# Patient Record
Sex: MALE | Race: WHITE | NOT HISPANIC OR LATINO | Employment: OTHER | URBAN - METROPOLITAN AREA
[De-identification: names, ages, dates, MRNs, and addresses within clinical notes are randomized per-mention and may not be internally consistent; named-entity substitution may affect disease eponyms.]

---

## 2017-04-18 ENCOUNTER — HOSPITAL ENCOUNTER (OUTPATIENT)
Dept: LAB | Facility: MEDICAL CENTER | Age: 77
End: 2017-04-18
Attending: UROLOGY
Payer: MEDICARE

## 2017-04-18 LAB
25(OH)D3 SERPL-MCNC: 26 NG/ML (ref 30–100)
ALBUMIN SERPL BCP-MCNC: 3.9 G/DL (ref 3.2–4.9)
ALBUMIN/GLOB SERPL: 1.3 G/DL
ALP SERPL-CCNC: 63 U/L (ref 30–99)
ALT SERPL-CCNC: 17 U/L (ref 2–50)
ANION GAP SERPL CALC-SCNC: 6 MMOL/L (ref 0–11.9)
AST SERPL-CCNC: 14 U/L (ref 12–45)
BASOPHILS # BLD AUTO: 0.9 % (ref 0–1.8)
BASOPHILS # BLD: 0.06 K/UL (ref 0–0.12)
BILIRUB SERPL-MCNC: 0.4 MG/DL (ref 0.1–1.5)
BUN SERPL-MCNC: 27 MG/DL (ref 8–22)
CALCIUM SERPL-MCNC: 9.8 MG/DL (ref 8.5–10.5)
CHLORIDE SERPL-SCNC: 105 MMOL/L (ref 96–112)
CO2 SERPL-SCNC: 27 MMOL/L (ref 20–33)
CREAT SERPL-MCNC: 1.78 MG/DL (ref 0.5–1.4)
EOSINOPHIL # BLD AUTO: 0.08 K/UL (ref 0–0.51)
EOSINOPHIL NFR BLD: 1.2 % (ref 0–6.9)
ERYTHROCYTE [DISTWIDTH] IN BLOOD BY AUTOMATED COUNT: 43 FL (ref 35.9–50)
GFR SERPL CREATININE-BSD FRML MDRD: 37 ML/MIN/1.73 M 2
GLOBULIN SER CALC-MCNC: 2.9 G/DL (ref 1.9–3.5)
GLUCOSE SERPL-MCNC: 142 MG/DL (ref 65–99)
HCT VFR BLD AUTO: 45.6 % (ref 42–52)
HGB BLD-MCNC: 15.3 G/DL (ref 14–18)
IMM GRANULOCYTES # BLD AUTO: 0.04 K/UL (ref 0–0.11)
IMM GRANULOCYTES NFR BLD AUTO: 0.6 % (ref 0–0.9)
LYMPHOCYTES # BLD AUTO: 1.88 K/UL (ref 1–4.8)
LYMPHOCYTES NFR BLD: 28 % (ref 22–41)
MCH RBC QN AUTO: 29.3 PG (ref 27–33)
MCHC RBC AUTO-ENTMCNC: 33.6 G/DL (ref 33.7–35.3)
MCV RBC AUTO: 87.4 FL (ref 81.4–97.8)
MONOCYTES # BLD AUTO: 0.75 K/UL (ref 0–0.85)
MONOCYTES NFR BLD AUTO: 11.2 % (ref 0–13.4)
NEUTROPHILS # BLD AUTO: 3.91 K/UL (ref 1.82–7.42)
NEUTROPHILS NFR BLD: 58.1 % (ref 44–72)
NRBC # BLD AUTO: 0 K/UL
NRBC BLD AUTO-RTO: 0 /100 WBC
PLATELET # BLD AUTO: 228 K/UL (ref 164–446)
PMV BLD AUTO: 11.6 FL (ref 9–12.9)
POTASSIUM SERPL-SCNC: 3.9 MMOL/L (ref 3.6–5.5)
PROT SERPL-MCNC: 6.8 G/DL (ref 6–8.2)
RBC # BLD AUTO: 5.22 M/UL (ref 4.7–6.1)
SODIUM SERPL-SCNC: 138 MMOL/L (ref 135–145)
TESTOST SERPL-MCNC: 389 NG/DL (ref 175–781)
WBC # BLD AUTO: 6.7 K/UL (ref 4.8–10.8)

## 2017-04-18 PROCEDURE — 84403 ASSAY OF TOTAL TESTOSTERONE: CPT

## 2017-04-18 PROCEDURE — 80053 COMPREHEN METABOLIC PANEL: CPT

## 2017-04-18 PROCEDURE — 36415 COLL VENOUS BLD VENIPUNCTURE: CPT

## 2017-04-18 PROCEDURE — 82306 VITAMIN D 25 HYDROXY: CPT

## 2017-04-18 PROCEDURE — 85025 COMPLETE CBC W/AUTO DIFF WBC: CPT

## 2017-05-25 ENCOUNTER — HOSPITAL ENCOUNTER (OUTPATIENT)
Dept: LAB | Facility: MEDICAL CENTER | Age: 77
End: 2017-05-25
Attending: FAMILY MEDICINE
Payer: MEDICARE

## 2017-05-25 LAB
APPEARANCE UR: CLEAR
BILIRUB UR QL STRIP.AUTO: NEGATIVE
COLOR UR: ABNORMAL
CREAT UR-MCNC: 90.1 MG/DL
CULTURE IF INDICATED INDCX: NO UA CULTURE
GFR SERPL CREATININE-BSD FRML MDRD: 40 ML/MIN/1.73 M 2
GLUCOSE UR STRIP.AUTO-MCNC: NEGATIVE MG/DL
KETONES UR STRIP.AUTO-MCNC: NEGATIVE MG/DL
LEUKOCYTE ESTERASE UR QL STRIP.AUTO: NEGATIVE
MICRO URNS: ABNORMAL
MICROALBUMIN UR-MCNC: 93.3 MG/DL
MICROALBUMIN/CREAT UR: 1036 MG/G (ref 0–30)
NITRITE UR QL STRIP.AUTO: NEGATIVE
PH UR STRIP.AUTO: 7 [PH]
PROT UR QL STRIP: 100 MG/DL
RBC # URNS HPF: ABNORMAL /HPF
RBC UR QL AUTO: NEGATIVE
SP GR UR STRIP.AUTO: 1.01
WBC #/AREA URNS HPF: ABNORMAL /HPF

## 2017-05-25 PROCEDURE — 36415 COLL VENOUS BLD VENIPUNCTURE: CPT

## 2017-05-25 PROCEDURE — 82570 ASSAY OF URINE CREATININE: CPT

## 2017-05-25 PROCEDURE — 81001 URINALYSIS AUTO W/SCOPE: CPT

## 2017-05-25 PROCEDURE — 83036 HEMOGLOBIN GLYCOSYLATED A1C: CPT

## 2017-05-25 PROCEDURE — 84153 ASSAY OF PSA TOTAL: CPT

## 2017-05-25 PROCEDURE — 80061 LIPID PANEL: CPT

## 2017-05-25 PROCEDURE — 82043 UR ALBUMIN QUANTITATIVE: CPT

## 2017-05-25 PROCEDURE — 80053 COMPREHEN METABOLIC PANEL: CPT

## 2017-05-26 LAB
ALBUMIN SERPL BCP-MCNC: 3.7 G/DL (ref 3.2–4.9)
ALBUMIN/GLOB SERPL: 1.3 G/DL
ALP SERPL-CCNC: 64 U/L (ref 30–99)
ALT SERPL-CCNC: 20 U/L (ref 2–50)
ANION GAP SERPL CALC-SCNC: 8 MMOL/L (ref 0–11.9)
AST SERPL-CCNC: 17 U/L (ref 12–45)
BILIRUB SERPL-MCNC: 0.6 MG/DL (ref 0.1–1.5)
BUN SERPL-MCNC: 27 MG/DL (ref 8–22)
CALCIUM SERPL-MCNC: 9.7 MG/DL (ref 8.5–10.5)
CHLORIDE SERPL-SCNC: 102 MMOL/L (ref 96–112)
CHOLEST SERPL-MCNC: 175 MG/DL (ref 100–199)
CO2 SERPL-SCNC: 28 MMOL/L (ref 20–33)
CREAT SERPL-MCNC: 1.69 MG/DL (ref 0.5–1.4)
EST. AVERAGE GLUCOSE BLD GHB EST-MCNC: 151 MG/DL
GLOBULIN SER CALC-MCNC: 2.9 G/DL (ref 1.9–3.5)
GLUCOSE SERPL-MCNC: 128 MG/DL (ref 65–99)
HBA1C MFR BLD: 6.9 % (ref 0–5.6)
HDLC SERPL-MCNC: 41 MG/DL
LDLC SERPL CALC-MCNC: 73 MG/DL
POTASSIUM SERPL-SCNC: 4 MMOL/L (ref 3.6–5.5)
PROT SERPL-MCNC: 6.6 G/DL (ref 6–8.2)
PSA SERPL-MCNC: 1.91 NG/ML (ref 0–4)
SODIUM SERPL-SCNC: 138 MMOL/L (ref 135–145)
TRIGL SERPL-MCNC: 303 MG/DL (ref 0–149)

## 2017-10-19 ENCOUNTER — APPOINTMENT (OUTPATIENT)
Dept: SOCIAL WORK | Facility: CLINIC | Age: 77
End: 2017-10-19
Payer: MEDICARE

## 2017-10-19 PROCEDURE — G0008 ADMIN INFLUENZA VIRUS VAC: HCPCS | Performed by: REGISTERED NURSE

## 2017-10-19 PROCEDURE — 90662 IIV NO PRSV INCREASED AG IM: CPT | Performed by: REGISTERED NURSE

## 2018-02-17 ENCOUNTER — HOSPITAL ENCOUNTER (OUTPATIENT)
Dept: LAB | Facility: MEDICAL CENTER | Age: 78
End: 2018-02-17
Attending: FAMILY MEDICINE
Payer: MEDICARE

## 2018-02-17 LAB
ALBUMIN SERPL BCP-MCNC: 4 G/DL (ref 3.2–4.9)
ALBUMIN/GLOB SERPL: 1.4 G/DL
ALP SERPL-CCNC: 65 U/L (ref 30–99)
ALT SERPL-CCNC: 15 U/L (ref 2–50)
ANION GAP SERPL CALC-SCNC: 11 MMOL/L (ref 0–11.9)
AST SERPL-CCNC: 15 U/L (ref 12–45)
BASOPHILS # BLD AUTO: 0.6 % (ref 0–1.8)
BASOPHILS # BLD: 0.07 K/UL (ref 0–0.12)
BILIRUB SERPL-MCNC: 0.4 MG/DL (ref 0.1–1.5)
BUN SERPL-MCNC: 24 MG/DL (ref 8–22)
CALCIUM SERPL-MCNC: 9.8 MG/DL (ref 8.5–10.5)
CHLORIDE SERPL-SCNC: 101 MMOL/L (ref 96–112)
CHOLEST SERPL-MCNC: 175 MG/DL (ref 100–199)
CO2 SERPL-SCNC: 26 MMOL/L (ref 20–33)
CREAT SERPL-MCNC: 1.61 MG/DL (ref 0.5–1.4)
CREAT UR-MCNC: 54.4 MG/DL
EOSINOPHIL # BLD AUTO: 0.14 K/UL (ref 0–0.51)
EOSINOPHIL NFR BLD: 1.1 % (ref 0–6.9)
ERYTHROCYTE [DISTWIDTH] IN BLOOD BY AUTOMATED COUNT: 44.3 FL (ref 35.9–50)
EST. AVERAGE GLUCOSE BLD GHB EST-MCNC: 157 MG/DL
GLOBULIN SER CALC-MCNC: 2.9 G/DL (ref 1.9–3.5)
GLUCOSE SERPL-MCNC: 122 MG/DL (ref 65–99)
HBA1C MFR BLD: 7.1 % (ref 0–5.6)
HCT VFR BLD AUTO: 46.7 % (ref 42–52)
HDLC SERPL-MCNC: 39 MG/DL
HGB BLD-MCNC: 15.3 G/DL (ref 14–18)
IMM GRANULOCYTES # BLD AUTO: 0.03 K/UL (ref 0–0.11)
IMM GRANULOCYTES NFR BLD AUTO: 0.2 % (ref 0–0.9)
LDLC SERPL CALC-MCNC: ABNORMAL MG/DL
LYMPHOCYTES # BLD AUTO: 2.18 K/UL (ref 1–4.8)
LYMPHOCYTES NFR BLD: 17.4 % (ref 22–41)
MCH RBC QN AUTO: 29.3 PG (ref 27–33)
MCHC RBC AUTO-ENTMCNC: 32.8 G/DL (ref 33.7–35.3)
MCV RBC AUTO: 89.3 FL (ref 81.4–97.8)
MICROALBUMIN UR-MCNC: 51.7 MG/DL
MICROALBUMIN/CREAT UR: 950 MG/G (ref 0–30)
MONOCYTES # BLD AUTO: 0.91 K/UL (ref 0–0.85)
MONOCYTES NFR BLD AUTO: 7.3 % (ref 0–13.4)
NEUTROPHILS # BLD AUTO: 9.22 K/UL (ref 1.82–7.42)
NEUTROPHILS NFR BLD: 73.4 % (ref 44–72)
NRBC # BLD AUTO: 0 K/UL
NRBC BLD-RTO: 0 /100 WBC
PLATELET # BLD AUTO: 258 K/UL (ref 164–446)
PMV BLD AUTO: 11.3 FL (ref 9–12.9)
POTASSIUM SERPL-SCNC: 3.8 MMOL/L (ref 3.6–5.5)
PROT SERPL-MCNC: 6.9 G/DL (ref 6–8.2)
RBC # BLD AUTO: 5.23 M/UL (ref 4.7–6.1)
SODIUM SERPL-SCNC: 138 MMOL/L (ref 135–145)
TRIGL SERPL-MCNC: 463 MG/DL (ref 0–149)
URATE SERPL-MCNC: 5.9 MG/DL (ref 2.5–8.3)
WBC # BLD AUTO: 12.6 K/UL (ref 4.8–10.8)

## 2018-02-17 PROCEDURE — 82043 UR ALBUMIN QUANTITATIVE: CPT

## 2018-02-17 PROCEDURE — 84550 ASSAY OF BLOOD/URIC ACID: CPT

## 2018-02-17 PROCEDURE — 36415 COLL VENOUS BLD VENIPUNCTURE: CPT

## 2018-02-17 PROCEDURE — 85025 COMPLETE CBC W/AUTO DIFF WBC: CPT

## 2018-02-17 PROCEDURE — 80053 COMPREHEN METABOLIC PANEL: CPT

## 2018-02-17 PROCEDURE — 80061 LIPID PANEL: CPT

## 2018-02-17 PROCEDURE — 82570 ASSAY OF URINE CREATININE: CPT

## 2018-02-17 PROCEDURE — 83036 HEMOGLOBIN GLYCOSYLATED A1C: CPT

## 2018-04-25 ENCOUNTER — HOSPITAL ENCOUNTER (OUTPATIENT)
Dept: LAB | Facility: MEDICAL CENTER | Age: 78
End: 2018-04-25
Attending: UROLOGY
Payer: MEDICARE

## 2018-04-25 LAB
ALBUMIN SERPL BCP-MCNC: 3.8 G/DL (ref 3.2–4.9)
ALBUMIN/GLOB SERPL: 1.3 G/DL
ALP SERPL-CCNC: 58 U/L (ref 30–99)
ALT SERPL-CCNC: 16 U/L (ref 2–50)
ANION GAP SERPL CALC-SCNC: 6 MMOL/L (ref 0–11.9)
AST SERPL-CCNC: 13 U/L (ref 12–45)
BILIRUB SERPL-MCNC: 0.4 MG/DL (ref 0.1–1.5)
BUN SERPL-MCNC: 33 MG/DL (ref 8–22)
CALCIUM SERPL-MCNC: 9.5 MG/DL (ref 8.5–10.5)
CHLORIDE SERPL-SCNC: 107 MMOL/L (ref 96–112)
CO2 SERPL-SCNC: 27 MMOL/L (ref 20–33)
CREAT SERPL-MCNC: 2.07 MG/DL (ref 0.5–1.4)
ERYTHROCYTE [DISTWIDTH] IN BLOOD BY AUTOMATED COUNT: 45.1 FL (ref 35.9–50)
GLOBULIN SER CALC-MCNC: 2.9 G/DL (ref 1.9–3.5)
GLUCOSE SERPL-MCNC: 131 MG/DL (ref 65–99)
HCT VFR BLD AUTO: 45.1 % (ref 42–52)
HGB BLD-MCNC: 14.4 G/DL (ref 14–18)
MCH RBC QN AUTO: 28.6 PG (ref 27–33)
MCHC RBC AUTO-ENTMCNC: 31.9 G/DL (ref 33.7–35.3)
MCV RBC AUTO: 89.5 FL (ref 81.4–97.8)
PLATELET # BLD AUTO: 253 K/UL (ref 164–446)
PMV BLD AUTO: 10.7 FL (ref 9–12.9)
POTASSIUM SERPL-SCNC: 3.7 MMOL/L (ref 3.6–5.5)
PROT SERPL-MCNC: 6.7 G/DL (ref 6–8.2)
RBC # BLD AUTO: 5.04 M/UL (ref 4.7–6.1)
SODIUM SERPL-SCNC: 140 MMOL/L (ref 135–145)
WBC # BLD AUTO: 6.9 K/UL (ref 4.8–10.8)

## 2018-04-25 PROCEDURE — 84270 ASSAY OF SEX HORMONE GLOBUL: CPT

## 2018-04-25 PROCEDURE — 85027 COMPLETE CBC AUTOMATED: CPT

## 2018-04-25 PROCEDURE — 84403 ASSAY OF TOTAL TESTOSTERONE: CPT

## 2018-04-25 PROCEDURE — 36415 COLL VENOUS BLD VENIPUNCTURE: CPT

## 2018-04-25 PROCEDURE — 80053 COMPREHEN METABOLIC PANEL: CPT

## 2018-04-27 LAB
SHBG SERPL-SCNC: 35 NMOL/L (ref 11–80)
TESTOST FREE MFR SERPL: 1.9 % (ref 1.6–2.9)
TESTOST FREE SERPL-MCNC: 121 PG/ML (ref 47–244)
TESTOST SERPL-MCNC: 637 NG/DL (ref 300–720)

## 2018-06-05 ENCOUNTER — HOSPITAL ENCOUNTER (OUTPATIENT)
Dept: LAB | Facility: MEDICAL CENTER | Age: 78
End: 2018-06-05
Attending: FAMILY MEDICINE
Payer: MEDICARE

## 2018-06-05 LAB
ALBUMIN SERPL BCP-MCNC: 4.1 G/DL (ref 3.2–4.9)
ALBUMIN/GLOB SERPL: 1.3 G/DL
ALP SERPL-CCNC: 61 U/L (ref 30–99)
ALT SERPL-CCNC: 19 U/L (ref 2–50)
ANION GAP SERPL CALC-SCNC: 10 MMOL/L (ref 0–11.9)
APPEARANCE UR: CLEAR
AST SERPL-CCNC: 12 U/L (ref 12–45)
BACTERIA #/AREA URNS HPF: NEGATIVE /HPF
BILIRUB SERPL-MCNC: 0.6 MG/DL (ref 0.1–1.5)
BILIRUB UR QL STRIP.AUTO: NEGATIVE
BUN SERPL-MCNC: 34 MG/DL (ref 8–22)
CALCIUM SERPL-MCNC: 10.3 MG/DL (ref 8.5–10.5)
CHLORIDE SERPL-SCNC: 103 MMOL/L (ref 96–112)
CO2 SERPL-SCNC: 26 MMOL/L (ref 20–33)
COLOR UR: YELLOW
CREAT SERPL-MCNC: 1.63 MG/DL (ref 0.5–1.4)
EPI CELLS #/AREA URNS HPF: NEGATIVE /HPF
EST. AVERAGE GLUCOSE BLD GHB EST-MCNC: 151 MG/DL
GLOBULIN SER CALC-MCNC: 3.1 G/DL (ref 1.9–3.5)
GLUCOSE SERPL-MCNC: 120 MG/DL (ref 65–99)
GLUCOSE UR STRIP.AUTO-MCNC: NEGATIVE MG/DL
HBA1C MFR BLD: 6.9 % (ref 0–5.6)
HYALINE CASTS #/AREA URNS LPF: ABNORMAL /LPF
KETONES UR STRIP.AUTO-MCNC: NEGATIVE MG/DL
LEUKOCYTE ESTERASE UR QL STRIP.AUTO: NEGATIVE
MICRO URNS: ABNORMAL
NITRITE UR QL STRIP.AUTO: NEGATIVE
PH UR STRIP.AUTO: 6.5 [PH]
POTASSIUM SERPL-SCNC: 3.9 MMOL/L (ref 3.6–5.5)
PROT SERPL-MCNC: 7.2 G/DL (ref 6–8.2)
PROT UR QL STRIP: 100 MG/DL
RBC # URNS HPF: ABNORMAL /HPF
RBC UR QL AUTO: NEGATIVE
SODIUM SERPL-SCNC: 139 MMOL/L (ref 135–145)
SP GR UR STRIP.AUTO: 1.01
UROBILINOGEN UR STRIP.AUTO-MCNC: 0.2 MG/DL
WBC #/AREA URNS HPF: ABNORMAL /HPF

## 2018-06-05 PROCEDURE — 83036 HEMOGLOBIN GLYCOSYLATED A1C: CPT

## 2018-06-05 PROCEDURE — 36415 COLL VENOUS BLD VENIPUNCTURE: CPT

## 2018-06-05 PROCEDURE — 81001 URINALYSIS AUTO W/SCOPE: CPT

## 2018-06-05 PROCEDURE — 80053 COMPREHEN METABOLIC PANEL: CPT

## 2018-08-14 ENCOUNTER — PATIENT OUTREACH (OUTPATIENT)
Dept: HEALTH INFORMATION MANAGEMENT | Facility: OTHER | Age: 78
End: 2018-08-14

## 2018-08-14 NOTE — PROGRESS NOTES
Outcome: Left Message    Please transfer to Patient Outreach Team at [phone] when patient returns call.    WebIZ Checked & Epic Updated:  yes    HealthConnect Verified: yes    Attempt # 1

## 2018-09-05 NOTE — PROGRESS NOTES
Outcome: Requested A Call Back     Please transfer to Patient Outreach Team at [phone] when patient returns call.      Attempt # 2

## 2018-09-17 NOTE — PROGRESS NOTES
1. Attempt #:3    2. WebIZ Checked & Epic Updated: Yes  3. HealthConnect Verified: yes  4. Verify PCP: yes    5. Communication Preference Obtained: yes    6. Diabetes Visit Scheduling  Scheduling Status:Not Scheduled. Patient states they are no longer with PCP      7. Care Gap Scheduling (Attempt to Schedule EACH Overdue Care Gap!)    Health Maintenance Due   Topic Date Due   • Annual Wellness Visit  1940   • DIABETES MONOFILAMENT / LE EXAM  1940   • RETINAL SCREENING  04/29/1959   • IMM HEP B VACCINE (1 of 3 - Risk 3-dose series) 04/29/1960   • IMM DTaP/Tdap/Td Vaccine (1 - Tdap) 04/29/1960   • COLONOSCOPY  04/29/1991   • IMM ZOSTER VACCINES (1 of 2) 04/29/1991   • IMM INFLUENZA (1) 09/01/2018        8. Patient was directed to Health and Wellness Website: no  Has a non [facility] pcp    9. Screened for Food [employer] Prescription? yes  10. Evirx Activation: already active  11. Evirx Latesha: no  12. Virtual Visits: no  13. Opt In to Text Messages: no

## 2018-09-18 ENCOUNTER — HOSPITAL ENCOUNTER (OUTPATIENT)
Dept: LAB | Facility: MEDICAL CENTER | Age: 78
End: 2018-09-18
Attending: INTERNAL MEDICINE
Payer: MEDICARE

## 2018-09-18 LAB
25(OH)D3 SERPL-MCNC: 40 NG/ML (ref 30–100)
APPEARANCE UR: CLEAR
BACTERIA #/AREA URNS HPF: NEGATIVE /HPF
BASOPHILS # BLD AUTO: 0.4 % (ref 0–1.8)
BASOPHILS # BLD: 0.03 K/UL (ref 0–0.12)
BILIRUB UR QL STRIP.AUTO: NEGATIVE
COLOR UR: YELLOW
CREAT UR-MCNC: 65.5 MG/DL
CREAT UR-MCNC: 66.3 MG/DL
EOSINOPHIL # BLD AUTO: 0.11 K/UL (ref 0–0.51)
EOSINOPHIL NFR BLD: 1.4 % (ref 0–6.9)
EPI CELLS #/AREA URNS HPF: NEGATIVE /HPF
ERYTHROCYTE [DISTWIDTH] IN BLOOD BY AUTOMATED COUNT: 46.1 FL (ref 35.9–50)
EST. AVERAGE GLUCOSE BLD GHB EST-MCNC: 169 MG/DL
GLUCOSE UR STRIP.AUTO-MCNC: NEGATIVE MG/DL
HBA1C MFR BLD: 7.5 % (ref 0–5.6)
HCT VFR BLD AUTO: 47.3 % (ref 42–52)
HGB BLD-MCNC: 15.4 G/DL (ref 14–18)
HYALINE CASTS #/AREA URNS LPF: ABNORMAL /LPF
IMM GRANULOCYTES # BLD AUTO: 0.03 K/UL (ref 0–0.11)
IMM GRANULOCYTES NFR BLD AUTO: 0.4 % (ref 0–0.9)
KETONES UR STRIP.AUTO-MCNC: NEGATIVE MG/DL
LEUKOCYTE ESTERASE UR QL STRIP.AUTO: NEGATIVE
LYMPHOCYTES # BLD AUTO: 1.74 K/UL (ref 1–4.8)
LYMPHOCYTES NFR BLD: 22.6 % (ref 22–41)
MCH RBC QN AUTO: 29.4 PG (ref 27–33)
MCHC RBC AUTO-ENTMCNC: 32.6 G/DL (ref 33.7–35.3)
MCV RBC AUTO: 90.3 FL (ref 81.4–97.8)
MICRO URNS: ABNORMAL
MICROALBUMIN UR-MCNC: 44.1 MG/DL
MICROALBUMIN/CREAT UR: 673 MG/G (ref 0–30)
MONOCYTES # BLD AUTO: 0.69 K/UL (ref 0–0.85)
MONOCYTES NFR BLD AUTO: 9 % (ref 0–13.4)
NEUTROPHILS # BLD AUTO: 5.09 K/UL (ref 1.82–7.42)
NEUTROPHILS NFR BLD: 66.2 % (ref 44–72)
NITRITE UR QL STRIP.AUTO: NEGATIVE
NRBC # BLD AUTO: 0 K/UL
NRBC BLD-RTO: 0 /100 WBC
PH UR STRIP.AUTO: 6.5 [PH]
PLATELET # BLD AUTO: 250 K/UL (ref 164–446)
PMV BLD AUTO: 11 FL (ref 9–12.9)
PROT UR QL STRIP: 100 MG/DL
PROT UR-MCNC: 61.9 MG/DL (ref 0–15)
PROT/CREAT UR: 934 MG/G (ref 15–68)
PTH-INTACT SERPL-MCNC: 65.2 PG/ML (ref 14–72)
RBC # BLD AUTO: 5.24 M/UL (ref 4.7–6.1)
RBC # URNS HPF: ABNORMAL /HPF
RBC UR QL AUTO: NEGATIVE
SP GR UR STRIP.AUTO: 1.01
UROBILINOGEN UR STRIP.AUTO-MCNC: 0.2 MG/DL
WBC # BLD AUTO: 7.7 K/UL (ref 4.8–10.8)
WBC #/AREA URNS HPF: ABNORMAL /HPF

## 2018-09-18 PROCEDURE — 82043 UR ALBUMIN QUANTITATIVE: CPT

## 2018-09-18 PROCEDURE — 83970 ASSAY OF PARATHORMONE: CPT

## 2018-09-18 PROCEDURE — 80069 RENAL FUNCTION PANEL: CPT

## 2018-09-18 PROCEDURE — 81001 URINALYSIS AUTO W/SCOPE: CPT

## 2018-09-18 PROCEDURE — 83735 ASSAY OF MAGNESIUM: CPT

## 2018-09-18 PROCEDURE — 36415 COLL VENOUS BLD VENIPUNCTURE: CPT

## 2018-09-18 PROCEDURE — 83036 HEMOGLOBIN GLYCOSYLATED A1C: CPT

## 2018-09-18 PROCEDURE — 82570 ASSAY OF URINE CREATININE: CPT | Mod: 91

## 2018-09-18 PROCEDURE — 84550 ASSAY OF BLOOD/URIC ACID: CPT

## 2018-09-18 PROCEDURE — 85025 COMPLETE CBC W/AUTO DIFF WBC: CPT

## 2018-09-18 PROCEDURE — 82306 VITAMIN D 25 HYDROXY: CPT

## 2018-09-18 PROCEDURE — 84156 ASSAY OF PROTEIN URINE: CPT

## 2018-09-19 LAB
ALBUMIN SERPL BCP-MCNC: 4 G/DL (ref 3.2–4.9)
BUN SERPL-MCNC: 28 MG/DL (ref 8–22)
CALCIUM SERPL-MCNC: 9.5 MG/DL (ref 8.5–10.5)
CHLORIDE SERPL-SCNC: 105 MMOL/L (ref 96–112)
CO2 SERPL-SCNC: 25 MMOL/L (ref 20–33)
CREAT SERPL-MCNC: 1.71 MG/DL (ref 0.5–1.4)
FASTING STATUS PATIENT QL REPORTED: NORMAL
GLUCOSE SERPL-MCNC: 115 MG/DL (ref 65–99)
MAGNESIUM SERPL-MCNC: 2 MG/DL (ref 1.5–2.5)
PHOSPHATE SERPL-MCNC: 3 MG/DL (ref 2.5–4.5)
POTASSIUM SERPL-SCNC: 4.1 MMOL/L (ref 3.6–5.5)
SODIUM SERPL-SCNC: 141 MMOL/L (ref 135–145)
URATE SERPL-MCNC: 6.5 MG/DL (ref 2.5–8.3)

## 2018-11-08 ENCOUNTER — IMMUNIZATION (OUTPATIENT)
Dept: SOCIAL WORK | Facility: CLINIC | Age: 78
End: 2018-11-08
Payer: MEDICARE

## 2018-11-08 DIAGNOSIS — Z23 NEED FOR VACCINATION: ICD-10-CM

## 2018-11-08 PROCEDURE — G0008 ADMIN INFLUENZA VIRUS VAC: HCPCS | Performed by: REGISTERED NURSE

## 2018-11-08 PROCEDURE — 90662 IIV NO PRSV INCREASED AG IM: CPT | Performed by: REGISTERED NURSE

## 2018-12-10 ENCOUNTER — HOSPITAL ENCOUNTER (OUTPATIENT)
Dept: LAB | Facility: MEDICAL CENTER | Age: 78
End: 2018-12-10
Attending: FAMILY MEDICINE
Payer: MEDICARE

## 2018-12-10 LAB
ALBUMIN SERPL BCP-MCNC: 4 G/DL (ref 3.2–4.9)
ALBUMIN/GLOB SERPL: 1.4 G/DL
ALP SERPL-CCNC: 72 U/L (ref 30–99)
ALT SERPL-CCNC: 17 U/L (ref 2–50)
ANION GAP SERPL CALC-SCNC: 10 MMOL/L (ref 0–11.9)
APPEARANCE UR: CLEAR
AST SERPL-CCNC: 14 U/L (ref 12–45)
BACTERIA #/AREA URNS HPF: NEGATIVE /HPF
BASOPHILS # BLD AUTO: 0.6 % (ref 0–1.8)
BASOPHILS # BLD: 0.05 K/UL (ref 0–0.12)
BILIRUB SERPL-MCNC: 0.6 MG/DL (ref 0.1–1.5)
BILIRUB UR QL STRIP.AUTO: NEGATIVE
BUN SERPL-MCNC: 34 MG/DL (ref 8–22)
CALCIUM SERPL-MCNC: 9.9 MG/DL (ref 8.5–10.5)
CHLORIDE SERPL-SCNC: 103 MMOL/L (ref 96–112)
CHOLEST SERPL-MCNC: 167 MG/DL (ref 100–199)
CO2 SERPL-SCNC: 26 MMOL/L (ref 20–33)
COLOR UR: YELLOW
CREAT SERPL-MCNC: 1.97 MG/DL (ref 0.5–1.4)
CREAT UR-MCNC: 84.4 MG/DL
EOSINOPHIL # BLD AUTO: 0.19 K/UL (ref 0–0.51)
EOSINOPHIL NFR BLD: 2.1 % (ref 0–6.9)
EPI CELLS #/AREA URNS HPF: NEGATIVE /HPF
ERYTHROCYTE [DISTWIDTH] IN BLOOD BY AUTOMATED COUNT: 45.5 FL (ref 35.9–50)
FASTING STATUS PATIENT QL REPORTED: NORMAL
FOLATE SERPL-MCNC: 22.9 NG/ML
GLOBULIN SER CALC-MCNC: 2.9 G/DL (ref 1.9–3.5)
GLUCOSE SERPL-MCNC: 145 MG/DL (ref 65–99)
GLUCOSE UR STRIP.AUTO-MCNC: NEGATIVE MG/DL
HCT VFR BLD AUTO: 45.7 % (ref 42–52)
HDLC SERPL-MCNC: 41 MG/DL
HGB BLD-MCNC: 15.3 G/DL (ref 14–18)
HYALINE CASTS #/AREA URNS LPF: ABNORMAL /LPF
IMM GRANULOCYTES # BLD AUTO: 0.03 K/UL (ref 0–0.11)
IMM GRANULOCYTES NFR BLD AUTO: 0.3 % (ref 0–0.9)
KETONES UR STRIP.AUTO-MCNC: NEGATIVE MG/DL
LDLC SERPL CALC-MCNC: 69 MG/DL
LEUKOCYTE ESTERASE UR QL STRIP.AUTO: NEGATIVE
LYMPHOCYTES # BLD AUTO: 2.29 K/UL (ref 1–4.8)
LYMPHOCYTES NFR BLD: 25.2 % (ref 22–41)
MCH RBC QN AUTO: 29.7 PG (ref 27–33)
MCHC RBC AUTO-ENTMCNC: 33.5 G/DL (ref 33.7–35.3)
MCV RBC AUTO: 88.7 FL (ref 81.4–97.8)
MICRO URNS: ABNORMAL
MICROALBUMIN UR-MCNC: 42.8 MG/DL
MICROALBUMIN/CREAT UR: 507 MG/G (ref 0–30)
MONOCYTES # BLD AUTO: 0.83 K/UL (ref 0–0.85)
MONOCYTES NFR BLD AUTO: 9.1 % (ref 0–13.4)
NEUTROPHILS # BLD AUTO: 5.69 K/UL (ref 1.82–7.42)
NEUTROPHILS NFR BLD: 62.7 % (ref 44–72)
NITRITE UR QL STRIP.AUTO: NEGATIVE
NRBC # BLD AUTO: 0 K/UL
NRBC BLD-RTO: 0 /100 WBC
PH UR STRIP.AUTO: 6.5 [PH]
PLATELET # BLD AUTO: 236 K/UL (ref 164–446)
PMV BLD AUTO: 11.3 FL (ref 9–12.9)
POTASSIUM SERPL-SCNC: 3.9 MMOL/L (ref 3.6–5.5)
PROT SERPL-MCNC: 6.9 G/DL (ref 6–8.2)
PROT UR QL STRIP: 100 MG/DL
RBC # BLD AUTO: 5.15 M/UL (ref 4.7–6.1)
RBC # URNS HPF: ABNORMAL /HPF
RBC UR QL AUTO: NEGATIVE
SODIUM SERPL-SCNC: 139 MMOL/L (ref 135–145)
SP GR UR STRIP.AUTO: 1.01
T4 FREE SERPL-MCNC: 0.85 NG/DL (ref 0.53–1.43)
TRIGL SERPL-MCNC: 286 MG/DL (ref 0–149)
TSH SERPL DL<=0.005 MIU/L-ACNC: 1.53 UIU/ML (ref 0.38–5.33)
UROBILINOGEN UR STRIP.AUTO-MCNC: 0.2 MG/DL
VIT B12 SERPL-MCNC: 316 PG/ML (ref 211–911)
WBC # BLD AUTO: 9.1 K/UL (ref 4.8–10.8)
WBC #/AREA URNS HPF: ABNORMAL /HPF

## 2018-12-10 PROCEDURE — 80061 LIPID PANEL: CPT

## 2018-12-10 PROCEDURE — 84439 ASSAY OF FREE THYROXINE: CPT

## 2018-12-10 PROCEDURE — 36415 COLL VENOUS BLD VENIPUNCTURE: CPT

## 2018-12-10 PROCEDURE — 82607 VITAMIN B-12: CPT

## 2018-12-10 PROCEDURE — 80053 COMPREHEN METABOLIC PANEL: CPT

## 2018-12-10 PROCEDURE — 82043 UR ALBUMIN QUANTITATIVE: CPT

## 2018-12-10 PROCEDURE — 81001 URINALYSIS AUTO W/SCOPE: CPT

## 2018-12-10 PROCEDURE — 82570 ASSAY OF URINE CREATININE: CPT

## 2018-12-10 PROCEDURE — 82746 ASSAY OF FOLIC ACID SERUM: CPT

## 2018-12-10 PROCEDURE — 85025 COMPLETE CBC W/AUTO DIFF WBC: CPT

## 2018-12-10 PROCEDURE — 83036 HEMOGLOBIN GLYCOSYLATED A1C: CPT

## 2018-12-10 PROCEDURE — 84443 ASSAY THYROID STIM HORMONE: CPT

## 2018-12-12 LAB
EST. AVERAGE GLUCOSE BLD GHB EST-MCNC: 160 MG/DL
HBA1C MFR BLD: 7.2 % (ref 0–5.6)

## 2019-04-08 ENCOUNTER — HOSPITAL ENCOUNTER (OUTPATIENT)
Dept: LAB | Facility: MEDICAL CENTER | Age: 79
End: 2019-04-08
Attending: NURSE PRACTITIONER
Payer: MEDICARE

## 2019-04-08 LAB
ALBUMIN SERPL BCP-MCNC: 4.1 G/DL (ref 3.2–4.9)
ALBUMIN/GLOB SERPL: 1.4 G/DL
ALP SERPL-CCNC: 62 U/L (ref 30–99)
ALT SERPL-CCNC: 16 U/L (ref 2–50)
ANION GAP SERPL CALC-SCNC: 7 MMOL/L (ref 0–11.9)
APPEARANCE UR: CLEAR
AST SERPL-CCNC: 16 U/L (ref 12–45)
BACTERIA #/AREA URNS HPF: NEGATIVE /HPF
BASOPHILS # BLD AUTO: 0.7 % (ref 0–1.8)
BASOPHILS # BLD: 0.06 K/UL (ref 0–0.12)
BILIRUB SERPL-MCNC: 0.4 MG/DL (ref 0.1–1.5)
BILIRUB UR QL STRIP.AUTO: NEGATIVE
BUN SERPL-MCNC: 33 MG/DL (ref 8–22)
CALCIUM SERPL-MCNC: 9.8 MG/DL (ref 8.5–10.5)
CHLORIDE SERPL-SCNC: 103 MMOL/L (ref 96–112)
CO2 SERPL-SCNC: 28 MMOL/L (ref 20–33)
COLOR UR: YELLOW
CREAT SERPL-MCNC: 1.98 MG/DL (ref 0.5–1.4)
EOSINOPHIL # BLD AUTO: 0.17 K/UL (ref 0–0.51)
EOSINOPHIL NFR BLD: 2 % (ref 0–6.9)
EPI CELLS #/AREA URNS HPF: NEGATIVE /HPF
ERYTHROCYTE [DISTWIDTH] IN BLOOD BY AUTOMATED COUNT: 45.3 FL (ref 35.9–50)
GLOBULIN SER CALC-MCNC: 2.9 G/DL (ref 1.9–3.5)
GLUCOSE SERPL-MCNC: 100 MG/DL (ref 65–99)
GLUCOSE UR STRIP.AUTO-MCNC: NEGATIVE MG/DL
HCT VFR BLD AUTO: 45.8 % (ref 42–52)
HGB BLD-MCNC: 15.3 G/DL (ref 14–18)
HYALINE CASTS #/AREA URNS LPF: ABNORMAL /LPF
IMM GRANULOCYTES # BLD AUTO: 0.02 K/UL (ref 0–0.11)
IMM GRANULOCYTES NFR BLD AUTO: 0.2 % (ref 0–0.9)
KETONES UR STRIP.AUTO-MCNC: NEGATIVE MG/DL
LEUKOCYTE ESTERASE UR QL STRIP.AUTO: NEGATIVE
LYMPHOCYTES # BLD AUTO: 2.23 K/UL (ref 1–4.8)
LYMPHOCYTES NFR BLD: 26.7 % (ref 22–41)
MCH RBC QN AUTO: 29.8 PG (ref 27–33)
MCHC RBC AUTO-ENTMCNC: 33.4 G/DL (ref 33.7–35.3)
MCV RBC AUTO: 89.1 FL (ref 81.4–97.8)
MICRO URNS: ABNORMAL
MONOCYTES # BLD AUTO: 0.83 K/UL (ref 0–0.85)
MONOCYTES NFR BLD AUTO: 9.9 % (ref 0–13.4)
NEUTROPHILS # BLD AUTO: 5.04 K/UL (ref 1.82–7.42)
NEUTROPHILS NFR BLD: 60.5 % (ref 44–72)
NITRITE UR QL STRIP.AUTO: NEGATIVE
NRBC # BLD AUTO: 0 K/UL
NRBC BLD-RTO: 0 /100 WBC
PH UR STRIP.AUTO: 6.5 [PH]
PLATELET # BLD AUTO: 248 K/UL (ref 164–446)
PMV BLD AUTO: 11.3 FL (ref 9–12.9)
POTASSIUM SERPL-SCNC: 4.2 MMOL/L (ref 3.6–5.5)
PROT SERPL-MCNC: 7 G/DL (ref 6–8.2)
PROT UR QL STRIP: 300 MG/DL
RBC # BLD AUTO: 5.14 M/UL (ref 4.7–6.1)
RBC # URNS HPF: ABNORMAL /HPF
RBC UR QL AUTO: NEGATIVE
SODIUM SERPL-SCNC: 138 MMOL/L (ref 135–145)
SP GR UR STRIP.AUTO: 1.02
UROBILINOGEN UR STRIP.AUTO-MCNC: 0.2 MG/DL
WBC # BLD AUTO: 8.4 K/UL (ref 4.8–10.8)
WBC #/AREA URNS HPF: ABNORMAL /HPF

## 2019-04-08 PROCEDURE — 36415 COLL VENOUS BLD VENIPUNCTURE: CPT

## 2019-04-08 PROCEDURE — 85025 COMPLETE CBC W/AUTO DIFF WBC: CPT

## 2019-04-08 PROCEDURE — 81001 URINALYSIS AUTO W/SCOPE: CPT

## 2019-04-08 PROCEDURE — 80053 COMPREHEN METABOLIC PANEL: CPT

## 2019-05-11 ENCOUNTER — HOSPITAL ENCOUNTER (OUTPATIENT)
Dept: LAB | Facility: MEDICAL CENTER | Age: 79
End: 2019-05-11
Attending: FAMILY MEDICINE
Payer: MEDICARE

## 2019-05-11 LAB
ALBUMIN SERPL BCP-MCNC: 3.8 G/DL (ref 3.2–4.9)
ALBUMIN/GLOB SERPL: 1.3 G/DL
ALP SERPL-CCNC: 69 U/L (ref 30–99)
ALT SERPL-CCNC: 16 U/L (ref 2–50)
ANION GAP SERPL CALC-SCNC: 8 MMOL/L (ref 0–11.9)
APPEARANCE UR: CLEAR
AST SERPL-CCNC: 15 U/L (ref 12–45)
BACTERIA #/AREA URNS HPF: NEGATIVE /HPF
BASOPHILS # BLD AUTO: 0.5 % (ref 0–1.8)
BASOPHILS # BLD: 0.04 K/UL (ref 0–0.12)
BILIRUB SERPL-MCNC: 0.6 MG/DL (ref 0.1–1.5)
BILIRUB UR QL STRIP.AUTO: NEGATIVE
BUN SERPL-MCNC: 31 MG/DL (ref 8–22)
CALCIUM SERPL-MCNC: 9.7 MG/DL (ref 8.5–10.5)
CHLORIDE SERPL-SCNC: 102 MMOL/L (ref 96–112)
CHOLEST SERPL-MCNC: 168 MG/DL (ref 100–199)
CO2 SERPL-SCNC: 27 MMOL/L (ref 20–33)
COLOR UR: YELLOW
CREAT SERPL-MCNC: 1.72 MG/DL (ref 0.5–1.4)
CREAT UR-MCNC: 64.7 MG/DL
EOSINOPHIL # BLD AUTO: 0.18 K/UL (ref 0–0.51)
EOSINOPHIL NFR BLD: 2.2 % (ref 0–6.9)
EPI CELLS #/AREA URNS HPF: NEGATIVE /HPF
ERYTHROCYTE [DISTWIDTH] IN BLOOD BY AUTOMATED COUNT: 45.9 FL (ref 35.9–50)
EST. AVERAGE GLUCOSE BLD GHB EST-MCNC: 163 MG/DL
FASTING STATUS PATIENT QL REPORTED: NORMAL
GLOBULIN SER CALC-MCNC: 3 G/DL (ref 1.9–3.5)
GLUCOSE SERPL-MCNC: 143 MG/DL (ref 65–99)
GLUCOSE UR STRIP.AUTO-MCNC: NEGATIVE MG/DL
HBA1C MFR BLD: 7.3 % (ref 0–5.6)
HCT VFR BLD AUTO: 46.9 % (ref 42–52)
HDLC SERPL-MCNC: 38 MG/DL
HGB BLD-MCNC: 15.4 G/DL (ref 14–18)
HYALINE CASTS #/AREA URNS LPF: ABNORMAL /LPF
IMM GRANULOCYTES # BLD AUTO: 0.03 K/UL (ref 0–0.11)
IMM GRANULOCYTES NFR BLD AUTO: 0.4 % (ref 0–0.9)
KETONES UR STRIP.AUTO-MCNC: NEGATIVE MG/DL
LDLC SERPL CALC-MCNC: 55 MG/DL
LEUKOCYTE ESTERASE UR QL STRIP.AUTO: NEGATIVE
LYMPHOCYTES # BLD AUTO: 2.11 K/UL (ref 1–4.8)
LYMPHOCYTES NFR BLD: 26 % (ref 22–41)
MCH RBC QN AUTO: 29.4 PG (ref 27–33)
MCHC RBC AUTO-ENTMCNC: 32.8 G/DL (ref 33.7–35.3)
MCV RBC AUTO: 89.7 FL (ref 81.4–97.8)
MICRO URNS: ABNORMAL
MICROALBUMIN UR-MCNC: 74.5 MG/DL
MICROALBUMIN/CREAT UR: 1151 MG/G (ref 0–30)
MONOCYTES # BLD AUTO: 0.79 K/UL (ref 0–0.85)
MONOCYTES NFR BLD AUTO: 9.7 % (ref 0–13.4)
NEUTROPHILS # BLD AUTO: 4.97 K/UL (ref 1.82–7.42)
NEUTROPHILS NFR BLD: 61.2 % (ref 44–72)
NITRITE UR QL STRIP.AUTO: NEGATIVE
NRBC # BLD AUTO: 0 K/UL
NRBC BLD-RTO: 0 /100 WBC
PH UR STRIP.AUTO: 6.5 [PH]
PLATELET # BLD AUTO: 224 K/UL (ref 164–446)
PMV BLD AUTO: 11.1 FL (ref 9–12.9)
POTASSIUM SERPL-SCNC: 3.8 MMOL/L (ref 3.6–5.5)
PROT SERPL-MCNC: 6.8 G/DL (ref 6–8.2)
PROT UR QL STRIP: 100 MG/DL
RBC # BLD AUTO: 5.23 M/UL (ref 4.7–6.1)
RBC # URNS HPF: ABNORMAL /HPF
RBC UR QL AUTO: NEGATIVE
SODIUM SERPL-SCNC: 137 MMOL/L (ref 135–145)
SP GR UR STRIP.AUTO: 1.01
T4 FREE SERPL-MCNC: 0.79 NG/DL (ref 0.53–1.43)
TRIGL SERPL-MCNC: 377 MG/DL (ref 0–149)
TSH SERPL DL<=0.005 MIU/L-ACNC: 1.77 UIU/ML (ref 0.38–5.33)
UROBILINOGEN UR STRIP.AUTO-MCNC: 0.2 MG/DL
WBC # BLD AUTO: 8.1 K/UL (ref 4.8–10.8)
WBC #/AREA URNS HPF: ABNORMAL /HPF

## 2019-05-11 PROCEDURE — 84443 ASSAY THYROID STIM HORMONE: CPT

## 2019-05-11 PROCEDURE — 82043 UR ALBUMIN QUANTITATIVE: CPT

## 2019-05-11 PROCEDURE — 36415 COLL VENOUS BLD VENIPUNCTURE: CPT

## 2019-05-11 PROCEDURE — 80053 COMPREHEN METABOLIC PANEL: CPT

## 2019-05-11 PROCEDURE — 85025 COMPLETE CBC W/AUTO DIFF WBC: CPT

## 2019-05-11 PROCEDURE — 81001 URINALYSIS AUTO W/SCOPE: CPT

## 2019-05-11 PROCEDURE — 83036 HEMOGLOBIN GLYCOSYLATED A1C: CPT

## 2019-05-11 PROCEDURE — 80061 LIPID PANEL: CPT

## 2019-05-11 PROCEDURE — 84439 ASSAY OF FREE THYROXINE: CPT

## 2019-05-11 PROCEDURE — 82570 ASSAY OF URINE CREATININE: CPT

## 2019-07-24 ENCOUNTER — HOSPITAL ENCOUNTER (OUTPATIENT)
Dept: LAB | Facility: MEDICAL CENTER | Age: 79
End: 2019-07-24
Attending: INTERNAL MEDICINE
Payer: MEDICARE

## 2019-07-24 LAB
25(OH)D3 SERPL-MCNC: 30 NG/ML (ref 30–100)
ALBUMIN SERPL BCP-MCNC: 4 G/DL (ref 3.2–4.9)
APPEARANCE UR: CLEAR
BACTERIA #/AREA URNS HPF: NEGATIVE /HPF
BASOPHILS # BLD AUTO: 0.6 % (ref 0–1.8)
BASOPHILS # BLD: 0.05 K/UL (ref 0–0.12)
BILIRUB UR QL STRIP.AUTO: NEGATIVE
BUN SERPL-MCNC: 29 MG/DL (ref 8–22)
CALCIUM SERPL-MCNC: 9.9 MG/DL (ref 8.5–10.5)
CHLORIDE SERPL-SCNC: 102 MMOL/L (ref 96–112)
CO2 SERPL-SCNC: 25 MMOL/L (ref 20–33)
COLOR UR: YELLOW
CREAT SERPL-MCNC: 2.08 MG/DL (ref 0.5–1.4)
CREAT UR-MCNC: 77.7 MG/DL
EOSINOPHIL # BLD AUTO: 0.16 K/UL (ref 0–0.51)
EOSINOPHIL NFR BLD: 2 % (ref 0–6.9)
EPI CELLS #/AREA URNS HPF: NEGATIVE /HPF
ERYTHROCYTE [DISTWIDTH] IN BLOOD BY AUTOMATED COUNT: 44.8 FL (ref 35.9–50)
FERRITIN SERPL-MCNC: 95.6 NG/ML (ref 22–322)
GLUCOSE SERPL-MCNC: 150 MG/DL (ref 65–99)
GLUCOSE UR STRIP.AUTO-MCNC: NEGATIVE MG/DL
HCT VFR BLD AUTO: 49.2 % (ref 42–52)
HGB BLD-MCNC: 15.7 G/DL (ref 14–18)
HYALINE CASTS #/AREA URNS LPF: ABNORMAL /LPF
IMM GRANULOCYTES # BLD AUTO: 0.03 K/UL (ref 0–0.11)
IMM GRANULOCYTES NFR BLD AUTO: 0.4 % (ref 0–0.9)
IRON SATN MFR SERPL: 28 % (ref 15–55)
IRON SERPL-MCNC: 92 UG/DL (ref 50–180)
KETONES UR STRIP.AUTO-MCNC: NEGATIVE MG/DL
LEUKOCYTE ESTERASE UR QL STRIP.AUTO: NEGATIVE
LYMPHOCYTES # BLD AUTO: 2.22 K/UL (ref 1–4.8)
LYMPHOCYTES NFR BLD: 27.3 % (ref 22–41)
MAGNESIUM SERPL-MCNC: 2.1 MG/DL (ref 1.5–2.5)
MCH RBC QN AUTO: 28.4 PG (ref 27–33)
MCHC RBC AUTO-ENTMCNC: 31.9 G/DL (ref 33.7–35.3)
MCV RBC AUTO: 89 FL (ref 81.4–97.8)
MICRO URNS: ABNORMAL
MONOCYTES # BLD AUTO: 0.7 K/UL (ref 0–0.85)
MONOCYTES NFR BLD AUTO: 8.6 % (ref 0–13.4)
NEUTROPHILS # BLD AUTO: 4.98 K/UL (ref 1.82–7.42)
NEUTROPHILS NFR BLD: 61.1 % (ref 44–72)
NITRITE UR QL STRIP.AUTO: NEGATIVE
NRBC # BLD AUTO: 0 K/UL
NRBC BLD-RTO: 0 /100 WBC
PH UR STRIP.AUTO: 6 [PH]
PHOSPHATE SERPL-MCNC: 3.2 MG/DL (ref 2.5–4.5)
PLATELET # BLD AUTO: 262 K/UL (ref 164–446)
PMV BLD AUTO: 11.2 FL (ref 9–12.9)
POTASSIUM SERPL-SCNC: 3.8 MMOL/L (ref 3.6–5.5)
PROT UR QL STRIP: 100 MG/DL
PROT UR-MCNC: 133.4 MG/DL (ref 0–15)
PROT/CREAT UR: 1717 MG/G (ref 15–68)
PTH-INTACT SERPL-MCNC: 73 PG/ML (ref 14–72)
RBC # BLD AUTO: 5.53 M/UL (ref 4.7–6.1)
RBC # URNS HPF: ABNORMAL /HPF
RBC UR QL AUTO: NEGATIVE
SODIUM SERPL-SCNC: 136 MMOL/L (ref 135–145)
SP GR UR STRIP.AUTO: 1.01
TIBC SERPL-MCNC: 323 UG/DL (ref 250–450)
URATE SERPL-MCNC: 6.5 MG/DL (ref 2.5–8.3)
UROBILINOGEN UR STRIP.AUTO-MCNC: 0.2 MG/DL
WBC # BLD AUTO: 8.1 K/UL (ref 4.8–10.8)
WBC #/AREA URNS HPF: ABNORMAL /HPF

## 2019-07-24 PROCEDURE — 83550 IRON BINDING TEST: CPT

## 2019-07-24 PROCEDURE — 83540 ASSAY OF IRON: CPT

## 2019-07-24 PROCEDURE — 83970 ASSAY OF PARATHORMONE: CPT

## 2019-07-24 PROCEDURE — 82306 VITAMIN D 25 HYDROXY: CPT

## 2019-07-24 PROCEDURE — 81001 URINALYSIS AUTO W/SCOPE: CPT

## 2019-07-24 PROCEDURE — 85025 COMPLETE CBC W/AUTO DIFF WBC: CPT

## 2019-07-24 PROCEDURE — 84550 ASSAY OF BLOOD/URIC ACID: CPT

## 2019-07-24 PROCEDURE — 36415 COLL VENOUS BLD VENIPUNCTURE: CPT

## 2019-07-24 PROCEDURE — 83735 ASSAY OF MAGNESIUM: CPT

## 2019-07-24 PROCEDURE — 84156 ASSAY OF PROTEIN URINE: CPT

## 2019-07-24 PROCEDURE — 82570 ASSAY OF URINE CREATININE: CPT

## 2019-07-24 PROCEDURE — 80069 RENAL FUNCTION PANEL: CPT

## 2019-07-24 PROCEDURE — 82728 ASSAY OF FERRITIN: CPT

## 2019-08-26 ENCOUNTER — HOSPITAL ENCOUNTER (OUTPATIENT)
Dept: LAB | Facility: MEDICAL CENTER | Age: 79
End: 2019-08-26
Attending: INTERNAL MEDICINE
Payer: MEDICARE

## 2019-08-26 LAB
ALBUMIN SERPL BCP-MCNC: 3.9 G/DL (ref 3.2–4.9)
APPEARANCE UR: CLEAR
BACTERIA #/AREA URNS HPF: NEGATIVE /HPF
BASOPHILS # BLD AUTO: 0.4 % (ref 0–1.8)
BASOPHILS # BLD: 0.04 K/UL (ref 0–0.12)
BILIRUB UR QL STRIP.AUTO: NEGATIVE
BUN SERPL-MCNC: 35 MG/DL (ref 8–22)
CALCIUM SERPL-MCNC: 10.4 MG/DL (ref 8.5–10.5)
CHLORIDE SERPL-SCNC: 102 MMOL/L (ref 96–112)
CO2 SERPL-SCNC: 26 MMOL/L (ref 20–33)
COLOR UR: YELLOW
CREAT SERPL-MCNC: 2.08 MG/DL (ref 0.5–1.4)
CREAT UR-MCNC: 124.8 MG/DL
EOSINOPHIL # BLD AUTO: 0.15 K/UL (ref 0–0.51)
EOSINOPHIL NFR BLD: 1.6 % (ref 0–6.9)
EPI CELLS #/AREA URNS HPF: NEGATIVE /HPF
ERYTHROCYTE [DISTWIDTH] IN BLOOD BY AUTOMATED COUNT: 48.1 FL (ref 35.9–50)
GLUCOSE SERPL-MCNC: 152 MG/DL (ref 65–99)
GLUCOSE UR STRIP.AUTO-MCNC: NEGATIVE MG/DL
HCT VFR BLD AUTO: 47.1 % (ref 42–52)
HGB BLD-MCNC: 15.1 G/DL (ref 14–18)
HYALINE CASTS #/AREA URNS LPF: ABNORMAL /LPF
IMM GRANULOCYTES # BLD AUTO: 0.05 K/UL (ref 0–0.11)
IMM GRANULOCYTES NFR BLD AUTO: 0.5 % (ref 0–0.9)
KETONES UR STRIP.AUTO-MCNC: NEGATIVE MG/DL
LEUKOCYTE ESTERASE UR QL STRIP.AUTO: NEGATIVE
LYMPHOCYTES # BLD AUTO: 2.23 K/UL (ref 1–4.8)
LYMPHOCYTES NFR BLD: 23.3 % (ref 22–41)
MCH RBC QN AUTO: 29.3 PG (ref 27–33)
MCHC RBC AUTO-ENTMCNC: 32.1 G/DL (ref 33.7–35.3)
MCV RBC AUTO: 91.5 FL (ref 81.4–97.8)
MICRO URNS: ABNORMAL
MONOCYTES # BLD AUTO: 0.71 K/UL (ref 0–0.85)
MONOCYTES NFR BLD AUTO: 7.4 % (ref 0–13.4)
NEUTROPHILS # BLD AUTO: 6.41 K/UL (ref 1.82–7.42)
NEUTROPHILS NFR BLD: 66.8 % (ref 44–72)
NITRITE UR QL STRIP.AUTO: NEGATIVE
NRBC # BLD AUTO: 0 K/UL
NRBC BLD-RTO: 0 /100 WBC
PH UR STRIP.AUTO: 6.5 [PH] (ref 5–8)
PHOSPHATE SERPL-MCNC: 3 MG/DL (ref 2.5–4.5)
PLATELET # BLD AUTO: 253 K/UL (ref 164–446)
PMV BLD AUTO: 11.4 FL (ref 9–12.9)
POTASSIUM SERPL-SCNC: 3.7 MMOL/L (ref 3.6–5.5)
PROT UR QL STRIP: 300 MG/DL
PROT UR-MCNC: 209.5 MG/DL (ref 0–15)
PROT/CREAT UR: 1679 MG/G (ref 15–68)
RBC # BLD AUTO: 5.15 M/UL (ref 4.7–6.1)
RBC # URNS HPF: ABNORMAL /HPF
RBC UR QL AUTO: NEGATIVE
SODIUM SERPL-SCNC: 138 MMOL/L (ref 135–145)
SP GR UR STRIP.AUTO: 1.02
UROBILINOGEN UR STRIP.AUTO-MCNC: 0.2 MG/DL
WBC # BLD AUTO: 9.6 K/UL (ref 4.8–10.8)
WBC #/AREA URNS HPF: ABNORMAL /HPF

## 2019-08-26 PROCEDURE — 85025 COMPLETE CBC W/AUTO DIFF WBC: CPT

## 2019-08-26 PROCEDURE — 84156 ASSAY OF PROTEIN URINE: CPT

## 2019-08-26 PROCEDURE — 36415 COLL VENOUS BLD VENIPUNCTURE: CPT

## 2019-08-26 PROCEDURE — 80069 RENAL FUNCTION PANEL: CPT

## 2019-08-26 PROCEDURE — 82570 ASSAY OF URINE CREATININE: CPT

## 2019-08-26 PROCEDURE — 81001 URINALYSIS AUTO W/SCOPE: CPT

## 2019-09-20 ENCOUNTER — OFFICE VISIT (OUTPATIENT)
Dept: CARDIOLOGY | Facility: MEDICAL CENTER | Age: 79
End: 2019-09-20
Payer: MEDICARE

## 2019-09-20 VITALS
HEIGHT: 71 IN | BODY MASS INDEX: 28.14 KG/M2 | HEART RATE: 78 BPM | OXYGEN SATURATION: 93 % | DIASTOLIC BLOOD PRESSURE: 68 MMHG | WEIGHT: 201 LBS | SYSTOLIC BLOOD PRESSURE: 110 MMHG

## 2019-09-20 DIAGNOSIS — N18.30 CHRONIC KIDNEY DISEASE, STAGE III (MODERATE) (HCC): Chronic | ICD-10-CM

## 2019-09-20 DIAGNOSIS — E78.2 MIXED HYPERLIPIDEMIA: Chronic | ICD-10-CM

## 2019-09-20 DIAGNOSIS — I10 ESSENTIAL HYPERTENSION: ICD-10-CM

## 2019-09-20 DIAGNOSIS — I45.10 RBBB: ICD-10-CM

## 2019-09-20 LAB — EKG IMPRESSION: NORMAL

## 2019-09-20 PROCEDURE — 99203 OFFICE O/P NEW LOW 30 MIN: CPT | Performed by: INTERNAL MEDICINE

## 2019-09-20 PROCEDURE — 93000 ELECTROCARDIOGRAM COMPLETE: CPT | Performed by: INTERNAL MEDICINE

## 2019-09-20 RX ORDER — CARVEDILOL 3.12 MG/1
3.12 TABLET ORAL 2 TIMES DAILY WITH MEALS
COMMUNITY
End: 2020-07-22 | Stop reason: DRUGHIGH

## 2019-09-20 RX ORDER — FINASTERIDE 5 MG/1
5 TABLET, FILM COATED ORAL DAILY
COMMUNITY
End: 2021-02-27

## 2019-09-20 RX ORDER — MIRTAZAPINE 15 MG/1
15 TABLET, FILM COATED ORAL NIGHTLY
COMMUNITY
End: 2021-02-27

## 2019-09-20 RX ORDER — OLMESARTAN MEDOXOMIL AND HYDROCHLOROTHIAZIDE 40/25 40; 25 MG/1; MG/1
1 TABLET ORAL DAILY
Qty: 90 TAB | Refills: 3 | Status: SHIPPED | DISCHARGE
Start: 2019-09-20 | End: 2021-05-24

## 2019-09-20 ASSESSMENT — ENCOUNTER SYMPTOMS
SHORTNESS OF BREATH: 1
DIZZINESS: 1

## 2019-09-20 NOTE — PROGRESS NOTES
Cardiology Initial Consultation Note    Date of note:    9/20/2019    Primary Care Provider: Dino Bee M.D.  Referring Provider: Dino Bee M.D.    Patient Name: Tyrone Cline   YOB: 1940  MRN:              [id number]    Chief Complaint: hypertension    History of Present Illness: Tyrone Cline is a 79 y.o. male whose current medical problems include diabetes, hypertension, dyslipidemia, chronic kidney disease, gout, who is here for cardiac consultation for hypertension.    Has had difficulty with high blood pressure. Had swelling on high dose norvasc. Now at 5mg PO Daily along with coreg and BP well controlled without symptoms. He is here to confirm these medications for hypertension he is on are appropriate.     Followed by Dr. Sharma for his CKD.     Walks for 30 minutes daily.     Review of Systems   HENT: Positive for hearing loss and tinnitus.    Cardiovascular: Positive for leg swelling.   Respiratory: Positive for shortness of breath.    Genitourinary: Positive for frequency and urgency.   Neurological: Positive for dizziness.     All other systems reviewed and discussed using a comprehensive questionnaire and are negative.       Past Medical History:   Diagnosis Date   • Abnormal electrocardiogram    • ACE inhibitor intolerance    • BPH (benign prostatic hyperplasia)    • Cancer (HCC)     basal and squamous cell to face   • Cataract     robbi IOL   • Cholesterol blood decreased    • Chronic kidney disease, stage III (moderate) (HCC)    • Cold    • Diabetes     oral meds   • GOUT    • History of skull fracture    • History of urinary tract infection    • Hyperlipidemia    • Hypertension    • Indigestion    • Mixed hyperlipidemia    • Pneumonia 9/2015   • Proteinuria    • Type 2 diabetes mellitus (HCC)          Past Surgical History:   Procedure Laterality Date   • SEPTAL RECONSTRUCTION  12/7/2015    Procedure: SEPTAL RECONSTRUCTION OPEN WITH   GRAFTS & CONCHAL CART GRAFT;  Surgeon: SYDNIE Gaitan M.D.;  Location: SURGERY SAME DAY [facility];  Service:    • BLEPHAROPLASTY  7/23/2014    Performed by Gera Plasencia M.D. at SURGERY [facility] ORS   • BROW LIFT  7/23/2014    Performed by Gera Plasencia M.D. at [facility] ORS   • CATARACT PHACO WITH IOL  12/4/2012    Performed by Suraj Gonzalez M.D. at [facility] ORS   • CATARACT PHACO WITH IOL  11/20/2012    Performed by Suraj Gonzalez M.D. at [facility] ORS   • APPENDECTOMY     • OTHER      KNEE SURGERY - LEFT.   • OTHER      NOSE SURGERY.   • TONSILLECTOMY           Current Outpatient Medications   Medication Sig Dispense Refill   • finasteride (PROSCAR) 5 MG Tab Take 5 mg by mouth every day.     • mirtazapine (REMERON) 15 MG Tab Take 15 mg by mouth every evening.     • carvedilol (COREG) 3.125 MG Tab Take 3.125 mg by mouth 2 times a day, with meals.     • amlodipine (NORVASC) 5 MG TABS Take 5 mg by mouth every day.     • simvastatin (ZOCOR) 10 MG TABS Take 10 mg by mouth every evening.     • Testosterone (ANDROGEL) 20.25 MG/1.25GM (1.62%) GEL Apply  to skin as directed. daily     • zolpidem (AMBIEN) 10 MG TABS Take 10 mg by mouth at bedtime as needed.       • metformin (GLUCOPHAGE) 500 MG TABS Take 1,000 mg by mouth 2 times a day.     • allopurinol (ZYLOPRIM) 300 MG TABS Take 300 mg by mouth every day.     • Multiple Vitamins-Minerals (CENTRUM SILVER PO) Take  by mouth every day.     • FISH OIL by Does not apply route every day.     • Cholecalciferol (VITAMIN D) 1000 UNIT CAPS Take  by mouth every day.     • sitagliptin (JANUVIA) 100 MG TABS Take  by mouth every day. With dinner       No current facility-administered medications for this visit.          Allergies   Allergen Reactions   • Ace Inhibitors      COUGH.   • Ether      Violently sick   • Lipitor [Atorvastatin Calcium]      LEG PAINS           Family History   Problem Relation Age of Onset   •  "Diabetes Father    • Heart Disease Father          Social History     Socioeconomic History   • Marital status:      Spouse name: Not on file   • Number of children: Not on file   • Years of education: Not on file   • Highest education level: Not on file   Occupational History   • Not on file   Social Needs   • Financial resource strain: Not on file   • Food insecurity:     Worry: Not on file     Inability: Not on file   • Transportation needs:     Medical: Not on file     Non-medical: Not on file   Tobacco Use   • Smoking status: Never Smoker   • Smokeless tobacco: Never Used   • Tobacco comment: Stopped over 25 years ago.   Substance and Sexual Activity   • Alcohol use: No   • Drug use: No   • Sexual activity: Not on file   Lifestyle   • Physical activity:     Days per week: Not on file     Minutes per session: Not on file   • Stress: Not on file   Relationships   • Social connections:     Talks on phone: Not on file     Gets together: Not on file     Attends Taoist service: Not on file     Active member of club or organization: Not on file     Attends meetings of clubs or organizations: Not on file     Relationship status: Not on file   • Intimate partner violence:     Fear of current or ex partner: Not on file     Emotionally abused: Not on file     Physically abused: Not on file     Forced sexual activity: Not on file   Other Topics Concern   • Not on file   Social History Narrative   • Not on file         Physical Exam:  Ambulatory Vitals  /68 (BP Location: Left arm, Patient Position: Sitting, BP Cuff Size: Adult)   Pulse 78   Ht 1.803 m (5' 11\")   Wt 91.2 kg (201 lb)   SpO2 93%    Oxygen Therapy:  Pulse Oximetry: 93 %  BP Readings from Last 4 Encounters:   09/20/19 110/68   07/11/16 136/88   12/08/15 147/85   04/15/15 138/73       Weight/BMI: Body mass index is 28.03 kg/m².  Wt Readings from Last 4 Encounters:   09/20/19 91.2 kg (201 lb)   07/11/16 96.6 kg (213 lb)   12/02/15 96.6 kg (212 " lb 15.4 oz)   04/13/15 97.5 kg (214 lb 15.9 oz)       General: No apparent distress  Eyes: nl conjunctiva  ENT: OP clear, normal external appearance of nose and ears  Neck: JVP 4-5 cm H2O, no carotid bruits  Lungs: normal respiratory effort, CTAB  Heart: RRR, no murmurs, no rubs or gallops, 1+ edema bilateral lower extremities. No LV/RV heave on cardiac palpatation. 2+ bilateral radial pulses.  2+ bilateral dp pulses.   Abdomen: soft, non tender, non distended, no masses, normal bowel sounds.  No HSM.  Extremities/MSK: no clubbing, no cyanosis  Neurological: No focal sensory deficits  Psychiatric: Appropriate affect, A/O x 3, intact judgement and insight  Skin: Warm extremities      Lab Data Review:  Lab Results   Component Value Date/Time    CHOLSTRLTOT 168 05/11/2019 09:27 AM    LDL 55 05/11/2019 09:27 AM    HDL 38 (A) 05/11/2019 09:27 AM    TRIGLYCERIDE 377 (H) 05/11/2019 09:27 AM       Lab Results   Component Value Date/Time    SODIUM 138 08/26/2019 11:26 AM    POTASSIUM 3.7 08/26/2019 11:26 AM    CHLORIDE 102 08/26/2019 11:26 AM    CO2 26 08/26/2019 11:26 AM    GLUCOSE 152 (H) 08/26/2019 11:26 AM    BUN 35 (H) 08/26/2019 11:26 AM    CREATININE 2.08 (H) 08/26/2019 11:26 AM    CREATININE 1.4 02/06/2009 03:00 AM     Lab Results   Component Value Date/Time    ALKPHOSPHAT 69 05/11/2019 09:27 AM    ASTSGOT 15 05/11/2019 09:27 AM    ALTSGPT 16 05/11/2019 09:27 AM    TBILIRUBIN 0.6 05/11/2019 09:27 AM      Lab Results   Component Value Date/Time    WBC 9.6 08/26/2019 11:26 AM     No components found for: HBGA1C  No components found for: TROPONIN  No components found for: BNP      Cardiac Imaging and Procedures Review:    EKG dated 9/20/2019 : My personal interpretation is NSR, 1st degree AV block, RBBB    Echo dated 2015:   CONCLUSIONS  Normal left ventricular size, thickness and, systolic function.   Contrast was used to enhance visualization of the endocardial border.   Left ventricular ejection fraction is 60% to  65%. Grade I diastolic   dysfunction.   No significant valve disease or flow abnormalities.   Mild tricuspid regurgitation. Right ventricular systolic pressure is   estimated to be 27 mmHg.  Normal aortic root diameter 2.7 cm.  No prior study is available for comparison.       [facility] (2015):   FINDINGS:  1.  HEMODYNAMICS:  Left heart pressures.  LVEDP of 12, left ventricular   systolic pressure of 147, central aortic pressure systolic 134, diastolic 61,   mean of 86.     2.  LEFT VENTRICULOGRAPHY:  Left ventricular chamber size, wall motion, and   systolic function are normal.  Calculated ejection fraction 67%.     3.  CORONARY ANGIOGRAPHY:  1.  Left main artery:  Left main vessel is large, angiographically widely   patent normal and trifurcates to left anterior descending artery, circumflex   artery and a small caliber ramus intermedius vessel.  2.  Left anterior descending artery:  The LAD has mild superficial   calcifications.  The mid LAD has some mild focal atheroma, but the vessel is   otherwise widely patent with no stenotic lesions.  3.  Circumflex artery:  The circumflex is a large caliber vessel, gives rise   to 3 large marginal branches and a small first marginal branch, the left   atrial branch and SA edith artery.  Angiographically, the circumflex artery   has some mild focal intimal atheroma, but otherwise is widely patent.  4.  Right coronary artery:  The RCA gives rise to a conus branch and a small   caliber bifurcating posterior descending artery and a small caliber posterior   lateral branch.  Angiographically, the right coronary artery has some minimal   focal intimal plaquing.  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  5.  Ramus intermedius:  The ramus is a tiny caliber vessel that is   angiographically patent.     CONCLUSION:  1.  EF 67%, normal wall motion.  2.  Minimal atheromatous disease, but no stenotic lesions.       Radiology test Review:  CXR: 2015  Heart size is within normal limits  allowing for patient body habitus and poor inspiration along with portable technique. There is bibasal atelectasis.  No focal infiltrates or consolidations are identified in the lungs.  No pleural fluid collections are identified.  No pneumothorax is appreciated.       V/Q scan 2015:  Negative ventilation/perfusion scan.    Medical Decision Makin. Essential hypertension  Now well controlled  -continue coreg, can uptitrate as needed. Could try to increase coreg and stop norvasc in the future if you'd like as he does have some leg swelling even on this dose of norvasc.  -continue olmesartan/hctz.       2. Chronic kidney disease, stage III (moderate) (HCC)  On ARB. Followed by nephrology  -CTM    3. Mixed hyperlipidemia  Continue simvastastin, given his lack of clinical CVD, aggressive statin therapy is typically not indicated, but I would consider increasing to at least 20mg PO daily given his significant hypertriglyceridemia. Would check lipid panel at least once a year and ensure tg's are <500.     4. RBBB  Along with 1st degree AV block. Discussed heart block symptoms and ED precautions as he is at somewhat high lifetime risk for needing a pacemaker.       Return if symptoms worsen or fail to improve.      Vaughn Quiros MD, [facility]  [facility], [address]  [address] [address]  Phone: [phone]  Fax: [phone]

## 2019-10-16 ENCOUNTER — IMMUNIZATION (OUTPATIENT)
Dept: SOCIAL WORK | Facility: CLINIC | Age: 79
End: 2019-10-16
Payer: MEDICARE

## 2019-10-16 DIAGNOSIS — Z23 NEED FOR VACCINATION: ICD-10-CM

## 2019-10-16 PROCEDURE — G0008 ADMIN INFLUENZA VIRUS VAC: HCPCS | Performed by: REGISTERED NURSE

## 2019-10-16 PROCEDURE — 90662 IIV NO PRSV INCREASED AG IM: CPT | Performed by: REGISTERED NURSE

## 2020-02-26 ENCOUNTER — OFFICE VISIT (OUTPATIENT)
Dept: URGENT CARE | Facility: CLINIC | Age: 80
End: 2020-02-26
Payer: MEDICARE

## 2020-02-26 VITALS
HEART RATE: 85 BPM | SYSTOLIC BLOOD PRESSURE: 128 MMHG | HEIGHT: 71 IN | OXYGEN SATURATION: 95 % | BODY MASS INDEX: 27.3 KG/M2 | DIASTOLIC BLOOD PRESSURE: 100 MMHG | RESPIRATION RATE: 14 BRPM | WEIGHT: 195 LBS | TEMPERATURE: 98.7 F

## 2020-02-26 DIAGNOSIS — S09.90XA CLOSED HEAD INJURY, INITIAL ENCOUNTER: ICD-10-CM

## 2020-02-26 DIAGNOSIS — S01.81XA FACIAL LACERATION, INITIAL ENCOUNTER: ICD-10-CM

## 2020-02-26 PROCEDURE — 99213 OFFICE O/P EST LOW 20 MIN: CPT | Performed by: PHYSICIAN ASSISTANT

## 2020-02-26 ASSESSMENT — ENCOUNTER SYMPTOMS
CHILLS: 0
HEADACHES: 0
FEVER: 0
ROS SKIN COMMENTS: LACERATION TO RIGHT EYEBROW
SHORTNESS OF BREATH: 0
DIZZINESS: 0

## 2020-02-26 NOTE — PROGRESS NOTES
"Subjective:      Tyrone Cline is a 79 y.o. male who presents with Laceration (RT eyebrow after fall)        Laceration      Patient is a 79-year-old male who complains of a right eyebrow laceration onset 3 hours ago.  Patient states he was walking his dogs and 1 of them may have pulled him and he tripped, fell forward, caught himself with his right hand, however, his right forehead/eyebrow area struck the pavement ground.  Mild bleeding that is controlled.  He sustained a laceration and swelling.  He denies any loss of consciousness, dizziness, headache, nausea, vomiting or any other concerns.  He has not on any anticoagulants or antiplatelets.  Up-to-date on tetanus.    Review of Systems   Constitutional: Negative for chills and fever.   HENT: Negative for ear pain and nosebleeds.    Respiratory: Negative for shortness of breath.    Cardiovascular: Negative for chest pain.   Skin:        Laceration to right eyebrow   Neurological: Negative for dizziness and headaches.          Objective:     /100 (BP Location: Left arm, Patient Position: Sitting, BP Cuff Size: Adult)   Pulse 85   Temp 37.1 °C (98.7 °F)   Resp 14   Ht 1.803 m (5' 11\")   Wt 88.5 kg (195 lb)   SpO2 95%   BMI 27.20 kg/m²      Physical Exam  Vitals signs reviewed.   Constitutional:       Appearance: Normal appearance.   HENT:      Head:        Right Ear: Tympanic membrane normal.      Left Ear: Tympanic membrane normal.      Nose: Nose normal.      Mouth/Throat:      Mouth: Mucous membranes are dry.      Pharynx: No oropharyngeal exudate or posterior oropharyngeal erythema.   Eyes:      Extraocular Movements: Extraocular movements intact.      Conjunctiva/sclera: Conjunctivae normal.      Pupils: Pupils are equal, round, and reactive to light.   Cardiovascular:      Rate and Rhythm: Normal rate and regular rhythm.      Heart sounds: Normal heart sounds.   Pulmonary:      Effort: Pulmonary effort is normal. No respiratory " distress.      Breath sounds: Normal breath sounds. No wheezing, rhonchi or rales.   Skin:     General: Skin is warm and dry.   Neurological:      General: No focal deficit present.      Mental Status: He is alert and oriented to person, place, and time.      Sensory: No sensory deficit.      Motor: No weakness.   Psychiatric:         Mood and Affect: Mood normal.         Behavior: Behavior normal.       Past Medical History:   Diagnosis Date   • Abnormal electrocardiogram    • ACE inhibitor intolerance    • BPH (benign prostatic hyperplasia)    • Cancer (HCC)     basal and squamous cell to face   • Cataract     robbi IOL   • Cholesterol blood decreased    • Chronic kidney disease, stage III (moderate) (HCC)    • Cold    • Diabetes     oral meds   • GOUT    • History of skull fracture    • History of urinary tract infection    • Hyperlipidemia    • Hypertension    • Indigestion    • Mixed hyperlipidemia    • Pneumonia 9/2015   • Proteinuria    • Type 2 diabetes mellitus (HCC)       Past Surgical History:   Procedure Laterality Date   • SEPTAL RECONSTRUCTION  12/7/2015    Procedure: SEPTAL RECONSTRUCTION OPEN WITH  GRAFTS & CONCHAL CART GRAFT;  Surgeon: SYDNIE Gaitan M.D.;  Location: SURGERY SAME DAY [facility];  Service:    • BLEPHAROPLASTY  7/23/2014    Performed by Gera Plasencia M.D. at [facility] ORS   • BROW LIFT  7/23/2014    Performed by Gera Plasencia M.D. at [facility] ORS   • CATARACT PHACO WITH IOL  12/4/2012    Performed by Suraj Gonzalez M.D. at [facility] ORS   • CATARACT PHACO WITH IOL  11/20/2012    Performed by Suraj Gonzalez M.D. at [facility] ORS   • APPENDECTOMY     • CARDIAC CATH, LEFT HEART      C out of concern for STEMI, normal coronaries with minimal atherosclerosis, diagnosed with PNA as cause of symptoms and ST changes.    • OTHER      KNEE SURGERY - LEFT.   • OTHER      NOSE SURGERY.   • TONSILLECTOMY        Social  History     Socioeconomic History   • Marital status:      Spouse name: Not on file   • Number of children: Not on file   • Years of education: Not on file   • Highest education level: Not on file   Occupational History   • Not on file   Social Needs   • Financial resource strain: Not on file   • Food insecurity     Worry: Not on file     Inability: Not on file   • Transportation needs     Medical: Not on file     Non-medical: Not on file   Tobacco Use   • Smoking status: Former Smoker     Packs/day: 0.20     Years: 6.00     Pack years: 1.20     Last attempt to quit: 1965     Years since quittin.1   • Smokeless tobacco: Never Used   • Tobacco comment: Stopped over 25 years ago.   Substance and Sexual Activity   • Alcohol use: No   • Drug use: No   • Sexual activity: Not on file   Lifestyle   • Physical activity     Days per week: Not on file     Minutes per session: Not on file   • Stress: Not on file   Relationships   • Social connections     Talks on phone: Not on file     Gets together: Not on file     Attends Muslim service: Not on file     Active member of club or organization: Not on file     Attends meetings of clubs or organizations: Not on file     Relationship status: Not on file   • Intimate partner violence     Fear of current or ex partner: Not on file     Emotionally abused: Not on file     Physically abused: Not on file     Forced sexual activity: Not on file   Other Topics Concern   • Not on file   Social History Narrative    Retired CPA.    Ace inhibitors; Ether; and Lipitor [atorvastatin calcium]       Assessment/Plan:     1. Facial laceration, initial encounter    2. Closed head injury, initial encounter    Patient sustained mild laceration to right eyebrow.  Uncomplicated.    Laceration was thoroughly cleansed with chlorhexidine and normal saline.  Laceration edges were lined up and approximated nicely and was able to close laceration with Dermabond and Steri-Strips without  complication.  No sutures needed.    Instructed to keep area dry for at least a week.  Avoid contact with area.  Watch for signs of infection such as surrounding redness, swelling, purulent drainage, fevers, chills, or any other concerns.  If any of those signs or any other concerns please return to the clinic for reevaluation.  If any headache, dizziness, confusion, nausea, vomiting present to the emergency department.  His son periodically checks on him.  Recommended he son check on him tonight and also tomorrow for any of the symptoms.  Concerns for concussion or intracranial pathology are low.    Supportive care, differential diagnoses, and indications for immediate follow-up discussed with patient.    Pathogenesis of diagnosis discussed including typical length and natural progression. Patient expresses understanding and agrees to plan.    Please note that this dictation was created using voice recognition software. I have made every reasonable attempt to correct obvious errors, but I expect that there are errors of grammar and possibly content that I did not discover before finalizing the note.

## 2020-06-14 ENCOUNTER — HOSPITAL ENCOUNTER (OUTPATIENT)
Facility: MEDICAL CENTER | Age: 80
End: 2020-06-14
Attending: EMERGENCY MEDICINE
Payer: MEDICARE

## 2020-06-14 ENCOUNTER — OFFICE VISIT (OUTPATIENT)
Dept: URGENT CARE | Facility: CLINIC | Age: 80
End: 2020-06-14
Payer: MEDICARE

## 2020-06-14 VITALS
OXYGEN SATURATION: 95 % | TEMPERATURE: 97.7 F | HEART RATE: 95 BPM | BODY MASS INDEX: 27.86 KG/M2 | SYSTOLIC BLOOD PRESSURE: 130 MMHG | HEIGHT: 71 IN | WEIGHT: 199 LBS | DIASTOLIC BLOOD PRESSURE: 88 MMHG

## 2020-06-14 DIAGNOSIS — Z87.448 HISTORY OF RENAL INSUFFICIENCY: ICD-10-CM

## 2020-06-14 DIAGNOSIS — R39.9 SYMPTOMS INVOLVING URINARY SYSTEM: ICD-10-CM

## 2020-06-14 LAB
APPEARANCE UR: CLEAR
BILIRUB UR STRIP-MCNC: NEGATIVE MG/DL
COLOR UR AUTO: YELLOW
GLUCOSE UR STRIP.AUTO-MCNC: NEGATIVE MG/DL
KETONES UR STRIP.AUTO-MCNC: NEGATIVE MG/DL
LEUKOCYTE ESTERASE UR QL STRIP.AUTO: NEGATIVE
NITRITE UR QL STRIP.AUTO: NEGATIVE
PH UR STRIP.AUTO: 6 [PH] (ref 5–8)
PROT UR QL STRIP: 300 MG/DL
RBC UR QL AUTO: NEGATIVE
SP GR UR STRIP.AUTO: 1.02
UROBILINOGEN UR STRIP-MCNC: 0.2 MG/DL

## 2020-06-14 PROCEDURE — 99203 OFFICE O/P NEW LOW 30 MIN: CPT | Performed by: EMERGENCY MEDICINE

## 2020-06-14 PROCEDURE — 81002 URINALYSIS NONAUTO W/O SCOPE: CPT | Performed by: EMERGENCY MEDICINE

## 2020-06-14 PROCEDURE — 87086 URINE CULTURE/COLONY COUNT: CPT

## 2020-06-14 RX ORDER — SULFAMETHOXAZOLE AND TRIMETHOPRIM 400; 80 MG/1; MG/1
1 TABLET ORAL 2 TIMES DAILY
Qty: 28 TAB | Refills: 0 | Status: SHIPPED | OUTPATIENT
Start: 2020-06-14 | End: 2020-06-28

## 2020-06-14 ASSESSMENT — ENCOUNTER SYMPTOMS
CONSTIPATION: 1
DIARRHEA: 1
FEVER: 0
CHANGE IN BOWEL HABIT: 1
BLOOD IN STOOL: 0
NAUSEA: 0
ROS GI COMMENTS: ALTERNATING
FLANK PAIN: 1
VOMITING: 0
ABDOMINAL PAIN: 0

## 2020-06-14 ASSESSMENT — FIBROSIS 4 INDEX: FIB4 SCORE: 1.17

## 2020-06-14 NOTE — PROGRESS NOTES
Subjective:      Tyrone Cline is a 80 y.o. male who presents with UTI (hx of UTI, lower back pain, painful urination)            UTI   This is a new problem. The current episode started more than 1 month ago. The problem has been rapidly worsening. Associated symptoms include a change in bowel habit and urinary symptoms. Pertinent negatives include no abdominal pain, fever, nausea, rash or vomiting. Nothing aggravates the symptoms. He has tried nothing for the symptoms.   Notes symptoms including malodorous urine started several weeks ago, worsened since yesterday.  Also notes diffuse lower backache without trauma.  Notes over-the-counter urine testing kit positive.  PMH significant for BPH, chronic kidney disease.  Last renal function testing estimated 1 year ago.    Review of Systems   Constitutional: Negative for fever.   Gastrointestinal: Positive for change in bowel habit, constipation and diarrhea. Negative for abdominal pain, blood in stool, nausea and vomiting.        Alternating   Genitourinary: Positive for dysuria, flank pain, frequency and urgency. Negative for hematuria.        No scrotal pain, swelling.  No penile discharge.   Skin: Negative for rash.       Past Medical History:   Diagnosis Date   • Abnormal electrocardiogram    • ACE inhibitor intolerance    • BPH (benign prostatic hyperplasia)    • Cancer (HCC)     basal and squamous cell to face   • Cataract     robbi IOL   • Cholesterol blood decreased    • Chronic kidney disease, stage III (moderate) (HCC)    • Cold    • Diabetes     oral meds   • GOUT    • History of skull fracture    • History of urinary tract infection    • Hyperlipidemia    • Hypertension    • Indigestion    • Mixed hyperlipidemia    • Pneumonia 9/2015   • Proteinuria    • Type 2 diabetes mellitus (HCC)      Past Surgical History:   Procedure Laterality Date   • SEPTAL RECONSTRUCTION  12/7/2015    Procedure: SEPTAL RECONSTRUCTION OPEN WITH  GRAFTS & CONCHAL  CART GRAFT;  Surgeon: SYDNIE Gaitan M.D.;  Location: SURGERY SAME DAY [facility];  Service:    • BLEPHAROPLASTY  7/23/2014    Performed by Gera Plasencia M.D. at SURGERY [facility]   • BROW LIFT  7/23/2014    Performed by Gera Plasencia M.D. at [facility]   • CATARACT PHACO WITH IOL  12/4/2012    Performed by Suraj Gonzalez M.D. at [facility]   • CATARACT PHACO WITH IOL  11/20/2012    Performed by Suraj Gonzalez M.D. at [facility]   • APPENDECTOMY     • CARDIAC CATH, LEFT HEART      [facility] out of concern for STEMI, normal coronaries with minimal atherosclerosis, diagnosed with PNA as cause of symptoms and ST changes.    • OTHER      KNEE SURGERY - LEFT.   • OTHER      NOSE SURGERY.   • TONSILLECTOMY        Allergy:  Ace inhibitors; Ether; and Lipitor [atorvastatin calcium]     Current Outpatient Medications:   •  sulfamethoxazole-trimethoprim, 1 Tab, Oral, BID  •  finasteride, 5 mg, Oral, DAILY, Taking  •  mirtazapine, 15 mg, Oral, Nightly, Taking  •  carvedilol, 3.125 mg, Oral, BID WITH MEALS, Taking  •  olmesartan-hydrochlorothiazide, 1 Tab, Oral, DAILY, Taking  •  simvastatin, 10 mg, Oral, Nightly, Taking  •  Testosterone, Apply  to skin as directed. daily, Taking  •  zolpidem, 10 mg, Oral, HS PRN, Taking  •  metFORMIN, 1,000 mg, Oral, BID, Taking  •  allopurinol, 300 mg, Oral, DAILY, Taking  •  Multiple Vitamins-Minerals (CENTRUM SILVER PO), Take  by mouth every day., Taking  •  FISH OIL, by Does not apply route every day., Taking  •  Vitamin D, Take  by mouth every day., Taking  •  SITagliptin, Take  by mouth every day. With dinner, Taking  •  amLODIPine, 5 mg, Oral, DAILY, Not Taking   family history includes Diabetes in his father; Heart Attack (age of onset: 79) in his father.   Social History     Tobacco Use   • Smoking status: Former Smoker     Packs/day: 0.20     Years: 6.00     Pack years: 1.20     Last attempt to quit: 1965     Years  "since quittin.4   • Smokeless tobacco: Never Used   • Tobacco comment: Stopped over 25 years ago.   Substance Use Topics   • Alcohol use: No   • Drug use: No       Objective:     /88 (BP Location: Left arm, Patient Position: Sitting, BP Cuff Size: Large adult)   Pulse 95   Temp 36.5 °C (97.7 °F) (Temporal)   Ht 1.803 m (5' 11\")   Wt 90.3 kg (199 lb)   SpO2 95%   BMI 27.75 kg/m²      Physical Exam  Constitutional:       General: He is not in acute distress.     Appearance: He is well-developed. He is not ill-appearing.   Cardiovascular:      Rate and Rhythm: Normal rate and regular rhythm.      Heart sounds: Normal heart sounds.   Pulmonary:      Effort: Pulmonary effort is normal.      Breath sounds: Normal breath sounds.   Abdominal:      General: There is no distension or abdominal bruit.      Palpations: Abdomen is soft. There is no mass or pulsatile mass.      Tenderness: There is no abdominal tenderness. There is no right CVA tenderness or left CVA tenderness.   Genitourinary:     Penis: Uncircumcised. No erythema, discharge or lesions.       Scrotum/Testes:         Right: Tenderness or swelling not present.         Left: Tenderness or swelling not present.   Musculoskeletal:      Lumbar back: He exhibits no bony tenderness and no deformity.   Skin:     General: Skin is warm and dry.      Findings: No rash.   Neurological:      Mental Status: He is alert and oriented to person, place, and time.      Gait: Gait is intact.   Psychiatric:         Behavior: Behavior is cooperative.                 Assessment/Plan:       1. Symptoms involving urinary system  Positive protein- POCT Urinalysis  - URINE CULTURE(NEW); Future  Due to known GFR 30's:  - sulfamethoxazole-trimethoprim (BACTRIM) 400-80 MG Tab; Take 1 Tab by mouth 2 times a day for 14 days.  Dispense: 28 Tab; Refill: 0    2. History of renal insufficiency  REF NEPHROLOGY  - Renal Function Panel; Future    "

## 2020-06-15 ENCOUNTER — HOSPITAL ENCOUNTER (OUTPATIENT)
Dept: LAB | Facility: MEDICAL CENTER | Age: 80
End: 2020-06-15
Attending: EMERGENCY MEDICINE
Payer: MEDICARE

## 2020-06-15 DIAGNOSIS — R39.9 SYMPTOMS INVOLVING URINARY SYSTEM: ICD-10-CM

## 2020-06-15 DIAGNOSIS — Z87.448 HISTORY OF RENAL INSUFFICIENCY: ICD-10-CM

## 2020-06-15 LAB
ALBUMIN SERPL BCP-MCNC: 3.9 G/DL (ref 3.2–4.9)
BUN SERPL-MCNC: 34 MG/DL (ref 8–22)
CALCIUM SERPL-MCNC: 9.8 MG/DL (ref 8.5–10.5)
CHLORIDE SERPL-SCNC: 99 MMOL/L (ref 96–112)
CO2 SERPL-SCNC: 27 MMOL/L (ref 20–33)
CREAT SERPL-MCNC: 2.46 MG/DL (ref 0.5–1.4)
GLUCOSE SERPL-MCNC: 90 MG/DL (ref 65–99)
PHOSPHATE SERPL-MCNC: 3.3 MG/DL (ref 2.5–4.5)
POTASSIUM SERPL-SCNC: 4 MMOL/L (ref 3.6–5.5)
SODIUM SERPL-SCNC: 139 MMOL/L (ref 135–145)

## 2020-06-15 PROCEDURE — 36415 COLL VENOUS BLD VENIPUNCTURE: CPT

## 2020-06-15 PROCEDURE — 80069 RENAL FUNCTION PANEL: CPT

## 2020-06-17 ENCOUNTER — TELEPHONE (OUTPATIENT)
Dept: URGENT CARE | Facility: CLINIC | Age: 80
End: 2020-06-17

## 2020-06-17 LAB
BACTERIA UR CULT: NORMAL
SIGNIFICANT IND 70042: NORMAL
SITE SITE: NORMAL
SOURCE SOURCE: NORMAL

## 2020-06-17 NOTE — TELEPHONE ENCOUNTER
Please notify patient of negative urine culture test; no evidence of UTI.  Discontinue antibiotic, follow up with urologist and nephrologist as advised.

## 2020-07-22 ENCOUNTER — OFFICE VISIT (OUTPATIENT)
Dept: NEPHROLOGY | Facility: MEDICAL CENTER | Age: 80
End: 2020-07-22
Payer: MEDICARE

## 2020-07-22 VITALS
HEIGHT: 71 IN | HEART RATE: 86 BPM | OXYGEN SATURATION: 98 % | WEIGHT: 196 LBS | DIASTOLIC BLOOD PRESSURE: 74 MMHG | BODY MASS INDEX: 27.44 KG/M2 | SYSTOLIC BLOOD PRESSURE: 146 MMHG | RESPIRATION RATE: 16 BRPM | TEMPERATURE: 98 F

## 2020-07-22 DIAGNOSIS — R80.9 MICROALBUMINURIA DUE TO TYPE 2 DIABETES MELLITUS (HCC): ICD-10-CM

## 2020-07-22 DIAGNOSIS — I10 ESSENTIAL HYPERTENSION: ICD-10-CM

## 2020-07-22 DIAGNOSIS — N18.4 ANEMIA DUE TO STAGE 4 CHRONIC KIDNEY DISEASE (HCC): ICD-10-CM

## 2020-07-22 DIAGNOSIS — E11.29 MICROALBUMINURIA DUE TO TYPE 2 DIABETES MELLITUS (HCC): ICD-10-CM

## 2020-07-22 DIAGNOSIS — D63.1 ANEMIA DUE TO STAGE 4 CHRONIC KIDNEY DISEASE (HCC): ICD-10-CM

## 2020-07-22 DIAGNOSIS — M10.9 CONTROLLED GOUT: ICD-10-CM

## 2020-07-22 DIAGNOSIS — E55.9 VITAMIN D DEFICIENCY: ICD-10-CM

## 2020-07-22 DIAGNOSIS — N18.4 CKD (CHRONIC KIDNEY DISEASE), STAGE IV (HCC): ICD-10-CM

## 2020-07-22 PROCEDURE — 99204 OFFICE O/P NEW MOD 45 MIN: CPT | Performed by: INTERNAL MEDICINE

## 2020-07-22 RX ORDER — CARVEDILOL 12.5 MG/1
12.5 TABLET ORAL 2 TIMES DAILY WITH MEALS
Qty: 100 TAB | Refills: 2 | Status: ON HOLD
Start: 2020-07-22 | End: 2021-02-28

## 2020-07-22 ASSESSMENT — ENCOUNTER SYMPTOMS
MYALGIAS: 0
NECK PAIN: 0
SHORTNESS OF BREATH: 0
SINUS PAIN: 0
FLANK PAIN: 0
WHEEZING: 0
BACK PAIN: 1
NAUSEA: 0
WEIGHT LOSS: 0
FEVER: 0
CHILLS: 0
ORTHOPNEA: 0
EYES NEGATIVE: 1
VOMITING: 0
COUGH: 0
HEMOPTYSIS: 0
PALPITATIONS: 0

## 2020-07-22 ASSESSMENT — FIBROSIS 4 INDEX: FIB4 SCORE: 1.17

## 2020-07-22 NOTE — PROGRESS NOTES
Subjective:      Tyrone Cline is a 80 y.o. male who presents with New Patient and Chronic Kidney Disease            HPI  Tyrone is coming today for initial evaluation of CKD III with progression to CKD IV  Seen by Nephrologist Dr. Baig in the past.  Doing well, no complaints except LUTS -scheduled for prostate surgery on Aug 9th with Dr Thompson  CKD III -with progression to stage IV -creat worse from 2.8 to 2.46!  HTN: BP monitored at home -compliant to low salt diet and medications  With elevated SBP -to adjust carvedilol dose  Anemia: Hb at 15.1 a from Aug 2019   Hx/of gout involving left big toe -on Allopurinol    Review of Systems   Constitutional: Negative for chills, fever, malaise/fatigue and weight loss.   HENT: Negative for congestion, hearing loss and sinus pain.    Eyes: Negative.    Respiratory: Negative for cough, hemoptysis, shortness of breath and wheezing.    Cardiovascular: Negative for chest pain, palpitations, orthopnea and leg swelling.   Gastrointestinal: Negative for nausea and vomiting.   Genitourinary: Positive for frequency and urgency. Negative for dysuria, flank pain and hematuria.   Musculoskeletal: Positive for back pain and joint pain. Negative for myalgias and neck pain.   Skin: Negative.    All other systems reviewed and are negative.    Past Medical History:   Diagnosis Date   • Abnormal electrocardiogram    • ACE inhibitor intolerance    • BPH (benign prostatic hyperplasia)    • Cancer (HCC)     basal and squamous cell to face   • Cataract     robbi IOL   • Cholesterol blood decreased    • Chronic kidney disease, stage III (moderate) (HCC)    • Cold    • Diabetes     oral meds   • GOUT    • History of skull fracture    • History of urinary tract infection    • Hyperlipidemia    • Hypertension    • Indigestion    • Mixed hyperlipidemia    • Pneumonia 9/2015   • Proteinuria    • Type 2 diabetes mellitus (HCC)        Family History   Problem Relation Age of Onset   • Diabetes  "Father    • Heart Attack Father 79       Social History     Socioeconomic History   • Marital status:      Spouse name: Not on file   • Number of children: Not on file   • Years of education: Not on file   • Highest education level: Not on file   Occupational History   • Not on file   Social Needs   • Financial resource strain: Not on file   • Food insecurity     Worry: Not on file     Inability: Not on file   • Transportation needs     Medical: Not on file     Non-medical: Not on file   Tobacco Use   • Smoking status: Former Smoker     Packs/day: 0.20     Years: 6.00     Pack years: 1.20     Last attempt to quit: 1965     Years since quittin.5   • Smokeless tobacco: Never Used   • Tobacco comment: Stopped over 25 years ago.   Substance and Sexual Activity   • Alcohol use: No   • Drug use: No   • Sexual activity: Not on file   Lifestyle   • Physical activity     Days per week: Not on file     Minutes per session: Not on file   • Stress: Not on file   Relationships   • Social connections     Talks on phone: Not on file     Gets together: Not on file     Attends Jainism service: Not on file     Active member of club or organization: Not on file     Attends meetings of clubs or organizations: Not on file     Relationship status: Not on file   • Intimate partner violence     Fear of current or ex partner: Not on file     Emotionally abused: Not on file     Physically abused: Not on file     Forced sexual activity: Not on file   Other Topics Concern   • Not on file   Social History Narrative    Retired CPA.          Objective:     /74 (BP Location: Right arm, Patient Position: Sitting)   Pulse 86   Temp 36.7 °C (98 °F)   Resp 16   Ht 1.803 m (5' 11\")   Wt 88.9 kg (196 lb)   SpO2 98%   BMI 27.34 kg/m²      Physical Exam  Vitals signs reviewed.   Constitutional:       General: He is not in acute distress.     Appearance: Normal appearance. He is well-developed. He is not diaphoretic.   HENT:      " Head: Normocephalic and atraumatic.      Nose: Nose normal.      Mouth/Throat:      Mouth: Mucous membranes are moist.   Eyes:      General: No scleral icterus.     Extraocular Movements: Extraocular movements intact.      Conjunctiva/sclera: Conjunctivae normal.      Pupils: Pupils are equal, round, and reactive to light.   Neck:      Musculoskeletal: Normal range of motion and neck supple.   Cardiovascular:      Rate and Rhythm: Normal rate and regular rhythm.      Pulses: Normal pulses.      Heart sounds: Normal heart sounds. No friction rub. No gallop.    Pulmonary:      Effort: Pulmonary effort is normal.      Breath sounds: Normal breath sounds.   Abdominal:      General: Bowel sounds are normal. There is no distension.      Palpations: Abdomen is soft. There is no mass.      Tenderness: There is no abdominal tenderness. There is no right CVA tenderness, left CVA tenderness or guarding.   Musculoskeletal:         General: No swelling.      Right lower leg: No edema.      Left lower leg: No edema.   Skin:     General: Skin is warm and dry.      Coloration: Skin is not jaundiced or pale.      Findings: No erythema.   Neurological:      General: No focal deficit present.      Mental Status: He is alert and oriented to person, place, and time.      Cranial Nerves: No cranial nerve deficit.      Coordination: Coordination normal.   Psychiatric:         Mood and Affect: Mood normal.         Behavior: Behavior normal.         Thought Content: Thought content normal.         Judgment: Judgment normal.     Laboratory results reviewed: d/w Pt  Lab Results   Component Value Date/Time    CREATININE 2.46 (H) 06/15/2020 03:36 PM    CREATININE 1.4 02/06/2009 03:00 AM    POTASSIUM 4.0 06/15/2020 03:36 PM                 Assessment/Plan:       1. CKD (chronic kidney disease), stage IV (HCC)      With worsening creat level -to monitor closely  - CBC WITHOUT DIFFERENTIAL; Future  - PHOSPHORUS; Future  - URINALYSIS; Future  -  Basic Metabolic Panel; Future    2. Essential hypertension      Elevated BP -to monitor at home      Carvedilol 12.5 mg BID  - URINALYSIS; Future  - Basic Metabolic Panel; Future    3. 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 Controlled gout      To reduce Allopurinol dose  - URIC ACID, SERUM    5. Vitamin D deficiency      To monitor vit D and PTH levels    6. Anemia due to stage 4 chronic kidney disease (HCC)      To monitor  - CBC WITHOUT DIFFERENTIAL; Future      Recs;  Carvedilol 12.5 mg twice a day  Allopurinol 150 mg daily (1/2 tablet)  Monitor BP  Low salt diet  Keep well hydrated  Avoid NSAID's  Need to talk to Primary doctor to replace metformin with other diabetic agent  F/u in 4 weeks    Thank you for the consult

## 2020-07-22 NOTE — PATIENT INSTRUCTIONS
Carvedilol 12.5 mg twice a day  Allopurinol 150 mg daily (1/2 tablet)  Monitor BP  Low salt diet  Keep well hydrated  Avoid NSAID's  Need to talk to Primary doctor to replace metformin with other diabetic agent

## 2020-07-27 ENCOUNTER — HOSPITAL ENCOUNTER (OUTPATIENT)
Dept: LAB | Facility: MEDICAL CENTER | Age: 80
End: 2020-07-27
Attending: UROLOGY
Payer: MEDICARE

## 2020-07-27 LAB
ALBUMIN SERPL BCP-MCNC: 3.9 G/DL (ref 3.2–4.9)
ALBUMIN/GLOB SERPL: 1.4 G/DL
ALP SERPL-CCNC: 73 U/L (ref 30–99)
ALT SERPL-CCNC: 15 U/L (ref 2–50)
ANION GAP SERPL CALC-SCNC: 14 MMOL/L (ref 7–16)
AST SERPL-CCNC: 12 U/L (ref 12–45)
BASOPHILS # BLD AUTO: 0.4 % (ref 0–1.8)
BASOPHILS # BLD: 0.03 K/UL (ref 0–0.12)
BILIRUB SERPL-MCNC: 0.4 MG/DL (ref 0.1–1.5)
BUN SERPL-MCNC: 35 MG/DL (ref 8–22)
CALCIUM SERPL-MCNC: 9.9 MG/DL (ref 8.5–10.5)
CHLORIDE SERPL-SCNC: 100 MMOL/L (ref 96–112)
CO2 SERPL-SCNC: 22 MMOL/L (ref 20–33)
CREAT SERPL-MCNC: 2.36 MG/DL (ref 0.5–1.4)
EOSINOPHIL # BLD AUTO: 0.16 K/UL (ref 0–0.51)
EOSINOPHIL NFR BLD: 2.2 % (ref 0–6.9)
ERYTHROCYTE [DISTWIDTH] IN BLOOD BY AUTOMATED COUNT: 47 FL (ref 35.9–50)
GLOBULIN SER CALC-MCNC: 2.8 G/DL (ref 1.9–3.5)
GLUCOSE SERPL-MCNC: 234 MG/DL (ref 65–99)
HCT VFR BLD AUTO: 46 % (ref 42–52)
HGB BLD-MCNC: 14.8 G/DL (ref 14–18)
IMM GRANULOCYTES # BLD AUTO: 0.02 K/UL (ref 0–0.11)
IMM GRANULOCYTES NFR BLD AUTO: 0.3 % (ref 0–0.9)
LYMPHOCYTES # BLD AUTO: 1.62 K/UL (ref 1–4.8)
LYMPHOCYTES NFR BLD: 22 % (ref 22–41)
MCH RBC QN AUTO: 29.1 PG (ref 27–33)
MCHC RBC AUTO-ENTMCNC: 32.2 G/DL (ref 33.7–35.3)
MCV RBC AUTO: 90.6 FL (ref 81.4–97.8)
MONOCYTES # BLD AUTO: 0.53 K/UL (ref 0–0.85)
MONOCYTES NFR BLD AUTO: 7.2 % (ref 0–13.4)
NEUTROPHILS # BLD AUTO: 5.01 K/UL (ref 1.82–7.42)
NEUTROPHILS NFR BLD: 67.9 % (ref 44–72)
NRBC # BLD AUTO: 0 K/UL
NRBC BLD-RTO: 0 /100 WBC
PLATELET # BLD AUTO: 230 K/UL (ref 164–446)
PMV BLD AUTO: 11.3 FL (ref 9–12.9)
POTASSIUM SERPL-SCNC: 3.9 MMOL/L (ref 3.6–5.5)
PROT SERPL-MCNC: 6.7 G/DL (ref 6–8.2)
PSA SERPL-MCNC: 2.57 NG/ML (ref 0–4)
RBC # BLD AUTO: 5.08 M/UL (ref 4.7–6.1)
SODIUM SERPL-SCNC: 136 MMOL/L (ref 135–145)
TESTOST SERPL-MCNC: 303 NG/DL (ref 175–781)
WBC # BLD AUTO: 7.4 K/UL (ref 4.8–10.8)

## 2020-07-27 PROCEDURE — 84153 ASSAY OF PSA TOTAL: CPT

## 2020-07-27 PROCEDURE — 85025 COMPLETE CBC W/AUTO DIFF WBC: CPT

## 2020-07-27 PROCEDURE — 36415 COLL VENOUS BLD VENIPUNCTURE: CPT

## 2020-07-27 PROCEDURE — 84403 ASSAY OF TOTAL TESTOSTERONE: CPT

## 2020-07-27 PROCEDURE — 80053 COMPREHEN METABOLIC PANEL: CPT

## 2020-08-25 ENCOUNTER — TELEPHONE (OUTPATIENT)
Dept: NEPHROLOGY | Facility: MEDICAL CENTER | Age: 80
End: 2020-08-25

## 2020-08-25 NOTE — TELEPHONE ENCOUNTER
The patient called to let you know that he forgot to get his labs done for tomorrow. He had some other labs done for a different provider on 7/27/2020. Do you want to see him or do you need him to get the labs that you ordered done first? Please advise.    Thank you

## 2020-08-26 ENCOUNTER — APPOINTMENT (OUTPATIENT)
Dept: NEPHROLOGY | Facility: MEDICAL CENTER | Age: 80
End: 2020-08-26
Payer: MEDICARE

## 2020-08-26 ENCOUNTER — HOSPITAL ENCOUNTER (OUTPATIENT)
Dept: LAB | Facility: MEDICAL CENTER | Age: 80
End: 2020-08-26
Attending: INTERNAL MEDICINE
Payer: MEDICARE

## 2020-08-26 DIAGNOSIS — I10 ESSENTIAL HYPERTENSION: ICD-10-CM

## 2020-08-26 DIAGNOSIS — D63.1 ANEMIA DUE TO STAGE 4 CHRONIC KIDNEY DISEASE (HCC): ICD-10-CM

## 2020-08-26 DIAGNOSIS — N18.4 CKD (CHRONIC KIDNEY DISEASE), STAGE IV (HCC): ICD-10-CM

## 2020-08-26 DIAGNOSIS — N18.4 ANEMIA DUE TO STAGE 4 CHRONIC KIDNEY DISEASE (HCC): ICD-10-CM

## 2020-08-26 LAB
ANION GAP SERPL CALC-SCNC: 16 MMOL/L (ref 7–16)
APPEARANCE UR: CLEAR
BACTERIA #/AREA URNS HPF: NEGATIVE /HPF
BILIRUB UR QL STRIP.AUTO: NEGATIVE
BUN SERPL-MCNC: 46 MG/DL (ref 8–22)
CALCIUM SERPL-MCNC: 10.1 MG/DL (ref 8.5–10.5)
CHLORIDE SERPL-SCNC: 97 MMOL/L (ref 96–112)
CO2 SERPL-SCNC: 23 MMOL/L (ref 20–33)
COLOR UR: YELLOW
CREAT SERPL-MCNC: 2.68 MG/DL (ref 0.5–1.4)
EPI CELLS #/AREA URNS HPF: NEGATIVE /HPF
ERYTHROCYTE [DISTWIDTH] IN BLOOD BY AUTOMATED COUNT: 46.9 FL (ref 35.9–50)
GLUCOSE SERPL-MCNC: 264 MG/DL (ref 65–99)
GLUCOSE UR STRIP.AUTO-MCNC: >=1000 MG/DL
HCT VFR BLD AUTO: 47.4 % (ref 42–52)
HGB BLD-MCNC: 15.7 G/DL (ref 14–18)
HYALINE CASTS #/AREA URNS LPF: ABNORMAL /LPF
KETONES UR STRIP.AUTO-MCNC: NEGATIVE MG/DL
LEUKOCYTE ESTERASE UR QL STRIP.AUTO: NEGATIVE
MCH RBC QN AUTO: 29.8 PG (ref 27–33)
MCHC RBC AUTO-ENTMCNC: 33.1 G/DL (ref 33.7–35.3)
MCV RBC AUTO: 90.1 FL (ref 81.4–97.8)
MICRO URNS: ABNORMAL
NITRITE UR QL STRIP.AUTO: NEGATIVE
PH UR STRIP.AUTO: 6.5 [PH] (ref 5–8)
PHOSPHATE SERPL-MCNC: 4.3 MG/DL (ref 2.5–4.5)
PLATELET # BLD AUTO: 238 K/UL (ref 164–446)
PMV BLD AUTO: 11.5 FL (ref 9–12.9)
POTASSIUM SERPL-SCNC: 4.1 MMOL/L (ref 3.6–5.5)
PROT UR QL STRIP: 300 MG/DL
RBC # BLD AUTO: 5.26 M/UL (ref 4.7–6.1)
RBC # URNS HPF: ABNORMAL /HPF
RBC UR QL AUTO: NEGATIVE
SODIUM SERPL-SCNC: 136 MMOL/L (ref 135–145)
SP GR UR STRIP.AUTO: 1.02
UROBILINOGEN UR STRIP.AUTO-MCNC: 0.2 MG/DL
WBC # BLD AUTO: 7.4 K/UL (ref 4.8–10.8)
WBC #/AREA URNS HPF: ABNORMAL /HPF

## 2020-08-26 PROCEDURE — 85027 COMPLETE CBC AUTOMATED: CPT

## 2020-08-26 PROCEDURE — 36415 COLL VENOUS BLD VENIPUNCTURE: CPT

## 2020-08-26 PROCEDURE — 80048 BASIC METABOLIC PNL TOTAL CA: CPT

## 2020-08-26 PROCEDURE — 84100 ASSAY OF PHOSPHORUS: CPT

## 2020-08-26 PROCEDURE — 81001 URINALYSIS AUTO W/SCOPE: CPT

## 2020-09-10 ENCOUNTER — OFFICE VISIT (OUTPATIENT)
Dept: NEPHROLOGY | Facility: MEDICAL CENTER | Age: 80
End: 2020-09-10
Payer: MEDICARE

## 2020-09-10 VITALS
SYSTOLIC BLOOD PRESSURE: 128 MMHG | DIASTOLIC BLOOD PRESSURE: 76 MMHG | OXYGEN SATURATION: 98 % | BODY MASS INDEX: 28 KG/M2 | HEIGHT: 71 IN | TEMPERATURE: 98 F | RESPIRATION RATE: 16 BRPM | WEIGHT: 200 LBS | HEART RATE: 67 BPM

## 2020-09-10 DIAGNOSIS — D63.1 ANEMIA DUE TO STAGE 4 CHRONIC KIDNEY DISEASE (HCC): ICD-10-CM

## 2020-09-10 DIAGNOSIS — I10 ESSENTIAL HYPERTENSION: ICD-10-CM

## 2020-09-10 DIAGNOSIS — E11.29 MICROALBUMINURIA DUE TO TYPE 2 DIABETES MELLITUS (HCC): ICD-10-CM

## 2020-09-10 DIAGNOSIS — N18.4 ANEMIA DUE TO STAGE 4 CHRONIC KIDNEY DISEASE (HCC): ICD-10-CM

## 2020-09-10 DIAGNOSIS — R80.9 MICROALBUMINURIA DUE TO TYPE 2 DIABETES MELLITUS (HCC): ICD-10-CM

## 2020-09-10 DIAGNOSIS — E55.9 VITAMIN D DEFICIENCY: ICD-10-CM

## 2020-09-10 DIAGNOSIS — M10.9 CONTROLLED GOUT: ICD-10-CM

## 2020-09-10 DIAGNOSIS — N18.4 CKD (CHRONIC KIDNEY DISEASE), STAGE IV (HCC): ICD-10-CM

## 2020-09-10 PROCEDURE — 99214 OFFICE O/P EST MOD 30 MIN: CPT | Performed by: INTERNAL MEDICINE

## 2020-09-10 ASSESSMENT — ENCOUNTER SYMPTOMS
NECK PAIN: 0
PALPITATIONS: 0
ORTHOPNEA: 0
CHILLS: 0
ABDOMINAL PAIN: 0
EYES NEGATIVE: 1
HEMOPTYSIS: 0
WEIGHT LOSS: 0
FLANK PAIN: 0
FEVER: 0
VOMITING: 0
BACK PAIN: 1
NAUSEA: 0
SINUS PAIN: 0
SHORTNESS OF BREATH: 0
COUGH: 0
MYALGIAS: 0
WHEEZING: 0

## 2020-09-10 ASSESSMENT — FIBROSIS 4 INDEX: FIB4 SCORE: 1.03

## 2020-09-10 NOTE — PROGRESS NOTES
Subjective:      Tyrone Cline is a 80 y.o. male who presents with Follow-Up and Chronic Kidney Disease            Chronic Kidney Disease  Pertinent negatives include no abdominal pain, chest pain, chills, congestion, coughing, fever, myalgias, nausea, neck pain or vomiting.     Tyrone is coming today for f/u of CKD IV  Seen by Nephrologist Dr. Baig in the past.  Doing well, no complaints except LUTS -scheduled for prostate surgery on Aug 9th with Dr Thompson - postponed  CKD III -with progression to stage IV -creat worse from 2.08 to 2.46! -now at 2.68!  HTN: BP monitored at home - well controlled now -compliant to low salt diet and medications  Anemia: Hb level stable WNL  Hx/of gout involving left big toe - no recent attacks -on Allopurinol    Review of Systems   Constitutional: Negative for chills, fever, malaise/fatigue and weight loss.   HENT: Negative for congestion, hearing loss and sinus pain.    Eyes: Negative.    Respiratory: Negative for cough, hemoptysis, shortness of breath and wheezing.    Cardiovascular: Negative for chest pain, palpitations, orthopnea and leg swelling.   Gastrointestinal: Negative for abdominal pain, nausea and vomiting.   Genitourinary: Positive for frequency. Negative for dysuria, flank pain, hematuria and urgency.   Musculoskeletal: Positive for back pain and joint pain. Negative for myalgias and neck pain.   Skin: Negative.    All other systems reviewed and are negative.    Past Medical History:   Diagnosis Date   • Abnormal electrocardiogram    • ACE inhibitor intolerance    • BPH (benign prostatic hyperplasia)    • Cancer (HCC)     basal and squamous cell to face   • Cataract     robbi IOL   • Cholesterol blood decreased    • Chronic kidney disease, stage III (moderate) (HCC)    • Cold    • Diabetes     oral meds   • GOUT    • History of skull fracture    • History of urinary tract infection    • Hyperlipidemia    • Hypertension    • Indigestion    • Mixed  "hyperlipidemia    • Pneumonia 2015   • Proteinuria    • Type 2 diabetes mellitus (HCC)        Family History   Problem Relation Age of Onset   • Diabetes Father    • Heart Attack Father 79       Social History     Socioeconomic History   • Marital status:      Spouse name: Not on file   • Number of children: Not on file   • Years of education: Not on file   • Highest education level: Not on file   Occupational History   • Not on file   Social Needs   • Financial resource strain: Not on file   • Food insecurity     Worry: Not on file     Inability: Not on file   • Transportation needs     Medical: Not on file     Non-medical: Not on file   Tobacco Use   • Smoking status: Former Smoker     Packs/day: 0.20     Years: 6.00     Pack years: 1.20     Quit date: 1965     Years since quittin.7   • Smokeless tobacco: Never Used   • Tobacco comment: Stopped over 25 years ago.   Substance and Sexual Activity   • Alcohol use: No   • Drug use: No   • Sexual activity: Not on file   Lifestyle   • Physical activity     Days per week: Not on file     Minutes per session: Not on file   • Stress: Not on file   Relationships   • Social connections     Talks on phone: Not on file     Gets together: Not on file     Attends Evangelical service: Not on file     Active member of club or organization: Not on file     Attends meetings of clubs or organizations: Not on file     Relationship status: Not on file   • Intimate partner violence     Fear of current or ex partner: Not on file     Emotionally abused: Not on file     Physically abused: Not on file     Forced sexual activity: Not on file   Other Topics Concern   • Not on file   Social History Narrative    Retired CPA.          Objective:     /76 (BP Location: Right arm, Patient Position: Sitting)   Pulse 67   Temp 36.7 °C (98 °F)   Resp 16   Ht 1.803 m (5' 11\")   Wt 90.7 kg (200 lb)   SpO2 98%   BMI 27.89 kg/m²      Physical Exam  Vitals signs reviewed. "   Constitutional:       General: He is not in acute distress.     Appearance: He is well-developed. He is not diaphoretic.   HENT:      Head: Normocephalic and atraumatic.      Nose: Nose normal.      Mouth/Throat:      Mouth: Mucous membranes are moist.      Pharynx: Oropharynx is clear.   Eyes:      Extraocular Movements: Extraocular movements intact.      Conjunctiva/sclera: Conjunctivae normal.      Pupils: Pupils are equal, round, and reactive to light.   Neck:      Musculoskeletal: Neck supple.   Cardiovascular:      Rate and Rhythm: Normal rate and regular rhythm.      Pulses: Normal pulses.      Heart sounds: Normal heart sounds. No friction rub. No gallop.    Pulmonary:      Effort: Pulmonary effort is normal. No respiratory distress.      Breath sounds: Normal breath sounds. No wheezing, rhonchi or rales.   Abdominal:      General: There is no distension.      Palpations: There is no mass.      Tenderness: There is no abdominal tenderness. There is no left CVA tenderness or guarding.   Musculoskeletal:      Right lower leg: No edema.      Left lower leg: No edema.   Skin:     General: Skin is warm.      Coloration: Skin is not jaundiced.      Findings: No bruising, erythema, lesion or rash.   Neurological:      General: No focal deficit present.      Mental Status: He is alert and oriented to person, place, and time.      Cranial Nerves: No cranial nerve deficit.      Coordination: Coordination normal.   Psychiatric:         Mood and Affect: Mood normal.         Behavior: Behavior normal.         Thought Content: Thought content normal.         Judgment: Judgment normal.     Laboratory results reviewed: d/w Pt  Lab Results   Component Value Date/Time    CREATININE 2.68 (H) 08/26/2020 02:52 PM    CREATININE 1.4 02/06/2009 03:00 AM    POTASSIUM 4.1 08/26/2020 02:52 PM                 Assessment/Plan:       1. CKD (chronic kidney disease), stage IV (HCC)      With worsening creat level -to monitor closely       F/u urology with concerns of urinary retention    2. Essential hypertension      Elevated BP -improved with carvedilol -to monitor at home       3. 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 Controlled gout      No recen tattacks -on allopurinol      To monitor uric acid    5. Vitamin D deficiency      To monitor vit D and PTH levels    6. Anemia due to stage 4 chronic kidney disease (HCC)      Hb stable -WNL      Recs;  Continue current treatment  Monitor BP  Low salt diet  Keep well hydrated  Avoid NSAID's  F/u in 2 months

## 2020-09-23 ENCOUNTER — IMMUNIZATION (OUTPATIENT)
Dept: SOCIAL WORK | Facility: CLINIC | Age: 80
End: 2020-09-23
Payer: MEDICARE

## 2020-09-23 DIAGNOSIS — Z23 NEED FOR VACCINATION: ICD-10-CM

## 2020-09-23 PROCEDURE — G0008 ADMIN INFLUENZA VIRUS VAC: HCPCS | Performed by: REGISTERED NURSE

## 2020-09-23 PROCEDURE — 90662 IIV NO PRSV INCREASED AG IM: CPT | Performed by: REGISTERED NURSE

## 2020-10-21 ENCOUNTER — IMMUNIZATION (OUTPATIENT)
Dept: SOCIAL WORK | Facility: CLINIC | Age: 80
End: 2020-10-21
Payer: MEDICARE

## 2020-10-21 DIAGNOSIS — Z23 NEED FOR VACCINATION: ICD-10-CM

## 2020-11-19 ENCOUNTER — HOSPITAL ENCOUNTER (OUTPATIENT)
Dept: LAB | Facility: MEDICAL CENTER | Age: 80
End: 2020-11-19
Attending: INTERNAL MEDICINE
Payer: MEDICARE

## 2020-11-19 DIAGNOSIS — N18.4 CKD (CHRONIC KIDNEY DISEASE), STAGE IV (HCC): ICD-10-CM

## 2020-11-19 DIAGNOSIS — E55.9 VITAMIN D DEFICIENCY: ICD-10-CM

## 2020-11-19 DIAGNOSIS — D63.1 ANEMIA DUE TO STAGE 4 CHRONIC KIDNEY DISEASE (HCC): ICD-10-CM

## 2020-11-19 DIAGNOSIS — N18.4 ANEMIA DUE TO STAGE 4 CHRONIC KIDNEY DISEASE (HCC): ICD-10-CM

## 2020-11-19 DIAGNOSIS — I10 ESSENTIAL HYPERTENSION: ICD-10-CM

## 2020-11-19 LAB
25(OH)D3 SERPL-MCNC: 41 NG/ML (ref 30–100)
ANION GAP SERPL CALC-SCNC: 12 MMOL/L (ref 7–16)
APPEARANCE UR: CLEAR
BACTERIA #/AREA URNS HPF: NEGATIVE /HPF
BILIRUB UR QL STRIP.AUTO: NEGATIVE
BUN SERPL-MCNC: 35 MG/DL (ref 8–22)
CALCIUM SERPL-MCNC: 10.2 MG/DL (ref 8.5–10.5)
CHLORIDE SERPL-SCNC: 99 MMOL/L (ref 96–112)
CO2 SERPL-SCNC: 28 MMOL/L (ref 20–33)
COLOR UR: YELLOW
CREAT SERPL-MCNC: 2.42 MG/DL (ref 0.5–1.4)
CREAT UR-MCNC: 68.47 MG/DL
EPI CELLS #/AREA URNS HPF: NEGATIVE /HPF
ERYTHROCYTE [DISTWIDTH] IN BLOOD BY AUTOMATED COUNT: 47.1 FL (ref 35.9–50)
GLUCOSE SERPL-MCNC: 279 MG/DL (ref 65–99)
GLUCOSE UR STRIP.AUTO-MCNC: 100 MG/DL
HCT VFR BLD AUTO: 51.2 % (ref 42–52)
HGB BLD-MCNC: 16.4 G/DL (ref 14–18)
HYALINE CASTS #/AREA URNS LPF: ABNORMAL /LPF
KETONES UR STRIP.AUTO-MCNC: NEGATIVE MG/DL
LEUKOCYTE ESTERASE UR QL STRIP.AUTO: NEGATIVE
MCH RBC QN AUTO: 29 PG (ref 27–33)
MCHC RBC AUTO-ENTMCNC: 32 G/DL (ref 33.7–35.3)
MCV RBC AUTO: 90.5 FL (ref 81.4–97.8)
MICRO URNS: ABNORMAL
NITRITE UR QL STRIP.AUTO: NEGATIVE
PH UR STRIP.AUTO: 6.5 [PH] (ref 5–8)
PLATELET # BLD AUTO: 232 K/UL (ref 164–446)
PMV BLD AUTO: 11.4 FL (ref 9–12.9)
POTASSIUM SERPL-SCNC: 3.8 MMOL/L (ref 3.6–5.5)
PROT UR QL STRIP: 300 MG/DL
PROT UR-MCNC: 184 MG/DL (ref 0–15)
PTH-INTACT SERPL-MCNC: 78.3 PG/ML (ref 14–72)
RBC # BLD AUTO: 5.66 M/UL (ref 4.7–6.1)
RBC # URNS HPF: ABNORMAL /HPF
RBC UR QL AUTO: NEGATIVE
SODIUM SERPL-SCNC: 139 MMOL/L (ref 135–145)
SP GR UR STRIP.AUTO: 1.01
URATE SERPL-MCNC: 8.3 MG/DL (ref 2.5–8.3)
UROBILINOGEN UR STRIP.AUTO-MCNC: 0.2 MG/DL
WBC # BLD AUTO: 8.3 K/UL (ref 4.8–10.8)
WBC #/AREA URNS HPF: ABNORMAL /HPF

## 2020-11-19 PROCEDURE — 80048 BASIC METABOLIC PNL TOTAL CA: CPT

## 2020-11-19 PROCEDURE — 36415 COLL VENOUS BLD VENIPUNCTURE: CPT

## 2020-11-19 PROCEDURE — 85027 COMPLETE CBC AUTOMATED: CPT

## 2020-11-19 PROCEDURE — 82306 VITAMIN D 25 HYDROXY: CPT

## 2020-11-19 PROCEDURE — 84156 ASSAY OF PROTEIN URINE: CPT

## 2020-11-19 PROCEDURE — 81001 URINALYSIS AUTO W/SCOPE: CPT

## 2020-11-19 PROCEDURE — 84550 ASSAY OF BLOOD/URIC ACID: CPT

## 2020-11-19 PROCEDURE — 83970 ASSAY OF PARATHORMONE: CPT

## 2020-11-19 PROCEDURE — 82570 ASSAY OF URINE CREATININE: CPT

## 2020-12-03 ENCOUNTER — OFFICE VISIT (OUTPATIENT)
Dept: NEPHROLOGY | Facility: MEDICAL CENTER | Age: 80
End: 2020-12-03
Payer: MEDICARE

## 2020-12-03 VITALS
HEART RATE: 76 BPM | WEIGHT: 212 LBS | SYSTOLIC BLOOD PRESSURE: 130 MMHG | DIASTOLIC BLOOD PRESSURE: 80 MMHG | OXYGEN SATURATION: 94 % | BODY MASS INDEX: 29.57 KG/M2 | TEMPERATURE: 96.8 F | RESPIRATION RATE: 14 BRPM

## 2020-12-03 DIAGNOSIS — E11.29 MICROALBUMINURIA DUE TO TYPE 2 DIABETES MELLITUS (HCC): ICD-10-CM

## 2020-12-03 DIAGNOSIS — N18.4 ANEMIA DUE TO STAGE 4 CHRONIC KIDNEY DISEASE (HCC): ICD-10-CM

## 2020-12-03 DIAGNOSIS — R80.9 MICROALBUMINURIA DUE TO TYPE 2 DIABETES MELLITUS (HCC): ICD-10-CM

## 2020-12-03 DIAGNOSIS — D63.1 ANEMIA DUE TO STAGE 4 CHRONIC KIDNEY DISEASE (HCC): ICD-10-CM

## 2020-12-03 DIAGNOSIS — M10.9 CONTROLLED GOUT: ICD-10-CM

## 2020-12-03 DIAGNOSIS — E55.9 VITAMIN D DEFICIENCY: ICD-10-CM

## 2020-12-03 DIAGNOSIS — N18.4 CKD (CHRONIC KIDNEY DISEASE), STAGE IV (HCC): ICD-10-CM

## 2020-12-03 DIAGNOSIS — I10 ESSENTIAL HYPERTENSION: ICD-10-CM

## 2020-12-03 PROCEDURE — 99214 OFFICE O/P EST MOD 30 MIN: CPT | Performed by: INTERNAL MEDICINE

## 2020-12-03 RX ORDER — GLIMEPIRIDE 2 MG/1
2 TABLET ORAL EVERY MORNING
COMMUNITY
End: 2021-03-29

## 2020-12-03 RX ORDER — MIRABEGRON 50 MG/1
1 TABLET, FILM COATED, EXTENDED RELEASE ORAL DAILY
COMMUNITY
End: 2021-12-10

## 2020-12-03 ASSESSMENT — ENCOUNTER SYMPTOMS
FLANK PAIN: 0
BACK PAIN: 1
EYES NEGATIVE: 1
ORTHOPNEA: 0
CHILLS: 0
WHEEZING: 0
ABDOMINAL PAIN: 0
WEIGHT LOSS: 0
NAUSEA: 0
DIARRHEA: 0
SINUS PAIN: 0
VOMITING: 0
MYALGIAS: 0
HEMOPTYSIS: 0
SHORTNESS OF BREATH: 0
FEVER: 0
PALPITATIONS: 0
COUGH: 0

## 2020-12-03 ASSESSMENT — FIBROSIS 4 INDEX: FIB4 SCORE: 1.06

## 2020-12-03 NOTE — PROGRESS NOTES
Subjective:      Tyrone Cline is a 80 y.o. male who presents with Follow-Up and Chronic Kidney Disease            Chronic Kidney Disease  Pertinent negatives include no abdominal pain, chest pain, chills, congestion, coughing, fever, myalgias, nausea or vomiting.     Tyrone is coming today for f/u of CKD IV  Doing well, no complaints except BPH LUTS f/u with Urology  CKD IV  -creat level slightly better 2.68-to 2.42  HTN: BP monitored at home - well controlled now -compliant to low salt diet and medications  Anemia: Hb level stable WNL  Hx/of gout involving left big toe - no recent attacks -on Allopurinol    Review of Systems   Constitutional: Negative for chills, fever, malaise/fatigue and weight loss.   HENT: Negative for congestion, hearing loss and sinus pain.    Eyes: Negative.    Respiratory: Negative for cough, hemoptysis, shortness of breath and wheezing.    Cardiovascular: Negative for chest pain, palpitations, orthopnea and leg swelling.   Gastrointestinal: Negative for abdominal pain, diarrhea, nausea and vomiting.   Genitourinary: Positive for frequency and urgency. Negative for dysuria, flank pain and hematuria.   Musculoskeletal: Positive for back pain and joint pain. Negative for myalgias.   Skin: Negative.    All other systems reviewed and are negative.    Past Medical History:   Diagnosis Date   • Abnormal electrocardiogram    • ACE inhibitor intolerance    • BPH (benign prostatic hyperplasia)    • Cancer (HCC)     basal and squamous cell to face   • Cataract     robbi IOL   • Cholesterol blood decreased    • Chronic kidney disease, stage III (moderate) (HCC)    • Cold    • Diabetes     oral meds   • GOUT    • History of skull fracture    • History of urinary tract infection    • Hyperlipidemia    • Hypertension    • Indigestion    • Mixed hyperlipidemia    • Pneumonia 9/2015   • Proteinuria    • Type 2 diabetes mellitus (HCC)        Family History   Problem Relation Age of Onset   •  Diabetes Father    • Heart Attack Father 79       Social History     Socioeconomic History   • Marital status:      Spouse name: Not on file   • Number of children: Not on file   • Years of education: Not on file   • Highest education level: Not on file   Occupational History   • Not on file   Social Needs   • Financial resource strain: Not on file   • Food insecurity     Worry: Not on file     Inability: Not on file   • Transportation needs     Medical: Not on file     Non-medical: Not on file   Tobacco Use   • Smoking status: Former Smoker     Packs/day: 0.20     Years: 6.00     Pack years: 1.20     Quit date: 1965     Years since quittin.9   • Smokeless tobacco: Never Used   • Tobacco comment: Stopped over 25 years ago.   Substance and Sexual Activity   • Alcohol use: No   • Drug use: No   • Sexual activity: Not on file   Lifestyle   • Physical activity     Days per week: Not on file     Minutes per session: Not on file   • Stress: Not on file   Relationships   • Social connections     Talks on phone: Not on file     Gets together: Not on file     Attends Mosque service: Not on file     Active member of club or organization: Not on file     Attends meetings of clubs or organizations: Not on file     Relationship status: Not on file   • Intimate partner violence     Fear of current or ex partner: Not on file     Emotionally abused: Not on file     Physically abused: Not on file     Forced sexual activity: Not on file   Other Topics Concern   • Not on file   Social History Narrative    Retired CPA.          Objective:     /80   Pulse 76   Temp 36 °C (96.8 °F) (Temporal)   Resp 14   Wt 96.2 kg (212 lb)   SpO2 94%   BMI 29.57 kg/m²      Physical Exam  Vitals signs and nursing note reviewed.   Constitutional:       General: He is not in acute distress.     Appearance: Normal appearance. He is well-developed. He is not diaphoretic.   HENT:      Head: Normocephalic and atraumatic.      Nose:  Nose normal.      Mouth/Throat:      Mouth: Mucous membranes are moist.      Pharynx: Oropharynx is clear.   Eyes:      General: No scleral icterus.     Extraocular Movements: Extraocular movements intact.      Conjunctiva/sclera: Conjunctivae normal.      Pupils: Pupils are equal, round, and reactive to light.   Neck:      Musculoskeletal: Normal range of motion and neck supple.   Cardiovascular:      Rate and Rhythm: Normal rate and regular rhythm.      Pulses: Normal pulses.      Heart sounds: Normal heart sounds.   Pulmonary:      Effort: Pulmonary effort is normal. No respiratory distress.      Breath sounds: Normal breath sounds. No wheezing, rhonchi or rales.   Abdominal:      General: Bowel sounds are normal. There is no distension.      Palpations: Abdomen is soft. There is no mass.      Tenderness: There is no abdominal tenderness. There is no right CVA tenderness, left CVA tenderness or guarding.   Musculoskeletal:      Right lower leg: No edema.      Left lower leg: No edema.   Skin:     General: Skin is warm.      Coloration: Skin is not jaundiced or pale.      Findings: No bruising, erythema, lesion or rash.   Neurological:      General: No focal deficit present.      Mental Status: He is alert and oriented to person, place, and time.      Cranial Nerves: No cranial nerve deficit.      Coordination: Coordination normal.   Psychiatric:         Mood and Affect: Mood normal.         Behavior: Behavior normal.         Thought Content: Thought content normal.         Judgment: Judgment normal.     Laboratory results reviewed: d/w Pt  Lab Results   Component Value Date/Time    CREATININE 2.42 (H) 11/19/2020 10:55 AM    CREATININE 1.4 02/06/2009 03:00 AM    POTASSIUM 3.8 11/19/2020 10:55 AM                 Assessment/Plan:       1. CKD (chronic kidney disease), stage IV ([facility])      Creat level better -to monitor closely      F/u urology with concerns of urinary retention    2. Essential hypertension        Elevated BP - well controlled now  -to monitor at home       3. 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 Controlled gout      No recent gout attacks -on allopurinol      To monitor uric acid    5. Vitamin D deficiency      vit D and PTH levels well controlled    6. Anemia due to stage 4 chronic kidney disease (HCC)      Hb stable -WNL      Recs;  Continue current treatment  Monitor BP  Low salt diet  Keep well hydrated  Avoid NSAID's  F/u in 3-4 months

## 2021-01-08 DIAGNOSIS — Z23 NEED FOR VACCINATION: ICD-10-CM

## 2021-01-14 ENCOUNTER — IMMUNIZATION (OUTPATIENT)
Dept: FAMILY PLANNING/WOMEN'S HEALTH CLINIC | Facility: IMMUNIZATION CENTER | Age: 81
End: 2021-01-14
Attending: INTERNAL MEDICINE
Payer: MEDICARE

## 2021-01-14 DIAGNOSIS — Z23 NEED FOR VACCINATION: ICD-10-CM

## 2021-01-14 DIAGNOSIS — Z23 ENCOUNTER FOR VACCINATION: Primary | ICD-10-CM

## 2021-01-14 PROCEDURE — 0001A PFIZER SARS-COV-2 VACCINE: CPT

## 2021-01-14 PROCEDURE — 91300 PFIZER SARS-COV-2 VACCINE: CPT

## 2021-01-22 ENCOUNTER — HOSPITAL ENCOUNTER (OUTPATIENT)
Dept: LAB | Facility: MEDICAL CENTER | Age: 81
End: 2021-01-22
Attending: UROLOGY
Payer: MEDICARE

## 2021-01-22 LAB
ALBUMIN SERPL BCP-MCNC: 3.9 G/DL (ref 3.2–4.9)
ALBUMIN/GLOB SERPL: 1.3 G/DL
ALP SERPL-CCNC: 75 U/L (ref 30–99)
ALT SERPL-CCNC: 17 U/L (ref 2–50)
ANION GAP SERPL CALC-SCNC: 12 MMOL/L (ref 7–16)
AST SERPL-CCNC: 11 U/L (ref 12–45)
BASOPHILS # BLD AUTO: 0.4 % (ref 0–1.8)
BASOPHILS # BLD: 0.03 K/UL (ref 0–0.12)
BILIRUB SERPL-MCNC: 0.3 MG/DL (ref 0.1–1.5)
BUN SERPL-MCNC: 38 MG/DL (ref 8–22)
CALCIUM SERPL-MCNC: 9.6 MG/DL (ref 8.5–10.5)
CHLORIDE SERPL-SCNC: 99 MMOL/L (ref 96–112)
CO2 SERPL-SCNC: 24 MMOL/L (ref 20–33)
CREAT SERPL-MCNC: 2.61 MG/DL (ref 0.5–1.4)
EOSINOPHIL # BLD AUTO: 0.11 K/UL (ref 0–0.51)
EOSINOPHIL NFR BLD: 1.6 % (ref 0–6.9)
ERYTHROCYTE [DISTWIDTH] IN BLOOD BY AUTOMATED COUNT: 47.1 FL (ref 35.9–50)
GLOBULIN SER CALC-MCNC: 3 G/DL (ref 1.9–3.5)
GLUCOSE SERPL-MCNC: 280 MG/DL (ref 65–99)
HCT VFR BLD AUTO: 47.4 % (ref 42–52)
HGB BLD-MCNC: 15.7 G/DL (ref 14–18)
IMM GRANULOCYTES # BLD AUTO: 0.02 K/UL (ref 0–0.11)
IMM GRANULOCYTES NFR BLD AUTO: 0.3 % (ref 0–0.9)
LYMPHOCYTES # BLD AUTO: 1.68 K/UL (ref 1–4.8)
LYMPHOCYTES NFR BLD: 24.8 % (ref 22–41)
MCH RBC QN AUTO: 29.5 PG (ref 27–33)
MCHC RBC AUTO-ENTMCNC: 33.1 G/DL (ref 33.7–35.3)
MCV RBC AUTO: 88.9 FL (ref 81.4–97.8)
MONOCYTES # BLD AUTO: 0.48 K/UL (ref 0–0.85)
MONOCYTES NFR BLD AUTO: 7.1 % (ref 0–13.4)
NEUTROPHILS # BLD AUTO: 4.45 K/UL (ref 1.82–7.42)
NEUTROPHILS NFR BLD: 65.8 % (ref 44–72)
NRBC # BLD AUTO: 0 K/UL
NRBC BLD-RTO: 0 /100 WBC
PLATELET # BLD AUTO: 221 K/UL (ref 164–446)
PMV BLD AUTO: 11.6 FL (ref 9–12.9)
POTASSIUM SERPL-SCNC: 3.8 MMOL/L (ref 3.6–5.5)
PROT SERPL-MCNC: 6.9 G/DL (ref 6–8.2)
PSA SERPL-MCNC: 1.87 NG/ML (ref 0–4)
RBC # BLD AUTO: 5.33 M/UL (ref 4.7–6.1)
SODIUM SERPL-SCNC: 135 MMOL/L (ref 135–145)
TESTOST SERPL-MCNC: 167 NG/DL (ref 175–781)
WBC # BLD AUTO: 6.8 K/UL (ref 4.8–10.8)

## 2021-01-22 PROCEDURE — 85025 COMPLETE CBC W/AUTO DIFF WBC: CPT

## 2021-01-22 PROCEDURE — 80053 COMPREHEN METABOLIC PANEL: CPT

## 2021-01-22 PROCEDURE — 84153 ASSAY OF PSA TOTAL: CPT

## 2021-01-22 PROCEDURE — 36415 COLL VENOUS BLD VENIPUNCTURE: CPT

## 2021-01-22 PROCEDURE — 84403 ASSAY OF TOTAL TESTOSTERONE: CPT

## 2021-02-05 ENCOUNTER — IMMUNIZATION (OUTPATIENT)
Dept: FAMILY PLANNING/WOMEN'S HEALTH CLINIC | Facility: IMMUNIZATION CENTER | Age: 81
End: 2021-02-05
Attending: INTERNAL MEDICINE
Payer: MEDICARE

## 2021-02-05 DIAGNOSIS — Z23 ENCOUNTER FOR VACCINATION: Primary | ICD-10-CM

## 2021-02-05 PROCEDURE — 91300 PFIZER SARS-COV-2 VACCINE: CPT

## 2021-02-05 PROCEDURE — 0002A PFIZER SARS-COV-2 VACCINE: CPT

## 2021-02-11 ENCOUNTER — HOSPITAL ENCOUNTER (OUTPATIENT)
Dept: RADIOLOGY | Facility: MEDICAL CENTER | Age: 81
End: 2021-02-11
Attending: FAMILY MEDICINE
Payer: MEDICARE

## 2021-02-11 DIAGNOSIS — J18.9 PNEUMONIA OF RIGHT MIDDLE LOBE DUE TO INFECTIOUS ORGANISM: ICD-10-CM

## 2021-02-11 PROCEDURE — 71046 X-RAY EXAM CHEST 2 VIEWS: CPT

## 2021-02-27 ENCOUNTER — APPOINTMENT (OUTPATIENT)
Dept: RADIOLOGY | Facility: MEDICAL CENTER | Age: 81
DRG: 305 | End: 2021-02-27
Attending: EMERGENCY MEDICINE
Payer: MEDICARE

## 2021-02-27 ENCOUNTER — APPOINTMENT (OUTPATIENT)
Dept: CARDIOLOGY | Facility: MEDICAL CENTER | Age: 81
DRG: 305 | End: 2021-02-27
Attending: STUDENT IN AN ORGANIZED HEALTH CARE EDUCATION/TRAINING PROGRAM
Payer: MEDICARE

## 2021-02-27 ENCOUNTER — APPOINTMENT (OUTPATIENT)
Dept: RADIOLOGY | Facility: MEDICAL CENTER | Age: 81
DRG: 305 | End: 2021-02-27
Attending: STUDENT IN AN ORGANIZED HEALTH CARE EDUCATION/TRAINING PROGRAM
Payer: MEDICARE

## 2021-02-27 ENCOUNTER — HOSPITAL ENCOUNTER (INPATIENT)
Facility: MEDICAL CENTER | Age: 81
LOS: 1 days | DRG: 305 | End: 2021-02-28
Attending: EMERGENCY MEDICINE | Admitting: STUDENT IN AN ORGANIZED HEALTH CARE EDUCATION/TRAINING PROGRAM
Payer: MEDICARE

## 2021-02-27 DIAGNOSIS — I16.1 HYPERTENSIVE EMERGENCY: ICD-10-CM

## 2021-02-27 DIAGNOSIS — R79.89 ELEVATED TROPONIN: ICD-10-CM

## 2021-02-27 PROBLEM — H93.8X3 CONGESTION OF BOTH EARS: Status: ACTIVE | Noted: 2021-02-27

## 2021-02-27 PROBLEM — N18.4 CKD (CHRONIC KIDNEY DISEASE) STAGE 4, GFR 15-29 ML/MIN (HCC): Status: ACTIVE | Noted: 2021-02-27

## 2021-02-27 LAB
ALBUMIN SERPL BCP-MCNC: 3.7 G/DL (ref 3.2–4.9)
ALBUMIN/GLOB SERPL: 1.2 G/DL
ALP SERPL-CCNC: 74 U/L (ref 30–99)
ALT SERPL-CCNC: 18 U/L (ref 2–50)
ANION GAP SERPL CALC-SCNC: 10 MMOL/L (ref 7–16)
AST SERPL-CCNC: 13 U/L (ref 12–45)
BASOPHILS # BLD AUTO: 0.5 % (ref 0–1.8)
BASOPHILS # BLD: 0.04 K/UL (ref 0–0.12)
BILIRUB SERPL-MCNC: 0.3 MG/DL (ref 0.1–1.5)
BUN SERPL-MCNC: 36 MG/DL (ref 8–22)
CALCIUM SERPL-MCNC: 9.6 MG/DL (ref 8.5–10.5)
CHLORIDE SERPL-SCNC: 99 MMOL/L (ref 96–112)
CO2 SERPL-SCNC: 26 MMOL/L (ref 20–33)
CREAT SERPL-MCNC: 2.44 MG/DL (ref 0.5–1.4)
CREAT UR-MCNC: 36.6 MG/DL
CREAT UR-MCNC: 36.87 MG/DL
D DIMER PPP IA.FEU-MCNC: 0.41 UG/ML (FEU) (ref 0–0.5)
EKG IMPRESSION: NORMAL
EKG IMPRESSION: NORMAL
EOSINOPHIL # BLD AUTO: 0.19 K/UL (ref 0–0.51)
EOSINOPHIL NFR BLD: 2.2 % (ref 0–6.9)
ERYTHROCYTE [DISTWIDTH] IN BLOOD BY AUTOMATED COUNT: 46.2 FL (ref 35.9–50)
EST. AVERAGE GLUCOSE BLD GHB EST-MCNC: 206 MG/DL
FLUAV RNA SPEC QL NAA+PROBE: NEGATIVE
FLUBV RNA SPEC QL NAA+PROBE: NEGATIVE
GLOBULIN SER CALC-MCNC: 3.1 G/DL (ref 1.9–3.5)
GLUCOSE SERPL-MCNC: 206 MG/DL (ref 65–99)
HBA1C MFR BLD: 8.8 % (ref 4–5.6)
HCT VFR BLD AUTO: 46 % (ref 42–52)
HGB BLD-MCNC: 15.3 G/DL (ref 14–18)
IMM GRANULOCYTES # BLD AUTO: 0.03 K/UL (ref 0–0.11)
IMM GRANULOCYTES NFR BLD AUTO: 0.3 % (ref 0–0.9)
LIPASE SERPL-CCNC: 35 U/L (ref 11–82)
LV EJECT FRACT  99904: 65
LV EJECT FRACT MOD 2C 99903: 71.52
LV EJECT FRACT MOD 4C 99902: 42.84
LV EJECT FRACT MOD BP 99901: 57.61
LYMPHOCYTES # BLD AUTO: 2.15 K/UL (ref 1–4.8)
LYMPHOCYTES NFR BLD: 24.8 % (ref 22–41)
MAGNESIUM SERPL-MCNC: 2.1 MG/DL (ref 1.5–2.5)
MCH RBC QN AUTO: 29.7 PG (ref 27–33)
MCHC RBC AUTO-ENTMCNC: 33.3 G/DL (ref 33.7–35.3)
MCV RBC AUTO: 89.3 FL (ref 81.4–97.8)
MONOCYTES # BLD AUTO: 0.74 K/UL (ref 0–0.85)
MONOCYTES NFR BLD AUTO: 8.5 % (ref 0–13.4)
NEUTROPHILS # BLD AUTO: 5.52 K/UL (ref 1.82–7.42)
NEUTROPHILS NFR BLD: 63.7 % (ref 44–72)
NRBC # BLD AUTO: 0 K/UL
NRBC BLD-RTO: 0 /100 WBC
NT-PROBNP SERPL IA-MCNC: 54 PG/ML (ref 0–125)
PLATELET # BLD AUTO: 210 K/UL (ref 164–446)
PMV BLD AUTO: 11.2 FL (ref 9–12.9)
POTASSIUM SERPL-SCNC: 3.6 MMOL/L (ref 3.6–5.5)
PROT SERPL-MCNC: 6.8 G/DL (ref 6–8.2)
PROT UR-MCNC: 167 MG/DL (ref 0–15)
PROT/CREAT UR: 4563 MG/G (ref 15–68)
RBC # BLD AUTO: 5.15 M/UL (ref 4.7–6.1)
RSV RNA SPEC QL NAA+PROBE: NEGATIVE
SARS-COV-2 RNA RESP QL NAA+PROBE: NOTDETECTED
SODIUM SERPL-SCNC: 135 MMOL/L (ref 135–145)
SPECIMEN SOURCE: NORMAL
TROPONIN T SERPL-MCNC: 30 NG/L (ref 6–19)
TROPONIN T SERPL-MCNC: 32 NG/L (ref 6–19)
TROPONIN T SERPL-MCNC: 32 NG/L (ref 6–19)
TSH SERPL DL<=0.005 MIU/L-ACNC: 2.13 UIU/ML (ref 0.38–5.33)
WBC # BLD AUTO: 8.7 K/UL (ref 4.8–10.8)

## 2021-02-27 PROCEDURE — 94760 N-INVAS EAR/PLS OXIMETRY 1: CPT

## 2021-02-27 PROCEDURE — 93306 TTE W/DOPPLER COMPLETE: CPT

## 2021-02-27 PROCEDURE — 700111 HCHG RX REV CODE 636 W/ 250 OVERRIDE (IP): Performed by: STUDENT IN AN ORGANIZED HEALTH CARE EDUCATION/TRAINING PROGRAM

## 2021-02-27 PROCEDURE — 80053 COMPREHEN METABOLIC PANEL: CPT

## 2021-02-27 PROCEDURE — 0241U HCHG SARS-COV-2 COVID-19 NFCT DS RESP RNA 4 TRGT MIC: CPT

## 2021-02-27 PROCEDURE — A9270 NON-COVERED ITEM OR SERVICE: HCPCS | Performed by: STUDENT IN AN ORGANIZED HEALTH CARE EDUCATION/TRAINING PROGRAM

## 2021-02-27 PROCEDURE — 99223 1ST HOSP IP/OBS HIGH 75: CPT | Mod: AI | Performed by: STUDENT IN AN ORGANIZED HEALTH CARE EDUCATION/TRAINING PROGRAM

## 2021-02-27 PROCEDURE — 93010 ELECTROCARDIOGRAM REPORT: CPT | Performed by: INTERNAL MEDICINE

## 2021-02-27 PROCEDURE — 99285 EMERGENCY DEPT VISIT HI MDM: CPT

## 2021-02-27 PROCEDURE — 93306 TTE W/DOPPLER COMPLETE: CPT | Mod: 26 | Performed by: INTERNAL MEDICINE

## 2021-02-27 PROCEDURE — 96375 TX/PRO/DX INJ NEW DRUG ADDON: CPT

## 2021-02-27 PROCEDURE — 700102 HCHG RX REV CODE 250 W/ 637 OVERRIDE(OP): Performed by: STUDENT IN AN ORGANIZED HEALTH CARE EDUCATION/TRAINING PROGRAM

## 2021-02-27 PROCEDURE — 770020 HCHG ROOM/CARE - TELE (206)

## 2021-02-27 PROCEDURE — 84484 ASSAY OF TROPONIN QUANT: CPT

## 2021-02-27 PROCEDURE — 83735 ASSAY OF MAGNESIUM: CPT

## 2021-02-27 PROCEDURE — 93005 ELECTROCARDIOGRAM TRACING: CPT

## 2021-02-27 PROCEDURE — 700101 HCHG RX REV CODE 250: Performed by: EMERGENCY MEDICINE

## 2021-02-27 PROCEDURE — 83880 ASSAY OF NATRIURETIC PEPTIDE: CPT

## 2021-02-27 PROCEDURE — 70450 CT HEAD/BRAIN W/O DYE: CPT | Mod: ME

## 2021-02-27 PROCEDURE — 700105 HCHG RX REV CODE 258: Performed by: STUDENT IN AN ORGANIZED HEALTH CARE EDUCATION/TRAINING PROGRAM

## 2021-02-27 PROCEDURE — 84156 ASSAY OF PROTEIN URINE: CPT

## 2021-02-27 PROCEDURE — 84443 ASSAY THYROID STIM HORMONE: CPT

## 2021-02-27 PROCEDURE — 82570 ASSAY OF URINE CREATININE: CPT | Mod: 91

## 2021-02-27 PROCEDURE — 85025 COMPLETE CBC W/AUTO DIFF WBC: CPT

## 2021-02-27 PROCEDURE — 83036 HEMOGLOBIN GLYCOSYLATED A1C: CPT

## 2021-02-27 PROCEDURE — 93005 ELECTROCARDIOGRAM TRACING: CPT | Performed by: EMERGENCY MEDICINE

## 2021-02-27 PROCEDURE — 71045 X-RAY EXAM CHEST 1 VIEW: CPT

## 2021-02-27 PROCEDURE — 700117 HCHG RX CONTRAST REV CODE 255: Performed by: STUDENT IN AN ORGANIZED HEALTH CARE EDUCATION/TRAINING PROGRAM

## 2021-02-27 PROCEDURE — 83690 ASSAY OF LIPASE: CPT

## 2021-02-27 PROCEDURE — 96374 THER/PROPH/DIAG INJ IV PUSH: CPT

## 2021-02-27 PROCEDURE — 85379 FIBRIN DEGRADATION QUANT: CPT

## 2021-02-27 PROCEDURE — 93005 ELECTROCARDIOGRAM TRACING: CPT | Performed by: STUDENT IN AN ORGANIZED HEALTH CARE EDUCATION/TRAINING PROGRAM

## 2021-02-27 PROCEDURE — 36415 COLL VENOUS BLD VENIPUNCTURE: CPT

## 2021-02-27 RX ORDER — DILTIAZEM HYDROCHLORIDE 5 MG/ML
10 INJECTION INTRAVENOUS ONCE
Status: COMPLETED | OUTPATIENT
Start: 2021-02-27 | End: 2021-02-27

## 2021-02-27 RX ORDER — POLYETHYLENE GLYCOL 3350 17 G/17G
1 POWDER, FOR SOLUTION ORAL
Status: DISCONTINUED | OUTPATIENT
Start: 2021-02-27 | End: 2021-02-28 | Stop reason: HOSPADM

## 2021-02-27 RX ORDER — ONDANSETRON 4 MG/1
4 TABLET, ORALLY DISINTEGRATING ORAL EVERY 4 HOURS PRN
Status: DISCONTINUED | OUTPATIENT
Start: 2021-02-27 | End: 2021-02-28 | Stop reason: HOSPADM

## 2021-02-27 RX ORDER — REGADENOSON 0.08 MG/ML
0.4 INJECTION, SOLUTION INTRAVENOUS
Status: COMPLETED | OUTPATIENT
Start: 2021-02-27 | End: 2021-02-28

## 2021-02-27 RX ORDER — AMINOPHYLLINE 25 MG/ML
100 INJECTION, SOLUTION INTRAVENOUS
Status: DISCONTINUED | OUTPATIENT
Start: 2021-02-27 | End: 2021-02-28 | Stop reason: HOSPADM

## 2021-02-27 RX ORDER — BISACODYL 10 MG
10 SUPPOSITORY, RECTAL RECTAL
Status: DISCONTINUED | OUTPATIENT
Start: 2021-02-27 | End: 2021-02-28 | Stop reason: HOSPADM

## 2021-02-27 RX ORDER — DIPHENHYDRAMINE HCL 25 MG
25 TABLET ORAL
COMMUNITY
End: 2021-03-29

## 2021-02-27 RX ORDER — SIMVASTATIN 40 MG
40 TABLET ORAL NIGHTLY
Status: DISCONTINUED | OUTPATIENT
Start: 2021-02-27 | End: 2021-02-28 | Stop reason: HOSPADM

## 2021-02-27 RX ORDER — AMLODIPINE BESYLATE 10 MG/1
10 TABLET ORAL
Status: DISCONTINUED | OUTPATIENT
Start: 2021-02-28 | End: 2021-02-28 | Stop reason: HOSPADM

## 2021-02-27 RX ORDER — ENALAPRILAT 1.25 MG/ML
1.25 INJECTION INTRAVENOUS EVERY 6 HOURS PRN
Status: DISCONTINUED | OUTPATIENT
Start: 2021-02-27 | End: 2021-02-27

## 2021-02-27 RX ORDER — OLMESARTAN MEDOXOMIL AND HYDROCHLOROTHIAZIDE 40/25 40; 25 MG/1; MG/1
1 TABLET ORAL DAILY
Status: DISCONTINUED | OUTPATIENT
Start: 2021-02-27 | End: 2021-02-27

## 2021-02-27 RX ORDER — ACETAMINOPHEN 325 MG/1
650 TABLET ORAL EVERY 6 HOURS PRN
Status: DISCONTINUED | OUTPATIENT
Start: 2021-02-27 | End: 2021-02-28 | Stop reason: HOSPADM

## 2021-02-27 RX ORDER — ONDANSETRON 2 MG/ML
4 INJECTION INTRAMUSCULAR; INTRAVENOUS EVERY 4 HOURS PRN
Status: DISCONTINUED | OUTPATIENT
Start: 2021-02-27 | End: 2021-02-28 | Stop reason: HOSPADM

## 2021-02-27 RX ORDER — AMLODIPINE BESYLATE 5 MG/1
5 TABLET ORAL
Status: DISCONTINUED | OUTPATIENT
Start: 2021-02-27 | End: 2021-02-27

## 2021-02-27 RX ORDER — CHOLECALCIFEROL (VITAMIN D3) 125 MCG
5 CAPSULE ORAL NIGHTLY
Status: DISCONTINUED | OUTPATIENT
Start: 2021-02-27 | End: 2021-02-28 | Stop reason: HOSPADM

## 2021-02-27 RX ORDER — ASPIRIN 325 MG
325 TABLET ORAL DAILY
Status: DISCONTINUED | OUTPATIENT
Start: 2021-02-27 | End: 2021-02-28 | Stop reason: HOSPADM

## 2021-02-27 RX ORDER — LABETALOL HYDROCHLORIDE 5 MG/ML
10 INJECTION, SOLUTION INTRAVENOUS ONCE
Status: COMPLETED | OUTPATIENT
Start: 2021-02-27 | End: 2021-02-27

## 2021-02-27 RX ORDER — SODIUM CHLORIDE, SODIUM LACTATE, POTASSIUM CHLORIDE, CALCIUM CHLORIDE 600; 310; 30; 20 MG/100ML; MG/100ML; MG/100ML; MG/100ML
1000 INJECTION, SOLUTION INTRAVENOUS CONTINUOUS
Status: DISCONTINUED | OUTPATIENT
Start: 2021-02-27 | End: 2021-02-28

## 2021-02-27 RX ORDER — OLMESARTAN MEDOXOMIL 20 MG/1
40 TABLET ORAL
Status: DISCONTINUED | OUTPATIENT
Start: 2021-02-27 | End: 2021-02-28 | Stop reason: HOSPADM

## 2021-02-27 RX ORDER — ASPIRIN 81 MG/1
324 TABLET, CHEWABLE ORAL DAILY
Status: DISCONTINUED | OUTPATIENT
Start: 2021-02-27 | End: 2021-02-28 | Stop reason: HOSPADM

## 2021-02-27 RX ORDER — OXYBUTYNIN CHLORIDE 5 MG/1
2.5 TABLET ORAL 2 TIMES DAILY
Status: DISCONTINUED | OUTPATIENT
Start: 2021-02-27 | End: 2021-02-28 | Stop reason: HOSPADM

## 2021-02-27 RX ORDER — AMOXICILLIN 250 MG
2 CAPSULE ORAL 2 TIMES DAILY
Status: DISCONTINUED | OUTPATIENT
Start: 2021-02-27 | End: 2021-02-28 | Stop reason: HOSPADM

## 2021-02-27 RX ORDER — ALUMINA, MAGNESIA, AND SIMETHICONE 2400; 2400; 240 MG/30ML; MG/30ML; MG/30ML
30 SUSPENSION ORAL EVERY 4 HOURS PRN
Status: DISCONTINUED | OUTPATIENT
Start: 2021-02-27 | End: 2021-02-28 | Stop reason: HOSPADM

## 2021-02-27 RX ORDER — HYDRALAZINE HYDROCHLORIDE 20 MG/ML
20 INJECTION INTRAMUSCULAR; INTRAVENOUS EVERY 4 HOURS PRN
Status: DISCONTINUED | OUTPATIENT
Start: 2021-02-27 | End: 2021-02-28 | Stop reason: HOSPADM

## 2021-02-27 RX ORDER — ASPIRIN 300 MG/1
300 SUPPOSITORY RECTAL DAILY
Status: DISCONTINUED | OUTPATIENT
Start: 2021-02-27 | End: 2021-02-28 | Stop reason: HOSPADM

## 2021-02-27 RX ORDER — CARVEDILOL 25 MG/1
25 TABLET ORAL 2 TIMES DAILY WITH MEALS
Status: DISCONTINUED | OUTPATIENT
Start: 2021-02-27 | End: 2021-02-28 | Stop reason: HOSPADM

## 2021-02-27 RX ORDER — ZOLPIDEM TARTRATE 5 MG/1
5 TABLET ORAL NIGHTLY PRN
Status: DISCONTINUED | OUTPATIENT
Start: 2021-02-27 | End: 2021-02-28 | Stop reason: HOSPADM

## 2021-02-27 RX ORDER — DEXTROSE MONOHYDRATE 25 G/50ML
50 INJECTION, SOLUTION INTRAVENOUS
Status: DISCONTINUED | OUTPATIENT
Start: 2021-02-27 | End: 2021-02-27

## 2021-02-27 RX ORDER — HYDROCHLOROTHIAZIDE 25 MG/1
25 TABLET ORAL
Status: DISCONTINUED | OUTPATIENT
Start: 2021-02-27 | End: 2021-02-28 | Stop reason: HOSPADM

## 2021-02-27 RX ORDER — CARVEDILOL 12.5 MG/1
12.5 TABLET ORAL 2 TIMES DAILY WITH MEALS
Status: DISCONTINUED | OUTPATIENT
Start: 2021-02-27 | End: 2021-02-27

## 2021-02-27 RX ADMIN — AMLODIPINE BESYLATE 5 MG: 5 TABLET ORAL at 11:40

## 2021-02-27 RX ADMIN — Medication 5 MG: at 21:39

## 2021-02-27 RX ADMIN — SODIUM CHLORIDE, POTASSIUM CHLORIDE, SODIUM LACTATE AND CALCIUM CHLORIDE 1000 ML: 600; 310; 30; 20 INJECTION, SOLUTION INTRAVENOUS at 09:59

## 2021-02-27 RX ADMIN — HYDRALAZINE HYDROCHLORIDE 20 MG: 20 INJECTION INTRAMUSCULAR; INTRAVENOUS at 15:01

## 2021-02-27 RX ADMIN — ENALAPRILAT 1.25 MG: 1.25 INJECTION INTRAVENOUS at 13:08

## 2021-02-27 RX ADMIN — CARVEDILOL 12.5 MG: 12.5 TABLET, FILM COATED ORAL at 08:21

## 2021-02-27 RX ADMIN — SIMVASTATIN 40 MG: 40 TABLET, FILM COATED ORAL at 21:39

## 2021-02-27 RX ADMIN — OLMESARTAN MEDOXOMIL 40 MG: 20 TABLET, FILM COATED ORAL at 08:21

## 2021-02-27 RX ADMIN — OXYBUTYNIN CHLORIDE 2.5 MG: 5 TABLET ORAL at 18:11

## 2021-02-27 RX ADMIN — HYDROCHLOROTHIAZIDE 25 MG: 25 TABLET ORAL at 08:21

## 2021-02-27 RX ADMIN — LABETALOL HYDROCHLORIDE 10 MG: 5 INJECTION, SOLUTION INTRAVENOUS at 04:45

## 2021-02-27 RX ADMIN — SITAGLIPTIN 100 MG: 100 TABLET, FILM COATED ORAL at 17:27

## 2021-02-27 RX ADMIN — HUMAN ALBUMIN MICROSPHERES AND PERFLUTREN 3 ML: 10; .22 INJECTION, SOLUTION INTRAVENOUS at 10:51

## 2021-02-27 RX ADMIN — CARVEDILOL 25 MG: 25 TABLET, FILM COATED ORAL at 17:27

## 2021-02-27 RX ADMIN — DILTIAZEM HYDROCHLORIDE 10 MG: 5 INJECTION INTRAVENOUS at 05:28

## 2021-02-27 ASSESSMENT — COGNITIVE AND FUNCTIONAL STATUS - GENERAL
MOBILITY SCORE: 24
DAILY ACTIVITIY SCORE: 24
SUGGESTED CMS G CODE MODIFIER MOBILITY: CH
SUGGESTED CMS G CODE MODIFIER DAILY ACTIVITY: CH

## 2021-02-27 ASSESSMENT — PATIENT HEALTH QUESTIONNAIRE - PHQ9
SUM OF ALL RESPONSES TO PHQ9 QUESTIONS 1 AND 2: 0
SUM OF ALL RESPONSES TO PHQ9 QUESTIONS 1 AND 2: 0
2. FEELING DOWN, DEPRESSED, IRRITABLE, OR HOPELESS: NOT AT ALL
1. LITTLE INTEREST OR PLEASURE IN DOING THINGS: NOT AT ALL
1. LITTLE INTEREST OR PLEASURE IN DOING THINGS: NOT AT ALL

## 2021-02-27 ASSESSMENT — ENCOUNTER SYMPTOMS
HEADACHES: 0
SHORTNESS OF BREATH: 1
DOUBLE VISION: 0
PALPITATIONS: 0
FEVER: 0
DIZZINESS: 0
ORTHOPNEA: 1
SORE THROAT: 0
CLAUDICATION: 0
MYALGIAS: 0
PND: 0
DEPRESSION: 0
COUGH: 0
CHILLS: 0
NAUSEA: 0
BLURRED VISION: 0
ABDOMINAL PAIN: 0
VOMITING: 0
WHEEZING: 0

## 2021-02-27 ASSESSMENT — PAIN DESCRIPTION - PAIN TYPE: TYPE: ACUTE PAIN

## 2021-02-27 ASSESSMENT — LIFESTYLE VARIABLES
ALCOHOL_USE: NO
EVER FELT BAD OR GUILTY ABOUT YOUR DRINKING: NO
TOTAL SCORE: 0
HAVE PEOPLE ANNOYED YOU BY CRITICIZING YOUR DRINKING: NO
HAVE YOU EVER FELT YOU SHOULD CUT DOWN ON YOUR DRINKING: NO
TOTAL SCORE: 0
CONSUMPTION TOTAL: NEGATIVE
EVER HAD A DRINK FIRST THING IN THE MORNING TO STEADY YOUR NERVES TO GET RID OF A HANGOVER: NO
HOW MANY TIMES IN THE PAST YEAR HAVE YOU HAD 5 OR MORE DRINKS IN A DAY: 0
TOTAL SCORE: 0
ON A TYPICAL DAY WHEN YOU DRINK ALCOHOL HOW MANY DRINKS DO YOU HAVE: 0
AVERAGE NUMBER OF DAYS PER WEEK YOU HAVE A DRINK CONTAINING ALCOHOL: 0

## 2021-02-27 ASSESSMENT — FIBROSIS 4 INDEX
FIB4 SCORE: 0.95
FIB4 SCORE: 1.15
FIB4 SCORE: 1.15

## 2021-02-27 NOTE — ASSESSMENT & PLAN NOTE
Labetalol and Diltiazem given in the ED  Admitted with telemetry  Chest xray showing: No acute cardiopulmonary abnormality  EKG showing NSR, with RBBB, 1st degree AV block, no acute changes from previous EKG  BROWN: No evidence of heart failure. 4.4 cm thoracic AA     Plan:   Increase home medications: Coreg 25mg BID, Norvasc 10mg starting 2/28 (5mg today)   Hydralazine PRN

## 2021-02-27 NOTE — PROGRESS NOTES
Received report from, RN (ED). Patient is A&Ox4. Patient transported via [facility] with ACLS RN and [employer] monitor. Patient arrived to unit, placed on tele box. . [place] Fall precautions in place. Call light and belongings within reach. Bed locked and in lowest position. Bed alarm on. Assessment completed, will continue to monitor.

## 2021-02-27 NOTE — PROGRESS NOTES
Daily Progress Note:     Date of Service: 2/27/2021  Primary Team: [employer] [facility] Yellow Team   Attending: JARED Paulson M.D.   Senior Resident: Dr. Sun  Contact:  [phone]    Chief Complaint: Shortness of breath    Subjective:   Overnight: Admitted, hypertensive urgency   Given Diltiazem, Labetalol, started on home meds    Continued to remain anxious, hypertensive throughout day - increased Norvasc/Coreg  Denies CP   TTE: no evidence of CHF, Thoracic Aortic Dilation 4.4cm     Consultants/Specialty:  None     Review of Systems:   Review of Systems   Constitutional: Negative for chills and fever.   HENT: Positive for congestion. Negative for sore throat.    Eyes: Negative for blurred vision and double vision.   Respiratory: Negative for cough and wheezing.    Cardiovascular: Positive for orthopnea. Negative for palpitations, claudication, leg swelling and PND.   Gastrointestinal: Negative for abdominal pain, nausea and vomiting.   Genitourinary: Negative for dysuria.   Musculoskeletal: Negative for myalgias.   Skin: Negative for itching and rash.   Neurological: Negative for dizziness and headaches.   Psychiatric/Behavioral: Negative for depression and suicidal ideas.       Objective Data:   Physical Exam:   Vitals:   Temp:  [36.1 °C (97 °F)-36.9 °C (98.4 °F)] 36.5 °C (97.7 °F)  Pulse:  [50-72] 61  Resp:  [13-29] 16  BP: (154-227)/() 159/92  SpO2:  [94 %-100 %] 99 %  Physical Exam  Constitutional:       Appearance: He is obese. He is not ill-appearing.   HENT:      Head: Normocephalic and atraumatic.      Mouth/Throat:      Mouth: Mucous membranes are moist.      Pharynx: Oropharynx is clear.   Eyes:      Extraocular Movements: Extraocular movements intact.      Conjunctiva/sclera: Conjunctivae normal.      Pupils: Pupils are equal, round, and reactive to light.   Cardiovascular:      Rate and Rhythm: Normal rate and regular rhythm.      Pulses: Normal pulses.      Heart sounds: Normal heart sounds.    Pulmonary:      Effort: Pulmonary effort is normal. No respiratory distress.      Breath sounds: Normal breath sounds. No stridor. No wheezing, rhonchi or rales.   Abdominal:      General: Abdomen is flat. Bowel sounds are normal. There is no distension.      Palpations: Abdomen is soft.   Musculoskeletal:         General: No swelling. Normal range of motion.      Cervical back: Normal range of motion.   Skin:     General: Skin is warm and dry.   Neurological:      General: No focal deficit present.      Mental Status: He is alert and oriented to person, place, and time.   Psychiatric:         Thought Content: Thought content normal.         Judgment: Judgment normal.      Comments: Anxious         Labs:   Recent Labs     02/27/21  0320   WBC 8.7   RBC 5.15   HEMOGLOBIN 15.3   HEMATOCRIT 46.0   MCV 89.3   MCH 29.7   RDW 46.2   PLATELETCT 210   MPV 11.2   NEUTSPOLYS 63.70   LYMPHOCYTES 24.80   MONOCYTES 8.50   EOSINOPHILS 2.20   BASOPHILS 0.50     Recent Labs     02/27/21  0320   SODIUM 135   POTASSIUM 3.6   CHLORIDE 99   CO2 26   GLUCOSE 206*   BUN 36*       Imaging:   EC-ECHOCARDIOGRAM COMPLETE W/ CONT   Final Result      CT-HEAD W/O   Final Result      No acute intracranial abnormality is identified.      Atrophy      There are periventricular and subcortical white matter changes present.  This finding is nonspecific and could be from previous small vessel ischemia, demyelination, or gliosis.         DX-CHEST-PORTABLE (1 VIEW)   Final Result      No acute cardiopulmonary abnormality.      NM-CARDIAC STRESS TEST    (Results Pending)       Hypertensive emergency- (present on admission)  Assessment & Plan  Labetalol and Diltiazem given in the ED  Admitted with telemetry  Chest xray showing: No acute cardiopulmonary abnormality  EKG showing NSR, with RBBB, 1st degree AV block, no acute changes from previous EKG  BROWN: No evidence of heart failure. 4.4 cm thoracic AA     Plan:   Increase home medications: Coreg 25mg  BID, Norvasc 10mg starting 2/28 (5mg today)   Hydralazine PRN       CKD (chronic kidney disease) stage 4, GFR 15-29 ml/min ([facility])- (present on admission)  Assessment & Plan  GFR 26   BUN/Creatinine 36/2.44  Continue to monitor  Gentle IVF    Type 2 diabetes mellitus (HCC)- (present on admission)  Assessment & Plan  A1c 8.8   Restarted Januvia      Orthopnea  Assessment & Plan  C/o dyspnea on exertion and orthopnea  Concerning for CHF     BROWN: no evidence of Heart Failure     Plan:   CTM   Pending Stress Test in AM    Congestion of both ears  Assessment & Plan  Cerumen blocking full ear exam  Sinus congestion X 1 month  CT head/sinuses to assess    Mixed hyperlipidemia- (present on admission)  Assessment & Plan  Lipid panel ordered to assess  Continue statin daily

## 2021-02-27 NOTE — H&P
Hospital Medicine History & Physical Note    Date of Service  2/27/2021    Primary Care Physician  Dino Bee M.D.    Consultants       Code Status  Full Code    Chief Complaint  Chief Complaint   Patient presents with   • Hypertension     systolic 200's    • Shortness of Breath     started around 2300       History of Presenting Illness  80 y.o. male with a past medical hx of DM2, HTN, CKD, HLD, GERD who presented 2/27/2021 via EMS with shortness of breath onset a few weeks ago. Per the patient, his shortness of breath suddenly worsened last night and when he checked his blood pressure, he was systolic in the 200s; his normal is around 125-130.  He states that both of his monitors at home said his blood pressure was higher than could be read, which led him to call EMS to be taken to the ED. He has gone to see his PCP for his shortness of breath, where he had a chest x-ray performed that was unremarkable. The patient reports additional symptoms of orthopnea. His shortness of breath is worsened with exertion; no other alleviating factors were noted. He adds that he sleeps with an extra pillow while laying on his left side, slightly elevated, and that he has been taken to the ED in the past for concerns of a heart attack.  He is on a diuretic and has not missed any doses, and takes supplementary testosterone as his levels are low. He has not had recent changes to his medication dosages. He was given 4 ASA prior to arrival by EMS. Patient also complains of recent congestion in his sinuses for the past month.      Chest xray performed in the ED shows, no acute cardiopulmonary abnormality.  Notable labs include: /120 with HR 72 on arrival to ED  GFR 26  Troponin 30  Glucose 206  Bun/creatinine 36/2.44  Patient is admitted to the hospitalist service for HTN emergency, shortness of breath for ACS rule out.  Echo and stress test are ordered.      Review of Systems  Review of Systems   HENT: Positive for  congestion.    Respiratory: Positive for shortness of breath.    Cardiovascular: Positive for chest pain and orthopnea.   All other systems reviewed and are negative.      Past Medical History   has a past medical history of Abnormal electrocardiogram, ACE inhibitor intolerance, BPH (benign prostatic hyperplasia), Cancer (HCC), Cataract, Cholesterol blood decreased, Chronic kidney disease, stage III (moderate), Cold, Diabetes, GOUT, History of skull fracture, History of urinary tract infection, Hyperlipidemia, Hypertension, Indigestion, Mixed hyperlipidemia, Pneumonia (9/2015), Proteinuria, and Type 2 diabetes mellitus (HCC).    Surgical History   has a past surgical history that includes cataract phaco with iol (11/20/2012); cataract phaco with iol (12/4/2012); appendectomy; other; other; tonsillectomy; blepharoplasty (7/23/2014); brow lift (7/23/2014); septal reconstruction (12/7/2015); and cardiac cath, left heart.     Family History  family history includes Diabetes in his father; Heart Attack (age of onset: 79) in his father.     Social History   reports that he quit smoking about 56 years ago. He has a 1.20 pack-year smoking history. He has never used smokeless tobacco. He reports that he does not drink alcohol and does not use drugs.    Allergies  Allergies   Allergen Reactions   • Ace Inhibitors      COUGH.   • Ether      Violently sick   • Lipitor [Atorvastatin Calcium]      LEG PAINS         Medications  Prior to Admission Medications   Prescriptions Last Dose Informant Patient Reported? Taking?   Cholecalciferol (VITAMIN D) 1000 UNIT CAPS  Patient Yes No   Sig: Take  by mouth every day.   FISH OIL  Patient Yes No   Sig: by Does not apply route every day.   Mirabegron ER (MYRBETRIQ) 50 MG TABLET SR 24 HR   Yes No   Sig: Take  by mouth.   Multiple Vitamins-Minerals (CENTRUM SILVER PO)  Patient Yes No   Sig: Take  by mouth every day.   Testosterone (ANDROGEL) 20.25 MG/1.25GM (1.62%) GEL  Patient Yes No    Sig: Apply  to skin as directed. daily   allopurinol (ZYLOPRIM) 300 MG TABS  Patient Yes No   Sig: Take 300 mg by mouth every day.   amlodipine (NORVASC) 5 MG TABS  Patient Yes No   Sig: Take 5 mg by mouth every day.   carvedilol (COREG) 12.5 MG Tab   No No   Sig: Take 1 Tab by mouth 2 times a day, with meals.   finasteride (PROSCAR) 5 MG Tab   Yes No   Sig: Take 5 mg by mouth every day.   glimepiride (AMARYL) 2 MG Tab   Yes No   Sig: Take 2 mg by mouth every morning.   metformin (GLUCOPHAGE) 500 MG TABS  Patient Yes No   Sig: Take 1,000 mg by mouth 2 times a day.   mirtazapine (REMERON) 15 MG Tab   Yes No   Sig: Take 15 mg by mouth every evening.   olmesartan-hydrochlorothiazide (BENICAR HCT) 40-25 MG per tablet   No No   Sig: Take 1 Tab by mouth every day.   simvastatin (ZOCOR) 10 MG TABS  Patient Yes No   Sig: Take 10 mg by mouth every evening.   sitagliptin (JANUVIA) 100 MG TABS  Patient Yes No   Sig: Take  by mouth every day. With dinner   zolpidem (AMBIEN) 10 MG TABS  Patient Yes No   Sig: Take 10 mg by mouth at bedtime as needed.        Facility-Administered Medications: None       Physical Exam  Temp:  [36.9 °C (98.4 °F)] 36.9 °C (98.4 °F)  Pulse:  [50-72] 64  Resp:  [13-29] 20  BP: (171-227)/() 195/102  SpO2:  [94 %-100 %] 98 %    Physical Exam       Vitals and nursing note reviewed.  Constitutional:     General: Alert in no acute distress.    Appearance: Pt is not ill-appearing.      HENT: No signs of trauma, right ear tympanic membrane covered with cerumen, left ear with erythema, Nose normal. Patient sounds congested when speaking.      Head: Normocephalic.     Mouth/Throat: Unremarkable. Moist Mucosa     Eyes:      Pupils: Pupils are equal round and reactive to light, Conjunctiva normal, Non-icteric.   Neck: Normal range of motion, No tenderness, Supple, No stridor.   Cardiovascular:      Rate and Rhythm: Regular Rate and Rhythm     Heart sounds: No murmur, rubs, or gallops  appreciated.  Pulmonary/Thorax & Lungs:      Effort: No respiratory distress.     Breath sounds: No stridor. No wheezing, No Rhonchi, no palpable chest tenderness      Abdomen:     General: There is no distension.      Palpation/Auscultation: Soft, No tenderness, No masses, No pulsatile masses. Bowel sounds normal.   Skin: Warm, Dry, No erythema, No rash.       Coloration: Skin is not jaundiced or pale.     Musculoskeletal:         General: No  tenderness. Normal ROM  Extremities:       General: Intact distal pulses, b/l trace pitting edema, No tenderness, No cyanosis    Neurologic/Psych: Alert, No focal deficits noted.         Laboratory:  Recent Labs     02/27/21 0320   WBC 8.7   RBC 5.15   HEMOGLOBIN 15.3   HEMATOCRIT 46.0   MCV 89.3   MCH 29.7   MCHC 33.3*   RDW 46.2   PLATELETCT 210   MPV 11.2     Recent Labs     02/27/21 0320   SODIUM 135   POTASSIUM 3.6   CHLORIDE 99   CO2 26   GLUCOSE 206*   BUN 36*   CREATININE 2.44*   CALCIUM 9.6     Recent Labs     02/27/21 0320   ALTSGPT 18   ASTSGOT 13   ALKPHOSPHAT 74   TBILIRUBIN 0.3   LIPASE 35   GLUCOSE 206*         Recent Labs     02/27/21  0320   NTPROBNP 54         Recent Labs     02/27/21  0320   TROPONINT 30*       Imaging:  DX-CHEST-PORTABLE (1 VIEW)   Final Result      No acute cardiopulmonary abnormality.      NM-CARDIAC STRESS TEST    (Results Pending)   EC-ECHOCARDIOGRAM LTD W/ CONT    (Results Pending)         Assessment/Plan:  I anticipate this patient is appropriate for observation status at this time.    Congestion of both ears- (present on admission)  Assessment & Plan  Cerumen blocking full ear exam  Sinus congestion X 1 month  CT head/sinuses to assess    CKD (chronic kidney disease) stage 4, GFR 15-29 ml/min ([facility])- (present on admission)  Assessment & Plan  GFR 26   BUN/Creatinine 36/2.44  Continue to monitor  Gentle IVF    Hypertensive emergency- (present on admission)  Assessment & Plan  Labetalol and Diltiazem given in the ED  Admitted with  telemetry  Restart home meds  Denies chest pain  /120 with HR 72 on arrival to ED  Chest xray showing: No acute cardiopulmonary abnormality    EKG showing NSR, with RBBB, 1st degree AV block, no acute changes from previous EKG    Type 2 diabetes mellitus (HCC)- (present on admission)  Assessment & Plan  ISS  A1C ordered  accuchecks    Mixed hyperlipidemia- (present on admission)  Assessment & Plan  Lipid panel ordered to assess  Continue statin daily

## 2021-02-27 NOTE — PROGRESS NOTES
2 RN Skin Check    2 RN skin check complete with Annika RN  Devices in place: PIV, tele box.  Skin assessed under devices: no.  Confirmed pressure ulcers found on: n/a.  New potential pressure ulcers noted on n/a. Wound consult placed N/A.  The following interventions in place Pillows, encouraged patient to turn frequently.    All bony prominences intact.   Generalized scars  Ricardo heels dry/calloused.

## 2021-02-27 NOTE — ED NOTES
Med rec complete via interview with pt at bedside (medication list at bedside). Allergies reviewed. Pt denies antibiotic use in past 14 days.

## 2021-02-27 NOTE — ED NOTES
Patient ambulated to restroom w/ shuffle gait and no assistance. No other needs at this time.    [FreeTextEntry1] : CT Chest on 12/06/2019:\par - 5.3 x 4.0 x 4.3 cm focal consolidation within MYLES, inseparable from the posterior pleural surface and oblique fissure, triangular in shape, with air bronchograms\par - 6 mm LLL nodule (3: 95)\par - calcified right hilar LNs\par - pericardial calcifications, compatible with previous pericarditis

## 2021-02-27 NOTE — ED TRIAGE NOTES
"Chief Complaint   Patient presents with   • Hypertension     systolic 200's    • Shortness of Breath     started around 2300     Pt BIB EMS for above complaint. Patient has hx of HTN and is compliant w/ his medications. Pt states he developed the SOB and went and checked his BP and at home it read 270/160. Patient has minimal SOB, speaking full sentences and airway intact. BP Lt side 206/101 and Rt side 179/100. EKG obtained. Pt denies any chest pain, abdominal pain, or back pain.     BP (!) 206/101   Pulse 68   Temp 36.9 °C (98.4 °F) (Oral)   Resp 16   Ht 1.803 m (5' 11\")   Wt 90.7 kg (200 lb)   SpO2 98%   BMI 27.89 kg/m²     "

## 2021-02-27 NOTE — ED PROVIDER NOTES
ED Provider Note    Scribed for Aditya Fonseca M.D. by Nicholas Aviles. 2/27/2021  3:18 AM    Primary care provider: Dino Bee M.D.  Means of arrival: EMS  History obtained from: Patient  History limited by: None    CHIEF COMPLAINT  Chief Complaint   Patient presents with   • Hypertension     systolic 200's    • Shortness of Breath     started around 2300       HPI  Tyrone Cline is a 80 y.o. male who presents to the Emergency Department via EMS with shortness of breath onset couple of weeks ago. Per the patient, his shortness of breath suddenly worsened and when he checked his blood pressure, he was systolic in the 200s.  He states that both of his monitors at home said his blood pressure was higher than could be read, which led him to call EMS to be taken to the ED. He has gone to see his PCP for his shortness of breath and had an unremarkable chest x ray. The patient denies chest pain, tightness, leg swelling, fever, melena, hematochezia, diarrhea, or vomiting. His shortness of breath is worsened when laying down, but not upon exertion; no other alleviating factors were noted. He adds that he sleeps with an extra pillow while laying on his left side, slightly elevated, and that he has been taken to the ED in the past for concerns of a heart attack.  He is on a diuretic and has not missed any doses, and takes supplementary testosterone as his levels are low. He has not had recent changes to his medication dosages. He was given 4 ASA prior to arrival by EMS.     REVIEW OF SYSTEMS  Pertinent positives include: hypertension, shortness of breath. Pertinent negatives include: chest pain, tightness, leg swelling, fever, melena, hematochezia, diarrhea, or vomiting. See history of present illness. All other systems are negative.     PAST MEDICAL HISTORY   has a past medical history of Abnormal electrocardiogram, ACE inhibitor intolerance, BPH (benign prostatic hyperplasia), Cancer (HCC), Cataract,  Cholesterol blood decreased, Chronic kidney disease, stage III (moderate), Cold, Diabetes, GOUT, History of skull fracture, History of urinary tract infection, Hyperlipidemia, Hypertension, Indigestion, Mixed hyperlipidemia, Pneumonia (2015), Proteinuria, and Type 2 diabetes mellitus (HCC).    SURGICAL HISTORY   has a past surgical history that includes cataract phaco with iol (2012); cataract phaco with iol (2012); appendectomy; other; other; tonsillectomy; blepharoplasty (2014); brow lift (2014); septal reconstruction (2015); and cardiac cath, left heart.    SOCIAL HISTORY  Social History     Tobacco Use   • Smoking status: Former Smoker     Packs/day: 0.20     Years: 6.00     Pack years: 1.20     Quit date:      Years since quittin.1   • Smokeless tobacco: Never Used   • Tobacco comment: Stopped over 25 years ago.   Substance Use Topics   • Alcohol use: No   • Drug use: No      Social History     Substance and Sexual Activity   Drug Use No       FAMILY HISTORY  Family History   Problem Relation Age of Onset   • Diabetes Father    • Heart Attack Father 79       CURRENT MEDICATIONS  Current Outpatient Medications   Medication Instructions   • allopurinol (ZYLOPRIM) 300 mg, DAILY   • amLODIPine (NORVASC) 5 mg, DAILY   • carvedilol (COREG) 12.5 mg, Oral, 2 TIMES DAILY WITH MEALS   • Cholecalciferol (VITAMIN D) 1000 UNIT CAPS Oral, DAILY   • finasteride (PROSCAR) 5 mg, Oral, DAILY   • FISH OIL Does not apply, DAILY   • glimepiride (AMARYL) 2 mg, Oral, EVERY MORNING   • metFORMIN (GLUCOPHAGE) 1,000 mg, 2 TIMES DAILY   • Mirabegron ER (MYRBETRIQ) 50 MG TABLET SR 24 HR Oral   • mirtazapine (REMERON) 15 mg, Oral, NIGHTLY   • Multiple Vitamins-Minerals (CENTRUM SILVER PO) Oral, DAILY   • olmesartan-hydrochlorothiazide (BENICAR HCT) 40-25 MG per tablet 1 tablet, Oral, DAILY   • simvastatin (ZOCOR) 10 mg, NIGHTLY   • sitagliptin (JANUVIA) 100 MG TABS Oral, DAILY, With dinner   •  "Testosterone (ANDROGEL) 20.25 MG/1.25GM (1.62%) GEL Transdermal, daily   • zolpidem (AMBIEN) 10 mg, NIGHTLY PRN     ALLERGIES  Allergies   Allergen Reactions   • Ace Inhibitors      COUGH.   • Ether      Violently sick   • Lipitor [Atorvastatin Calcium]      LEG PAINS         PHYSICAL EXAM  VITAL SIGNS: BP (!) 206/101   Pulse 68   Temp 36.9 °C (98.4 °F) (Oral)   Resp 16   Ht 1.803 m (5' 11\")   Wt 90.7 kg (200 lb)   SpO2 98%   BMI 27.89 kg/m²     Constitutional: Alert in no apparent distress.  HENT: No signs of trauma, Bilateral external ears normal, Nose normal. Uvula midline.   Eyes: Pupils are equal and reactive, Conjunctiva normal, Non-icteric.   Neck: Normal range of motion, No tenderness, Supple, No stridor.   Lymphatic: No lymphadenopathy noted.   Cardiovascular: Regular rate and rhythm, no murmurs.   Thorax & Lungs: Normal breath sounds, No respiratory distress, No wheezing, No chest tenderness.   Abdomen:  Soft, No tenderness, No peritoneal signs, No masses, No pulsatile masses.   Skin: Warm, Dry, No erythema, No rash.   Back: No bony tenderness, No CVA tenderness.   Extremities: Intact distal pulses, No edema, No tenderness, No cyanosis.  Musculoskeletal: Good range of motion in all major joints. No tenderness to palpation or major deformities noted.   Neurologic: Alert , Normal motor function, Normal sensory function, No focal deficits noted.   Psychiatric: Affect normal, Judgment normal, Mood normal.     DIAGNOSTIC STUDIES / PROCEDURES    LABS  Labs Reviewed   CBC WITH DIFFERENTIAL - Abnormal; Notable for the following components:       Result Value    MCHC 33.3 (*)     All other components within normal limits   COMP METABOLIC PANEL - Abnormal; Notable for the following components:    Glucose 206 (*)     Bun 36 (*)     Creatinine 2.44 (*)     All other components within normal limits   TROPONIN - Abnormal; Notable for the following components:    Troponin T 30 (*)     All other components within " normal limits   ESTIMATED GFR - Abnormal; Notable for the following components:    GFR If  31 (*)     GFR If Non  26 (*)     All other components within normal limits   HEMOGLOBIN A1C - Abnormal; Notable for the following components:    Glycohemoglobin 8.8 (*)     All other components within normal limits   LIPASE   D-DIMER   COV-2, FLU A/B, AND RSV BY PCR    Narrative:     Have you been in close contact with a person who is suspected  or known to be positive for COVID-19 within the last 30 days  (e.g. last seen that person < 30 days ago)->No   MAGNESIUM   PROBRAIN NATRIURETIC PEPTIDE, NT   TSH WITH REFLEX TO FT4      All labs reviewed by me.    EKG  12 Lead EKG interpreted by me to show:  Indication: Shortness of breath  Normal sinus rhythm  Rate 69  Axis: Normal  Intervals: RBBB  Normal T waves  Normal ST segments  My impression of this EKG: No STEMI. No change from 9/20/2019.     RADIOLOGY  DX-CHEST-PORTABLE (1 VIEW)   Final Result      No acute cardiopulmonary abnormality.      NM-CARDIAC STRESS TEST    (Results Pending)   EC-ECHOCARDIOGRAM LTD W/ CONT    (Results Pending)   CT-HEAD W/O    (Results Pending)     The radiologist's interpretation of all radiological studies have been reviewed by me.    COURSE & MEDICAL DECISION MAKING  Nursing notes, VS, PMSFHx reviewed in chart.    80 y.o. male p/w chief complaint of shortness of breath and hypertension.    3:18 AM Patient seen and examined at bedside.      I verified that the patient was wearing a mask and I was wearing appropriate PPE every time I entered the room. The patient's mask was on the patient at all times during my encounter except for a brief view of the oropharynx.     The differential diagnoses include but are not limited to:     #acute chest pain  CBC negative for significant anemia/leukocytosis.  BMP negative for significant electrolyte abnormality.  Troponin/EKG (interpreted by me) without STEMI    HEART SCORE:  5  Hx and physical exam not c/w pericarditis, no e/o pericardial effusion on US  Elevated trop.     Given new reported elevated blood pressure and positive troponin I am concerned for hypertensive emergency however upon arrival patient with systolic blood pressure greater than 220 with spontaneous improvement to the 170s prior to medications.  Given the fluctuation in patient's blood pressure I elected to treat patient with labetalol, and attempt to slowly correct hypertension as patient does not have any confusion or slurred speech and his chest pain/ chest tightness free at this time.     4:15 AM Paged the hospitalist for consult.     4:18 AM Patient treated with labetalol 10 mg.      4:22 AM RN reports patient's bp is 187     4:23 AM Recheck: Patient re-evaluated at bedside. Discussed patient's condition and treatment plan, including plans for hospitalization to evaluate the extent of his heart strain further. Patient's lab and radiology results discussed. The patient understood and is in agreement.      4:27 AM I discussed the patient's case and the above findings with Dr. Correa (Hospitalist) who agreed to hospitalize the patient       DISPOSITION:  Patient will be hospitalized by Dr. Correa in guarded condition.    FINAL IMPRESSION  1. Hypertensive emergency    2. Elevated troponin          I, Nicholas Aviles (Scribe), am scribing for, and in the presence of, Aditya Fonseca M.D..    Electronically signed by: Nicholas Aviles (Scribe), 2/27/2021    IAditya M.D. personally performed the services described in this documentation, as scribed by Nicholas Aviles in my presence, and it is both accurate and complete.    C    The note accurately reflects work and decisions made by me.  Aditya Fonseca M.D.  2/27/2021  6:44 AM

## 2021-02-28 ENCOUNTER — APPOINTMENT (OUTPATIENT)
Dept: RADIOLOGY | Facility: MEDICAL CENTER | Age: 81
DRG: 305 | End: 2021-02-28
Attending: STUDENT IN AN ORGANIZED HEALTH CARE EDUCATION/TRAINING PROGRAM
Payer: MEDICARE

## 2021-02-28 VITALS
OXYGEN SATURATION: 92 % | HEIGHT: 71 IN | WEIGHT: 214.73 LBS | SYSTOLIC BLOOD PRESSURE: 136 MMHG | TEMPERATURE: 97.2 F | RESPIRATION RATE: 18 BRPM | DIASTOLIC BLOOD PRESSURE: 80 MMHG | HEART RATE: 73 BPM | BODY MASS INDEX: 30.06 KG/M2

## 2021-02-28 LAB
ALBUMIN SERPL BCP-MCNC: 3.4 G/DL (ref 3.2–4.9)
ALBUMIN/GLOB SERPL: 1.1 G/DL
ALP SERPL-CCNC: 69 U/L (ref 30–99)
ALT SERPL-CCNC: 13 U/L (ref 2–50)
ANION GAP SERPL CALC-SCNC: 12 MMOL/L (ref 7–16)
AST SERPL-CCNC: 14 U/L (ref 12–45)
BASOPHILS # BLD AUTO: 0.5 % (ref 0–1.8)
BASOPHILS # BLD: 0.05 K/UL (ref 0–0.12)
BILIRUB SERPL-MCNC: 0.5 MG/DL (ref 0.1–1.5)
BUN SERPL-MCNC: 37 MG/DL (ref 8–22)
CALCIUM SERPL-MCNC: 9.5 MG/DL (ref 8.5–10.5)
CHLORIDE SERPL-SCNC: 97 MMOL/L (ref 96–112)
CHOLEST SERPL-MCNC: 180 MG/DL (ref 100–199)
CO2 SERPL-SCNC: 20 MMOL/L (ref 20–33)
CREAT SERPL-MCNC: 2.45 MG/DL (ref 0.5–1.4)
EOSINOPHIL # BLD AUTO: 0.13 K/UL (ref 0–0.51)
EOSINOPHIL NFR BLD: 1.2 % (ref 0–6.9)
ERYTHROCYTE [DISTWIDTH] IN BLOOD BY AUTOMATED COUNT: 46 FL (ref 35.9–50)
GLOBULIN SER CALC-MCNC: 3.1 G/DL (ref 1.9–3.5)
GLUCOSE SERPL-MCNC: 200 MG/DL (ref 65–99)
HCT VFR BLD AUTO: 45.6 % (ref 42–52)
HDLC SERPL-MCNC: 34 MG/DL
HGB BLD-MCNC: 15.3 G/DL (ref 14–18)
IMM GRANULOCYTES # BLD AUTO: 0.05 K/UL (ref 0–0.11)
IMM GRANULOCYTES NFR BLD AUTO: 0.5 % (ref 0–0.9)
LDLC SERPL CALC-MCNC: 83 MG/DL
LYMPHOCYTES # BLD AUTO: 2.39 K/UL (ref 1–4.8)
LYMPHOCYTES NFR BLD: 22.7 % (ref 22–41)
MCH RBC QN AUTO: 29.5 PG (ref 27–33)
MCHC RBC AUTO-ENTMCNC: 33.6 G/DL (ref 33.7–35.3)
MCV RBC AUTO: 88 FL (ref 81.4–97.8)
MONOCYTES # BLD AUTO: 1 K/UL (ref 0–0.85)
MONOCYTES NFR BLD AUTO: 9.5 % (ref 0–13.4)
NEUTROPHILS # BLD AUTO: 6.92 K/UL (ref 1.82–7.42)
NEUTROPHILS NFR BLD: 65.6 % (ref 44–72)
NRBC # BLD AUTO: 0 K/UL
NRBC BLD-RTO: 0 /100 WBC
PLATELET # BLD AUTO: 212 K/UL (ref 164–446)
PMV BLD AUTO: 11 FL (ref 9–12.9)
POTASSIUM SERPL-SCNC: 3.4 MMOL/L (ref 3.6–5.5)
PROT SERPL-MCNC: 6.5 G/DL (ref 6–8.2)
RBC # BLD AUTO: 5.18 M/UL (ref 4.7–6.1)
SODIUM SERPL-SCNC: 129 MMOL/L (ref 135–145)
TRIGL SERPL-MCNC: 313 MG/DL (ref 0–149)
TROPONIN T SERPL-MCNC: 51 NG/L (ref 6–19)
WBC # BLD AUTO: 10.5 K/UL (ref 4.8–10.8)

## 2021-02-28 PROCEDURE — A9502 TC99M TETROFOSMIN: HCPCS

## 2021-02-28 PROCEDURE — 80053 COMPREHEN METABOLIC PANEL: CPT

## 2021-02-28 PROCEDURE — 700111 HCHG RX REV CODE 636 W/ 250 OVERRIDE (IP)

## 2021-02-28 PROCEDURE — A9270 NON-COVERED ITEM OR SERVICE: HCPCS | Performed by: STUDENT IN AN ORGANIZED HEALTH CARE EDUCATION/TRAINING PROGRAM

## 2021-02-28 PROCEDURE — 700102 HCHG RX REV CODE 250 W/ 637 OVERRIDE(OP): Performed by: STUDENT IN AN ORGANIZED HEALTH CARE EDUCATION/TRAINING PROGRAM

## 2021-02-28 PROCEDURE — 85025 COMPLETE CBC W/AUTO DIFF WBC: CPT

## 2021-02-28 PROCEDURE — 700111 HCHG RX REV CODE 636 W/ 250 OVERRIDE (IP): Performed by: STUDENT IN AN ORGANIZED HEALTH CARE EDUCATION/TRAINING PROGRAM

## 2021-02-28 PROCEDURE — 99239 HOSP IP/OBS DSCHRG MGMT >30: CPT | Mod: GC | Performed by: HOSPITALIST

## 2021-02-28 PROCEDURE — 84484 ASSAY OF TROPONIN QUANT: CPT

## 2021-02-28 PROCEDURE — 80061 LIPID PANEL: CPT

## 2021-02-28 RX ORDER — AMLODIPINE BESYLATE 10 MG/1
10 TABLET ORAL DAILY
Qty: 30 TABLET | Refills: 0 | Status: SHIPPED
Start: 2021-03-01 | End: 2021-02-28

## 2021-02-28 RX ORDER — CHOLECALCIFEROL (VITAMIN D3) 125 MCG
5 CAPSULE ORAL
Qty: 30 TABLET | Refills: 0 | Status: SHIPPED
Start: 2021-02-28 | End: 2021-02-28

## 2021-02-28 RX ORDER — OXYBUTYNIN CHLORIDE 5 MG/1
2.5 TABLET ORAL 2 TIMES DAILY
Qty: 30 TABLET | Refills: 0 | Status: SHIPPED
Start: 2021-02-28 | End: 2021-02-28

## 2021-02-28 RX ORDER — REGADENOSON 0.08 MG/ML
INJECTION, SOLUTION INTRAVENOUS
Status: COMPLETED
Start: 2021-02-28 | End: 2021-02-28

## 2021-02-28 RX ORDER — AMLODIPINE BESYLATE 10 MG/1
10 TABLET ORAL DAILY
Qty: 30 TABLET | Refills: 0 | Status: SHIPPED | OUTPATIENT
Start: 2021-03-01 | End: 2021-03-29

## 2021-02-28 RX ORDER — AMLODIPINE BESYLATE 10 MG/1
TABLET ORAL
Status: DISCONTINUED
Start: 2021-02-28 | End: 2021-02-28 | Stop reason: HOSPADM

## 2021-02-28 RX ORDER — POTASSIUM CHLORIDE 20 MEQ/1
20 TABLET, EXTENDED RELEASE ORAL ONCE
Status: COMPLETED | OUTPATIENT
Start: 2021-02-28 | End: 2021-02-28

## 2021-02-28 RX ORDER — CHOLECALCIFEROL (VITAMIN D3) 125 MCG
5 CAPSULE ORAL
Qty: 30 TABLET | Refills: 0 | Status: SHIPPED | OUTPATIENT
Start: 2021-02-28 | End: 2021-03-30

## 2021-02-28 RX ORDER — OXYBUTYNIN CHLORIDE 5 MG/1
2.5 TABLET ORAL 2 TIMES DAILY
Qty: 30 TABLET | Refills: 0 | Status: SHIPPED | OUTPATIENT
Start: 2021-02-28 | End: 2021-03-29

## 2021-02-28 RX ORDER — POTASSIUM CHLORIDE 20 MEQ/1
40 TABLET, EXTENDED RELEASE ORAL ONCE
Status: COMPLETED | OUTPATIENT
Start: 2021-02-28 | End: 2021-02-28

## 2021-02-28 RX ORDER — ASPIRIN 81 MG/1
81 TABLET, CHEWABLE ORAL DAILY
Qty: 30 TABLET | Refills: 0 | Status: SHIPPED | OUTPATIENT
Start: 2021-02-28 | End: 2021-03-30

## 2021-02-28 RX ORDER — CARVEDILOL 25 MG/1
25 TABLET ORAL 2 TIMES DAILY WITH MEALS
Qty: 60 TABLET | Refills: 1 | Status: SHIPPED | OUTPATIENT
Start: 2021-02-28 | End: 2021-03-30

## 2021-02-28 RX ORDER — SIMVASTATIN 40 MG
40 TABLET ORAL EVERY EVENING
Qty: 30 TABLET | Refills: 1 | Status: SHIPPED | OUTPATIENT
Start: 2021-02-28 | End: 2021-03-30

## 2021-02-28 RX ORDER — SIMVASTATIN 40 MG
40 TABLET ORAL EVERY EVENING
Qty: 30 TABLET | Refills: 1 | Status: SHIPPED
Start: 2021-02-28 | End: 2021-02-28

## 2021-02-28 RX ORDER — CARVEDILOL 25 MG/1
25 TABLET ORAL 2 TIMES DAILY WITH MEALS
Qty: 60 TABLET | Refills: 1 | Status: SHIPPED
Start: 2021-02-28 | End: 2021-02-28

## 2021-02-28 RX ORDER — ASPIRIN 81 MG/1
81 TABLET, CHEWABLE ORAL DAILY
Qty: 30 TABLET | Refills: 0 | Status: SHIPPED
Start: 2021-02-28 | End: 2021-03-29

## 2021-02-28 RX ADMIN — CARVEDILOL 25 MG: 25 TABLET, FILM COATED ORAL at 08:00

## 2021-02-28 RX ADMIN — REGADENOSON 0.4 MG: 0.08 INJECTION, SOLUTION INTRAVENOUS at 10:43

## 2021-02-28 RX ADMIN — ASPIRIN 325 MG ORAL TABLET 325 MG: 325 PILL ORAL at 04:34

## 2021-02-28 RX ADMIN — ZOLPIDEM TARTRATE 5 MG: 5 TABLET ORAL at 00:02

## 2021-02-28 RX ADMIN — HYDROCHLOROTHIAZIDE 25 MG: 25 TABLET ORAL at 04:34

## 2021-02-28 RX ADMIN — AMLODIPINE BESYLATE 10 MG: 10 TABLET ORAL at 04:34

## 2021-02-28 RX ADMIN — POTASSIUM CHLORIDE 40 MEQ: 1500 TABLET, EXTENDED RELEASE ORAL at 08:00

## 2021-02-28 RX ADMIN — OLMESARTAN MEDOXOMIL 40 MG: 20 TABLET, FILM COATED ORAL at 04:34

## 2021-02-28 RX ADMIN — OXYBUTYNIN CHLORIDE 2.5 MG: 5 TABLET ORAL at 04:35

## 2021-02-28 RX ADMIN — POTASSIUM CHLORIDE 20 MEQ: 1500 TABLET, EXTENDED RELEASE ORAL at 12:34

## 2021-02-28 RX ADMIN — SITAGLIPTIN 100 MG: 100 TABLET, FILM COATED ORAL at 04:34

## 2021-02-28 ASSESSMENT — PAIN DESCRIPTION - PAIN TYPE
TYPE: ACUTE PAIN
TYPE: ACUTE PAIN

## 2021-02-28 NOTE — PROGRESS NOTES
Patient discharged home with friend. All personal belongings collected. IV access removed. Discharge instructions discussed. Medications reviewed. Follow up appointments to be scheduled by patient at earliest convenience. Patient discharged from discharge lounge without incident.

## 2021-02-28 NOTE — PROGRESS NOTES
[facility]    I was asked by [facility] case management to review this case to see if patient meets inpatient status.    I have personally and independently completed this review.    Patient presented to the hospital on February 27, 2021 with complaints of hypotension and shortness of breath.  Patient diagnosed with acute hypertensive urgency/emergency with slight elevation of troponin levels.  Blood pressures on presentation were 216/120.  Since hospitalization patient has required administration of intravenous labetalol 10 mg initially at 0400 hrs., followed by administration of intravenous diltiazem 10 mg at 0500 hrs., followed by administration of intravenous enalapril at 1300 hrs and subsequently followed by administration of intravenous hydralazine 20 mg x 1 at 1500 hrs.  Patient has simultaneously been continued on intravenous fluids with underlying CKD.  Oral regimen is being titrated by the primary team.  Overall in the last 24 hours, patient has required administration of multiple intravenous antihypertensive agents for optimal control of hypertension.  Anticipation for hospitalization is more than 2 midnights.    Overall this patient has an anticipation of hospitalization of more than 2 midnights requiring ongoing administration of intravenous antihypertensive agents for optimal blood pressure control without which there is high risk of development of complications.  Hospitalization is appropriate at an inpatient level to protect endorgan dysfunction and further complications.    Recommendations: Patient appropriate for hospitalization at an inpatient level    Sunitha Lopez M.D.  02/27/21  6:08 PM

## 2021-02-28 NOTE — PROGRESS NOTES
Bedside report received from JUAN JOSÉ Harper. Call light and belongings within reach. Bed locked and in lowest position. Alarm and fall precautions in place.

## 2021-02-28 NOTE — THERAPY
Occupational Therapy   Initial Evaluation     Patient Name: Tyrone Cline  Age:  80 y.o., Sex:  male  Medical Record #: [id number]  Today's Date: 2/28/2021 02/28/21 0719   Interdisciplinary Plan of Care Collaboration   Collaboration Comments OT eval held due to uptrending labs. Will continue to follow.

## 2021-02-28 NOTE — ASSESSMENT & PLAN NOTE
C/o dyspnea on exertion and orthopnea  Concerning for CHF     BROWN: no evidence of Heart Failure     Plan:   CTM   Pending Stress Test in AM

## 2021-02-28 NOTE — THERAPY
Therapy Contact Note.     Patient Name: Tyrone Cline  Age:  80 y.o., Sex:  male  Medical Record #: [id number]  Today's Date: 2/28/2021    Discussed missed therapy with RN       02/28/21 1034   Initial Contact Note    Initial Contact Note Order Received and Verified. Physical Therapy Evaluation NOT Completed Because Patient Does Not Require Acute Physical Therapy at this Time.   Interdisciplinary Plan of Care Collaboration   Collaboration Comments Chart reviewed, case discussed with RN who states pt is mobilizing at indep level, no device.,pt has no PT needs. No DME or post acute PT needs. Evaluation not indicated.

## 2021-02-28 NOTE — DISCHARGE SUMMARY
"Discharge Summary    Date of Admission: 2/27/2021  Date of Discharge: 2/28/21  Discharging Attending: Ramiro Hebert M.D.   Discharging Senior Resident: Dr. Hebert    CHIEF COMPLAINT ON ADMISSION  Chief Complaint   Patient presents with   • Hypertension     systolic 200's    • Shortness of Breath     started around 2300       Reason for Admission  Hypertensive emergency     Admission Date  2/27/2021    CODE STATUS  Full Code    HPI & HOSPITAL COURSE  80 y.o. male who presented via EMS with shortness of breath of several weeks. His shortness of breath suddenly worsened night before admission and when he checked his blood pressure, he was systolic in the 200s; his normal is around 125-130.  He stated that both of his monitors at home said his blood pressure was higher than could be read, which led him to call EMS to be taken to the ED. He has gone to see his PCP for his shortness of breath, where he had a chest x-ray performed that was unremarkable. The patient reported orthopnea. His shortness of breath is worsened with exertion. He was sleeping with an extra pillow. Patient also complained of recent congestion in his sinuses for the past month.  CXR in the ED showed no acute cardiopulmonary abnormality. /120 with HR 72 on arrival to ED. GFR 26. Troponin 30. Glucose 206. Bun/creatinine 36/2.44. Patient was admitted for HTN emergency, shortness of breath, and ACS rule out. EKG showed SR 69, first degree AV block, RBBB, no significant change compared to EKG 9/20/19. Troponin 30, 32, 32, 51. ECHO showed, \"Normal left ventricular chamber size. Normal left ventricular wall thickness. Normal left ventricular systolic function. Left ventricular ejection fraction is visually estimated to be 65%. Normal regional wall motion. Normal diastolic function.  No evidence of heart failure. 4.4 cm thoracic AA.\" Nuclear stress test showed, \"No evidence of significant jeopardized viable myocardium or prior myocardial " "infarction.\" His symptoms resolved with correction of his blood pressure. His Coreg was increased to 25 mg BID and started Norvasc 10 mg daily. Continue olmesartan-HCTZ. Blood pressure stabilized with this regiment. Will need to follow up closely with PCP. If blood pressure continues to be an issue, would recommend adding PO hydralazine outpatient. Recommended he follows up with his nephrologist for his chronic kidney disease and PCP to closely monitor his blood pressure and perform appropriate screening for his thoracic aortic aneurysm.     Therefore, he is discharged in fair and stable condition to home with close outpatient follow-up.    The patient recovered much more quickly than anticipated on admission.    PHYSICAL EXAM ON DISCHARGE  Temp:  [36.2 °C (97.2 °F)-37 °C (98.6 °F)] 36.2 °C (97.2 °F)  Pulse:  [73-99] 73  Resp:  [16-18] 18  BP: (133-198)/() 136/80  SpO2:  [91 %-98 %] 92 %    Physical Exam   Constitutional:       Appearance: He is obese. He is not ill-appearing.   HENT:      Head: Normocephalic and atraumatic.      Mouth/Throat:      Mouth: Mucous membranes are moist.      Pharynx: Oropharynx is clear.   Eyes:      Extraocular Movements: Extraocular movements intact.      Conjunctiva/sclera: Conjunctivae normal.      Pupils: Pupils are equal, round, and reactive to light.   Cardiovascular:      Rate and Rhythm: Normal rate and regular rhythm.      Pulses: Normal pulses.      Heart sounds: Normal heart sounds.   Pulmonary:      Effort: Pulmonary effort is normal. No respiratory distress.      Breath sounds: Normal breath sounds. No stridor. No wheezing, rhonchi or rales.   Abdominal:      General: Abdomen is flat. Bowel sounds are normal. There is no distension.      Palpations: Abdomen is soft.   Musculoskeletal:         General: No swelling. Normal range of motion.      Cervical back: Normal range of motion.   Skin:     General: Skin is warm and dry.   Neurological:      General: No focal " deficit present.      Mental Status: He is alert and oriented to person, place, and time.   Psychiatric:         Thought Content: Thought content normal.         Judgment: Judgment normal.      Comments: Anxious     Discharge Date  2/28/21    FOLLOW UP ITEMS POST DISCHARGE  PCP  Cardiology     DISCHARGE DIAGNOSES  Active Problems:    Hypertensive emergency POA: Yes    Orthopnea POA: Unknown      Overview: 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    Type 2 diabetes mellitus (HCC) (Chronic) POA: Yes    CKD (chronic kidney disease) stage 4, GFR 15-29 ml/min (HCC) POA: Yes    Mixed hyperlipidemia (Chronic) POA: Yes  Resolved Problems:    * No resolved hospital problems. *      FOLLOW UP  Future Appointments   Date Time Provider Department Center   5/5/2021  2:00 PM Kristine Olsen M.D. [facility]     No follow-up provider specified.    MEDICATIONS ON DISCHARGE     Medication List      START taking these medications      Instructions   amLODIPine 10 MG Tabs  Start taking on: March 1, 2021  Commonly known as: NORVASC   Take 1 tablet by mouth every day for 30 days.  Dose: 10 mg     * aspirin 81 MG Chew chewable tablet  Commonly known as: ASA   Chew 1 tablet every day for 30 days.  Dose: 81 mg     * aspirin 81 MG Chew chewable tablet  Commonly known as: ASA   Chew 1 tablet every day for 30 days.  Dose: 81 mg     melatonin 5 mg Tabs   Take 1 tablet by mouth every bedtime for 30 days.  Dose: 5 mg     oxybutynin 5 MG Tabs  Commonly known as: DITROPAN   Take 0.5 Tablets by mouth 2 Times a Day for 30 days.  Dose: 2.5 mg         * This list has 2 medication(s) that are the same as other medications prescribed for you. Read the directions carefully, and ask your doctor or other care provider to review them with you.            CHANGE how you take these medications      Instructions   carvedilol 25 MG Tabs  What changed:   · medication strength  · how much to take  Commonly known as: COREG   Take 1 tablet by mouth 2 times a day, with meals for 30  days.  Dose: 25 mg     simvastatin 40 MG Tabs  What changed:   · medication strength  · how much to take  · when to take this  Commonly known as: ZOCOR   Take 1 tablet by mouth every evening for 30 days.  Dose: 40 mg        CONTINUE taking these medications      Instructions   allopurinol 300 MG Tabs  Commonly known as: ZYLOPRIM   Take 150 mg by mouth every day.  Dose: 150 mg     AndroGel 20.25 MG/1.25GM (1.62%) Gel  Generic drug: Testosterone   Place 60.75 mg on the skin every day. daily  Dose: 60.75 mg     B COMPLEX 1 PO   Take 1 tablet by mouth every day.  Dose: 1 tablet     CITRUCEL PO   Take 1 capsule by mouth every day.  Dose: 1 capsule     diphenhydrAMINE 25 MG Tabs  Commonly known as: BENADRYL   Take 25 mg by mouth one time as needed for Sleep.  Dose: 25 mg     FISH OIL   Take 1 capsule by mouth every day.  Dose: 1 capsule     glimepiride 2 MG Tabs  Commonly known as: AMARYL   Take 2 mg by mouth every morning.  Dose: 2 mg     Januvia 100 MG Tabs  Generic drug: SITagliptin   Take  by mouth every day. With dinner     Myrbetriq 50 MG Tb24  Generic drug: Mirabegron ER   Take 1 tablet by mouth every day.  Dose: 1 tablet     olmesartan-hydrochlorothiazide 40-25 MG per tablet  Commonly known as: BENICAR HCT   Take 1 Tab by mouth every day.  Dose: 1 tablet     vitamin D 2000 UNIT Tabs   Take 1 capsule by mouth 2 (two) times a day.  Dose: 1 capsule     zolpidem 10 MG Tabs  Commonly known as: AMBIEN   Take 5-10 mg by mouth at bedtime as needed.  Dose: 5-10 mg            Allergies  Allergies   Allergen Reactions   • Ace Inhibitors      COUGH.   • Ether      Violently sick   • Lipitor [Atorvastatin Calcium]      LEG PAINS         DIET  Orders Placed This Encounter   Procedures   • Diet Order Diet: Consistent CHO (Diabetic)     Standing Status:   Standing     Number of Occurrences:   1     Order Specific Question:   Diet:     Answer:   Consistent CHO (Diabetic) [4]       ACTIVITY  As tolerated.  Weight bearing as  tolerated    CONSULTATIONS  N/A    PROCEDURES  N/A    Time spent on discharge 30 minutes

## 2021-03-04 ENCOUNTER — TELEPHONE (OUTPATIENT)
Dept: VASCULAR LAB | Facility: MEDICAL CENTER | Age: 81
End: 2021-03-04

## 2021-03-04 NOTE — TELEPHONE ENCOUNTER
LVM for pt to call back to schedule initial vascular medicine appt w/ Dr. Cline.     Referral from [facility]

## 2021-03-05 ENCOUNTER — HOSPITAL ENCOUNTER (OUTPATIENT)
Dept: LAB | Facility: MEDICAL CENTER | Age: 81
End: 2021-03-05
Attending: FAMILY MEDICINE
Payer: MEDICARE

## 2021-03-05 LAB
ALBUMIN SERPL BCP-MCNC: 4.1 G/DL (ref 3.2–4.9)
ALBUMIN/GLOB SERPL: 1.2 G/DL
ALP SERPL-CCNC: 76 U/L (ref 30–99)
ALT SERPL-CCNC: 20 U/L (ref 2–50)
ANION GAP SERPL CALC-SCNC: 13 MMOL/L (ref 7–16)
AST SERPL-CCNC: 11 U/L (ref 12–45)
BILIRUB SERPL-MCNC: 0.6 MG/DL (ref 0.1–1.5)
BUN SERPL-MCNC: 47 MG/DL (ref 8–22)
CALCIUM SERPL-MCNC: 10.2 MG/DL (ref 8.5–10.5)
CHLORIDE SERPL-SCNC: 96 MMOL/L (ref 96–112)
CHOLEST SERPL-MCNC: 152 MG/DL (ref 100–199)
CO2 SERPL-SCNC: 23 MMOL/L (ref 20–33)
CREAT SERPL-MCNC: 2.66 MG/DL (ref 0.5–1.4)
CREAT UR-MCNC: 60.34 MG/DL
EST. AVERAGE GLUCOSE BLD GHB EST-MCNC: 214 MG/DL
FASTING STATUS PATIENT QL REPORTED: NORMAL
GLOBULIN SER CALC-MCNC: 3.4 G/DL (ref 1.9–3.5)
GLUCOSE SERPL-MCNC: 141 MG/DL (ref 65–99)
HBA1C MFR BLD: 9.1 % (ref 4–5.6)
HDLC SERPL-MCNC: 39 MG/DL
LDLC SERPL CALC-MCNC: 73 MG/DL
MICROALBUMIN UR-MCNC: 56.6 MG/DL
MICROALBUMIN/CREAT UR: 938 MG/G (ref 0–30)
POTASSIUM SERPL-SCNC: 3.5 MMOL/L (ref 3.6–5.5)
PROT SERPL-MCNC: 7.5 G/DL (ref 6–8.2)
SODIUM SERPL-SCNC: 132 MMOL/L (ref 135–145)
TRIGL SERPL-MCNC: 200 MG/DL (ref 0–149)

## 2021-03-05 PROCEDURE — 80061 LIPID PANEL: CPT

## 2021-03-05 PROCEDURE — 36415 COLL VENOUS BLD VENIPUNCTURE: CPT

## 2021-03-05 PROCEDURE — 82043 UR ALBUMIN QUANTITATIVE: CPT

## 2021-03-05 PROCEDURE — 80053 COMPREHEN METABOLIC PANEL: CPT

## 2021-03-05 PROCEDURE — 83036 HEMOGLOBIN GLYCOSYLATED A1C: CPT

## 2021-03-05 PROCEDURE — 82570 ASSAY OF URINE CREATININE: CPT

## 2021-03-22 ENCOUNTER — TELEPHONE (OUTPATIENT)
Dept: NEPHROLOGY | Facility: MEDICAL CENTER | Age: 81
End: 2021-03-22

## 2021-03-22 NOTE — TELEPHONE ENCOUNTER
Patient called... Had recent labs 3/5. GFR has decreased. Has an appt with you May 5. Feet and calf's are swelling.  No current order for GFR.    Please advise.

## 2021-03-29 ENCOUNTER — APPOINTMENT (OUTPATIENT)
Dept: VASCULAR LAB | Facility: MEDICAL CENTER | Age: 81
End: 2021-03-29
Payer: MEDICARE

## 2021-03-29 ENCOUNTER — OFFICE VISIT (OUTPATIENT)
Dept: VASCULAR LAB | Facility: MEDICAL CENTER | Age: 81
End: 2021-03-29
Attending: INTERNAL MEDICINE
Payer: MEDICARE

## 2021-03-29 VITALS
BODY MASS INDEX: 29.4 KG/M2 | DIASTOLIC BLOOD PRESSURE: 72 MMHG | HEIGHT: 71 IN | WEIGHT: 210 LBS | SYSTOLIC BLOOD PRESSURE: 124 MMHG | HEART RATE: 73 BPM

## 2021-03-29 DIAGNOSIS — I77.819 ACQUIRED DILATION OF ASCENDING AORTA AND AORTIC ROOT (HCC): ICD-10-CM

## 2021-03-29 DIAGNOSIS — I10 ESSENTIAL HYPERTENSION: ICD-10-CM

## 2021-03-29 DIAGNOSIS — E11.22 TYPE 2 DIABETES MELLITUS WITH STAGE 4 CHRONIC KIDNEY DISEASE, WITHOUT LONG-TERM CURRENT USE OF INSULIN (HCC): ICD-10-CM

## 2021-03-29 DIAGNOSIS — N18.4 TYPE 2 DIABETES MELLITUS WITH STAGE 4 CHRONIC KIDNEY DISEASE, WITHOUT LONG-TERM CURRENT USE OF INSULIN (HCC): ICD-10-CM

## 2021-03-29 DIAGNOSIS — E78.2 MIXED HYPERLIPIDEMIA: ICD-10-CM

## 2021-03-29 DIAGNOSIS — N18.4 CKD (CHRONIC KIDNEY DISEASE) STAGE 4, GFR 15-29 ML/MIN (HCC): ICD-10-CM

## 2021-03-29 PROCEDURE — 99204 OFFICE O/P NEW MOD 45 MIN: CPT | Performed by: INTERNAL MEDICINE

## 2021-03-29 PROCEDURE — 99212 OFFICE O/P EST SF 10 MIN: CPT

## 2021-03-29 RX ORDER — INSULIN DEGLUDEC INJECTION 100 U/ML
6 INJECTION, SOLUTION SUBCUTANEOUS DAILY
Qty: 100 ML | Refills: 11
Start: 2021-03-29 | End: 2021-12-10

## 2021-03-29 RX ORDER — AMLODIPINE BESYLATE 2.5 MG/1
2.5 TABLET ORAL DAILY
Qty: 30 TABLET | Refills: 11 | Status: SHIPPED
Start: 2021-03-29 | End: 2021-04-05

## 2021-03-29 ASSESSMENT — FIBROSIS 4 INDEX: FIB4 SCORE: 0.92

## 2021-03-29 NOTE — PROGRESS NOTES
VASCULAR MEDICINE CLINIC - INITIAL VISIT  03/29/21     Tyrone Cline is a 80 y.o.  male who presents today  for   Chief Complaint   Patient presents with   • Follow-Up        HPI:  Patient referred for evaluation and management of ascending aortic aneurysm, hypertension, dyslipidemia, diabetes  Recent admission with hypertensive urgency.  Had meds adjusted.   Denies any issues with adherence  No interfering substances.   Never happened before.   Had recently had a covid shot  Sees Dr. Olsen  Has had stage 4 for some time.   No recent kidney imaging   nsaids   BP at home around 100-120/60-70s  Has some lightheadedness   No h/o ASCVD  No tia or cva  Remains on simvastatin - no myalgias  Had AE on atorva in past.   Was on metformin for awhile - stopped due to renal function  Was on amaryl and januvia for awhile  Started on tresiba about 2 weeks ago    Past Medical History:   Diagnosis Date   • Abnormal electrocardiogram    • ACE inhibitor intolerance    • BPH (benign prostatic hyperplasia)    • Cancer (HCC)     basal and squamous cell to face   • Cataract     robbi IOL   • Cholesterol blood decreased    • Chronic kidney disease, stage III (moderate)    • Cold    • Diabetes     oral meds   • GOUT    • History of skull fracture    • History of urinary tract infection    • Hyperlipidemia    • Hypertension    • Indigestion    • Mixed hyperlipidemia    • Pneumonia 9/2015   • Proteinuria    • Type 2 diabetes mellitus (HCC)         Past Surgical History:   Procedure Laterality Date   • SEPTAL RECONSTRUCTION  12/7/2015    Procedure: SEPTAL RECONSTRUCTION OPEN WITH  GRAFTS & CONCHAL CART GRAFT;  Surgeon: SYDNIE Gaitan M.D.;  Location: SURGERY SAME DAY [facility];  Service:    • BLEPHAROPLASTY  7/23/2014    Performed by Gera Plasencia M.D. at SURGERY SURGICAL [facility] ORS   • BROW LIFT  7/23/2014    Performed by Gera Plasencia M.D. at SURGERY SURGICAL [facility] ORS   • CATARACT PHACO WITH IOL  12/4/2012     Performed by Suraj Gonzalez M.D. at [facility]GERY [facility]   • CATARACT PHACO WITH IOL  2012    Performed by Suraj Gonzalez M.D. at [facility]   • APPENDECTOMY     • CARDIAC CATH, LEFT HEART      LHC out of concern for STEMI, normal coronaries with minimal atherosclerosis, diagnosed with PNA as cause of symptoms and ST changes.    • OTHER      KNEE SURGERY - LEFT.   • OTHER      NOSE SURGERY.   • TONSILLECTOMY          Family History   Problem Relation Age of Onset   • Diabetes Father    • Heart Attack Father 79        Social History     Tobacco Use   • Smoking status: Former Smoker     Packs/day: 0.20     Years: 6.00     Pack years: 1.20     Quit date: 1965     Years since quittin.2   • Smokeless tobacco: Never Used   • Tobacco comment: Stopped over 25 years ago.   Substance Use Topics   • Alcohol use: No   • Drug use: No        Current Outpatient Medications on File Prior to Visit   Medication Sig Dispense Refill   • carvedilol (COREG) 25 MG Tab Take 1 tablet by mouth 2 times a day, with meals for 30 days. 60 tablet 1   • simvastatin (ZOCOR) 40 MG Tab Take 1 tablet by mouth every evening for 30 days. 30 tablet 1   • amLODIPine (NORVASC) 10 MG Tab Take 1 tablet by mouth every day for 30 days. 30 tablet 0   • melatonin 5 mg Tab Take 1 tablet by mouth every bedtime for 30 days. 30 tablet 0   • aspirin (ASA) 81 MG Chew Tab chewable tablet Chew 1 tablet every day for 30 days. 30 tablet 0   • B Complex Vitamins (B COMPLEX 1 PO) Take 1 tablet by mouth every day.     • Mirabegron ER (MYRBETRIQ) 50 MG TABLET SR 24 HR Take 1 tablet by mouth every day.     • olmesartan-hydrochlorothiazide (BENICAR HCT) 40-25 MG per tablet Take 1 Tab by mouth every day. 90 Tab 3   • Testosterone (ANDROGEL) 20.25 MG/1.25GM (1.62%) GEL Place 60.75 mg on the skin every day. daily     • zolpidem (AMBIEN) 10 MG TABS Take 5-10 mg by mouth at bedtime as needed.     • allopurinol (ZYLOPRIM) 300 MG TABS Take 150 mg by  "mouth every day.     • FISH OIL Take 1 capsule by mouth every day.     • Cholecalciferol (VITAMIN D) 2000 UNIT Tab Take 1 capsule by mouth 2 (two) times a day.     • sitagliptin (JANUVIA) 100 MG TABS Take  by mouth every day. With dinner     • glimepiride (AMARYL) 2 MG Tab Take 2 mg by mouth every morning.       No current facility-administered medications on file prior to visit.        ALLERGIES  Ace inhibitors, Ether, and Lipitor [atorvastatin calcium]     DIET AND EXERCISE:  Weight Change: up a bit over past year  Diet: decent dm diet but wants to see MNT  Exercise: walks daily      Objective:     /72 (BP Location: Left arm, Patient Position: Sitting)   Pulse 73   Ht 1.803 m (5' 11\")   Wt 95.3 kg (210 lb)      Physical Exam   Constitutional: He is oriented to person, place, and time and well-developed, well-nourished, and in no distress. No distress.   HENT:   Head: Normocephalic and atraumatic.   Eyes: Conjunctivae and EOM are normal. No scleral icterus.   Cardiovascular: Normal rate, regular rhythm, normal heart sounds and intact distal pulses.   No murmur heard.  Pulmonary/Chest: Effort normal and breath sounds normal. No respiratory distress. He has no wheezes. He has no rales.   Abdominal: Soft. There is no abdominal tenderness.   Musculoskeletal:         General: Edema present. No tenderness.      Cervical back: Neck supple.      Comments: Trace edema bilateral   Neurological: He is alert and oriented to person, place, and time. No cranial nerve deficit. Gait normal. Coordination normal.   Skin: No rash noted. He is not diaphoretic. No pallor.   Psychiatric: Affect and judgment normal.   Vitals reviewed.       DATA REVIEW    Lab Results   Component Value Date/Time    CHOLSTRLTOT 152 03/05/2021 12:37 PM    LDL 73 03/05/2021 12:37 PM    HDL 39 (A) 03/05/2021 12:37 PM    TRIGLYCERIDE 200 (H) 03/05/2021 12:37 PM       Lab Results   Component Value Date/Time    SODIUM 132 (L) 03/05/2021 12:37 PM    " POTASSIUM 3.5 (L) 03/05/2021 12:37 PM    CHLORIDE 96 03/05/2021 12:37 PM    CO2 23 03/05/2021 12:37 PM    GLUCOSE 141 (H) 03/05/2021 12:37 PM    BUN 47 (H) 03/05/2021 12:37 PM    CREATININE 2.66 (H) 03/05/2021 12:37 PM    CREATININE 1.4 02/06/2009 03:00 AM     Lab Results   Component Value Date/Time    ALKPHOSPHAT 76 03/05/2021 12:37 PM    ASTSGOT 11 (L) 03/05/2021 12:37 PM    ALTSGPT 20 03/05/2021 12:37 PM    TBILIRUBIN 0.6 03/05/2021 12:37 PM       Lab Results   Component Value Date/Time    HBA1C 9.1 (H) 03/05/2021 12:37 PM       Lab Results   Component Value Date/Time    MALBCRT 938 (H) 03/05/2021 12:37 PM    MICROALBUR 56.6 03/05/2021 12:37 PM       M PI February 2021   No evidence of significant jeopardized viable myocardium or prior myocardial    infarction.   Normal left ventricular size, ejection fraction, and wall motion.   ECG INTERPRETATION   Negative stress ECG for ischemia.    Echocardiogram February 2021  Compared to the images of the prior study done 4/15/2015, Ascending   aortic dilatation is now present.  Technically difficult study - adequate information is obtained.   Contrast was used to enhance visualization of the endocardial border.  Left ventricular ejection fraction is visually estimated to be 65%.  Normal regional wall motion.  Normal left ventricular wall thickness.  Normal right ventricular size and systolic function.  Normal left atrial size.  Structurally normal mitral valve without significant stenosis or   regurgitation.  Mild aortic sclerosis without stenosis.  No aortic insufficiency.  Estimated right ventricular systolic pressure  is 32 mmHg.  Trace tricuspid regurgitation.  No pericardial effusion seen.  Ascending aorta is dilated with a diameter of 4.4 cm.    Medical Decision Making:  Today's Assessment / Status / Plan:     1. Acquired dilation of ascending aorta and aortic root (HCC)     2. CKD (chronic kidney disease) stage 4, GFR 15-29 ml/min (HCC)     3. Essential  hypertension     4. Type 2 diabetes mellitus with stage 4 chronic kidney disease, without long-term current use of insulin (HCC)     5. Mixed hyperlipidemia        Patient Type: Primary prevention    Etiology of Established CVD if Present: Ascending aortic aneurysm without previous intervention    Lipid Management: Qualifies for Statin Therapy Based on 2018 ACC/AHA Guidelines: yes  Calculated 10-Year Risk of ASCVD: N/A  Currently on Statin: Yes  Goal LDL less than 100 and non-HDL less than 130  Appears below threshold on most recent blood work  Did not tolerate atorvastatin in the past  Although not necessarily the best agent for CKD, does tolerate simvastatin  Plan:  -Continue simvastatin for now  -Consider change to rosuvastatin in the future  -Recheck fasting lipid panel in 6 to 12 months    Blood Pressure Management:  [facility] (2017) Blood Pressure Goal <130/80  Had recent admission for hypertensive urgency -unclear etiology  Denies issues with adherence  Denies changes in lifestyle or interfering substances  Possibly related to Covid vaccination  Blood pressure now under excellent control with intensification of his medications, in fact now has some symptomatic relative hypotension  Leg swelling on higher dose of amlodipine  CKD obviously barrier to control  Occasionally renal artery stenosis can cause hypertensive urgency  Plan:  -Continue current dose of carvedilol  -Continue current dose of olmesartan HCT  -Decrease amlodipine to 2.5 mg daily  -Continue to follow blood pressure carefully at home  -Check renal artery duplex  -Avoid interfering substances    Glycemic Status: Diabetic  Goal A1c at least less than 7.5  Has had poor control for some time now  Pharmacologic options limited by CKD  Plan:  -Agree with Januvia, but renally dose 50 mg daily  -Defer titration of Tresiba to PCP and endocrinologist  -We will refer to medical nutrition therapy and diabetes education    Anti-Platelet/Anti-Coagulant Tx:  yes  Reasonable to continue low-dose aspirin given relatively high cardiovascular risk    Smoking: Continue complete avoidance    Physical Activity: Continue daily walking    Weight Management and Nutrition: Dietary plan was discussed with patient at this visit including referral to dietitian for further evaluation and management    Other:    1.  Ascending aortic aneurysm -newly diagnosed based on echocardiogram during his recent hospitalization.  Max diameter 4.4 cm.  Imaging options limited by CKD.  We will repeat echocardiogram in 1 year    2.  CKD, stage IV -most likely etiology is diabetes and hypertension although his early onset proteinuria does suggest that other etiologies may be in the differential.  Will obtain renal artery duplex as above.  Otherwise defer all further work-up management and surveillance to his nephrologist    3.  Testosterone therapy -as we discussed, the cardiovascular effects of testosterone repletion in the eighth decade of life are unknown.  Over vigorous replacement of testosterone can worsen blood pressure control.  All that being said, I am not opposed to continuing testosterone therapy as long as he is aware of these potential risks and is being monitored carefully for side effects.  We will defer all further work-up management and surveillance to urology    Instructed to follow-up with PCP for remainder of adult medical needs: yes  We will partner with other providers in the management of established vascular disease and cardiometabolic risk factors.    Studies to Be Obtained:   1) renal artery duplex prior to follow-up  2) echocardiogram February 2022    Labs to Be Obtained: Per nephrology    Follow up in: 2 months    Michael J Bloch, M.D.     Cc:  Dr. Pretty Villavicencio

## 2021-04-05 DIAGNOSIS — I10 ESSENTIAL HYPERTENSION: ICD-10-CM

## 2021-04-05 RX ORDER — HYDRALAZINE HYDROCHLORIDE 10 MG/1
10 TABLET, FILM COATED ORAL 3 TIMES DAILY
Qty: 90 TABLET | Refills: 2 | Status: SHIPPED | OUTPATIENT
Start: 2021-04-05 | End: 2021-07-08

## 2021-04-05 NOTE — PROGRESS NOTES
Pt called stating his BP was 182/97 last night at 2:30 am and right now just a few minutes ago was 143/85. He requested to go back on the Amlodipine 10 mg. He did not want to end up back in the hospital. He had leg swelling on the 10 mg dos so it was decreased to 2.5 mg just recently. I felt he could go back up to 5 mg and will be seeing Dr. Bloch again soon. He also needed a prn for elevated BP so I allowed his to have hydralazine utril he see's Dr. Bloch again. KEV Liao.

## 2021-04-06 ENCOUNTER — OFFICE VISIT (OUTPATIENT)
Dept: HEALTH INFORMATION MANAGEMENT | Facility: MEDICAL CENTER | Age: 81
End: 2021-04-06
Payer: MEDICARE

## 2021-04-06 DIAGNOSIS — N18.4 CKD (CHRONIC KIDNEY DISEASE) STAGE 4, GFR 15-29 ML/MIN (HCC): ICD-10-CM

## 2021-04-06 DIAGNOSIS — N18.4 TYPE 2 DIABETES MELLITUS WITH STAGE 4 CHRONIC KIDNEY DISEASE, WITHOUT LONG-TERM CURRENT USE OF INSULIN (HCC): ICD-10-CM

## 2021-04-06 DIAGNOSIS — E11.22 TYPE 2 DIABETES MELLITUS WITH STAGE 4 CHRONIC KIDNEY DISEASE, WITHOUT LONG-TERM CURRENT USE OF INSULIN (HCC): ICD-10-CM

## 2021-04-06 PROCEDURE — 97802 MEDICAL NUTRITION INDIV IN: CPT | Performed by: DIETITIAN, REGISTERED

## 2021-04-06 NOTE — PROGRESS NOTES
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   Time in/out: 12:53-1:55pm    Anthropometrics/Objective  There were no vitals filed for this visit.    There is no height or weight on file to calculate BMI.      Estimated Caloric needs: 5605-7499 Kcals/day for weight maintenance   See comprehensive patient history form for further information     Subjective:  · Would like a recommendation for a meal delivery service to take into account multiple conditions. Only prepares breakfasts/lunches/snacks, does not like to cook. Eats out with family for dinner 1-2x/week  · Recently started using insulin, BG have improved since  · Wants to maintain current weight, wants to track kcal intake   · Checking fasting BG only, readings vary widely      Nutrition Diagnosis (PES Statement)  · Altered nutrition related lab values related to endocrine dysfunction as evidenced by HgbA1c of 9.1  · Altered nutrition related lab values related to kidney dysfunction as evidenced by GFR of 23    Client history:  Condition(s) associated with a diagnosis or treatment (specify): T2DM, CKD4, HTN, HLD, overweight    Biochemical data, medical test and procedures  Lab Results   Component Value Date/Time    HBA1C 9.1 (H) 03/05/2021 12:37 PM   @  Lab Results   Component Value Date/Time    POCGLUCOSE 111 (H) 12/07/2015 09:26 AM     Lab Results   Component Value Date/Time    CHOLSTRLTOT 152 03/05/2021 12:37 PM    LDL 73 03/05/2021 12:37 PM    HDL 39 (A) 03/05/2021 12:37 PM    TRIGLYCERIDE 200 (H) 03/05/2021 12:37 PM         Nutrition Intervention  Nutrition Prescription  Recommended Daily Kcals: 8141-0347 kcals/day  Carb choices/grams: ~45g per meal, ~15g per snack  Protein choices/grams: 8-11 oz per day    Meal and Snack  Recommend a general/healthful diet    Comprehensive Nutrition education Instruction or training leading to in-depth nutrition related knowledge about:  Combine carb, protein and fat at each meal, Eating out, Metabolism of carb, protein, fat,  Portion control, Sweets and alcohol in moderation, Heart-healthy guidelines and Label Reading    Monitoring & Evaluation Plan    Behavioral-Environmental:  Behavior: adjust behavior when eating out: half portions, ask for modifications, skip freebies/appetizers; use label and/or Calorie Timothy to determine kcals, CHO, and protein content; suggested checking BG 2 hrs pp in addition to fasting.    Food / Nutrient Intake:  Food intake: follow Plate Planner for balance and portion control while meal planning, following nutrition prescription listed above; follow low Na diet    Physical Signs / Symptoms:  HbA1c profiles: trend down to WNL   Other: GFR remains stable    Assessment Notes: Ag is a very pleasant patient who is looking for further guidance and resources to maintain his kidney function and improve his BG. We reviewed nutrition basics, emphasizing appropriate portions of all foods, lean proteins, and unsaturated fats in moderation. I explained the the role of protein combined with CHO to stabilize BG and maintain lean muscle, but I also reviewed the need to restrict protein d/t his stage of CKD. I advised him to spread his protein intake out through the day, rather than eating too much at one sitting. We reviewed label reading, advising Ag to use the label when available for greater accuracy, but suggested he download the BLAZER & FLIP FLOPS keturah to determine specific nutrient content. We discussed strategies for making healthier choices when eating out, and I provided a few suggestions for meal delivery services (namely [employer]). I encouraged Ag to check his BG 2 hrs after eating in addition to his fasting. Pt verbalized understanding and all of his/her nutrition-related questions were answered.     Follow-up: PRN

## 2021-04-13 ENCOUNTER — APPOINTMENT (OUTPATIENT)
Dept: RADIOLOGY | Facility: MEDICAL CENTER | Age: 81
End: 2021-04-13
Attending: INTERNAL MEDICINE
Payer: MEDICARE

## 2021-04-21 ENCOUNTER — HOSPITAL ENCOUNTER (OUTPATIENT)
Dept: RADIOLOGY | Facility: MEDICAL CENTER | Age: 81
End: 2021-04-21
Attending: INTERNAL MEDICINE
Payer: MEDICARE

## 2021-04-21 DIAGNOSIS — I10 ESSENTIAL HYPERTENSION: ICD-10-CM

## 2021-04-21 DIAGNOSIS — N18.4 CKD (CHRONIC KIDNEY DISEASE) STAGE 4, GFR 15-29 ML/MIN (HCC): ICD-10-CM

## 2021-04-21 PROCEDURE — 93975 VASCULAR STUDY: CPT

## 2021-04-21 PROCEDURE — 93975 VASCULAR STUDY: CPT | Mod: 26 | Performed by: INTERNAL MEDICINE

## 2021-04-26 ENCOUNTER — HOSPITAL ENCOUNTER (OUTPATIENT)
Dept: LAB | Facility: MEDICAL CENTER | Age: 81
End: 2021-04-26
Attending: INTERNAL MEDICINE
Payer: MEDICARE

## 2021-04-26 DIAGNOSIS — E55.9 VITAMIN D DEFICIENCY: ICD-10-CM

## 2021-04-26 DIAGNOSIS — N18.4 CKD (CHRONIC KIDNEY DISEASE), STAGE IV (HCC): ICD-10-CM

## 2021-04-26 DIAGNOSIS — N18.4 ANEMIA DUE TO STAGE 4 CHRONIC KIDNEY DISEASE (HCC): ICD-10-CM

## 2021-04-26 DIAGNOSIS — I10 ESSENTIAL HYPERTENSION: ICD-10-CM

## 2021-04-26 DIAGNOSIS — D63.1 ANEMIA DUE TO STAGE 4 CHRONIC KIDNEY DISEASE (HCC): ICD-10-CM

## 2021-04-26 LAB
ANION GAP SERPL CALC-SCNC: 12 MMOL/L (ref 7–16)
BUN SERPL-MCNC: 53 MG/DL (ref 8–22)
CALCIUM SERPL-MCNC: 10.3 MG/DL (ref 8.5–10.5)
CHLORIDE SERPL-SCNC: 98 MMOL/L (ref 96–112)
CO2 SERPL-SCNC: 26 MMOL/L (ref 20–33)
CREAT SERPL-MCNC: 2.68 MG/DL (ref 0.5–1.4)
ERYTHROCYTE [DISTWIDTH] IN BLOOD BY AUTOMATED COUNT: 47.7 FL (ref 35.9–50)
GLUCOSE SERPL-MCNC: 88 MG/DL (ref 65–99)
HCT VFR BLD AUTO: 46.6 % (ref 42–52)
HGB BLD-MCNC: 15.1 G/DL (ref 14–18)
MCH RBC QN AUTO: 29.9 PG (ref 27–33)
MCHC RBC AUTO-ENTMCNC: 32.4 G/DL (ref 33.7–35.3)
MCV RBC AUTO: 92.3 FL (ref 81.4–97.8)
PLATELET # BLD AUTO: 234 K/UL (ref 164–446)
PMV BLD AUTO: 11.9 FL (ref 9–12.9)
POTASSIUM SERPL-SCNC: 4.1 MMOL/L (ref 3.6–5.5)
PTH-INTACT SERPL-MCNC: 103 PG/ML (ref 14–72)
RBC # BLD AUTO: 5.05 M/UL (ref 4.7–6.1)
SODIUM SERPL-SCNC: 136 MMOL/L (ref 135–145)
URATE SERPL-MCNC: 9.8 MG/DL (ref 2.5–8.3)
WBC # BLD AUTO: 7.9 K/UL (ref 4.8–10.8)

## 2021-04-26 PROCEDURE — 36415 COLL VENOUS BLD VENIPUNCTURE: CPT

## 2021-04-26 PROCEDURE — 82306 VITAMIN D 25 HYDROXY: CPT

## 2021-04-26 PROCEDURE — 85027 COMPLETE CBC AUTOMATED: CPT

## 2021-04-26 PROCEDURE — 83970 ASSAY OF PARATHORMONE: CPT

## 2021-04-26 PROCEDURE — 80048 BASIC METABOLIC PNL TOTAL CA: CPT

## 2021-04-26 PROCEDURE — 84550 ASSAY OF BLOOD/URIC ACID: CPT

## 2021-04-28 LAB — 25(OH)D3 SERPL-MCNC: 47 NG/ML (ref 30–80)

## 2021-05-05 ENCOUNTER — OFFICE VISIT (OUTPATIENT)
Dept: NEPHROLOGY | Facility: MEDICAL CENTER | Age: 81
End: 2021-05-05
Payer: MEDICARE

## 2021-05-05 VITALS
HEIGHT: 71 IN | BODY MASS INDEX: 27.72 KG/M2 | OXYGEN SATURATION: 95 % | WEIGHT: 198 LBS | RESPIRATION RATE: 18 BRPM | HEART RATE: 67 BPM | DIASTOLIC BLOOD PRESSURE: 70 MMHG | SYSTOLIC BLOOD PRESSURE: 114 MMHG | TEMPERATURE: 97.5 F

## 2021-05-05 DIAGNOSIS — I10 ESSENTIAL HYPERTENSION: ICD-10-CM

## 2021-05-05 DIAGNOSIS — M10.9 CONTROLLED GOUT: ICD-10-CM

## 2021-05-05 DIAGNOSIS — E55.9 VITAMIN D DEFICIENCY: ICD-10-CM

## 2021-05-05 DIAGNOSIS — R80.9 MICROALBUMINURIA DUE TO TYPE 2 DIABETES MELLITUS (HCC): ICD-10-CM

## 2021-05-05 DIAGNOSIS — R80.9 PROTEINURIA, UNSPECIFIED TYPE: ICD-10-CM

## 2021-05-05 DIAGNOSIS — N18.4 ANEMIA DUE TO STAGE 4 CHRONIC KIDNEY DISEASE (HCC): ICD-10-CM

## 2021-05-05 DIAGNOSIS — D63.1 ANEMIA DUE TO STAGE 4 CHRONIC KIDNEY DISEASE (HCC): ICD-10-CM

## 2021-05-05 DIAGNOSIS — N18.4 CKD (CHRONIC KIDNEY DISEASE), STAGE IV (HCC): ICD-10-CM

## 2021-05-05 DIAGNOSIS — E11.29 MICROALBUMINURIA DUE TO TYPE 2 DIABETES MELLITUS (HCC): ICD-10-CM

## 2021-05-05 PROCEDURE — 99214 OFFICE O/P EST MOD 30 MIN: CPT | Performed by: INTERNAL MEDICINE

## 2021-05-05 RX ORDER — SEMAGLUTIDE 1.34 MG/ML
0.5 INJECTION, SOLUTION SUBCUTANEOUS
Status: ON HOLD | COMMUNITY
Start: 2021-04-08 | End: 2023-05-31

## 2021-05-05 RX ORDER — TESTOSTERONE 20.25 MG/1.25G
GEL TOPICAL
Status: ON HOLD | COMMUNITY
Start: 2021-03-15 | End: 2021-12-09

## 2021-05-05 RX ORDER — FLURBIPROFEN SODIUM 0.3 MG/ML
SOLUTION/ DROPS OPHTHALMIC
COMMUNITY
Start: 2021-03-15 | End: 2022-02-23

## 2021-05-05 RX ORDER — INSULIN DEGLUDEC 200 U/ML
INJECTION, SOLUTION SUBCUTANEOUS
COMMUNITY
Start: 2021-03-16 | End: 2021-05-24

## 2021-05-05 RX ORDER — TRIAMCINOLONE ACETONIDE 1 MG/G
CREAM TOPICAL
COMMUNITY
Start: 2021-03-29 | End: 2021-05-24

## 2021-05-05 ASSESSMENT — ENCOUNTER SYMPTOMS
COUGH: 0
NAUSEA: 0
FEVER: 0
SORE THROAT: 0
MYALGIAS: 0
BACK PAIN: 0
PALPITATIONS: 0
WEIGHT LOSS: 0
EYES NEGATIVE: 1
ABDOMINAL PAIN: 0
NECK PAIN: 0
ORTHOPNEA: 0
SHORTNESS OF BREATH: 0
CHILLS: 0
FLANK PAIN: 0
WHEEZING: 0
VOMITING: 0
HEMOPTYSIS: 0
SINUS PAIN: 0

## 2021-05-05 ASSESSMENT — FIBROSIS 4 INDEX: FIB4 SCORE: 0.84

## 2021-05-05 NOTE — PROGRESS NOTES
Subjective:      Tyrone Cline is a 81 y.o. male who presents with Follow-Up and Chronic Kidney Disease            Chronic Kidney Disease  Pertinent negatives include no abdominal pain, chest pain, chills, congestion, coughing, fever, myalgias, nausea, neck pain, sore throat or vomiting.     Tyrone is coming today for f/u of CKD IV  Doing well, no complaints   BPH LUTS f/u with Urology  Hospitalized in Feb 2021 with uncotrolled HTN and DM -improved.  CKD IV  -creat level stable at 2.6  HTN: BP monitored at home - well controlled now -compliant to low salt diet and medications  Anemia: Hb level stable WNL  Hx/of gout involving left big toe - no recent attacks -on Allopurinol  Elevated uric acid level -to adjust allopurinol dose    Review of Systems   Constitutional: Negative for chills, fever, malaise/fatigue and weight loss.   HENT: Negative for congestion, hearing loss, sinus pain and sore throat.    Eyes: Negative.    Respiratory: Negative for cough, hemoptysis, shortness of breath and wheezing.    Cardiovascular: Negative for chest pain, palpitations, orthopnea and leg swelling.   Gastrointestinal: Negative for abdominal pain, nausea and vomiting.   Genitourinary: Positive for frequency. Negative for dysuria, flank pain, hematuria and urgency.   Musculoskeletal: Negative for back pain, myalgias and neck pain.   Skin: Negative.    All other systems reviewed and are negative.    Past Medical History:   Diagnosis Date   • Abnormal electrocardiogram    • ACE inhibitor intolerance    • BPH (benign prostatic hyperplasia)    • Cancer (HCC)     basal and squamous cell to face   • Cataract     robbi IOL   • Cholesterol blood decreased    • Chronic kidney disease, stage III (moderate)    • Cold    • Diabetes     oral meds   • GOUT    • History of skull fracture    • History of urinary tract infection    • Hyperlipidemia    • Hypertension    • Indigestion    • Mixed hyperlipidemia    • Pneumonia 9/2015   •  "Proteinuria    • Type 2 diabetes mellitus (HCC)        Family History   Problem Relation Age of Onset   • Diabetes Father    • Heart Attack Father 79       Social History     Socioeconomic History   • Marital status:      Spouse name: Not on file   • Number of children: Not on file   • Years of education: Not on file   • Highest education level: Not on file   Occupational History   • Not on file   Tobacco Use   • Smoking status: Former Smoker     Packs/day: 0.20     Years: 6.00     Pack years: 1.20     Quit date: 1965     Years since quittin.3   • Smokeless tobacco: Never Used   • Tobacco comment: Stopped over 25 years ago.   Substance and Sexual Activity   • Alcohol use: No   • Drug use: No   • Sexual activity: Not on file   Other Topics Concern   • Not on file   Social History Narrative    Retired CPA.     Social Determinants of Health     Financial Resource Strain:    • Difficulty of Paying Living Expenses:    Food Insecurity:    • Worried About Running Out of Food in the Last Year:    • Ran Out of Food in the Last Year:    Transportation Needs:    • Lack of Transportation (Medical):    • Lack of Transportation (Non-Medical):    Physical Activity:    • Days of Exercise per Week:    • Minutes of Exercise per Session:    Stress:    • Feeling of Stress :    Social Connections:    • Frequency of Communication with Friends and Family:    • Frequency of Social Gatherings with Friends and Family:    • Attends [employer] Services:    • Active Member of Clubs or Organizations:    • Attends Club or Organization Meetings:    • Marital Status:    Intimate Partner Violence:    • Fear of Current or Ex-Partner:    • Emotionally Abused:    • Physically Abused:    • Sexually Abused:           Objective:     /70 (BP Location: Right arm, Patient Position: Sitting)   Pulse 67   Temp 36.4 °C (97.5 °F) (Temporal)   Resp 18   Ht 1.803 m (5' 11\")   Wt 89.8 kg (198 lb)   SpO2 95%   BMI 27.62 kg/m²      Physical " Exam  Vitals and nursing note reviewed.   Constitutional:       General: He is not in acute distress.     Appearance: Normal appearance. He is well-developed. He is not diaphoretic.   HENT:      Head: Normocephalic and atraumatic.      Nose: Nose normal.      Mouth/Throat:      Mouth: Mucous membranes are moist.      Pharynx: Oropharynx is clear.   Eyes:      Pupils: Pupils are equal, round, and reactive to light.   Cardiovascular:      Rate and Rhythm: Normal rate and regular rhythm.      Pulses: Normal pulses.      Heart sounds: Normal heart sounds.   Pulmonary:      Effort: Pulmonary effort is normal. No respiratory distress.      Breath sounds: Normal breath sounds. No wheezing, rhonchi or rales.   Abdominal:      General: Bowel sounds are normal. There is no distension.      Palpations: Abdomen is soft. There is no mass.      Tenderness: There is no abdominal tenderness. There is no right CVA tenderness, left CVA tenderness or guarding.   Musculoskeletal:         General: Normal range of motion.      Cervical back: Normal range of motion and neck supple.      Right lower leg: No edema.      Left lower leg: No edema.   Skin:     General: Skin is warm.      Findings: No erythema or rash.   Neurological:      General: No focal deficit present.      Mental Status: He is alert and oriented to person, place, and time.      Cranial Nerves: No cranial nerve deficit.      Coordination: Coordination normal.   Psychiatric:         Mood and Affect: Mood normal.         Behavior: Behavior normal.         Thought Content: Thought content normal.         Judgment: Judgment normal.     Laboratory results reviewed: d/w Pt  Lab Results   Component Value Date/Time    CREATININE 2.68 (H) 04/26/2021 04:10 PM    CREATININE 1.4 02/06/2009 03:00 AM    POTASSIUM 4.1 04/26/2021 04:10 PM                 Assessment/Plan:       1. CKD (chronic kidney disease), stage IV ([facility])      Creat level stable-to monitor closely         2. Essential  hypertension       Elevated BP - very well controlled now  -to monitor at home       3. 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 Controlled gout      No recent gout attacks -on allopurinol      To monitor uric acid    5. Vitamin D deficiency      vit D and PTH levels well controlled    6. Anemia due to stage 4 chronic kidney disease (HCC)      Hb stable -WNL    7. Proteinuria: to complete serology r/o GN    Recs;  Continue current treatment  Monitor BP  Low salt diet  Keep well hydrated  Avoid NSAID's  F/u in 2 months

## 2021-05-24 ENCOUNTER — OFFICE VISIT (OUTPATIENT)
Dept: VASCULAR LAB | Facility: MEDICAL CENTER | Age: 81
End: 2021-05-24
Attending: INTERNAL MEDICINE
Payer: MEDICARE

## 2021-05-24 VITALS
HEIGHT: 71 IN | SYSTOLIC BLOOD PRESSURE: 93 MMHG | BODY MASS INDEX: 27.72 KG/M2 | DIASTOLIC BLOOD PRESSURE: 57 MMHG | HEART RATE: 80 BPM | WEIGHT: 198 LBS

## 2021-05-24 DIAGNOSIS — N18.4 CKD (CHRONIC KIDNEY DISEASE) STAGE 4, GFR 15-29 ML/MIN (HCC): ICD-10-CM

## 2021-05-24 DIAGNOSIS — I77.819 ACQUIRED DILATION OF ASCENDING AORTA AND AORTIC ROOT (HCC): ICD-10-CM

## 2021-05-24 DIAGNOSIS — E11.22 TYPE 2 DIABETES MELLITUS WITH STAGE 4 CHRONIC KIDNEY DISEASE, WITHOUT LONG-TERM CURRENT USE OF INSULIN (HCC): ICD-10-CM

## 2021-05-24 DIAGNOSIS — I10 ESSENTIAL HYPERTENSION: ICD-10-CM

## 2021-05-24 DIAGNOSIS — N18.4 TYPE 2 DIABETES MELLITUS WITH STAGE 4 CHRONIC KIDNEY DISEASE, WITHOUT LONG-TERM CURRENT USE OF INSULIN (HCC): ICD-10-CM

## 2021-05-24 DIAGNOSIS — E78.2 MIXED HYPERLIPIDEMIA: ICD-10-CM

## 2021-05-24 PROCEDURE — 99214 OFFICE O/P EST MOD 30 MIN: CPT | Performed by: INTERNAL MEDICINE

## 2021-05-24 PROCEDURE — 99212 OFFICE O/P EST SF 10 MIN: CPT

## 2021-05-24 RX ORDER — AMLODIPINE BESYLATE 10 MG/1
5 TABLET ORAL DAILY
Qty: 30 TABLET | Refills: 11
Start: 2021-05-24 | End: 2021-06-04 | Stop reason: SDUPTHER

## 2021-05-24 RX ORDER — SIMVASTATIN 40 MG
40 TABLET ORAL NIGHTLY
COMMUNITY
End: 2022-02-14

## 2021-05-24 RX ORDER — AMLODIPINE BESYLATE 5 MG/1
5 TABLET ORAL DAILY
COMMUNITY
End: 2021-05-24

## 2021-05-24 RX ORDER — OLMESARTAN MEDOXOMIL AND HYDROCHLOROTHIAZIDE 40/25 40; 25 MG/1; MG/1
0.5 TABLET ORAL DAILY
Qty: 90 TABLET | Refills: 3
Start: 2021-05-24 | End: 2021-07-08

## 2021-05-24 RX ORDER — ASPIRIN 81 MG/1
81 TABLET ORAL DAILY
Status: ON HOLD | COMMUNITY
End: 2021-12-09

## 2021-05-24 RX ORDER — OLMESARTAN MEDOXOMIL, AMLODIPINE AND HYDROCHLOROTHIAZIDE TABLET 20/5/12.5 MG 20; 5; 12.5 MG/1; MG/1; MG/1
20 TABLET ORAL
COMMUNITY
End: 2021-05-24

## 2021-05-24 RX ORDER — CARVEDILOL 12.5 MG/1
12.5 TABLET ORAL 2 TIMES DAILY WITH MEALS
COMMUNITY
End: 2021-07-19 | Stop reason: SDUPTHER

## 2021-05-24 ASSESSMENT — FIBROSIS 4 INDEX: FIB4 SCORE: 0.84

## 2021-05-24 NOTE — PROGRESS NOTES
"[facility] - Follow up VISIT  21    Tyrone Cline is a 80 y.o.  male who presents today  for   Chief Complaint   Patient presents with   • Follow-Up      HPI:  Patient here for f/u of ascending aortic aneurysm, hypertension, dyslipidemia, diabetes  Has lost 18 pounds since starting ozempic  Has been decreasing bp meds at direction of endo  BPs at home mostly 120s/70s  Still with a bit of lightheadedness at times, but does not seem to correlate with low bps  Sees Dr. Olsen regularly  Has had stage 4 ckd for some time.   Had renal artery duplex per below  Denies nsaids  Remains on simvastatin - no myalgias  Had AE on atorva in past.   On ozempic monotherapy  Fasting fs around 130       Family History   Problem Relation Age of Onset   • Diabetes Father    • Heart Attack Father 79        Social History     Tobacco Use   • Smoking status: Former Smoker     Packs/day: 0.20     Years: 6.00     Pack years: 1.20     Quit date: 1965     Years since quittin.4   • Smokeless tobacco: Never Used   • Tobacco comment: Stopped over 25 years ago.   Vaping Use   • Vaping Use: Never used   Substance Use Topics   • Alcohol use: No   • Drug use: No      DIET AND EXERCISE:  Weight Change: down 18 pounds since starting ozempic  Diet: decent dm diet but wants to see MNT  Exercise: walks daily      Objective:     Vitals:    21 1340   BP: (!) 93/57   BP Location: Left arm   Patient Position: Sitting   BP Cuff Size: Adult   Pulse: 80   Weight: 89.8 kg (198 lb)   Height: 1.803 m (5' 11\")       Physical Exam  Vitals reviewed.   Constitutional:       General: He is not in acute distress.     Appearance: He is not diaphoretic.   HENT:      Head: Normocephalic and atraumatic.   Eyes:      General: No scleral icterus.     Conjunctiva/sclera: Conjunctivae normal.   Cardiovascular:      Rate and Rhythm: Normal rate and regular rhythm.      Heart sounds: Normal heart sounds. No murmur heard.     Pulmonary:      " Effort: Pulmonary effort is normal. No respiratory distress.      Breath sounds: Normal breath sounds. No wheezing or rales.   Musculoskeletal:      Cervical back: Neck supple.      Right lower leg: No edema.      Left lower leg: No edema.   Skin:     Coloration: Skin is not pale.      Findings: No rash.   Neurological:      Mental Status: He is alert and oriented to person, place, and time.      Cranial Nerves: No cranial nerve deficit.      Coordination: Coordination normal.      Gait: Gait is intact.   Psychiatric:         Mood and Affect: Affect normal.         Judgment: Judgment normal.          DATA REVIEW    Lab Results   Component Value Date/Time    CHOLSTRLTOT 152 03/05/2021 12:37 PM    LDL 73 03/05/2021 12:37 PM    HDL 39 (A) 03/05/2021 12:37 PM    TRIGLYCERIDE 200 (H) 03/05/2021 12:37 PM       Lab Results   Component Value Date/Time    SODIUM 136 04/26/2021 04:10 PM    POTASSIUM 4.1 04/26/2021 04:10 PM    CHLORIDE 98 04/26/2021 04:10 PM    CO2 26 04/26/2021 04:10 PM    GLUCOSE 88 04/26/2021 04:10 PM    BUN 53 (H) 04/26/2021 04:10 PM    CREATININE 2.68 (H) 04/26/2021 04:10 PM    CREATININE 1.4 02/06/2009 03:00 AM     Lab Results   Component Value Date/Time    ALKPHOSPHAT 76 03/05/2021 12:37 PM    ASTSGOT 11 (L) 03/05/2021 12:37 PM    ALTSGPT 20 03/05/2021 12:37 PM    TBILIRUBIN 0.6 03/05/2021 12:37 PM       Lab Results   Component Value Date/Time    HBA1C 9.1 (H) 03/05/2021 12:37 PM       Lab Results   Component Value Date/Time    MALBCRT 938 (H) 03/05/2021 12:37 PM    MICROALBUR 56.6 03/05/2021 12:37 PM       M PI February 2021   No evidence of significant jeopardized viable myocardium or prior myocardial    infarction.   Normal left ventricular size, ejection fraction, and wall motion.   ECG INTERPRETATION   Negative stress ECG for ischemia.    Echocardiogram February 2021  Compared to the images of the prior study done 4/15/2015, Ascending   aortic dilatation is now present.  Technically difficult study  - adequate information is obtained.   Contrast was used to enhance visualization of the endocardial border.  Left ventricular ejection fraction is visually estimated to be 65%.  Normal regional wall motion.  Normal left ventricular wall thickness.  Normal right ventricular size and systolic function.  Normal left atrial size.  Structurally normal mitral valve without significant stenosis or   regurgitation.  Mild aortic sclerosis without stenosis.  No aortic insufficiency.  Estimated right ventricular systolic pressure  is 32 mmHg.  Trace tricuspid regurgitation.  No pericardial effusion seen.  Ascending aorta is dilated with a diameter of 4.4 cm.    Renal artery duplex april 2021   SMA and celiac artery not visualized.    Proximal/mid aorta not well seen.   No evidence of abdominal aortic aneurysm in areas visualized.    No obvious KEERTHI   Multiple left renal cysts seen measuring approximately 0.9cm.    Medical Decision Making:  Today's Assessment / Status / Plan:     1. Essential hypertension  amLODIPine (NORVASC) 10 MG Tab   2. Acquired dilation of ascending aorta and aortic root (HCC)     3. CKD (chronic kidney disease) stage 4, GFR 15-29 ml/min ([facility])     4. Type 2 diabetes mellitus with stage 4 chronic kidney disease, without long-term current use of insulin ([facility])     5. Mixed hyperlipidemia        Patient Type: Primary prevention    Etiology of Established CVD if Present: Ascending aortic aneurysm without previous intervention    Lipid Management: Qualifies for Statin Therapy Based on 2018 ACC/AHA Guidelines: yes  Calculated 10-Year Risk of ASCVD: N/A  Currently on Statin: Yes  Goal LDL less than 100 and non-HDL less than 130  Appears below threshold on most recent blood work  Did not tolerate atorvastatin in the past  Although not necessarily the best agent for CKD, does tolerate simvastatin  Plan:  -Continue simvastatin for now  -Consider change to rosuvastatin in the future  -Recheck fasting lipid panel in 6  to 12 months    Blood Pressure Management:  [facility] (2017) Blood Pressure Goal <130/80  Blood pressure now under excellent control with intensification of his medications, in fact now has some symptomatic relative hypotension especially after losing weight  Leg swelling on higher dose of amlodipine - would not increase past 5 mg  CKD obviously barrier to control  Occasionally renal artery stenosis can cause hypertensive urgency  H/o gout limits use of higher dose tiazides  No obvious evidence of KERETHI on duplex  Plan:  -Continue current dose of carvedilol  -Continue current dose of olmesartan HCT (1/2 tab), but consider change to olmesartan without hctz in future  -continue amlodipine 5 mg daily for now  -Continue to follow blood pressure carefully at home  -Avoid interfering substances    Glycemic Status: Diabetic  Goal A1c at least less than 7.5  Control improving per FS since started on ozempic  Pharmacologic options limited by CKD  Plan:  -continue ozempic monotherapy per endo  -Defer further management to endocrinologist  -continue lifestyle mod  - recheck a1c per endo    Anti-Platelet/Anti-Coagulant Tx: yes  Reasonable to continue low-dose aspirin given relatively high cardiovascular risk    Smoking: Continue complete avoidance    Physical Activity: Continue daily walking    Weight Management and Nutrition: Dietary plan was discussed with patient at this visit including referral to dietitian for further evaluation and management    Other:    1.  Ascending aortic aneurysm -newly diagnosed based on echocardiogram during his recent hospitalization.  Max diameter 4.4 cm.  Imaging options limited by CKD.  We will repeat echocardiogram in 1 year frm previous    2.  CKD, stage IV -most likely etiology is diabetes and hypertension although his early onset proteinuria does suggest that other etiologies may be in the differential.  No obvious evidence of KEERTHI on duplex. Avoid nsaids. BP control and ARB as above. Otherwise  defer all further work-up management and surveillance to his nephrologist    3.  Testosterone therapy -as we discussed previously, the cardiovascular effects of testosterone repletion in the eighth decade of life are unknown.  Over vigorous replacement of testosterone can worsen blood pressure control.  All that being said, I am not opposed to continuing testosterone therapy as long as he is aware of these potential risks and is being monitored carefully for side effects.  We will defer all further work-up management and surveillance to urology    4. Gout - no recent recurrence. Avoid higher doses of thiazides. Otherwise defer management to pcp    Instructed to follow-up with PCP for remainder of adult medical needs: yes  We will partner with other providers in the management of established vascular disease and cardiometabolic risk factors.    Studies to Be Obtained: echocardiogram February 2022    Labs to Be Obtained: Per nephrology and endo    Follow up in: feb after imaging; as needed if bp becomes a problem again between now and then    Michael J Bloch, M.D.     Cc:  Dr. Pretty Villavicencio

## 2021-06-04 DIAGNOSIS — I10 ESSENTIAL HYPERTENSION: ICD-10-CM

## 2021-06-04 RX ORDER — AMLODIPINE BESYLATE 10 MG/1
5 TABLET ORAL DAILY
Qty: 30 TABLET | Refills: 11 | Status: SHIPPED | OUTPATIENT
Start: 2021-06-04 | End: 2021-06-10 | Stop reason: SDUPTHER

## 2021-06-10 ENCOUNTER — OFFICE VISIT (OUTPATIENT)
Dept: SLEEP MEDICINE | Facility: MEDICAL CENTER | Age: 81
End: 2021-06-10
Payer: MEDICARE

## 2021-06-10 VITALS
HEART RATE: 74 BPM | HEIGHT: 71 IN | RESPIRATION RATE: 16 BRPM | DIASTOLIC BLOOD PRESSURE: 64 MMHG | SYSTOLIC BLOOD PRESSURE: 114 MMHG | OXYGEN SATURATION: 97 % | BODY MASS INDEX: 26.6 KG/M2 | TEMPERATURE: 98.1 F | WEIGHT: 190 LBS

## 2021-06-10 DIAGNOSIS — I10 HYPERTENSION, UNSPECIFIED TYPE: ICD-10-CM

## 2021-06-10 DIAGNOSIS — N18.4 CKD (CHRONIC KIDNEY DISEASE) STAGE 4, GFR 15-29 ML/MIN (HCC): ICD-10-CM

## 2021-06-10 DIAGNOSIS — R06.02 SOB (SHORTNESS OF BREATH): ICD-10-CM

## 2021-06-10 DIAGNOSIS — I10 ESSENTIAL HYPERTENSION: ICD-10-CM

## 2021-06-10 PROCEDURE — 99203 OFFICE O/P NEW LOW 30 MIN: CPT | Performed by: INTERNAL MEDICINE

## 2021-06-10 RX ORDER — AMLODIPINE BESYLATE 10 MG/1
5 TABLET ORAL DAILY
Qty: 45 TABLET | Refills: 3 | Status: SHIPPED | OUTPATIENT
Start: 2021-06-10 | End: 2021-07-22 | Stop reason: SDUPTHER

## 2021-06-10 ASSESSMENT — ENCOUNTER SYMPTOMS
FALLS: 0
DIZZINESS: 0
TREMORS: 0
VOMITING: 0
HEARTBURN: 0
FOCAL WEAKNESS: 0
HEMOPTYSIS: 0
STRIDOR: 0
ORTHOPNEA: 0
WHEEZING: 0
HEADACHES: 0
WEAKNESS: 0
MYALGIAS: 0
PALPITATIONS: 0
BLURRED VISION: 0
SHORTNESS OF BREATH: 0
CHILLS: 0
WEIGHT LOSS: 0
SPUTUM PRODUCTION: 0
DOUBLE VISION: 0
PHOTOPHOBIA: 0
DIAPHORESIS: 0
EYE REDNESS: 0
FEVER: 0
NAUSEA: 0
CLAUDICATION: 0
EYE DISCHARGE: 0
SPEECH CHANGE: 0
SINUS PAIN: 0
PND: 0
CONSTIPATION: 0
NECK PAIN: 0
COUGH: 0
SORE THROAT: 0
DEPRESSION: 0
EYE PAIN: 0
DIARRHEA: 0
ABDOMINAL PAIN: 0
BACK PAIN: 0

## 2021-06-10 ASSESSMENT — FIBROSIS 4 INDEX: FIB4 SCORE: 0.84

## 2021-06-10 ASSESSMENT — PAIN SCALES - GENERAL: PAINLEVEL: NO PAIN

## 2021-06-10 NOTE — PROGRESS NOTES
Chief Complaint   Patient presents with   • New Patient     Evaluation for Shortness of Breath       HPI: This patient is a 81 y.o. male presenting for evaluation of SOB.  Patient's past medical history significant for hypertension previously not well controlled, CKD followed by nephrology, type 2 diabetes.  He is a former tobacco smoker with between 25 and 35-pack-year history and quit in 1975.  He is retired from work as a CPA.  He has both the mother and brother suffer from significant asthma.  Otherwise no family history of autoimmune disease.  He presents today for evaluation of shortness of breath that began slowly several months prior to being admitted in February for hypertensive emergency.  He denies chronic shortness of breath.  No cough.  No wheezing.  No chest pain.  No edema.  Chest x-ray during his stay showed clear lung fields.  He has since been followed by nephrology, cardiology and vascular medicine with improved BP control.  His shortness of breath has essentially resolved with controlled blood pressure.  Echo during his stay showed normal LV ejection fraction, normal RV size and function with RVSP estimated at 30 mmHg.  Interestingly enough he reportedly had normal diastolic function.  Today he is asymptomatic and saturating 97% on room air.    Past Medical History:   Diagnosis Date   • Abdominal pain    • Abnormal electrocardiogram    • ACE inhibitor intolerance    • BPH (benign prostatic hyperplasia)    • Bruxism    • Cancer (HCC)     basal and squamous cell to face   • Cataract     robbi IOL   • Chickenpox    • Cholesterol blood decreased    • Chronic kidney disease, stage III (moderate) (HCC)    • Cold    • Cough    • Depression    • Diabetes     oral meds   • Difficulty breathing    • Fever    • GERD (gastroesophageal reflux disease)    • GOUT    • Gout    • History of skull fracture    • History of urinary tract infection    • Hyperlipidemia    • Hypertension    • Indigestion    • Influenza     • Insomnia    • Mixed hyperlipidemia    • Pneumonia 2015   • Proteinuria    • Ringing in ears    • Shortness of breath    • Sputum production    • Tonsillitis    • Type 2 diabetes mellitus (HCC)    • Wears glasses    • Weight loss        Social History     Socioeconomic History   • Marital status:      Spouse name: Not on file   • Number of children: Not on file   • Years of education: Not on file   • Highest education level: Not on file   Occupational History   • Not on file   Tobacco Use   • Smoking status: Former Smoker     Packs/day: 0.20     Years: 6.00     Pack years: 1.20     Quit date:      Years since quittin.4   • Smokeless tobacco: Never Used   • Tobacco comment: Stopped over 25 years ago.   Vaping Use   • Vaping Use: Never used   Substance and Sexual Activity   • Alcohol use: No   • Drug use: No   • Sexual activity: Not on file   Other Topics Concern   • Not on file   Social History Narrative    Retired CPA.     Social Determinants of Health     Financial Resource Strain:    • Difficulty of Paying Living Expenses:    Food Insecurity:    • Worried About Running Out of Food in the Last Year:    • Ran Out of Food in the Last Year:    Transportation Needs:    • Lack of Transportation (Medical):    • Lack of Transportation (Non-Medical):    Physical Activity:    • Days of Exercise per Week:    • Minutes of Exercise per Session:    Stress:    • Feeling of Stress :    Social Connections:    • Frequency of Communication with Friends and Family:    • Frequency of Social Gatherings with Friends and Family:    • Attends [employer] Services:    • Active Member of Clubs or Organizations:    • Attends Club or Organization Meetings:    • Marital Status:    Intimate Partner Violence:    • Fear of Current or Ex-Partner:    • Emotionally Abused:    • Physically Abused:    • Sexually Abused:        Family History   Problem Relation Age of Onset   • Diabetes Father    • Heart Attack Father 79   • Cancer  Brother        Current Outpatient Medications on File Prior to Visit   Medication Sig Dispense Refill   • carvedilol (COREG) 12.5 MG Tab Take 12.5 mg by mouth 2 times a day with meals.     • aspirin (ASPIRIN 81) 81 MG EC tablet Take 81 mg by mouth every day.     • simvastatin (ZOCOR) 40 MG Tab Take 40 mg by mouth every evening.     • olmesartan-hydrochlorothiazide (BENICAR HCT) 40-25 MG per tablet Take 0.5 Tablets by mouth every day. 90 tablet 3   • OZEMPIC, 0.25 OR 0.5 MG/DOSE, 2 MG/1.5ML Solution Pen-injector      • Insulin Degludec (TRESIBA) 100 UNIT/ML Solution Inject 6 Units under the skin every day. 100 mL 11   • B Complex Vitamins (B COMPLEX 1 PO) Take 1 tablet by mouth every day.     • Mirabegron ER (MYRBETRIQ) 50 MG TABLET SR 24 HR Take 1 tablet by mouth every day.     • Testosterone (ANDROGEL) 20.25 MG/1.25GM (1.62%) GEL Place 60.75 mg on the skin every day. daily     • zolpidem (AMBIEN) 10 MG TABS Take 5-10 mg by mouth at bedtime as needed.     • allopurinol (ZYLOPRIM) 300 MG TABS Take 200 mg by mouth every day.     • FISH OIL Take 1 capsule by mouth every day.     • Cholecalciferol (VITAMIN D) 2000 UNIT Tab Take 1 capsule by mouth 2 (two) times a day.     • B-D UF III MINI PEN NEEDLES 31G X 5 MM Misc      • CONTOUR NEXT TEST strip      • Testosterone 1.62 % Gel      • hydrALAZINE (APRESOLINE) 10 MG Tab Take 1 tablet by mouth 3 times a day. PRN BP > 165/90 90 tablet 2     No current facility-administered medications on file prior to visit.       Allergies: Ace inhibitors, Ether, and Lipitor [atorvastatin calcium]    ROS:   Review of Systems   Constitutional: Negative for chills, diaphoresis, fever, malaise/fatigue and weight loss.   HENT: Negative for congestion, ear discharge, ear pain, hearing loss, nosebleeds, sinus pain, sore throat and tinnitus.    Eyes: Negative for blurred vision, double vision, photophobia, pain, discharge and redness.   Respiratory: Negative for cough, hemoptysis, sputum  "production, shortness of breath, wheezing and stridor.    Cardiovascular: Negative for chest pain, palpitations, orthopnea, claudication, leg swelling and PND.   Gastrointestinal: Negative for abdominal pain, constipation, diarrhea, heartburn, nausea and vomiting.   Genitourinary: Negative for dysuria and urgency.   Musculoskeletal: Negative for back pain, falls, joint pain, myalgias and neck pain.   Skin: Negative for itching and rash.   Neurological: Negative for dizziness, tremors, speech change, focal weakness, weakness and headaches.   Endo/Heme/Allergies: Negative for environmental allergies.   Psychiatric/Behavioral: Negative for depression.       /64 (BP Location: Right arm, Patient Position: Sitting, BP Cuff Size: Adult long)   Pulse 74   Temp 36.7 °C (98.1 °F) (Temporal)   Resp 16   Ht 1.803 m (5' 11\")   Wt 86.2 kg (190 lb)   SpO2 97%     Physical Exam:  Physical Exam  Vitals reviewed.   Constitutional:       General: He is not in acute distress.     Appearance: Normal appearance. He is normal weight.   HENT:      Head: Normocephalic and atraumatic.      Right Ear: External ear normal.      Left Ear: External ear normal.      Nose: Nose normal. No congestion.      Mouth/Throat:      Mouth: Mucous membranes are moist.      Pharynx: Oropharynx is clear. No oropharyngeal exudate.   Eyes:      General: No scleral icterus.     Extraocular Movements: Extraocular movements intact.      Conjunctiva/sclera: Conjunctivae normal.      Pupils: Pupils are equal, round, and reactive to light.   Cardiovascular:      Rate and Rhythm: Normal rate and regular rhythm.      Heart sounds: Normal heart sounds. No murmur heard.   No gallop.    Pulmonary:      Effort: Pulmonary effort is normal. No respiratory distress.      Breath sounds: Normal breath sounds. No wheezing or rales.   Abdominal:      Palpations: Abdomen is soft.      Comments: Abdominal obesity   Musculoskeletal:         General: Normal range of " motion.      Cervical back: Normal range of motion and neck supple.      Right lower leg: No edema.      Left lower leg: No edema.   Skin:     General: Skin is warm and dry.      Findings: No rash.   Neurological:      Mental Status: He is alert and oriented to person, place, and time.      Cranial Nerves: No cranial nerve deficit.   Psychiatric:         Mood and Affect: Mood normal.         Behavior: Behavior normal.         PFTs as reviewed by me personally: None    Imaging as reviewed by me personally: As per HPI    Assessment:  1. SOB (shortness of breath)     2. Hypertension, unspecified type     3. CKD (chronic kidney disease) stage 4, GFR 15-29 ml/min ([facility])         Plan:  1.  This has resolved and although it did precede his hospitalization, I suspect was the development of elevated left ventricular end-diastolic filling pressures.  This is supported by the fact that his symptoms have improved with BP control.  He is followed by vascular and nephrology.  Chest imaging, exam and oxygenation are within normal limits.  He does have a tobacco history and I offered to obtain pulmonary function testing however this would not change our management at this point in time.  Ultimately we decided to follow-up on an as-needed basis if symptoms return despite good blood pressure control.  2.  See discussion above.  Patient was admitted with malignant hypertension with shortness of breath associated with this likely due to acute elevation of left ventricular end-diastolic filling pressures.  BP is now excellently controlled under the care of nephrology and vascular medicine.  Shortness of breath has subsequently resolved.  3.  Followed by nephrology.  BP well controlled.  No evidence of volume overload on exam today.  Return if symptoms worsen or fail to improve.

## 2021-07-01 ENCOUNTER — HOSPITAL ENCOUNTER (OUTPATIENT)
Dept: LAB | Facility: MEDICAL CENTER | Age: 81
End: 2021-07-01
Attending: INTERNAL MEDICINE
Payer: MEDICARE

## 2021-07-01 DIAGNOSIS — I10 ESSENTIAL HYPERTENSION: ICD-10-CM

## 2021-07-01 DIAGNOSIS — R80.9 PROTEINURIA, UNSPECIFIED TYPE: ICD-10-CM

## 2021-07-01 DIAGNOSIS — N18.4 CKD (CHRONIC KIDNEY DISEASE), STAGE IV (HCC): ICD-10-CM

## 2021-07-01 DIAGNOSIS — E55.9 VITAMIN D DEFICIENCY: ICD-10-CM

## 2021-07-01 DIAGNOSIS — R80.9 MICROALBUMINURIA DUE TO TYPE 2 DIABETES MELLITUS (HCC): ICD-10-CM

## 2021-07-01 DIAGNOSIS — D63.1 ANEMIA DUE TO STAGE 4 CHRONIC KIDNEY DISEASE (HCC): ICD-10-CM

## 2021-07-01 DIAGNOSIS — E11.29 MICROALBUMINURIA DUE TO TYPE 2 DIABETES MELLITUS (HCC): ICD-10-CM

## 2021-07-01 DIAGNOSIS — N18.4 ANEMIA DUE TO STAGE 4 CHRONIC KIDNEY DISEASE (HCC): ICD-10-CM

## 2021-07-01 LAB
25(OH)D3 SERPL-MCNC: 71 NG/ML (ref 30–100)
ANION GAP SERPL CALC-SCNC: 9 MMOL/L (ref 7–16)
APPEARANCE UR: CLEAR
BACTERIA #/AREA URNS HPF: NEGATIVE /HPF
BILIRUB UR QL STRIP.AUTO: NEGATIVE
BUN SERPL-MCNC: 45 MG/DL (ref 8–22)
C3 SERPL-MCNC: 153.2 MG/DL (ref 87–200)
C4 SERPL-MCNC: 37.5 MG/DL (ref 19–52)
CALCIUM SERPL-MCNC: 10.5 MG/DL (ref 8.5–10.5)
CHLORIDE SERPL-SCNC: 101 MMOL/L (ref 96–112)
CO2 SERPL-SCNC: 28 MMOL/L (ref 20–33)
COLOR UR: YELLOW
CREAT SERPL-MCNC: 2.72 MG/DL (ref 0.5–1.4)
CREAT UR-MCNC: 103.78 MG/DL
CREAT UR-MCNC: 108.19 MG/DL
EPI CELLS #/AREA URNS HPF: NEGATIVE /HPF
ERYTHROCYTE [DISTWIDTH] IN BLOOD BY AUTOMATED COUNT: 45.3 FL (ref 35.9–50)
EST. AVERAGE GLUCOSE BLD GHB EST-MCNC: 154 MG/DL
GLUCOSE SERPL-MCNC: 88 MG/DL (ref 65–99)
GLUCOSE UR STRIP.AUTO-MCNC: NEGATIVE MG/DL
HBA1C MFR BLD: 7 % (ref 4–5.6)
HCT VFR BLD AUTO: 43.4 % (ref 42–52)
HGB BLD-MCNC: 14.2 G/DL (ref 14–18)
HYALINE CASTS #/AREA URNS LPF: ABNORMAL /LPF
KETONES UR STRIP.AUTO-MCNC: NEGATIVE MG/DL
LEUKOCYTE ESTERASE UR QL STRIP.AUTO: NEGATIVE
MCH RBC QN AUTO: 29.5 PG (ref 27–33)
MCHC RBC AUTO-ENTMCNC: 32.7 G/DL (ref 33.7–35.3)
MCV RBC AUTO: 90 FL (ref 81.4–97.8)
MICRO URNS: ABNORMAL
MICROALBUMIN UR-MCNC: 38.7 MG/DL
MICROALBUMIN/CREAT UR: 373 MG/G (ref 0–30)
NITRITE UR QL STRIP.AUTO: NEGATIVE
PH UR STRIP.AUTO: 6.5 [PH] (ref 5–8)
PHOSPHATE SERPL-MCNC: 3.2 MG/DL (ref 2.5–4.5)
PLATELET # BLD AUTO: 262 K/UL (ref 164–446)
PMV BLD AUTO: 11.5 FL (ref 9–12.9)
POTASSIUM SERPL-SCNC: 3.8 MMOL/L (ref 3.6–5.5)
PROT UR QL STRIP: 100 MG/DL
PROT UR-MCNC: 52 MG/DL (ref 0–15)
PTH-INTACT SERPL-MCNC: 77.9 PG/ML (ref 14–72)
RBC # BLD AUTO: 4.82 M/UL (ref 4.7–6.1)
RBC # URNS HPF: ABNORMAL /HPF
RBC UR QL AUTO: NEGATIVE
SODIUM SERPL-SCNC: 138 MMOL/L (ref 135–145)
SP GR UR STRIP.AUTO: 1.01
URATE SERPL-MCNC: 8 MG/DL (ref 2.5–8.3)
UROBILINOGEN UR STRIP.AUTO-MCNC: 0.2 MG/DL
WBC # BLD AUTO: 8.2 K/UL (ref 4.8–10.8)
WBC #/AREA URNS HPF: ABNORMAL /HPF

## 2021-07-01 PROCEDURE — 36415 COLL VENOUS BLD VENIPUNCTURE: CPT

## 2021-07-01 PROCEDURE — 84402 ASSAY OF FREE TESTOSTERONE: CPT

## 2021-07-01 PROCEDURE — 86255 FLUORESCENT ANTIBODY SCREEN: CPT

## 2021-07-01 PROCEDURE — 84403 ASSAY OF TOTAL TESTOSTERONE: CPT

## 2021-07-01 PROCEDURE — 83970 ASSAY OF PARATHORMONE: CPT

## 2021-07-01 PROCEDURE — 81001 URINALYSIS AUTO W/SCOPE: CPT

## 2021-07-01 PROCEDURE — 85027 COMPLETE CBC AUTOMATED: CPT

## 2021-07-01 PROCEDURE — 84270 ASSAY OF SEX HORMONE GLOBUL: CPT

## 2021-07-01 PROCEDURE — 82306 VITAMIN D 25 HYDROXY: CPT

## 2021-07-01 PROCEDURE — 82570 ASSAY OF URINE CREATININE: CPT | Mod: 91

## 2021-07-01 PROCEDURE — 84100 ASSAY OF PHOSPHORUS: CPT

## 2021-07-01 PROCEDURE — 80048 BASIC METABOLIC PNL TOTAL CA: CPT

## 2021-07-01 PROCEDURE — 83036 HEMOGLOBIN GLYCOSYLATED A1C: CPT

## 2021-07-01 PROCEDURE — 86160 COMPLEMENT ANTIGEN: CPT | Mod: 91

## 2021-07-01 PROCEDURE — 82043 UR ALBUMIN QUANTITATIVE: CPT

## 2021-07-01 PROCEDURE — 84156 ASSAY OF PROTEIN URINE: CPT

## 2021-07-01 PROCEDURE — 84550 ASSAY OF BLOOD/URIC ACID: CPT

## 2021-07-03 LAB
ANCA IGG TITR SER IF: NORMAL {TITER}
SHBG SERPL-SCNC: 31 NMOL/L (ref 11–80)
TESTOST FREE MFR SERPL: 1.9 % (ref 1.6–2.9)
TESTOST FREE SERPL-MCNC: 79 PG/ML (ref 47–244)
TESTOST SERPL-MCNC: 411 NG/DL (ref 300–720)

## 2021-07-06 LAB
ALBUMIN 24H MFR UR ELPH: 73 %
ALPHA1 GLOB 24H MFR UR ELPH: 9.5 %
ALPHA2 GLOB 24H MFR UR ELPH: 4.9 %
B-GLOBULIN 24H MFR UR ELPH: 10.1 %
COLLECT DURATION TIME SPEC: NORMAL HRS
EER MONOCLONAL PROTEIN STUDY, 24 HOUR U Q5964: NORMAL
GAMMA GLOB 24H MFR UR ELPH: 2.5 %
INTERPRETATION UR IFE-IMP: NORMAL
M PROTEIN 24H MFR UR ELPH: 0 %
M PROTEIN 24H UR ELPH-MRATE: NORMAL MG/24 HRS
PROT 24H UR-MRATE: NORMAL MG/D (ref 40–150)
PROT UR-MCNC: 53 MG/DL
SPECIMEN VOL ?TM UR: NORMAL ML

## 2021-07-08 ENCOUNTER — OFFICE VISIT (OUTPATIENT)
Dept: NEPHROLOGY | Facility: MEDICAL CENTER | Age: 81
End: 2021-07-08
Payer: MEDICARE

## 2021-07-08 VITALS
HEART RATE: 69 BPM | BODY MASS INDEX: 26.88 KG/M2 | DIASTOLIC BLOOD PRESSURE: 78 MMHG | HEIGHT: 71 IN | TEMPERATURE: 97.7 F | WEIGHT: 192 LBS | SYSTOLIC BLOOD PRESSURE: 102 MMHG | OXYGEN SATURATION: 98 %

## 2021-07-08 DIAGNOSIS — I10 ESSENTIAL HYPERTENSION: ICD-10-CM

## 2021-07-08 DIAGNOSIS — M10.9 CONTROLLED GOUT: ICD-10-CM

## 2021-07-08 DIAGNOSIS — N18.4 CKD (CHRONIC KIDNEY DISEASE), STAGE IV (HCC): ICD-10-CM

## 2021-07-08 DIAGNOSIS — E55.9 VITAMIN D DEFICIENCY: ICD-10-CM

## 2021-07-08 DIAGNOSIS — R80.9 MICROALBUMINURIA DUE TO TYPE 2 DIABETES MELLITUS (HCC): ICD-10-CM

## 2021-07-08 DIAGNOSIS — E11.29 MICROALBUMINURIA DUE TO TYPE 2 DIABETES MELLITUS (HCC): ICD-10-CM

## 2021-07-08 PROCEDURE — 99214 OFFICE O/P EST MOD 30 MIN: CPT | Performed by: INTERNAL MEDICINE

## 2021-07-08 RX ORDER — HYOSCYAMINE SULFATE 0.125 MG
1 TABLET,DISINTEGRATING ORAL PRN
COMMUNITY
Start: 2021-06-08 | End: 2021-07-08

## 2021-07-08 RX ORDER — OLMESARTAN MEDOXOMIL 20 MG/1
20 TABLET ORAL DAILY
Qty: 30 TABLET | Refills: 3 | Status: SHIPPED | OUTPATIENT
Start: 2021-07-08 | End: 2022-07-14

## 2021-07-08 ASSESSMENT — ENCOUNTER SYMPTOMS
CHILLS: 0
BACK PAIN: 0
VOMITING: 0
EYES NEGATIVE: 1
COUGH: 0
MYALGIAS: 0
FEVER: 0
NECK PAIN: 0
NAUSEA: 0
FLANK PAIN: 0
WHEEZING: 0
PALPITATIONS: 0
SINUS PAIN: 0
HEMOPTYSIS: 0
SHORTNESS OF BREATH: 0
ORTHOPNEA: 0
WEIGHT LOSS: 0

## 2021-07-08 ASSESSMENT — FIBROSIS 4 INDEX: FIB4 SCORE: 0.75

## 2021-07-08 NOTE — PROGRESS NOTES
Subjective:      Tyrone Cline is a 81 y.o. male who presents with Follow-Up and Chronic Kidney Disease            Chronic Kidney Disease  Pertinent negatives include no chest pain, chills, congestion, coughing, fever, myalgias, nausea, neck pain or vomiting.     Tyrone is coming today for f/u of CKD IV  Doing well, no complaints   BPH LUTS f/u with Urology  (+) weight loss since started Ozempic -several lbs  CKD IV  -creat level slightly worse from  2.6 -to 2.7  HTN: BP monitored at home - well controlled now -compliant to low salt diet and medications  Anemia: Hb level stable WNL  Hx/of gout involving left big toe - no recent attacks -on Allopurinol  Elevated uric acid level under better control - after adjusted allopurinol dose    Review of Systems   Constitutional: Negative for chills, fever, malaise/fatigue and weight loss.   HENT: Negative for congestion, hearing loss and sinus pain.    Eyes: Negative.    Respiratory: Negative for cough, hemoptysis, shortness of breath and wheezing.    Cardiovascular: Negative for chest pain, palpitations, orthopnea and leg swelling.   Gastrointestinal: Negative for nausea and vomiting.   Genitourinary: Positive for frequency. Negative for dysuria, flank pain, hematuria and urgency.   Musculoskeletal: Negative for back pain, joint pain, myalgias and neck pain.   Skin: Negative.    All other systems reviewed and are negative.    Past Medical History:   Diagnosis Date   • Abdominal pain    • Abnormal electrocardiogram    • ACE inhibitor intolerance    • BPH (benign prostatic hyperplasia)    • Bruxism    • Cancer (HCC)     basal and squamous cell to face   • Cataract     robbi IOL   • Chickenpox    • Cholesterol blood decreased    • Chronic kidney disease, stage III (moderate) (HCC)    • Cold    • Cough    • Depression    • Diabetes     oral meds   • Difficulty breathing    • Fever    • GERD (gastroesophageal reflux disease)    • GOUT    • Gout    • History of skull  fracture    • History of urinary tract infection    • Hyperlipidemia    • Hypertension    • Indigestion    • Influenza    • Insomnia    • Mixed hyperlipidemia    • Pneumonia 2015   • Proteinuria    • Ringing in ears    • Shortness of breath    • Sputum production    • Tonsillitis    • Type 2 diabetes mellitus (HCC)    • Wears glasses    • Weight loss        Family History   Problem Relation Age of Onset   • Diabetes Father    • Heart Attack Father 79   • Cancer Brother        Social History     Socioeconomic History   • Marital status:      Spouse name: Not on file   • Number of children: Not on file   • Years of education: Not on file   • Highest education level: Not on file   Occupational History   • Not on file   Tobacco Use   • Smoking status: Former Smoker     Packs/day: 0.20     Years: 6.00     Pack years: 1.20     Quit date: 1965     Years since quittin.5   • Smokeless tobacco: Never Used   • Tobacco comment: Stopped over 25 years ago.   Vaping Use   • Vaping Use: Never used   Substance and Sexual Activity   • Alcohol use: No   • Drug use: No   • Sexual activity: Not on file   Other Topics Concern   • Not on file   Social History Narrative    Retired CPA.     Social Determinants of Health     Financial Resource Strain:    • Difficulty of Paying Living Expenses:    Food Insecurity:    • Worried About Running Out of Food in the Last Year:    • Ran Out of Food in the Last Year:    Transportation Needs:    • Lack of Transportation (Medical):    • Lack of Transportation (Non-Medical):    Physical Activity:    • Days of Exercise per Week:    • Minutes of Exercise per Session:    Stress:    • Feeling of Stress :    Social Connections:    • Frequency of Communication with Friends and Family:    • Frequency of Social Gatherings with Friends and Family:    • Attends Sikhism Services:    • Active Member of Clubs or Organizations:    • Attends Club or Organization Meetings:    • Marital Status:   "  Intimate Partner Violence:    • Fear of Current or Ex-Partner:    • Emotionally Abused:    • Physically Abused:    • Sexually Abused:           Objective:     /78 (BP Location: Left arm, Patient Position: Sitting, BP Cuff Size: Adult)   Pulse 69   Temp 36.5 °C (97.7 °F) (Temporal)   Ht 1.803 m (5' 11\")   Wt 87.1 kg (192 lb)   SpO2 98%   BMI 26.78 kg/m²      Physical Exam  Vitals and nursing note reviewed.   Constitutional:       General: He is not in acute distress.     Appearance: Normal appearance. He is well-developed. He is not diaphoretic.   HENT:      Head: Normocephalic and atraumatic.      Nose: Nose normal.      Mouth/Throat:      Mouth: Mucous membranes are moist.      Pharynx: Oropharynx is clear.   Eyes:      General: No scleral icterus.     Extraocular Movements: Extraocular movements intact.      Conjunctiva/sclera: Conjunctivae normal.      Pupils: Pupils are equal, round, and reactive to light.   Cardiovascular:      Rate and Rhythm: Normal rate and regular rhythm.      Pulses: Normal pulses.      Heart sounds: Normal heart sounds. No friction rub. No gallop.    Pulmonary:      Effort: Pulmonary effort is normal.      Breath sounds: Normal breath sounds.   Abdominal:      General: Bowel sounds are normal. There is no distension.      Palpations: Abdomen is soft. There is no mass.      Tenderness: There is no abdominal tenderness. There is no right CVA tenderness, left CVA tenderness or guarding.   Musculoskeletal:      Cervical back: Normal range of motion and neck supple.      Right lower leg: No edema.      Left lower leg: No edema.   Skin:     General: Skin is warm.      Findings: No erythema or rash.   Neurological:      General: No focal deficit present.      Mental Status: He is alert and oriented to person, place, and time.      Cranial Nerves: No cranial nerve deficit.      Coordination: Coordination normal.   Psychiatric:         Mood and Affect: Mood normal.         Behavior: " Behavior normal.         Thought Content: Thought content normal.         Judgment: Judgment normal.     Laboratory results reviewed: d/w Pt  Lab Results   Component Value Date/Time    CREATININE 2.72 (H) 07/01/2021 04:08 PM    CREATININE 1.4 02/06/2009 03:00 AM    POTASSIUM 3.8 07/01/2021 04:08 PM                 Assessment/Plan:       1. CKD (chronic kidney disease), stage IV ([facility])      Creat level slightly worse-to monitor closely      D/c HCTZ    2. Essential hypertension       Elevated BP - very well controlled now  -to monitor at home       3. Microalbuminuria due to type 2 diabetes mellitus ([facility])      Sub nephrotic -to monitor-on ARB    4. Controlled gout      No recent gout attacks -on allopurinol      To monitor uric acid    5. Vitamin D deficiency      vit D and PTH levels well controlled    6. Anemia due to stage 4 chronic kidney disease ([facility])      Hb stable -WNL    7. Proteinuria: improved  -serology r/o GN negative    Recs;    Stop Benicar /HCTZ  New: Benicar 20 mg daily and if BP < 110/60 reduce Benicar dose to 10 mg daily  Keep well hydrated  Low salt diet  F/u in 3 months

## 2021-07-19 DIAGNOSIS — I10 ESSENTIAL HYPERTENSION: ICD-10-CM

## 2021-07-19 RX ORDER — CARVEDILOL 12.5 MG/1
12.5 TABLET ORAL 2 TIMES DAILY WITH MEALS
Qty: 180 TABLET | Refills: 3 | Status: SHIPPED | OUTPATIENT
Start: 2021-07-19 | End: 2021-07-20 | Stop reason: SDUPTHER

## 2021-07-19 NOTE — TELEPHONE ENCOUNTER
----- Message from Randa Wilhelm PharmD sent at 7/19/2021  1:24 PM PDT -----  Regarding: FW: refill request    ----- Message -----  From: Lulu Guerra, Med Ass't  Sent: 7/19/2021  12:40 PM PDT  To: Amb Anticoag Pool  Subject: refill request                                   Hello,  I received a refill request for the patient on my end  RX: carvedilol 12.5mg   Requesting a 90 day supply    Pharmacy: [facility] mail order    Thank you

## 2021-07-20 DIAGNOSIS — I10 ESSENTIAL HYPERTENSION: ICD-10-CM

## 2021-07-20 RX ORDER — CARVEDILOL 12.5 MG/1
12.5 TABLET ORAL 2 TIMES DAILY WITH MEALS
Qty: 200 TABLET | Refills: 3 | Status: SHIPPED | OUTPATIENT
Start: 2021-07-20 | End: 2022-06-06

## 2021-07-22 DIAGNOSIS — I10 ESSENTIAL HYPERTENSION: ICD-10-CM

## 2021-07-22 NOTE — TELEPHONE ENCOUNTER
[facility] mail order pharmacy called on pt's behalf requesting a 100 day supply amlodipine. Please advise.

## 2021-07-23 RX ORDER — AMLODIPINE BESYLATE 10 MG/1
5 TABLET ORAL DAILY
Qty: 50 TABLET | Refills: 1 | Status: SHIPPED | OUTPATIENT
Start: 2021-07-23 | End: 2021-07-26 | Stop reason: SDUPTHER

## 2021-07-26 DIAGNOSIS — I10 ESSENTIAL HYPERTENSION: ICD-10-CM

## 2021-07-26 RX ORDER — AMLODIPINE BESYLATE 2.5 MG/1
2.5 TABLET ORAL DAILY
Qty: 90 TABLET | Refills: 3 | Status: SHIPPED
Start: 2021-07-26 | End: 2021-12-10

## 2021-07-30 ENCOUNTER — HOSPITAL ENCOUNTER (OUTPATIENT)
Dept: LAB | Facility: MEDICAL CENTER | Age: 81
End: 2021-07-30
Attending: INTERNAL MEDICINE
Payer: MEDICARE

## 2021-07-30 LAB
25(OH)D3 SERPL-MCNC: 58 NG/ML (ref 30–100)
CREAT UR-MCNC: 76.45 MG/DL
MICROALBUMIN UR-MCNC: 53.7 MG/DL
MICROALBUMIN/CREAT UR: 702 MG/G (ref 0–30)
URATE SERPL-MCNC: 5.8 MG/DL (ref 2.5–8.3)

## 2021-07-30 PROCEDURE — 36415 COLL VENOUS BLD VENIPUNCTURE: CPT

## 2021-07-30 PROCEDURE — 82306 VITAMIN D 25 HYDROXY: CPT

## 2021-07-30 PROCEDURE — 84550 ASSAY OF BLOOD/URIC ACID: CPT

## 2021-07-30 PROCEDURE — 82570 ASSAY OF URINE CREATININE: CPT

## 2021-07-30 PROCEDURE — 82043 UR ALBUMIN QUANTITATIVE: CPT

## 2021-08-02 ENCOUNTER — HOSPITAL ENCOUNTER (OUTPATIENT)
Dept: LAB | Facility: MEDICAL CENTER | Age: 81
End: 2021-08-02
Attending: UROLOGY
Payer: MEDICARE

## 2021-08-02 LAB
HCT VFR BLD AUTO: 46 % (ref 42–52)
HGB BLD-MCNC: 14.8 G/DL (ref 14–18)
TESTOST SERPL-MCNC: 450 NG/DL (ref 175–781)

## 2021-08-02 PROCEDURE — 84403 ASSAY OF TOTAL TESTOSTERONE: CPT

## 2021-08-02 PROCEDURE — 85014 HEMATOCRIT: CPT

## 2021-08-02 PROCEDURE — 85018 HEMOGLOBIN: CPT

## 2021-08-02 PROCEDURE — 36415 COLL VENOUS BLD VENIPUNCTURE: CPT

## 2021-08-26 ENCOUNTER — PATIENT MESSAGE (OUTPATIENT)
Dept: HEALTH INFORMATION MANAGEMENT | Facility: OTHER | Age: 81
End: 2021-08-26

## 2021-11-02 ENCOUNTER — HOSPITAL ENCOUNTER (OUTPATIENT)
Dept: LAB | Facility: MEDICAL CENTER | Age: 81
End: 2021-11-02
Attending: INTERNAL MEDICINE
Payer: MEDICARE

## 2021-11-02 LAB
ANION GAP SERPL CALC-SCNC: 12 MMOL/L (ref 7–16)
BUN SERPL-MCNC: 37 MG/DL (ref 8–22)
CALCIUM SERPL-MCNC: 9.9 MG/DL (ref 8.5–10.5)
CHLORIDE SERPL-SCNC: 104 MMOL/L (ref 96–112)
CO2 SERPL-SCNC: 24 MMOL/L (ref 20–33)
CREAT SERPL-MCNC: 2.44 MG/DL (ref 0.5–1.4)
EST. AVERAGE GLUCOSE BLD GHB EST-MCNC: 134 MG/DL
GLUCOSE SERPL-MCNC: 137 MG/DL (ref 65–99)
HBA1C MFR BLD: 6.3 % (ref 4–5.6)
POTASSIUM SERPL-SCNC: 4.5 MMOL/L (ref 3.6–5.5)
SODIUM SERPL-SCNC: 140 MMOL/L (ref 135–145)

## 2021-11-02 PROCEDURE — 83036 HEMOGLOBIN GLYCOSYLATED A1C: CPT

## 2021-11-02 PROCEDURE — 82570 ASSAY OF URINE CREATININE: CPT

## 2021-11-02 PROCEDURE — 80048 BASIC METABOLIC PNL TOTAL CA: CPT

## 2021-11-02 PROCEDURE — 82043 UR ALBUMIN QUANTITATIVE: CPT

## 2021-11-02 PROCEDURE — 36415 COLL VENOUS BLD VENIPUNCTURE: CPT

## 2021-11-03 LAB
CREAT UR-MCNC: 144.67 MG/DL
MICROALBUMIN UR-MCNC: 134.7 MG/DL
MICROALBUMIN/CREAT UR: 931 MG/G (ref 0–30)

## 2021-11-11 ENCOUNTER — OFFICE VISIT (OUTPATIENT)
Dept: NEPHROLOGY | Facility: MEDICAL CENTER | Age: 81
End: 2021-11-11
Payer: MEDICARE

## 2021-11-11 VITALS
HEART RATE: 75 BPM | DIASTOLIC BLOOD PRESSURE: 60 MMHG | HEIGHT: 71 IN | SYSTOLIC BLOOD PRESSURE: 128 MMHG | OXYGEN SATURATION: 97 % | TEMPERATURE: 98.2 F | BODY MASS INDEX: 28 KG/M2 | RESPIRATION RATE: 20 BRPM | WEIGHT: 200 LBS

## 2021-11-11 DIAGNOSIS — D63.1 ANEMIA DUE TO STAGE 4 CHRONIC KIDNEY DISEASE (HCC): ICD-10-CM

## 2021-11-11 DIAGNOSIS — M10.9 CONTROLLED GOUT: ICD-10-CM

## 2021-11-11 DIAGNOSIS — R80.9 MICROALBUMINURIA DUE TO TYPE 2 DIABETES MELLITUS (HCC): ICD-10-CM

## 2021-11-11 DIAGNOSIS — E55.9 VITAMIN D DEFICIENCY: ICD-10-CM

## 2021-11-11 DIAGNOSIS — E11.29 MICROALBUMINURIA DUE TO TYPE 2 DIABETES MELLITUS (HCC): ICD-10-CM

## 2021-11-11 DIAGNOSIS — I10 ESSENTIAL HYPERTENSION: ICD-10-CM

## 2021-11-11 DIAGNOSIS — N18.4 CKD (CHRONIC KIDNEY DISEASE), STAGE IV (HCC): ICD-10-CM

## 2021-11-11 DIAGNOSIS — N18.4 ANEMIA DUE TO STAGE 4 CHRONIC KIDNEY DISEASE (HCC): ICD-10-CM

## 2021-11-11 PROCEDURE — 99214 OFFICE O/P EST MOD 30 MIN: CPT | Performed by: INTERNAL MEDICINE

## 2021-11-11 ASSESSMENT — ENCOUNTER SYMPTOMS
COUGH: 0
FLANK PAIN: 0
ORTHOPNEA: 0
EYES NEGATIVE: 1
SHORTNESS OF BREATH: 0
NAUSEA: 0
HEMOPTYSIS: 0
CHILLS: 0
PALPITATIONS: 0
DIARRHEA: 0
VOMITING: 0
SINUS PAIN: 0
FEVER: 0
ABDOMINAL PAIN: 0
WEIGHT LOSS: 0
WHEEZING: 0

## 2021-11-11 ASSESSMENT — FIBROSIS 4 INDEX: FIB4 SCORE: 0.75

## 2021-11-11 NOTE — PROGRESS NOTES
Subjective:      Tyrone Cline is a 81 y.o. male who presents with Follow-Up and Chronic Kidney Disease            Chronic Kidney Disease  Pertinent negatives include no abdominal pain, chest pain, chills, congestion, coughing, fever, nausea or vomiting.     Tyrone is coming today for f/u of CKD IV  Doing well, no complaints   BPH LUTS - improved -s/p TURP f/u with Urology  CKD IV  -creat level slightly worse from  2.6 -to 2.7 -now better to 2.44  HTN: BP monitored at home - well controlled now -compliant to low salt diet and medications  Anemia: Hb level stable WNL  Hx/of gout involving left big toe - no recent attacks -on Allopurinol  Elevated uric acid level well controlled    Review of Systems   Constitutional: Negative for chills, fever, malaise/fatigue and weight loss.   HENT: Negative for congestion, hearing loss and sinus pain.    Eyes: Negative.    Respiratory: Negative for cough, hemoptysis, shortness of breath and wheezing.    Cardiovascular: Negative for chest pain, palpitations, orthopnea and leg swelling.   Gastrointestinal: Negative for abdominal pain, diarrhea, nausea and vomiting.   Genitourinary: Negative for dysuria, flank pain, frequency, hematuria and urgency.   Skin: Negative.    All other systems reviewed and are negative.    Past Medical History:   Diagnosis Date   • Abdominal pain    • Abnormal electrocardiogram    • ACE inhibitor intolerance    • BPH (benign prostatic hyperplasia)    • Bruxism    • Cancer (HCC)     basal and squamous cell to face   • Cataract     robbi IOL   • Chickenpox    • Cholesterol blood decreased    • Chronic kidney disease, stage III (moderate) (HCC)    • Cold    • Cough    • Depression    • Diabetes     oral meds   • Difficulty breathing    • Fever    • GERD (gastroesophageal reflux disease)    • GOUT    • Gout    • History of skull fracture    • History of urinary tract infection    • Hyperlipidemia    • Hypertension    • Indigestion    • Influenza    •  Insomnia    • Mixed hyperlipidemia    • Pneumonia 2015   • Proteinuria    • Ringing in ears    • Shortness of breath    • Sputum production    • Tonsillitis    • Type 2 diabetes mellitus (HCC)    • Wears glasses    • Weight loss        Family History   Problem Relation Age of Onset   • Diabetes Father    • Heart Attack Father 79   • Cancer Brother        Social History     Socioeconomic History   • Marital status:      Spouse name: Not on file   • Number of children: Not on file   • Years of education: Not on file   • Highest education level: Not on file   Occupational History   • Not on file   Tobacco Use   • Smoking status: Former Smoker     Packs/day: 0.20     Years: 6.00     Pack years: 1.20     Quit date:      Years since quittin.8   • Smokeless tobacco: Never Used   • Tobacco comment: Stopped over 25 years ago.   Vaping Use   • Vaping Use: Never used   Substance and Sexual Activity   • Alcohol use: No   • Drug use: No   • Sexual activity: Not on file   Other Topics Concern   • Not on file   Social History Narrative    Retired CPA.     Social Determinants of Health     Financial Resource Strain:    • Difficulty of Paying Living Expenses: Not on file   Food Insecurity:    • Worried About Running Out of Food in the Last Year: Not on file   • Ran Out of Food in the Last Year: Not on file   Transportation Needs:    • Lack of Transportation (Medical): Not on file   • Lack of Transportation (Non-Medical): Not on file   Physical Activity:    • Days of Exercise per Week: Not on file   • Minutes of Exercise per Session: Not on file   Stress:    • Feeling of Stress : Not on file   Social Connections:    • Frequency of Communication with Friends and Family: Not on file   • Frequency of Social Gatherings with Friends and Family: Not on file   • Attends Confucianism Services: Not on file   • Active Member of Clubs or Organizations: Not on file   • Attends Club or Organization Meetings: Not on file   • Marital  Status: Not on file   Intimate Partner Violence:    • Fear of Current or Ex-Partner: Not on file   • Emotionally Abused: Not on file   • Physically Abused: Not on file   • Sexually Abused: Not on file   Housing Stability:    • Unable to Pay for Housing in the Last Year: Not on file   • Number of Places Lived in the Last Year: Not on file   • Unstable Housing in the Last Year: Not on file          Objective:     There were no vitals taken for this visit.     Physical Exam  Vitals reviewed.   Constitutional:       General: He is not in acute distress.     Appearance: Normal appearance. He is well-developed. He is not diaphoretic.   HENT:      Head: Normocephalic and atraumatic.      Nose: Nose normal.      Mouth/Throat:      Mouth: Mucous membranes are moist.      Pharynx: Oropharynx is clear.   Eyes:      General: No scleral icterus.     Extraocular Movements: Extraocular movements intact.      Conjunctiva/sclera: Conjunctivae normal.      Pupils: Pupils are equal, round, and reactive to light.   Cardiovascular:      Rate and Rhythm: Normal rate and regular rhythm.      Pulses: Normal pulses.      Heart sounds: Normal heart sounds. No friction rub. No gallop.    Pulmonary:      Effort: Pulmonary effort is normal. No respiratory distress.      Breath sounds: Normal breath sounds. No wheezing, rhonchi or rales.   Abdominal:      General: Bowel sounds are normal. There is no distension.      Palpations: Abdomen is soft. There is no mass.      Tenderness: There is no abdominal tenderness. There is no right CVA tenderness, left CVA tenderness or guarding.   Musculoskeletal:      Cervical back: Normal range of motion and neck supple.      Right lower leg: No edema.      Left lower leg: No edema.   Skin:     General: Skin is warm.      Coloration: Skin is not pale.      Findings: No erythema or rash.   Neurological:      General: No focal deficit present.      Mental Status: He is alert and oriented to person, place, and  time.      Cranial Nerves: No cranial nerve deficit.      Coordination: Coordination normal.   Psychiatric:         Mood and Affect: Mood normal.         Behavior: Behavior normal.         Thought Content: Thought content normal.         Judgment: Judgment normal.     Laboratory results reviewed: d/w Pt  Lab Results   Component Value Date/Time    CREATININE 2.44 (H) 11/02/2021 01:45 PM    CREATININE 1.4 02/06/2009 03:00 AM    POTASSIUM 4.5 11/02/2021 01:45 PM                 Assessment/Plan:       1. CKD (chronic kidney disease), stage IV ([facility])      Creat level improving-to monitor closely          2. Essential hypertension       Elevated BP - very well controlled now  -to monitor at home       3. 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 Controlled gout      No recent gout attacks -on allopurinol      To monitor uric acid    5. Vitamin D deficiency      vit D and PTH levels well controlled    6. Anemia due to stage 4 chronic kidney disease ([facility])      Hb stable -WNL    7. Proteinuria: improved  -serology r/o GN negative    Recs;  Allopurinol 100 mg po QD  Keep well hydrated  Low salt diet  Monitor BP  F/u in 3 months

## 2021-11-29 ENCOUNTER — HOSPITAL ENCOUNTER (OUTPATIENT)
Dept: LAB | Facility: MEDICAL CENTER | Age: 81
End: 2021-11-29
Attending: FAMILY MEDICINE
Payer: MEDICARE

## 2021-11-29 LAB
ALBUMIN SERPL BCP-MCNC: 4.4 G/DL (ref 3.2–4.9)
ALBUMIN/GLOB SERPL: 1.5 G/DL
ALP SERPL-CCNC: 98 U/L (ref 30–99)
ALT SERPL-CCNC: 13 U/L (ref 2–50)
ANION GAP SERPL CALC-SCNC: 10 MMOL/L (ref 7–16)
AST SERPL-CCNC: 10 U/L (ref 12–45)
BILIRUB SERPL-MCNC: 0.5 MG/DL (ref 0.1–1.5)
BUN SERPL-MCNC: 32 MG/DL (ref 8–22)
CALCIUM SERPL-MCNC: 10 MG/DL (ref 8.5–10.5)
CHLORIDE SERPL-SCNC: 103 MMOL/L (ref 96–112)
CHOLEST SERPL-MCNC: 143 MG/DL (ref 100–199)
CO2 SERPL-SCNC: 24 MMOL/L (ref 20–33)
CREAT SERPL-MCNC: 2.26 MG/DL (ref 0.5–1.4)
FASTING STATUS PATIENT QL REPORTED: NORMAL
GLOBULIN SER CALC-MCNC: 2.9 G/DL (ref 1.9–3.5)
GLUCOSE SERPL-MCNC: 91 MG/DL (ref 65–99)
HDLC SERPL-MCNC: 42 MG/DL
LDLC SERPL CALC-MCNC: 78 MG/DL
POTASSIUM SERPL-SCNC: 4.6 MMOL/L (ref 3.6–5.5)
PROT SERPL-MCNC: 7.3 G/DL (ref 6–8.2)
SODIUM SERPL-SCNC: 137 MMOL/L (ref 135–145)
TRIGL SERPL-MCNC: 114 MG/DL (ref 0–149)

## 2021-11-29 PROCEDURE — 80053 COMPREHEN METABOLIC PANEL: CPT

## 2021-11-29 PROCEDURE — 36415 COLL VENOUS BLD VENIPUNCTURE: CPT

## 2021-11-29 PROCEDURE — 80061 LIPID PANEL: CPT

## 2021-12-08 ENCOUNTER — APPOINTMENT (OUTPATIENT)
Dept: RADIOLOGY | Facility: MEDICAL CENTER | Age: 81
End: 2021-12-08
Attending: EMERGENCY MEDICINE
Payer: MEDICARE

## 2021-12-08 ENCOUNTER — HOSPITAL ENCOUNTER (OUTPATIENT)
Facility: MEDICAL CENTER | Age: 81
End: 2021-12-09
Attending: EMERGENCY MEDICINE | Admitting: INTERNAL MEDICINE
Payer: MEDICARE

## 2021-12-08 ENCOUNTER — APPOINTMENT (OUTPATIENT)
Dept: RADIOLOGY | Facility: MEDICAL CENTER | Age: 81
End: 2021-12-08
Payer: MEDICARE

## 2021-12-08 DIAGNOSIS — K92.1 BLOODY STOOLS: ICD-10-CM

## 2021-12-08 PROBLEM — K92.2 GI BLEED: Status: ACTIVE | Noted: 2021-12-08

## 2021-12-08 LAB
ABO GROUP BLD: NORMAL
ALBUMIN SERPL BCP-MCNC: 4.2 G/DL (ref 3.2–4.9)
ALBUMIN/GLOB SERPL: 1.4 G/DL
ALP SERPL-CCNC: 99 U/L (ref 30–99)
ALT SERPL-CCNC: 13 U/L (ref 2–50)
ANION GAP SERPL CALC-SCNC: 11 MMOL/L (ref 7–16)
APTT PPP: 29.4 SEC (ref 24.7–36)
AST SERPL-CCNC: 12 U/L (ref 12–45)
BASOPHILS # BLD AUTO: 0.6 % (ref 0–1.8)
BASOPHILS # BLD: 0.04 K/UL (ref 0–0.12)
BILIRUB SERPL-MCNC: 0.4 MG/DL (ref 0.1–1.5)
BLD GP AB SCN SERPL QL: NORMAL
BUN SERPL-MCNC: 32 MG/DL (ref 8–22)
CALCIUM SERPL-MCNC: 9.9 MG/DL (ref 8.4–10.2)
CHLORIDE SERPL-SCNC: 103 MMOL/L (ref 96–112)
CO2 SERPL-SCNC: 25 MMOL/L (ref 20–33)
CREAT SERPL-MCNC: 2.28 MG/DL (ref 0.5–1.4)
EOSINOPHIL # BLD AUTO: 0.2 K/UL (ref 0–0.51)
EOSINOPHIL NFR BLD: 2.8 % (ref 0–6.9)
ERYTHROCYTE [DISTWIDTH] IN BLOOD BY AUTOMATED COUNT: 45.9 FL (ref 35.9–50)
GLOBULIN SER CALC-MCNC: 3 G/DL (ref 1.9–3.5)
GLUCOSE SERPL-MCNC: 159 MG/DL (ref 65–99)
HCT VFR BLD AUTO: 46.5 % (ref 42–52)
HGB BLD-MCNC: 15.1 G/DL (ref 14–18)
IMM GRANULOCYTES # BLD AUTO: 0.02 K/UL (ref 0–0.11)
IMM GRANULOCYTES NFR BLD AUTO: 0.3 % (ref 0–0.9)
INR PPP: 1.12 (ref 0.87–1.13)
LIPASE SERPL-CCNC: 32 U/L (ref 7–58)
LYMPHOCYTES # BLD AUTO: 1.71 K/UL (ref 1–4.8)
LYMPHOCYTES NFR BLD: 23.9 % (ref 22–41)
MCH RBC QN AUTO: 29.1 PG (ref 27–33)
MCHC RBC AUTO-ENTMCNC: 32.5 G/DL (ref 33.7–35.3)
MCV RBC AUTO: 89.6 FL (ref 81.4–97.8)
MONOCYTES # BLD AUTO: 0.63 K/UL (ref 0–0.85)
MONOCYTES NFR BLD AUTO: 8.8 % (ref 0–13.4)
NEUTROPHILS # BLD AUTO: 4.56 K/UL (ref 1.82–7.42)
NEUTROPHILS NFR BLD: 63.6 % (ref 44–72)
NRBC # BLD AUTO: 0 K/UL
NRBC BLD-RTO: 0 /100 WBC
PLATELET # BLD AUTO: 248 K/UL (ref 164–446)
PMV BLD AUTO: 10.5 FL (ref 9–12.9)
POTASSIUM SERPL-SCNC: 4.5 MMOL/L (ref 3.6–5.5)
PROT SERPL-MCNC: 7.2 G/DL (ref 6–8.2)
PROTHROMBIN TIME: 13.5 SEC (ref 12–14.6)
RBC # BLD AUTO: 5.19 M/UL (ref 4.7–6.1)
RH BLD: NORMAL
SODIUM SERPL-SCNC: 139 MMOL/L (ref 135–145)
WBC # BLD AUTO: 7.2 K/UL (ref 4.8–10.8)

## 2021-12-08 PROCEDURE — 74176 CT ABD & PELVIS W/O CONTRAST: CPT | Mod: ME

## 2021-12-08 PROCEDURE — 80053 COMPREHEN METABOLIC PANEL: CPT

## 2021-12-08 PROCEDURE — G0378 HOSPITAL OBSERVATION PER HR: HCPCS

## 2021-12-08 PROCEDURE — 99285 EMERGENCY DEPT VISIT HI MDM: CPT

## 2021-12-08 PROCEDURE — 86900 BLOOD TYPING SEROLOGIC ABO: CPT

## 2021-12-08 PROCEDURE — 85730 THROMBOPLASTIN TIME PARTIAL: CPT

## 2021-12-08 PROCEDURE — 86850 RBC ANTIBODY SCREEN: CPT

## 2021-12-08 PROCEDURE — 85025 COMPLETE CBC W/AUTO DIFF WBC: CPT

## 2021-12-08 PROCEDURE — 86901 BLOOD TYPING SEROLOGIC RH(D): CPT

## 2021-12-08 PROCEDURE — 85610 PROTHROMBIN TIME: CPT

## 2021-12-08 PROCEDURE — 99220 PR INITIAL OBSERVATION CARE,LEVL III: CPT | Performed by: INTERNAL MEDICINE

## 2021-12-08 PROCEDURE — 71045 X-RAY EXAM CHEST 1 VIEW: CPT

## 2021-12-08 PROCEDURE — 83690 ASSAY OF LIPASE: CPT

## 2021-12-08 RX ORDER — OLMESARTAN MEDOXOMIL 20 MG/1
20 TABLET ORAL DAILY
Status: DISCONTINUED | OUTPATIENT
Start: 2021-12-09 | End: 2021-12-09 | Stop reason: HOSPADM

## 2021-12-08 RX ORDER — ACETAMINOPHEN 325 MG/1
650 TABLET ORAL EVERY 6 HOURS PRN
Status: DISCONTINUED | OUTPATIENT
Start: 2021-12-08 | End: 2021-12-09 | Stop reason: HOSPADM

## 2021-12-08 RX ORDER — ZOLPIDEM TARTRATE 5 MG/1
5-10 TABLET ORAL NIGHTLY PRN
Status: DISCONTINUED | OUTPATIENT
Start: 2021-12-08 | End: 2021-12-09 | Stop reason: HOSPADM

## 2021-12-08 RX ORDER — AMOXICILLIN 250 MG
2 CAPSULE ORAL 2 TIMES DAILY
Status: DISCONTINUED | OUTPATIENT
Start: 2021-12-08 | End: 2021-12-09 | Stop reason: HOSPADM

## 2021-12-08 RX ORDER — SIMVASTATIN 20 MG
40 TABLET ORAL NIGHTLY
Status: DISCONTINUED | OUTPATIENT
Start: 2021-12-08 | End: 2021-12-09 | Stop reason: HOSPADM

## 2021-12-08 RX ORDER — BISACODYL 10 MG
10 SUPPOSITORY, RECTAL RECTAL
Status: DISCONTINUED | OUTPATIENT
Start: 2021-12-08 | End: 2021-12-09 | Stop reason: HOSPADM

## 2021-12-08 RX ORDER — ONDANSETRON 2 MG/ML
4 INJECTION INTRAMUSCULAR; INTRAVENOUS EVERY 4 HOURS PRN
Status: DISCONTINUED | OUTPATIENT
Start: 2021-12-08 | End: 2021-12-09 | Stop reason: HOSPADM

## 2021-12-08 RX ORDER — ONDANSETRON 4 MG/1
4 TABLET, ORALLY DISINTEGRATING ORAL EVERY 4 HOURS PRN
Status: DISCONTINUED | OUTPATIENT
Start: 2021-12-08 | End: 2021-12-09 | Stop reason: HOSPADM

## 2021-12-08 RX ORDER — AMLODIPINE BESYLATE 5 MG/1
2.5 TABLET ORAL NIGHTLY
Status: DISCONTINUED | OUTPATIENT
Start: 2021-12-09 | End: 2021-12-09 | Stop reason: HOSPADM

## 2021-12-08 RX ORDER — ENALAPRILAT 1.25 MG/ML
1.25 INJECTION INTRAVENOUS EVERY 6 HOURS PRN
Status: DISCONTINUED | OUTPATIENT
Start: 2021-12-08 | End: 2021-12-09 | Stop reason: HOSPADM

## 2021-12-08 RX ORDER — POLYETHYLENE GLYCOL 3350 17 G/17G
1 POWDER, FOR SOLUTION ORAL
Status: DISCONTINUED | OUTPATIENT
Start: 2021-12-08 | End: 2021-12-09 | Stop reason: HOSPADM

## 2021-12-08 RX ORDER — DEXTROSE MONOHYDRATE 25 G/50ML
50 INJECTION, SOLUTION INTRAVENOUS
Status: DISCONTINUED | OUTPATIENT
Start: 2021-12-08 | End: 2021-12-09 | Stop reason: HOSPADM

## 2021-12-08 RX ORDER — CARVEDILOL 6.25 MG/1
12.5 TABLET ORAL 2 TIMES DAILY WITH MEALS
Status: DISCONTINUED | OUTPATIENT
Start: 2021-12-09 | End: 2021-12-09 | Stop reason: HOSPADM

## 2021-12-08 RX ORDER — LABETALOL HYDROCHLORIDE 5 MG/ML
10 INJECTION, SOLUTION INTRAVENOUS EVERY 4 HOURS PRN
Status: DISCONTINUED | OUTPATIENT
Start: 2021-12-08 | End: 2021-12-09 | Stop reason: HOSPADM

## 2021-12-08 ASSESSMENT — ENCOUNTER SYMPTOMS
DEPRESSION: 0
ABDOMINAL PAIN: 0
VOMITING: 0
FALLS: 0
STRIDOR: 0
COUGH: 0
CONSTIPATION: 0
BLOOD IN STOOL: 1
SHORTNESS OF BREATH: 0
WEAKNESS: 0
NAUSEA: 0
SPUTUM PRODUCTION: 0
LOSS OF CONSCIOUSNESS: 0
CHILLS: 0
DIARRHEA: 0
HEADACHES: 0
TINGLING: 0
DIZZINESS: 0
MYALGIAS: 0
PALPITATIONS: 0
FEVER: 0

## 2021-12-08 ASSESSMENT — FIBROSIS 4 INDEX: FIB4 SCORE: 0.85

## 2021-12-09 ENCOUNTER — PATIENT OUTREACH (OUTPATIENT)
Dept: HEALTH INFORMATION MANAGEMENT | Facility: OTHER | Age: 81
End: 2021-12-09

## 2021-12-09 VITALS
HEART RATE: 74 BPM | BODY MASS INDEX: 26.04 KG/M2 | TEMPERATURE: 98.7 F | RESPIRATION RATE: 17 BRPM | DIASTOLIC BLOOD PRESSURE: 83 MMHG | OXYGEN SATURATION: 92 % | WEIGHT: 186 LBS | SYSTOLIC BLOOD PRESSURE: 159 MMHG | HEIGHT: 71 IN

## 2021-12-09 LAB
ANION GAP SERPL CALC-SCNC: 13 MMOL/L (ref 7–16)
BUN SERPL-MCNC: 31 MG/DL (ref 8–22)
CALCIUM SERPL-MCNC: 9.5 MG/DL (ref 8.4–10.2)
CHLORIDE SERPL-SCNC: 106 MMOL/L (ref 96–112)
CO2 SERPL-SCNC: 21 MMOL/L (ref 20–33)
CREAT SERPL-MCNC: 2.15 MG/DL (ref 0.5–1.4)
ERYTHROCYTE [DISTWIDTH] IN BLOOD BY AUTOMATED COUNT: 46.5 FL (ref 35.9–50)
GLUCOSE SERPL-MCNC: 153 MG/DL (ref 65–99)
HCT VFR BLD AUTO: 44.9 % (ref 42–52)
HGB BLD-MCNC: 14.6 G/DL (ref 14–18)
HGB BLD-MCNC: 14.6 G/DL (ref 14–18)
MCH RBC QN AUTO: 29.1 PG (ref 27–33)
MCHC RBC AUTO-ENTMCNC: 32.5 G/DL (ref 33.7–35.3)
MCV RBC AUTO: 89.6 FL (ref 81.4–97.8)
PLATELET # BLD AUTO: 267 K/UL (ref 164–446)
PMV BLD AUTO: 11.1 FL (ref 9–12.9)
POTASSIUM SERPL-SCNC: 3.9 MMOL/L (ref 3.6–5.5)
RBC # BLD AUTO: 5.01 M/UL (ref 4.7–6.1)
SODIUM SERPL-SCNC: 140 MMOL/L (ref 135–145)
WBC # BLD AUTO: 8.2 K/UL (ref 4.8–10.8)

## 2021-12-09 PROCEDURE — 99217 PR OBSERVATION CARE DISCHARGE: CPT | Performed by: INTERNAL MEDICINE

## 2021-12-09 PROCEDURE — A9270 NON-COVERED ITEM OR SERVICE: HCPCS | Performed by: INTERNAL MEDICINE

## 2021-12-09 PROCEDURE — 36415 COLL VENOUS BLD VENIPUNCTURE: CPT

## 2021-12-09 PROCEDURE — 85018 HEMOGLOBIN: CPT

## 2021-12-09 PROCEDURE — 700102 HCHG RX REV CODE 250 W/ 637 OVERRIDE(OP): Performed by: INTERNAL MEDICINE

## 2021-12-09 PROCEDURE — 85027 COMPLETE CBC AUTOMATED: CPT

## 2021-12-09 PROCEDURE — 80048 BASIC METABOLIC PNL TOTAL CA: CPT

## 2021-12-09 PROCEDURE — G0378 HOSPITAL OBSERVATION PER HR: HCPCS

## 2021-12-09 RX ADMIN — AMLODIPINE BESYLATE 2.5 MG: 5 TABLET ORAL at 00:32

## 2021-12-09 RX ADMIN — SIMVASTATIN 40 MG: 20 TABLET, FILM COATED ORAL at 00:32

## 2021-12-09 RX ADMIN — ZOLPIDEM TARTRATE 10 MG: 5 TABLET ORAL at 00:33

## 2021-12-09 RX ADMIN — OLMESARTAN MEDOXOMIL 20 MG: 20 TABLET, FILM COATED ORAL at 08:03

## 2021-12-09 RX ADMIN — CARVEDILOL 12.5 MG: 6.25 TABLET, FILM COATED ORAL at 08:03

## 2021-12-09 SDOH — ECONOMIC STABILITY: FOOD INSECURITY: WITHIN THE PAST 12 MONTHS, YOU WORRIED THAT YOUR FOOD WOULD RUN OUT BEFORE YOU GOT MONEY TO BUY MORE.: NEVER TRUE

## 2021-12-09 SDOH — ECONOMIC STABILITY: FOOD INSECURITY: WITHIN THE PAST 12 MONTHS, THE FOOD YOU BOUGHT JUST DIDN'T LAST AND YOU DIDN'T HAVE MONEY TO GET MORE.: NEVER TRUE

## 2021-12-09 SDOH — ECONOMIC STABILITY: TRANSPORTATION INSECURITY
IN THE PAST 12 MONTHS, HAS LACK OF TRANSPORTATION KEPT YOU FROM MEETINGS, WORK, OR FROM GETTING THINGS NEEDED FOR DAILY LIVING?: NO

## 2021-12-09 SDOH — ECONOMIC STABILITY: TRANSPORTATION INSECURITY
IN THE PAST 12 MONTHS, HAS THE LACK OF TRANSPORTATION KEPT YOU FROM MEDICAL APPOINTMENTS OR FROM GETTING MEDICATIONS?: NO

## 2021-12-09 SDOH — ECONOMIC STABILITY: INCOME INSECURITY: HOW HARD IS IT FOR YOU TO PAY FOR THE VERY BASICS LIKE FOOD, HOUSING, MEDICAL CARE, AND HEATING?: NOT HARD AT ALL

## 2021-12-09 ASSESSMENT — PATIENT HEALTH QUESTIONNAIRE - PHQ9
SUM OF ALL RESPONSES TO PHQ9 QUESTIONS 1 AND 2: 0
2. FEELING DOWN, DEPRESSED, IRRITABLE, OR HOPELESS: NOT AT ALL
SUM OF ALL RESPONSES TO PHQ9 QUESTIONS 1 AND 2: 0
2. FEELING DOWN, DEPRESSED, IRRITABLE, OR HOPELESS: NOT AT ALL
1. LITTLE INTEREST OR PLEASURE IN DOING THINGS: NOT AT ALL
1. LITTLE INTEREST OR PLEASURE IN DOING THINGS: NOT AT ALL

## 2021-12-09 ASSESSMENT — LIFESTYLE VARIABLES
EVER FELT BAD OR GUILTY ABOUT YOUR DRINKING: NO
ON A TYPICAL DAY WHEN YOU DRINK ALCOHOL HOW MANY DRINKS DO YOU HAVE: 0
HAVE PEOPLE ANNOYED YOU BY CRITICIZING YOUR DRINKING: NO
ALCOHOL_USE: NO
HAVE YOU EVER FELT YOU SHOULD CUT DOWN ON YOUR DRINKING: NO
AVERAGE NUMBER OF DAYS PER WEEK YOU HAVE A DRINK CONTAINING ALCOHOL: 0
TOTAL SCORE: 0
HOW MANY TIMES IN THE PAST YEAR HAVE YOU HAD 5 OR MORE DRINKS IN A DAY: 0
CONSUMPTION TOTAL: NEGATIVE
EVER HAD A DRINK FIRST THING IN THE MORNING TO STEADY YOUR NERVES TO GET RID OF A HANGOVER: NO

## 2021-12-09 ASSESSMENT — COGNITIVE AND FUNCTIONAL STATUS - GENERAL
SUGGESTED CMS G CODE MODIFIER MOBILITY: CH
MOBILITY SCORE: 24
DAILY ACTIVITIY SCORE: 24
SUGGESTED CMS G CODE MODIFIER DAILY ACTIVITY: CH

## 2021-12-09 ASSESSMENT — PAIN DESCRIPTION - PAIN TYPE
TYPE: ACUTE PAIN
TYPE: ACUTE PAIN

## 2021-12-09 ASSESSMENT — FIBROSIS 4 INDEX: FIB4 SCORE: 1.07

## 2021-12-09 NOTE — ASSESSMENT & PLAN NOTE
-Continue home amlodipine, Coreg and olmesartan  -Start as needed enalapril and labetalol  -Adjust as needed

## 2021-12-09 NOTE — ASSESSMENT & PLAN NOTE
-At baseline however I am going to hold his home allopurinol, okay to continue home ARB  -he does not appear dehydrated, I am not going to start IV fluids

## 2021-12-09 NOTE — CARE PLAN
The patient is Watcher - Medium risk of patient condition declining or worsening    Shift Goals  Clinical Goals: monitor stool/urine for signs of bleeding, monitor hgb    Progress made toward(s) clinical / shift goals:  hgb wnl, no visible blood in stool and urine

## 2021-12-09 NOTE — DISCHARGE INSTRUCTIONS
Discharge Instructions    Discharged to home by car with relative. Discharged via wheelchair, hospital escort: Yes.  Special equipment needed: Not Applicable    Be sure to schedule a follow-up appointment with your primary care doctor or any specialists as instructed.     Discharge Plan:   Influenza Vaccine Indication: Not indicated: Previously immunized this influenza season and > 8 years of age    I understand that a diet low in cholesterol, fat, and sodium is recommended for good health. Unless I have been given specific instructions below for another diet, I accept this instruction as my diet prescription.   Other diet: Home Diet    Special Instructions: None    · Is patient discharged on Warfarin / Coumadin?   No     Depression / Suicide Risk    As you are discharged from this [facility] facility, it is important to learn how to keep safe from harming yourself.    Recognize the warning signs:  · Abrupt changes in personality, positive or negative- including increase in energy   · Giving away possessions  · Change in eating patterns- significant weight changes-  positive or negative  · Change in sleeping patterns- unable to sleep or sleeping all the time   · Unwillingness or inability to communicate  · Depression  · Unusual sadness, discouragement and loneliness  · Talk of wanting to die  · Neglect of personal appearance   · Rebelliousness- reckless behavior  · Withdrawal from people/activities they love  · Confusion- inability to concentrate     If you or a loved one observes any of these behaviors or has concerns about self-harm, here's what you can do:  · Talk about it- your feelings and reasons for harming yourself  · Remove any means that you might use to hurt yourself (examples: pills, rope, extension cords, firearm)  · Get professional help from the community (Mental Health, Substance Abuse, psychological counseling)  · Do not be alone:Call your Safe Contact- someone whom you trust who will be there  for you.  · Call your local CRISIS HOTLINE 714-1929 or 098-823-8291  · Call your local Children's Mobile Crisis Response Team [place] (068) 506-9034 or www.Nextinit  · Call the toll free National Suicide Prevention Hotlines   · National Suicide Prevention Lifeline 235-803-XYAM (3263)  · National [employer] Line Network 800-SUICIDE (766-0869)

## 2021-12-09 NOTE — ASSESSMENT & PLAN NOTE
-Has occurred twice, has not been ongoing  -CT scan did show possible diverticulitis however judging by his symptoms and lack of pain tonight, I do not feel he has diverticulitis, I think this is likely diverticular tic causing bleeding  -I do not anticipate it worsening however there is this risk  -Trend hemoglobin overnight  -If no recurrence of the bleeding, can likely be discharged tomorrow and follow-up with GI, he already has an appointment on Monday, if this continues intermittently, he may need endoscopy  -He does take aspirin at home, could be as simple as stopping his aspirin which I will not continue at this time, avoid anticoagulation

## 2021-12-09 NOTE — PROGRESS NOTES
Pt arrived via gurney, admitted to room 215-1 from ER  Pt is A&Ox4, denied any abdominal pain upon assessment  Food and water provided   Oriented to room call light and smoking policy.   Reviewed plan of care with the patient and the family.   Fall precaution in place.   Call light within reach.   Encouraged pt the importance to call for assistance. Continue to monitor.

## 2021-12-09 NOTE — PROGRESS NOTES
12/9/21- ALEXX Epps contacted pt via TC post d/c to introduce CCM services. Completed SDOH screening and outpatient assessment. Pt has follow up visit with TAMIKO on 12/14. Pt declined assistance with scheduling, CCM services and referral to [facility]. Pt mentions good support from family and friends. No medical equipment used at home. Pt is confident in ability to manage care post d/c. No issues keeping appointments or financial barriers to care. Completed AVS review/ medication/ questions. Pt denies need for resources such as food, transportation or housing. CCM contact info left with pt. Encouraged pt to contact if needed.     Community Health Worker Intake  • Social determinates of health intake completed.   • Identified barriers to none.   • Contact information provided to Tyrone Cline. Yes   • Has PCP appointment scheduled for. Pt will make follow up with PCP as needed. Has follow up with TAMIKO on 12/14/21  • Scheduled Food Delivery/Home Visit/Outpatient Visit: No   • Accepted/Declined Meds-To-Beds. No   • Inpatient/Outpatient assessment completed. Outpatient   • Did the patient receive medications post discharge: No     Plan: D/c pt from CCM services as all needs met.

## 2021-12-09 NOTE — ED PROVIDER NOTES
ED Provider Note    CHIEF COMPLAINT  Chief Complaint   Patient presents with   • Bloody Stools     Blood stools 10 days ago and again today        HPI  Tyrone Cline is a 81 y.o. male who presents with a history of chronic kidney disease, diabetes, gout, high cholesterol, hypertension, he reports that 10 days ago he had bright red blood per rectum, he cannot remember how many times this happened but he said it was a lot of blood.  It resolved.  He had some diffuse abdominal pain at the time.  He said abdominal pain resolved.  Now he presents today stating he has no abdominal pain but had one episode of bright red blood per rectum.  He says he feels lightheaded, denies chest pain or focal neurologic deficits.  He reports he has never had GI bleeding before.  He is followed by GI specialist Dr. Bishop and has an appointment on Monday after experiencing the episode he had 10 days ago he had set that up.    REVIEW OF SYSTEMS  See HPI for further details. All other systems are negative.     PAST MEDICAL HISTORY   has a past medical history of Abdominal pain, Abnormal electrocardiogram, ACE inhibitor intolerance, BPH (benign prostatic hyperplasia), Bruxism, Cancer (HCC), Cataract, Chickenpox, Cholesterol blood decreased, Chronic kidney disease, stage III (moderate) (HCC), Cold, Cough, Depression, Diabetes, Difficulty breathing, Fever, GERD (gastroesophageal reflux disease), GOUT, Gout, History of skull fracture, History of urinary tract infection, Hyperlipidemia, Hypertension, Indigestion, Influenza, Insomnia, Mixed hyperlipidemia, Pneumonia (2015), Proteinuria, Ringing in ears, Shortness of breath, Sputum production, Tonsillitis, Type 2 diabetes mellitus (HCC), Wears glasses, and Weight loss.    SOCIAL HISTORY  Social History     Tobacco Use   • Smoking status: Former Smoker     Packs/day: 0.20     Years: 6.00     Pack years: 1.20     Quit date:      Years since quittin.9   • Smokeless tobacco:  Never Used   • Tobacco comment: Stopped over 25 years ago.   Vaping Use   • Vaping Use: Never used   Substance and Sexual Activity   • Alcohol use: No   • Drug use: No   • Sexual activity: Not on file       SURGICAL HISTORY   has a past surgical history that includes cataract phaco with iol (2012); cataract phaco with iol (2012); appendectomy; other; other; tonsillectomy; blepharoplasty (2014); brow lift (2014); septal reconstruction (2015); cardiac cath, left heart; and arthroscopy, knee.    CURRENT MEDICATIONS     Start End   amLODIPine (NORVASC) 2.5 MG Tab 2021    Sig: Take 1 tablet by mouth every day.   Route: Oral   Number of times this order has been changed since signin     Order Audit [place]     carvedilol (COREG) 12.5 MG Tab 2021    Sig: Take 1 tablet by mouth 2 times a day with meals.   Route: Oral   Number of times this order has been changed since signin     Order Audit [place]     olmesartan (BENICAR) 20 MG Tab 2021    Sig: Take 1 tablet by mouth every day.   Route: Oral   Number of times this order has been changed since signin     Order Audit [place]     aspirin (ASPIRIN 81) 81 MG EC tablet     Sig: Take 81 mg by mouth every day.   Class: Historical Med   Route: Oral   simvastatin (ZOCOR) 40 MG Tab     Sig: Take 40 mg by mouth every evening.   Class: Historical Med   Route: Oral   OZEMPIC, 0.25 OR 0.5 MG/DOSE, 2 MG/1.5ML Solution Pen-injector 2021    Class: Historical Med   B-D UF III MINI PEN NEEDLES 31G X 5 MM Misc 3/15/2021    Class: Historical Med   CONTOUR NEXT TEST strip 2021    Class: Historical Med   Testosterone 1.62 % Gel 3/15/2021    Class: Historical Med   Insulin Degludec (TRESIBA) 100 UNIT/ML Solution 3/29/2021    Sig: Inject 6 Units under the skin every day.   Class: No Print   Route: Subcutaneous   Prior authorization: Approved   Number of times this order has been changed since signin     Order Audit [place]     B Complex  "Vitamins (B COMPLEX 1 PO)     Sig: Take 1 tablet by mouth every day.   Class: Historical Med   Route: Oral   Number of times this order has been changed since signin     Order Audit [place]     Mirabegron ER (MYRBETRIQ) 50 MG TABLET SR 24 HR     Sig: Take 1 tablet by mouth every day.   Class: Historical Med   Route: Oral   Number of times this order has been changed since signing: 3     Order Audit [place]     Testosterone (ANDROGEL) 20.25 MG/1.25GM (1.62%) GEL     Sig: Place 60.75 mg on the skin every day. daily   Class: Historical Med   Route: Transdermal   Number of times this order has been changed since signin     Order Audit [place]     zolpidem (AMBIEN) 10 MG TABS     Sig: Take 5-10 mg by mouth at bedtime as needed.   Class: Historical Med   Route: Oral   Number of times this order has been changed since signin     Order Audit [place]     allopurinol (ZYLOPRIM) 300 MG TABS     Sig: Take 200 mg by mouth every day.   Class: Historical Med   Route: Oral   Number of times this order has been changed since signin     Order Audit [place]     FISH OIL     Sig: Take 1 capsule by mouth every day.   Class: Historical Med   Route: Oral   Number of times this order has been changed since signin     Order Audit [place]     Cholecalciferol (VITAMIN D) 2000 UNIT Tab     Sig: Take 1 capsule by mouth 2 (two) times a day.   Class: Historical Med   Route: Oral   Number of times this order has been changed since signin     Order Audit [place]         ALLERGIES  Allergies   Allergen Reactions   • Ether      Violently sick   • Lipitor [Atorvastatin Calcium]      LEG PAINS     • Polyethylene Glycol Unspecified       FAMILY HISTORY  No pertinent family history    PHYSICAL EXAM  VITAL SIGNS: /89   Pulse 71   Temp 36.7 °C (98.1 °F) (Temporal)   Resp 15   Ht 1.803 m (5' 11\")   Wt 90.4 kg (199 lb 4.7 oz)   SpO2 97%   BMI 27.80 kg/m²  @NAWAF[[id number]::@   Pulse ox interpretation: I interpret this pulse ox as " normal.  Constitutional: Alert in no apparent distress.  HENT: No signs of trauma, Bilateral external ears normal, Nose normal.   Eyes: Pupils are equal and reactive, Conjunctiva normal, Non-icteric.   Neck: Normal range of motion, No tenderness, Supple, No stridor.   Lymphatic: No lymphadenopathy noted.   Cardiovascular: Regular rate and rhythm, no murmurs.   Thorax & Lungs: Normal breath sounds, No respiratory distress, No wheezing, No chest tenderness.   Abdomen: Bowel sounds normal, Soft, No tenderness, No masses, No pulsatile masses. No peritoneal signs.  Skin: Warm, Dry, No erythema, No rash.   Back: No bony tenderness, No CVA tenderness.   Extremities: Intact distal pulses, No edema, No tenderness, No cyanosis.  Musculoskeletal: Good range of motion in all major joints. No tenderness to palpation or major deformities noted.   Neurologic: Alert , Normal motor function, Normal sensory function, No focal deficits noted.   Psychiatric: Affect normal, Judgment normal, Mood normal.       DIAGNOSTIC STUDIES / PROCEDURES        LABS  Labs Reviewed   CBC WITH DIFFERENTIAL - Abnormal; Notable for the following components:       Result Value    MCHC 32.5 (*)     All other components within normal limits    Narrative:     Collected By:  Indicate which anticoagulants the patient is on:->UNKNOWN   COMP METABOLIC PANEL - Abnormal; Notable for the following components:    Glucose 159 (*)     Bun 32 (*)     Creatinine 2.28 (*)     All other components within normal limits    Narrative:     Collected By:  Indicate which anticoagulants the patient is on:->UNKNOWN   ESTIMATED GFR - Abnormal; Notable for the following components:    GFR If  34 (*)     GFR If Non  28 (*)     All other components within normal limits    Narrative:     Collected By:  Indicate which anticoagulants the patient is on:->UNKNOWN   COD (ADULT)    Narrative:     Collected By:   LIPASE    Narrative:     Collected  By:  Indicate which anticoagulants the patient is on:->UNKNOWN   PROTHROMBIN TIME    Narrative:     Collected By:  Indicate which anticoagulants the patient is on:->UNKNOWN   APTT    Narrative:     Collected By:  Indicate which anticoagulants the patient is on:->UNKNOWN         RADIOLOGY  CT-ABDOMEN-PELVIS W/O   Final Result         1.  Diverticulosis with slight hazy fat stranding adjacent to the sigmoid colon, could represent early or mild diverticulitis.   2.  Atrophic bilateral kidneys   3.  Cholelithiasis   4.  Atherosclerosis and atherosclerotic coronary artery disease   5.  Fat-containing left inguinal hernia      DX-CHEST-PORTABLE (1 VIEW)   Final Result      1.  There is no acute cardiopulmonary process.              COURSE & MEDICAL DECISION MAKING  Pertinent Labs & Imaging studies reviewed. (See chart for details)    The patient presents with bright red blood per rectum, once 10 days ago and now again today.  I have ordered a CT scan to evaluate for hemic bowel or colitis.  Labs were ordered.    The patient's white blood count is normal 7.2.  H&H are normal.  Differential is normal.  The patient's BUN is at its baseline 32 and his creatinine is at baseline 2.28, he has chronic kidney disease.  CT scan without contrast was ordered because of his chronic kidney disease.    The patient's the patient CT scan is positive for diverticulosis, possible diverticulitis.    I spoke with the [facility] hospitalist Dr. Parra who will assess the patient for hospitalization.  The patient is in fair condition at this time.        FINAL IMPRESSION  1. Bloody stools                Electronically signed by: Javed Gibson M.D., 12/8/2021 8:40 PM

## 2021-12-09 NOTE — PROGRESS NOTES
4 Eyes Skin Assessment Completed by JUAN JOSÉ Herrera and JUAN JOSÉ Palma.    Head WDL  Ears WDL  Nose WDL  Mouth WDL  Neck WDL  Breast/Chest WDL  Shoulder Blades WDL  Spine WDL  (R) Arm/Elbow/Hand WDL  (L) Arm/Elbow/Hand WDL  Abdomen WDL  Groin WDL  Scrotum/Coccyx/Buttocks WDL  (R) Leg WDL  (L) Leg WDL  (R) Heel/Foot/Toe WDL  (L) Heel/Foot/Toe WDL    Devices In Places None    Interventions In Place N/A    Possible Skin Injury No    Pictures Uploaded Into Epic N/A  Wound Consult Placed N/A  RN Wound Prevention Protocol Ordered No

## 2021-12-09 NOTE — CARE PLAN
The patient is Stable - Low risk of patient condition declining or worsening    Shift Goals  Clinical Goals: Monitor for active GI bleeding  Patient Goals: Able to rest during the night     Progress made toward(s) clinical / shift goals:    Problem: Knowledge Deficit - Standard  Goal: Patient and family/care givers will demonstrate understanding of plan of care, disease process/condition, diagnostic tests and medications  Outcome: Progressing  Note: Will continue to trend H&H; most recent Hgb 15.1   No active bleeding seen since admission   Possibly discharge in AM and F/U with outpatient GI      Problem: Pain - Standard  Goal: Alleviation of pain or a reduction in pain to the patient’s comfort goal  Note: Pt denied any abd pain upon assessment   Will continue to monitor and medicate as needed

## 2021-12-09 NOTE — ED TRIAGE NOTES
Bloody stools 10 days ago and again today. He has no pain but states that he feels a little light headed.

## 2021-12-09 NOTE — H&P
Hospital Medicine History & Physical Note    Date of Service  12/8/2021    Primary Care Physician  Jonah Logan M.D.    Consultants  None    Specialist Names: None    Code Status  Full Code    Chief Complaint  Chief Complaint   Patient presents with   • Bloody Stools     Blood stools 10 days ago and again today        History of Presenting Illness  Tyrone Cline is a 81 y.o. male who presented 12/8/2021 with blood in his stools.  Patient states his first episode was 10 days ago, he had left lower abdominal pain that was an ache, moderate in severity, resolved prior to a bowel movement.  When he had a bowel movement it was bloody diarrhea.  This only occurred once and then resolved.  He did call his GI doctor and an appointment was scheduled for this upcoming Monday.  Patient states he had no additional pain or blood until today whenever he thought he needed to have a bowel movement, sat down but did not have a bowel movement went to wipe and noted blood on the toilet paper however otherwise none in the bowel.  But due to the bleeding he presented to the ER. I did discuss the case including labs and imaging with the ER physician.    I discussed the plan of care with patient.    Review of Systems  Review of Systems   Constitutional: Negative for chills, fever and malaise/fatigue.   HENT: Negative for congestion.    Respiratory: Negative for cough, sputum production, shortness of breath and stridor.    Cardiovascular: Negative for chest pain, palpitations and leg swelling.   Gastrointestinal: Positive for blood in stool. Negative for abdominal pain, constipation, diarrhea, nausea and vomiting.   Genitourinary: Negative for dysuria and urgency.   Musculoskeletal: Negative for falls and myalgias.   Neurological: Negative for dizziness, tingling, loss of consciousness, weakness and headaches.   Psychiatric/Behavioral: Negative for depression and suicidal ideas.   All other systems reviewed and are  negative.      Past Medical History   has a past medical history of Abdominal pain, Abnormal electrocardiogram, ACE inhibitor intolerance, BPH (benign prostatic hyperplasia), Bruxism, Cancer ([facility]), Cataract, Chickenpox, Cholesterol blood decreased, Chronic kidney disease, stage III (moderate) (HCC), Cold, Cough, Depression, Diabetes, Difficulty breathing, Fever, GERD (gastroesophageal reflux disease), GOUT, Gout, History of skull fracture, History of urinary tract infection, Hyperlipidemia, Hypertension, Indigestion, Influenza, Insomnia, Mixed hyperlipidemia, Pneumonia (9/2015), Proteinuria, Ringing in ears, Shortness of breath, Sputum production, Tonsillitis, Type 2 diabetes mellitus ([facility]), Wears glasses, and Weight loss.    Surgical History   has a past surgical history that includes cataract phaco with iol (11/20/2012); cataract phaco with iol (12/4/2012); appendectomy; other; other; tonsillectomy; blepharoplasty (7/23/2014); brow lift (7/23/2014); septal reconstruction (12/7/2015); cardiac cath, left heart; and arthroscopy, knee.     Family History  family history includes Cancer in his brother; Diabetes in his father; Heart Attack (age of onset: 79) in his father.   Family history reviewed with patient. There is no family history that is pertinent to the chief complaint.     Social History   reports that he quit smoking about 56 years ago. He has a 1.20 pack-year smoking history. He has never used smokeless tobacco. He reports that he does not drink alcohol and does not use drugs.    Allergies  Allergies   Allergen Reactions   • Ether      Violently sick   • Lipitor [Atorvastatin Calcium]      LEG PAINS     • Polyethylene Glycol Unspecified       Medications  Prior to Admission Medications   Prescriptions Last Dose Informant Patient Reported? Taking?   B Complex Vitamins (B COMPLEX 1 PO)  Patient Yes No   Sig: Take 1 tablet by mouth every day.   B-D UF III MINI PEN NEEDLES 31G X 5 MM Misc   Yes No   CONTOUR  NEXT TEST strip   Yes No   Cholecalciferol (VITAMIN D) 2000 UNIT Tab  Patient Yes No   Sig: Take 1 capsule by mouth 2 (two) times a day.   FISH OIL  Patient Yes No   Sig: Take 1 capsule by mouth every day.   Insulin Degludec (TRESIBA) 100 UNIT/ML Solution   No No   Sig: Inject 6 Units under the skin every day.   Patient not taking: Reported on 7/8/2021   Mirabegron ER (MYRBETRIQ) 50 MG TABLET SR 24 HR  Patient Yes No   Sig: Take 1 tablet by mouth every day.   Patient not taking: Reported on 7/8/2021   OZEMPIC, 0.25 OR 0.5 MG/DOSE, 2 MG/1.5ML Solution Pen-injector   Yes No   Testosterone (ANDROGEL) 20.25 MG/1.25GM (1.62%) GEL  Patient Yes No   Sig: Place 60.75 mg on the skin every day. daily   Testosterone 1.62 % Gel   Yes No   allopurinol (ZYLOPRIM) 300 MG TABS  Patient Yes No   Sig: Take 200 mg by mouth every day.   amLODIPine (NORVASC) 2.5 MG Tab   No No   Sig: Take 1 tablet by mouth every day.   aspirin (ASPIRIN 81) 81 MG EC tablet   Yes No   Sig: Take 81 mg by mouth every day.   carvedilol (COREG) 12.5 MG Tab   No No   Sig: Take 1 tablet by mouth 2 times a day with meals.   olmesartan (BENICAR) 20 MG Tab   No No   Sig: Take 1 tablet by mouth every day.   simvastatin (ZOCOR) 40 MG Tab   Yes No   Sig: Take 40 mg by mouth every evening.   zolpidem (AMBIEN) 10 MG TABS  Patient Yes No   Sig: Take 5-10 mg by mouth at bedtime as needed.      Facility-Administered Medications: None       Physical Exam  Temp:  [36.7 °C (98.1 °F)] 36.7 °C (98.1 °F)  Pulse:  [70-83] 71  Resp:  [15-21] 15  BP: (153-170)/(84-96) 153/89  SpO2:  [95 %-97 %] 97 %  Blood Pressure : 153/89   Temperature: 36.7 °C (98.1 °F)   Pulse: 71   Respiration: 15   Pulse Oximetry: 97 %       Physical Exam  Vitals and nursing note reviewed.   Constitutional:       General: He is not in acute distress.     Appearance: He is well-developed. He is not toxic-appearing or diaphoretic.   HENT:      Head: Normocephalic and atraumatic.      Right Ear: External ear  normal.      Left Ear: External ear normal.      Nose: Nose normal. No congestion or rhinorrhea.      Mouth/Throat:      Mouth: Mucous membranes are moist.      Pharynx: No oropharyngeal exudate.   Eyes:      General:         Right eye: No discharge.         Left eye: No discharge.      Extraocular Movements: Extraocular movements intact.   Neck:      Trachea: No tracheal deviation.   Cardiovascular:      Rate and Rhythm: Normal rate and regular rhythm.      Heart sounds: No murmur heard.  No friction rub. No gallop.    Pulmonary:      Effort: Pulmonary effort is normal. No respiratory distress.      Breath sounds: Normal breath sounds. No stridor. No wheezing or rales.   Chest:      Chest wall: No tenderness.   Abdominal:      General: Bowel sounds are normal. There is no distension.      Palpations: Abdomen is soft.      Tenderness: There is no abdominal tenderness.   Musculoskeletal:         General: No tenderness. Normal range of motion.      Cervical back: Normal range of motion and neck supple. No edema or erythema.      Right lower leg: No edema.      Left lower leg: No edema.   Lymphadenopathy:      Cervical: No cervical adenopathy.   Skin:     General: Skin is warm and dry.      Findings: No erythema or rash.   Neurological:      General: No focal deficit present.      Mental Status: He is alert and oriented to person, place, and time.      Cranial Nerves: No cranial nerve deficit.   Psychiatric:         Mood and Affect: Mood normal.         Behavior: Behavior normal.         Thought Content: Thought content normal.         Judgment: Judgment normal.         Laboratory:  Recent Labs     12/08/21 1907   WBC 7.2   RBC 5.19   HEMOGLOBIN 15.1   HEMATOCRIT 46.5   MCV 89.6   MCH 29.1   MCHC 32.5*   RDW 45.9   PLATELETCT 248   MPV 10.5     Recent Labs     12/08/21 1907   SODIUM 139   POTASSIUM 4.5   CHLORIDE 103   CO2 25   GLUCOSE 159*   BUN 32*   CREATININE 2.28*   CALCIUM 9.9     Recent Labs     12/08/21 1907    ALTSGPT 13   ASTSGOT 12   ALKPHOSPHAT 99   TBILIRUBIN 0.4   LIPASE 32   GLUCOSE 159*     Recent Labs     12/08/21  1907   APTT 29.4   INR 1.12     No results for input(s): NTPROBNP in the last 72 hours.      No results for input(s): TROPONINT in the last 72 hours.    Imaging:  CT-ABDOMEN-PELVIS W/O   Final Result         1.  Diverticulosis with slight hazy fat stranding adjacent to the sigmoid colon, could represent early or mild diverticulitis.   2.  Atrophic bilateral kidneys   3.  Cholelithiasis   4.  Atherosclerosis and atherosclerotic coronary artery disease   5.  Fat-containing left inguinal hernia      DX-CHEST-PORTABLE (1 VIEW)   Final Result      1.  There is no acute cardiopulmonary process.          X-Ray:  I have personally reviewed the images and compared with prior images.    Assessment/Plan:  I anticipate this patient is appropriate for observation status at this time.    * GI bleed- (present on admission)  Assessment & Plan  -Has occurred twice, has not been ongoing  -CT scan did show possible diverticulitis however judging by his symptoms and lack of pain tonight, I do not feel he has diverticulitis, I think this is likely diverticular tic causing bleeding  -I do not anticipate it worsening however there is this risk  -Trend hemoglobin overnight  -If no recurrence of the bleeding, can likely be discharged tomorrow and follow-up with GI, he already has an appointment on Monday, if this continues intermittently, he may need endoscopy  -He does take aspirin at home, could be as simple as stopping his aspirin which I will not continue at this time, avoid anticoagulation    CKD (chronic kidney disease) stage 4, GFR 15-29 ml/min ([facility])- (present on admission)  Assessment & Plan  -At baseline however I am going to hold his home allopurinol, okay to continue home ARB  -he does not appear dehydrated, I am not going to start IV fluids    Essential hypertension- (present on admission)  Assessment &  Plan  -Continue home amlodipine, Coreg and olmesartan  -Start as needed enalapril and labetalol  -Adjust as needed    Controlled gout- (present on admission)  Assessment & Plan  -Hold home allopurinol, his kidney function is chronic so he can likely continue at discharge  -repeat BMP in the morning    Type 2 diabetes mellitus with hyperglycemia (HCC)- (present on admission)  Assessment & Plan  -Start insulin sliding scale  -Adjust as needed    Mixed hyperlipidemia- (present on admission)  Assessment & Plan  -Continue home statin      VTE prophylaxis: SCDs/TEDs

## 2021-12-09 NOTE — DISCHARGE SUMMARY
"Discharge Summary    CHIEF COMPLAINT ON ADMISSION  Chief Complaint   Patient presents with   • Bloody Stools     Blood stools 10 days ago and again today        Reason for Admission  Bloody Stools     Admission Date  12/8/2021    CODE STATUS  Prior    HPI & HOSPITAL COURSE  Per notes, \" 81 y.o. male who presented 12/8/2021 with blood in his stools.  Patient states his first episode was 10 days ago, he had left lower abdominal pain that was an ache, moderate in severity, resolved prior to a bowel movement.  When he had a bowel movement it was bloody diarrhea.  This only occurred once and then resolved.  He did call his GI doctor and an appointment was scheduled for this upcoming Monday.  Patient states he had no additional pain or blood until today whenever he thought he needed to have a bowel movement, sat down but did not have a bowel movement went to wipe and noted blood on the toilet paper however otherwise none in the bowel.  But due to the bleeding he presented to the ER. I did discuss the case including labs and imaging with the ER physician.\"    Patient was admitted and monitored overnight for possible GI bleed.  Hemoglobin stable after several draws and patient had no recurrence of GI bleeding.  He does have a follow-up appointment with his outpatient gastroenterologist in 4 days.  I recommended he follow-up with them and also PCP for further evaluation.  Given patient's improvement and resolution of symptoms I do think is safe to be discharged and managed in the outpatient setting.  Patient was in agreement with this plan.     Therefore, he is discharged in good and stable condition to home with close outpatient follow-up.    The patient recovered much more quickly than anticipated on admission.    Discharge Date  12/9/2021    FOLLOW UP ITEMS POST DISCHARGE  FU with PCP  FU with gastroenterologist     DISCHARGE DIAGNOSES  Principal Problem:    GI bleed POA: Yes  Active Problems:    Mixed hyperlipidemia " (Chronic) POA: Yes    Type 2 diabetes mellitus with hyperglycemia (HCC) POA: Yes    Controlled gout POA: Yes    Essential hypertension POA: Yes    CKD (chronic kidney disease) stage 4, GFR 15-29 ml/min (HCC) POA: Yes  Resolved Problems:    * No resolved hospital problems. *      FOLLOW UP  Future Appointments   Date Time Provider Department Center   12/20/2021  3:30 PM [facility] None   2/14/2022  2:00 PM Michael J Bloch, M.D. [facility] None     Mike Bishop M.D.  [id number]  [place] [id number]  [phone]            MEDICATIONS ON DISCHARGE     Medication List      CHANGE how you take these medications      Instructions   AndroGel 20.25 MG/1.25GM (1.62%) Gel  What changed: Another medication with the same name was removed. Continue taking this medication, and follow the directions you see here.  Generic drug: Testosterone   Place 60.75 mg on the skin every day. daily  Dose: 60.75 mg        CONTINUE taking these medications      Instructions   allopurinol 300 MG Tabs  Commonly known as: ZYLOPRIM   Take 200 mg by mouth every day.  Dose: 200 mg     amLODIPine 2.5 MG Tabs  Commonly known as: NORVASC   Take 1 tablet by mouth every day.  Dose: 2.5 mg     B COMPLEX 1 PO   Take 1 tablet by mouth every day.  Dose: 1 Tablet     B-D UF III MINI PEN NEEDLES 31G X 5 MM Misc  Generic drug: Insulin Pen Needle      carvedilol 12.5 MG Tabs  Commonly known as: COREG   Take 1 tablet by mouth 2 times a day with meals.  Dose: 12.5 mg     Contour Next Test strip  Generic drug: glucose blood      FISH OIL   Take 1 capsule by mouth every day.  Dose: 1 Capsule     Myrbetriq 50 MG Tb24  Generic drug: Mirabegron ER   Take 1 tablet by mouth every day.  Dose: 1 Tablet     olmesartan 20 MG Tabs  Commonly known as: BENICAR   Take 1 tablet by mouth every day.  Dose: 20 mg     Ozempic (0.25 or 0.5 MG/DOSE) 2 MG/1.5ML Sopn  Generic drug: Semaglutide(0.25 or 0.5MG/DOS)      simvastatin 40 MG Tabs  Commonly known as: ZOCOR   Take 40  mg by mouth every evening.  Dose: 40 mg     Tresiba 100 UNIT/ML Soln  Generic drug: Insulin Degludec   Inject 6 Units under the skin every day.  Dose: 6 Units     vitamin D 2000 UNIT Tabs   Take 1 capsule by mouth 2 (two) times a day.  Dose: 1 Capsule     zolpidem 10 MG Tabs  Commonly known as: AMBIEN   Take 5-10 mg by mouth at bedtime as needed.  Dose: 5-10 mg        STOP taking these medications    Aspirin 81 81 MG EC tablet  Generic drug: aspirin            Allergies  Allergies   Allergen Reactions   • Ether      Violently sick   • Lipitor [Atorvastatin Calcium]      LEG PAINS     • Polyethylene Glycol Unspecified       DIET  No orders of the defined types were placed in this encounter.      ACTIVITY  As tolerated.  Weight bearing as tolerated    CONSULTATIONS  None    PROCEDURES  None    LABORATORY  Lab Results   Component Value Date    SODIUM 140 12/09/2021    POTASSIUM 3.9 12/09/2021    CHLORIDE 106 12/09/2021    CO2 21 12/09/2021    GLUCOSE 153 (H) 12/09/2021    BUN 31 (H) 12/09/2021    CREATININE 2.15 (H) 12/09/2021    CREATININE 1.4 02/06/2009        Lab Results   Component Value Date    WBC 8.2 12/09/2021    HEMOGLOBIN 14.6 12/09/2021    HEMOGLOBIN 14.6 12/09/2021    HEMATOCRIT 44.9 12/09/2021    PLATELETCT 267 12/09/2021        Total time of the discharge process exceeds 35 minutes.

## 2021-12-09 NOTE — DISCHARGE PLANNING
Anticipated Discharge Disposition:   Home      Action:  Chart review complete     Per MD, patient anticipated to discharge today.  No anticipated needs.     RN CM will continue to follow.       Barriers to Discharge:   None     Plan:   Will continue to monitor for any needs.

## 2021-12-09 NOTE — ASSESSMENT & PLAN NOTE
-Hold home allopurinol, his kidney function is chronic so he can likely continue at discharge  -repeat BMP in the morning

## 2021-12-10 ENCOUNTER — TELEPHONE (OUTPATIENT)
Dept: HEALTH INFORMATION MANAGEMENT | Facility: OTHER | Age: 81
End: 2021-12-10

## 2021-12-10 ENCOUNTER — HOSPITAL ENCOUNTER (OUTPATIENT)
Facility: MEDICAL CENTER | Age: 81
End: 2021-12-13
Attending: EMERGENCY MEDICINE | Admitting: INTERNAL MEDICINE
Payer: MEDICARE

## 2021-12-10 DIAGNOSIS — K92.2 GASTROINTESTINAL HEMORRHAGE, UNSPECIFIED GASTROINTESTINAL HEMORRHAGE TYPE: ICD-10-CM

## 2021-12-10 DIAGNOSIS — K57.92 DIVERTICULITIS: ICD-10-CM

## 2021-12-10 PROBLEM — I95.9 HYPOTENSION: Status: ACTIVE | Noted: 2021-12-10

## 2021-12-10 LAB
ABO GROUP BLD: NORMAL
ALBUMIN SERPL BCP-MCNC: 3.6 G/DL (ref 3.2–4.9)
ALBUMIN/GLOB SERPL: 1.5 G/DL
ALP SERPL-CCNC: 77 U/L (ref 30–99)
ALT SERPL-CCNC: 9 U/L (ref 2–50)
ANION GAP SERPL CALC-SCNC: 11 MMOL/L (ref 7–16)
APTT PPP: 25.3 SEC (ref 24.7–36)
AST SERPL-CCNC: 8 U/L (ref 12–45)
BASOPHILS # BLD AUTO: 0.6 % (ref 0–1.8)
BASOPHILS # BLD: 0.06 K/UL (ref 0–0.12)
BILIRUB SERPL-MCNC: 0.4 MG/DL (ref 0.1–1.5)
BLD GP AB SCN SERPL QL: NORMAL
BUN SERPL-MCNC: 32 MG/DL (ref 8–22)
CALCIUM SERPL-MCNC: 9.2 MG/DL (ref 8.5–10.5)
CHLORIDE SERPL-SCNC: 107 MMOL/L (ref 96–112)
CO2 SERPL-SCNC: 22 MMOL/L (ref 20–33)
CREAT SERPL-MCNC: 2.33 MG/DL (ref 0.5–1.4)
EOSINOPHIL # BLD AUTO: 0.14 K/UL (ref 0–0.51)
EOSINOPHIL NFR BLD: 1.3 % (ref 0–6.9)
ERYTHROCYTE [DISTWIDTH] IN BLOOD BY AUTOMATED COUNT: 45.8 FL (ref 35.9–50)
ERYTHROCYTE [DISTWIDTH] IN BLOOD BY AUTOMATED COUNT: 46.9 FL (ref 35.9–50)
GLOBULIN SER CALC-MCNC: 2.4 G/DL (ref 1.9–3.5)
GLUCOSE SERPL-MCNC: 196 MG/DL (ref 65–99)
HCT VFR BLD AUTO: 37 % (ref 42–52)
HCT VFR BLD AUTO: 41.7 % (ref 42–52)
HGB BLD-MCNC: 12 G/DL (ref 14–18)
HGB BLD-MCNC: 13.8 G/DL (ref 14–18)
IMM GRANULOCYTES # BLD AUTO: 0.05 K/UL (ref 0–0.11)
IMM GRANULOCYTES NFR BLD AUTO: 0.5 % (ref 0–0.9)
INR PPP: 1.17 (ref 0.87–1.13)
LIPASE SERPL-CCNC: 23 U/L (ref 11–82)
LYMPHOCYTES # BLD AUTO: 1.73 K/UL (ref 1–4.8)
LYMPHOCYTES NFR BLD: 16.1 % (ref 22–41)
MCH RBC QN AUTO: 29.1 PG (ref 27–33)
MCH RBC QN AUTO: 29.5 PG (ref 27–33)
MCHC RBC AUTO-ENTMCNC: 32.4 G/DL (ref 33.7–35.3)
MCHC RBC AUTO-ENTMCNC: 33.1 G/DL (ref 33.7–35.3)
MCV RBC AUTO: 89.1 FL (ref 81.4–97.8)
MCV RBC AUTO: 89.8 FL (ref 81.4–97.8)
MONOCYTES # BLD AUTO: 0.67 K/UL (ref 0–0.85)
MONOCYTES NFR BLD AUTO: 6.2 % (ref 0–13.4)
NEUTROPHILS # BLD AUTO: 8.12 K/UL (ref 1.82–7.42)
NEUTROPHILS NFR BLD: 75.3 % (ref 44–72)
NRBC # BLD AUTO: 0 K/UL
NRBC BLD-RTO: 0 /100 WBC
PLATELET # BLD AUTO: 246 K/UL (ref 164–446)
PLATELET # BLD AUTO: 280 K/UL (ref 164–446)
PMV BLD AUTO: 10.8 FL (ref 9–12.9)
PMV BLD AUTO: 10.9 FL (ref 9–12.9)
POTASSIUM SERPL-SCNC: 4.4 MMOL/L (ref 3.6–5.5)
PROT SERPL-MCNC: 6 G/DL (ref 6–8.2)
PROTHROMBIN TIME: 14.6 SEC (ref 12–14.6)
RBC # BLD AUTO: 4.12 M/UL (ref 4.7–6.1)
RBC # BLD AUTO: 4.68 M/UL (ref 4.7–6.1)
RH BLD: NORMAL
SODIUM SERPL-SCNC: 140 MMOL/L (ref 135–145)
WBC # BLD AUTO: 10.8 K/UL (ref 4.8–10.8)
WBC # BLD AUTO: 14.1 K/UL (ref 4.8–10.8)

## 2021-12-10 PROCEDURE — 99285 EMERGENCY DEPT VISIT HI MDM: CPT

## 2021-12-10 PROCEDURE — 86901 BLOOD TYPING SEROLOGIC RH(D): CPT

## 2021-12-10 PROCEDURE — 86850 RBC ANTIBODY SCREEN: CPT

## 2021-12-10 PROCEDURE — G0378 HOSPITAL OBSERVATION PER HR: HCPCS

## 2021-12-10 PROCEDURE — 700105 HCHG RX REV CODE 258: Performed by: INTERNAL MEDICINE

## 2021-12-10 PROCEDURE — 85027 COMPLETE CBC AUTOMATED: CPT

## 2021-12-10 PROCEDURE — 80053 COMPREHEN METABOLIC PANEL: CPT

## 2021-12-10 PROCEDURE — 700105 HCHG RX REV CODE 258: Performed by: EMERGENCY MEDICINE

## 2021-12-10 PROCEDURE — 36415 COLL VENOUS BLD VENIPUNCTURE: CPT

## 2021-12-10 PROCEDURE — 85730 THROMBOPLASTIN TIME PARTIAL: CPT

## 2021-12-10 PROCEDURE — 85610 PROTHROMBIN TIME: CPT

## 2021-12-10 PROCEDURE — 83690 ASSAY OF LIPASE: CPT

## 2021-12-10 PROCEDURE — 99220 PR INITIAL OBSERVATION CARE,LEVL III: CPT | Performed by: INTERNAL MEDICINE

## 2021-12-10 PROCEDURE — 700102 HCHG RX REV CODE 250 W/ 637 OVERRIDE(OP): Performed by: INTERNAL MEDICINE

## 2021-12-10 PROCEDURE — 85025 COMPLETE CBC W/AUTO DIFF WBC: CPT

## 2021-12-10 PROCEDURE — 82962 GLUCOSE BLOOD TEST: CPT

## 2021-12-10 RX ORDER — SODIUM CHLORIDE 9 MG/ML
INJECTION, SOLUTION INTRAVENOUS CONTINUOUS
Status: DISCONTINUED | OUTPATIENT
Start: 2021-12-10 | End: 2021-12-13 | Stop reason: HOSPADM

## 2021-12-10 RX ORDER — SIMVASTATIN 20 MG
40 TABLET ORAL NIGHTLY
Status: DISCONTINUED | OUTPATIENT
Start: 2021-12-11 | End: 2021-12-13 | Stop reason: HOSPADM

## 2021-12-10 RX ORDER — ACETAMINOPHEN 325 MG/1
650 TABLET ORAL EVERY 6 HOURS PRN
Status: DISCONTINUED | OUTPATIENT
Start: 2021-12-10 | End: 2021-12-13 | Stop reason: HOSPADM

## 2021-12-10 RX ORDER — POLYETHYLENE GLYCOL 3350 17 G/17G
1 POWDER, FOR SOLUTION ORAL
Status: DISCONTINUED | OUTPATIENT
Start: 2021-12-10 | End: 2021-12-13 | Stop reason: HOSPADM

## 2021-12-10 RX ORDER — ONDANSETRON 2 MG/ML
4 INJECTION INTRAMUSCULAR; INTRAVENOUS EVERY 4 HOURS PRN
Status: DISCONTINUED | OUTPATIENT
Start: 2021-12-10 | End: 2021-12-13 | Stop reason: HOSPADM

## 2021-12-10 RX ORDER — UREA 10 %
1 LOTION (ML) TOPICAL
Status: SHIPPED | COMMUNITY
End: 2022-02-14

## 2021-12-10 RX ORDER — ONDANSETRON 4 MG/1
4 TABLET, ORALLY DISINTEGRATING ORAL EVERY 4 HOURS PRN
Status: DISCONTINUED | OUTPATIENT
Start: 2021-12-10 | End: 2021-12-13 | Stop reason: HOSPADM

## 2021-12-10 RX ORDER — ZOLPIDEM TARTRATE 5 MG/1
5-10 TABLET ORAL NIGHTLY PRN
Status: DISCONTINUED | OUTPATIENT
Start: 2021-12-10 | End: 2021-12-13 | Stop reason: HOSPADM

## 2021-12-10 RX ORDER — BISACODYL 10 MG
10 SUPPOSITORY, RECTAL RECTAL
Status: DISCONTINUED | OUTPATIENT
Start: 2021-12-10 | End: 2021-12-13 | Stop reason: HOSPADM

## 2021-12-10 RX ORDER — AMLODIPINE BESYLATE 2.5 MG/1
5 TABLET ORAL DAILY
Status: SHIPPED | COMMUNITY
End: 2022-08-15

## 2021-12-10 RX ORDER — DEXTROSE MONOHYDRATE 25 G/50ML
50 INJECTION, SOLUTION INTRAVENOUS
Status: DISCONTINUED | OUTPATIENT
Start: 2021-12-10 | End: 2021-12-13 | Stop reason: HOSPADM

## 2021-12-10 RX ORDER — SODIUM CHLORIDE 9 MG/ML
INJECTION, SOLUTION INTRAVENOUS CONTINUOUS
Status: DISCONTINUED | OUTPATIENT
Start: 2021-12-10 | End: 2021-12-10

## 2021-12-10 RX ORDER — AMOXICILLIN 250 MG
2 CAPSULE ORAL 2 TIMES DAILY
Status: DISCONTINUED | OUTPATIENT
Start: 2021-12-10 | End: 2021-12-13 | Stop reason: HOSPADM

## 2021-12-10 RX ADMIN — SODIUM CHLORIDE: 9 INJECTION, SOLUTION INTRAVENOUS at 20:37

## 2021-12-10 RX ADMIN — SODIUM CHLORIDE: 9 INJECTION, SOLUTION INTRAVENOUS at 23:07

## 2021-12-10 ASSESSMENT — ENCOUNTER SYMPTOMS
WEAKNESS: 1
BLOOD IN STOOL: 1
DIZZINESS: 1

## 2021-12-10 ASSESSMENT — PATIENT HEALTH QUESTIONNAIRE - PHQ9
1. LITTLE INTEREST OR PLEASURE IN DOING THINGS: NOT AT ALL
SUM OF ALL RESPONSES TO PHQ9 QUESTIONS 1 AND 2: 0
2. FEELING DOWN, DEPRESSED, IRRITABLE, OR HOPELESS: NOT AT ALL

## 2021-12-10 ASSESSMENT — PAIN DESCRIPTION - PAIN TYPE: TYPE: ACUTE PAIN

## 2021-12-10 ASSESSMENT — FIBROSIS 4 INDEX
FIB4 SCORE: 1
FIB4 SCORE: 0.87

## 2021-12-11 PROBLEM — K57.92 DIVERTICULITIS: Status: ACTIVE | Noted: 2021-12-11

## 2021-12-11 PROBLEM — N18.9 CHRONIC KIDNEY DISEASE (CKD): Status: ACTIVE | Noted: 2021-02-27

## 2021-12-11 LAB
ANION GAP SERPL CALC-SCNC: 14 MMOL/L (ref 7–16)
ANION GAP SERPL CALC-SCNC: 9 MMOL/L (ref 7–16)
BUN SERPL-MCNC: 32 MG/DL (ref 8–22)
BUN SERPL-MCNC: 36 MG/DL (ref 8–22)
CALCIUM SERPL-MCNC: 8.9 MG/DL (ref 8.5–10.5)
CALCIUM SERPL-MCNC: 9.3 MG/DL (ref 8.5–10.5)
CHLORIDE SERPL-SCNC: 107 MMOL/L (ref 96–112)
CHLORIDE SERPL-SCNC: 110 MMOL/L (ref 96–112)
CO2 SERPL-SCNC: 18 MMOL/L (ref 20–33)
CO2 SERPL-SCNC: 22 MMOL/L (ref 20–33)
CREAT SERPL-MCNC: 2.1 MG/DL (ref 0.5–1.4)
CREAT SERPL-MCNC: 2.13 MG/DL (ref 0.5–1.4)
ERYTHROCYTE [DISTWIDTH] IN BLOOD BY AUTOMATED COUNT: 47.5 FL (ref 35.9–50)
ERYTHROCYTE [DISTWIDTH] IN BLOOD BY AUTOMATED COUNT: 47.9 FL (ref 35.9–50)
ERYTHROCYTE [DISTWIDTH] IN BLOOD BY AUTOMATED COUNT: 48.4 FL (ref 35.9–50)
EXTERNAL QUALITY CONTROL: NORMAL
GLUCOSE BLD-MCNC: 117 MG/DL (ref 65–99)
GLUCOSE BLD-MCNC: 121 MG/DL (ref 65–99)
GLUCOSE BLD-MCNC: 168 MG/DL (ref 65–99)
GLUCOSE BLD-MCNC: 84 MG/DL (ref 65–99)
GLUCOSE SERPL-MCNC: 108 MG/DL (ref 65–99)
GLUCOSE SERPL-MCNC: 136 MG/DL (ref 65–99)
HCT VFR BLD AUTO: 35.9 % (ref 42–52)
HCT VFR BLD AUTO: 37.5 % (ref 42–52)
HCT VFR BLD AUTO: 38.7 % (ref 42–52)
HGB BLD-MCNC: 11.7 G/DL (ref 14–18)
HGB BLD-MCNC: 12.2 G/DL (ref 14–18)
HGB BLD-MCNC: 12.4 G/DL (ref 14–18)
MCH RBC QN AUTO: 29.1 PG (ref 27–33)
MCH RBC QN AUTO: 29.3 PG (ref 27–33)
MCH RBC QN AUTO: 29.4 PG (ref 27–33)
MCHC RBC AUTO-ENTMCNC: 32 G/DL (ref 33.7–35.3)
MCHC RBC AUTO-ENTMCNC: 32.5 G/DL (ref 33.7–35.3)
MCHC RBC AUTO-ENTMCNC: 32.6 G/DL (ref 33.7–35.3)
MCV RBC AUTO: 89.9 FL (ref 81.4–97.8)
MCV RBC AUTO: 90.2 FL (ref 81.4–97.8)
MCV RBC AUTO: 90.8 FL (ref 81.4–97.8)
PLATELET # BLD AUTO: 227 K/UL (ref 164–446)
PLATELET # BLD AUTO: 253 K/UL (ref 164–446)
PLATELET # BLD AUTO: 260 K/UL (ref 164–446)
PMV BLD AUTO: 10.8 FL (ref 9–12.9)
PMV BLD AUTO: 10.8 FL (ref 9–12.9)
PMV BLD AUTO: 11.1 FL (ref 9–12.9)
POTASSIUM SERPL-SCNC: 3.8 MMOL/L (ref 3.6–5.5)
POTASSIUM SERPL-SCNC: 4.1 MMOL/L (ref 3.6–5.5)
RBC # BLD AUTO: 3.98 M/UL (ref 4.7–6.1)
RBC # BLD AUTO: 4.17 M/UL (ref 4.7–6.1)
RBC # BLD AUTO: 4.26 M/UL (ref 4.7–6.1)
SARS-COV+SARS-COV-2 AG RESP QL IA.RAPID: NEGATIVE
SODIUM SERPL-SCNC: 139 MMOL/L (ref 135–145)
SODIUM SERPL-SCNC: 141 MMOL/L (ref 135–145)
WBC # BLD AUTO: 13.1 K/UL (ref 4.8–10.8)
WBC # BLD AUTO: 9.7 K/UL (ref 4.8–10.8)
WBC # BLD AUTO: 9.7 K/UL (ref 4.8–10.8)

## 2021-12-11 PROCEDURE — 700105 HCHG RX REV CODE 258: Performed by: INTERNAL MEDICINE

## 2021-12-11 PROCEDURE — 700102 HCHG RX REV CODE 250 W/ 637 OVERRIDE(OP): Performed by: NURSE PRACTITIONER

## 2021-12-11 PROCEDURE — 80048 BASIC METABOLIC PNL TOTAL CA: CPT | Mod: 91

## 2021-12-11 PROCEDURE — A9270 NON-COVERED ITEM OR SERVICE: HCPCS | Performed by: NURSE PRACTITIONER

## 2021-12-11 PROCEDURE — 87426 SARSCOV CORONAVIRUS AG IA: CPT | Performed by: INTERNAL MEDICINE

## 2021-12-11 PROCEDURE — 85027 COMPLETE CBC AUTOMATED: CPT

## 2021-12-11 PROCEDURE — 36415 COLL VENOUS BLD VENIPUNCTURE: CPT

## 2021-12-11 PROCEDURE — 82962 GLUCOSE BLOOD TEST: CPT | Mod: 91

## 2021-12-11 PROCEDURE — G0378 HOSPITAL OBSERVATION PER HR: HCPCS

## 2021-12-11 RX ORDER — OLMESARTAN MEDOXOMIL 20 MG/1
20 TABLET ORAL DAILY
Status: DISCONTINUED | OUTPATIENT
Start: 2021-12-11 | End: 2021-12-13 | Stop reason: HOSPADM

## 2021-12-11 RX ORDER — PEG-3350, SODIUM SULFATE, SODIUM CHLORIDE, POTASSIUM CHLORIDE, SODIUM ASCORBATE AND ASCORBIC ACID 7.5-2.691G
100 KIT ORAL 2 TIMES DAILY
Status: COMPLETED | OUTPATIENT
Start: 2021-12-11 | End: 2021-12-11

## 2021-12-11 RX ORDER — CARVEDILOL 12.5 MG/1
12.5 TABLET ORAL 2 TIMES DAILY WITH MEALS
Status: DISCONTINUED | OUTPATIENT
Start: 2021-12-11 | End: 2021-12-13 | Stop reason: HOSPADM

## 2021-12-11 RX ORDER — VITAMIN B COMPLEX
1000 TABLET ORAL
Status: DISCONTINUED | OUTPATIENT
Start: 2021-12-11 | End: 2021-12-13 | Stop reason: HOSPADM

## 2021-12-11 RX ORDER — ALLOPURINOL 100 MG/1
100 TABLET ORAL DAILY
Status: DISCONTINUED | OUTPATIENT
Start: 2021-12-11 | End: 2021-12-13 | Stop reason: HOSPADM

## 2021-12-11 RX ADMIN — CARVEDILOL 12.5 MG: 12.5 TABLET, FILM COATED ORAL at 17:03

## 2021-12-11 RX ADMIN — SODIUM CHLORIDE: 9 INJECTION, SOLUTION INTRAVENOUS at 23:58

## 2021-12-11 RX ADMIN — SODIUM CHLORIDE: 9 INJECTION, SOLUTION INTRAVENOUS at 11:58

## 2021-12-11 RX ADMIN — ALLOPURINOL 100 MG: 100 TABLET ORAL at 13:30

## 2021-12-11 RX ADMIN — OLMESARTAN MEDOXOMIL 20 MG: 20 TABLET, FILM COATED ORAL at 15:19

## 2021-12-11 RX ADMIN — PEG-3350, SODIUM SULFATE, SODIUM CHLORIDE, POTASSIUM CHLORIDE, SODIUM ASCORBATE AND ASCORBIC ACID 100 G: KIT at 17:03

## 2021-12-11 RX ADMIN — Medication 1000 UNITS: at 13:29

## 2021-12-11 RX ADMIN — PEG-3350, SODIUM SULFATE, SODIUM CHLORIDE, POTASSIUM CHLORIDE, SODIUM ASCORBATE AND ASCORBIC ACID 100 G: KIT at 19:45

## 2021-12-11 ASSESSMENT — ENCOUNTER SYMPTOMS
SINUS PAIN: 0
EYE PAIN: 0
INSOMNIA: 0
MEMORY LOSS: 0
PALPITATIONS: 0
EYE DISCHARGE: 0
SPEECH CHANGE: 0
SENSORY CHANGE: 0
FEVER: 0
ABDOMINAL PAIN: 1
NERVOUS/ANXIOUS: 0
VOMITING: 0
NAUSEA: 0
WEAKNESS: 0
BACK PAIN: 0
ABDOMINAL PAIN: 0
CHILLS: 0
FLANK PAIN: 0
FOCAL WEAKNESS: 0
POLYDIPSIA: 0
CLAUDICATION: 0
DEPRESSION: 0
EYE REDNESS: 0
WHEEZING: 0
COUGH: 0
SHORTNESS OF BREATH: 0
SORE THROAT: 0
BLOOD IN STOOL: 1
STRIDOR: 0
DIZZINESS: 0

## 2021-12-11 ASSESSMENT — LIFESTYLE VARIABLES
HOW MANY TIMES IN THE PAST YEAR HAVE YOU HAD 5 OR MORE DRINKS IN A DAY: 0
HAVE YOU EVER FELT YOU SHOULD CUT DOWN ON YOUR DRINKING: NO
EVER HAD A DRINK FIRST THING IN THE MORNING TO STEADY YOUR NERVES TO GET RID OF A HANGOVER: NO
AVERAGE NUMBER OF DAYS PER WEEK YOU HAVE A DRINK CONTAINING ALCOHOL: 0
TOTAL SCORE: 0
ON A TYPICAL DAY WHEN YOU DRINK ALCOHOL HOW MANY DRINKS DO YOU HAVE: 0
CONSUMPTION TOTAL: NEGATIVE
ALCOHOL_USE: NO
TOTAL SCORE: 0
TOTAL SCORE: 0
EVER FELT BAD OR GUILTY ABOUT YOUR DRINKING: NO
HAVE PEOPLE ANNOYED YOU BY CRITICIZING YOUR DRINKING: NO
DOES PATIENT WANT TO STOP DRINKING: NO

## 2021-12-11 ASSESSMENT — PAIN DESCRIPTION - PAIN TYPE
TYPE: ACUTE PAIN
TYPE: ACUTE PAIN

## 2021-12-11 ASSESSMENT — PAIN SCALES - WONG BAKER: WONGBAKER_NUMERICALRESPONSE: DOESN'T HURT AT ALL

## 2021-12-11 NOTE — ED TRIAGE NOTES
Tyrone Carrillo Looford  81 y.o. male  Chief Complaint   Patient presents with   • Rectal Bleeding     per EMS, pt c/o rectal bleeding bright red stool that started about 5pm. pt stated he had 6 episodes of bright red stool. pt denies any pain at this time. pt c/o dizziness. pt denies NV. per pt, he was seen in [facility] last Wednesday for the same complaint but was discharged yesterday with diverticulitis diagnosis. pt was given about 400 mL NS by EMS prior to arrival for bp at 72/42. bp upon arrival is at 103/62       Pt BIB EMS for above complaint.    Pt is alert and oriented, speaking in full sentences, follows commands and responds appropriately to questions. Resp are even and unlabored. No behavioral indicators of pain.     Pt educated on triage process. Pt encouraged to alert staff for any changes. This RN masked and in appropriate PPE during encounter.     Vitals:    12/10/21 2014   BP: 103/62   Pulse: 63   Resp: 20   SpO2: 94%

## 2021-12-11 NOTE — ED NOTES
Rounded on patient. Patient resting in gurney. Pt continues to deny any new complaints. No signs of distress noted with stable VS. Equal chest rise and fall. No further needs at this time. Call light within reach. Will continue to monitor pt.

## 2021-12-11 NOTE — CARE PLAN
The patient is Stable - Low risk of patient condition declining or worsening    Shift Goals  Clinical Goals: rest, ivf, maintain appropriate h&h  Patient Goals: rest  Family Goals: n/a    Progress made toward(s) clinical / shift goals: Pt receiving IVF with no difficulty this evening. Understands, agrees with, and ask appropriate questions regarding POC    Patient is not progressing towards the following goals: Pt stool still remains bloody

## 2021-12-11 NOTE — ASSESSMENT & PLAN NOTE
CKD stage IV  Under the care of nephrology, Dr. Del Rio  Creatinine 2.13, BUN 36 this admission, which is improved from prior  Patient follows a renal diet and has his food delivered  -Avoid nephrotoxic medications  -Renal diet

## 2021-12-11 NOTE — H&P
Hospital Medicine History & Physical Note    Date of Service  12/10/2021    Primary Care Physician  Jonah Logan M.D.    Consultants  GI    Specialist Names: call in am    Code Status  Full Code    Chief Complaint  Chief Complaint   Patient presents with   • Rectal Bleeding     per EMS, pt c/o rectal bleeding bright red stool that started about 5pm. pt stated he had 6 episodes of bright red stool. pt denies any pain at this time. pt c/o dizziness. pt denies NV. per pt, he was seen in [facility] ER last Wednesday for the same complaint but was discharged yesterday with diverticulitis diagnosis. pt was given about 400 mL NS by EMS prior to arrival for bp at 72/42. bp upon arrival is at 103/62       History of Presenting Illness  Tyrone Cline is a 81 y.o. male who presented 12/10/2021 with  6 episodes of fresh blood per rectum. Patient was seen in [facility] and was discharged yesterday for same problem. He was initially diagnosed with diverticulitis, however denies any symptoms of fever or abdominal pain. He had stable H/H and was discharged home. Since he went home, he noticed 6x bloody bowel movement. He denies any history of hemorrhoids and states that he was constipated 1 week ago, however denies any recent constipation or straining. In ED, patient was found to have hypotension with drop in h/h. His vitals stablized with fluids. He will be admitted for GI consult for colonoscopy and monitoring of his lower GIB.     I discussed the plan of care with patient.    Review of Systems  Review of Systems   Gastrointestinal: Positive for blood in stool.   Neurological: Positive for dizziness and weakness.   All other systems reviewed and are negative.      Past Medical History   has a past medical history of Abdominal pain, Abnormal electrocardiogram, ACE inhibitor intolerance, BPH (benign prostatic hyperplasia), Bruxism, Cancer (HCC), Cataract, Chickenpox, Cholesterol blood decreased, Chronic kidney  disease, stage III (moderate) (HCC), Cold, Cough, Depression, Diabetes, Difficulty breathing, Fever, GERD (gastroesophageal reflux disease), GOUT, Gout, History of skull fracture, History of urinary tract infection, Hyperlipidemia, Hypertension, Indigestion, Influenza, Insomnia, Mixed hyperlipidemia, Pneumonia (9/2015), Proteinuria, Ringing in ears, Shortness of breath, Sputum production, Tonsillitis, Type 2 diabetes mellitus (HCC), Wears glasses, and Weight loss.    Surgical History   has a past surgical history that includes cataract phaco with iol (11/20/2012); cataract phaco with iol (12/4/2012); appendectomy; other; other; tonsillectomy; blepharoplasty (7/23/2014); brow lift (7/23/2014); septal reconstruction (12/7/2015); cardiac cath, left heart; and arthroscopy, knee.     Family History  family history includes Cancer in his brother; Diabetes in his father; Heart Attack (age of onset: 79) in his father.   Family history reviewed with patient. There is no family history that is pertinent to the chief complaint.     Social History   reports that he quit smoking about 56 years ago. He has a 1.20 pack-year smoking history. He has never used smokeless tobacco. He reports that he does not drink alcohol and does not use drugs.    Allergies  Allergies   Allergen Reactions   • Ether      Violently sick   • Lipitor [Atorvastatin Calcium]      LEG PAINS     • Polyethylene Glycol Unspecified       Medications  Prior to Admission Medications   Prescriptions Last Dose Informant Patient Reported? Taking?   B Complex Vitamins (B COMPLEX 1 PO)  Patient Yes No   Sig: Take 1 tablet by mouth every day.   B-D UF III MINI PEN NEEDLES 31G X 5 MM Misc   Yes No   CONTOUR NEXT TEST strip   Yes No   Cholecalciferol (VITAMIN D) 2000 UNIT Tab  Patient Yes No   Sig: Take 1 capsule by mouth 2 (two) times a day.   FISH OIL  Patient Yes No   Sig: Take 1 capsule by mouth every day.   Insulin Degludec (TRESIBA) 100 UNIT/ML Solution   No No    Sig: Inject 6 Units under the skin every day.   Patient not taking: Reported on 7/8/2021   Mirabegron ER (MYRBETRIQ) 50 MG TABLET SR 24 HR  Patient Yes No   Sig: Take 1 tablet by mouth every day.   Patient not taking: Reported on 7/8/2021   OZEMPIC, 0.25 OR 0.5 MG/DOSE, 2 MG/1.5ML Solution Pen-injector   Yes No   Testosterone (ANDROGEL) 20.25 MG/1.25GM (1.62%) GEL  Patient Yes No   Sig: Place 60.75 mg on the skin every day. daily   allopurinol (ZYLOPRIM) 300 MG TABS  Patient Yes No   Sig: Take 200 mg by mouth every day.   amLODIPine (NORVASC) 2.5 MG Tab   No No   Sig: Take 1 tablet by mouth every day.   carvedilol (COREG) 12.5 MG Tab   No No   Sig: Take 1 tablet by mouth 2 times a day with meals.   olmesartan (BENICAR) 20 MG Tab   No No   Sig: Take 1 tablet by mouth every day.   simvastatin (ZOCOR) 40 MG Tab   Yes No   Sig: Take 40 mg by mouth every evening.   zolpidem (AMBIEN) 10 MG TABS  Patient Yes No   Sig: Take 5-10 mg by mouth at bedtime as needed.      Facility-Administered Medications: None       Physical Exam  Temp:  [35.6 °C (96 °F)] 35.6 °C (96 °F)  Pulse:  [63-74] 74  Resp:  [20] 20  BP: (103-115)/(62-76) 115/76  SpO2:  [94 %-96 %] 96 %  Blood Pressure : 115/76   Temperature: (!) 35.6 °C (96 °F)   Pulse: 74   Respiration: 20   Pulse Oximetry: 96 %       Physical Exam  Vitals and nursing note reviewed.   Constitutional:       General: He is not in acute distress.     Appearance: Normal appearance.   HENT:      Head: Normocephalic and atraumatic.      Mouth/Throat:      Mouth: Mucous membranes are moist.      Pharynx: Oropharynx is clear.   Eyes:      General: No scleral icterus.     Extraocular Movements: Extraocular movements intact.      Conjunctiva/sclera: Conjunctivae normal.      Pupils: Pupils are equal, round, and reactive to light.   Cardiovascular:      Rate and Rhythm: Normal rate and regular rhythm.      Pulses: Normal pulses.      Heart sounds: Normal heart sounds.   Pulmonary:      Effort:  Pulmonary effort is normal.      Breath sounds: Normal breath sounds.   Abdominal:      General: Abdomen is flat. Bowel sounds are normal. There is no distension.      Palpations: Abdomen is soft.      Tenderness: There is no abdominal tenderness. There is no guarding.      Comments: There is significant mixed bloody stool next to bedside   Musculoskeletal:         General: No swelling or tenderness. Normal range of motion.      Cervical back: Normal range of motion and neck supple. No rigidity.   Skin:     General: Skin is warm and dry.      Coloration: Skin is not jaundiced.   Neurological:      General: No focal deficit present.      Mental Status: He is alert and oriented to person, place, and time. Mental status is at baseline.   Psychiatric:         Mood and Affect: Mood normal.         Behavior: Behavior normal.         Thought Content: Thought content normal.         Judgment: Judgment normal.         Laboratory:  Recent Labs     12/08/21  1907 12/09/21  0142 12/10/21  2024   WBC 7.2 8.2 10.8   RBC 5.19 5.01 4.12*   HEMOGLOBIN 15.1 14.6  14.6 12.0*   HEMATOCRIT 46.5 44.9 37.0*   MCV 89.6 89.6 89.8   MCH 29.1 29.1 29.1   MCHC 32.5* 32.5* 32.4*   RDW 45.9 46.5 46.9   PLATELETCT 248 267 246   MPV 10.5 11.1 10.8     Recent Labs     12/08/21  1907 12/09/21  0142 12/10/21  2024   SODIUM 139 140 140   POTASSIUM 4.5 3.9 4.4   CHLORIDE 103 106 107   CO2 25 21 22   GLUCOSE 159* 153* 196*   BUN 32* 31* 32*   CREATININE 2.28* 2.15* 2.33*   CALCIUM 9.9 9.5 9.2     Recent Labs     12/08/21  1907 12/09/21  0142 12/10/21  2024   ALTSGPT 13  --  9   ASTSGOT 12  --  8*   ALKPHOSPHAT 99  --  77   TBILIRUBIN 0.4  --  0.4   LIPASE 32  --  23   GLUCOSE 159* 153* 196*     Recent Labs     12/08/21  1907 12/10/21  2024   APTT 29.4 25.3   INR 1.12 1.17*     No results for input(s): NTPROBNP in the last 72 hours.      No results for input(s): TROPONINT in the last 72 hours.    Imaging:  No orders to display       X-Ray:  I have  personally reviewed the images and compared with prior images.    Assessment/Plan:  I anticipate this patient is appropriate for observation status at this time.    * GIB (gastrointestinal bleeding)- (present on admission)  Assessment & Plan  -GI consult  -H/H downtrending, bp was low initially however responded to fluids  -will keep patient NPO for now  -H/H q6 hours, transfuse to goal of 7      Hypotension- (present on admission)  Assessment & Plan  -hold all bp meds    BPH (benign prostatic hyperplasia)- (present on admission)  Assessment & Plan  -resume home meds      VTE prophylaxis: SCDs/TEDs

## 2021-12-11 NOTE — ASSESSMENT & PLAN NOTE
Under the care of an endocrinologist  Has a glucose monitoring system in place  -AC/HS and sliding scale while inpatient

## 2021-12-11 NOTE — HOSPITAL COURSE
Patient is an 81-year-old male with past medical history of diverticulitis, CKD stage IV under the care of nephrology (Dr. Del Rio), diabetes type 2, BPH status post TURP 7/1/2021,  hypertension, hyperlipidemia, and aortic aneurysm who presented 12/10/2021 with complaints of 6 episodes of bloody stool.  Patient reports the stool was bright red in color and he felt dizzy and lightheaded afterwards.  He was [facility] to Thursday of last week for the same problem, however was discharged when his hemoglobin and hematocrit stabilized.  He was diagnosed with diverticulitis at that time.  He follows with [facility] and had a scheduled appointment for Monday Dec 13.    In the ED, patient was found to have hypotension with H&H of 12 and 37.  His blood pressure stabilized with fluid administration.  He was admitted for GI consultation and monitoring of his GI bleed.    Dr. Simons of [facility] was notified of this patient's admission.  Patient underwent EGD and colonoscopy 12/12/2021 and was found to have moderate diverticulitis and gastritis. Dr. Simons recommending overnight stay with initiation of Zosyn to monitor for bleeding.     Patient remained overnight, no evidence of bleeding.  Vital signs remained stable and patient able to tolerate oral intake of soft GI diet.  Denies chest pain, shortness of breath, dizziness, hematuria, bloody stool, or difficulty urinating.  Discussed antibiotic therapy, daily PPI, and GI follow-up with patient, he verbalizes understanding with plan of care.

## 2021-12-11 NOTE — CONSULTS
Gastroenterology (GIC) Initial Consult Note               Author:  DASHA Vallejo Date & Time Created: 12/11/2021 10:02 AM       Patient ID:  Name:             Tyrone Cline  YOB: 1940  Age:                 81 y.o.  male  MRN:               [id number]      Referring Provider:  Bal Moreau MD      Presenting Chief Complaint:  Hematochezia      History of Present Illness:    He is an 81-year-old male patient seen in consultation for hematochezia.  Patient states on Wednesday last week he had a episode of large painless bright red blood per rectum.  He went to [facility] and was evaluated there.  CT scan without contrast demonstrated diverticulosis with slight adjacent to the sigmoid colon, atrophic bilateral colitis, cholelithiasis, atherosclerosis and atherosclerotic coronary artery disease, and fat-containing left renal hernia.  He was treated conservatively and bleeding stopped, therefore was sent home.  However, yesterday the patient developed 5 more episodes of of large amount of bright red blood per rectum.  He denies change in bowel habits.  His last colonoscopy was 10 years ago or so.  His hemoglobin has dropped from 14.6 initially 12/9 to 12.2 today.  CBC glucose 136, creatinine 2.1.,  GFR 40.    Denied further modifying factors, associated symptoms or timing issues.  Of note, I spoke to patient's nurse about patient to obtain further history.        Review of Systems:  Review of Systems   Constitutional: Negative for chills and fever.   HENT: Negative for sinus pain and sore throat.    Eyes: Negative for pain, discharge and redness.   Respiratory: Negative for cough, shortness of breath, wheezing and stridor.    Cardiovascular: Negative for chest pain, palpitations and claudication.   Gastrointestinal: Positive for blood in stool. Negative for abdominal pain, nausea and vomiting.   Genitourinary: Negative for flank pain, frequency and hematuria.    Musculoskeletal: Negative for back pain and joint pain.   Skin: Negative for itching and rash.   Neurological: Negative for sensory change, speech change, focal weakness and weakness.   Endo/Heme/Allergies: Negative for environmental allergies and polydipsia.   Psychiatric/Behavioral: Negative for memory loss. The patient is not nervous/anxious and does not have insomnia.              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   • Cataract     robbi IOL   • Chickenpox    • Cholesterol blood decreased    • Chronic kidney disease, stage III (moderate) ([facility])    • Cold    • Cough    • Depression    • Diabetes     oral meds   • Difficulty breathing    • Fever    • GERD (gastroesophageal reflux disease)    • GOUT    • Gout    • History of skull fracture    • History of urinary tract infection    • Hyperlipidemia    • Hypertension    • Indigestion    • Influenza    • Insomnia    • Mixed hyperlipidemia    • Pneumonia 9/2015   • Proteinuria    • Ringing in ears    • Shortness of breath    • Sputum production    • Tonsillitis    • Type 2 diabetes mellitus ([facility])    • Wears glasses    • Weight loss      Active Hospital Problems    Diagnosis    • GIB (gastrointestinal bleeding) [K92.2]    • Hypotension [I95.9]    • Chronic kidney disease (CKD) [N18.9]    • Type 2 diabetes mellitus with hyperglycemia (HCC) [E11.65]    • BPH (benign prostatic hyperplasia) [N40.0]          Past Surgical History:  Past Surgical History:   Procedure Laterality Date   • SEPTAL RECONSTRUCTION  12/7/2015    Procedure: SEPTAL RECONSTRUCTION OPEN WITH  GRAFTS & CONCHAL CART GRAFT;  Surgeon: SYDNIE Gaitan M.D.;  Location: SURGERY SAME DAY [facility];  Service:    • BLEPHAROPLASTY  7/23/2014    Performed by Gera Plasencia M.D. at  [facility]S   • BROW LIFT  7/23/2014    Performed by Gera Plasencia M.D. at [facility]   • CATARACT PHACO WITH IOL  12/4/2012    Performed by Suraj Gonzalez M.D. at [facility]   • CATARACT PHACO WITH IOL  11/20/2012    Performed by Suraj Gonzalez M.D. at [facility]   • APPENDECTOMY     • ARTHROSCOPY, KNEE     • CARDIAC CATH, LEFT HEART      C out of concern for STEMI, normal coronaries with minimal atherosclerosis, diagnosed with PNA as cause of symptoms and ST changes.    • OTHER      KNEE SURGERY - LEFT.   • OTHER      NOSE SURGERY.   • TONSILLECTOMY           Hospital Medications:  Current Facility-Administered Medications   Medication Dose Frequency Provider Last Rate Last Admin   • simvastatin (ZOCOR) tablet 40 mg  40 mg Nightly Zenobia Frederick M.D.       • zolpidem (AMBIEN) tablet 5-10 mg  5-10 mg HS PRN Zenobia Frederick M.D.       • senna-docusate (PERICOLACE or SENOKOT S) 8.6-50 MG per tablet 2 Tablet  2 Tablet BID Zenobia Frederick M.D.        And   • polyethylene glycol/lytes (MIRALAX) PACKET 1 Packet  1 Packet QDAY PRN Zenobia Frederick M.D.        And   • magnesium hydroxide (MILK OF MAGNESIA) suspension 30 mL  30 mL QDAY PRN Zenobia Frederick M.D.        And   • bisacodyl (DULCOLAX) suppository 10 mg  10 mg QDAY PRN Zenboia Frederick M.D.       • NS infusion   Continuous Zenobia Frederick M.D. 75 mL/hr at 12/10/21 2307 New Bag at 12/10/21 2307   • acetaminophen (Tylenol) tablet 650 mg  650 mg Q6HRS PRN Zenobia Frederick M.D.       • ondansetron (ZOFRAN) syringe/vial injection 4 mg  4 mg Q4HRS PRN Zenobia Frederick M.D.       • ondansetron (ZOFRAN ODT) dispertab 4 mg  4 mg Q4HRS PRN Zenobia Frederick M.D.       • insulin regular (HumuLIN R,NovoLIN R) injection  1-6 Units Q6HRS Zenobia Frederick M.D.        And   • dextrose 50% (D50W) injection 50 mL  50 mL Q15 MIN PRN Zenobia Frederick M.D.       Last reviewed on 12/10/2021 10:15 PM by Es Laboy        Current Outpatient  "Medications:  Medications Prior to Admission   Medication Sig Dispense Refill Last Dose   • amLODIPine (NORVASC) 2.5 MG Tab Take 5 mg by mouth every day.   12/10/2021 at 1630   • aspirin EC (ECOTRIN) 81 MG Tablet Delayed Response Take 81 mg by mouth every day.   12/10/2021 at 1630   • melatonin 1 mg Tab Take 1 mg by mouth at bedtime.   12/9/2021 at 2100   • carvedilol (COREG) 12.5 MG Tab Take 1 tablet by mouth 2 times a day with meals. 200 tablet 3 12/10/2021 at 1630   • olmesartan (BENICAR) 20 MG Tab Take 1 tablet by mouth every day. 30 tablet 3 12/10/2021 at 0800   • simvastatin (ZOCOR) 40 MG Tab Take 40 mg by mouth every evening.   12/10/2021 at 1630   • OZEMPIC, 0.25 OR 0.5 MG/DOSE, 2 MG/1.5ML Solution Pen-injector Inject 0.5 mg under the skin every Sunday.   12/5/2021 at [facility]   • B-D UF III MINI PEN NEEDLES 31G X 5 MM Misc    Continuous at Supply   • CONTOUR NEXT TEST strip    Continuous at Supply   • B Complex Vitamins (B COMPLEX 1 PO) Take 1 tablet by mouth every day.   12/10/2021 at 0800   • Testosterone (ANDROGEL) 20.25 MG/1.25GM (1.62%) GEL Place 60.75 mg on the skin every day. \"2 pumps on one shoulder and 1 pump on the other for a total of 3 pumps\"   12/10/2021 at 0800   • zolpidem (AMBIEN) 10 MG TABS Take 5-10 mg by mouth at bedtime as needed.   12/9/2021 at 2100   • allopurinol (ZYLOPRIM) 100 MG Tab Take 100 mg by mouth every day.   12/10/2021 at 0800   • FISH OIL Take 1 capsule by mouth every day.   12/10/2021 at 1630   • Cholecalciferol (VITAMIN D) 2000 UNIT Tab Take 1 capsule by mouth 2 (two) times a day.   12/10/2021 at 1630         Medication Allergies:  Allergies   Allergen Reactions   • Ether      Violently sick   • Lipitor [Atorvastatin Calcium]      LEG PAINS     • Polyethylene Glycol Unspecified         Family Medical History:  Family History   Problem Relation Age of Onset   • Diabetes Father    • Heart Attack Father 79   • Cancer Brother          Social History:  Social History "     Socioeconomic History   • Marital status:      Spouse name: Not on file   • Number of children: Not on file   • Years of education: Not on file   • Highest education level: Bachelor's degree (e.g., BA, AB, BS)   Occupational History   • Not on file   Tobacco Use   • Smoking status: Former Smoker     Packs/day: 0.20     Years: 6.00     Pack years: 1.20     Quit date: 1965     Years since quittin.9   • Smokeless tobacco: Never Used   • Tobacco comment: Stopped over 25 years ago.   Vaping Use   • Vaping Use: Never used   Substance and Sexual Activity   • Alcohol use: No   • Drug use: No   • Sexual activity: Not on file   Other Topics Concern   • Not on file   Social History Narrative    Retired CPA.     Social Determinants of Health     Financial Resource Strain: Low Risk    • Difficulty of Paying Living Expenses: Not very hard   Food Insecurity: No Food Insecurity   • Worried About Running Out of Food in the Last Year: Never true   • Ran Out of Food in the Last Year: Never true   Transportation Needs: No Transportation Needs   • Lack of Transportation (Medical): No   • Lack of Transportation (Non-Medical): No   Physical Activity: Insufficiently Active   • Days of Exercise per Week: 3 days   • Minutes of Exercise per Session: 20 min   Stress: Stress Concern Present   • Feeling of Stress : To some extent   Social Connections: Moderately Isolated   • Frequency of Communication with Friends and Family: More than three times a week   • Frequency of Social Gatherings with Friends and Family: Once a week   • Attends Taoism Services: Never   • Active Member of Clubs or Organizations: Yes   • Attends Club or Organization Meetings: More than 4 times per year   • Marital Status:    Intimate Partner Violence:    • Fear of Current or Ex-Partner: Not on file   • Emotionally Abused: Not on file   • Physically Abused: Not on file   • Sexually Abused: Not on file   Housing Stability: Low Risk    • Unable to  "Pay for Housing in the Last Year: No   • Number of Places Lived in the Last Year: 1   • Unstable Housing in the Last Year: No         Vital signs:  Weight/BMI: Body mass index is 26.6 kg/m².  /79   Pulse 86   Temp 36.6 °C (97.9 °F) (Temporal)   Resp 18   Ht 1.803 m (5' 11\")   Wt 86.5 kg (190 lb 11.2 oz)   SpO2 94%   Vitals:    12/10/21 2200 12/10/21 2237 12/11/21 0423 12/11/21 0728   BP: 139/79 (!) 163/94 135/75 158/79   Pulse: 80 75 90 86   Resp: 20 16 16 18   Temp:  36.8 °C (98.2 °F) 37.1 °C (98.7 °F) 36.6 °C (97.9 °F)   TempSrc:  Temporal Temporal Temporal   SpO2: 95% 94% 93% 94%   Weight:  86.5 kg (190 lb 11.2 oz)     Height:  1.803 m (5' 11\")       Oxygen Therapy:  Pulse Oximetry: 94 %, O2 (LPM): 0, O2 Delivery Device: None - Room Air  No intake or output data in the 24 hours ending 12/11/21 1002      Physical Exam:  Physical Exam  Vitals and nursing note reviewed.   Constitutional:       Appearance: Normal appearance.   HENT:      Head: Normocephalic and atraumatic.      Right Ear: External ear normal.      Left Ear: External ear normal.      Nose: Nose normal. No congestion.      Mouth/Throat:      Mouth: Mucous membranes are moist.      Pharynx: Oropharynx is clear.   Eyes:      General: No scleral icterus.     Extraocular Movements: Extraocular movements intact.      Pupils: Pupils are equal, round, and reactive to light.   Cardiovascular:      Rate and Rhythm: Normal rate and regular rhythm.      Pulses: Normal pulses.      Heart sounds: Normal heart sounds. No murmur heard.      Pulmonary:      Effort: Pulmonary effort is normal. No respiratory distress.      Breath sounds: Normal breath sounds. No wheezing or rales.   Abdominal:      General: Abdomen is flat. Bowel sounds are normal. There is no distension.      Palpations: Abdomen is soft.      Tenderness: There is no abdominal tenderness.   Musculoskeletal:         General: No tenderness.      Cervical back: Neck supple.      Right lower " leg: No edema.      Left lower leg: No edema.   Lymphadenopathy:      Cervical: No cervical adenopathy.   Skin:     General: Skin is warm and dry.      Capillary Refill: Capillary refill takes less than 2 seconds.      Coloration: Skin is not pale.   Neurological:      General: No focal deficit present.      Mental Status: He is alert and oriented to person, place, and time.   Psychiatric:         Mood and Affect: Mood normal.         Behavior: Behavior normal.         Thought Content: Thought content normal.         Judgment: Judgment normal.               Labs:  Recent Labs     12/09/21  0142 12/10/21  2024 12/11/21  0434   SODIUM 140 140 139   POTASSIUM 3.9 4.4 4.1   CHLORIDE 106 107 107   CO2 21 22 18*   BUN 31* 32* 36*   CREATININE 2.15* 2.33* 2.13*   CALCIUM 9.5 9.2 9.3     Recent Labs     12/08/21  1907 12/08/21  1907 12/09/21  0142 12/10/21  2024 12/11/21  0434   ALTSGPT 13  --   --  9  --    ASTSGOT 12  --   --  8*  --    ALKPHOSPHAT 99  --   --  77  --    TBILIRUBIN 0.4  --   --  0.4  --    LIPASE 32  --   --  23  --    GLUCOSE 159*   < > 153* 196* 136*    < > = values in this interval not displayed.     Recent Labs     12/08/21  1907 12/09/21  0142 12/10/21  2024 12/10/21  2306 12/11/21  0434   WBC 7.2   < > 10.8 14.1* 9.7   NEUTSPOLYS 63.60  --  75.30*  --   --    LYMPHOCYTES 23.90  --  16.10*  --   --    MONOCYTES 8.80  --  6.20  --   --    EOSINOPHILS 2.80  --  1.30  --   --    BASOPHILS 0.60  --  0.60  --   --    ASTSGOT 12  --  8*  --   --    ALTSGPT 13  --  9  --   --    ALKPHOSPHAT 99  --  77  --   --    TBILIRUBIN 0.4  --  0.4  --   --     < > = values in this interval not displayed.     Recent Labs     12/08/21  1907 12/09/21  0142 12/10/21  2024 12/10/21  2306 12/11/21  0434   RBC 5.19   < > 4.12* 4.68* 4.17*   HEMOGLOBIN 15.1   < > 12.0* 13.8* 12.2*   HEMATOCRIT 46.5   < > 37.0* 41.7* 37.5*   PLATELETCT 248   < > 246 280 253   PROTHROMBTM 13.5  --  14.6  --   --    APTT 29.4  --  25.3  --   --     INR 1.12  --  1.17*  --   --     < > = values in this interval not displayed.     Recent Results (from the past 24 hour(s))   COD (ADULT)    Collection Time: 12/10/21  8:24 PM   Result Value Ref Range    ABO Grouping Only A     Rh Grouping Only POS     Antibody Screen-Cod NEG    CBC WITH DIFFERENTIAL    Collection Time: 12/10/21  8:24 PM   Result Value Ref Range    WBC 10.8 4.8 - 10.8 K/uL    RBC 4.12 (L) 4.70 - 6.10 M/uL    Hemoglobin 12.0 (L) 14.0 - 18.0 g/dL    Hematocrit 37.0 (L) 42.0 - 52.0 %    MCV 89.8 81.4 - 97.8 fL    MCH 29.1 27.0 - 33.0 pg    MCHC 32.4 (L) 33.7 - 35.3 g/dL    RDW 46.9 35.9 - 50.0 fL    Platelet Count 246 164 - 446 K/uL    MPV 10.8 9.0 - 12.9 fL    Neutrophils-Polys 75.30 (H) 44.00 - 72.00 %    Lymphocytes 16.10 (L) 22.00 - 41.00 %    Monocytes 6.20 0.00 - 13.40 %    Eosinophils 1.30 0.00 - 6.90 %    Basophils 0.60 0.00 - 1.80 %    Immature Granulocytes 0.50 0.00 - 0.90 %    Nucleated RBC 0.00 /100 WBC    Neutrophils (Absolute) 8.12 (H) 1.82 - 7.42 K/uL    Lymphs (Absolute) 1.73 1.00 - 4.80 K/uL    Monos (Absolute) 0.67 0.00 - 0.85 K/uL    Eos (Absolute) 0.14 0.00 - 0.51 K/uL    Baso (Absolute) 0.06 0.00 - 0.12 K/uL    Immature Granulocytes (abs) 0.05 0.00 - 0.11 K/uL    NRBC (Absolute) 0.00 K/uL   COMP METABOLIC PANEL    Collection Time: 12/10/21  8:24 PM   Result Value Ref Range    Sodium 140 135 - 145 mmol/L    Potassium 4.4 3.6 - 5.5 mmol/L    Chloride 107 96 - 112 mmol/L    Co2 22 20 - 33 mmol/L    Anion Gap 11.0 7.0 - 16.0    Glucose 196 (H) 65 - 99 mg/dL    Bun 32 (H) 8 - 22 mg/dL    Creatinine 2.33 (H) 0.50 - 1.40 mg/dL    Calcium 9.2 8.5 - 10.5 mg/dL    AST(SGOT) 8 (L) 12 - 45 U/L    ALT(SGPT) 9 2 - 50 U/L    Alkaline Phosphatase 77 30 - 99 U/L    Total Bilirubin 0.4 0.1 - 1.5 mg/dL    Albumin 3.6 3.2 - 4.9 g/dL    Total Protein 6.0 6.0 - 8.2 g/dL    Globulin 2.4 1.9 - 3.5 g/dL    A-G Ratio 1.5 g/dL   LIPASE    Collection Time: 12/10/21  8:24 PM   Result Value Ref Range     Lipase 23 11 - 82 U/L   PROTHROMBIN TIME    Collection Time: 12/10/21  8:24 PM   Result Value Ref Range    PT 14.6 12.0 - 14.6 sec    INR 1.17 (H) 0.87 - 1.13   APTT    Collection Time: 12/10/21  8:24 PM   Result Value Ref Range    APTT 25.3 24.7 - 36.0 sec   ESTIMATED GFR    Collection Time: 12/10/21  8:24 PM   Result Value Ref Range    GFR If  33 (A) >60 mL/min/1.73 m 2    GFR If Non  27 (A) >60 mL/min/1.73 m 2   CBC without Differential    Collection Time: 12/10/21 11:06 PM   Result Value Ref Range    WBC 14.1 (H) 4.8 - 10.8 K/uL    RBC 4.68 (L) 4.70 - 6.10 M/uL    Hemoglobin 13.8 (L) 14.0 - 18.0 g/dL    Hematocrit 41.7 (L) 42.0 - 52.0 %    MCV 89.1 81.4 - 97.8 fL    MCH 29.5 27.0 - 33.0 pg    MCHC 33.1 (L) 33.7 - 35.3 g/dL    RDW 45.8 35.9 - 50.0 fL    Platelet Count 280 164 - 446 K/uL    MPV 10.9 9.0 - 12.9 fL   POCT glucose device results    Collection Time: 12/10/21 11:41 PM   Result Value Ref Range    Glucose - Accu-Ck 117 (H) 65 - 99 mg/dL   Basic Metabolic Panel (BMP)    Collection Time: 12/11/21  4:34 AM   Result Value Ref Range    Sodium 139 135 - 145 mmol/L    Potassium 4.1 3.6 - 5.5 mmol/L    Chloride 107 96 - 112 mmol/L    Co2 18 (L) 20 - 33 mmol/L    Glucose 136 (H) 65 - 99 mg/dL    Bun 36 (H) 8 - 22 mg/dL    Creatinine 2.13 (H) 0.50 - 1.40 mg/dL    Calcium 9.3 8.5 - 10.5 mg/dL    Anion Gap 14.0 7.0 - 16.0   CBC without Differential    Collection Time: 12/11/21  4:34 AM   Result Value Ref Range    WBC 9.7 4.8 - 10.8 K/uL    RBC 4.17 (L) 4.70 - 6.10 M/uL    Hemoglobin 12.2 (L) 14.0 - 18.0 g/dL    Hematocrit 37.5 (L) 42.0 - 52.0 %    MCV 89.9 81.4 - 97.8 fL    MCH 29.3 27.0 - 33.0 pg    MCHC 32.5 (L) 33.7 - 35.3 g/dL    RDW 47.5 35.9 - 50.0 fL    Platelet Count 253 164 - 446 K/uL    MPV 11.1 9.0 - 12.9 fL   ESTIMATED GFR    Collection Time: 12/11/21  4:34 AM   Result Value Ref Range    GFR If  36 (A) >60 mL/min/1.73 m 2    GFR If Non African American 30  (A) >60 mL/min/1.73 m 2   POCT glucose device results    Collection Time: 12/11/21  4:47 AM   Result Value Ref Range    Glucose - Accu-Ck 121 (H) 65 - 99 mg/dL         Radiology Review:  No orders to display         MDM (Data Review):   -Records reviewed and summarized in current documentation  -I personally reviewed and interpreted the laboratory results  -I personally reviewed the radiology images        Medical Decision Making, by Problem:  Active Hospital Problems    Diagnosis    • GIB (gastrointestinal bleeding) [K92.2]    • Hypotension [I95.9]    • Chronic kidney disease (CKD) [N18.9]    • Type 2 diabetes mellitus with hyperglycemia (HCC) [E11.65]    • BPH (benign prostatic hyperplasia) [N40.0]            Assessment/Problems:  Assessment:  1.  Hematochezia-suggestive of lower gastrointestinal bleeding.  Suspect diverticular bleeding based on description without pain.  CT scan did demonstrate possible slight hazy fat stranding adjacent to the sigmoid colon, but cannot get contrast with a CT scan due to his renal function.  He has not had leukocytosis other than one measurement over the last 2 days.  He does not have fevers or chills.  No pain on palpation.  It is possible he may have mild diverticulitis.  Additional differential diagnosis does include malignancy, ulcer, or other bleeding lesion.    2. Melena  3.  Acute posthemorrhagic anemia-stable  4.  Type 2 diabetes mellitus  5.  Chronic kidney disease  6.  BPH  7.  Coronary artery disease and peripheral vascular disease  8.  Hypertension  9.  Hyperlipidemia  10.  Increased complexity medical decision making secondary to above    Recommendations:  Plan:  1.  Clinically diet today, n.p.o. after midnight  2.  Rapid Covid  3.  MoviPrep tonight  4.  Upper enteroscopy and colonoscopy with attempted hemostasis under anesthesia with possible biopsies, snare polypectomy(ies) and/or other endotherapy scheduled with Dr. Dariel Simons at 9 AM tomorrow     Risks,  benefits, and alternatives of aforementioned procedures were discussed with patient in detail. Patient was given opportunities to ask questions and discuss other options.  Risks including but not limited to perforation, infection, bleeding, missed lesion(s), possible need for surgery(ies) and/or interventional radiology, possible need for repeat procedure(s) and/or additional testing, hospitalization possibly prolonged, cardiac and/or pulmonary event, aspiration, hypoxia, stroke, medication (s) and/or anesthesia reaction(s), indefinite diagnosis, discomfort/pain, unsuccessful and/or incomplete procedure, ineffective therapy, persistent symptoms, damage to adjacent organs/structure and/or vascular such as splenic laceration, and other adverse event(s) possibly life-threatening.  Interactive discussion was undertaken with Layman's terms.  I answered questions in full and to satisfaction.  I gave opportunity to cancel, delay and/or reschedule if not completely comfortable with proceeding.  Patient stated understanding and acceptance of these risks, and wished to proceed.   Informed consent was given in clear state of mind and paper permit was confirmed to have been signed before proceeding.    5.  Serial hemoglobin and hematocrit, transfuse for hemoglobin less than 7    LLOYD Vallejo.  Dariel Simons MD, [facility], [facility]    Thank you for inviting me to participate in the care of this patient. Please do not hesitate to call GI consultants with additional questions/concerns or changes in the patient's clinical status at [phone].    Core Quality Measures   Reviewed items:  Labs, Medications and Radiology reports reviewed

## 2021-12-11 NOTE — ASSESSMENT & PLAN NOTE
Diagnosed inpatient at [facility]  CT abdomen suspicious for diverticulitis  GI Dr. Simons on board, appreciate recs  -Colonoscopy confirmed   -IV Zosyn started  -Clear liquid diet

## 2021-12-11 NOTE — CARE PLAN
Problem: Knowledge Deficit - Standard  Goal: Patient and family/care givers will demonstrate understanding of plan of care, disease process/condition, diagnostic tests and medications  Outcome: Progressing   The patient is Stable - Low risk of patient condition declining or worsening    Shift Goals  Clinical Goals: rest, ivf, maintain appropriate h&h  Patient Goals: rest  Family Goals: n/a    Progress made toward(s) clinical / shift goals:  patient updated on POC    Patient is not progressing towards the following goals:

## 2021-12-11 NOTE — PROGRESS NOTES
Assessment completed. Pt A&Ox 4. Respirations are even and unlabored on room air. Pt denies pain at this time. Monitors applied, VS stable, call light and belongings within reach. POC updated (Colonscopy). Pt educated on room and call light, pt verbalized understanding. Communication board updated. Needs met.

## 2021-12-11 NOTE — ED NOTES
Med Rec completed per patient and med list at bedside (Returned)  Allergies reviewed  No ORAL antibiotics in last 30 days    Patient takes Aspirin 81 mg daily. Last dose was today at 1630.\  Patient takes fish oil daily, last dose today at 1630.    Patient reports applying Androgel 20.25 mg/1.25 gm gel, 2 pumps on one shoulder and 1 pump on the other shoulder for a total of 3 pumps. Last dose was this morning at 0800.

## 2021-12-11 NOTE — ED NOTES
Pt had 1 episode of bright red stool upon arrival to ED. Pt now resting quietly in gurney. Pt denies pain at this time; denies NV. Pt is in no apparent distress with stable VS. Will continue to monitor pt.    Pt updated regarding plan of care and verbalized understanding.

## 2021-12-11 NOTE — PROGRESS NOTES
[facility] Daily Progress Note    Date of Service  12/11/2021    Chief Complaint  Tyrone Cline is a 81 y.o. male admitted 12/10/2021 with bloody stools    Hospital Course  Patient is an 81-year-old male with past medical history of diverticulitis, CKD stage IV under the care of nephrology (Dr. Del Rio), diabetes type 2, BPH status post TURP 7/1/2021,  hypertension, hyperlipidemia, and aortic aneurysm who presented 12/10/2021 with complaints of 6 episodes of bloody stool.  Patient reports the stool was bright red in color and he felt dizzy and lightheaded afterwards.  He was admitted overnight at [facility] from Wednesday to Thursday of last week for the same problem, however was discharged when his hemoglobin and hematocrit stabilized.  He was diagnosed with diverticulitis at that time.  He follows with Dr. [facility] and had a scheduled appointment for Monday Dec 13.    In the ED, patient was found to have hypotension with H&H of 12 and 37.  His blood pressure stabilized with fluid administration.  He was admitted for GI consultation and monitoring of his GI bleed.    Dr. Simons of [facility] was notified of this patient's admission.  He will go for a colonoscopy tomorrow 12/12/2021.    Interval Problem Update  12/11/2021: Patient seen and examined.  Complains of feeling hungry, but no acute complaints.  Last episode of bloody stool last night at 6 PM.  H&H this morning 12.2 and 37.5.  Vital signs stable.    Disposition: Patient to undergo colonoscopy tomorrow morning with Dr. Simons.  Anticipate discharge tomorrow.    I have personally seen and examined the patient at bedside. I discussed the plan of care with patient, bedside RN and Dr. Moreau.    Consultants/Specialty  GI    Code Status  Full Code    Disposition  Patient is medically cleared pending colonoscopy.   Anticipate discharge to to home with close outpatient follow-up.  I have placed the appropriate orders for  post-discharge needs.    Review of Systems  Review of Systems   Constitutional: Negative for fever and malaise/fatigue.   HENT: Negative for hearing loss.    Respiratory: Negative for cough and shortness of breath.    Cardiovascular: Negative for chest pain, palpitations and leg swelling.   Gastrointestinal: Positive for abdominal pain and blood in stool.   Genitourinary: Negative for dysuria and frequency.   Neurological: Negative for dizziness and weakness.   Psychiatric/Behavioral: Negative for depression.   All other systems reviewed and are negative.       Physical Exam  Temp:  [35.6 °C (96 °F)-37.1 °C (98.7 °F)] 36.6 °C (97.9 °F)  Pulse:  [63-90] 86  Resp:  [16-20] 18  BP: (103-163)/(62-94) 158/79  SpO2:  [93 %-96 %] 94 %    Physical Exam  Vitals and nursing note reviewed.   Constitutional:       Appearance: Normal appearance.   HENT:      Head: Normocephalic.      Mouth/Throat:      Mouth: Mucous membranes are dry.   Cardiovascular:      Rate and Rhythm: Normal rate and regular rhythm.      Pulses: Normal pulses.      Heart sounds: Normal heart sounds.   Pulmonary:      Effort: Pulmonary effort is normal.      Breath sounds: Normal breath sounds.   Abdominal:      General: Abdomen is flat.      Tenderness: There is abdominal tenderness (Periumbilical and LUQ ).   Musculoskeletal:         General: Normal range of motion.   Skin:     General: Skin is warm and dry.      Capillary Refill: Capillary refill takes less than 2 seconds.   Neurological:      Mental Status: He is alert and oriented to person, place, and time.   Psychiatric:         Mood and Affect: Mood normal.       Fluids  No intake or output data in the 24 hours ending 12/11/21 1227    Laboratory  Recent Labs     12/10/21  2024 12/10/21  2306 12/11/21  0434   WBC 10.8 14.1* 9.7   RBC 4.12* 4.68* 4.17*   HEMOGLOBIN 12.0* 13.8* 12.2*   HEMATOCRIT 37.0* 41.7* 37.5*   MCV 89.8 89.1 89.9   MCH 29.1 29.5 29.3   MCHC 32.4* 33.1* 32.5*   RDW 46.9 45.8 47.5    PLATELETCT 246 280 253   MPV 10.8 10.9 11.1     Recent Labs     12/09/21  0142 12/10/21  2024 12/11/21  0434   SODIUM 140 140 139   POTASSIUM 3.9 4.4 4.1   CHLORIDE 106 107 107   CO2 21 22 18*   GLUCOSE 153* 196* 136*   BUN 31* 32* 36*   CREATININE 2.15* 2.33* 2.13*   CALCIUM 9.5 9.2 9.3     Recent Labs     12/08/21  1907 12/10/21  2024   APTT 29.4 25.3   INR 1.12 1.17*               Imaging  No orders to display        Assessment/Plan  * GIB (gastrointestinal bleeding)- (present on admission)  Assessment & Plan  6 episodes of bloody stool while at home  Recently discharged from [facility], diagnosed with diverticulitis  While in the ED, H&H 12 and 37  Hypotensive on admission, responded to fluids  [facility], Dr. Simons on board, appreciate recs  -Colonoscopy tomorrow  -Every 6hr H&H  -Transfuse for hemoglobin less than 7    Diverticulitis- (present on admission)  Assessment & Plan  Diagnosed inpatient at [facility]  CT abdomen suspicious for diverticulitis  GI Dr. Simons on board, appreciate recs  -Colonoscopy tomorrow    Hypotension- (present on admission)  Assessment & Plan  Hypotensive while in ED  Normotensive at admission  - Home medication regimen restarted    Chronic kidney disease (CKD)- (present on admission)  Assessment & Plan  CKD stage IV  Under the care of nephrology, Dr. Del Rio  Creatinine 2.13, BUN 36 this admission, which is improved from prior  Patient follows a renal diet and has his food delivered  -Avoid nephrotoxic medications  -Renal diet    Type 2 diabetes mellitus with hyperglycemia (HCC)- (present on admission)  Assessment & Plan  Under the care of an endocrinologist  Has a glucose monitoring system in place  -AC/HS and sliding scale while inpatient    BPH (benign prostatic hyperplasia)- (present on admission)  Assessment & Plan  Underwent TURP 7/1/2021  Placed on testosterone by urologist  Continue home medication regimen         VTE prophylaxis: SCDs/TEDs    I have performed  a physical exam and reviewed and updated ROS and Plan today (12/11/2021). In review of yesterday's note (12/10/2021), there are no changes except as documented above.

## 2021-12-11 NOTE — PROGRESS NOTES
Report received from JUAN JOSÉ Mcgraw.  Assumed care of pt.  Pt transferred from ED to T210. AOx4, responds appropriately. Pain 0/10.  Denies SOB, n/v.  Plan of care discussed.  Explained importance of calling before getting OOB and pt verbalizes understanding.  Pt oriented to room, call light and belongings within reach, treaded slipper socks on, bed in lowest locked position.

## 2021-12-11 NOTE — ED PROVIDER NOTES
ED Provider Note    CHIEF COMPLAINT  Chief Complaint   Patient presents with   • Rectal Bleeding     per EMS, pt c/o rectal bleeding bright red stool that started about 5pm. pt stated he had 6 episodes of bright red stool. pt denies any pain at this time. pt c/o dizziness. pt denies NV. per pt, he was seen in [facility] last Wednesday for the same complaint but was discharged yesterday with diverticulitis diagnosis. pt was given about 400 mL NS by EMS prior to arrival for bp at 72/42. bp upon arrival is at 103/62       HPI  Tyrone Cline is a 81 y.o. male who presents with rectal bleeding.  The patient states he was admitted to [facility] through the emergency department after he presented there 2 days ago with some blood from his rectum.  Initially CT scan showed possible diverticulitis but he states the numbers improved and he was discharged yesterday on no antibiotics for outpatient GI follow-up.  The patient states today's about 6 bouts of bloody stool.  He states he is dizzy and weak.  He continues have some left lower quadrant abdominal pain.  He does not have any nausea or vomiting.  He has not had any associated fevers.  He does not take any anticoagulants.    REVIEW OF SYSTEMS  See HPI for further details. All other systems are negative.     PAST MEDICAL HISTORY  Past Medical History:   Diagnosis Date   • Pneumonia 9/2015   • Abdominal pain    • Abnormal electrocardiogram    • ACE inhibitor intolerance    • BPH (benign prostatic hyperplasia)    • Bruxism    • Cancer (HCC)     basal and squamous cell to face   • Cataract     robbi IOL   • Chickenpox    • Cholesterol blood decreased    • Chronic kidney disease, stage III (moderate) (HCC)    • Cold    • Cough    • Depression    • Diabetes     oral meds   • Difficulty breathing    • Fever    • GERD (gastroesophageal reflux disease)    • GOUT    • Gout    • History of skull fracture    • History of urinary tract infection    •  Hyperlipidemia    • Hypertension    • Indigestion    • Influenza    • Insomnia    • Mixed hyperlipidemia    • Proteinuria    • Ringing in ears    • Shortness of breath    • Sputum production    • Tonsillitis    • Type 2 diabetes mellitus (HCC)    • Wears glasses    • Weight loss        FAMILY HISTORY  [unfilled]    SOCIAL HISTORY  Social History     Socioeconomic History   • Marital status:      Spouse name: Not on file   • Number of children: Not on file   • Years of education: Not on file   • Highest education level: Not on file   Occupational History   • Not on file   Tobacco Use   • Smoking status: Former Smoker     Packs/day: 0.20     Years: 6.00     Pack years: 1.20     Quit date:      Years since quittin.9   • Smokeless tobacco: Never Used   • Tobacco comment: Stopped over 25 years ago.   Vaping Use   • Vaping Use: Never used   Substance and Sexual Activity   • Alcohol use: No   • Drug use: No   • Sexual activity: Not on file   Other Topics Concern   • Not on file   Social History Narrative    Retired CPA.     Social Determinants of Health     Financial Resource Strain: Low Risk    • Difficulty of Paying Living Expenses: Not hard at all   Food Insecurity: No Food Insecurity   • Worried About Running Out of Food in the Last Year: Never true   • Ran Out of Food in the Last Year: Never true   Transportation Needs: No Transportation Needs   • Lack of Transportation (Medical): No   • Lack of Transportation (Non-Medical): No   Physical Activity:    • Days of Exercise per Week: Not on file   • Minutes of Exercise per Session: Not on file   Stress:    • Feeling of Stress : Not on file   Social Connections:    • Frequency of Communication with Friends and Family: Not on file   • Frequency of Social Gatherings with Friends and Family: Not on file   • Attends Anabaptist Services: Not on file   • Active Member of Clubs or Organizations: Not on file   • Attends Club or Organization Meetings: Not on file   •  Marital Status: Not on file   Intimate Partner Violence:    • Fear of Current or Ex-Partner: Not on file   • Emotionally Abused: Not on file   • Physically Abused: Not on file   • Sexually Abused: Not on file   Housing Stability:    • Unable to Pay for Housing in the Last Year: Not on file   • Number of Places Lived in the Last Year: Not on file   • Unstable Housing in the Last Year: Not on file       SURGICAL HISTORY  Past Surgical History:   Procedure Laterality Date   • SEPTAL RECONSTRUCTION  12/7/2015    Procedure: SEPTAL RECONSTRUCTION OPEN WITH  GRAFTS & CONCHAL CART GRAFT;  Surgeon: SYDNIE Gaitan M.D.;  Location: SURGERY SAME DAY [facility] ORS;  Service:    • BLEPHAROPLASTY  7/23/2014    Performed by Gera Plasencia M.D. at [facility] ORS   • BROW LIFT  7/23/2014    Performed by Gera Plasencia M.D. at [facility] ORS   • CATARACT PHACO WITH IOL  12/4/2012    Performed by Suraj Gonzalez M.D. at [facility] ORS   • CATARACT PHACO WITH IOL  11/20/2012    Performed by Suraj Gonzalez M.D. at [facility] ORS   • APPENDECTOMY     • ARTHROSCOPY, KNEE     • CARDIAC CATH, LEFT HEART      LHC out of concern for STEMI, normal coronaries with minimal atherosclerosis, diagnosed with PNA as cause of symptoms and ST changes.    • OTHER      KNEE SURGERY - LEFT.   • OTHER      NOSE SURGERY.   • TONSILLECTOMY         CURRENT MEDICATIONS  Home Medications     Reviewed by Mariluz Singh R.N. (Registered Nurse) on 12/10/21 at 2016  Med List Status: Not Addressed   Medication Last Dose Status   allopurinol (ZYLOPRIM) 300 MG TABS  Active   amLODIPine (NORVASC) 2.5 MG Tab  Active   B Complex Vitamins (B COMPLEX 1 PO)  Active   B-D UF III MINI PEN NEEDLES 31G X 5 MM Misc  Active   carvedilol (COREG) 12.5 MG Tab  Active   Cholecalciferol (VITAMIN D) 2000 UNIT Tab  Active   CONTOUR NEXT TEST strip  Active   FISH OIL  Active   Insulin Degludec (TRESIBA) 100 UNIT/ML Solution  " Active   Mirabegron ER (MYRBETRIQ) 50 MG TABLET SR 24 HR  Active   olmesartan (BENICAR) 20 MG Tab  Active   OZEMPIC, 0.25 OR 0.5 MG/DOSE, 2 MG/1.5ML Solution Pen-injector  Active   simvastatin (ZOCOR) 40 MG Tab  Active   Testosterone (ANDROGEL) 20.25 MG/1.25GM (1.62%) GEL  Active   zolpidem (AMBIEN) 10 MG TABS  Active                ALLERGIES  Allergies   Allergen Reactions   • Ether      Violently sick   • Lipitor [Atorvastatin Calcium]      LEG PAINS     • Polyethylene Glycol Unspecified       PHYSICAL EXAM  VITAL SIGNS: /62   Pulse 63   Resp 20   Ht 1.803 m (5' 11\")   Wt 81.6 kg (180 lb)   SpO2 94%   BMI 25.10 kg/m²       Constitutional: Pale and ill in appearance  HENT: Normocephalic, Atraumatic, Bilateral external ears normal, Oropharynx moist, No oral exudates, Nose normal.   Eyes: PERRLA, EOMI, Conjunctiva normal, No discharge.   Neck: Normal range of motion, No tenderness, Supple, No stridor.   Lymphatic: No lymphadenopathy noted.   Cardiovascular: Normal heart rate, Normal rhythm, No murmurs, No rubs, No gallops.   Thorax & Lungs: Normal breath sounds, No respiratory distress, No wheezing, No chest tenderness.   Abdomen: Bowel sounds normal, Soft, left lower quadrant tenderness, No masses, No pulsatile masses.   Skin: Diffuse pallor.   Back: No tenderness, No CVA tenderness.   Extremities: Intact distal pulses, No edema, No tenderness, No cyanosis, No clubbing.    Neurologic: Alert & oriented x 3, Normal motor function, Normal sensory function, No focal deficits noted.   Psychiatric: Affect normal, Judgment normal, Mood normal.     Results for orders placed or performed during the hospital encounter of 12/10/21   COD (ADULT)   Result Value Ref Range    ABO Grouping Only A     Rh Grouping Only POS     Antibody Screen-Cod NEG    CBC WITH DIFFERENTIAL   Result Value Ref Range    WBC 10.8 4.8 - 10.8 K/uL    RBC 4.12 (L) 4.70 - 6.10 M/uL    Hemoglobin 12.0 (L) 14.0 - 18.0 g/dL    Hematocrit 37.0 (L) " 42.0 - 52.0 %    MCV 89.8 81.4 - 97.8 fL    MCH 29.1 27.0 - 33.0 pg    MCHC 32.4 (L) 33.7 - 35.3 g/dL    RDW 46.9 35.9 - 50.0 fL    Platelet Count 246 164 - 446 K/uL    MPV 10.8 9.0 - 12.9 fL    Neutrophils-Polys 75.30 (H) 44.00 - 72.00 %    Lymphocytes 16.10 (L) 22.00 - 41.00 %    Monocytes 6.20 0.00 - 13.40 %    Eosinophils 1.30 0.00 - 6.90 %    Basophils 0.60 0.00 - 1.80 %    Immature Granulocytes 0.50 0.00 - 0.90 %    Nucleated RBC 0.00 /100 WBC    Neutrophils (Absolute) 8.12 (H) 1.82 - 7.42 K/uL    Lymphs (Absolute) 1.73 1.00 - 4.80 K/uL    Monos (Absolute) 0.67 0.00 - 0.85 K/uL    Eos (Absolute) 0.14 0.00 - 0.51 K/uL    Baso (Absolute) 0.06 0.00 - 0.12 K/uL    Immature Granulocytes (abs) 0.05 0.00 - 0.11 K/uL    NRBC (Absolute) 0.00 K/uL   COMP METABOLIC PANEL   Result Value Ref Range    Sodium 140 135 - 145 mmol/L    Potassium 4.4 3.6 - 5.5 mmol/L    Chloride 107 96 - 112 mmol/L    Co2 22 20 - 33 mmol/L    Anion Gap 11.0 7.0 - 16.0    Glucose 196 (H) 65 - 99 mg/dL    Bun 32 (H) 8 - 22 mg/dL    Creatinine 2.33 (H) 0.50 - 1.40 mg/dL    Calcium 9.2 8.5 - 10.5 mg/dL    AST(SGOT) 8 (L) 12 - 45 U/L    ALT(SGPT) 9 2 - 50 U/L    Alkaline Phosphatase 77 30 - 99 U/L    Total Bilirubin 0.4 0.1 - 1.5 mg/dL    Albumin 3.6 3.2 - 4.9 g/dL    Total Protein 6.0 6.0 - 8.2 g/dL    Globulin 2.4 1.9 - 3.5 g/dL    A-G Ratio 1.5 g/dL   LIPASE   Result Value Ref Range    Lipase 23 11 - 82 U/L   PROTHROMBIN TIME   Result Value Ref Range    PT 14.6 12.0 - 14.6 sec    INR 1.17 (H) 0.87 - 1.13   APTT   Result Value Ref Range    APTT 25.3 24.7 - 36.0 sec   ESTIMATED GFR   Result Value Ref Range    GFR If  33 (A) >60 mL/min/1.73 m 2    GFR If Non  27 (A) >60 mL/min/1.73 m 2         COURSE & MEDICAL DECISION MAKING  Pertinent Labs & Imaging studies reviewed. (See chart for details)  This an 81-year-old male who presents the emergency department with blood per his rectum as well as dizziness. He did appear  pale on arrival and was slightly hypotensive. Therefore he received intravenous fluids and a crossmatch on demand was ordered. Laboratory analysis does show acute anemia with a hemoglobin around 12 and yesterday was at 14. I suspect this is from acute blood loss from a lower GI bleed. His white count is stable. He is not acidotic. His blood pressure has stabilized with IV fluids. Will admit the patient to the hospital with GI consultation.    FINAL IMPRESSION  1. Lower GI bleed  2. Anemia secondary to GI bleed    Disposition  The patient will be admitted in guarded condition      Electronically signed by: Anibal Hernandez M.D., 12/10/2021 8:21 PM

## 2021-12-11 NOTE — PROGRESS NOTES
4 Eyes Skin Assessment Completed by JUAN JOSÉ Luis and JUAN JOSÉ Carver.    Head WDL  Ears WDL  Nose WDL  Mouth WDL  Neck WDL  Breast/Chest WDL  Shoulder Blades WDL  Spine WDL  (R) Arm/Elbow/Hand WDL  (L) Arm/Elbow/Hand WDL  Abdomen WDL  Groin WDL  Scrotum/Coccyx/Buttocks WDL  (R) Leg WDL  (L) Leg WDL  (R) Heel/Foot/Toe WDL  (L) Heel/Foot/Toe WDL          Devices In Places Blood Pressure Cuff and Pulse Ox      Interventions In Place Pillows and Pressure Redistribution Mattress    Possible Skin Injury No    Pictures Uploaded Into Epic N/A  Wound Consult Placed N/A  RN Wound Prevention Protocol Ordered No

## 2021-12-11 NOTE — ASSESSMENT & PLAN NOTE
6 episodes of bloody stool while at home  Recently discharged from [facility], diagnosed with diverticulitis  While in the ED, H&H 12 and 37  Hypotensive on admission, responded to fluids  GI consultants, Dr. Simons on board, appreciate recs  -Colonoscopy reveals moderate diverticulitis  -Starting IV Zosyn  -Continue clear liquid diet  -Monitor for bleeding

## 2021-12-11 NOTE — TELEPHONE ENCOUNTER
Outreach call to mbr to follow up regarding hospital discharge. Mbr reported he is okay but is having some rectal bleeding. He reported he doesn't feel going to the ER would help as he's been told he needs to follow up with GI outpatient. He does have an appt on Monday with GI but indicated he would like to get recommendations on what to eat/what he can eat until then. LSW inquired if he had reached out to [facility] to inform their medical team of current situation and to discuss diet. He reported he hadn't but reported he would call upon end of conversation with this LSW. LSW encouraged mbr regardless of previous recommendations from hospital, to follow up outpatient,  if his symptoms continue or he feels worse to go back to the ED immediately. Mbr voiced understanding. After conversation LSW staffed with RN care manager, Keila Singletary who reported mbr needed to follow up with [facility] on diet recommendation and go to ED if symptoms continue. LSW placed follow up call to mbr after staffing with RN care manager to determine if he had reached [facility]. Mbr did not answer, LSW left  with contact information for mbr to call back.

## 2021-12-12 ENCOUNTER — ANESTHESIA EVENT (OUTPATIENT)
Dept: SURGERY | Facility: MEDICAL CENTER | Age: 81
End: 2021-12-12
Payer: MEDICARE

## 2021-12-12 ENCOUNTER — ANESTHESIA (OUTPATIENT)
Dept: SURGERY | Facility: MEDICAL CENTER | Age: 81
End: 2021-12-12
Payer: MEDICARE

## 2021-12-12 LAB
EKG IMPRESSION: NORMAL
ERYTHROCYTE [DISTWIDTH] IN BLOOD BY AUTOMATED COUNT: 47.1 FL (ref 35.9–50)
GLUCOSE BLD-MCNC: 107 MG/DL (ref 65–99)
GLUCOSE BLD-MCNC: 116 MG/DL (ref 65–99)
GLUCOSE BLD-MCNC: 132 MG/DL (ref 65–99)
GLUCOSE BLD-MCNC: 209 MG/DL (ref 65–99)
GLUCOSE BLD-MCNC: 252 MG/DL (ref 65–99)
HCT VFR BLD AUTO: 33.3 % (ref 42–52)
HGB BLD-MCNC: 11.1 G/DL (ref 14–18)
MCH RBC QN AUTO: 29.6 PG (ref 27–33)
MCHC RBC AUTO-ENTMCNC: 33.3 G/DL (ref 33.7–35.3)
MCV RBC AUTO: 88.8 FL (ref 81.4–97.8)
PLATELET # BLD AUTO: 225 K/UL (ref 164–446)
PMV BLD AUTO: 10.8 FL (ref 9–12.9)
RBC # BLD AUTO: 3.75 M/UL (ref 4.7–6.1)
WBC # BLD AUTO: 9.7 K/UL (ref 4.8–10.8)

## 2021-12-12 PROCEDURE — 160048 HCHG OR STATISTICAL LEVEL 1-5: Performed by: INTERNAL MEDICINE

## 2021-12-12 PROCEDURE — 700101 HCHG RX REV CODE 250: Performed by: ANESTHESIOLOGY

## 2021-12-12 PROCEDURE — 700105 HCHG RX REV CODE 258: Performed by: INTERNAL MEDICINE

## 2021-12-12 PROCEDURE — 160002 HCHG RECOVERY MINUTES (STAT): Performed by: INTERNAL MEDICINE

## 2021-12-12 PROCEDURE — A9270 NON-COVERED ITEM OR SERVICE: HCPCS | Performed by: INTERNAL MEDICINE

## 2021-12-12 PROCEDURE — 88305 TISSUE EXAM BY PATHOLOGIST: CPT

## 2021-12-12 PROCEDURE — 96365 THER/PROPH/DIAG IV INF INIT: CPT

## 2021-12-12 PROCEDURE — 700111 HCHG RX REV CODE 636 W/ 250 OVERRIDE (IP): Performed by: ANESTHESIOLOGY

## 2021-12-12 PROCEDURE — 700102 HCHG RX REV CODE 250 W/ 637 OVERRIDE(OP): Performed by: NURSE PRACTITIONER

## 2021-12-12 PROCEDURE — 93005 ELECTROCARDIOGRAM TRACING: CPT | Performed by: INTERNAL MEDICINE

## 2021-12-12 PROCEDURE — G0378 HOSPITAL OBSERVATION PER HR: HCPCS

## 2021-12-12 PROCEDURE — 700102 HCHG RX REV CODE 250 W/ 637 OVERRIDE(OP): Performed by: INTERNAL MEDICINE

## 2021-12-12 PROCEDURE — 93010 ELECTROCARDIOGRAM REPORT: CPT | Performed by: INTERNAL MEDICINE

## 2021-12-12 PROCEDURE — 96366 THER/PROPH/DIAG IV INF ADDON: CPT

## 2021-12-12 PROCEDURE — 700111 HCHG RX REV CODE 636 W/ 250 OVERRIDE (IP): Performed by: INTERNAL MEDICINE

## 2021-12-12 PROCEDURE — 160203 HCHG ENDO MINUTES - 1ST 30 MINS LEVEL 4: Performed by: INTERNAL MEDICINE

## 2021-12-12 PROCEDURE — 96372 THER/PROPH/DIAG INJ SC/IM: CPT

## 2021-12-12 PROCEDURE — 85027 COMPLETE CBC AUTOMATED: CPT

## 2021-12-12 PROCEDURE — 88312 SPECIAL STAINS GROUP 1: CPT

## 2021-12-12 PROCEDURE — A9270 NON-COVERED ITEM OR SERVICE: HCPCS | Performed by: NURSE PRACTITIONER

## 2021-12-12 PROCEDURE — 36415 COLL VENOUS BLD VENIPUNCTURE: CPT

## 2021-12-12 PROCEDURE — 160035 HCHG PACU - 1ST 60 MINS PHASE I: Performed by: INTERNAL MEDICINE

## 2021-12-12 PROCEDURE — 160009 HCHG ANES TIME/MIN: Performed by: INTERNAL MEDICINE

## 2021-12-12 PROCEDURE — 82962 GLUCOSE BLOOD TEST: CPT | Mod: 91

## 2021-12-12 RX ORDER — OXYCODONE HCL 5 MG/5 ML
5 SOLUTION, ORAL ORAL
Status: DISCONTINUED | OUTPATIENT
Start: 2021-12-12 | End: 2021-12-12 | Stop reason: HOSPADM

## 2021-12-12 RX ORDER — SODIUM CHLORIDE, SODIUM LACTATE, POTASSIUM CHLORIDE, CALCIUM CHLORIDE 600; 310; 30; 20 MG/100ML; MG/100ML; MG/100ML; MG/100ML
INJECTION, SOLUTION INTRAVENOUS CONTINUOUS
Status: DISCONTINUED | OUTPATIENT
Start: 2021-12-12 | End: 2021-12-12 | Stop reason: HOSPADM

## 2021-12-12 RX ORDER — DEXAMETHASONE SODIUM PHOSPHATE 4 MG/ML
INJECTION, SOLUTION INTRA-ARTICULAR; INTRALESIONAL; INTRAMUSCULAR; INTRAVENOUS; SOFT TISSUE PRN
Status: DISCONTINUED | OUTPATIENT
Start: 2021-12-12 | End: 2021-12-12 | Stop reason: SURG

## 2021-12-12 RX ORDER — HALOPERIDOL 5 MG/ML
1 INJECTION INTRAMUSCULAR
Status: DISCONTINUED | OUTPATIENT
Start: 2021-12-12 | End: 2021-12-12 | Stop reason: HOSPADM

## 2021-12-12 RX ORDER — OMEPRAZOLE 20 MG/1
40 CAPSULE, DELAYED RELEASE ORAL DAILY
Status: DISCONTINUED | OUTPATIENT
Start: 2021-12-12 | End: 2021-12-13 | Stop reason: HOSPADM

## 2021-12-12 RX ORDER — DIPHENHYDRAMINE HYDROCHLORIDE 50 MG/ML
12.5 INJECTION INTRAMUSCULAR; INTRAVENOUS
Status: DISCONTINUED | OUTPATIENT
Start: 2021-12-12 | End: 2021-12-12 | Stop reason: HOSPADM

## 2021-12-12 RX ORDER — ONDANSETRON 2 MG/ML
4 INJECTION INTRAMUSCULAR; INTRAVENOUS
Status: DISCONTINUED | OUTPATIENT
Start: 2021-12-12 | End: 2021-12-12 | Stop reason: HOSPADM

## 2021-12-12 RX ORDER — OXYCODONE HCL 5 MG/5 ML
10 SOLUTION, ORAL ORAL
Status: DISCONTINUED | OUTPATIENT
Start: 2021-12-12 | End: 2021-12-12 | Stop reason: HOSPADM

## 2021-12-12 RX ADMIN — CARVEDILOL 12.5 MG: 12.5 TABLET, FILM COATED ORAL at 11:14

## 2021-12-12 RX ADMIN — INSULIN HUMAN 3 UNITS: 100 INJECTION, SOLUTION PARENTERAL at 18:36

## 2021-12-12 RX ADMIN — PIPERACILLIN AND TAZOBACTAM 3.38 G: 3; .375 INJECTION, POWDER, LYOPHILIZED, FOR SOLUTION INTRAVENOUS; PARENTERAL at 12:59

## 2021-12-12 RX ADMIN — ALLOPURINOL 100 MG: 100 TABLET ORAL at 04:49

## 2021-12-12 RX ADMIN — PROPOFOL 200 MG: 10 INJECTION, EMULSION INTRAVENOUS at 08:49

## 2021-12-12 RX ADMIN — SODIUM CHLORIDE: 9 INJECTION, SOLUTION INTRAVENOUS at 20:10

## 2021-12-12 RX ADMIN — FENTANYL CITRATE 100 MCG: 50 INJECTION, SOLUTION INTRAMUSCULAR; INTRAVENOUS at 08:48

## 2021-12-12 RX ADMIN — FENTANYL CITRATE 150 MCG: 50 INJECTION, SOLUTION INTRAMUSCULAR; INTRAVENOUS at 09:02

## 2021-12-12 RX ADMIN — ROCURONIUM BROMIDE 20 MG: 10 INJECTION, SOLUTION INTRAVENOUS at 09:02

## 2021-12-12 RX ADMIN — SIMVASTATIN 40 MG: 20 TABLET, FILM COATED ORAL at 20:06

## 2021-12-12 RX ADMIN — Medication 1000 UNITS: at 04:49

## 2021-12-12 RX ADMIN — OLMESARTAN MEDOXOMIL 20 MG: 20 TABLET, FILM COATED ORAL at 04:49

## 2021-12-12 RX ADMIN — Medication 1000 UNITS: at 20:06

## 2021-12-12 RX ADMIN — PIPERACILLIN AND TAZOBACTAM 3.38 G: 3; .375 INJECTION, POWDER, LYOPHILIZED, FOR SOLUTION INTRAVENOUS; PARENTERAL at 15:42

## 2021-12-12 RX ADMIN — ZOLPIDEM TARTRATE 5 MG: 5 TABLET ORAL at 23:08

## 2021-12-12 RX ADMIN — OMEPRAZOLE 40 MG: 20 CAPSULE, DELAYED RELEASE ORAL at 12:58

## 2021-12-12 RX ADMIN — DEXAMETHASONE SODIUM PHOSPHATE 8 MG: 4 INJECTION, SOLUTION INTRA-ARTICULAR; INTRALESIONAL; INTRAMUSCULAR; INTRAVENOUS; SOFT TISSUE at 09:03

## 2021-12-12 RX ADMIN — ZOLPIDEM TARTRATE 5 MG: 5 TABLET ORAL at 01:17

## 2021-12-12 RX ADMIN — CARVEDILOL 12.5 MG: 12.5 TABLET, FILM COATED ORAL at 17:16

## 2021-12-12 RX ADMIN — INSULIN HUMAN 2 UNITS: 100 INJECTION, SOLUTION PARENTERAL at 13:00

## 2021-12-12 RX ADMIN — PIPERACILLIN AND TAZOBACTAM 3.38 G: 3; .375 INJECTION, POWDER, LYOPHILIZED, FOR SOLUTION INTRAVENOUS; PARENTERAL at 23:08

## 2021-12-12 ASSESSMENT — PAIN DESCRIPTION - PAIN TYPE
TYPE: ACUTE PAIN

## 2021-12-12 ASSESSMENT — PAIN SCALES - WONG BAKER
WONGBAKER_NUMERICALRESPONSE: DOESN'T HURT AT ALL

## 2021-12-12 ASSESSMENT — ENCOUNTER SYMPTOMS
BLOOD IN STOOL: 1
PALPITATIONS: 0
COUGH: 0
ABDOMINAL PAIN: 0
SHORTNESS OF BREATH: 0
FEVER: 0
DIZZINESS: 0
DEPRESSION: 0
WEAKNESS: 0

## 2021-12-12 ASSESSMENT — PAIN SCALES - GENERAL: PAIN_LEVEL: 1

## 2021-12-12 NOTE — ANESTHESIA PROCEDURE NOTES
Airway    Date/Time: 12/12/2021 8:45 AM  Performed by: Kostas Alston M.D.  Authorized by: Kostas Alston M.D.     Location:  OR  Urgency:  Elective  Difficult Airway: No    Indications for Airway Management:  Anesthesia      Spontaneous Ventilation: present    Sedation Level:  Deep  Preoxygenated: Yes    Patient Position:  Sniffing  MILS Maintained Throughout: No    Mask Difficulty Assessment:  0 - not attempted  Final Airway Type:  Endotracheal airway  Final Endotracheal Airway:  ETT  Cuffed: Yes    Technique Used for Successful ETT Placement:  Direct laryngoscopy    Blade Type:  Darnell  Laryngoscope Blade/Videolaryngoscope Blade Size:  4  ETT Size (mm):  7.0  Placement Verified by: auscultation and capnometry    Cormack-Lehane Classification:  Grade I - full view of glottis  Number of Attempts at Approach:  1

## 2021-12-12 NOTE — ANESTHESIA TIME REPORT
Anesthesia Start and Stop Event Times     Date Time Event    12/12/2021 0839 Ready for Procedure     0846 Anesthesia Start     0913 Anesthesia Stop        Responsible Staff  12/12/21    Name Role Begin End    Kostas Alston M.D. Anesth 0846 0913        Preop Diagnosis (Free Text):  Pre-op Diagnosis     Hematochezia-suggestive of lower gastrointestinal bleeding        Preop Diagnosis (Codes):    Premium Reason  E. Weekend    Comments:

## 2021-12-12 NOTE — PROCEDURES
Pre-procedure Diagnoses   Hematochezia [K92.1]   Melena [K92.1]   Anemia associated with acute blood loss [D62]   Abnormal computerized axial tomography of abdomen [R93.5]     Post-procedure Diagnoses   Diverticulitis of large intestine without perforation or abscess with bleeding [K57.33]   Acute superficial gastritis without hemorrhage [K29.00]   Irregular Z line of esophagus [K22.9]     Procedures   UPPER ENTEROSCOPY SMALL BOWEL ENDOSCOPY,BIOPSIES   COLONOSCOPY,FLEX,W/CONTROL, BLEEDING HEMOCLIPPING     Endoscopist: Dariel Simons MD, [facility], [facility] [place]    General Anesthesia: Kostas Alston M.D.    Consent: Risks, benefits, and alternatives of aforementioned procedures were discussed with patient again in detail before proceeding.  Patient was given opportunities to ask questions and discuss other options.  Risks including but not limited to perforation, infection, bleeding, missed lesion(s), possible need for surgery(ies) and/or interventional radiology, possible need for repeat procedure(s) and/or additional testing, hospitalization possibly prolonged, cardiac and/or pulmonary event, aspiration, hypoxia, stroke, medication (s) and/or anesthesia reaction(s), indefinite diagnosis, discomfort/pain, unsuccessful and/or incomplete procedure, ineffective therapy, persistent symptoms, damage to adjacent organs/structure and/or vascular splenic laceration, and other adverse events possibly life-threatening.  Interactive discussion was undertaken with Layman's terms.  I answered questions in full and to satisfaction.   I gave opportunity to cancel, reschedule and/or delay if not completely comfortable with proceeding.  Patient stated understanding and acceptance of these risks, and wished to proceed.  I  Informed consent was given in clear state of mind and paper permit was confirmed to have been signed before proceeding.    Endoscopic procedures in detail:     Long variable stiffness flexible Olympus endoscope was inserted  from mouth into second portion of the mid-jejunum, retroflexion was performed in the stomach.  Endoscope was advanced more than 60 cm post-pylorus.  Gastroesophageal junction and gastric mucosal biopsies were obtained and sent in separate containers.  I suctioned insufflated air and stomach fluid contents upon removal.      Long-variable stiffness flexible Olympus colonoscopy was inserted into anus and rectum, retroflexion was performed in rectum, colonoscope was advanced to cecum, appendiceal orifice and ileocecal valve were identified, terminal ileum was intubated and inspected, water flush was used to wash mucosa and suction of colonic fluid contents was performed to optimize visualization.  I performed photodocumentation of the terminal ileum, cecum, appendical orifice, ileocecal value, retroflexion view in rectum and anus channel. Two sigmoid diverticula were noted with inflammation and bleeding, one hemoclip was applied to the bleeding lips of each diverticula with good hemostasis and marking in case interventional radiology coil embolization is needed in near future for re-bleeding.  Cecal wthdrawal time was greater than 6 minutes.  I suctioned insufflated air and fluid contents upon removal.     Procedure times:  - In-room 08:46  - Start 08:50  - Completed 09:06  - Out of room per nursing records    Esophagogastroduodenoscopy Findings:  - Esophagus: irregular distal esophageal nodular Z-line, biopsies to evaluate for East's esophagus.  - Stomach: mild non-erosive body gastritis, biopsied to evaluate H.pylori status.  - Duodenum: endoscopically unremarkable.  - Jejunum: unremarkable to mid.    Colonoscopy Findings:  - Bowel preparation: fair.  - Terminal ileum: unremarkable.  - Colon: Diverticulosis mostly involving distal transverse, descending and sigmoid; moderate severity with medium-sized lumen.  Two distal sigmoid diverticula at 30 cm from anal verge that appeared inflamed and friable with oozing of  blood,one hemoclip was applied to each bleeding site with hemostasis and for marking.  - Rectum: non-bleeding grade 2 internal hemorrhoids.    Impression:  1. Sigmoid diverticulitis with bleeding, hemoclipped x2 for marking  2. Non-erosive gastritis  3. Irregular Z-line    Recommendations:   1.  Routine post-endoscopy anesthesia recovery care.  Transfer back to prior hospital room when recovery criteria are met.  Aspiration and fall precautions x 24 hours.   2.  Keep on clear liquid diet.  3.  Added zosyn to treat diverticulitis.  4.  Consider coil embolization if re-bleeds.  5.  Non-emergent surgical consult to discuss possible sigmoidectomy.  6.  Dr. Dino Wallace and/or Ankita Guevara NP will be back Monday 12/13 to round on patient.  7.  I spoke to patient and recovery nurse about impression, diagnosis and recommendations.  I answered questions in full and to satisfaction

## 2021-12-12 NOTE — PROGRESS NOTES
Patient seen and examined before proceeding with Upper enteroscopy and colonoscopy with attempted hemostasis under anesthesia with possible biopsies, snare polypectomy(ies) and/or other endotherapy     Risks, benefits, and alternatives of aforementioned procedures were again discussed with patient in detail before proceeding. Patient was again given opportunities to ask questions and discuss other options.  Risks including but not limited to perforation, infection, bleeding, missed lesion(s), possible need for surgery(ies) and/or interventional radiology, possible need for repeat procedure(s) and/or additional testing, hospitalization possibly prolonged, cardiac and/or pulmonary event, aspiration, hypoxia, stroke, medication (s) and/or anesthesia reaction(s), indefinite diagnosis, discomfort/pain, unsuccessful and/or incomplete procedure, ineffective therapy, persistent symptoms, damage to adjacent organs/structure and/or vascular such as splenic laceration, and other adverse event(s) possibly life-threatening.  Interactive discussion was undertaken with Layman's terms.  I answered questions in full and to satisfaction.  I gave opportunity to cancel, delay and/or reschedule if not completely comfortable with proceeding.  Patient stated understanding and acceptance of these risks, and wished to proceed.   Informed consent was given in clear state of mind and paper permit was confirmed to have been signed before proceeding.

## 2021-12-12 NOTE — ANESTHESIA PREPROCEDURE EVALUATION
Case: [id number] Date/Time: 12/12/21 0900    Procedures:       COLONOSCOPY (N/A Anus)      GASTROSCOPY, WITH PUSH ENTEROSCOPY (N/A Esophagus)    Anesthesia type: General    Pre-op diagnosis: Hematochezia-suggestive of lower gastrointestinal bleeding    Location: [facility] OR 07 / SURGERY [facility]    Surgeons: Dariel Simons M.D.          Relevant Problems   PULMONARY   (positive) History of urinary tract infection   (positive) Orthopnea   (positive) Pneumonia      NEURO   (positive) History of skull fracture   (positive) History of urinary tract infection      CARDIAC   (positive) Acquired dilation of ascending aorta and aortic root (HCC)   (positive) Essential hypertension   (positive) Hypertensive emergency   (positive) Orthopnea         (positive) Chronic kidney disease (CKD)   (positive) Chronic kidney disease, stage III (moderate) (HCC)      ENDO   (positive) Type 2 diabetes mellitus with hyperglycemia (HCC)       Physical Exam    Airway   Mallampati: I  TM distance: >3 FB  Neck ROM: full       Cardiovascular   Rhythm: regular  Rate: normal     Dental - normal exam           Pulmonary   Breath sounds clear to auscultation     Abdominal - normal exam     Neurological - normal exam                 Anesthesia Plan    ASA 3   ASA physical status 3 criteria: morbid obesity - BMI greater than or equal to 40    Plan - general       Airway plan will be ETT        Plan Factors:   Patient was not previously instructed to abstain from smoking on day of procedure.  Patient did not smoke on day of procedure.      Induction: intravenous          Informed Consent:    Anesthetic plan and risks discussed with patient.    Use of blood products discussed with: patient whom.

## 2021-12-12 NOTE — PROGRESS NOTES
Assessment completed. Pt A&Ox 4. Respirations are even and unlabored on room air. Pt denies/reports pain at this time. Monitors applied, VS stable, call light and belongings within reach. POC updated (Surgery). Pt educated on room and call light, pt verbalized understanding. Communication board updated. Needs met.

## 2021-12-12 NOTE — CARE PLAN
Problem: Knowledge Deficit - Standard  Goal: Patient and family/care givers will demonstrate understanding of plan of care, disease process/condition, diagnostic tests and medications  Outcome: Progressing     Problem: Risk for Fluid Volume Deficit Related to Bleeding  Goal: Fluid volume balance will be maintained  Outcome: Progressing  Goal: Patient will show no signs and symptoms of excessive bleeding  Outcome: Progressing     Problem: Risk for Bleeding  Goal: Patient will take measures to prevent bleeding and recognizes signs of bleeding that need to be reported immediately to a health care professional  Outcome: Progressing  Goal: Patient will not experience bleeding as evidenced by normal blood pressure, stable hematocrit and hemoglobin levels and desired ranges for coagulation profiles  Outcome: Progressing   The patient is Stable - Low risk of patient condition declining or worsening    Shift Goals  Clinical Goals: prep for colonoscopy  Patient Goals: relief from rectal bleeding  Family Goals: n/a    Progress made toward(s) clinical / shift goals:  patient updated on POC    Patient is not progressing towards the following goals:

## 2021-12-12 NOTE — CARE PLAN
The patient is sgtable.    Shift Goals  Clinical Goals: prep for colonoscopy  Patient Goals: relief from rectal bleeding  Family Goals: n/a    Progress made toward(s) clinical / shift goals:  progressing.      Problem: Knowledge Deficit - Standard  Goal: Patient and family/care givers will demonstrate understanding of plan of care, disease process/condition, diagnostic tests and medications  Outcome: Progressing     Problem: Risk for Fluid Volume Deficit Related to Bleeding  Goal: Fluid volume balance will be maintained  Outcome: Progressing     Problem: Risk for Bleeding  Goal: Patient will not experience bleeding as evidenced by normal blood pressure, stable hematocrit and hemoglobin levels and desired ranges for coagulation profiles  Outcome: Progressing

## 2021-12-12 NOTE — PROGRESS NOTES
[facility] Medicine Daily Progress Note    Date of Service  12/12/2021    Chief Complaint  Tyrone Cline is a 81 y.o. male admitted 12/10/2021 with bloody stools    Hospital Course  Patient is an 81-year-old male with past medical history of diverticulitis, CKD stage IV under the care of nephrology (Dr. Del Rio), diabetes type 2, BPH status post TURP 7/1/2021,  hypertension, hyperlipidemia, and aortic aneurysm who presented 12/10/2021 with complaints of 6 episodes of bloody stool.  Patient reports the stool was bright red in color and he felt dizzy and lightheaded afterwards.  He was admitted overnight at [facility] from Wednesday to Thursday of last week for the same problem, however was discharged when his hemoglobin and hematocrit stabilized.  He was diagnosed with diverticulitis at that time.  He follows with Dr. [facility] and had a scheduled appointment for Monday Dec 13.    In the ED, patient was found to have hypotension with H&H of 12 and 37.  His blood pressure stabilized with fluid administration.  He was admitted for GI consultation and monitoring of his GI bleed.    Dr. Simons of [facility] was notified of this patient's admission.  Patient underwent EGD and colonoscopy 12/12/2021 and was found to have moderate diverticulitis and gastritis. Dr. Simons recommending overnight stay with initiation of Zosyn to monitor for bleeding.     Interval Problem Update  12/12/2021: Patient seen and examined.  No acute complaints. Has not had a BM since prior to procedure after prep. Understands he is to remain overnight for monitoring, start antibiotics, continue clear liquid diet. H/H this am 11.1/33.3 prior to procedure, VSS, glucose well controlled.     Disposition: Patient to remain overnight for monitoring of bleeding. Anticipate discharge tomorrow.    I have personally seen and examined the patient at bedside. I discussed the plan of care with patient, bedside RN and   Breast cancer    Coronary artery disease    Hyperlipidemia    Hypertension    Hypothyroidism     Lizzeth.    Consultants/Specialty  GI    Code Status  Full Code    Disposition  Patient is medically cleared pending colonoscopy.   Anticipate discharge to to home with close outpatient follow-up.  I have placed the appropriate orders for post-discharge needs.    Review of Systems  Review of Systems   Constitutional: Negative for fever and malaise/fatigue.   HENT: Negative for hearing loss.    Respiratory: Negative for cough and shortness of breath.    Cardiovascular: Negative for chest pain, palpitations and leg swelling.   Gastrointestinal: Positive for blood in stool. Negative for abdominal pain.   Genitourinary: Negative for dysuria and frequency.   Neurological: Negative for dizziness and weakness.   Psychiatric/Behavioral: Negative for depression.   All other systems reviewed and are negative.       Physical Exam  Temp:  [36.4 °C (97.6 °F)-37.7 °C (99.9 °F)] 36.4 °C (97.6 °F)  Pulse:  [65-81] 65  Resp:  [14-21] 18  BP: (143-169)/(70-85) 161/82  SpO2:  [93 %-100 %] 94 %    Physical Exam  Vitals and nursing note reviewed.   Constitutional:       Appearance: Normal appearance.   HENT:      Head: Normocephalic.      Mouth/Throat:      Mouth: Mucous membranes are moist.      Pharynx: Oropharynx is clear.   Cardiovascular:      Rate and Rhythm: Normal rate and regular rhythm.      Pulses: Normal pulses.      Heart sounds: Normal heart sounds.   Pulmonary:      Effort: Pulmonary effort is normal.      Breath sounds: Normal breath sounds.   Abdominal:      General: Abdomen is flat.      Tenderness: There is no abdominal tenderness.   Musculoskeletal:         General: Normal range of motion.   Skin:     General: Skin is warm and dry.      Capillary Refill: Capillary refill takes less than 2 seconds.   Neurological:      Mental Status: He is alert and oriented to person, place, and time.   Psychiatric:         Mood and Affect: Mood normal.       Fluids    Intake/Output Summary (Last 24 hours) at 12/12/2021 1056  Last  data filed at 12/12/2021 1000  Gross per 24 hour   Intake 520 ml   Output --   Net 520 ml       Laboratory  Recent Labs     12/11/21  1334 12/11/21  2112 12/12/21  0306   WBC 9.7 13.1* 9.7   RBC 3.98* 4.26* 3.75*   HEMOGLOBIN 11.7* 12.4* 11.1*   HEMATOCRIT 35.9* 38.7* 33.3*   MCV 90.2 90.8 88.8   MCH 29.4 29.1 29.6   MCHC 32.6* 32.0* 33.3*   RDW 47.9 48.4 47.1   PLATELETCT 227 260 225   MPV 10.8 10.8 10.8     Recent Labs     12/10/21  2024 12/11/21  0434 12/11/21  1334   SODIUM 140 139 141   POTASSIUM 4.4 4.1 3.8   CHLORIDE 107 107 110   CO2 22 18* 22   GLUCOSE 196* 136* 108*   BUN 32* 36* 32*   CREATININE 2.33* 2.13* 2.10*   CALCIUM 9.2 9.3 8.9     Recent Labs     12/10/21  2024   APTT 25.3   INR 1.17*               Imaging  No orders to display        Assessment/Plan  * GIB (gastrointestinal bleeding)- (present on admission)  Assessment & Plan  6 episodes of bloody stool while at home  Recently discharged from [facility], diagnosed with diverticulitis  While in the ED, H&H 12 and 37  Hypotensive on admission, responded to fluids  [facility], Dr. Simons on board, appreciate recs  -Colonoscopy reveals moderate diverticulitis  -Starting IV Zosyn  -Continue clear liquid diet  -Monitor for bleeding    Diverticulitis- (present on admission)  Assessment & Plan  Diagnosed inpatient at [facility]  CT abdomen suspicious for diverticulitis  GI Dr. Simons on board, appreciate recs  -Colonoscopy confirmed   -IV Zosyn started  -Clear liquid diet    Hypotension- (present on admission)  Assessment & Plan  Hypotensive while in ED  Normotensive since admission  - Home medication regimen restarted    Chronic kidney disease (CKD)- (present on admission)  Assessment & Plan  CKD stage IV  Under the care of nephrology, Dr. Del Rio  Creatinine 2.13, BUN 36 this admission, which is improved from prior  Patient follows a renal diet and has his food delivered  -Avoid nephrotoxic medications  -Renal diet    Type 2 diabetes mellitus  with hyperglycemia (HCC)- (present on admission)  Assessment & Plan  Under the care of an endocrinologist  Has a glucose monitoring system in place  -AC/HS and sliding scale while inpatient    BPH (benign prostatic hyperplasia)- (present on admission)  Assessment & Plan  Underwent TURP 7/1/2021  Placed on testosterone by urologist  Continue home medication regimen         VTE prophylaxis: SCDs/TEDs    I have performed a physical exam and reviewed and updated ROS and Plan today (12/12/2021). In review of yesterday's note (12/11/2021), there are no changes except as documented above.       Breast cancer    Coronary artery disease    Deafness    Hyperlipidemia    Hypertension    Hypothyroidism

## 2021-12-12 NOTE — ANESTHESIA POSTPROCEDURE EVALUATION
Patient: Tyrone Cline    Procedure Summary     Date: 12/12/21 Room / Location: [place] [address] / SURGERY [facility]    Anesthesia Start: 0846 Anesthesia Stop: 0913    Procedures:       COLONOSCOPY (N/A Anus)      GASTROSCOPY, WITH PUSH ENTEROSCOPY (N/A Esophagus)      GASTROSCOPY, WITH BIOPSY (N/A Esophagus)      EGD, WITH CLIP PLACEMENT (N/A Esophagus) Diagnosis: (GASTRITIS/ DIVERTICULOSIS VS DIVERTICULITIS)    Surgeons: Dariel Simons M.D. Responsible Provider: Kostas Alston M.D.    Anesthesia Type: general ASA Status: 3          Final Anesthesia Type: general  Last vitals  BP   Blood Pressure : (!) 169/84    Temp   36.4 °C (97.6 °F)    Pulse   76   Resp   18    SpO2   97 %      Anesthesia Post Evaluation    Patient location during evaluation: PACU  Patient participation: complete - patient participated  Level of consciousness: awake and alert  Pain score: 1    Airway patency: patent  Anesthetic complications: no  Cardiovascular status: adequate  Respiratory status: acceptable  Hydration status: acceptable    PONV: none          No complications documented.     Nurse Pain Score: 0 (NPRS)

## 2021-12-13 ENCOUNTER — PATIENT OUTREACH (OUTPATIENT)
Dept: HEALTH INFORMATION MANAGEMENT | Facility: OTHER | Age: 81
End: 2021-12-13

## 2021-12-13 ENCOUNTER — DOCUMENTATION (OUTPATIENT)
Dept: VASCULAR LAB | Facility: MEDICAL CENTER | Age: 81
End: 2021-12-13

## 2021-12-13 VITALS
SYSTOLIC BLOOD PRESSURE: 130 MMHG | TEMPERATURE: 99.1 F | HEART RATE: 64 BPM | HEIGHT: 71 IN | DIASTOLIC BLOOD PRESSURE: 76 MMHG | OXYGEN SATURATION: 95 % | WEIGHT: 190.7 LBS | RESPIRATION RATE: 18 BRPM | BODY MASS INDEX: 26.7 KG/M2

## 2021-12-13 DIAGNOSIS — I71.21 ASCENDING AORTIC ANEURYSM (HCC): ICD-10-CM

## 2021-12-13 PROBLEM — K92.2 GIB (GASTROINTESTINAL BLEEDING): Status: RESOLVED | Noted: 2021-12-10 | Resolved: 2021-12-13

## 2021-12-13 PROBLEM — I95.9 HYPOTENSION: Status: RESOLVED | Noted: 2021-12-10 | Resolved: 2021-12-13

## 2021-12-13 LAB
ALBUMIN SERPL BCP-MCNC: 3.4 G/DL (ref 3.2–4.9)
ALBUMIN/GLOB SERPL: 1.4 G/DL
ALP SERPL-CCNC: 64 U/L (ref 30–99)
ALT SERPL-CCNC: 11 U/L (ref 2–50)
ANION GAP SERPL CALC-SCNC: 13 MMOL/L (ref 7–16)
AST SERPL-CCNC: 8 U/L (ref 12–45)
BASOPHILS # BLD AUTO: 0.1 % (ref 0–1.8)
BASOPHILS # BLD: 0.01 K/UL (ref 0–0.12)
BILIRUB SERPL-MCNC: 0.4 MG/DL (ref 0.1–1.5)
BUN SERPL-MCNC: 26 MG/DL (ref 8–22)
CALCIUM SERPL-MCNC: 9.2 MG/DL (ref 8.5–10.5)
CHLORIDE SERPL-SCNC: 107 MMOL/L (ref 96–112)
CO2 SERPL-SCNC: 18 MMOL/L (ref 20–33)
CREAT SERPL-MCNC: 2.11 MG/DL (ref 0.5–1.4)
EOSINOPHIL # BLD AUTO: 0 K/UL (ref 0–0.51)
EOSINOPHIL NFR BLD: 0 % (ref 0–6.9)
ERYTHROCYTE [DISTWIDTH] IN BLOOD BY AUTOMATED COUNT: 46.8 FL (ref 35.9–50)
GLOBULIN SER CALC-MCNC: 2.4 G/DL (ref 1.9–3.5)
GLUCOSE BLD-MCNC: 108 MG/DL (ref 65–99)
GLUCOSE BLD-MCNC: 135 MG/DL (ref 65–99)
GLUCOSE SERPL-MCNC: 130 MG/DL (ref 65–99)
HCT VFR BLD AUTO: 30.2 % (ref 42–52)
HGB BLD-MCNC: 9.9 G/DL (ref 14–18)
IMM GRANULOCYTES # BLD AUTO: 0.04 K/UL (ref 0–0.11)
IMM GRANULOCYTES NFR BLD AUTO: 0.4 % (ref 0–0.9)
LYMPHOCYTES # BLD AUTO: 1.07 K/UL (ref 1–4.8)
LYMPHOCYTES NFR BLD: 10.6 % (ref 22–41)
MAGNESIUM SERPL-MCNC: 1.9 MG/DL (ref 1.5–2.5)
MCH RBC QN AUTO: 29.5 PG (ref 27–33)
MCHC RBC AUTO-ENTMCNC: 32.8 G/DL (ref 33.7–35.3)
MCV RBC AUTO: 89.9 FL (ref 81.4–97.8)
MONOCYTES # BLD AUTO: 0.33 K/UL (ref 0–0.85)
MONOCYTES NFR BLD AUTO: 3.3 % (ref 0–13.4)
NEUTROPHILS # BLD AUTO: 8.61 K/UL (ref 1.82–7.42)
NEUTROPHILS NFR BLD: 85.6 % (ref 44–72)
NRBC # BLD AUTO: 0 K/UL
NRBC BLD-RTO: 0 /100 WBC
PATHOLOGY CONSULT NOTE: NORMAL
PHOSPHATE SERPL-MCNC: 3.2 MG/DL (ref 2.5–4.5)
PLATELET # BLD AUTO: 190 K/UL (ref 164–446)
PMV BLD AUTO: 11 FL (ref 9–12.9)
POTASSIUM SERPL-SCNC: 3.6 MMOL/L (ref 3.6–5.5)
PROT SERPL-MCNC: 5.8 G/DL (ref 6–8.2)
RBC # BLD AUTO: 3.36 M/UL (ref 4.7–6.1)
SODIUM SERPL-SCNC: 138 MMOL/L (ref 135–145)
WBC # BLD AUTO: 10.1 K/UL (ref 4.8–10.8)

## 2021-12-13 PROCEDURE — A9270 NON-COVERED ITEM OR SERVICE: HCPCS | Performed by: NURSE PRACTITIONER

## 2021-12-13 PROCEDURE — A9270 NON-COVERED ITEM OR SERVICE: HCPCS | Performed by: INTERNAL MEDICINE

## 2021-12-13 PROCEDURE — 700102 HCHG RX REV CODE 250 W/ 637 OVERRIDE(OP): Performed by: NURSE PRACTITIONER

## 2021-12-13 PROCEDURE — 85025 COMPLETE CBC W/AUTO DIFF WBC: CPT

## 2021-12-13 PROCEDURE — 82962 GLUCOSE BLOOD TEST: CPT

## 2021-12-13 PROCEDURE — 700102 HCHG RX REV CODE 250 W/ 637 OVERRIDE(OP): Performed by: INTERNAL MEDICINE

## 2021-12-13 PROCEDURE — 36415 COLL VENOUS BLD VENIPUNCTURE: CPT

## 2021-12-13 PROCEDURE — 84100 ASSAY OF PHOSPHORUS: CPT

## 2021-12-13 PROCEDURE — 83735 ASSAY OF MAGNESIUM: CPT

## 2021-12-13 PROCEDURE — G0378 HOSPITAL OBSERVATION PER HR: HCPCS

## 2021-12-13 PROCEDURE — 80053 COMPREHEN METABOLIC PANEL: CPT

## 2021-12-13 PROCEDURE — 99217 PR OBSERVATION CARE DISCHARGE: CPT | Performed by: INTERNAL MEDICINE

## 2021-12-13 PROCEDURE — 700111 HCHG RX REV CODE 636 W/ 250 OVERRIDE (IP): Performed by: INTERNAL MEDICINE

## 2021-12-13 PROCEDURE — 700105 HCHG RX REV CODE 258: Performed by: INTERNAL MEDICINE

## 2021-12-13 PROCEDURE — 96366 THER/PROPH/DIAG IV INF ADDON: CPT

## 2021-12-13 RX ORDER — AMOXICILLIN AND CLAVULANATE POTASSIUM 875; 125 MG/1; MG/1
1 TABLET, FILM COATED ORAL EVERY 12 HOURS
Qty: 20 TABLET | Refills: 0 | Status: SHIPPED | OUTPATIENT
Start: 2021-12-13 | End: 2022-02-23

## 2021-12-13 RX ORDER — OMEPRAZOLE 40 MG/1
40 CAPSULE, DELAYED RELEASE ORAL DAILY
Qty: 30 CAPSULE | Refills: 0 | Status: SHIPPED | OUTPATIENT
Start: 2021-12-14 | End: 2022-12-01

## 2021-12-13 RX ORDER — AMOXICILLIN AND CLAVULANATE POTASSIUM 875; 125 MG/1; MG/1
1 TABLET, FILM COATED ORAL EVERY 12 HOURS
Status: DISCONTINUED | OUTPATIENT
Start: 2021-12-13 | End: 2021-12-13 | Stop reason: HOSPADM

## 2021-12-13 RX ADMIN — AMOXICILLIN AND CLAVULANATE POTASSIUM 1 TABLET: 875; 125 TABLET, FILM COATED ORAL at 10:28

## 2021-12-13 RX ADMIN — OLMESARTAN MEDOXOMIL 20 MG: 20 TABLET, FILM COATED ORAL at 06:06

## 2021-12-13 RX ADMIN — SENNOSIDES AND DOCUSATE SODIUM 2 TABLET: 50; 8.6 TABLET ORAL at 06:08

## 2021-12-13 RX ADMIN — Medication 1000 UNITS: at 06:06

## 2021-12-13 RX ADMIN — CARVEDILOL 12.5 MG: 12.5 TABLET, FILM COATED ORAL at 08:48

## 2021-12-13 RX ADMIN — PIPERACILLIN AND TAZOBACTAM 3.38 G: 3; .375 INJECTION, POWDER, LYOPHILIZED, FOR SOLUTION INTRAVENOUS; PARENTERAL at 06:08

## 2021-12-13 RX ADMIN — OMEPRAZOLE 40 MG: 20 CAPSULE, DELAYED RELEASE ORAL at 06:06

## 2021-12-13 RX ADMIN — ALLOPURINOL 100 MG: 100 TABLET ORAL at 06:06

## 2021-12-13 ASSESSMENT — ENCOUNTER SYMPTOMS
NERVOUS/ANXIOUS: 0
ABDOMINAL PAIN: 0
FOCAL WEAKNESS: 0
PALPITATIONS: 0
WEAKNESS: 0
CHILLS: 0
EYE DISCHARGE: 0
SPEECH CHANGE: 0
EYE PAIN: 0
COUGH: 0
CLAUDICATION: 0
SORE THROAT: 0
WHEEZING: 0
INSOMNIA: 0
POLYDIPSIA: 0
SENSORY CHANGE: 0
VOMITING: 0
BACK PAIN: 0
SINUS PAIN: 0
FLANK PAIN: 0
EYE REDNESS: 0
MEMORY LOSS: 0
STRIDOR: 0
FEVER: 0
SHORTNESS OF BREATH: 0
BLOOD IN STOOL: 0
NAUSEA: 0

## 2021-12-13 ASSESSMENT — PAIN DESCRIPTION - PAIN TYPE: TYPE: ACUTE PAIN

## 2021-12-13 ASSESSMENT — PAIN SCALES - WONG BAKER: WONGBAKER_NUMERICALRESPONSE: DOESN'T HURT AT ALL

## 2021-12-13 NOTE — PROGRESS NOTES
Gastroenterology ([facility]) Initial Consult Note               Author:  Ankita ANDERSEN Date & Time Created: 12/13/2021 9:24 AM       Patient ID:  Name:             Tyrone Cline  YOB: 1940  Age:                 81 y.o.  male  MRN:               [id number]      Referring Provider:  Bal Moreau MD      Presenting Chief Complaint:  Hematochezia      History of Present Illness:    He is an 81-year-old male patient seen in consultation for hematochezia.  Patient states on Wednesday last week he had a episode of large painless bright red blood per rectum.  He went to [facility] and was evaluated there.  CT scan without contrast demonstrated diverticulosis with slight adjacent to the sigmoid colon, atrophic bilateral colitis, cholelithiasis, atherosclerosis and atherosclerotic coronary artery disease, and fat-containing left renal hernia.  He was treated conservatively and bleeding stopped, therefore was sent home.  However, yesterday the patient developed 5 more episodes of of large amount of bright red blood per rectum.  He denies change in bowel habits.  His last colonoscopy was 10 years ago or so.  His hemoglobin has dropped from 14.6 initially 12/9 to 12.2 today.  CBC glucose 136, creatinine 2.1.,  GFR 40.    Denied further modifying factors, associated symptoms or timing issues.  Of note, I spoke to patient's nurse about patient to obtain further history.      INTERVAL HISTORY:    12/13/21: Stable. Hgb trended down: 9.9 (11.1 on 12/12/21). Denies abdominal pain, bloody stools. Colonoscopy revealed sigmoid diverticulitis with bleeding. Started on IV Zosyn. Since colonoscopy: no further episodes of bleeding.    Colonoscopy/EGD 12/12/21 performed by Dr. Simons:   Impression:  1. Sigmoid diverticulitis with bleeding, hemoclipped x2 for marking  2. Non-erosive gastritis  3. Irregular Z-line    Review of Systems:  Review of Systems   Constitutional: Negative for chills and  fever.   HENT: Negative for sinus pain and sore throat.    Eyes: Negative for pain, discharge and redness.   Respiratory: Negative for cough, shortness of breath, wheezing and stridor.    Cardiovascular: Negative for chest pain, palpitations and claudication.   Gastrointestinal: Negative for abdominal pain, blood in stool, nausea and vomiting.   Genitourinary: Negative for flank pain, frequency and hematuria.   Musculoskeletal: Negative for back pain and joint pain.   Skin: Negative for itching and rash.   Neurological: Negative for sensory change, speech change, focal weakness and weakness.   Endo/Heme/Allergies: Negative for environmental allergies and polydipsia.   Psychiatric/Behavioral: Negative for memory loss. The patient is not nervous/anxious and does not have insomnia.              Past Medical History:  Past Medical History:   Diagnosis Date   • Abdominal pain    • Abnormal electrocardiogram    • ACE inhibitor intolerance    • BPH (benign prostatic hyperplasia)    • Bruxism    • Cancer (HCC)     basal and squamous cell to face   • Cataract     robbi IOL   • Chickenpox    • Cholesterol blood decreased    • Chronic kidney disease, stage III (moderate) (HCC)    • Cold    • Cough    • Depression    • Diabetes     oral meds   • Difficulty breathing    • Fever    • GERD (gastroesophageal reflux disease)    • GOUT    • Gout    • History of skull fracture    • History of urinary tract infection    • Hyperlipidemia    • Hypertension    • Indigestion    • Influenza    • Insomnia    • Mixed hyperlipidemia    • Pneumonia 9/2015   • Proteinuria    • Ringing in ears    • Shortness of breath    • Sputum production    • Tonsillitis    • Type 2 diabetes mellitus (HCC)    • Wears glasses    • Weight loss      Active Hospital Problems    Diagnosis    • Diverticulitis [K57.92]    • GIB (gastrointestinal bleeding) [K92.2]    • Hypotension [I95.9]    • Chronic kidney disease (CKD) [N18.9]    • Type 2 diabetes mellitus with  hyperglycemia (HCC) [E11.65]    • BPH (benign prostatic hyperplasia) [N40.0]          Past Surgical History:  Past Surgical History:   Procedure Laterality Date   • PB COLONOSCOPY,DIAGNOSTIC N/A 12/12/2021    Procedure: COLONOSCOPY;  Surgeon: Dariel Simons M.D.;  Location: SURGERY [facility];  Service: Gastroenterology   • PB UPPER GI ENDOSCOPY,BIOPSY N/A 12/12/2021    Procedure: GASTROSCOPY, WITH BIOPSY;  Surgeon: Dariel Simons M.D.;  Location: SURGERY [facility];  Service: Gastroenterology   • PB UPPER GI ENDOSCOPY,CTRL BLEED N/A 12/12/2021    Procedure: EGD, WITH CLIP PLACEMENT;  Surgeon: Dariel Simons M.D.;  Location: SURGERY [facility];  Service: Gastroenterology   • GASTROSCOPY W/PUSH ENTERSCOPY N/A 12/12/2021    Procedure: GASTROSCOPY, WITH PUSH ENTEROSCOPY;  Surgeon: Dariel Simons M.D.;  Location: SURGERY [facility];  Service: Gastroenterology   • SEPTAL RECONSTRUCTION  12/7/2015    Procedure: SEPTAL RECONSTRUCTION OPEN WITH  GRAFTS & CONCHAL CART GRAFT;  Surgeon: SYDNIE Gaitan M.D.;  Location: SURGERY [facility]S;  Service:    • BLEPHAROPLASTY  7/23/2014    Performed by Gera Plasencia M.D. at [facility] ORS   • BROW LIFT  7/23/2014    Performed by Gera Plasencia M.D. at [facility]   • CATARACT PHACO WITH IOL  12/4/2012    Performed by Suraj Gonzalez M.D. at [facility] ORS   • CATARACT PHACO WITH IOL  11/20/2012    Performed by Suraj Gonzalez M.D. at [facility] OR[facility]   • APPENDECTOMY     • ARTHROSCOPY, KNEE     • CARDIAC CATH, LEFT HEART      C out of concern for STEMI, normal coronaries with minimal atherosclerosis, diagnosed with PNA as cause of symptoms and ST changes.    • OTHER      KNEE SURGERY - LEFT.   • OTHER      NOSE SURGERY.   • TONSILLECTOMY           Hospital Medications:  Current Facility-Administered Medications   Medication Dose Frequency Provider Last Rate Last Admin   • omeprazole (PRILOSEC) capsule  40 mg  40 mg DAILY Dariel Simons M.D.   40 mg at 12/13/21 0606   • piperacillin-tazobactam (ZOSYN) 3.375 g in  mL IVPB  3.375 g Q8HRS Dariel Simons M.D. 25 mL/hr at 12/13/21 0608 3.375 g at 12/13/21 0608   • allopurinol (ZYLOPRIM) tablet 100 mg  100 mg DAILY Noy Webber, A.P.R.N.   100 mg at 12/13/21 0606   • carvedilol (COREG) tablet 12.5 mg  12.5 mg BID WITH MEALS Noy Webber A.P.R.N.   12.5 mg at 12/13/21 0848   • vitamin D3 (cholecalciferol) tablet 1,000 Units  1,000 Units BID Noy Webber A.P.R.N.   1,000 Units at 12/13/21 0606   • olmesartan (BENICAR) tablet 20 mg  20 mg DAILY Noy Webber A.P.R.N.   20 mg at 12/13/21 0606   • simvastatin (ZOCOR) tablet 40 mg  40 mg Nightly Zenobia Frederick M.D.   40 mg at 12/12/21 2006   • zolpidem (AMBIEN) tablet 5-10 mg  5-10 mg HS PRN Zenobia Frederick M.D.   5 mg at 12/12/21 2308   • senna-docusate (PERICOLACE or SENOKOT S) 8.6-50 MG per tablet 2 Tablet  2 Tablet BID Zenobia Frederick M.D.   2 Tablet at 12/13/21 0608    And   • polyethylene glycol/lytes (MIRALAX) PACKET 1 Packet  1 Packet QDAY PRN Zenobia Frederick M.D.        And   • magnesium hydroxide (MILK OF MAGNESIA) suspension 30 mL  30 mL QDAY PRN Zenobia Frederick M.D.        And   • bisacodyl (DULCOLAX) suppository 10 mg  10 mg QDAY PRN Zenobia Frederick M.D.       • NS infusion   Continuous Zenobia Frederick M.D.   Stopped at 12/12/21 2103   • acetaminophen (Tylenol) tablet 650 mg  650 mg Q6HRS PRN Zenobia Frederick M.D.       • ondansetron (ZOFRAN) syringe/vial injection 4 mg  4 mg Q4HRS PRN Zenobia Frederick M.D.       • ondansetron (ZOFRAN ODT) dispertab 4 mg  4 mg Q4HRS PRN Zenobia Frederick M.D.       • insulin regular (HumuLIN R,NovoLIN R) injection  1-6 Units Q6HRS Zenobia Frederick M.D.   3 Units at 12/12/21 1836    And   • dextrose 50% (D50W) injection 50 mL  50 mL Q15 MIN PRN Zenobia Frederick M.D.       Last reviewed on 12/10/2021 10:15 PM by Alexandra Cerda PhT        Current Outpatient  "Medications:  Medications Prior to Admission   Medication Sig Dispense Refill Last Dose   • amLODIPine (NORVASC) 2.5 MG Tab Take 5 mg by mouth every day.   12/10/2021 at 1630   • aspirin EC (ECOTRIN) 81 MG Tablet Delayed Response Take 81 mg by mouth every day.   12/10/2021 at 1630   • melatonin 1 mg Tab Take 1 mg by mouth at bedtime.   12/9/2021 at 2100   • carvedilol (COREG) 12.5 MG Tab Take 1 tablet by mouth 2 times a day with meals. 200 tablet 3 12/10/2021 at 1630   • olmesartan (BENICAR) 20 MG Tab Take 1 tablet by mouth every day. 30 tablet 3 12/10/2021 at 0800   • simvastatin (ZOCOR) 40 MG Tab Take 40 mg by mouth every evening.   12/10/2021 at 1630   • OZEMPIC, 0.25 OR 0.5 MG/DOSE, 2 MG/1.5ML Solution Pen-injector Inject 0.5 mg under the skin every Sunday.   12/5/2021 at [facility]   • B-D UF III MINI PEN NEEDLES 31G X 5 MM Misc    Continuous at Supply   • CONTOUR NEXT TEST strip    Continuous at Supply   • B Complex Vitamins (B COMPLEX 1 PO) Take 1 tablet by mouth every day.   12/10/2021 at 0800   • Testosterone (ANDROGEL) 20.25 MG/1.25GM (1.62%) GEL Place 60.75 mg on the skin every day. \"2 pumps on one shoulder and 1 pump on the other for a total of 3 pumps\"   12/10/2021 at 0800   • zolpidem (AMBIEN) 10 MG TABS Take 5-10 mg by mouth at bedtime as needed.   12/9/2021 at 2100   • allopurinol (ZYLOPRIM) 100 MG Tab Take 100 mg by mouth every day.   12/10/2021 at 0800   • FISH OIL Take 1 capsule by mouth every day.   12/10/2021 at 1630   • Cholecalciferol (VITAMIN D) 2000 UNIT Tab Take 1 capsule by mouth 2 (two) times a day.   12/10/2021 at 1630         Medication Allergies:  Allergies   Allergen Reactions   • Ether      Violently sick   • Lipitor [Atorvastatin Calcium]      LEG PAINS           Family Medical History:  Family History   Problem Relation Age of Onset   • Diabetes Father    • Heart Attack Father 79   • Cancer Brother          Social History:  Social History     Socioeconomic History   • Marital status: "      Spouse name: Not on file   • Number of children: Not on file   • Years of education: Not on file   • Highest education level: Bachelor's degree (e.g., BA, AB, BS)   Occupational History   • Not on file   Tobacco Use   • Smoking status: Former Smoker     Packs/day: 0.20     Years: 6.00     Pack years: 1.20     Quit date: 1965     Years since quittin.9   • Smokeless tobacco: Never Used   • Tobacco comment: Stopped over 25 years ago.   Vaping Use   • Vaping Use: Never used   Substance and Sexual Activity   • Alcohol use: No   • Drug use: No   • Sexual activity: Not on file   Other Topics Concern   • Not on file   Social History Narrative    Retired CPA.     Social Determinants of Health     Financial Resource Strain: Low Risk    • Difficulty of Paying Living Expenses: Not very hard   Food Insecurity: No Food Insecurity   • Worried About Running Out of Food in the Last Year: Never true   • Ran Out of Food in the Last Year: Never true   Transportation Needs: No Transportation Needs   • Lack of Transportation (Medical): No   • Lack of Transportation (Non-Medical): No   Physical Activity: Insufficiently Active   • Days of Exercise per Week: 3 days   • Minutes of Exercise per Session: 20 min   Stress: Stress Concern Present   • Feeling of Stress : To some extent   Social Connections: Moderately Isolated   • Frequency of Communication with Friends and Family: More than three times a week   • Frequency of Social Gatherings with Friends and Family: Once a week   • Attends [employer] Services: Never   • Active Member of Clubs or Organizations: Yes   • Attends Club or Organization Meetings: More than 4 times per year   • Marital Status:    Intimate Partner Violence:    • Fear of Current or Ex-Partner: Not on file   • Emotionally Abused: Not on file   • Physically Abused: Not on file   • Sexually Abused: Not on file   Housing Stability: Low Risk    • Unable to Pay for Housing in the Last Year: No   • Number  "of Places Lived in the Last Year: 1   • Unstable Housing in the Last Year: No         Vital signs:  Weight/BMI: Body mass index is 26.6 kg/m².  /76   Pulse 64   Temp 37.3 °C (99.1 °F) (Temporal)   Resp 18   Ht 1.803 m (5' 11\")   Wt 86.5 kg (190 lb 11.2 oz)   SpO2 95%   Vitals:    12/12/21 1611 12/12/21 1924 12/13/21 0345 12/13/21 0716   BP: 122/68 122/68 120/66 130/76   Pulse: (!) 59 78 89 64   Resp: 18 18 18 18   Temp: 36.6 °C (97.8 °F) 36.9 °C (98.4 °F) 37.1 °C (98.7 °F) 37.3 °C (99.1 °F)   TempSrc: Temporal Temporal Temporal Temporal   SpO2: 96% 94% 95% 95%   Weight:       Height:         Oxygen Therapy:  Pulse Oximetry: 95 %, O2 (LPM): 0, O2 Delivery Device: None - Room Air    Intake/Output Summary (Last 24 hours) at 12/13/2021 0924  Last data filed at 12/12/2021 1000  Gross per 24 hour   Intake 220 ml   Output --   Net 220 ml         Physical Exam:  Physical Exam  Vitals and nursing note reviewed.   Constitutional:       Appearance: Normal appearance.   HENT:      Head: Normocephalic and atraumatic.      Right Ear: External ear normal.      Left Ear: External ear normal.      Nose: Nose normal. No congestion.      Mouth/Throat:      Mouth: Mucous membranes are moist.      Pharynx: Oropharynx is clear.   Eyes:      General: No scleral icterus.     Extraocular Movements: Extraocular movements intact.      Pupils: Pupils are equal, round, and reactive to light.   Cardiovascular:      Rate and Rhythm: Normal rate and regular rhythm.      Pulses: Normal pulses.      Heart sounds: Normal heart sounds. No murmur heard.      Pulmonary:      Effort: Pulmonary effort is normal. No respiratory distress.      Breath sounds: Normal breath sounds. No wheezing or rales.   Abdominal:      General: Abdomen is flat. Bowel sounds are normal. There is no distension.      Palpations: Abdomen is soft.      Tenderness: There is no abdominal tenderness.   Musculoskeletal:         General: No tenderness.      Cervical back: " Neck supple.      Right lower leg: No edema.      Left lower leg: No edema.   Lymphadenopathy:      Cervical: No cervical adenopathy.   Skin:     General: Skin is warm and dry.      Capillary Refill: Capillary refill takes less than 2 seconds.      Coloration: Skin is not pale.   Neurological:      General: No focal deficit present.      Mental Status: He is alert and oriented to person, place, and time.   Psychiatric:         Mood and Affect: Mood normal.         Behavior: Behavior normal.         Thought Content: Thought content normal.         Judgment: Judgment normal.               Labs:  Recent Labs     12/11/21 0434 12/11/21 1334 12/13/21 0347   SODIUM 139 141 138   POTASSIUM 4.1 3.8 3.6   CHLORIDE 107 110 107   CO2 18* 22 18*   BUN 36* 32* 26*   CREATININE 2.13* 2.10* 2.11*   MAGNESIUM  --   --  1.9   PHOSPHORUS  --   --  3.2   CALCIUM 9.3 8.9 9.2     Recent Labs     12/10/21  2024 12/10/21  2024 12/11/21  0434 12/11/21  1334 12/13/21 0347   ALTSGPT 9  --   --   --  11   ASTSGOT 8*  --   --   --  8*   ALKPHOSPHAT 77  --   --   --  64   TBILIRUBIN 0.4  --   --   --  0.4   LIPASE 23  --   --   --   --    GLUCOSE 196*   < > 136* 108* 130*    < > = values in this interval not displayed.     Recent Labs     12/10/21  2024 12/10/21  2306 12/11/21 2112 12/12/21  0306 12/13/21 0347   WBC 10.8   < > 13.1* 9.7 10.1   NEUTSPOLYS 75.30*  --   --   --  85.60*   LYMPHOCYTES 16.10*  --   --   --  10.60*   MONOCYTES 6.20  --   --   --  3.30   EOSINOPHILS 1.30  --   --   --  0.00   BASOPHILS 0.60  --   --   --  0.10   ASTSGOT 8*  --   --   --  8*   ALTSGPT 9  --   --   --  11   ALKPHOSPHAT 77  --   --   --  64   TBILIRUBIN 0.4  --   --   --  0.4    < > = values in this interval not displayed.     Recent Labs     12/10/21  2024 12/10/21  2306 12/11/21  2112 12/12/21  0306 12/13/21  0347   RBC 4.12*   < > 4.26* 3.75* 3.36*   HEMOGLOBIN 12.0*   < > 12.4* 11.1* 9.9*   HEMATOCRIT 37.0*   < > 38.7* 33.3* 30.2*   PLATELETCT  246   < > 260 225 190   PROTHROMBTM 14.6  --   --   --   --    APTT 25.3  --   --   --   --    INR 1.17*  --   --   --   --     < > = values in this interval not displayed.     Recent Results (from the past 24 hour(s))   POCT glucose device results    Collection Time: 12/12/21 12:58 PM   Result Value Ref Range    Glucose - Accu-Ck 209 (H) 65 - 99 mg/dL   POCT glucose device results    Collection Time: 12/12/21  5:21 PM   Result Value Ref Range    Glucose - Accu-Ck 132 (H) 65 - 99 mg/dL   POCT glucose device results    Collection Time: 12/12/21  6:32 PM   Result Value Ref Range    Glucose - Accu-Ck 252 (H) 65 - 99 mg/dL   POCT glucose device results    Collection Time: 12/13/21 12:10 AM   Result Value Ref Range    Glucose - Accu-Ck 108 (H) 65 - 99 mg/dL   CBC WITH DIFFERENTIAL    Collection Time: 12/13/21  3:47 AM   Result Value Ref Range    WBC 10.1 4.8 - 10.8 K/uL    RBC 3.36 (L) 4.70 - 6.10 M/uL    Hemoglobin 9.9 (L) 14.0 - 18.0 g/dL    Hematocrit 30.2 (L) 42.0 - 52.0 %    MCV 89.9 81.4 - 97.8 fL    MCH 29.5 27.0 - 33.0 pg    MCHC 32.8 (L) 33.7 - 35.3 g/dL    RDW 46.8 35.9 - 50.0 fL    Platelet Count 190 164 - 446 K/uL    MPV 11.0 9.0 - 12.9 fL    Neutrophils-Polys 85.60 (H) 44.00 - 72.00 %    Lymphocytes 10.60 (L) 22.00 - 41.00 %    Monocytes 3.30 0.00 - 13.40 %    Eosinophils 0.00 0.00 - 6.90 %    Basophils 0.10 0.00 - 1.80 %    Immature Granulocytes 0.40 0.00 - 0.90 %    Nucleated RBC 0.00 /100 WBC    Neutrophils (Absolute) 8.61 (H) 1.82 - 7.42 K/uL    Lymphs (Absolute) 1.07 1.00 - 4.80 K/uL    Monos (Absolute) 0.33 0.00 - 0.85 K/uL    Eos (Absolute) 0.00 0.00 - 0.51 K/uL    Baso (Absolute) 0.01 0.00 - 0.12 K/uL    Immature Granulocytes (abs) 0.04 0.00 - 0.11 K/uL    NRBC (Absolute) 0.00 K/uL   Comp Metabolic Panel    Collection Time: 12/13/21  3:47 AM   Result Value Ref Range    Sodium 138 135 - 145 mmol/L    Potassium 3.6 3.6 - 5.5 mmol/L    Chloride 107 96 - 112 mmol/L    Co2 18 (L) 20 - 33 mmol/L    Anion  Gap 13.0 7.0 - 16.0    Glucose 130 (H) 65 - 99 mg/dL    Bun 26 (H) 8 - 22 mg/dL    Creatinine 2.11 (H) 0.50 - 1.40 mg/dL    Calcium 9.2 8.5 - 10.5 mg/dL    AST(SGOT) 8 (L) 12 - 45 U/L    ALT(SGPT) 11 2 - 50 U/L    Alkaline Phosphatase 64 30 - 99 U/L    Total Bilirubin 0.4 0.1 - 1.5 mg/dL    Albumin 3.4 3.2 - 4.9 g/dL    Total Protein 5.8 (L) 6.0 - 8.2 g/dL    Globulin 2.4 1.9 - 3.5 g/dL    A-G Ratio 1.4 g/dL   MAGNESIUM    Collection Time: 12/13/21  3:47 AM   Result Value Ref Range    Magnesium 1.9 1.5 - 2.5 mg/dL   PHOSPHORUS    Collection Time: 12/13/21  3:47 AM   Result Value Ref Range    Phosphorus 3.2 2.5 - 4.5 mg/dL   ESTIMATED GFR    Collection Time: 12/13/21  3:47 AM   Result Value Ref Range    GFR If  37 (A) >60 mL/min/1.73 m 2    GFR If Non African American 30 (A) >60 mL/min/1.73 m 2   POCT glucose device results    Collection Time: 12/13/21  6:13 AM   Result Value Ref Range    Glucose - Accu-Ck 135 (H) 65 - 99 mg/dL         Radiology Review:  No orders to display         MDM (Data Review):   -Records reviewed and summarized in current documentation  -I personally reviewed and interpreted the laboratory results  -I personally reviewed the radiology images        Medical Decision Making, by Problem:  Active Hospital Problems    Diagnosis    • GIB (gastrointestinal bleeding) [K92.2]    • Hypotension [I95.9]    • Chronic kidney disease (CKD) [N18.9]    • Type 2 diabetes mellitus with hyperglycemia (HCC) [E11.65]    • BPH (benign prostatic hyperplasia) [N40.0]            Assessment/Problems:  Assessment:  1.  Hematochezia-suggestive of lower gastrointestinal bleeding.  Suspect diverticular bleeding based on description without pain.  CT scan did demonstrate possible slight hazy fat stranding adjacent to the sigmoid colon, but cannot get contrast with a CT scan due to his renal function.  He has not had leukocytosis other than one measurement over the last 2 days.  He does not have fevers or  chills.  No pain on palpation.  It is possible he may have mild diverticulitis.  Additional differential diagnosis does include malignancy, ulcer, or other bleeding lesion.    2. Melena  3.  Acute posthemorrhagic anemia-stable  4.  Type 2 diabetes mellitus  5.  Chronic kidney disease  6.  BPH  7.  Coronary artery disease and peripheral vascular disease  8.  Hypertension  9.  Hyperlipidemia  10.  Increased complexity medical decision making secondary to above    Recommendations:  Plan:  1.  May start GI soft diet for 3 days then slowly ADAT  2. If re-bleeds would recommend IR for coil embolization  3. Continue antibiotics IV may transition to oral antibiotics x 10 days  4. Recommend outpatient surgical consult  5. Trend Hgb and transfuse >7  6. Continue oral PPI 40mg daily  7. Patient will need outpatient clinic with GI consultants in 2-4 weeks. Clinic will call patient to schedule      Thank you for inviting me to participate in the care of this patient. Please do not hesitate to call GI consultants with additional questions/concerns or changes in the patient's clinical status at [phone].    GI WILL SIGN OFF PLEASE CALL IF ANY QUESTIONS OR CONCERNS.    Core Quality Measures   Reviewed items:  Labs, Medications and Radiology reports reviewed

## 2021-12-13 NOTE — CARE PLAN
Problem: Knowledge Deficit - Standard  Goal: Patient and family/care givers will demonstrate understanding of plan of care, disease process/condition, diagnostic tests and medications  Outcome: Progressing     Problem: Risk for Fluid Volume Deficit Related to Bleeding  Goal: Fluid volume balance will be maintained  Outcome: Progressing  Goal: Patient will show no signs and symptoms of excessive bleeding  Outcome: Progressing     Problem: Risk for Bleeding  Goal: Patient will take measures to prevent bleeding and recognizes signs of bleeding that need to be reported immediately to a health care professional  Outcome: Progressing  Goal: Patient will not experience bleeding as evidenced by normal blood pressure, stable hematocrit and hemoglobin levels and desired ranges for coagulation profiles  Outcome: Progressing   The patient is Stable - Low risk of patient condition declining or worsening    Shift Goals  Clinical Goals: no rectal bleeding  Patient Goals: Discharge  Family Goals: n/a    Progress made toward(s) clinical / shift goals:  Patient updated on POC    Patient is not progressing towards the following goals:

## 2021-12-13 NOTE — DISCHARGE INSTRUCTIONS
Colonoscopy, Adult, Care After  This sheet gives you information about how to care for yourself after your procedure. Your doctor may also give you more specific instructions. If you have problems or questions, call your doctor.  What can I expect after the procedure?  After the procedure, it is common to have:  · A small amount of blood in your poop for 24 hours.  · Some gas.  · Mild cramping or bloating in your belly.  Follow these instructions at home:  General instructions  · For the first 24 hours after the procedure:  ? Do not drive or use machinery.  ? Do not sign important documents.  ? Do not drink alcohol.  ? Do your daily activities more slowly than normal.  ? Eat foods that are soft and easy to digest.  · Take over-the-counter or prescription medicines only as told by your doctor.  To help cramping and bloating:    · Try walking around.  · Put heat on your belly (abdomen) as told by your doctor. Use a heat source that your doctor recommends, such as a moist heat pack or a heating pad.  ? Put a towel between your skin and the heat source.  ? Leave the heat on for 20-30 minutes.  ? Remove the heat if your skin turns bright red. This is especially important if you cannot feel pain, heat, or cold. You can get burned.  Eating and drinking    · Drink enough fluid to keep your pee (urine) clear or pale yellow.  · Return to your normal diet as told by your doctor. Avoid heavy or fried foods that are hard to digest.  · Avoid drinking alcohol for as long as told by your doctor.  Contact a doctor if:  · You have blood in your poop (stool) 2-3 days after the procedure.  Get help right away if:  · You have more than a small amount of blood in your poop.  · You see large clumps of tissue (blood clots) in your poop.  · Your belly is swollen.  · You feel sick to your stomach (nauseous).  · You throw up (vomit).  · You have a fever.  · You have belly pain that gets worse, and medicine does not help your  pain.  Summary  · After the procedure, it is common to have a small amount of blood in your poop. You may also have mild cramping and bloating in your belly.  · For the first 24 hours after the procedure, do not drive or use machinery, do not sign important documents, and do not drink alcohol.  · Get help right away if you have a lot of blood in your poop, feel sick to your stomach, have a fever, or have more belly pain.  This information is not intended to replace advice given to you by your health care provider. Make sure you discuss any questions you have with your health care provider.  Document Released: 01/20/2012 Document Revised: 10/18/2018 Document Reviewed: 09/11/2017  [employer] Patient Education © 2020 [employer]  Discharge Instructions    Discharged to home by car with relative. Discharged via wheelchair, hospital escort: Yes.  Special equipment needed: Not Applicable    Be sure to schedule a follow-up appointment with your primary care doctor or any specialists as instructed.     Discharge Plan:   Diet Plan: Discussed  Activity Level: Discussed  Confirmed Follow up Appointment: Appointment Scheduled  Confirmed Symptoms Management: Discussed  Medication Reconciliation Updated: Yes  Influenza Vaccine Indication: Not indicated: Previously immunized this influenza season and > 8 years of age    I understand that a diet low in cholesterol, fat, and sodium is recommended for good health. Unless I have been given specific instructions below for another diet, I accept this instruction as my diet prescription.   Other diet: Soft diet      Special Instructions: None    · Is patient discharged on Warfarin / Coumadin?   No     Depression / Suicide Risk    As you are discharged from this [facility] facility, it is important to learn how to keep safe from harming yourself.    Recognize the warning signs:  · Abrupt changes in personality, positive or negative- including increase in energy   · Giving away  possessions  · Change in eating patterns- significant weight changes-  positive or negative  · Change in sleeping patterns- unable to sleep or sleeping all the time   · Unwillingness or inability to communicate  · Depression  · Unusual sadness, discouragement and loneliness  · Talk of wanting to die  · Neglect of personal appearance   · Rebelliousness- reckless behavior  · Withdrawal from people/activities they love  · Confusion- inability to concentrate     If you or a loved one observes any of these behaviors or has concerns about self-harm, here's what you can do:  · Talk about it- your feelings and reasons for harming yourself  · Remove any means that you might use to hurt yourself (examples: pills, rope, extension cords, firearm)  · Get professional help from the community (Mental Health, Substance Abuse, psychological counseling)  · Do not be alone:Call your Safe Contact- someone whom you trust who will be there for you.  · Call your local CRISIS HOTLINE 895-6909 or 622-911-6054  · Call your local Children's Mobile Crisis Response Team [place] (885) 661-5056 or wwwThe [employer] Peace Prize  · Call the toll free National Suicide Prevention Hotlines   · National Suicide Prevention Lifeline 594-939-SZBW (6922)  · National Hope Line Network 800-SUICIDE (463-2270)      Soft-Food Eating Plan  A soft-food eating plan includes foods that are safe and easy to chew and swallow. Your health care provider or dietitian can help you find foods and flavors that fit into this plan. Follow this plan until your health care provider or dietitian says it is safe to start eating other foods and food textures.  What are tips for following this plan?  General guidelines    · Take small bites of food, or cut food into pieces about ½ inch or smaller. Bite-sized pieces of food are easier to chew and swallow.  · Eat moist foods. Avoid overly dry foods.  · Avoid foods that:  ? Are difficult to swallow, such as dry, chunky, crispy, or  sticky foods.  ? Are difficult to chew, such as hard, tough, or stringy foods.  ? Contain nuts, seeds, or fruits.  · Follow instructions from your dietitian about the types of liquids that are safe for you to swallow. You may be allowed to have:  ? Thick liquids only. This includes only liquids that are thicker than honey.  ? Thin and thick liquids. This includes all beverages and foods that become liquid at room temperature.  · To make thick liquids:  ? Purchase a commercial liquid thickening powder. These are available at grocery stores and pharmacies.  ? Mix the thickener into liquids according to instructions on the label.  ? Purchase ready-made thickened liquids.  ? Thicken soup by pureeing, straining to remove chunks, and adding flour, potato flakes, or corn starch.  ? Add commercial thickener to foods that become liquid at room temperature, such as milk shakes, yogurt, ice cream, gelatin, and sherbet.  · Ask your health care provider whether you need to take a fiber supplement.  Cooking  · Cook meats so they stay tender and moist. Use methods like braising, stewing, or baking in liquid.  · Cook vegetables and fruit until they are soft enough to be mashed with a fork.  · Peel soft, fresh fruits such as peaches, nectarines, and melons.  · When making soup, make sure chunks of meat and vegetables are smaller than ½ inch.  · Reheat leftover foods slowly so that a tough crust does not form.  What foods are allowed?  The items listed below may not be a complete list. Talk with your dietitian about what dietary choices are best for you.  Grains  Breads, muffins, pancakes, or waffles moistened with syrup, jelly, or butter. Dry cereals well-moistened with milk. Moist, cooked cereals. Well-cooked pasta and rice.  Vegetables  All soft-cooked vegetables. Shredded lettuce.  Fruits  All canned and cooked fruits. Soft, peeled fresh fruits. Strawberries.  Dairy  Milk. Cream. Yogurt. Cottage cheese. Soft cheese without the  rind.  Meats and other protein foods  Tender, moist ground meat, poultry, or fish. Meat cooked in gravy or sauces. Eggs.  Sweets and desserts  Ice cream. Milk shakes. Sherbet. Pudding.  Fats and oils  Butter. Margarine. Olive, canola, sunflower, and grapeseed oil. Smooth salad dressing. Smooth cream cheese. Mayonnaise. Gravy.  What foods are not allowed?  The items listed bemay not be a complete list. Talk with your dietitian about what dietary choices are best for you.  Grains  Coarse or dry cereals, such as bran, granola, and shredded wheat. Tough or chewy crusty breads, such as Malian bread or baguettes. Breads with nuts, seeds, or fruit.  Vegetables  All raw vegetables. Cooked corn. Cooked vegetables that are tough or stringy. Tough, crisp, fried potatoes and potato skins.  Fruits  Fresh fruits with skins or seeds, or both, such as apples, pears, and grapes. Stringy, high-pulp fruits, such as papaya, pineapple, coconut, and keyona. Fruit leather and all dried fruit.  Dairy  Yogurt with nuts or coconut.  Meats and other protein foods  Hard, dry sausages. Dry meat, poultry, or fish. Meats with gristle. Fish with bones. Fried meat or fish. Lunch meat and hotdogs. Nuts and seeds. Priddy peanut butter or other nut butters.  Sweets and desserts  Cakes or cookies that are very dry or chewy. Desserts with dried fruit, nuts, or coconut. Fried pastries. Very rich pastries.  Fats and oils  Cream cheese with fruit or nuts. Salad dressings with seeds or chunks.  Summary  · A soft-food eating plan includes foods that are safe and easy to swallow. Generally, the foods should be soft enough to be mashed with a fork.  · Avoid foods that are dry, hard to chew, crunchy, sticky, stringy, or crispy.  · Ask your health care provider whether you need to thicken your liquids and if you need to take a fiber supplement.  This information is not intended to replace advice given to you by your health care provider. Make sure you discuss any  questions you have with your health care provider.  Document Released: 03/26/2009 Document Revised: 04/09/2020 Document Reviewed: 02/20/2018  Elsevier Patient Education © 2020 Elsevier Inc.

## 2021-12-13 NOTE — CARE PLAN
The patient is stable.    Shift Goals  Clinical Goals: no rectal bleeding  Patient Goals: discharge tomorrow  Family Goals: n/a    Progress made toward(s) clinical / shift goals:  progressing    Problem: Knowledge Deficit - Standard  Goal: Patient and family/care givers will demonstrate understanding of plan of care, disease process/condition, diagnostic tests and medications  Outcome: Progressing     Problem: Risk for Fluid Volume Deficit Related to Bleeding  Goal: Fluid volume balance will be maintained  Outcome: Progressing     Problem: Risk for Bleeding  Goal: Patient will not experience bleeding as evidenced by normal blood pressure, stable hematocrit and hemoglobin levels and desired ranges for coagulation profiles  Outcome: Progressing

## 2021-12-13 NOTE — PROGRESS NOTES
Assessment completed. Pt A&Ox 4. Respirations are even and unlabored on room air. Pt denies pain at this time. Medical Patient, VS stable, call light and belongings within reach. POC updated (Possible Discharge). Pt educated on room and call light, pt verbalized understanding. Communication board updated. Needs met.

## 2021-12-13 NOTE — DISCHARGE SUMMARY
Discharge Summary    CHIEF COMPLAINT ON ADMISSION  Chief Complaint   Patient presents with   • Rectal Bleeding     per EMS, pt c/o rectal bleeding bright red stool that started about 5pm. pt stated he had 6 episodes of bright red stool. pt denies any pain at this time. pt c/o dizziness. pt denies NV. per pt, he was seen in [facility] last Wednesday for the same complaint but was discharged yesterday with diverticulitis diagnosis. pt was given about 400 mL NS by EMS prior to arrival for bp at 72/42. bp upon arrival is at 103/62       Reason for Admission  EMS     Admission Date  12/10/2021    CODE STATUS  Full Code    HPI & HOSPITAL COURSE    Patient is an 81-year-old male with past medical history of diverticulitis, CKD stage IV under the care of nephrology (Dr. Del Rio), diabetes type 2, BPH status post TURP 7/1/2021,  hypertension, hyperlipidemia, and aortic aneurysm who presented 12/10/2021 with complaints of 6 episodes of bloody stool.  Patient reports the stool was bright red in color and he felt dizzy and lightheaded afterwards.  He was admitted overnight at [facility] from Wednesday to Thursday of last week for the same problem, however was discharged when his hemoglobin and hematocrit stabilized.  He was diagnosed with diverticulitis at that time.  He follows with [facility] and had a scheduled appointment for Monday Dec 13.    In the ED, patient was found to have hypotension with H&H of 12 and 37.  His blood pressure stabilized with fluid administration.  He was admitted for GI consultation and monitoring of his GI bleed.    Dr. Simons of [facility] was notified of this patient's admission.  Patient underwent EGD and colonoscopy 12/12/2021 and was found to have moderate diverticulitis and gastritis. Dr. Simons recommending overnight stay with initiation of Zosyn to monitor for bleeding.     Patient remained overnight, no evidence of bleeding.  Vital signs remained stable.  Patient  able to tolerate oral intake of soft GI diet.  Denies chest pain, shortness of breath, dizziness, hematuria, bloody stool, or difficulty urinating.  Discussed antibiotic therapy, daily PPI, and GI follow-up with patient, he verbalizes understanding with plan of care.    Therefore, he is discharged in good and stable condition to home with close outpatient follow-up.    The patient recovered much more quickly than anticipated on admission.    Discharge Date  12/13/2021    FOLLOW UP ITEMS POST DISCHARGE  Eat a soft diet for 3 days, then you may advance your diet as tolerated.  [facility] will call you for follow-up appointment.  Ensure you take the entire course of antibiotics that you are being discharged on.  Return to the ED for any signs of bleeding, dizziness, or feeling like you are going to pass out, chest pain, or shortness of breath.    DISCHARGE DIAGNOSES  Principal Problem (Resolved):    GIB (gastrointestinal bleeding) POA: Yes  Active Problems:    BPH (benign prostatic hyperplasia) POA: Yes    Type 2 diabetes mellitus with hyperglycemia (HCC) POA: Yes    Chronic kidney disease (CKD) POA: Yes    Diverticulitis POA: Yes  Resolved Problems:    Hypotension POA: Yes      FOLLOW UP  Future Appointments   Date Time Provider Department Center   2/14/2022  2:00 PM Michael J Bloch, M.D. [facility] None     Jonah Logan M.D.  [address] [address]  [phone]    Go on 12/17/2021  Please go to your hospital follow up appointment with Jonah Logan M.D. on 12/17/21 at 11 am. Thank you.      MEDICATIONS ON DISCHARGE     Medication List      START taking these medications      Instructions   amoxicillin-clavulanate 875-125 MG Tabs  Commonly known as: AUGMENTIN   Take 1 Tablet by mouth every 12 hours.  Dose: 1 Tablet     omeprazole 40 MG delayed-release capsule  Start taking on: December 14, 2021  Commonly known as: PRILOSEC   Take 1 Capsule by mouth every day.  Dose: 40 mg        CONTINUE  "taking these medications      Instructions   allopurinol 100 MG Tabs  Commonly known as: ZYLOPRIM   Take 100 mg by mouth every day.  Dose: 100 mg     amLODIPine 2.5 MG Tabs  Commonly known as: NORVASC   Take 5 mg by mouth every day.  Dose: 5 mg     AndroGel 20.25 MG/1.25GM (1.62%) Gel  Generic drug: Testosterone   Place 60.75 mg on the skin every day. \"2 pumps on one shoulder and 1 pump on the other for a total of 3 pumps\"  Dose: 60.75 mg     B COMPLEX 1 PO   Take 1 tablet by mouth every day.  Dose: 1 Tablet     B-D UF III MINI PEN NEEDLES 31G X 5 MM Misc  Generic drug: Insulin Pen Needle      carvedilol 12.5 MG Tabs  Commonly known as: COREG   Take 1 tablet by mouth 2 times a day with meals.  Dose: 12.5 mg     Contour Next Test strip  Generic drug: glucose blood      FISH OIL   Take 1 capsule by mouth every day.  Dose: 1 Capsule     melatonin 1 mg Tabs   Take 1 mg by mouth at bedtime.  Dose: 1 mg     olmesartan 20 MG Tabs  Commonly known as: BENICAR   Take 1 tablet by mouth every day.  Dose: 20 mg     Ozempic (0.25 or 0.5 MG/DOSE) 2 MG/1.5ML Sopn  Generic drug: Semaglutide(0.25 or 0.5MG/DOS)   Inject 0.5 mg under the skin every Sunday.  Dose: 0.5 mg     simvastatin 40 MG Tabs  Commonly known as: ZOCOR   Take 40 mg by mouth every evening.  Dose: 40 mg     vitamin D 2000 UNIT Tabs   Take 1 capsule by mouth 2 (two) times a day.  Dose: 1 Capsule     zolpidem 10 MG Tabs  Commonly known as: AMBIEN   Take 5-10 mg by mouth at bedtime as needed.  Dose: 5-10 mg        STOP taking these medications    aspirin EC 81 MG Tbec  Commonly known as: ECOTRIN            Allergies  Allergies   Allergen Reactions   • Ether      Violently sick   • Lipitor [Atorvastatin Calcium]      LEG PAINS         DIET  Orders Placed This Encounter   Procedures   • Diet Order Diet: Low Fiber(GI Soft) (no reds)     Standing Status:   Standing     Number of Occurrences:   1     Order Specific Question:   Diet:     Answer:   Low Fiber(GI Soft) [2]     " Comments:   no reds       ACTIVITY  As tolerated.  Weight bearing as tolerated    CONSULTATIONS  GI    PROCEDURES  EGD/colonoscopy    LABORATORY  Lab Results   Component Value Date    SODIUM 138 12/13/2021    POTASSIUM 3.6 12/13/2021    CHLORIDE 107 12/13/2021    CO2 18 (L) 12/13/2021    GLUCOSE 130 (H) 12/13/2021    BUN 26 (H) 12/13/2021    CREATININE 2.11 (H) 12/13/2021    CREATININE 1.4 02/06/2009        Lab Results   Component Value Date    WBC 10.1 12/13/2021    HEMOGLOBIN 9.9 (L) 12/13/2021    HEMATOCRIT 30.2 (L) 12/13/2021    PLATELETCT 190 12/13/2021        Total time of the discharge process exceeds 38 minutes.

## 2021-12-13 NOTE — DISCHARGE PLANNING
SC[facility] TCN review. Attempted to introduce [facility] services to member. However, mbr was already discharged per Bedside RN Carter. Telephone call to son [phone]  to attempt to introduce GSC services - Son deferred decision making to his Dad Ag. TCN RN LVM to member Ag  [phone]   to introduce GSC services, call back number provided.

## 2021-12-14 ENCOUNTER — TELEPHONE (OUTPATIENT)
Dept: VASCULAR LAB | Facility: MEDICAL CENTER | Age: 81
End: 2021-12-14

## 2021-12-15 NOTE — TELEPHONE ENCOUNTER
Spoke with the he will keep the echo appt and the appt with Dr. Bloch and does not need to reschedule. KEV Liao.        ----- Message from Constance Sotelo sent at 12/14/2021  3:03 PM PST -----  Regarding: Needs to rescheduled 02-14-22  Hi There,    Will someone in the office please reach out to this patient to reschedule his appointment for February, 2022?  He is having a hard time getting through to scheduling.     Thank you,  Constance Sotelo  [facility] Scheduler

## 2021-12-17 ENCOUNTER — TELEPHONE (OUTPATIENT)
Dept: HEALTH INFORMATION MANAGEMENT | Facility: OTHER | Age: 81
End: 2021-12-17

## 2021-12-17 NOTE — TELEPHONE ENCOUNTER
Outreach call to mbr to follow up on hospital discharge. Mbr reported he is going better and has appt with PCP today that he is running late for; thus, conversation cut short. Mbr has LSW contact information and was encouraged to call back if he has any additional concerns/needs. Mbr agreeable.

## 2021-12-29 ENCOUNTER — HOSPITAL ENCOUNTER (OUTPATIENT)
Dept: LAB | Facility: MEDICAL CENTER | Age: 81
End: 2021-12-29
Attending: FAMILY MEDICINE
Payer: MEDICARE

## 2021-12-29 LAB
HCT VFR BLD AUTO: 36.2 % (ref 42–52)
HGB BLD-MCNC: 11.9 G/DL (ref 14–18)

## 2021-12-29 PROCEDURE — 36415 COLL VENOUS BLD VENIPUNCTURE: CPT

## 2021-12-29 PROCEDURE — 85014 HEMATOCRIT: CPT

## 2021-12-29 PROCEDURE — 85018 HEMOGLOBIN: CPT

## 2022-01-26 ENCOUNTER — HOSPITAL ENCOUNTER (OUTPATIENT)
Dept: LAB | Facility: MEDICAL CENTER | Age: 82
End: 2022-01-26
Attending: UROLOGY
Payer: MEDICARE

## 2022-01-26 LAB
ALBUMIN SERPL BCP-MCNC: 4.1 G/DL (ref 3.2–4.9)
ALBUMIN/GLOB SERPL: 1.6 G/DL
ALP SERPL-CCNC: 97 U/L (ref 30–99)
ALT SERPL-CCNC: 12 U/L (ref 2–50)
ANION GAP SERPL CALC-SCNC: 9 MMOL/L (ref 7–16)
AST SERPL-CCNC: 13 U/L (ref 12–45)
BASOPHILS # BLD AUTO: 0.4 % (ref 0–1.8)
BASOPHILS # BLD: 0.03 K/UL (ref 0–0.12)
BILIRUB SERPL-MCNC: 0.3 MG/DL (ref 0.1–1.5)
BUN SERPL-MCNC: 29 MG/DL (ref 8–22)
CALCIUM SERPL-MCNC: 9.8 MG/DL (ref 8.5–10.5)
CHLORIDE SERPL-SCNC: 105 MMOL/L (ref 96–112)
CO2 SERPL-SCNC: 24 MMOL/L (ref 20–33)
CREAT SERPL-MCNC: 2.14 MG/DL (ref 0.5–1.4)
EOSINOPHIL # BLD AUTO: 0.13 K/UL (ref 0–0.51)
EOSINOPHIL NFR BLD: 1.9 % (ref 0–6.9)
ERYTHROCYTE [DISTWIDTH] IN BLOOD BY AUTOMATED COUNT: 46.5 FL (ref 35.9–50)
GLOBULIN SER CALC-MCNC: 2.5 G/DL (ref 1.9–3.5)
GLUCOSE SERPL-MCNC: 116 MG/DL (ref 65–99)
HCT VFR BLD AUTO: 41 % (ref 42–52)
HGB BLD-MCNC: 13.4 G/DL (ref 14–18)
IMM GRANULOCYTES # BLD AUTO: 0.03 K/UL (ref 0–0.11)
IMM GRANULOCYTES NFR BLD AUTO: 0.4 % (ref 0–0.9)
LYMPHOCYTES # BLD AUTO: 1.68 K/UL (ref 1–4.8)
LYMPHOCYTES NFR BLD: 25.1 % (ref 22–41)
MCH RBC QN AUTO: 29.6 PG (ref 27–33)
MCHC RBC AUTO-ENTMCNC: 32.7 G/DL (ref 33.7–35.3)
MCV RBC AUTO: 90.7 FL (ref 81.4–97.8)
MONOCYTES # BLD AUTO: 0.53 K/UL (ref 0–0.85)
MONOCYTES NFR BLD AUTO: 7.9 % (ref 0–13.4)
NEUTROPHILS # BLD AUTO: 4.3 K/UL (ref 1.82–7.42)
NEUTROPHILS NFR BLD: 64.3 % (ref 44–72)
NRBC # BLD AUTO: 0 K/UL
NRBC BLD-RTO: 0 /100 WBC
PLATELET # BLD AUTO: 257 K/UL (ref 164–446)
PMV BLD AUTO: 11 FL (ref 9–12.9)
POTASSIUM SERPL-SCNC: 4.5 MMOL/L (ref 3.6–5.5)
PROT SERPL-MCNC: 6.6 G/DL (ref 6–8.2)
PSA SERPL-MCNC: 1.82 NG/ML (ref 0–4)
RBC # BLD AUTO: 4.52 M/UL (ref 4.7–6.1)
SODIUM SERPL-SCNC: 138 MMOL/L (ref 135–145)
TESTOST SERPL-MCNC: 442 NG/DL (ref 175–781)
WBC # BLD AUTO: 6.7 K/UL (ref 4.8–10.8)

## 2022-01-26 PROCEDURE — 80053 COMPREHEN METABOLIC PANEL: CPT

## 2022-01-26 PROCEDURE — 36415 COLL VENOUS BLD VENIPUNCTURE: CPT

## 2022-01-26 PROCEDURE — 84153 ASSAY OF PSA TOTAL: CPT

## 2022-01-26 PROCEDURE — 85025 COMPLETE CBC W/AUTO DIFF WBC: CPT

## 2022-01-26 PROCEDURE — 84403 ASSAY OF TOTAL TESTOSTERONE: CPT

## 2022-02-09 ENCOUNTER — HOSPITAL ENCOUNTER (OUTPATIENT)
Dept: LAB | Facility: MEDICAL CENTER | Age: 82
End: 2022-02-09
Attending: INTERNAL MEDICINE
Payer: MEDICARE

## 2022-02-09 LAB
ANION GAP SERPL CALC-SCNC: 13 MMOL/L (ref 7–16)
BUN SERPL-MCNC: 36 MG/DL (ref 8–22)
CALCIUM SERPL-MCNC: 9.5 MG/DL (ref 8.5–10.5)
CHLORIDE SERPL-SCNC: 105 MMOL/L (ref 96–112)
CO2 SERPL-SCNC: 21 MMOL/L (ref 20–33)
CREAT SERPL-MCNC: 2.44 MG/DL (ref 0.5–1.4)
CREAT UR-MCNC: 154.17 MG/DL
EST. AVERAGE GLUCOSE BLD GHB EST-MCNC: 126 MG/DL
GLUCOSE SERPL-MCNC: 127 MG/DL (ref 65–99)
HBA1C MFR BLD: 6 % (ref 4–5.6)
MICROALBUMIN UR-MCNC: 98.6 MG/DL
MICROALBUMIN/CREAT UR: 640 MG/G (ref 0–30)
POTASSIUM SERPL-SCNC: 4.4 MMOL/L (ref 3.6–5.5)
SODIUM SERPL-SCNC: 139 MMOL/L (ref 135–145)
TESTOST SERPL-MCNC: 480 NG/DL (ref 175–781)

## 2022-02-09 PROCEDURE — 82570 ASSAY OF URINE CREATININE: CPT

## 2022-02-09 PROCEDURE — 84403 ASSAY OF TOTAL TESTOSTERONE: CPT

## 2022-02-09 PROCEDURE — 36415 COLL VENOUS BLD VENIPUNCTURE: CPT

## 2022-02-09 PROCEDURE — 82043 UR ALBUMIN QUANTITATIVE: CPT

## 2022-02-09 PROCEDURE — 84270 ASSAY OF SEX HORMONE GLOBUL: CPT

## 2022-02-09 PROCEDURE — 83036 HEMOGLOBIN GLYCOSYLATED A1C: CPT

## 2022-02-09 PROCEDURE — 80048 BASIC METABOLIC PNL TOTAL CA: CPT

## 2022-02-14 ENCOUNTER — OFFICE VISIT (OUTPATIENT)
Dept: VASCULAR LAB | Facility: MEDICAL CENTER | Age: 82
End: 2022-02-14
Attending: INTERNAL MEDICINE
Payer: MEDICARE

## 2022-02-14 VITALS
WEIGHT: 197 LBS | DIASTOLIC BLOOD PRESSURE: 68 MMHG | SYSTOLIC BLOOD PRESSURE: 102 MMHG | HEIGHT: 71 IN | HEART RATE: 71 BPM | BODY MASS INDEX: 27.58 KG/M2

## 2022-02-14 DIAGNOSIS — I10 ESSENTIAL HYPERTENSION: ICD-10-CM

## 2022-02-14 DIAGNOSIS — N18.4 TYPE 2 DIABETES MELLITUS WITH STAGE 4 CHRONIC KIDNEY DISEASE, WITHOUT LONG-TERM CURRENT USE OF INSULIN (HCC): ICD-10-CM

## 2022-02-14 DIAGNOSIS — I71.21 ASCENDING AORTIC ANEURYSM (HCC): ICD-10-CM

## 2022-02-14 DIAGNOSIS — N18.4 CKD (CHRONIC KIDNEY DISEASE) STAGE 4, GFR 15-29 ML/MIN (HCC): ICD-10-CM

## 2022-02-14 DIAGNOSIS — E11.22 TYPE 2 DIABETES MELLITUS WITH STAGE 4 CHRONIC KIDNEY DISEASE, WITHOUT LONG-TERM CURRENT USE OF INSULIN (HCC): ICD-10-CM

## 2022-02-14 DIAGNOSIS — E78.2 MIXED HYPERLIPIDEMIA: ICD-10-CM

## 2022-02-14 LAB — SHBG SERPL-SCNC: 35 NMOL/L (ref 11–80)

## 2022-02-14 PROCEDURE — 99214 OFFICE O/P EST MOD 30 MIN: CPT | Performed by: INTERNAL MEDICINE

## 2022-02-14 PROCEDURE — 99212 OFFICE O/P EST SF 10 MIN: CPT

## 2022-02-14 RX ORDER — SIMVASTATIN 40 MG
20 TABLET ORAL NIGHTLY
Qty: 30 TABLET | Refills: 11
Start: 2022-02-14 | End: 2023-03-01

## 2022-02-14 ASSESSMENT — FIBROSIS 4 INDEX: FIB4 SCORE: 1.17

## 2022-02-14 NOTE — PROGRESS NOTES
"[facility] - Follow up VISIT  22    Tyrone Cline is a 81 y.o.  male who presents today  for   Chief Complaint   Patient presents with   • Follow-Up      Subjective     HPI:  Patient here for f/u of ascending aortic aneurysm, hypertension, dyslipidemia, diabetes  Since last time I saw him patient has had a TURP and 2 hospitalizations for diverticular bleeding.  No cardiovascular complications.  He is following his hemoglobin at home and it is stable.  He is noticed no further melena or bright red blood per rectum.  He does have follow-up scheduled with GI  She is off aspirin  FS at home usually on cgm - 120s-130s - lowest around 100  Rare if any hypoglycemia  Has maintained weight loss on Ozempic  Still taking Jardiance  His nephrologist stopped his hydrochlorothiazide and is now on olmesartan 20 mg daily  Remains on amlodipine without leg swelling  Carvedilol dose has not changed  He still has a bit of lightheadedness on occasion.  Mean blood pressure 138/84  Sees Dr. Olsen regularly  Has had stage 4 ckd for some time.   Denies nsaids  Remains on simvastatin - no myalgias  Had AE on atorva in past.          Social History     Tobacco Use   • Smoking status: Former Smoker     Packs/day: 0.20     Years: 6.00     Pack years: 1.20     Quit date: 1965     Years since quittin.1   • Smokeless tobacco: Never Used   • Tobacco comment: Stopped over 25 years ago.   Vaping Use   • Vaping Use: Never used   Substance Use Topics   • Alcohol use: No   • Drug use: No      DIET AND EXERCISE:  Weight Change: down 18 pounds since starting ozempic  Diet: decent dm diet but wants to see MNT  Exercise: walks daily        Objective        Objective:     Vitals:    22 1357   BP: 102/68   BP Location: Right arm   Patient Position: Sitting   BP Cuff Size: Adult   Pulse: 71   Weight: 89.4 kg (197 lb)   Height: 1.803 m (5' 11\")       Physical Exam  Vitals reviewed.   Constitutional:       General: He is " not in acute distress.     Appearance: He is not diaphoretic.   HENT:      Head: Normocephalic and atraumatic.   Eyes:      General: No scleral icterus.     Conjunctiva/sclera: Conjunctivae normal.   Cardiovascular:      Rate and Rhythm: Normal rate and regular rhythm.      Heart sounds: Normal heart sounds. No murmur heard.      Pulmonary:      Effort: Pulmonary effort is normal. No respiratory distress.      Breath sounds: Normal breath sounds. No wheezing or rales.   Musculoskeletal:      Cervical back: Neck supple.      Right lower leg: No edema.      Left lower leg: No edema.   Skin:     Coloration: Skin is not pale.      Findings: No rash.   Neurological:      Mental Status: He is alert and oriented to person, place, and time.      Cranial Nerves: No cranial nerve deficit.      Coordination: Coordination normal.      Gait: Gait is intact.   Psychiatric:         Mood and Affect: Affect normal.         Judgment: Judgment normal.          DATA REVIEW    Lab Results   Component Value Date/Time    CHOLSTRLTOT 143 11/29/2021 03:30 PM    LDL 78 11/29/2021 03:30 PM    HDL 42 11/29/2021 03:30 PM    TRIGLYCERIDE 114 11/29/2021 03:30 PM       Lab Results   Component Value Date/Time    SODIUM 139 02/09/2022 04:35 PM    POTASSIUM 4.4 02/09/2022 04:35 PM    CHLORIDE 105 02/09/2022 04:35 PM    CO2 21 02/09/2022 04:35 PM    GLUCOSE 127 (H) 02/09/2022 04:35 PM    BUN 36 (H) 02/09/2022 04:35 PM    CREATININE 2.44 (H) 02/09/2022 04:35 PM    CREATININE 1.4 02/06/2009 03:00 AM     Lab Results   Component Value Date/Time    ALKPHOSPHAT 97 01/26/2022 04:24 PM    ASTSGOT 13 01/26/2022 04:24 PM    ALTSGPT 12 01/26/2022 04:24 PM    TBILIRUBIN 0.3 01/26/2022 04:24 PM       Lab Results   Component Value Date/Time    HBA1C 6.0 (H) 02/09/2022 04:35 PM       Lab Results   Component Value Date/Time    MALBCRT 640 (H) 02/09/2022 04:35 PM    MICROALBUR 98.6 02/09/2022 04:35 PM       M PI February 2021   No evidence of significant jeopardized  viable myocardium or prior myocardial    infarction.   Normal left ventricular size, ejection fraction, and wall motion.   ECG INTERPRETATION   Negative stress ECG for ischemia.    Echocardiogram February 2021  Compared to the images of the prior study done 4/15/2015, Ascending   aortic dilatation is now present.  Technically difficult study - adequate information is obtained.   Contrast was used to enhance visualization of the endocardial border.  Left ventricular ejection fraction is visually estimated to be 65%.  Normal regional wall motion.  Normal left ventricular wall thickness.  Normal right ventricular size and systolic function.  Normal left atrial size.  Structurally normal mitral valve without significant stenosis or   regurgitation.  Mild aortic sclerosis without stenosis.  No aortic insufficiency.  Estimated right ventricular systolic pressure  is 32 mmHg.  Trace tricuspid regurgitation.  No pericardial effusion seen.  Ascending aorta is dilated with a diameter of 4.4 cm.    Renal artery duplex april 2021   SMA and celiac artery not visualized.    Proximal/mid aorta not well seen.   No evidence of abdominal aortic aneurysm in areas visualized.    No obvious KEERTHI   Multiple left renal cysts seen measuring approximately 0.9cm.           Medical Decision Making:  Today's Assessment / Status / Plan:     1. Ascending aortic aneurysm (HCC)     2. Essential hypertension     3. CKD (chronic kidney disease) stage 4, GFR 15-29 ml/min (HCC)     4. Type 2 diabetes mellitus with stage 4 chronic kidney disease, without long-term current use of insulin (HCC)     5. Mixed hyperlipidemia  CREATINE KINASE    Lipid Profile      Patient Type: Primary prevention    Etiology of Established CVD if Present: Ascending aortic aneurysm without previous intervention    Lipid Management: Qualifies for Statin Therapy Based on 2018 ACC/AHA Guidelines: yes  Calculated 10-Year Risk of ASCVD: N/A  Currently on Statin: Yes  Goal LDL less  than 100 and non-HDL less than 130  Appears below threshold on most recent blood work  Did not tolerate atorvastatin in the past  Although not necessarily the best agent for CKD, does tolerate simvastatin  Plan:  -Continue simvastatin 20 mg daily for now  -Consider change to rosuvastatin in the future  -Recheck fasting lipid panel and CK with next blood work    Blood Pressure Management:  Acc[facility] (2017) Blood Pressure Goal <130/80  Blood pressure control overall a bit worse since stopping hydrochlorothiazide  Decent control in the office today  Home readings are right around 135/85 which although above our long-term target is reasonable for the current clinical scenario in which she has some lightheadedness and recent GI bleed  Leg swelling on higher dose of amlodipine - would not increase past 5 mg  CKD obviously barrier to control  H/o gout limits use of higher dose tiazides  No obvious evidence of KEERTHI on duplex  Plan:  -Continue current dose of carvedilol  -Continue current dose of olmesartan 20 mg daily  -continue amlodipine 5 mg daily for now  -Continue to follow blood pressure carefully at home  -Avoid interfering substances    Glycemic Status: Diabetic  Goal A1c at least less than 7.5  A1c under excellent control  Pharmacologic options limited by CKD  Plan:  -continue ozempic   -Continue Januvia, renally dosed  -Defer further management to endocrinologist  -continue lifestyle mod  - recheck a1c per endo    Anti-Platelet/Anti-Coagulant Tx: yes  Hold aspirin given GI bleeding and no clear cardiovascular indication    Smoking: Continue complete avoidance    Physical Activity: Continue daily walking    Weight Management and Nutrition: Dietary plan was discussed with patient at this visit including referral to dietitian for further evaluation and management    Other:    1.  Ascending aortic aneurysm -fairly recently diagnosed.  Max diameter 4.4 cm on most recent imaging.  Imaging options limited by CKD.  Await  repeat echocardiogram    2.  CKD, stage IV -most likely etiology is diabetes and hypertension although his early onset proteinuria does suggest that other etiologies may be in the differential.  No obvious evidence of KEERTHI on duplex. Avoid nsaids. BP control and ARB as above. Otherwise defer all further work-up management and surveillance to his nephrologist    3.  Testosterone therapy -as we discussed previously, the cardiovascular effects of testosterone repletion in the eighth and ninth decade of life are unknown.  Over vigorous replacement of testosterone can worsen blood pressure control.  All that being said, I am not opposed to continuing testosterone therapy as long as he is aware of these potential risks and is being monitored carefully for side effects.  We will defer all further work-up management and surveillance to urology    4. Gout - no recent recurrence. Avoid higher doses of thiazides. Otherwise defer management to pcp    Instructed to follow-up with PCP for remainder of adult medical needs: yes  We will partner with other providers in the management of established vascular disease and cardiometabolic risk factors.    Studies to Be Obtained: Await results of echocardiogram scheduled February 2022  Labs to Be Obtained: Per nephrology and endo + lipid panel and CK    Follow up in: 6 months    Michael J Bloch, M.D.     Cc:  Dr. Pretty Thompson

## 2022-02-15 ENCOUNTER — HOSPITAL ENCOUNTER (OUTPATIENT)
Dept: LAB | Facility: MEDICAL CENTER | Age: 82
End: 2022-02-15
Attending: INTERNAL MEDICINE
Payer: MEDICARE

## 2022-02-15 DIAGNOSIS — N18.4 CKD (CHRONIC KIDNEY DISEASE), STAGE IV (HCC): ICD-10-CM

## 2022-02-15 DIAGNOSIS — R80.9 MICROALBUMINURIA DUE TO TYPE 2 DIABETES MELLITUS (HCC): ICD-10-CM

## 2022-02-15 DIAGNOSIS — D63.1 ANEMIA DUE TO STAGE 4 CHRONIC KIDNEY DISEASE (HCC): ICD-10-CM

## 2022-02-15 DIAGNOSIS — E78.2 MIXED HYPERLIPIDEMIA: ICD-10-CM

## 2022-02-15 DIAGNOSIS — M10.9 CONTROLLED GOUT: ICD-10-CM

## 2022-02-15 DIAGNOSIS — E55.9 VITAMIN D DEFICIENCY: ICD-10-CM

## 2022-02-15 DIAGNOSIS — I10 ESSENTIAL HYPERTENSION: ICD-10-CM

## 2022-02-15 DIAGNOSIS — N18.4 ANEMIA DUE TO STAGE 4 CHRONIC KIDNEY DISEASE (HCC): ICD-10-CM

## 2022-02-15 DIAGNOSIS — E11.29 MICROALBUMINURIA DUE TO TYPE 2 DIABETES MELLITUS (HCC): ICD-10-CM

## 2022-02-15 LAB
25(OH)D3 SERPL-MCNC: 56 NG/ML (ref 30–100)
ANION GAP SERPL CALC-SCNC: 11 MMOL/L (ref 7–16)
APPEARANCE UR: CLEAR
BACTERIA #/AREA URNS HPF: NEGATIVE /HPF
BILIRUB UR QL STRIP.AUTO: NEGATIVE
BUN SERPL-MCNC: 32 MG/DL (ref 8–22)
CALCIUM SERPL-MCNC: 9.8 MG/DL (ref 8.5–10.5)
CHLORIDE SERPL-SCNC: 105 MMOL/L (ref 96–112)
CHOLEST SERPL-MCNC: 142 MG/DL (ref 100–199)
CO2 SERPL-SCNC: 24 MMOL/L (ref 20–33)
COLOR UR: YELLOW
CREAT SERPL-MCNC: 2.28 MG/DL (ref 0.5–1.4)
CREAT SERPL-MCNC: 2.3 MG/DL (ref 0.5–1.4)
CREAT UR-MCNC: 58.99 MG/DL
EPI CELLS #/AREA URNS HPF: NEGATIVE /HPF
ERYTHROCYTE [DISTWIDTH] IN BLOOD BY AUTOMATED COUNT: 46.2 FL (ref 35.9–50)
GLUCOSE SERPL-MCNC: 93 MG/DL (ref 65–99)
GLUCOSE UR STRIP.AUTO-MCNC: NEGATIVE MG/DL
HCT VFR BLD AUTO: 42 % (ref 42–52)
HDLC SERPL-MCNC: 39 MG/DL
HGB BLD-MCNC: 13.7 G/DL (ref 14–18)
HYALINE CASTS #/AREA URNS LPF: ABNORMAL /LPF
KETONES UR STRIP.AUTO-MCNC: NEGATIVE MG/DL
LDLC SERPL CALC-MCNC: 77 MG/DL
LEUKOCYTE ESTERASE UR QL STRIP.AUTO: NEGATIVE
MCH RBC QN AUTO: 29.2 PG (ref 27–33)
MCHC RBC AUTO-ENTMCNC: 32.6 G/DL (ref 33.7–35.3)
MCV RBC AUTO: 89.6 FL (ref 81.4–97.8)
MICRO URNS: ABNORMAL
MICROALBUMIN UR-MCNC: 43.4 MG/DL
MICROALBUMIN/CREAT UR: 736 MG/G (ref 0–30)
NITRITE UR QL STRIP.AUTO: NEGATIVE
PH UR STRIP.AUTO: 6.5 [PH] (ref 5–8)
PLATELET # BLD AUTO: 218 K/UL (ref 164–446)
PMV BLD AUTO: 10.9 FL (ref 9–12.9)
POTASSIUM SERPL-SCNC: 4.7 MMOL/L (ref 3.6–5.5)
PROT UR QL STRIP: 100 MG/DL
PTH-INTACT SERPL-MCNC: 90 PG/ML (ref 14–72)
RBC # BLD AUTO: 4.69 M/UL (ref 4.7–6.1)
RBC # URNS HPF: ABNORMAL /HPF
RBC UR QL AUTO: NEGATIVE
SODIUM SERPL-SCNC: 140 MMOL/L (ref 135–145)
SP GR UR STRIP.AUTO: 1.01
TRIGL SERPL-MCNC: 131 MG/DL (ref 0–149)
URATE SERPL-MCNC: 5.9 MG/DL (ref 2.5–8.3)
UROBILINOGEN UR STRIP.AUTO-MCNC: 0.2 MG/DL
WBC # BLD AUTO: 6.3 K/UL (ref 4.8–10.8)
WBC #/AREA URNS HPF: ABNORMAL /HPF

## 2022-02-15 PROCEDURE — 81001 URINALYSIS AUTO W/SCOPE: CPT

## 2022-02-15 PROCEDURE — 82043 UR ALBUMIN QUANTITATIVE: CPT

## 2022-02-15 PROCEDURE — 82570 ASSAY OF URINE CREATININE: CPT

## 2022-02-15 PROCEDURE — 82306 VITAMIN D 25 HYDROXY: CPT

## 2022-02-15 PROCEDURE — 85027 COMPLETE CBC AUTOMATED: CPT

## 2022-02-15 PROCEDURE — 36415 COLL VENOUS BLD VENIPUNCTURE: CPT

## 2022-02-15 PROCEDURE — 84550 ASSAY OF BLOOD/URIC ACID: CPT

## 2022-02-15 PROCEDURE — 83970 ASSAY OF PARATHORMONE: CPT

## 2022-02-15 PROCEDURE — 80061 LIPID PANEL: CPT

## 2022-02-15 PROCEDURE — 80048 BASIC METABOLIC PNL TOTAL CA: CPT

## 2022-02-15 PROCEDURE — 82565 ASSAY OF CREATININE: CPT

## 2022-02-23 ENCOUNTER — OFFICE VISIT (OUTPATIENT)
Dept: NEPHROLOGY | Facility: MEDICAL CENTER | Age: 82
End: 2022-02-23
Payer: MEDICARE

## 2022-02-23 VITALS
HEART RATE: 72 BPM | RESPIRATION RATE: 20 BRPM | OXYGEN SATURATION: 95 % | TEMPERATURE: 98 F | BODY MASS INDEX: 27.58 KG/M2 | SYSTOLIC BLOOD PRESSURE: 128 MMHG | HEIGHT: 71 IN | WEIGHT: 197 LBS | DIASTOLIC BLOOD PRESSURE: 76 MMHG

## 2022-02-23 DIAGNOSIS — N18.4 ANEMIA DUE TO STAGE 4 CHRONIC KIDNEY DISEASE (HCC): ICD-10-CM

## 2022-02-23 DIAGNOSIS — M10.9 CONTROLLED GOUT: ICD-10-CM

## 2022-02-23 DIAGNOSIS — E11.29 MICROALBUMINURIA DUE TO TYPE 2 DIABETES MELLITUS (HCC): ICD-10-CM

## 2022-02-23 DIAGNOSIS — I10 ESSENTIAL HYPERTENSION: ICD-10-CM

## 2022-02-23 DIAGNOSIS — R80.9 MICROALBUMINURIA DUE TO TYPE 2 DIABETES MELLITUS (HCC): ICD-10-CM

## 2022-02-23 DIAGNOSIS — D63.1 ANEMIA DUE TO STAGE 4 CHRONIC KIDNEY DISEASE (HCC): ICD-10-CM

## 2022-02-23 DIAGNOSIS — E55.9 VITAMIN D DEFICIENCY: ICD-10-CM

## 2022-02-23 DIAGNOSIS — N18.4 CKD (CHRONIC KIDNEY DISEASE), STAGE IV (HCC): ICD-10-CM

## 2022-02-23 PROCEDURE — 99214 OFFICE O/P EST MOD 30 MIN: CPT | Performed by: INTERNAL MEDICINE

## 2022-02-23 RX ORDER — FINERENONE 10 MG/1
10 TABLET, FILM COATED ORAL DAILY
COMMUNITY
Start: 2022-02-14 | End: 2023-03-08

## 2022-02-23 ASSESSMENT — ENCOUNTER SYMPTOMS
VOMITING: 0
WHEEZING: 0
EYES NEGATIVE: 1
WEIGHT LOSS: 0
SINUS PAIN: 0
FEVER: 0
NAUSEA: 0
SHORTNESS OF BREATH: 0
ABDOMINAL PAIN: 0
PALPITATIONS: 0
ORTHOPNEA: 0
CHILLS: 0
COUGH: 0
HEMOPTYSIS: 0

## 2022-02-23 ASSESSMENT — PATIENT HEALTH QUESTIONNAIRE - PHQ9: CLINICAL INTERPRETATION OF PHQ2 SCORE: 0

## 2022-02-23 ASSESSMENT — FIBROSIS 4 INDEX: FIB4 SCORE: 1.38

## 2022-02-23 NOTE — PROGRESS NOTES
Subjective:      Tyrone Cline is a 81 y.o. male who presents with Follow-Up (CKD stage IV)            Chronic Kidney Disease  Pertinent negatives include no abdominal pain, chest pain, chills, congestion, coughing, fever, nausea or vomiting.     Tyrone is coming today for f/u of CKD IV  Doing well, no complaints   BPH LUTS - improved -s/p TURP f/u with Urology  CKD IV  -creat level slightly worse from  2.6 -to 2.7 -now better to 2.44 -2.3  HTN: BP monitored at home - well controlled now -compliant to low salt diet and medications  Anemia: Hb level stable WNL  Hx/of gout involving left big toe - no recent attacks -on Allopurinol  Elevated uric acid level well controlled  Albuminuria - controlled with ARB    Review of Systems   Constitutional: Negative for chills, fever, malaise/fatigue and weight loss.   HENT: Negative for congestion, hearing loss and sinus pain.    Eyes: Negative.    Respiratory: Negative for cough, hemoptysis, shortness of breath and wheezing.    Cardiovascular: Negative for chest pain, palpitations, orthopnea and leg swelling.   Gastrointestinal: Negative for abdominal pain, nausea and vomiting.   Genitourinary: Negative for dysuria, frequency, hematuria and urgency.   Skin: Negative.    All other systems reviewed and are negative.    Past Medical History:   Diagnosis Date   • Abdominal pain    • Abnormal electrocardiogram    • ACE inhibitor intolerance    • BPH (benign prostatic hyperplasia)    • Bruxism    • Cancer (HCC)     basal and squamous cell to face   • Cataract     robbi IOL   • Chickenpox    • Cholesterol blood decreased    • Chronic kidney disease, stage III (moderate) (HCC)    • Cold    • Cough    • Depression    • Diabetes     oral meds   • Difficulty breathing    • Fever    • GERD (gastroesophageal reflux disease)    • GOUT    • Gout    • History of skull fracture    • History of urinary tract infection    • Hyperlipidemia    • Hypertension    • Indigestion    • Influenza     • Insomnia    • Mixed hyperlipidemia    • Pneumonia 2015   • Proteinuria    • Ringing in ears    • Shortness of breath    • Sputum production    • Tonsillitis    • Type 2 diabetes mellitus (HCC)    • Wears glasses    • Weight loss        Family History   Problem Relation Age of Onset   • Diabetes Father    • Heart Attack Father 79   • Cancer Brother        Social History     Socioeconomic History   • Marital status:    • Highest education level: Bachelor's degree (e.g., BA, AB, BS)   Tobacco Use   • Smoking status: Former Smoker     Packs/day: 0.20     Years: 6.00     Pack years: 1.20     Quit date: 1965     Years since quittin.1   • Smokeless tobacco: Never Used   • Tobacco comment: Stopped over 25 years ago.   Vaping Use   • Vaping Use: Never used   Substance and Sexual Activity   • Alcohol use: No   • Drug use: No   Social History Narrative    Retired CPA.     Social Determinants of Health     Financial Resource Strain: Low Risk    • Difficulty of Paying Living Expenses: Not very hard   Food Insecurity: No Food Insecurity   • Worried About Running Out of Food in the Last Year: Never true   • Ran Out of Food in the Last Year: Never true   Transportation Needs: No Transportation Needs   • Lack of Transportation (Medical): No   • Lack of Transportation (Non-Medical): No   Physical Activity: Insufficiently Active   • Days of Exercise per Week: 3 days   • Minutes of Exercise per Session: 20 min   Stress: Stress Concern Present   • Feeling of Stress : To some extent   Social Connections: Moderately Isolated   • Frequency of Communication with Friends and Family: More than three times a week   • Frequency of Social Gatherings with Friends and Family: Once a week   • Attends Spiritism Services: Never   • Active Member of Clubs or Organizations: Yes   • Attends Club or Organization Meetings: More than 4 times per year   • Marital Status:    Housing Stability: Low Risk    • Unable to Pay for Housing  "in the Last Year: No   • Number of Places Lived in the Last Year: 1   • Unstable Housing in the Last Year: No          Objective:     /76 (BP Location: Right arm, Patient Position: Sitting, BP Cuff Size: Adult)   Pulse 72   Temp 36.7 °C (98 °F) (Temporal)   Resp 20   Ht 1.803 m (5' 11\")   Wt 89.4 kg (197 lb)   SpO2 95%   BMI 27.48 kg/m²      Physical Exam  Vitals reviewed.   Constitutional:       General: He is not in acute distress.     Appearance: Normal appearance. He is well-developed. He is not diaphoretic.   HENT:      Head: Normocephalic and atraumatic.      Nose: Nose normal.      Mouth/Throat:      Mouth: Mucous membranes are moist.      Pharynx: Oropharynx is clear.   Eyes:      Extraocular Movements: Extraocular movements intact.      Conjunctiva/sclera: Conjunctivae normal.      Pupils: Pupils are equal, round, and reactive to light.   Cardiovascular:      Rate and Rhythm: Normal rate and regular rhythm.      Pulses: Normal pulses.      Heart sounds: Normal heart sounds.     No friction rub. No gallop.   Pulmonary:      Effort: Pulmonary effort is normal. No respiratory distress.      Breath sounds: Normal breath sounds. No wheezing or rales.   Abdominal:      General: Bowel sounds are normal. There is no distension.      Palpations: Abdomen is soft. There is no mass.      Tenderness: There is no abdominal tenderness. There is no right CVA tenderness, left CVA tenderness or guarding.   Musculoskeletal:      Cervical back: Normal range of motion and neck supple.      Right lower leg: No edema.      Left lower leg: No edema.   Skin:     General: Skin is warm.      Coloration: Skin is not jaundiced or pale.      Findings: No bruising, erythema, lesion or rash.   Neurological:      General: No focal deficit present.      Mental Status: He is alert and oriented to person, place, and time.      Cranial Nerves: No cranial nerve deficit.      Coordination: Coordination normal.   Psychiatric:         " Mood and Affect: Mood normal.         Behavior: Behavior normal.         Thought Content: Thought content normal.         Judgment: Judgment normal.     Laboratory results reviewed: d/w Pt  Lab Results   Component Value Date/Time    CREATININE 2.30 (H) 02/15/2022 04:44 PM    CREATININE 1.4 02/06/2009 03:00 AM    POTASSIUM 4.7 02/15/2022 04:41 PM                 Assessment/Plan:       1. CKD (chronic kidney disease), stage IV ([facility])      Creat level improving-to monitor closely          2. Essential hypertension       Elevated BP - very well controlled now  -to monitor at home       3. Microalbuminuria due to type 2 diabetes mellitus ([facility])      Sub nephrotic -to monitor-on ARB    4. Controlled gout      No recent gout attacks -on allopurinol      Uric acid very well controlled    5. Vitamin D deficiency      vit D and PTH levels well controlled    6. Anemia due to stage 4 chronic kidney disease ([facility])      Hb stable -WNL    7. Proteinuria: improved  -serology r/o GN negative    Recs;  Continue current treatment  Keep well hydrated  Low salt diet  Monitor BP  F/u in 2-3 months

## 2022-02-24 ENCOUNTER — HOSPITAL ENCOUNTER (OUTPATIENT)
Dept: CARDIOLOGY | Facility: MEDICAL CENTER | Age: 82
End: 2022-02-24
Attending: NURSE PRACTITIONER
Payer: MEDICARE

## 2022-02-24 DIAGNOSIS — I71.21 ASCENDING AORTIC ANEURYSM (HCC): ICD-10-CM

## 2022-02-24 LAB
LV EJECT FRACT  99904: 65
LV EJECT FRACT MOD 2C 99903: 56.47
LV EJECT FRACT MOD 4C 99902: 57.54
LV EJECT FRACT MOD BP 99901: 55.44

## 2022-02-24 PROCEDURE — 93306 TTE W/DOPPLER COMPLETE: CPT

## 2022-02-24 PROCEDURE — 93306 TTE W/DOPPLER COMPLETE: CPT | Mod: 26 | Performed by: INTERNAL MEDICINE

## 2022-02-28 ENCOUNTER — DOCUMENTATION (OUTPATIENT)
Dept: VASCULAR LAB | Facility: MEDICAL CENTER | Age: 82
End: 2022-02-28
Payer: MEDICARE

## 2022-03-01 ENCOUNTER — TELEPHONE (OUTPATIENT)
Dept: VASCULAR LAB | Facility: MEDICAL CENTER | Age: 82
End: 2022-03-01
Payer: MEDICARE

## 2022-03-01 NOTE — TELEPHONE ENCOUNTER
Called and left  for pt letting him know the below. Pt to call back if he has any questions.       ----- Message from Michael J Bloch, M.D. sent at 2/28/2022  6:27 PM PST -----  Regarding: RE: echo  Ra[facility] echo is done.  Put on calendar for repeat echo in 1 year    Jackie -please let patient know that echocardiogram shows that her aneurysm appears stable.  We will go over the details of follow-up.  We will keep an eye on things with a repeat echo in 1 year    ----- Message -----  From: EZEQUIEL Liao  Sent: 2/28/2022   4:13 PM PST  To: Michael J Bloch, M.D.  Subject: echo                                             Scan is in the chart. Thanks EZEQUIEL Liao             English

## 2022-03-01 NOTE — PROGRESS NOTES
Echocardiogram demonstrates stable 4.4 cm ascending aortic aneurysm    We will repeat echo in 1 year    Michael Bloch, MD  Vascular Medicine

## 2022-03-22 ENCOUNTER — HOSPITAL ENCOUNTER (OUTPATIENT)
Dept: LAB | Facility: MEDICAL CENTER | Age: 82
End: 2022-03-22
Attending: INTERNAL MEDICINE
Payer: MEDICARE

## 2022-03-22 DIAGNOSIS — N18.4 CKD (CHRONIC KIDNEY DISEASE), STAGE IV (HCC): ICD-10-CM

## 2022-03-22 DIAGNOSIS — I10 ESSENTIAL HYPERTENSION: ICD-10-CM

## 2022-03-22 LAB
ANION GAP SERPL CALC-SCNC: 16 MMOL/L (ref 7–16)
BUN SERPL-MCNC: 46 MG/DL (ref 8–22)
CALCIUM SERPL-MCNC: 10.4 MG/DL (ref 8.5–10.5)
CHLORIDE SERPL-SCNC: 108 MMOL/L (ref 96–112)
CO2 SERPL-SCNC: 15 MMOL/L (ref 20–33)
CREAT SERPL-MCNC: 2.57 MG/DL (ref 0.5–1.4)
CREAT UR-MCNC: 76.84 MG/DL
GFR SERPLBLD CREATININE-BSD FMLA CKD-EPI: 24 ML/MIN/1.73 M 2
GLUCOSE SERPL-MCNC: 99 MG/DL (ref 65–99)
MICROALBUMIN UR-MCNC: 31.7 MG/DL
MICROALBUMIN/CREAT UR: 413 MG/G (ref 0–30)
POTASSIUM SERPL-SCNC: 5.7 MMOL/L (ref 3.6–5.5)
SODIUM SERPL-SCNC: 139 MMOL/L (ref 135–145)

## 2022-03-22 PROCEDURE — 80048 BASIC METABOLIC PNL TOTAL CA: CPT

## 2022-03-22 PROCEDURE — 36415 COLL VENOUS BLD VENIPUNCTURE: CPT

## 2022-03-22 PROCEDURE — 82570 ASSAY OF URINE CREATININE: CPT

## 2022-03-22 PROCEDURE — 82043 UR ALBUMIN QUANTITATIVE: CPT

## 2022-04-04 ENCOUNTER — HOSPITAL ENCOUNTER (OUTPATIENT)
Dept: LAB | Facility: MEDICAL CENTER | Age: 82
End: 2022-04-04
Attending: INTERNAL MEDICINE
Payer: MEDICARE

## 2022-04-04 LAB
ANION GAP SERPL CALC-SCNC: 11 MMOL/L (ref 7–16)
BUN SERPL-MCNC: 44 MG/DL (ref 8–22)
CALCIUM SERPL-MCNC: 10.2 MG/DL (ref 8.5–10.5)
CHLORIDE SERPL-SCNC: 104 MMOL/L (ref 96–112)
CO2 SERPL-SCNC: 22 MMOL/L (ref 20–33)
CREAT SERPL-MCNC: 2.79 MG/DL (ref 0.5–1.4)
CREAT UR-MCNC: 127.43 MG/DL
GFR SERPLBLD CREATININE-BSD FMLA CKD-EPI: 22 ML/MIN/1.73 M 2
GLUCOSE SERPL-MCNC: 111 MG/DL (ref 65–99)
MICROALBUMIN UR-MCNC: 37.1 MG/DL
MICROALBUMIN/CREAT UR: 291 MG/G (ref 0–30)
POTASSIUM SERPL-SCNC: 5.2 MMOL/L (ref 3.6–5.5)
SODIUM SERPL-SCNC: 137 MMOL/L (ref 135–145)

## 2022-04-04 PROCEDURE — 36415 COLL VENOUS BLD VENIPUNCTURE: CPT

## 2022-04-04 PROCEDURE — 82043 UR ALBUMIN QUANTITATIVE: CPT

## 2022-04-04 PROCEDURE — 80048 BASIC METABOLIC PNL TOTAL CA: CPT

## 2022-04-04 PROCEDURE — 82570 ASSAY OF URINE CREATININE: CPT

## 2022-05-25 ENCOUNTER — HOSPITAL ENCOUNTER (OUTPATIENT)
Dept: LAB | Facility: MEDICAL CENTER | Age: 82
End: 2022-05-25
Attending: INTERNAL MEDICINE
Payer: MEDICARE

## 2022-05-25 DIAGNOSIS — N18.4 ANEMIA DUE TO STAGE 4 CHRONIC KIDNEY DISEASE (HCC): ICD-10-CM

## 2022-05-25 DIAGNOSIS — I10 ESSENTIAL HYPERTENSION: ICD-10-CM

## 2022-05-25 DIAGNOSIS — R80.9 MICROALBUMINURIA DUE TO TYPE 2 DIABETES MELLITUS (HCC): ICD-10-CM

## 2022-05-25 DIAGNOSIS — E11.29 MICROALBUMINURIA DUE TO TYPE 2 DIABETES MELLITUS (HCC): ICD-10-CM

## 2022-05-25 DIAGNOSIS — D63.1 ANEMIA DUE TO STAGE 4 CHRONIC KIDNEY DISEASE (HCC): ICD-10-CM

## 2022-05-25 DIAGNOSIS — N18.4 CKD (CHRONIC KIDNEY DISEASE), STAGE IV (HCC): ICD-10-CM

## 2022-05-25 LAB
ANION GAP SERPL CALC-SCNC: 11 MMOL/L (ref 7–16)
BUN SERPL-MCNC: 46 MG/DL (ref 8–22)
CALCIUM SERPL-MCNC: 9.7 MG/DL (ref 8.5–10.5)
CHLORIDE SERPL-SCNC: 104 MMOL/L (ref 96–112)
CO2 SERPL-SCNC: 22 MMOL/L (ref 20–33)
CREAT SERPL-MCNC: 2.99 MG/DL (ref 0.5–1.4)
ERYTHROCYTE [DISTWIDTH] IN BLOOD BY AUTOMATED COUNT: 51.4 FL (ref 35.9–50)
GFR SERPLBLD CREATININE-BSD FMLA CKD-EPI: 20 ML/MIN/1.73 M 2
GLUCOSE SERPL-MCNC: 119 MG/DL (ref 65–99)
HCT VFR BLD AUTO: 44.3 % (ref 42–52)
HGB BLD-MCNC: 14.5 G/DL (ref 14–18)
MCH RBC QN AUTO: 29.5 PG (ref 27–33)
MCHC RBC AUTO-ENTMCNC: 32.7 G/DL (ref 33.7–35.3)
MCV RBC AUTO: 90.2 FL (ref 81.4–97.8)
PLATELET # BLD AUTO: 213 K/UL (ref 164–446)
PMV BLD AUTO: 11.5 FL (ref 9–12.9)
POTASSIUM SERPL-SCNC: 5.1 MMOL/L (ref 3.6–5.5)
RBC # BLD AUTO: 4.91 M/UL (ref 4.7–6.1)
SODIUM SERPL-SCNC: 137 MMOL/L (ref 135–145)
WBC # BLD AUTO: 7.6 K/UL (ref 4.8–10.8)

## 2022-05-25 PROCEDURE — 80048 BASIC METABOLIC PNL TOTAL CA: CPT

## 2022-05-25 PROCEDURE — 82043 UR ALBUMIN QUANTITATIVE: CPT

## 2022-05-25 PROCEDURE — 36415 COLL VENOUS BLD VENIPUNCTURE: CPT

## 2022-05-25 PROCEDURE — 82570 ASSAY OF URINE CREATININE: CPT

## 2022-05-25 PROCEDURE — 85027 COMPLETE CBC AUTOMATED: CPT

## 2022-05-26 LAB
CREAT UR-MCNC: 137.9 MG/DL
MICROALBUMIN UR-MCNC: 36.8 MG/DL
MICROALBUMIN/CREAT UR: 267 MG/G (ref 0–30)

## 2022-06-01 ENCOUNTER — OFFICE VISIT (OUTPATIENT)
Dept: NEPHROLOGY | Facility: MEDICAL CENTER | Age: 82
End: 2022-06-01
Payer: MEDICARE

## 2022-06-01 VITALS
SYSTOLIC BLOOD PRESSURE: 118 MMHG | OXYGEN SATURATION: 97 % | DIASTOLIC BLOOD PRESSURE: 70 MMHG | BODY MASS INDEX: 27.02 KG/M2 | HEART RATE: 65 BPM | WEIGHT: 193 LBS | TEMPERATURE: 97.8 F | HEIGHT: 71 IN

## 2022-06-01 DIAGNOSIS — D63.1 ANEMIA DUE TO STAGE 4 CHRONIC KIDNEY DISEASE (HCC): ICD-10-CM

## 2022-06-01 DIAGNOSIS — I10 ESSENTIAL HYPERTENSION: ICD-10-CM

## 2022-06-01 DIAGNOSIS — M10.9 CONTROLLED GOUT: ICD-10-CM

## 2022-06-01 DIAGNOSIS — R80.9 MICROALBUMINURIA DUE TO TYPE 2 DIABETES MELLITUS (HCC): ICD-10-CM

## 2022-06-01 DIAGNOSIS — E11.29 MICROALBUMINURIA DUE TO TYPE 2 DIABETES MELLITUS (HCC): ICD-10-CM

## 2022-06-01 DIAGNOSIS — E55.9 VITAMIN D DEFICIENCY: ICD-10-CM

## 2022-06-01 DIAGNOSIS — N18.4 ANEMIA DUE TO STAGE 4 CHRONIC KIDNEY DISEASE (HCC): ICD-10-CM

## 2022-06-01 DIAGNOSIS — N18.4 CKD (CHRONIC KIDNEY DISEASE), STAGE IV (HCC): ICD-10-CM

## 2022-06-01 PROCEDURE — 99214 OFFICE O/P EST MOD 30 MIN: CPT | Performed by: INTERNAL MEDICINE

## 2022-06-01 RX ORDER — LORAZEPAM 1 MG/1
1 TABLET ORAL
COMMUNITY
Start: 2022-05-11 | End: 2023-03-01

## 2022-06-01 ASSESSMENT — ENCOUNTER SYMPTOMS
PALPITATIONS: 0
NAUSEA: 0
FLANK PAIN: 0
SHORTNESS OF BREATH: 0
ORTHOPNEA: 0
BACK PAIN: 0
COUGH: 0
ABDOMINAL PAIN: 0
MYALGIAS: 0
EYES NEGATIVE: 1
NECK PAIN: 0
WHEEZING: 0
SINUS PAIN: 0
HEMOPTYSIS: 0
FEVER: 0
CHILLS: 0
VOMITING: 0
WEIGHT LOSS: 0

## 2022-06-01 ASSESSMENT — FIBROSIS 4 INDEX: FIB4 SCORE: 1.43

## 2022-06-01 NOTE — PROGRESS NOTES
Subjective:      Tyrone Cline is a 82 y.o. male who presents with Follow-Up and Chronic Kidney Disease            Chronic Kidney Disease  Pertinent negatives include no abdominal pain, chest pain, chills, congestion, coughing, fever, myalgias, nausea, neck pain or vomiting.     Tyrone is coming today for f/u of CKD IV  Doing well, no complaints   BPH LUTS - improved -s/p TURP f/u with Urology  CKD IV  - baseline creat level at 2.3-2.6 -worse to 2.99!  HTN: BP monitored at home - very well controlled -compliant to low salt diet and medications  Anemia: Hb level stable WNL  Hx/of gout involving left big toe - no recent attacks -on Allopurinol  Elevated uric acid level well controlled  Albuminuria - controlled with ARB    Review of Systems   Constitutional: Negative for chills, fever, malaise/fatigue and weight loss.   HENT: Negative for congestion, hearing loss and sinus pain.    Eyes: Negative.    Respiratory: Negative for cough, hemoptysis, shortness of breath and wheezing.    Cardiovascular: Negative for chest pain, palpitations, orthopnea and leg swelling.   Gastrointestinal: Negative for abdominal pain, nausea and vomiting.   Genitourinary: Negative for dysuria, flank pain, frequency, hematuria and urgency.   Musculoskeletal: Negative for back pain, myalgias and neck pain.   Skin: Negative.    All other systems reviewed and are negative.    Past Medical History:   Diagnosis Date   • Abdominal pain    • Abnormal electrocardiogram    • ACE inhibitor intolerance    • BPH (benign prostatic hyperplasia)    • Bruxism    • Cancer (HCC)     basal and squamous cell to face   • Cataract     robbi IOL   • Chickenpox    • Cholesterol blood decreased    • Chronic kidney disease, stage III (moderate) (HCC)    • Cold    • Cough    • Depression    • Diabetes     oral meds   • Difficulty breathing    • Fever    • GERD (gastroesophageal reflux disease)    • GOUT    • Gout    • History of skull fracture    • History of  urinary tract infection    • Hyperlipidemia    • Hypertension    • Indigestion    • Influenza    • Insomnia    • Mixed hyperlipidemia    • Pneumonia 2015   • Proteinuria    • Ringing in ears    • Shortness of breath    • Sputum production    • Tonsillitis    • Type 2 diabetes mellitus (HCC)    • Wears glasses    • Weight loss        Family History   Problem Relation Age of Onset   • Diabetes Father    • Heart Attack Father 79   • Cancer Brother        Social History     Socioeconomic History   • Marital status:    • Highest education level: Bachelor's degree (e.g., BA, AB, BS)   Tobacco Use   • Smoking status: Former Smoker     Packs/day: 0.20     Years: 6.00     Pack years: 1.20     Quit date:      Years since quittin.4   • Smokeless tobacco: Never Used   • Tobacco comment: Stopped over 25 years ago.   Vaping Use   • Vaping Use: Never used   Substance and Sexual Activity   • Alcohol use: No   • Drug use: No   Social History Narrative    Retired CPA.     Social Determinants of Health     Financial Resource Strain: Low Risk    • Difficulty of Paying Living Expenses: Not very hard   Food Insecurity: No Food Insecurity   • Worried About Running Out of Food in the Last Year: Never true   • Ran Out of Food in the Last Year: Never true   Transportation Needs: No Transportation Needs   • Lack of Transportation (Medical): No   • Lack of Transportation (Non-Medical): No   Physical Activity: Insufficiently Active   • Days of Exercise per Week: 3 days   • Minutes of Exercise per Session: 20 min   Stress: Stress Concern Present   • Feeling of Stress : To some extent   Social Connections: Moderately Isolated   • Frequency of Communication with Friends and Family: More than three times a week   • Frequency of Social Gatherings with Friends and Family: Once a week   • Attends [employer] Services: Never   • Active Member of Clubs or Organizations: Yes   • Attends Club or Organization Meetings: More than 4 times per  "year   • Marital Status:    Housing Stability: Low Risk    • Unable to Pay for Housing in the Last Year: No   • Number of Places Lived in the Last Year: 1   • Unstable Housing in the Last Year: No          Objective:     /70 (BP Location: Right arm, Patient Position: Sitting)   Pulse 65   Temp 36.6 °C (97.8 °F) (Temporal)   Ht 1.803 m (5' 11\")   Wt 87.5 kg (193 lb)   SpO2 97%   BMI 26.92 kg/m²      Physical Exam  Vitals reviewed.   Constitutional:       General: He is not in acute distress.     Appearance: Normal appearance. He is well-developed. He is not diaphoretic.   HENT:      Head: Normocephalic and atraumatic.      Nose: Nose normal.      Mouth/Throat:      Mouth: Mucous membranes are moist.      Pharynx: Oropharynx is clear.   Eyes:      General: No scleral icterus.     Extraocular Movements: Extraocular movements intact.      Conjunctiva/sclera: Conjunctivae normal.      Pupils: Pupils are equal, round, and reactive to light.   Cardiovascular:      Rate and Rhythm: Normal rate and regular rhythm.      Pulses: Normal pulses.      Heart sounds: Normal heart sounds.     No friction rub. No gallop.   Pulmonary:      Effort: Pulmonary effort is normal. No respiratory distress.      Breath sounds: Normal breath sounds. No wheezing or rales.   Abdominal:      General: Bowel sounds are normal. There is no distension.      Palpations: There is no mass.      Tenderness: There is no abdominal tenderness. There is no right CVA tenderness or left CVA tenderness.   Musculoskeletal:      Cervical back: Normal range of motion and neck supple.      Right lower leg: No edema.      Left lower leg: No edema.   Skin:     General: Skin is warm.      Coloration: Skin is not pale.      Findings: No erythema or rash.   Neurological:      General: No focal deficit present.      Mental Status: He is alert and oriented to person, place, and time.      Cranial Nerves: No cranial nerve deficit.      Coordination: " Coordination normal.   Psychiatric:         Mood and Affect: Mood normal.         Behavior: Behavior normal.         Thought Content: Thought content normal.         Judgment: Judgment normal.     Laboratory results reviewed: d/w Pt  Lab Results   Component Value Date/Time    CREATININE 2.99 (H) 05/25/2022 07:53 AM    CREATININE 1.4 02/06/2009 03:00 AM    POTASSIUM 5.1 05/25/2022 07:53 AM                 Assessment/Plan:       1. CKD (chronic kidney disease), stage IV ([facility])      Creat level worse-to monitor closely      Keep well hydrated      Repeat renal panel in 2 weeks    2. Essential hypertension       Elevated BP - very well controlled now  -to monitor at home       3. Microalbuminuria due to type 2 diabetes mellitus ([facility])      Very well controlled with ARB    4. Controlled gout      No recent gout attacks -on allopurinol      Uric acid very well controlled    5. Vitamin D deficiency      vit D and PTH to monitor    6. Anemia due to stage 5 chronic kidney disease ([facility])      Hb stable -WNL        Recs;    Continue current treatment  Low salt diet  Keep well hydrated  Repeat renal panel in 2 weeks and clinic to discuss results  Monitor BP    F/u in 2 months

## 2022-06-01 NOTE — PATIENT INSTRUCTIONS
Continue current treatment  Low salt diet  Keep well hydrated  Repeat renal panel in 2 weeks and clinic to discuss results  Monitor BP

## 2022-06-04 DIAGNOSIS — I10 ESSENTIAL HYPERTENSION: ICD-10-CM

## 2022-06-06 RX ORDER — CARVEDILOL 12.5 MG/1
TABLET ORAL
Qty: 180 TABLET | Refills: 0 | Status: SHIPPED
Start: 2022-06-06 | End: 2022-08-15

## 2022-06-09 PROBLEM — G47.00 INSOMNIA: Status: ACTIVE | Noted: 2022-06-09

## 2022-06-09 PROBLEM — H93.8X3 CONGESTION OF BOTH EARS: Status: RESOLVED | Noted: 2021-02-27 | Resolved: 2022-06-09

## 2022-06-20 ENCOUNTER — HOSPITAL ENCOUNTER (OUTPATIENT)
Dept: LAB | Facility: MEDICAL CENTER | Age: 82
End: 2022-06-20
Attending: INTERNAL MEDICINE
Payer: MEDICARE

## 2022-06-20 DIAGNOSIS — N18.4 CKD (CHRONIC KIDNEY DISEASE), STAGE IV (HCC): ICD-10-CM

## 2022-06-20 DIAGNOSIS — I10 ESSENTIAL HYPERTENSION: ICD-10-CM

## 2022-06-20 LAB
ANION GAP SERPL CALC-SCNC: 10 MMOL/L (ref 7–16)
BUN SERPL-MCNC: 44 MG/DL (ref 8–22)
CALCIUM SERPL-MCNC: 10.4 MG/DL (ref 8.5–10.5)
CHLORIDE SERPL-SCNC: 108 MMOL/L (ref 96–112)
CO2 SERPL-SCNC: 21 MMOL/L (ref 20–33)
CREAT SERPL-MCNC: 2.7 MG/DL (ref 0.5–1.4)
GFR SERPLBLD CREATININE-BSD FMLA CKD-EPI: 23 ML/MIN/1.73 M 2
GLUCOSE SERPL-MCNC: 103 MG/DL (ref 65–99)
POTASSIUM SERPL-SCNC: 5.7 MMOL/L (ref 3.6–5.5)
SODIUM SERPL-SCNC: 139 MMOL/L (ref 135–145)

## 2022-06-20 PROCEDURE — 80048 BASIC METABOLIC PNL TOTAL CA: CPT

## 2022-06-20 PROCEDURE — 36415 COLL VENOUS BLD VENIPUNCTURE: CPT

## 2022-06-22 ENCOUNTER — TELEPHONE (OUTPATIENT)
Dept: NEPHROLOGY | Facility: MEDICAL CENTER | Age: 82
End: 2022-06-22

## 2022-06-22 NOTE — TELEPHONE ENCOUNTER
Patient completed renal panel and would like a phone call if results are abnormal.     Please review,

## 2022-06-29 ENCOUNTER — NON-PROVIDER VISIT (OUTPATIENT)
Dept: NEUROLOGY | Facility: MEDICAL CENTER | Age: 82
End: 2022-06-29
Attending: PSYCHIATRY & NEUROLOGY
Payer: MEDICARE

## 2022-06-29 DIAGNOSIS — G90.09 OTHER IDIOPATHIC PERIPHERAL AUTONOMIC NEUROPATHY: ICD-10-CM

## 2022-06-29 PROCEDURE — 95911 NRV CNDJ TEST 9-10 STUDIES: CPT | Performed by: PSYCHIATRY & NEUROLOGY

## 2022-06-29 PROCEDURE — 95911 NRV CNDJ TEST 9-10 STUDIES: CPT | Mod: 26 | Performed by: PSYCHIATRY & NEUROLOGY

## 2022-06-29 PROCEDURE — 95910 NRV CNDJ TEST 7-8 STUDIES: CPT | Performed by: PSYCHIATRY & NEUROLOGY

## 2022-06-29 PROCEDURE — 95886 MUSC TEST DONE W/N TEST COMP: CPT | Performed by: PSYCHIATRY & NEUROLOGY

## 2022-06-29 PROCEDURE — 95886 MUSC TEST DONE W/N TEST COMP: CPT | Mod: 26 | Performed by: PSYCHIATRY & NEUROLOGY

## 2022-06-29 NOTE — PROCEDURES
"NERVE CONDUCTION STUDIES AND ELECTROMYOGRAPHY REPORT  [facility]  06/29/22          IMPRESSION:  This is an abnormal study.  There is electrophysiologic evidence of a symmetric, length dependent, sensorimotor, primarily axonal polyneuropathy.  There is no evidence of left lumbosacral radiculopathy.  Recommend clinical correlation.      Eli Schwartz MD  Neurology - Neurophysiology              REASON FOR REFERRAL:  Mr. Tyrone Cline 82 y.o. referred by Dr. Jonah Logan for an evaluation of peripheral neuropathy.  Patient has a history of diabetes and chronic kidney disease.  He began noticing tingling sensations in the bilateral lower extremities with slow proximal progression now up to his mid calf.  He denies any symptoms in his hands.  He does not have significant back pain but his left foot is has a bothersome cramp-like sensation in the morning.    Height: 5'11\"  Weight: 191 lbs    Symptom focused neurological exam shows normal strength in the left lower extremity with reduced sensation to light touch and vibration distally.  Reflexes are unobtainable in the legs.      ELECTRODIAGNOSTIC EXAMINATION:  Nerve conduction studies (NCS) and electromyography (EMG) are utilized to evaluate direct or indirect damage to the peripheral nervous system. NCS are performed to measure the nerve(s) response(s) to electrostimulation across a given nerve segment. EMG evaluates the passive and active electrical activity of the muscle(s) in question.  Muscles are innervated by specific peripheral nerves and roots. Often times, several nerves the muscle to be examined in order to determine the presence or absence of the disease process. Furthermore, nerves and muscles may need to be tested in a [id number] comparison, as well as in additional extremities, as this may be crucial in characterizing the extent of the disease process, which may be diffuse or isolated and of varying degree of severity. " The extent of the neurodiagnostic exam is justified as it may help arrive to a proper diagnosis, which ultimately may contribute to better management of the patient. Therefore, the nerves to muscles examined during the study were medically necessary.    Unless otherwise noted, temperature of the extremity(s) study was monitored before and during the examination and remained between 32 and 36 degrees C for the upper extremities, and between 30 and 36 degrees C for the lower extremities. The patient tolerated testing well, without any complications.       NERVE CONDUCTION STUDY SUMMARY:  Selected nerves of the bilateral lower extremity are studied.    Normal right radial sensory response.  Unobtainable bilateral sural sensory responses.  Abnormal left common peroneal motor response at the EDB due to low amplitude, secondary slowing and prolonged distal latency.  Abnormal bilateral common peroneal motor responses at the EDB due to low amplitude and secondary slowing.  Normal bilateral common peroneal motor responses at the TA.  Normal left tibial motor response at the AH -mild slowing of uncertain clinical significance.  Abnormal right tibial motor response at the AH due to low amplitude and slowing -low amplitude with proximal stimulation likely technical.      NEEDLE EMG SUMMARY:  Concentric needle study of selected left lower extremity and lower lumbar paraspinal muscles is performed.     Insertion activity is normal in all muscles sampled including lower lumbar paraspinal muscles.   With activation, there are normal morphology (amplitude/duration) motor unit action potentials firing with normal recruitment in muscles tested.       PATIENT DATA TABLES  Nerve Conduction Studies     Stim Site NR Onset (ms) Norm Onset (ms) O-P Amp (µV) Norm O-P Amp Site1 Site2 Delta-P (ms) Dist (cm) Igor (m/s) Norm Igor (m/s)   Left Radial Anti Sensory (Base 1st Digit)   Wrist    1.9 <2.8 20.7 >10 Wrist Base 1st Digit 2.4 10.0 *42 >50    Left Sural Anti Sensory (Lat Mall)   Calf *NR  <4.6  >3 Calf Lat Mall  14.0  >40   Right Sural Anti Sensory (Lat Mall)   Calf *NR  <4.6  >3 Calf Lat Mall  14.0  >40        Stim Site NR Onset (ms) Norm Onset (ms) O-P Amp (mV) Norm O-P Amp Site1 Site2 Delta-0 (ms) Dist (cm) Igor (m/s) Norm Igor (m/s)   Left Peroneal EDB Motor (Ext Dig Brev)   Ankle    *6.3 <6 *0.3 >2.5 B Fib Ankle 9.2 32.0 *35 >40   B Fib    15.5  0.3  Poplt B Fib 2.7 10.0 37    Poplt    18.2  0.2          Right Peroneal EDB Motor (Ext Dig Brev)   Ankle    4.8 <6 *1.6 >2.5 B Fib Ankle 8.8 32.0 *36 >40   B Fib    13.6  1.5  Poplt B Fib 2.7 10.0 37    Poplt    16.3  1.2          Left Peroneal TA Motor (AntTibialis)   Fib Head    3.0 <4.5 4.7 3 Poplit Fib Head 2.4 10.0 42 >40   Poplit    5.4  4.7          Right Peroneal TA Motor (AntTibialis)   Fib Head    3.1 <4.5 3.6 3 Poplit Fib Head 1.6 10.0 63 >40   Poplit    4.7  3.6          Left Tibial Motor (Abd Emery Brev)   Ankle    4.4 <6 5.2 >4 Knee Ankle 11.4 39.0 *34 >40   Knee    15.8  3.2          Right Tibial Motor (Abd Emery Brev)   Ankle    3.5 <6 *3.2 >4 Knee Ankle 13.2 39.5 *30 >40   Knee    16.7  1.7                                 Electromyography     Side Muscle Nerve Root Ins Act Fibs Psw Amp Dur Poly Recrt Int Pat Comment   Left AntTibialis Dp Br Fibular L4-5 Nml Nml Nml Nml Nml 0 Nml Nml    Left Gastroc Tibial S1-2 Nml Nml Nml Nml Nml 0 Nml Nml    Left VastusLat Femoral L2-4 Nml Nml Nml Nml Nml 0 Nml Nml    Left GluteusMed SupGluteal L5-S1 Nml Nml Nml Nml Nml 0 Nml Nml    Left Lumbo Parasp Low Rami L5-S1 Nml Nml Nml

## 2022-06-30 ENCOUNTER — HOSPITAL ENCOUNTER (OUTPATIENT)
Dept: LAB | Facility: MEDICAL CENTER | Age: 82
End: 2022-06-30
Attending: INTERNAL MEDICINE
Payer: MEDICARE

## 2022-06-30 LAB
ANION GAP SERPL CALC-SCNC: 11 MMOL/L (ref 7–16)
BUN SERPL-MCNC: 46 MG/DL (ref 8–22)
CALCIUM SERPL-MCNC: 10 MG/DL (ref 8.5–10.5)
CHLORIDE SERPL-SCNC: 105 MMOL/L (ref 96–112)
CO2 SERPL-SCNC: 20 MMOL/L (ref 20–33)
CREAT SERPL-MCNC: 2.74 MG/DL (ref 0.5–1.4)
CREAT UR-MCNC: 125.62 MG/DL
GFR SERPLBLD CREATININE-BSD FMLA CKD-EPI: 23 ML/MIN/1.73 M 2
GLUCOSE SERPL-MCNC: 116 MG/DL (ref 65–99)
MICROALBUMIN UR-MCNC: 22.1 MG/DL
MICROALBUMIN/CREAT UR: 176 MG/G (ref 0–30)
POTASSIUM SERPL-SCNC: 5.1 MMOL/L (ref 3.6–5.5)
SODIUM SERPL-SCNC: 136 MMOL/L (ref 135–145)

## 2022-06-30 PROCEDURE — 82043 UR ALBUMIN QUANTITATIVE: CPT

## 2022-06-30 PROCEDURE — 82570 ASSAY OF URINE CREATININE: CPT

## 2022-06-30 PROCEDURE — 80048 BASIC METABOLIC PNL TOTAL CA: CPT

## 2022-06-30 PROCEDURE — 36415 COLL VENOUS BLD VENIPUNCTURE: CPT

## 2022-07-14 RX ORDER — OLMESARTAN MEDOXOMIL 20 MG/1
TABLET ORAL
Qty: 100 TABLET | Refills: 0 | Status: SHIPPED | OUTPATIENT
Start: 2022-07-14 | End: 2022-10-10

## 2022-08-02 ENCOUNTER — HOSPITAL ENCOUNTER (OUTPATIENT)
Dept: LAB | Facility: MEDICAL CENTER | Age: 82
End: 2022-08-02
Attending: UROLOGY
Payer: MEDICARE

## 2022-08-02 LAB
BASOPHILS # BLD AUTO: 0.6 % (ref 0–1.8)
BASOPHILS # BLD: 0.04 K/UL (ref 0–0.12)
EOSINOPHIL # BLD AUTO: 0.15 K/UL (ref 0–0.51)
EOSINOPHIL NFR BLD: 2.2 % (ref 0–6.9)
ERYTHROCYTE [DISTWIDTH] IN BLOOD BY AUTOMATED COUNT: 49.2 FL (ref 35.9–50)
HCT VFR BLD AUTO: 44.4 % (ref 42–52)
HGB BLD-MCNC: 14.4 G/DL (ref 14–18)
IMM GRANULOCYTES # BLD AUTO: 0.01 K/UL (ref 0–0.11)
IMM GRANULOCYTES NFR BLD AUTO: 0.1 % (ref 0–0.9)
LYMPHOCYTES # BLD AUTO: 1.65 K/UL (ref 1–4.8)
LYMPHOCYTES NFR BLD: 24.1 % (ref 22–41)
MCH RBC QN AUTO: 29.8 PG (ref 27–33)
MCHC RBC AUTO-ENTMCNC: 32.4 G/DL (ref 33.7–35.3)
MCV RBC AUTO: 91.7 FL (ref 81.4–97.8)
MONOCYTES # BLD AUTO: 0.59 K/UL (ref 0–0.85)
MONOCYTES NFR BLD AUTO: 8.6 % (ref 0–13.4)
NEUTROPHILS # BLD AUTO: 4.42 K/UL (ref 1.82–7.42)
NEUTROPHILS NFR BLD: 64.4 % (ref 44–72)
NRBC # BLD AUTO: 0 K/UL
NRBC BLD-RTO: 0 /100 WBC
PLATELET # BLD AUTO: 214 K/UL (ref 164–446)
PMV BLD AUTO: 11.4 FL (ref 9–12.9)
RBC # BLD AUTO: 4.84 M/UL (ref 4.7–6.1)
TESTOST SERPL-MCNC: 530 NG/DL (ref 175–781)
WBC # BLD AUTO: 6.9 K/UL (ref 4.8–10.8)

## 2022-08-02 PROCEDURE — 85025 COMPLETE CBC W/AUTO DIFF WBC: CPT

## 2022-08-02 PROCEDURE — 36415 COLL VENOUS BLD VENIPUNCTURE: CPT

## 2022-08-02 PROCEDURE — 84403 ASSAY OF TOTAL TESTOSTERONE: CPT

## 2022-08-15 ENCOUNTER — OFFICE VISIT (OUTPATIENT)
Dept: VASCULAR LAB | Facility: MEDICAL CENTER | Age: 82
End: 2022-08-15
Attending: INTERNAL MEDICINE
Payer: MEDICARE

## 2022-08-15 ENCOUNTER — RESEARCH ENCOUNTER (OUTPATIENT)
Dept: RESEARCH | Facility: WORKSITE | Age: 82
End: 2022-08-15
Payer: MEDICARE

## 2022-08-15 VITALS
HEART RATE: 82 BPM | WEIGHT: 204.5 LBS | BODY MASS INDEX: 28.63 KG/M2 | DIASTOLIC BLOOD PRESSURE: 69 MMHG | HEIGHT: 71 IN | SYSTOLIC BLOOD PRESSURE: 115 MMHG

## 2022-08-15 DIAGNOSIS — E11.22 TYPE 2 DIABETES MELLITUS WITH STAGE 4 CHRONIC KIDNEY DISEASE, WITHOUT LONG-TERM CURRENT USE OF INSULIN (HCC): ICD-10-CM

## 2022-08-15 DIAGNOSIS — N18.4 CKD (CHRONIC KIDNEY DISEASE) STAGE 4, GFR 15-29 ML/MIN (HCC): ICD-10-CM

## 2022-08-15 DIAGNOSIS — I77.819 ACQUIRED DILATION OF ASCENDING AORTA AND AORTIC ROOT (HCC): ICD-10-CM

## 2022-08-15 DIAGNOSIS — I10 ESSENTIAL HYPERTENSION: ICD-10-CM

## 2022-08-15 DIAGNOSIS — I71.21 ASCENDING AORTIC ANEURYSM (HCC): ICD-10-CM

## 2022-08-15 DIAGNOSIS — E78.2 MIXED HYPERLIPIDEMIA: ICD-10-CM

## 2022-08-15 DIAGNOSIS — N18.4 TYPE 2 DIABETES MELLITUS WITH STAGE 4 CHRONIC KIDNEY DISEASE, WITHOUT LONG-TERM CURRENT USE OF INSULIN (HCC): ICD-10-CM

## 2022-08-15 DIAGNOSIS — Z00.6 RESEARCH STUDY PATIENT: Primary | ICD-10-CM

## 2022-08-15 PROCEDURE — 99214 OFFICE O/P EST MOD 30 MIN: CPT | Performed by: INTERNAL MEDICINE

## 2022-08-15 PROCEDURE — 99212 OFFICE O/P EST SF 10 MIN: CPT

## 2022-08-15 RX ORDER — AMLODIPINE BESYLATE 5 MG/1
5 TABLET ORAL DAILY
Qty: 30 TABLET | Refills: 11
Start: 2022-08-15 | End: 2023-03-01 | Stop reason: SDUPTHER

## 2022-08-15 RX ORDER — CARVEDILOL 25 MG/1
25 TABLET ORAL 2 TIMES DAILY WITH MEALS
Qty: 60 TABLET | Refills: 11 | Status: SHIPPED
Start: 2022-08-15 | End: 2023-02-22

## 2022-08-15 ASSESSMENT — FIBROSIS 4 INDEX: FIB4 SCORE: 1.42

## 2022-08-15 NOTE — PROGRESS NOTES
"VASCULAR MEDICINE CLINIC - Follow up VISIT  08/15/22      Tyrone Cline is a 81 y.o.  male who presents today  for vascular follow up    Subjective     HPI:  Patient here for f/u of ascending aortic aneurysm, hypertension, dyslipidemia, diabetes  No further gi bleeding  No asa or nsaids   FS at home usually on cgm - 120s-130s - lowest around 100  Rare if any hypoglycemia  Has maintained weight loss on Ozempic  Still taking Jardiance - renally dosed  Remains off hctz  Still on olmesartan  Remains on amlodipine without leg swelling  Carvedilol dose has not changed  No further lightheadedness  Sees Dr. Olsen regularly  Has had stage 4 ckd for some time.   Remains on simvastatin - no myalgias  Had AE on atorva in past.   Started on kerendia with improvement in bp  Had echo  No chest pain         Social History     Tobacco Use    Smoking status: Former     Packs/day: 0.20     Years: 6.00     Pack years: 1.20     Types: Cigarettes     Quit date: 1965     Years since quittin.6    Smokeless tobacco: Never    Tobacco comments:     Stopped over 25 years ago.   Vaping Use    Vaping Use: Never used   Substance Use Topics    Alcohol use: No    Drug use: No      DIET AND EXERCISE:  Weight Change: down 18 pounds since starting ozempic  Diet: decent dm diet but wants to see MNT  Exercise: walks daily        Objective        Objective:     Vitals:    08/15/22 1433   BP: 115/69   BP Location: Left arm   Patient Position: Sitting   BP Cuff Size: Adult   Pulse: 82   Weight: 92.8 kg (204 lb 8 oz)   Height: 1.803 m (5' 11\")         Physical Exam  Vitals reviewed.   Constitutional:       General: He is not in acute distress.     Appearance: He is not diaphoretic.   HENT:      Head: Normocephalic and atraumatic.   Eyes:      General: No scleral icterus.     Conjunctiva/sclera: Conjunctivae normal.   Cardiovascular:      Rate and Rhythm: Normal rate and regular rhythm.      Heart sounds: Normal heart sounds. No murmur " heard.  Pulmonary:      Effort: Pulmonary effort is normal. No respiratory distress.      Breath sounds: Normal breath sounds. No wheezing or rales.   Musculoskeletal:      Cervical back: Neck supple.      Right lower leg: No edema.      Left lower leg: No edema.   Skin:     Coloration: Skin is not pale.      Findings: No rash.   Neurological:      Mental Status: He is alert and oriented to person, place, and time.      Cranial Nerves: No cranial nerve deficit.      Coordination: Coordination normal.      Gait: Gait is intact.   Psychiatric:         Mood and Affect: Affect normal.         Judgment: Judgment normal.        DATA REVIEW    Lab Results   Component Value Date/Time    CHOLSTRLTOT 142 02/15/2022 04:44 PM    LDL 77 02/15/2022 04:44 PM    HDL 39 (A) 02/15/2022 04:44 PM    TRIGLYCERIDE 131 02/15/2022 04:44 PM       Lab Results   Component Value Date/Time    SODIUM 136 06/30/2022 09:26 AM    POTASSIUM 5.1 06/30/2022 09:26 AM    CHLORIDE 105 06/30/2022 09:26 AM    CO2 20 06/30/2022 09:26 AM    GLUCOSE 116 (H) 06/30/2022 09:26 AM    BUN 46 (H) 06/30/2022 09:26 AM    CREATININE 2.74 (H) 06/30/2022 09:26 AM    CREATININE 1.4 02/06/2009 03:00 AM     Lab Results   Component Value Date/Time    ALKPHOSPHAT 97 01/26/2022 04:24 PM    ASTSGOT 13 01/26/2022 04:24 PM    ALTSGPT 12 01/26/2022 04:24 PM    TBILIRUBIN 0.3 01/26/2022 04:24 PM       Lab Results   Component Value Date/Time    HBA1C 6.0 (H) 02/09/2022 04:35 PM       Lab Results   Component Value Date/Time    MALBCRT 176 (H) 06/30/2022 09:26 AM    MICROALBUR 22.1 06/30/2022 09:26 AM       M PI February 2021   No evidence of significant jeopardized viable myocardium or prior myocardial    infarction.   Normal left ventricular size, ejection fraction, and wall motion.   ECG INTERPRETATION   Negative stress ECG for ischemia.    Echocardiogram February 2021  Compared to the images of the prior study done 4/15/2015, Ascending   aortic dilatation is now  present.  Technically difficult study - adequate information is obtained.   Contrast was used to enhance visualization of the endocardial border.  Left ventricular ejection fraction is visually estimated to be 65%.  Normal regional wall motion.  Normal left ventricular wall thickness.  Normal right ventricular size and systolic function.  Normal left atrial size.  Structurally normal mitral valve without significant stenosis or   regurgitation.  Mild aortic sclerosis without stenosis.  No aortic insufficiency.  Estimated right ventricular systolic pressure  is 32 mmHg.  Trace tricuspid regurgitation.  No pericardial effusion seen.  Ascending aorta is dilated with a diameter of 4.4 cm.    Renal artery duplex april 2021   SMA and celiac artery not visualized.    Proximal/mid aorta not well seen.   No evidence of abdominal aortic aneurysm in areas visualized.    No obvious KEERTHI   Multiple left renal cysts seen measuring approximately 0.9cm.    Echo feb 2022  The left ventricular ejection fraction is visually estimated to be 65%.  No significant valve abnormality.  Estimated right ventricular systolic pressure is 23 mmHg.  The ascending aorta is dilated with a diameter of 4.4 cm.  Compared to prior echo 2/27/21, no significant change, ascending aorta   similar size.              Medical Decision Making:  Today's Assessment / Status / Plan:     1. Essential hypertension  carvedilol (COREG) 25 MG Tab      2. CKD (chronic kidney disease) stage 4, GFR 15-29 ml/min ([facility])  Referral to Nephrology      3. Type 2 diabetes mellitus with stage 4 chronic kidney disease, without long-term current use of insulin ([facility])        4. Ascending aortic aneurysm ([facility])  Referral to Genetic Research Studies      5. Mixed hyperlipidemia        6. Acquired dilation of ascending aorta and aortic root ([facility])           Patient Type: Primary prevention    Etiology of Established CVD if Present: Ascending aortic aneurysm without previous  intervention    Lipid Management: Qualifies for Statin Therapy Based on 2018 ACC/AHA Guidelines: yes  Calculated 10-Year Risk of ASCVD: N/A  Currently on Statin: Yes  Goal LDL less than 100 and non-HDL less than 130  Appears below threshold on most recent blood work  Did not tolerate atorvastatin in the past  Although not necessarily the best agent for CKD, does tolerate simvastatin  Plan:  -Continue simvastatin 20 mg daily for now  -Consider change to rosuvastatin in the future  -Recheck fasting lipid panel and CK in future    Blood Pressure Management:  Acc[place] (2017) Blood Pressure Goal <130/80  BP under excellent control both at home and in office with addition of kerendia  Leg swelling on higher dose of amlodipine - would not increase past 5 mg  CKD obviously barrier to control  H/o gout limits use of higher dose thiazides  No obvious evidence of KEERTHI on duplex  No bradycardia  Plan:  -Continue current dose of carvedilol  -Continue current dose of olmesartan 20 mg daily  -continue amlodipine 5 mg daily for now  - continue kerendia  -Continue to follow blood pressure carefully at home  -Avoid interfering substances    Glycemic Status: Diabetic  Goal A1c at least less than 7.5  A1c under excellent control per report  Pharmacologic options limited by CKD  Plan:  -continue ozempic   -Continue Januvia, renally dosed  -Defer further management to endocrinologist  -continue lifestyle mod  - recheck a1c per endo    Anti-Platelet/Anti-Coagulant Tx: yes  Hold aspirin given GI bleeding and no clear cardiovascular indication    Smoking: Continue complete avoidance    Physical Activity: Continue daily walking    Weight Management and Nutrition: Dietary plan was discussed with patient at this visit including referral to dietitian for further evaluation and management    Other:    1.  Ascending aortic aneurysm -fairly recently diagnosed.  Max diameter 4.4 cm on most recent imaging - stable.  Imaging options limited by CKD.  Repeat echo one year from previous    2.  CKD, stage IV -most likely etiology is diabetes and hypertension although his early onset proteinuria does suggest that other etiologies may be in the differential.  No obvious evidence of KEERTHI on duplex. Avoid nsaids. BP control and ARB as above. Otherwise defer all further work-up management and surveillance to his nephrologist. Would also like his nephrologist to assess whether or not his current glycemic agents are reasonable for current level of renal function    3.  Testosterone therapy -as we discussed previously, the cardiovascular effects of testosterone repletion in the eighth and ninth decade of life are unknown.  Over vigorous replacement of testosterone can worsen blood pressure control.  All that being said, I am not opposed to continuing testosterone therapy as long as he is aware of these potential risks and is being monitored carefully for side effects.  We will defer all further work-up management and surveillance to urology    4. Gout - no recent recurrence. Avoid higher doses of thiazides. Otherwise defer management to pcp and nephrology    Instructed to follow-up with PCP for remainder of adult medical needs: yes  We will partner with other providers in the management of established vascular disease and cardiometabolic risk factors.    Referred to genetic research studies ([facility])    Studies to Be Obtained: echo February 2023  Labs to Be Obtained: Per nephrology and endo     Follow up in: 6 months after echo    Time: 30-39min - chart review/prep, review of other providers' records, imaging/lab review, face-to-face time for history/examination, ordering, prescribing,  review of results/meds/ treatment plan with patient/family/caregiver, documentation in EMR, care coordination (as needed)     Michael J Bloch, M.D.     Cc:  Dr. Pretty Thompson

## 2022-09-01 ENCOUNTER — HOSPITAL ENCOUNTER (OUTPATIENT)
Dept: LAB | Facility: MEDICAL CENTER | Age: 82
End: 2022-09-01
Attending: INTERNAL MEDICINE
Payer: MEDICARE

## 2022-09-01 DIAGNOSIS — N18.4 ANEMIA DUE TO STAGE 4 CHRONIC KIDNEY DISEASE (HCC): ICD-10-CM

## 2022-09-01 DIAGNOSIS — D63.1 ANEMIA DUE TO STAGE 4 CHRONIC KIDNEY DISEASE (HCC): ICD-10-CM

## 2022-09-01 DIAGNOSIS — E55.9 VITAMIN D DEFICIENCY: ICD-10-CM

## 2022-09-01 DIAGNOSIS — N18.4 CKD (CHRONIC KIDNEY DISEASE), STAGE IV (HCC): ICD-10-CM

## 2022-09-01 LAB
25(OH)D3 SERPL-MCNC: 65 NG/ML (ref 30–100)
ANION GAP SERPL CALC-SCNC: 10 MMOL/L (ref 7–16)
APPEARANCE UR: CLEAR
BACTERIA #/AREA URNS HPF: NEGATIVE /HPF
BILIRUB UR QL STRIP.AUTO: NEGATIVE
BUN SERPL-MCNC: 44 MG/DL (ref 8–22)
CALCIUM SERPL-MCNC: 10.2 MG/DL (ref 8.5–10.5)
CHLORIDE SERPL-SCNC: 106 MMOL/L (ref 96–112)
CO2 SERPL-SCNC: 22 MMOL/L (ref 20–33)
COLOR UR: YELLOW
CREAT SERPL-MCNC: 2.47 MG/DL (ref 0.5–1.4)
EPI CELLS #/AREA URNS HPF: NEGATIVE /HPF
ERYTHROCYTE [DISTWIDTH] IN BLOOD BY AUTOMATED COUNT: 48.6 FL (ref 35.9–50)
GFR SERPLBLD CREATININE-BSD FMLA CKD-EPI: 25 ML/MIN/1.73 M 2
GLUCOSE SERPL-MCNC: 110 MG/DL (ref 65–99)
GLUCOSE UR STRIP.AUTO-MCNC: NEGATIVE MG/DL
HCT VFR BLD AUTO: 44 % (ref 42–52)
HGB BLD-MCNC: 14.4 G/DL (ref 14–18)
HYALINE CASTS #/AREA URNS LPF: ABNORMAL /LPF
KETONES UR STRIP.AUTO-MCNC: NEGATIVE MG/DL
LEUKOCYTE ESTERASE UR QL STRIP.AUTO: NEGATIVE
MCH RBC QN AUTO: 30.1 PG (ref 27–33)
MCHC RBC AUTO-ENTMCNC: 32.7 G/DL (ref 33.7–35.3)
MCV RBC AUTO: 91.9 FL (ref 81.4–97.8)
MICRO URNS: ABNORMAL
NITRITE UR QL STRIP.AUTO: NEGATIVE
PH UR STRIP.AUTO: 6 [PH] (ref 5–8)
PLATELET # BLD AUTO: 194 K/UL (ref 164–446)
PMV BLD AUTO: 11.4 FL (ref 9–12.9)
POTASSIUM SERPL-SCNC: 4.8 MMOL/L (ref 3.6–5.5)
PROT UR QL STRIP: 30 MG/DL
PTH-INTACT SERPL-MCNC: 86.8 PG/ML (ref 14–72)
RBC # BLD AUTO: 4.79 M/UL (ref 4.7–6.1)
RBC # URNS HPF: ABNORMAL /HPF
RBC UR QL AUTO: NEGATIVE
SODIUM SERPL-SCNC: 138 MMOL/L (ref 135–145)
SP GR UR STRIP.AUTO: 1.01
URATE SERPL-MCNC: 4.9 MG/DL (ref 2.5–8.3)
UROBILINOGEN UR STRIP.AUTO-MCNC: 0.2 MG/DL
WBC # BLD AUTO: 7.6 K/UL (ref 4.8–10.8)
WBC #/AREA URNS HPF: ABNORMAL /HPF

## 2022-09-01 PROCEDURE — 84550 ASSAY OF BLOOD/URIC ACID: CPT

## 2022-09-01 PROCEDURE — 82306 VITAMIN D 25 HYDROXY: CPT

## 2022-09-01 PROCEDURE — 85027 COMPLETE CBC AUTOMATED: CPT

## 2022-09-01 PROCEDURE — 81001 URINALYSIS AUTO W/SCOPE: CPT

## 2022-09-01 PROCEDURE — 83970 ASSAY OF PARATHORMONE: CPT

## 2022-09-01 PROCEDURE — 36415 COLL VENOUS BLD VENIPUNCTURE: CPT

## 2022-09-01 PROCEDURE — 80048 BASIC METABOLIC PNL TOTAL CA: CPT

## 2022-09-05 LAB
APOB+LDLR+PCSK9 GENE MUT ANL BLD/T: NOT DETECTED
BRCA1+BRCA2 DEL+DUP + FULL MUT ANL BLD/T: NOT DETECTED
MLH1+MSH2+MSH6+PMS2 GN DEL+DUP+FUL M: NOT DETECTED

## 2022-09-08 ENCOUNTER — OFFICE VISIT (OUTPATIENT)
Dept: NEPHROLOGY | Facility: MEDICAL CENTER | Age: 82
End: 2022-09-08
Payer: MEDICARE

## 2022-09-08 VITALS
SYSTOLIC BLOOD PRESSURE: 114 MMHG | WEIGHT: 204 LBS | TEMPERATURE: 97.2 F | OXYGEN SATURATION: 97 % | BODY MASS INDEX: 28.56 KG/M2 | HEIGHT: 71 IN | HEART RATE: 69 BPM | DIASTOLIC BLOOD PRESSURE: 74 MMHG

## 2022-09-08 DIAGNOSIS — I10 ESSENTIAL HYPERTENSION: ICD-10-CM

## 2022-09-08 DIAGNOSIS — E55.9 VITAMIN D DEFICIENCY: ICD-10-CM

## 2022-09-08 DIAGNOSIS — E11.29 MICROALBUMINURIA DUE TO TYPE 2 DIABETES MELLITUS (HCC): ICD-10-CM

## 2022-09-08 DIAGNOSIS — R80.9 MICROALBUMINURIA DUE TO TYPE 2 DIABETES MELLITUS (HCC): ICD-10-CM

## 2022-09-08 DIAGNOSIS — N18.4 CKD (CHRONIC KIDNEY DISEASE), STAGE IV (HCC): ICD-10-CM

## 2022-09-08 DIAGNOSIS — D63.1 ANEMIA DUE TO STAGE 4 CHRONIC KIDNEY DISEASE (HCC): ICD-10-CM

## 2022-09-08 DIAGNOSIS — N18.4 ANEMIA DUE TO STAGE 4 CHRONIC KIDNEY DISEASE (HCC): ICD-10-CM

## 2022-09-08 DIAGNOSIS — M10.9 CONTROLLED GOUT: ICD-10-CM

## 2022-09-08 PROCEDURE — 99214 OFFICE O/P EST MOD 30 MIN: CPT | Performed by: INTERNAL MEDICINE

## 2022-09-08 ASSESSMENT — ENCOUNTER SYMPTOMS
ABDOMINAL PAIN: 0
NAUSEA: 0
SHORTNESS OF BREATH: 0
WHEEZING: 0
WEIGHT LOSS: 0
EYES NEGATIVE: 1
PALPITATIONS: 0
HEMOPTYSIS: 0
FEVER: 0
VOMITING: 0
ORTHOPNEA: 0
COUGH: 0
CHILLS: 0
SINUS PAIN: 0

## 2022-09-08 ASSESSMENT — FIBROSIS 4 INDEX: FIB4 SCORE: 1.57

## 2022-09-08 NOTE — PROGRESS NOTES
Subjective:      Tyrone Cline is a 82 y.o. male who presents with Chronic Kidney Disease            Chronic Kidney Disease  Pertinent negatives include no abdominal pain, chest pain, chills, congestion, coughing, fever, nausea or vomiting.   Tyrone is coming today for f/u of CKD IV  Doing well, no complaints   BPH LUTS - improved -s/p TURP f/u with Urology  CKD IV  - baseline creat level at 2.3-2.6 -worse to 2.99! -now better to 2.47  HTN: BP monitored at home - very well controlled -compliant to low salt diet and medications  Anemia: Hb level stable WNL  Hx/of gout involving left big toe - no recent attacks -on Allopurinol  Elevated uric acid level well controlled  Albuminuria - controlled with ARB    Review of Systems   Constitutional:  Negative for chills, fever, malaise/fatigue and weight loss.   HENT:  Negative for congestion, hearing loss and sinus pain.    Eyes: Negative.    Respiratory:  Negative for cough, hemoptysis, shortness of breath and wheezing.    Cardiovascular:  Negative for chest pain, palpitations, orthopnea and leg swelling.   Gastrointestinal:  Negative for abdominal pain, nausea and vomiting.   Skin: Negative.    All other systems reviewed and are negative.  Past Medical History:   Diagnosis Date    Abdominal pain     Abnormal electrocardiogram     ACE inhibitor intolerance     BPH (benign prostatic hyperplasia)     Bruxism     Cancer (HCC)     basal and squamous cell to face    Cataract     robbi IOL    Chickenpox     Cholesterol blood decreased     Chronic kidney disease, stage III (moderate) (HCC)     Cold     Congestion of both ears 2/27/2021    Cough     Depression     Dermatochalasis of eyelid 7/23/2014    Diabetes     oral meds    Difficulty breathing     Fever     GERD (gastroesophageal reflux disease)     GOUT     Gout     History of skull fracture     History of urinary tract infection     Hyperlipidemia     Hypertension     Indigestion     Influenza     Insomnia     Mixed  hyperlipidemia     Orthopnea     IMO load 2020    Pneumonia 2015    Proteinuria     Purulent nasal discharge 2015    Ringing in ears     Shortness of breath     Sputum production     Tonsillitis     Type 2 diabetes mellitus (HCC)     Wears glasses     Weight loss        Family History   Problem Relation Age of Onset    Diabetes Father     Heart Attack Father 79    Cancer Brother        Social History     Socioeconomic History    Marital status:     Highest education level: Bachelor's degree (e.g., BA, AB, BS)   Tobacco Use    Smoking status: Former     Packs/day: 0.20     Years: 6.00     Pack years: 1.20     Types: Cigarettes     Quit date: 1965     Years since quittin.7    Smokeless tobacco: Never    Tobacco comments:     Stopped over 25 years ago.   Vaping Use    Vaping Use: Never used   Substance and Sexual Activity    Alcohol use: No    Drug use: No   Social History Narrative    Retired CPA.     Social Determinants of Health     Financial Resource Strain: Low Risk     Difficulty of Paying Living Expenses: Not very hard   Food Insecurity: No Food Insecurity    Worried About Running Out of Food in the Last Year: Never true    Ran Out of Food in the Last Year: Never true   Transportation Needs: No Transportation Needs    Lack of Transportation (Medical): No    Lack of Transportation (Non-Medical): No   Physical Activity: Insufficiently Active    Days of Exercise per Week: 3 days    Minutes of Exercise per Session: 20 min   Stress: Stress Concern Present    Feeling of Stress : To some extent   Social Connections: Moderately Isolated    Frequency of Communication with Friends and Family: More than three times a week    Frequency of Social Gatherings with Friends and Family: Once a week    Attends Zoroastrianism Services: Never    Active Member of Clubs or Organizations: Yes    Attends Club or Organization Meetings: More than 4 times per year    Marital Status:    Housing Stability: Low Risk  "    Unable to Pay for Housing in the Last Year: No    Number of Places Lived in the Last Year: 1    Unstable Housing in the Last Year: No          Objective:     /74 (BP Location: Right arm, Patient Position: Sitting, BP Cuff Size: Adult)   Pulse 69   Temp 36.2 °C (97.2 °F) (Temporal)   Ht 1.803 m (5' 11\")   Wt 92.5 kg (204 lb)   SpO2 97%   BMI 28.45 kg/m²      Physical Exam  Vitals reviewed.   Constitutional:       General: He is not in acute distress.     Appearance: Normal appearance. He is well-developed. He is not diaphoretic.   HENT:      Head: Normocephalic and atraumatic.      Nose: Nose normal.      Mouth/Throat:      Mouth: Mucous membranes are moist.      Pharynx: Oropharynx is clear.   Eyes:      Extraocular Movements: Extraocular movements intact.      Conjunctiva/sclera: Conjunctivae normal.      Pupils: Pupils are equal, round, and reactive to light.   Cardiovascular:      Rate and Rhythm: Normal rate and regular rhythm.      Pulses: Normal pulses.      Heart sounds: Normal heart sounds.     No friction rub. No gallop.   Pulmonary:      Effort: Pulmonary effort is normal. No respiratory distress.      Breath sounds: Normal breath sounds. No wheezing or rales.   Abdominal:      General: Bowel sounds are normal. There is no distension.      Palpations: Abdomen is soft. There is no mass.      Tenderness: There is no abdominal tenderness. There is no right CVA tenderness or left CVA tenderness.   Musculoskeletal:      Cervical back: Normal range of motion and neck supple.      Right lower leg: No edema.      Left lower leg: No edema.   Skin:     General: Skin is warm.      Coloration: Skin is not pale.      Findings: No erythema or rash.   Neurological:      General: No focal deficit present.      Mental Status: He is alert and oriented to person, place, and time.      Cranial Nerves: No cranial nerve deficit.      Coordination: Coordination normal.   Psychiatric:         Mood and Affect: Mood " normal.         Behavior: Behavior normal.         Thought Content: Thought content normal.         Judgment: Judgment normal.   Laboratory results reviewed: d/w Pt  Lab Results   Component Value Date/Time    CREATININE 2.47 (H) 09/01/2022 07:59 AM    CREATININE 1.4 02/06/2009 03:00 AM    POTASSIUM 4.8 09/01/2022 07:59 AM                 Assessment/Plan:       1. CKD (chronic kidney disease), stage IV ([facility])      Creat level improving -to monitor closely      Keep well hydrated          2. Essential hypertension       Elevated BP - very well controlled now  -to monitor at home       3. Microalbuminuria due to type 2 diabetes mellitus ([facility])      Very well controlled with ARB    4. Controlled gout      No recent gout attacks -on allopurinol      Uric acid very well controlled    5. Vitamin D deficiency      vit D and PTH well controlled - to monitor    6. Anemia due to stage 5 chronic kidney disease (HCC)      Hb stable -WNL        Recs;  Continue current treatment  Low salt diet  Monitor BP  Keep well hydrated  F/u in 3-4 months

## 2022-09-15 ENCOUNTER — OFFICE VISIT (OUTPATIENT)
Dept: URGENT CARE | Facility: CLINIC | Age: 82
End: 2022-09-15
Payer: MEDICARE

## 2022-09-15 VITALS
OXYGEN SATURATION: 98 % | HEIGHT: 71 IN | HEART RATE: 72 BPM | RESPIRATION RATE: 16 BRPM | TEMPERATURE: 97.8 F | SYSTOLIC BLOOD PRESSURE: 130 MMHG | DIASTOLIC BLOOD PRESSURE: 86 MMHG | BODY MASS INDEX: 28 KG/M2 | WEIGHT: 200 LBS

## 2022-09-15 DIAGNOSIS — J34.89 NASAL CONGESTION WITH RHINORRHEA: ICD-10-CM

## 2022-09-15 DIAGNOSIS — H61.22 IMPACTED CERUMEN OF LEFT EAR: ICD-10-CM

## 2022-09-15 DIAGNOSIS — R09.81 NASAL CONGESTION WITH RHINORRHEA: ICD-10-CM

## 2022-09-15 PROCEDURE — 99213 OFFICE O/P EST LOW 20 MIN: CPT

## 2022-09-15 RX ORDER — CETIRIZINE HYDROCHLORIDE 10 MG/1
10 TABLET ORAL DAILY
Qty: 30 TABLET | Refills: 0 | Status: SHIPPED | OUTPATIENT
Start: 2022-09-15 | End: 2022-12-01

## 2022-09-15 RX ORDER — FLUTICASONE PROPIONATE 50 MCG
1 SPRAY, SUSPENSION (ML) NASAL DAILY
Qty: 16 G | Refills: 0 | Status: SHIPPED | OUTPATIENT
Start: 2022-09-15 | End: 2023-04-12

## 2022-09-15 ASSESSMENT — FIBROSIS 4 INDEX: FIB4 SCORE: 1.57

## 2022-09-16 NOTE — PROGRESS NOTES
"Subjective:   Tyrone Cline is a 82 y.o. male who presents for Otalgia (X 4 days, dizziness, head congestion)      HPI:  Patient presents with complaints of left ear pain, dizziness, muffled hearing. Pain is aggravated when he pushes on the the tragus. Denies fever, chills, night sweats, nausea, vomiting, diarrhea. Two home covid tests have been negative.    ROS as above per HPI    Medications:    Current Outpatient Medications on File Prior to Visit   Medication Sig Dispense Refill    amLODIPine (NORVASC) 5 MG Tab Take 1 Tablet by mouth every day. 30 Tablet 11    carvedilol (COREG) 25 MG Tab Take 1 Tablet by mouth 2 times a day with meals. 60 Tablet 11    olmesartan (BENICAR) 20 MG Tab TAKE ONE TABLET BY MOUTH ONE TIME DAILY 100 Tablet 0    LORazepam (ATIVAN) 1 MG Tab Take 1 mg by mouth.      Finerenone (KERENDIA) 10 MG Tab Take 10 mg by mouth every day.      SITagliptin (JANUVIA) 50 MG Tab Take 50 mg by mouth every day.      Polyethylene Glycol 3350 (MIRALAX PO) Take  by mouth. 4 tsp daily      omeprazole (PRILOSEC) 40 MG delayed-release capsule Take 1 Capsule by mouth every day. 30 Capsule 0    OZEMPIC, 0.25 OR 0.5 MG/DOSE, 2 MG/1.5ML Solution Pen-injector Inject 0.5 mg under the skin every Sunday.      B Complex Vitamins (B COMPLEX 1 PO) Take 1 tablet by mouth every day.      Testosterone 20.25 MG/1.25GM (1.62%) Gel Place 60.75 mg on the skin every day. \"2 pumps on one shoulder and 1 pump on the other for a total of 3 pumps\"      allopurinol (ZYLOPRIM) 100 MG Tab Take 100 mg by mouth every day.      Cholecalciferol (VITAMIN D) 2000 UNIT Tab Take 1 capsule by mouth 2 (two) times a day.      simvastatin (ZOCOR) 40 MG Tab Take 0.5 Tablets by mouth every evening. (Patient not taking: Reported on 9/15/2022) 30 Tablet 11     No current facility-administered medications on file prior to visit.        Allergies:   Atorvastatin calcium, Ether, and Lipitor [atorvastatin calcium]    Problem List:   Patient Active " Problem List   Diagnosis    BPH (benign prostatic hyperplasia)    Abnormal electrocardiogram    Chronic kidney disease (CKD), stage IV (severe) (HCC)    ACE inhibitor intolerance    Mixed hyperlipidemia    Type 2 diabetes mellitus with hyperglycemia (HCC)    Proteinuria    History of skull fracture    LVH (left ventricular hypertrophy)    Controlled gout    Essential hypertension    Hypertensive emergency    Chronic kidney disease (CKD)    Acquired dilation of ascending aorta and aortic root (HCC)    GI bleed    Diverticulitis    Insomnia        Surgical History:  Past Surgical History:   Procedure Laterality Date    IA COLONOSCOPY,DIAGNOSTIC N/A 12/12/2021    Procedure: COLONOSCOPY;  Surgeon: Dariel Simons M.D.;  Location: [facility];  Service: Gastroenterology    IA UPPER GI ENDOSCOPY,BIOPSY N/A 12/12/2021    Procedure: GASTROSCOPY, WITH BIOPSY;  Surgeon: Dariel Simons M.D.;  Location: [facility];  Service: Gastroenterology    IA UPPER GI ENDOSCOPY,CTRL BLEED N/A 12/12/2021    Procedure: EGD, WITH CLIP PLACEMENT;  Surgeon: Dariel Simons M.D.;  Location: [facility];  Service: Gastroenterology    GASTROSCOPY W/PUSH ENTERSCOPY N/A 12/12/2021    Procedure: GASTROSCOPY, WITH PUSH ENTEROSCOPY;  Surgeon: Dariel Simons M.D.;  Location: [facility];  Service: Gastroenterology    SEPTAL RECONSTRUCTION  12/7/2015    Procedure: SEPTAL RECONSTRUCTION OPEN WITH  GRAFTS & CONCHAL CART GRAFT;  Surgeon: SYDNIE Gaitan M.D.;  Location: SURGERY SAME DAY [facility];  Service:     BLEPHAROPLASTY  7/23/2014    Performed by Gera Plasencia M.D. at [facility] ORS    BROW LIFT  7/23/2014    Performed by Gera Plasencia M.D. at [facility]    CATARACT PHACO WITH IOL  12/4/2012    Performed by Suraj Gonzalez M.D. at [facility] ORS    CATARACT PHACO WITH IOL  11/20/2012    Performed by Suraj Gonzalez M.D. at [facility]     "APPENDECTOMY      ARTHROSCOPY, KNEE      CARDIAC CATH, LEFT HEART      C out of concern for STEMI, normal coronaries with minimal atherosclerosis, diagnosed with PNA as cause of symptoms and ST changes.     OTHER      KNEE SURGERY - LEFT.    OTHER      NOSE SURGERY.    TONSILLECTOMY         Past Social Hx:   Social History     Tobacco Use    Smoking status: Former     Packs/day: 0.20     Years: 6.00     Pack years: 1.20     Types: Cigarettes     Quit date:      Years since quittin.7    Smokeless tobacco: Never    Tobacco comments:     Stopped over 25 years ago.   Vaping Use    Vaping Use: Never used   Substance Use Topics    Alcohol use: No    Drug use: No          Problem list, medications, and allergies reviewed by myself today in Epic.     Objective:     /86   Pulse 72   Temp 36.6 °C (97.8 °F) (Temporal)   Resp 16   Ht 1.803 m (5' 11\")   Wt 90.7 kg (200 lb)   SpO2 98%   BMI 27.89 kg/m²     Physical Exam  Vitals and nursing note reviewed.   Constitutional:       General: He is not in acute distress.     Appearance: Normal appearance. He is not ill-appearing or diaphoretic.   HENT:      Head: Normocephalic and atraumatic.      Right Ear: Tympanic membrane and ear canal normal.      Left Ear: Tympanic membrane and ear canal normal. There is impacted cerumen.      Nose: Nose normal. No congestion or rhinorrhea.      Mouth/Throat:      Mouth: Mucous membranes are moist.      Pharynx: Oropharynx is clear. No oropharyngeal exudate or posterior oropharyngeal erythema.   Eyes:      Conjunctiva/sclera: Conjunctivae normal.   Cardiovascular:      Rate and Rhythm: Normal rate and regular rhythm.      Heart sounds: Normal heart sounds.   Pulmonary:      Effort: Pulmonary effort is normal.      Breath sounds: Normal breath sounds.   Abdominal:      General: Abdomen is flat. Bowel sounds are normal. There is no distension.      Palpations: Abdomen is soft. There is no mass.      Tenderness: There is no " abdominal tenderness. There is no right CVA tenderness, left CVA tenderness, guarding or rebound.      Hernia: No hernia is present.   Musculoskeletal:      Cervical back: No tenderness.   Lymphadenopathy:      Cervical: No cervical adenopathy.   Skin:     General: Skin is warm and dry.      Capillary Refill: Capillary refill takes less than 2 seconds.      Findings: No rash.   Neurological:      Mental Status: He is alert and oriented to person, place, and time.       Assessment/Plan:     Diagnosis and associated orders:   1. Impacted cerumen of left ear  - Ear Irrigation ([facility] Only); Future    2. Nasal congestion with rhinorrhea  - fluticasone (FLONASE) 50 MCG/ACT nasal spray; Administer 1 Spray into affected nostril(S) every day.  Dispense: 16 g; Refill: 0  - cetirizine (ZYRTEC ALLERGY) 10 MG Tab; Take 1 Tablet by mouth every day.  Dispense: 30 Tablet; Refill: 0      Comments/MDM:     Cerumen successfully removed in clinic.  Follow up with ENT       Pt is clinically stable at today's acute urgent care visit.  No acute distress noted. Appropriate for outpatient management at this time.       Discussed DDx, management options (risks,benefits, and alternatives to planned treatment), natural progression and supportive care.  Expressed understanding and the treatment plan was agreed upon. Questions were encouraged and answered   Return to urgent care prn if new or worsening sx or if there is no improvement in condition prn.    Educated in Red flags and indications to immediately call 911 or present to the Emergency Department.   Advised the patient to follow-up with the primary care physician for recheck, reevaluation, and consideration of further management.      Please note that this dictation was created using voice recognition software. I have made a reasonable attempt to correct obvious errors, but I expect that there are errors of grammar and possibly content that I did not discover before finalizing the  note.    This note was electronically signed by Daysi Mckoy, JOHN

## 2022-09-22 ENCOUNTER — DOCUMENTATION (OUTPATIENT)
Dept: VASCULAR LAB | Facility: MEDICAL CENTER | Age: 82
End: 2022-09-22
Payer: MEDICARE

## 2022-09-22 DIAGNOSIS — I77.819 ACQUIRED DILATION OF ASCENDING AORTA AND AORTIC ROOT (HCC): ICD-10-CM

## 2022-10-01 ENCOUNTER — HOSPITAL ENCOUNTER (OUTPATIENT)
Dept: LAB | Facility: MEDICAL CENTER | Age: 82
End: 2022-10-01
Attending: INTERNAL MEDICINE
Payer: MEDICARE

## 2022-10-01 LAB
ANION GAP SERPL CALC-SCNC: 11 MMOL/L (ref 7–16)
BUN SERPL-MCNC: 37 MG/DL (ref 8–22)
CALCIUM SERPL-MCNC: 9.7 MG/DL (ref 8.5–10.5)
CHLORIDE SERPL-SCNC: 105 MMOL/L (ref 96–112)
CO2 SERPL-SCNC: 20 MMOL/L (ref 20–33)
CREAT SERPL-MCNC: 2.55 MG/DL (ref 0.5–1.4)
CREAT UR-MCNC: 147.27 MG/DL
GFR SERPLBLD CREATININE-BSD FMLA CKD-EPI: 25 ML/MIN/1.73 M 2
GLUCOSE SERPL-MCNC: 146 MG/DL (ref 65–99)
HCT VFR BLD AUTO: 45 % (ref 42–52)
HGB BLD-MCNC: 14.9 G/DL (ref 14–18)
MICROALBUMIN UR-MCNC: 14.5 MG/DL
MICROALBUMIN/CREAT UR: 98 MG/G (ref 0–30)
POTASSIUM SERPL-SCNC: 5 MMOL/L (ref 3.6–5.5)
SODIUM SERPL-SCNC: 136 MMOL/L (ref 135–145)
TESTOST SERPL-MCNC: 367 NG/DL (ref 175–781)

## 2022-10-01 PROCEDURE — 84270 ASSAY OF SEX HORMONE GLOBUL: CPT

## 2022-10-01 PROCEDURE — 82570 ASSAY OF URINE CREATININE: CPT

## 2022-10-01 PROCEDURE — 80048 BASIC METABOLIC PNL TOTAL CA: CPT

## 2022-10-01 PROCEDURE — 82043 UR ALBUMIN QUANTITATIVE: CPT

## 2022-10-01 PROCEDURE — 36415 COLL VENOUS BLD VENIPUNCTURE: CPT

## 2022-10-01 PROCEDURE — 85014 HEMATOCRIT: CPT

## 2022-10-01 PROCEDURE — 85018 HEMOGLOBIN: CPT

## 2022-10-01 PROCEDURE — 84403 ASSAY OF TOTAL TESTOSTERONE: CPT

## 2022-10-03 LAB — SHBG SERPL-SCNC: 37 NMOL/L (ref 19–76)

## 2022-11-21 ENCOUNTER — HOSPITAL ENCOUNTER (OUTPATIENT)
Dept: LAB | Facility: MEDICAL CENTER | Age: 82
End: 2022-11-21
Attending: INTERNAL MEDICINE
Payer: MEDICARE

## 2022-11-21 ENCOUNTER — DOCUMENTATION (OUTPATIENT)
Dept: VASCULAR LAB | Facility: MEDICAL CENTER | Age: 82
End: 2022-11-21
Payer: MEDICARE

## 2022-11-21 DIAGNOSIS — E11.29 MICROALBUMINURIA DUE TO TYPE 2 DIABETES MELLITUS (HCC): ICD-10-CM

## 2022-11-21 DIAGNOSIS — R80.9 MICROALBUMINURIA DUE TO TYPE 2 DIABETES MELLITUS (HCC): ICD-10-CM

## 2022-11-21 DIAGNOSIS — N18.4 ANEMIA DUE TO STAGE 4 CHRONIC KIDNEY DISEASE (HCC): ICD-10-CM

## 2022-11-21 DIAGNOSIS — D63.1 ANEMIA DUE TO STAGE 4 CHRONIC KIDNEY DISEASE (HCC): ICD-10-CM

## 2022-11-21 LAB
25(OH)D3 SERPL-MCNC: 64 NG/ML (ref 30–100)
CREAT UR-MCNC: 99.59 MG/DL
ERYTHROCYTE [DISTWIDTH] IN BLOOD BY AUTOMATED COUNT: 51.4 FL (ref 35.9–50)
HCT VFR BLD AUTO: 45.7 % (ref 42–52)
HGB BLD-MCNC: 15 G/DL (ref 14–18)
MCH RBC QN AUTO: 30.5 PG (ref 27–33)
MCHC RBC AUTO-ENTMCNC: 32.8 G/DL (ref 33.7–35.3)
MCV RBC AUTO: 92.9 FL (ref 81.4–97.8)
MICROALBUMIN UR-MCNC: 21.6 MG/DL
MICROALBUMIN/CREAT UR: 217 MG/G (ref 0–30)
PLATELET # BLD AUTO: 199 K/UL (ref 164–446)
PMV BLD AUTO: 11 FL (ref 9–12.9)
RBC # BLD AUTO: 4.92 M/UL (ref 4.7–6.1)
WBC # BLD AUTO: 7.1 K/UL (ref 4.8–10.8)

## 2022-11-21 PROCEDURE — 85027 COMPLETE CBC AUTOMATED: CPT

## 2022-11-21 PROCEDURE — 82306 VITAMIN D 25 HYDROXY: CPT

## 2022-11-21 PROCEDURE — 36415 COLL VENOUS BLD VENIPUNCTURE: CPT

## 2022-11-21 PROCEDURE — 82043 UR ALBUMIN QUANTITATIVE: CPT

## 2022-11-21 PROCEDURE — 82570 ASSAY OF URINE CREATININE: CPT

## 2022-11-21 NOTE — PROGRESS NOTES
Called and left VM for pt to call back to get rescheduled for 2/27 with dr. Bloch. Dr. Bloch unavailable that day. Please reschedule to something that is open in march.

## 2022-11-28 ENCOUNTER — DOCUMENTATION (OUTPATIENT)
Dept: HEALTH INFORMATION MANAGEMENT | Facility: OTHER | Age: 82
End: 2022-11-28
Payer: MEDICARE

## 2022-12-01 ENCOUNTER — OFFICE VISIT (OUTPATIENT)
Dept: NEPHROLOGY | Facility: MEDICAL CENTER | Age: 82
End: 2022-12-01
Payer: MEDICARE

## 2022-12-01 VITALS
WEIGHT: 205 LBS | SYSTOLIC BLOOD PRESSURE: 116 MMHG | DIASTOLIC BLOOD PRESSURE: 72 MMHG | HEART RATE: 62 BPM | HEIGHT: 71 IN | TEMPERATURE: 97.9 F | BODY MASS INDEX: 28.7 KG/M2 | OXYGEN SATURATION: 98 %

## 2022-12-01 DIAGNOSIS — M10.9 CONTROLLED GOUT: ICD-10-CM

## 2022-12-01 DIAGNOSIS — D63.1 ANEMIA DUE TO STAGE 4 CHRONIC KIDNEY DISEASE (HCC): ICD-10-CM

## 2022-12-01 DIAGNOSIS — N18.4 ANEMIA DUE TO STAGE 4 CHRONIC KIDNEY DISEASE (HCC): ICD-10-CM

## 2022-12-01 DIAGNOSIS — R80.9 MICROALBUMINURIA DUE TO TYPE 2 DIABETES MELLITUS (HCC): ICD-10-CM

## 2022-12-01 DIAGNOSIS — N18.4 CKD (CHRONIC KIDNEY DISEASE), STAGE IV (HCC): ICD-10-CM

## 2022-12-01 DIAGNOSIS — E11.29 MICROALBUMINURIA DUE TO TYPE 2 DIABETES MELLITUS (HCC): ICD-10-CM

## 2022-12-01 DIAGNOSIS — E55.9 VITAMIN D DEFICIENCY: ICD-10-CM

## 2022-12-01 DIAGNOSIS — I10 ESSENTIAL HYPERTENSION: ICD-10-CM

## 2022-12-01 PROCEDURE — 99214 OFFICE O/P EST MOD 30 MIN: CPT | Performed by: INTERNAL MEDICINE

## 2022-12-01 RX ORDER — LIDOCAINE 50 MG/G
OINTMENT TOPICAL
COMMUNITY
Start: 2022-11-05 | End: 2023-03-08

## 2022-12-01 RX ORDER — FAMOTIDINE 20 MG/1
10 TABLET, FILM COATED ORAL DAILY
COMMUNITY
End: 2023-04-21

## 2022-12-01 RX ORDER — OLMESARTAN MEDOXOMIL 20 MG/1
TABLET ORAL
COMMUNITY
End: 2022-12-01

## 2022-12-01 RX ORDER — ZOLPIDEM TARTRATE 5 MG/1
TABLET ORAL
COMMUNITY
End: 2023-03-01 | Stop reason: CLARIF

## 2022-12-01 RX ORDER — GABAPENTIN 300 MG/1
CAPSULE ORAL
COMMUNITY
Start: 2022-09-01 | End: 2022-12-01

## 2022-12-01 RX ORDER — GABAPENTIN 100 MG/1
100 CAPSULE ORAL
COMMUNITY
Start: 2022-10-13 | End: 2023-03-01

## 2022-12-01 RX ORDER — AMLODIPINE BESYLATE 5 MG/1
TABLET ORAL
COMMUNITY
End: 2022-12-01

## 2022-12-01 RX ORDER — POLYETHYLENE GLYCOL 3350 17 G/17G
POWDER, FOR SOLUTION ORAL
COMMUNITY
End: 2022-12-01

## 2022-12-01 RX ORDER — SIMVASTATIN 20 MG
TABLET ORAL
COMMUNITY
End: 2023-03-08

## 2022-12-01 RX ORDER — TESTOSTERONE 25 MG/2.5G
GEL TRANSDERMAL
COMMUNITY
End: 2023-03-01

## 2022-12-01 RX ORDER — WHEAT DEXTRIN 3 G/3.8 G
POWDER (GRAM) ORAL
COMMUNITY
End: 2023-03-01 | Stop reason: SDUPTHER

## 2022-12-01 RX ORDER — ALLOPURINOL 100 MG/1
TABLET ORAL
COMMUNITY
End: 2022-12-01

## 2022-12-01 RX ORDER — CARVEDILOL 25 MG/1
TABLET ORAL
COMMUNITY
End: 2022-12-01

## 2022-12-01 ASSESSMENT — ENCOUNTER SYMPTOMS
VOMITING: 0
SINUS PAIN: 0
NAUSEA: 0
PALPITATIONS: 0
WHEEZING: 0
WEIGHT LOSS: 0
FLANK PAIN: 0
COUGH: 0
CHILLS: 0
SENSORY CHANGE: 1
FEVER: 0
ORTHOPNEA: 0
EYES NEGATIVE: 1
SHORTNESS OF BREATH: 0
HEMOPTYSIS: 0
ABDOMINAL PAIN: 0

## 2022-12-01 ASSESSMENT — FIBROSIS 4 INDEX: FIB4 SCORE: 1.53

## 2022-12-01 NOTE — PROGRESS NOTES
Subjective:      Tyrone Cline is a 82 y.o. male who presents with Follow-Up and Chronic Kidney Disease            Chronic Kidney Disease  Pertinent negatives include no abdominal pain, chest pain, chills, congestion, coughing, fever, nausea or vomiting.   Tyrone is coming today for f/u of CKD IV  Doing well, no complaints except LE neuropathy -on Gabapentin  BPH LUTS - improved -s/p TURP f/u with Urology  CKD IV  - baseline creat level at 2.3-2.6 - stable  HTN: BP monitored at home - very well controlled -compliant to low salt diet and medications  Anemia: Hb level stable WNL  Hx/of gout involving left big toe - no recent attacks -on Allopurinol  Elevated uric acid level well controlled  Albuminuria - controlled with ARB    Review of Systems   Constitutional:  Negative for chills, fever, malaise/fatigue and weight loss.   HENT:  Negative for congestion, hearing loss and sinus pain.    Eyes: Negative.    Respiratory:  Negative for cough, hemoptysis, shortness of breath and wheezing.    Cardiovascular:  Negative for chest pain, palpitations, orthopnea and leg swelling.   Gastrointestinal:  Negative for abdominal pain, nausea and vomiting.   Genitourinary:  Negative for dysuria, flank pain, frequency, hematuria and urgency.   Skin: Negative.    Neurological:  Positive for sensory change.   All other systems reviewed and are negative.      Past Medical History:   Diagnosis Date    Abdominal pain     Abnormal electrocardiogram     ACE inhibitor intolerance     BPH (benign prostatic hyperplasia)     Bruxism     Cancer (HCC)     basal and squamous cell to face    Cataract     robbi IOL    Chickenpox     Cholesterol blood decreased     Chronic kidney disease, stage III (moderate) (HCC)     Cold     Congestion of both ears 2/27/2021    Cough     Depression     Dermatochalasis of eyelid 7/23/2014    Diabetes     oral meds    Difficulty breathing     Fever     GERD (gastroesophageal reflux disease)     GOUT     Gout      History of skull fracture     History of urinary tract infection     Hyperlipidemia     Hypertension     Indigestion     Influenza     Insomnia     Mixed hyperlipidemia     Orthopnea     IMO load 2020    Pneumonia 2015    Proteinuria     Purulent nasal discharge 2015    Ringing in ears     Shortness of breath     Sputum production     Tonsillitis     Type 2 diabetes mellitus (HCC)     Wears glasses     Weight loss        Family History   Problem Relation Age of Onset    Diabetes Father     Heart Attack Father 79    Cancer Brother        Social History     Socioeconomic History    Marital status:     Highest education level: Bachelor's degree (e.g., BA, AB, BS)   Tobacco Use    Smoking status: Former     Packs/day: 0.20     Years: 6.00     Pack years: 1.20     Types: Cigarettes     Quit date: 1965     Years since quittin.9    Smokeless tobacco: Never    Tobacco comments:     Stopped over 25 years ago.   Vaping Use    Vaping Use: Never used   Substance and Sexual Activity    Alcohol use: No    Drug use: No   Social History Narrative    Retired CPA.     Social Determinants of Health     Financial Resource Strain: Low Risk     Difficulty of Paying Living Expenses: Not very hard   Food Insecurity: No Food Insecurity    Worried About Running Out of Food in the Last Year: Never true    Ran Out of Food in the Last Year: Never true   Transportation Needs: No Transportation Needs    Lack of Transportation (Medical): No    Lack of Transportation (Non-Medical): No   Physical Activity: Insufficiently Active    Days of Exercise per Week: 3 days    Minutes of Exercise per Session: 20 min   Stress: Stress Concern Present    Feeling of Stress : To some extent   Social Connections: Moderately Isolated    Frequency of Communication with Friends and Family: More than three times a week    Frequency of Social Gatherings with Friends and Family: Once a week    Attends Mormonism Services: Never    Active Member  "of Clubs or Organizations: Yes    Attends Club or Organization Meetings: More than 4 times per year    Marital Status:    Housing Stability: Low Risk     Unable to Pay for Housing in the Last Year: No    Number of Places Lived in the Last Year: 1    Unstable Housing in the Last Year: No          Objective:     /72 (BP Location: Left arm, Patient Position: Sitting, BP Cuff Size: Adult)   Pulse 62   Temp 36.6 °C (97.9 °F) (Temporal)   Ht 1.803 m (5' 11\")   Wt 93 kg (205 lb)   SpO2 98%   BMI 28.59 kg/m²      Physical Exam  Vitals reviewed.   Constitutional:       General: He is not in acute distress.     Appearance: Normal appearance. He is well-developed. He is not diaphoretic.   HENT:      Head: Normocephalic and atraumatic.      Nose: Nose normal.      Mouth/Throat:      Mouth: Mucous membranes are moist.      Pharynx: Oropharynx is clear.   Eyes:      Extraocular Movements: Extraocular movements intact.      Conjunctiva/sclera: Conjunctivae normal.      Pupils: Pupils are equal, round, and reactive to light.   Cardiovascular:      Rate and Rhythm: Normal rate and regular rhythm.      Pulses: Normal pulses.      Heart sounds: Normal heart sounds.   Pulmonary:      Effort: Pulmonary effort is normal. No respiratory distress.      Breath sounds: Normal breath sounds. No wheezing or rales.   Abdominal:      General: Bowel sounds are normal. There is no distension.      Palpations: Abdomen is soft. There is no mass.      Tenderness: There is no abdominal tenderness. There is no right CVA tenderness or left CVA tenderness.   Musculoskeletal:      Cervical back: Normal range of motion and neck supple.      Right lower leg: No edema.      Left lower leg: No edema.   Skin:     General: Skin is warm.      Coloration: Skin is not pale.      Findings: No erythema or rash.   Neurological:      General: No focal deficit present.      Mental Status: He is alert and oriented to person, place, and time.      " Cranial Nerves: No cranial nerve deficit.      Coordination: Coordination normal.   Psychiatric:         Mood and Affect: Mood normal.         Behavior: Behavior normal.         Thought Content: Thought content normal.         Judgment: Judgment normal.   Laboratory results reviewed: d/w Pt  Lab Results   Component Value Date/Time    CREATININE 2.55 (H) 10/01/2022 11:22 AM    CREATININE 1.4 02/06/2009 03:00 AM    POTASSIUM 5.0 10/01/2022 11:22 AM                 Assessment/Plan:       1. CKD (chronic kidney disease), stage IV ([facility])      Creat level stable at baseline -to monitor closely      Keep well hydrated          2. Essential hypertension       Elevated BP - very well controlled now  -to monitor at home       3. Microalbuminuria due to type 2 diabetes mellitus (HCC)      Very well controlled with ARB    4. Controlled gout      No recent gout attacks -on allopurinol      Uric acid very well controlled    5. Vitamin D deficiency      vit D and PTH well controlled - to monitor    6. Anemia due to stage 4 chronic kidney disease (HCC)      Hb stable -WNL        Recs;  Continue current treatment  Low salt diet  Monitor BP  Keep well hydrated  F/u in 4 months

## 2023-01-03 ENCOUNTER — HOSPITAL ENCOUNTER (OUTPATIENT)
Dept: LAB | Facility: MEDICAL CENTER | Age: 83
End: 2023-01-03
Attending: INTERNAL MEDICINE
Payer: MEDICARE

## 2023-01-03 LAB
ALBUMIN SERPL BCP-MCNC: 4.2 G/DL (ref 3.2–4.9)
ALBUMIN/GLOB SERPL: 1.5 G/DL
ALP SERPL-CCNC: 88 U/L (ref 30–99)
ALT SERPL-CCNC: 11 U/L (ref 2–50)
ANION GAP SERPL CALC-SCNC: 11 MMOL/L (ref 7–16)
AST SERPL-CCNC: 10 U/L (ref 12–45)
BILIRUB SERPL-MCNC: 0.5 MG/DL (ref 0.1–1.5)
BUN SERPL-MCNC: 34 MG/DL (ref 8–22)
CALCIUM ALBUM COR SERPL-MCNC: 10.2 MG/DL (ref 8.5–10.5)
CALCIUM SERPL-MCNC: 10.4 MG/DL (ref 8.5–10.5)
CHLORIDE SERPL-SCNC: 104 MMOL/L (ref 96–112)
CO2 SERPL-SCNC: 23 MMOL/L (ref 20–33)
CREAT SERPL-MCNC: 2.47 MG/DL (ref 0.5–1.4)
EST. AVERAGE GLUCOSE BLD GHB EST-MCNC: 143 MG/DL
GFR SERPLBLD CREATININE-BSD FMLA CKD-EPI: 25 ML/MIN/1.73 M 2
GLOBULIN SER CALC-MCNC: 2.8 G/DL (ref 1.9–3.5)
GLUCOSE SERPL-MCNC: 120 MG/DL (ref 65–99)
HBA1C MFR BLD: 6.6 % (ref 4–5.6)
HCT VFR BLD AUTO: 45.9 % (ref 42–52)
POTASSIUM SERPL-SCNC: 4.7 MMOL/L (ref 3.6–5.5)
PROT SERPL-MCNC: 7 G/DL (ref 6–8.2)
SODIUM SERPL-SCNC: 138 MMOL/L (ref 135–145)
TESTOST SERPL-MCNC: 537 NG/DL (ref 175–781)
URATE SERPL-MCNC: 5.6 MG/DL (ref 2.5–8.3)

## 2023-01-03 PROCEDURE — 84154 ASSAY OF PSA FREE: CPT

## 2023-01-03 PROCEDURE — 85014 HEMATOCRIT: CPT

## 2023-01-03 PROCEDURE — 83036 HEMOGLOBIN GLYCOSYLATED A1C: CPT

## 2023-01-03 PROCEDURE — 84403 ASSAY OF TOTAL TESTOSTERONE: CPT

## 2023-01-03 PROCEDURE — 36415 COLL VENOUS BLD VENIPUNCTURE: CPT

## 2023-01-03 PROCEDURE — 80053 COMPREHEN METABOLIC PANEL: CPT

## 2023-01-03 PROCEDURE — 84270 ASSAY OF SEX HORMONE GLOBUL: CPT

## 2023-01-03 PROCEDURE — 84550 ASSAY OF BLOOD/URIC ACID: CPT

## 2023-01-03 PROCEDURE — 84153 ASSAY OF PSA TOTAL: CPT

## 2023-01-04 LAB
PSA FREE MFR SERPL: 24 %
PSA FREE SERPL-MCNC: 0.6 NG/ML
PSA SERPL-MCNC: 2.5 NG/ML (ref 0–4)
SHBG SERPL-SCNC: 37 NMOL/L (ref 19–76)

## 2023-01-10 ENCOUNTER — TELEPHONE (OUTPATIENT)
Dept: NEPHROLOGY | Facility: MEDICAL CENTER | Age: 83
End: 2023-01-10

## 2023-01-10 NOTE — TELEPHONE ENCOUNTER
Patient called to inform  that he completed lab orders recently. Per patient his lab results are good. Patient has follow up appointment today with . No need for return phone call

## 2023-01-31 ENCOUNTER — HOSPITAL ENCOUNTER (OUTPATIENT)
Dept: LAB | Facility: MEDICAL CENTER | Age: 83
End: 2023-01-31
Attending: UROLOGY
Payer: MEDICARE

## 2023-01-31 LAB
BASOPHILS # BLD AUTO: 0.7 % (ref 0–1.8)
BASOPHILS # BLD: 0.05 K/UL (ref 0–0.12)
EOSINOPHIL # BLD AUTO: 0.16 K/UL (ref 0–0.51)
EOSINOPHIL NFR BLD: 2.2 % (ref 0–6.9)
ERYTHROCYTE [DISTWIDTH] IN BLOOD BY AUTOMATED COUNT: 50.7 FL (ref 35.9–50)
HCT VFR BLD AUTO: 47.9 % (ref 42–52)
HGB BLD-MCNC: 15.3 G/DL (ref 14–18)
IMM GRANULOCYTES # BLD AUTO: 0.03 K/UL (ref 0–0.11)
IMM GRANULOCYTES NFR BLD AUTO: 0.4 % (ref 0–0.9)
LYMPHOCYTES # BLD AUTO: 1.82 K/UL (ref 1–4.8)
LYMPHOCYTES NFR BLD: 24.7 % (ref 22–41)
MCH RBC QN AUTO: 30.2 PG (ref 27–33)
MCHC RBC AUTO-ENTMCNC: 31.9 G/DL (ref 33.7–35.3)
MCV RBC AUTO: 94.5 FL (ref 81.4–97.8)
MONOCYTES # BLD AUTO: 0.63 K/UL (ref 0–0.85)
MONOCYTES NFR BLD AUTO: 8.5 % (ref 0–13.4)
NEUTROPHILS # BLD AUTO: 4.69 K/UL (ref 1.82–7.42)
NEUTROPHILS NFR BLD: 63.5 % (ref 44–72)
NRBC # BLD AUTO: 0 K/UL
NRBC BLD-RTO: 0 /100 WBC
PLATELET # BLD AUTO: 206 K/UL (ref 164–446)
PMV BLD AUTO: 11.5 FL (ref 9–12.9)
RBC # BLD AUTO: 5.07 M/UL (ref 4.7–6.1)
TESTOST SERPL-MCNC: 566 NG/DL (ref 175–781)
WBC # BLD AUTO: 7.4 K/UL (ref 4.8–10.8)

## 2023-01-31 PROCEDURE — 85025 COMPLETE CBC W/AUTO DIFF WBC: CPT

## 2023-01-31 PROCEDURE — 84403 ASSAY OF TOTAL TESTOSTERONE: CPT

## 2023-01-31 PROCEDURE — 36415 COLL VENOUS BLD VENIPUNCTURE: CPT

## 2023-02-06 ENCOUNTER — HOSPITAL ENCOUNTER (OUTPATIENT)
Dept: RADIOLOGY | Facility: MEDICAL CENTER | Age: 83
End: 2023-02-06
Payer: MEDICARE

## 2023-02-06 ENCOUNTER — HOSPITAL ENCOUNTER (OUTPATIENT)
Dept: CARDIOLOGY | Facility: MEDICAL CENTER | Age: 83
End: 2023-02-06
Attending: NURSE PRACTITIONER
Payer: MEDICARE

## 2023-02-06 DIAGNOSIS — K21.9 GASTROESOPHAGEAL REFLUX DISEASE, UNSPECIFIED WHETHER ESOPHAGITIS PRESENT: ICD-10-CM

## 2023-02-06 DIAGNOSIS — K57.31 DIVERTICULOSIS OF COLON WITH HEMORRHAGE: ICD-10-CM

## 2023-02-06 DIAGNOSIS — E11.9 DIABETES MELLITUS WITHOUT COMPLICATION (HCC): ICD-10-CM

## 2023-02-06 DIAGNOSIS — K59.00 CONSTIPATION, UNSPECIFIED CONSTIPATION TYPE: ICD-10-CM

## 2023-02-06 DIAGNOSIS — I77.819 ACQUIRED DILATION OF ASCENDING AORTA AND AORTIC ROOT (HCC): ICD-10-CM

## 2023-02-06 DIAGNOSIS — N18.2 CHRONIC KIDNEY DISEASE, STAGE II (MILD): ICD-10-CM

## 2023-02-06 DIAGNOSIS — R19.4 FREQUENT BOWEL MOVEMENTS: ICD-10-CM

## 2023-02-06 LAB
LV EJECT FRACT  99904: 60
LV EJECT FRACT MOD 2C 99903: 65.25
LV EJECT FRACT MOD 4C 99902: 60.1
LV EJECT FRACT MOD BP 99901: 61.28

## 2023-02-06 PROCEDURE — 93306 TTE W/DOPPLER COMPLETE: CPT | Mod: 26 | Performed by: INTERNAL MEDICINE

## 2023-02-06 PROCEDURE — 74018 RADEX ABDOMEN 1 VIEW: CPT

## 2023-02-06 PROCEDURE — 93306 TTE W/DOPPLER COMPLETE: CPT

## 2023-02-07 ENCOUNTER — DOCUMENTATION (OUTPATIENT)
Dept: VASCULAR LAB | Facility: MEDICAL CENTER | Age: 83
End: 2023-02-07
Payer: MEDICARE

## 2023-02-22 DIAGNOSIS — I10 ESSENTIAL HYPERTENSION: ICD-10-CM

## 2023-02-22 RX ORDER — CARVEDILOL 12.5 MG/1
12.5 TABLET ORAL 2 TIMES DAILY WITH MEALS
Qty: 180 TABLET | Refills: 3 | Status: ON HOLD
Start: 2023-02-22 | End: 2023-05-31

## 2023-02-23 ENCOUNTER — DOCUMENTATION (OUTPATIENT)
Dept: VASCULAR LAB | Facility: MEDICAL CENTER | Age: 83
End: 2023-02-23
Payer: MEDICARE

## 2023-02-23 NOTE — PROGRESS NOTES
Left Message to R/S follow-up appt 03/08 with Dr Bloch.  We can r/s to same day (03/08) for the AM time slots.

## 2023-03-01 PROBLEM — I10 ESSENTIAL HYPERTENSION: Chronic | Status: ACTIVE | Noted: 2019-09-20

## 2023-03-01 PROBLEM — E78.00 HYPERCHOLESTEROLEMIA: Status: ACTIVE | Noted: 2022-02-28

## 2023-03-01 PROBLEM — R74.02 ELEVATION OF LEVEL OF TRANSAMINASE AND LACTIC ACID DEHYDROGENASE (LDH): Status: ACTIVE | Noted: 2023-03-01

## 2023-03-01 PROBLEM — R19.4 ALTERED BOWEL HABITS: Status: ACTIVE | Noted: 2023-03-01

## 2023-03-01 PROBLEM — K59.00 CONSTIPATION: Status: ACTIVE | Noted: 2022-02-25

## 2023-03-01 PROBLEM — R42 DIZZINESS: Status: ACTIVE | Noted: 2023-03-01

## 2023-03-01 PROBLEM — G47.00 INSOMNIA: Status: ACTIVE | Noted: 2022-02-28

## 2023-03-01 PROBLEM — R74.01 ELEVATION OF LEVEL OF TRANSAMINASE AND LACTIC ACID DEHYDROGENASE (LDH): Status: ACTIVE | Noted: 2023-03-01

## 2023-03-01 PROBLEM — K57.31 DIVERTICULOSIS OF COLON WITH HEMORRHAGE OF LARGE INTESTINE: Status: ACTIVE | Noted: 2023-03-01

## 2023-03-08 ENCOUNTER — OFFICE VISIT (OUTPATIENT)
Dept: VASCULAR LAB | Facility: MEDICAL CENTER | Age: 83
End: 2023-03-08
Attending: INTERNAL MEDICINE
Payer: MEDICARE

## 2023-03-08 VITALS
DIASTOLIC BLOOD PRESSURE: 59 MMHG | WEIGHT: 197 LBS | HEIGHT: 71 IN | BODY MASS INDEX: 27.58 KG/M2 | HEART RATE: 70 BPM | SYSTOLIC BLOOD PRESSURE: 91 MMHG

## 2023-03-08 DIAGNOSIS — E78.2 MIXED HYPERLIPIDEMIA: ICD-10-CM

## 2023-03-08 DIAGNOSIS — N18.4 CKD (CHRONIC KIDNEY DISEASE) STAGE 4, GFR 15-29 ML/MIN (HCC): ICD-10-CM

## 2023-03-08 DIAGNOSIS — N18.4 TYPE 2 DIABETES MELLITUS WITH STAGE 4 CHRONIC KIDNEY DISEASE, WITHOUT LONG-TERM CURRENT USE OF INSULIN (HCC): ICD-10-CM

## 2023-03-08 DIAGNOSIS — I10 ESSENTIAL HYPERTENSION: ICD-10-CM

## 2023-03-08 DIAGNOSIS — E11.22 TYPE 2 DIABETES MELLITUS WITH STAGE 4 CHRONIC KIDNEY DISEASE, WITHOUT LONG-TERM CURRENT USE OF INSULIN (HCC): ICD-10-CM

## 2023-03-08 DIAGNOSIS — I71.21 ANEURYSM OF ASCENDING AORTA WITHOUT RUPTURE (HCC): ICD-10-CM

## 2023-03-08 PROCEDURE — 99214 OFFICE O/P EST MOD 30 MIN: CPT | Performed by: INTERNAL MEDICINE

## 2023-03-08 PROCEDURE — 99212 OFFICE O/P EST SF 10 MIN: CPT

## 2023-03-08 RX ORDER — ATORVASTATIN CALCIUM 10 MG/1
10 TABLET, FILM COATED ORAL NIGHTLY
Qty: 30 TABLET | Refills: 11 | Status: SHIPPED | OUTPATIENT
Start: 2023-03-08 | End: 2023-03-13 | Stop reason: SDUPTHER

## 2023-03-08 ASSESSMENT — FIBROSIS 4 INDEX: FIB4 SCORE: 1.19

## 2023-03-08 NOTE — PROGRESS NOTES
"VASCULAR MEDICINE CLINIC - Follow up VISIT  23    Tyrone Cline is a 82 y.o. male who presents today  for vascular follow up    Subjective     HPI:  Patient here for f/u of ascending aortic aneurysm, hypertension, dyslipidemia, diabetes  Denies any further bleeding  No asa or nsaids   FS at home usually on cgm - 120s-130s - lowest around 100  No hypoglycemia  His peripheral neuropathy is better since starting on Cymbalta  He lost about 50 pounds originally when he went on Ozempic but is gained back some since starting Cymbalta  Still taking Jardiance - renally dosed  Still on olmesartan  Remains on amlodipine without leg swelling  Carvedilol dose has not changed  Has noted multiple low blood pressure readings and is getting some lightheadedness  Sees Dr. Olsen regularly  Has had stage 4 ckd for some time.   Remains on simvastatin - no myalgias  Remains on kerindia  Had echo  No chest pain         Social History     Tobacco Use    Smoking status: Former     Packs/day: 0.20     Years: 6.00     Pack years: 1.20     Types: Cigarettes     Quit date: 1965     Years since quittin.2    Smokeless tobacco: Never    Tobacco comments:     Stopped over 25 years ago.   Vaping Use    Vaping Use: Never used   Substance Use Topics    Alcohol use: No    Drug use: No      DIET AND EXERCISE:  Weight Change: has had some weight regain - originally had lost about 15 pounds when starting ozempic but has regained  Diet: decent dm diet but wants to see MNT  Exercise: walks daily when the weather permits       Objective        Objective:     Vitals:    23 1101 23 1104   BP: 92/56 91/59   BP Location: Left arm Left arm   Patient Position: Sitting Sitting   BP Cuff Size: Adult Adult   Pulse: 70 70   Weight: 89.4 kg (197 lb)    Height: 1.803 m (5' 11\")          Physical Exam  Vitals reviewed.   Constitutional:       General: He is not in acute distress.     Appearance: He is not diaphoretic.   HENT:      Head: " Normocephalic and atraumatic.   Eyes:      General: No scleral icterus.     Conjunctiva/sclera: Conjunctivae normal.   Cardiovascular:      Rate and Rhythm: Normal rate and regular rhythm.      Heart sounds: Normal heart sounds. No murmur heard.  Pulmonary:      Effort: Pulmonary effort is normal. No respiratory distress.      Breath sounds: Normal breath sounds. No wheezing or rales.   Musculoskeletal:      Cervical back: Neck supple.      Right lower leg: No edema.      Left lower leg: No edema.   Skin:     Coloration: Skin is not pale.      Findings: No rash.   Neurological:      Mental Status: He is alert and oriented to person, place, and time.      Cranial Nerves: No cranial nerve deficit.      Coordination: Coordination normal.      Gait: Gait is intact.   Psychiatric:         Mood and Affect: Affect normal.         Judgment: Judgment normal.        DATA REVIEW    Lab Results   Component Value Date/Time    CHOLSTRLTOT 142 02/15/2022 04:44 PM    LDL 77 02/15/2022 04:44 PM    HDL 39 (A) 02/15/2022 04:44 PM    TRIGLYCERIDE 131 02/15/2022 04:44 PM       Lab Results   Component Value Date/Time    SODIUM 138 01/03/2023 08:45 AM    POTASSIUM 4.7 01/03/2023 08:45 AM    CHLORIDE 104 01/03/2023 08:45 AM    CO2 23 01/03/2023 08:45 AM    GLUCOSE 120 (H) 01/03/2023 08:45 AM    BUN 34 (H) 01/03/2023 08:45 AM    CREATININE 2.47 (H) 01/03/2023 08:45 AM    CREATININE 1.4 02/06/2009 03:00 AM     Lab Results   Component Value Date/Time    ALKPHOSPHAT 88 01/03/2023 08:45 AM    ASTSGOT 10 (L) 01/03/2023 08:45 AM    ALTSGPT 11 01/03/2023 08:45 AM    TBILIRUBIN 0.5 01/03/2023 08:45 AM       Lab Results   Component Value Date/Time    HBA1C 6.6 (H) 01/03/2023 08:45 AM       Lab Results   Component Value Date/Time    MALBCRT 217 (H) 11/21/2022 11:06 AM    MICROALBUR 21.6 11/21/2022 11:06 AM       M PI February 2021   No evidence of significant jeopardized viable myocardium or prior myocardial    infarction.   Normal left ventricular  size, ejection fraction, and wall motion.   ECG INTERPRETATION   Negative stress ECG for ischemia.    Echocardiogram February 2021  Compared to the images of the prior study done 4/15/2015, Ascending   aortic dilatation is now present.  Technically difficult study - adequate information is obtained.   Contrast was used to enhance visualization of the endocardial border.  Left ventricular ejection fraction is visually estimated to be 65%.  Normal regional wall motion.  Normal left ventricular wall thickness.  Normal right ventricular size and systolic function.  Normal left atrial size.  Structurally normal mitral valve without significant stenosis or   regurgitation.  Mild aortic sclerosis without stenosis.  No aortic insufficiency.  Estimated right ventricular systolic pressure  is 32 mmHg.  Trace tricuspid regurgitation.  No pericardial effusion seen.  Ascending aorta is dilated with a diameter of 4.4 cm.    Renal artery duplex april 2021   SMA and celiac artery not visualized.    Proximal/mid aorta not well seen.   No evidence of abdominal aortic aneurysm in areas visualized.    No obvious KEERTHI   Multiple left renal cysts seen measuring approximately 0.9cm.    Echo feb 2022  The left ventricular ejection fraction is visually estimated to be 65%.  No significant valve abnormality.  Estimated right ventricular systolic pressure is 23 mmHg.  The ascending aorta is dilated with a diameter of 4.4 cm.  Compared to prior echo 2/27/21, no significant change, ascending aorta   similar size.    Echo feb 2023  Prior study on 02/24/22, compared to the report of the prior study, the measured ascending aorta diameter is less.    The ascending aorta is dilated with a diameter of  4.1 cm.  Normal left ventricular systolic function.   The left ventricular ejection fraction is visually estimated to be 60%.  Mild tricuspid regurgitation.  Estimated right ventricular systolic pressure is 25 mmHg.                 Medical Decision  Making:  Today's Assessment / Status / Plan:     1. Essential hypertension        2. CKD (chronic kidney disease) stage 4, GFR 15-29 ml/min ([facility])        3. Aneurysm of ascending aorta without rupture        4. Mixed hyperlipidemia  atorvastatin (LIPITOR) 10 MG Tab      5. Type 2 diabetes mellitus with stage 4 chronic kidney disease, without long-term current use of insulin ([facility])           Patient Type: Primary prevention    Etiology of Established CVD if Present: Ascending aortic aneurysm without previous intervention    Lipid Management: Qualifies for Statin Therapy Based on 2018 ACC/AHA Guidelines: yes  Calculated 10-Year Risk of ASCVD: N/A  Currently on Statin: Yes  Goal LDL less than 100 and non-HDL less than 130  Appears below threshold on previous blood work  Did not tolerate atorvastatin in the past, but may have been dose related and this would be the favored agent in CKD  Plan:  -Trial of changing simvastatin to atorvastatin 10 mg daily  -Stop and call if any myalgias  -Recheck fasting lipid panel as ordered by PCP    Blood Pressure Management:  Acc[place] (2017) Blood Pressure Goal <130/80  BP under excellent control both at home and in office with addition of kerendia -seems a bit overtreated given his office readings and modest lightheadedness  Leg swelling on higher dose of amlodipine - would not increase past 5 mg  CKD obviously barrier to control  H/o gout limits use of higher dose thiazides  No obvious evidence of KEERTHI on duplex  No bradycardia  Plan:  -Continue current dose of carvedilol  -Continue current dose of olmesartan 20 mg daily  -Trial of holding amlodipine  - continue kerendia  -Continue to follow blood pressure carefully at home and call if elevates  -Avoid interfering substances    Glycemic Status: Diabetic  Goal A1c at least less than 7.5  A1c under excellent control  Pharmacologic options limited by CKD  Plan:  -continue ozempic   -Continue Januvia, renally dosed  -Defer further management  to endocrinologist  -continue lifestyle mod  - recheck a1c per endo    Anti-Platelet/Anti-Coagulant Tx: yes  Hold aspirin given GI bleeding and no clear cardiovascular indication    Smoking: Continue complete avoidance    Physical Activity: Continue daily walking    Weight Management and Nutrition: Possible 15 pounds with Ozempic.  Is gained back a bit of weight.  We will aim for stabilization    Other:    1.  Ascending aortic aneurysm -fairly recently diagnosed.  Max diameter 4.4 cm on most recent imaging - stable.  Imaging options limited by CKD. Repeat echo 2 years from previous    2.  CKD, stage IV -most likely etiology is diabetes and hypertension although his early onset proteinuria does suggest that other etiologies may be in the differential.  No obvious evidence of KEERTHI on duplex. Avoid nsaids. BP control and ARB as above. Otherwise defer all further work-up management and surveillance to his nephrologist.     3.  Testosterone therapy -as we previously discussed, the cardiovascular effects of testosterone repletion in the eighth and ninth decade of life are unknown.  Over vigorous replacement of testosterone can worsen blood pressure control.  All that being said, I am not opposed to continuing testosterone therapy as long as he is aware of these potential risks and is being monitored carefully for side effects.  His levels seem a bit elevated for the clinical scenario currently, but we will defer all further work-up management and surveillance to urology    4. Gout - no recent recurrence. Avoid higher doses of thiazides. Otherwise defer management to pcp and nephrology    Instructed to follow-up with PCP for remainder of adult medical needs: yes  We will partner with other providers in the management of established vascular disease and cardiometabolic risk factors.\    Studies to Be Obtained: echo February 2025  Labs to Be Obtained: Per nephrology, PCP and endo     Follow up in: 6 months     Time: 30-39min  - chart review/prep, review of other providers' records, imaging/lab review, face-to-face time for history/examination, ordering, prescribing,  review of results/meds/ treatment plan with patient/family/caregiver, documentation in EMR, care coordination (as needed)     Michael J Bloch, M.D.     Cc:  Dr. Pretty Thompson

## 2023-03-13 DIAGNOSIS — E78.2 MIXED HYPERLIPIDEMIA: ICD-10-CM

## 2023-03-13 RX ORDER — ATORVASTATIN CALCIUM 10 MG/1
10 TABLET, FILM COATED ORAL NIGHTLY
Qty: 30 TABLET | Refills: 11 | Status: SHIPPED | OUTPATIENT
Start: 2023-03-13 | End: 2023-04-05 | Stop reason: SDUPTHER

## 2023-04-03 ENCOUNTER — HOSPITAL ENCOUNTER (OUTPATIENT)
Dept: LAB | Facility: MEDICAL CENTER | Age: 83
End: 2023-04-03
Attending: INTERNAL MEDICINE
Payer: MEDICARE

## 2023-04-03 ENCOUNTER — HOSPITAL ENCOUNTER (OUTPATIENT)
Dept: LAB | Facility: MEDICAL CENTER | Age: 83
End: 2023-04-03
Payer: MEDICARE

## 2023-04-03 DIAGNOSIS — N18.4 CKD (CHRONIC KIDNEY DISEASE), STAGE IV (HCC): ICD-10-CM

## 2023-04-03 DIAGNOSIS — R80.9 MICROALBUMINURIA DUE TO TYPE 2 DIABETES MELLITUS (HCC): ICD-10-CM

## 2023-04-03 DIAGNOSIS — D63.1 ANEMIA DUE TO STAGE 4 CHRONIC KIDNEY DISEASE (HCC): ICD-10-CM

## 2023-04-03 DIAGNOSIS — E11.29 MICROALBUMINURIA DUE TO TYPE 2 DIABETES MELLITUS (HCC): ICD-10-CM

## 2023-04-03 DIAGNOSIS — E55.9 VITAMIN D DEFICIENCY: ICD-10-CM

## 2023-04-03 DIAGNOSIS — N18.4 ANEMIA DUE TO STAGE 4 CHRONIC KIDNEY DISEASE (HCC): ICD-10-CM

## 2023-04-03 DIAGNOSIS — E78.2 MIXED HYPERLIPIDEMIA: ICD-10-CM

## 2023-04-03 LAB
25(OH)D3 SERPL-MCNC: 66 NG/ML (ref 30–100)
ALBUMIN SERPL BCP-MCNC: 4.1 G/DL (ref 3.2–4.9)
ALBUMIN/GLOB SERPL: 1.6 G/DL
ALP SERPL-CCNC: 93 U/L (ref 30–99)
ALT SERPL-CCNC: 11 U/L (ref 2–50)
ANION GAP SERPL CALC-SCNC: 11 MMOL/L (ref 7–16)
ANION GAP SERPL CALC-SCNC: 11 MMOL/L (ref 7–16)
AST SERPL-CCNC: 11 U/L (ref 12–45)
BILIRUB SERPL-MCNC: 0.5 MG/DL (ref 0.1–1.5)
BUN SERPL-MCNC: 32 MG/DL (ref 8–22)
BUN SERPL-MCNC: 33 MG/DL (ref 8–22)
CALCIUM ALBUM COR SERPL-MCNC: 9.7 MG/DL (ref 8.5–10.5)
CALCIUM SERPL-MCNC: 9.8 MG/DL (ref 8.5–10.5)
CALCIUM SERPL-MCNC: 9.8 MG/DL (ref 8.5–10.5)
CHLORIDE SERPL-SCNC: 103 MMOL/L (ref 96–112)
CHLORIDE SERPL-SCNC: 103 MMOL/L (ref 96–112)
CHOLEST SERPL-MCNC: 151 MG/DL (ref 100–199)
CHOLEST SERPL-MCNC: 152 MG/DL (ref 100–199)
CO2 SERPL-SCNC: 24 MMOL/L (ref 20–33)
CO2 SERPL-SCNC: 24 MMOL/L (ref 20–33)
CREAT SERPL-MCNC: 2.25 MG/DL (ref 0.5–1.4)
CREAT SERPL-MCNC: 2.27 MG/DL (ref 0.5–1.4)
ERYTHROCYTE [DISTWIDTH] IN BLOOD BY AUTOMATED COUNT: 48.1 FL (ref 35.9–50)
FASTING STATUS PATIENT QL REPORTED: NORMAL
FASTING STATUS PATIENT QL REPORTED: NORMAL
GFR SERPLBLD CREATININE-BSD FMLA CKD-EPI: 28 ML/MIN/1.73 M 2
GFR SERPLBLD CREATININE-BSD FMLA CKD-EPI: 28 ML/MIN/1.73 M 2
GLOBULIN SER CALC-MCNC: 2.6 G/DL (ref 1.9–3.5)
GLUCOSE SERPL-MCNC: 110 MG/DL (ref 65–99)
GLUCOSE SERPL-MCNC: 110 MG/DL (ref 65–99)
HCT VFR BLD AUTO: 48.6 % (ref 42–52)
HDLC SERPL-MCNC: 35 MG/DL
HDLC SERPL-MCNC: 35 MG/DL
HGB BLD-MCNC: 16.1 G/DL (ref 14–18)
LDLC SERPL CALC-MCNC: 79 MG/DL
LDLC SERPL CALC-MCNC: 80 MG/DL
MCH RBC QN AUTO: 30 PG (ref 27–33)
MCHC RBC AUTO-ENTMCNC: 33.1 G/DL (ref 33.7–35.3)
MCV RBC AUTO: 90.5 FL (ref 81.4–97.8)
PLATELET # BLD AUTO: 207 K/UL (ref 164–446)
PMV BLD AUTO: 11.3 FL (ref 9–12.9)
POTASSIUM SERPL-SCNC: 4.5 MMOL/L (ref 3.6–5.5)
POTASSIUM SERPL-SCNC: 4.5 MMOL/L (ref 3.6–5.5)
PROT SERPL-MCNC: 6.7 G/DL (ref 6–8.2)
PTH-INTACT SERPL-MCNC: 116 PG/ML (ref 14–72)
RBC # BLD AUTO: 5.37 M/UL (ref 4.7–6.1)
SODIUM SERPL-SCNC: 138 MMOL/L (ref 135–145)
SODIUM SERPL-SCNC: 138 MMOL/L (ref 135–145)
TRIGL SERPL-MCNC: 184 MG/DL (ref 0–149)
TRIGL SERPL-MCNC: 187 MG/DL (ref 0–149)
URATE SERPL-MCNC: 5.6 MG/DL (ref 2.5–8.3)
WBC # BLD AUTO: 7.4 K/UL (ref 4.8–10.8)

## 2023-04-03 PROCEDURE — 82043 UR ALBUMIN QUANTITATIVE: CPT

## 2023-04-03 PROCEDURE — 80061 LIPID PANEL: CPT

## 2023-04-03 PROCEDURE — 83970 ASSAY OF PARATHORMONE: CPT

## 2023-04-03 PROCEDURE — 82570 ASSAY OF URINE CREATININE: CPT | Mod: 91

## 2023-04-03 PROCEDURE — 80053 COMPREHEN METABOLIC PANEL: CPT

## 2023-04-03 PROCEDURE — 84550 ASSAY OF BLOOD/URIC ACID: CPT

## 2023-04-03 PROCEDURE — 36415 COLL VENOUS BLD VENIPUNCTURE: CPT

## 2023-04-03 PROCEDURE — 80061 LIPID PANEL: CPT | Mod: 91

## 2023-04-03 PROCEDURE — 85027 COMPLETE CBC AUTOMATED: CPT

## 2023-04-03 PROCEDURE — 82306 VITAMIN D 25 HYDROXY: CPT

## 2023-04-03 PROCEDURE — 82570 ASSAY OF URINE CREATININE: CPT

## 2023-04-03 PROCEDURE — 80048 BASIC METABOLIC PNL TOTAL CA: CPT

## 2023-04-03 PROCEDURE — 82043 UR ALBUMIN QUANTITATIVE: CPT | Mod: 91

## 2023-04-04 LAB
CREAT UR-MCNC: 94.9 MG/DL
CREAT UR-MCNC: 97.83 MG/DL
MICROALBUMIN UR-MCNC: 101.8 MG/DL
MICROALBUMIN UR-MCNC: 104.5 MG/DL
MICROALBUMIN/CREAT UR: 1068 MG/G (ref 0–30)
MICROALBUMIN/CREAT UR: 1073 MG/G (ref 0–30)

## 2023-04-07 PROBLEM — N18.9 CHRONIC KIDNEY DISEASE (CKD): Chronic | Status: RESOLVED | Noted: 2021-02-27 | Resolved: 2023-04-07

## 2023-04-07 PROBLEM — E11.42 DIABETIC PERIPHERAL NEUROPATHY (HCC): Chronic | Status: ACTIVE | Noted: 2023-04-07

## 2023-04-07 PROBLEM — K92.2 GI BLEED: Status: RESOLVED | Noted: 2021-12-08 | Resolved: 2023-04-07

## 2023-04-07 PROBLEM — N18.9 CHRONIC KIDNEY DISEASE (CKD): Chronic | Status: ACTIVE | Noted: 2021-02-27

## 2023-04-07 PROBLEM — E11.42 DIABETIC PERIPHERAL NEUROPATHY (HCC): Status: ACTIVE | Noted: 2023-04-07

## 2023-04-07 PROBLEM — E78.00 HYPERCHOLESTEROLEMIA: Chronic | Status: ACTIVE | Noted: 2022-02-28

## 2023-04-07 PROBLEM — N25.81 SECONDARY HYPERPARATHYROIDISM (HCC): Status: ACTIVE | Noted: 2023-04-07

## 2023-04-07 PROBLEM — I77.819 ACQUIRED DILATION OF ASCENDING AORTA AND AORTIC ROOT (HCC): Chronic | Status: ACTIVE | Noted: 2021-03-29

## 2023-04-12 ENCOUNTER — OFFICE VISIT (OUTPATIENT)
Dept: NEPHROLOGY | Facility: MEDICAL CENTER | Age: 83
End: 2023-04-12
Payer: MEDICARE

## 2023-04-12 VITALS
BODY MASS INDEX: 27.72 KG/M2 | SYSTOLIC BLOOD PRESSURE: 110 MMHG | TEMPERATURE: 97.4 F | OXYGEN SATURATION: 96 % | HEART RATE: 85 BPM | HEIGHT: 71 IN | WEIGHT: 198 LBS | DIASTOLIC BLOOD PRESSURE: 70 MMHG

## 2023-04-12 DIAGNOSIS — E55.9 VITAMIN D DEFICIENCY: ICD-10-CM

## 2023-04-12 DIAGNOSIS — I10 ESSENTIAL HYPERTENSION: ICD-10-CM

## 2023-04-12 DIAGNOSIS — R80.9 MICROALBUMINURIA DUE TO TYPE 2 DIABETES MELLITUS (HCC): ICD-10-CM

## 2023-04-12 DIAGNOSIS — M10.9 CONTROLLED GOUT: ICD-10-CM

## 2023-04-12 DIAGNOSIS — E11.29 MICROALBUMINURIA DUE TO TYPE 2 DIABETES MELLITUS (HCC): ICD-10-CM

## 2023-04-12 DIAGNOSIS — N25.81 HYPERPARATHYROIDISM DUE TO RENAL INSUFFICIENCY (HCC): ICD-10-CM

## 2023-04-12 DIAGNOSIS — D63.1 ANEMIA DUE TO STAGE 4 CHRONIC KIDNEY DISEASE (HCC): ICD-10-CM

## 2023-04-12 DIAGNOSIS — N18.4 CKD (CHRONIC KIDNEY DISEASE), STAGE IV (HCC): ICD-10-CM

## 2023-04-12 DIAGNOSIS — N18.4 ANEMIA DUE TO STAGE 4 CHRONIC KIDNEY DISEASE (HCC): ICD-10-CM

## 2023-04-12 PROCEDURE — 99214 OFFICE O/P EST MOD 30 MIN: CPT | Performed by: INTERNAL MEDICINE

## 2023-04-12 ASSESSMENT — ENCOUNTER SYMPTOMS
COUGH: 0
SINUS PAIN: 0
FEVER: 0
WEIGHT LOSS: 0
HEMOPTYSIS: 0
NAUSEA: 0
CHILLS: 0
WHEEZING: 0
VOMITING: 0
FLANK PAIN: 1
ABDOMINAL PAIN: 0
EYES NEGATIVE: 1
PALPITATIONS: 0
ORTHOPNEA: 0
SHORTNESS OF BREATH: 0

## 2023-04-12 ASSESSMENT — FIBROSIS 4 INDEX: FIB4 SCORE: 1.3

## 2023-04-12 NOTE — PROGRESS NOTES
Subjective:      Tyrone Cline is a 82 y.o. male who presents with Follow-Up and Chronic Kidney Disease            Chronic Kidney Disease  Pertinent negatives include no abdominal pain, chest pain, chills, congestion, coughing, fever, nausea or vomiting.   Tyrone is coming today for f/u of CKD IV  Doing well, no complaints except LE neuropathy -on Gabapentin  BPH LUTS - improved -s/p TURP f/u with Urology  CKD IV  - baseline creat level at 2.2-2.6 - stable  HTN: BP monitored at home - very well controlled -compliant to low salt diet and medications  Anemia: Hb level stable WNL  Hx/of gout involving left big toe - no recent attacks   Elevated uric acid level well controlled with Allopurinol  Albuminuria - controlled with ARB    Review of Systems   Constitutional:  Negative for chills, fever, malaise/fatigue and weight loss.   HENT:  Negative for congestion, hearing loss and sinus pain.    Eyes: Negative.    Respiratory:  Negative for cough, hemoptysis, shortness of breath and wheezing.    Cardiovascular:  Negative for chest pain, palpitations, orthopnea and leg swelling.   Gastrointestinal:  Negative for abdominal pain, nausea and vomiting.   Genitourinary:  Positive for flank pain. Negative for dysuria, frequency, hematuria and urgency.   Skin: Negative.    All other systems reviewed and are negative.      Past Medical History:   Diagnosis Date    Abdominal pain     Abnormal electrocardiogram     ACE inhibitor intolerance     BPH (benign prostatic hyperplasia)     Bruxism     Cancer (HCC)     basal and squamous cell to face    Cataract     robbi IOL    Chickenpox     Cholesterol blood decreased     Chronic kidney disease (CKD) 2/27/2021    Chronic kidney disease, stage III (moderate) (HCC)     Cold     Congestion of both ears 2/27/2021    Cough     Depression     Dermatochalasis of eyelid 7/23/2014    Diabetes     oral meds    Difficulty breathing     Fever     GERD (gastroesophageal reflux disease)     GI  "bleed 2021    GOUT     Gout     History of skull fracture     History of urinary tract infection     Hyperlipidemia     Hypertension     Indigestion     Influenza     Insomnia     Mixed hyperlipidemia     Orthopnea     IMO load 2020    Pneumonia 2015    Proteinuria     Purulent nasal discharge 2015    Ringing in ears     Shortness of breath     Sputum production     Tonsillitis     Type 2 diabetes mellitus (HCC)     Wears glasses     Weight loss        Family History   Problem Relation Age of Onset    Diabetes Father     Heart Attack Father 79    Cancer Brother        Social History     Socioeconomic History    Marital status:     Highest education level: Bachelor's degree (e.g., BA, AB, BS)   Tobacco Use    Smoking status: Former     Packs/day: 0.20     Years: 6.00     Pack years: 1.20     Types: Cigarettes     Quit date: 1965     Years since quittin.3    Smokeless tobacco: Never    Tobacco comments:     Stopped over 25 years ago.   Vaping Use    Vaping Use: Never used   Substance and Sexual Activity    Alcohol use: No    Drug use: No   Social History Narrative    Retired CPA.          Objective:     /70 (BP Location: Right arm, Patient Position: Sitting)   Pulse 85   Temp 36.3 °C (97.4 °F)   Ht 1.803 m (5' 11\")   Wt 89.8 kg (198 lb)   SpO2 96%   BMI 27.62 kg/m²      Physical Exam  Vitals reviewed.   Constitutional:       General: He is not in acute distress.     Appearance: Normal appearance. He is well-developed. He is not diaphoretic.   HENT:      Head: Normocephalic and atraumatic.      Nose: Nose normal.      Mouth/Throat:      Mouth: Mucous membranes are moist.      Pharynx: Oropharynx is clear.   Eyes:      Extraocular Movements: Extraocular movements intact.      Conjunctiva/sclera: Conjunctivae normal.      Pupils: Pupils are equal, round, and reactive to light.   Cardiovascular:      Rate and Rhythm: Normal rate and regular rhythm.      Pulses: Normal pulses.      " Heart sounds: Normal heart sounds.   Pulmonary:      Effort: Pulmonary effort is normal. No respiratory distress.      Breath sounds: Normal breath sounds. No wheezing or rales.   Abdominal:      General: Bowel sounds are normal. There is no distension.      Palpations: Abdomen is soft. There is no mass.      Tenderness: There is no abdominal tenderness. There is no right CVA tenderness or left CVA tenderness.   Musculoskeletal:      Cervical back: Normal range of motion and neck supple.      Right lower leg: No edema.      Left lower leg: No edema.   Skin:     General: Skin is warm.      Coloration: Skin is not pale.      Findings: No erythema or rash.   Neurological:      General: No focal deficit present.      Mental Status: He is alert and oriented to person, place, and time.      Cranial Nerves: No cranial nerve deficit.      Coordination: Coordination normal.   Psychiatric:         Mood and Affect: Mood normal.         Behavior: Behavior normal.         Thought Content: Thought content normal.         Judgment: Judgment normal.   Laboratory results reviewed: d/w Pt  Lab Results   Component Value Date/Time    CREATININE 2.25 (H) 04/03/2023 04:24 PM    CREATININE 1.4 02/06/2009 03:00 AM    POTASSIUM 4.5 04/03/2023 04:24 PM                 Assessment/Plan:       1. CKD (chronic kidney disease), stage IV ([facility])      Creat level stable at baseline -to monitor closely      Keep well hydrated          2. Essential hypertension       BP - very well controlled  -to monitor at home       3. Microalbuminuria due to type 2 diabetes mellitus (HCC)      Very well controlled with ARB    4. Controlled gout      No recent gout attacks -on allopurinol      Uric acid very well controlled    5. Vitamin D deficiency      vit D and PTH well controlled - to monitor    6. Anemia due to stage 4 chronic kidney disease (HCC)      Hb stable -WNL        Recs;  Continue current treatment  Low salt diet  Monitor BP  Keep well hydrated  F/u  in 3-4 months

## 2023-04-21 ENCOUNTER — APPOINTMENT (OUTPATIENT)
Dept: RADIOLOGY | Facility: MEDICAL CENTER | Age: 83
DRG: 853 | End: 2023-04-21
Attending: EMERGENCY MEDICINE
Payer: MEDICARE

## 2023-04-21 ENCOUNTER — HOSPITAL ENCOUNTER (INPATIENT)
Facility: MEDICAL CENTER | Age: 83
LOS: 40 days | DRG: 853 | End: 2023-05-31
Attending: EMERGENCY MEDICINE | Admitting: HOSPITALIST
Payer: MEDICARE

## 2023-04-21 DIAGNOSIS — K57.92 DIVERTICULITIS: ICD-10-CM

## 2023-04-21 DIAGNOSIS — R74.01 ELEVATION OF LEVEL OF TRANSAMINASE AND LACTIC ACID DEHYDROGENASE (LDH): ICD-10-CM

## 2023-04-21 DIAGNOSIS — I95.81 POSTPROCEDURAL HYPOTENSION: ICD-10-CM

## 2023-04-21 DIAGNOSIS — R74.02 ELEVATION OF LEVEL OF TRANSAMINASE AND LACTIC ACID DEHYDROGENASE (LDH): ICD-10-CM

## 2023-04-21 DIAGNOSIS — R10.9 ABDOMINAL PAIN, UNSPECIFIED ABDOMINAL LOCATION: ICD-10-CM

## 2023-04-21 DIAGNOSIS — R29.898 WEAKNESS OF BOTH LOWER EXTREMITIES: ICD-10-CM

## 2023-04-21 DIAGNOSIS — D72.829 LEUKOCYTOSIS, UNSPECIFIED TYPE: ICD-10-CM

## 2023-04-21 DIAGNOSIS — K80.00 CALCULUS OF GALLBLADDER WITH ACUTE CHOLECYSTITIS WITHOUT OBSTRUCTION: ICD-10-CM

## 2023-04-21 DIAGNOSIS — K52.9 COLITIS: ICD-10-CM

## 2023-04-21 DIAGNOSIS — Z99.11 ON MECHANICALLY ASSISTED VENTILATION (HCC): ICD-10-CM

## 2023-04-21 DIAGNOSIS — R29.898 WEAKNESS OF RIGHT LOWER EXTREMITY: ICD-10-CM

## 2023-04-21 PROBLEM — E87.1 HYPONATREMIA: Status: ACTIVE | Noted: 2023-04-21

## 2023-04-21 PROBLEM — A41.9 SEPSIS (HCC): Status: ACTIVE | Noted: 2023-04-21

## 2023-04-21 PROBLEM — N17.9 ACUTE KIDNEY FAILURE (HCC): Status: ACTIVE | Noted: 2023-04-21

## 2023-04-21 LAB
ALBUMIN SERPL BCP-MCNC: 3 G/DL (ref 3.2–4.9)
ALBUMIN/GLOB SERPL: 0.9 G/DL
ALP SERPL-CCNC: 122 U/L (ref 30–99)
ALT SERPL-CCNC: 27 U/L (ref 2–50)
ANION GAP SERPL CALC-SCNC: 18 MMOL/L (ref 7–16)
AST SERPL-CCNC: 41 U/L (ref 12–45)
BASOPHILS # BLD AUTO: 0.2 % (ref 0–1.8)
BASOPHILS # BLD: 0.04 K/UL (ref 0–0.12)
BILIRUB SERPL-MCNC: 0.9 MG/DL (ref 0.1–1.5)
BUN SERPL-MCNC: 80 MG/DL (ref 8–22)
CALCIUM ALBUM COR SERPL-MCNC: 10.3 MG/DL (ref 8.5–10.5)
CALCIUM SERPL-MCNC: 9.5 MG/DL (ref 8.5–10.5)
CHLORIDE SERPL-SCNC: 92 MMOL/L (ref 96–112)
CK SERPL-CCNC: 745 U/L (ref 0–154)
CO2 SERPL-SCNC: 20 MMOL/L (ref 20–33)
CREAT SERPL-MCNC: 5.07 MG/DL (ref 0.5–1.4)
EKG IMPRESSION: NORMAL
EOSINOPHIL # BLD AUTO: 0 K/UL (ref 0–0.51)
EOSINOPHIL NFR BLD: 0 % (ref 0–6.9)
ERYTHROCYTE [DISTWIDTH] IN BLOOD BY AUTOMATED COUNT: 47.7 FL (ref 35.9–50)
GFR SERPLBLD CREATININE-BSD FMLA CKD-EPI: 11 ML/MIN/1.73 M 2
GLOBULIN SER CALC-MCNC: 3.3 G/DL (ref 1.9–3.5)
GLUCOSE BLD STRIP.AUTO-MCNC: 134 MG/DL (ref 65–99)
GLUCOSE BLD STRIP.AUTO-MCNC: 187 MG/DL (ref 65–99)
GLUCOSE SERPL-MCNC: 160 MG/DL (ref 65–99)
HCT VFR BLD AUTO: 44 % (ref 42–52)
HGB BLD-MCNC: 15.3 G/DL (ref 14–18)
IMM GRANULOCYTES # BLD AUTO: 0.25 K/UL (ref 0–0.11)
IMM GRANULOCYTES NFR BLD AUTO: 1.3 % (ref 0–0.9)
LACTATE SERPL-SCNC: 1.1 MMOL/L (ref 0.5–2)
LACTATE SERPL-SCNC: 1.2 MMOL/L (ref 0.5–2)
LIPASE SERPL-CCNC: 21 U/L (ref 11–82)
LYMPHOCYTES # BLD AUTO: 0.62 K/UL (ref 1–4.8)
LYMPHOCYTES NFR BLD: 3.2 % (ref 22–41)
MCH RBC QN AUTO: 29.9 PG (ref 27–33)
MCHC RBC AUTO-ENTMCNC: 34.8 G/DL (ref 33.7–35.3)
MCV RBC AUTO: 85.9 FL (ref 81.4–97.8)
MONOCYTES # BLD AUTO: 1.55 K/UL (ref 0–0.85)
MONOCYTES NFR BLD AUTO: 8 % (ref 0–13.4)
NEUTROPHILS # BLD AUTO: 16.96 K/UL (ref 1.82–7.42)
NEUTROPHILS NFR BLD: 87.3 % (ref 44–72)
NRBC # BLD AUTO: 0 K/UL
NRBC BLD-RTO: 0 /100 WBC
PLATELET # BLD AUTO: 147 K/UL (ref 164–446)
PMV BLD AUTO: 12.3 FL (ref 9–12.9)
POTASSIUM SERPL-SCNC: 3.8 MMOL/L (ref 3.6–5.5)
PROCALCITONIN SERPL-MCNC: 79.3 NG/ML
PROT SERPL-MCNC: 6.3 G/DL (ref 6–8.2)
RBC # BLD AUTO: 5.12 M/UL (ref 4.7–6.1)
SODIUM SERPL-SCNC: 130 MMOL/L (ref 135–145)
TROPONIN T SERPL-MCNC: 53 NG/L (ref 6–19)
WBC # BLD AUTO: 19.4 K/UL (ref 4.8–10.8)

## 2023-04-21 PROCEDURE — 82962 GLUCOSE BLOOD TEST: CPT | Mod: 91

## 2023-04-21 PROCEDURE — 71045 X-RAY EXAM CHEST 1 VIEW: CPT

## 2023-04-21 PROCEDURE — 87040 BLOOD CULTURE FOR BACTERIA: CPT

## 2023-04-21 PROCEDURE — 700102 HCHG RX REV CODE 250 W/ 637 OVERRIDE(OP): Performed by: HOSPITALIST

## 2023-04-21 PROCEDURE — 70450 CT HEAD/BRAIN W/O DYE: CPT

## 2023-04-21 PROCEDURE — 85025 COMPLETE CBC W/AUTO DIFF WBC: CPT

## 2023-04-21 PROCEDURE — 99223 1ST HOSP IP/OBS HIGH 75: CPT | Mod: AI | Performed by: HOSPITALIST

## 2023-04-21 PROCEDURE — 700101 HCHG RX REV CODE 250: Performed by: EMERGENCY MEDICINE

## 2023-04-21 PROCEDURE — 700111 HCHG RX REV CODE 636 W/ 250 OVERRIDE (IP): Performed by: EMERGENCY MEDICINE

## 2023-04-21 PROCEDURE — A9270 NON-COVERED ITEM OR SERVICE: HCPCS | Performed by: HOSPITALIST

## 2023-04-21 PROCEDURE — 83690 ASSAY OF LIPASE: CPT

## 2023-04-21 PROCEDURE — 96365 THER/PROPH/DIAG IV INF INIT: CPT

## 2023-04-21 PROCEDURE — 36415 COLL VENOUS BLD VENIPUNCTURE: CPT

## 2023-04-21 PROCEDURE — 99285 EMERGENCY DEPT VISIT HI MDM: CPT

## 2023-04-21 PROCEDURE — 96375 TX/PRO/DX INJ NEW DRUG ADDON: CPT

## 2023-04-21 PROCEDURE — 770006 HCHG ROOM/CARE - MED/SURG/GYN SEMI*

## 2023-04-21 PROCEDURE — 83605 ASSAY OF LACTIC ACID: CPT

## 2023-04-21 PROCEDURE — 84145 PROCALCITONIN (PCT): CPT

## 2023-04-21 PROCEDURE — 93005 ELECTROCARDIOGRAM TRACING: CPT | Performed by: EMERGENCY MEDICINE

## 2023-04-21 PROCEDURE — 700111 HCHG RX REV CODE 636 W/ 250 OVERRIDE (IP)

## 2023-04-21 PROCEDURE — 82550 ASSAY OF CK (CPK): CPT

## 2023-04-21 PROCEDURE — 700105 HCHG RX REV CODE 258: Performed by: EMERGENCY MEDICINE

## 2023-04-21 PROCEDURE — 770001 HCHG ROOM/CARE - MED/SURG/GYN PRIV*

## 2023-04-21 PROCEDURE — 700105 HCHG RX REV CODE 258: Performed by: HOSPITALIST

## 2023-04-21 PROCEDURE — 99222 1ST HOSP IP/OBS MODERATE 55: CPT | Performed by: PSYCHIATRY & NEUROLOGY

## 2023-04-21 PROCEDURE — 84484 ASSAY OF TROPONIN QUANT: CPT

## 2023-04-21 PROCEDURE — 74176 CT ABD & PELVIS W/O CONTRAST: CPT

## 2023-04-21 PROCEDURE — 80053 COMPREHEN METABOLIC PANEL: CPT

## 2023-04-21 RX ORDER — DULOXETIN HYDROCHLORIDE 30 MG/1
30 CAPSULE, DELAYED RELEASE ORAL EVERY EVENING
Status: DISCONTINUED | OUTPATIENT
Start: 2023-04-21 | End: 2023-05-31 | Stop reason: HOSPADM

## 2023-04-21 RX ORDER — POLYETHYLENE GLYCOL 3350 17 G/17G
1 POWDER, FOR SOLUTION ORAL
Status: DISCONTINUED | OUTPATIENT
Start: 2023-04-21 | End: 2023-04-24

## 2023-04-21 RX ORDER — SODIUM CHLORIDE, SODIUM LACTATE, POTASSIUM CHLORIDE, AND CALCIUM CHLORIDE .6; .31; .03; .02 G/100ML; G/100ML; G/100ML; G/100ML
1000 INJECTION, SOLUTION INTRAVENOUS
Status: DISCONTINUED | OUTPATIENT
Start: 2023-04-21 | End: 2023-04-29

## 2023-04-21 RX ORDER — ATORVASTATIN CALCIUM 10 MG/1
10 TABLET, FILM COATED ORAL DAILY
Status: ON HOLD | COMMUNITY
End: 2023-05-31

## 2023-04-21 RX ORDER — ACETAMINOPHEN 500 MG
1000 TABLET ORAL 2 TIMES DAILY PRN
Status: ON HOLD | COMMUNITY
End: 2023-05-31

## 2023-04-21 RX ORDER — OXYCODONE HYDROCHLORIDE 10 MG/1
10 TABLET ORAL
Status: DISCONTINUED | OUTPATIENT
Start: 2023-04-21 | End: 2023-04-28

## 2023-04-21 RX ORDER — SODIUM CHLORIDE 9 MG/ML
1000 INJECTION, SOLUTION INTRAVENOUS ONCE
Status: COMPLETED | OUTPATIENT
Start: 2023-04-21 | End: 2023-04-21

## 2023-04-21 RX ORDER — CEFTRIAXONE 1 G/1
1000 INJECTION, POWDER, FOR SOLUTION INTRAMUSCULAR; INTRAVENOUS ONCE
Status: COMPLETED | OUTPATIENT
Start: 2023-04-21 | End: 2023-04-21

## 2023-04-21 RX ORDER — HYDROMORPHONE HYDROCHLORIDE 1 MG/ML
0.5 INJECTION, SOLUTION INTRAMUSCULAR; INTRAVENOUS; SUBCUTANEOUS
Status: DISCONTINUED | OUTPATIENT
Start: 2023-04-21 | End: 2023-04-28

## 2023-04-21 RX ORDER — FAMOTIDINE 20 MG/1
20 TABLET, FILM COATED ORAL NIGHTLY
Status: ON HOLD | COMMUNITY
End: 2023-05-31

## 2023-04-21 RX ORDER — AMOXICILLIN 250 MG
2 CAPSULE ORAL 2 TIMES DAILY
Status: DISCONTINUED | OUTPATIENT
Start: 2023-04-21 | End: 2023-04-24

## 2023-04-21 RX ORDER — METRONIDAZOLE 500 MG/100ML
500 INJECTION, SOLUTION INTRAVENOUS ONCE
Status: COMPLETED | OUTPATIENT
Start: 2023-04-21 | End: 2023-04-21

## 2023-04-21 RX ORDER — ACETAMINOPHEN 325 MG/1
650 TABLET ORAL EVERY 6 HOURS PRN
Status: DISCONTINUED | OUTPATIENT
Start: 2023-04-21 | End: 2023-05-31 | Stop reason: HOSPADM

## 2023-04-21 RX ORDER — OLMESARTAN MEDOXOMIL 20 MG/1
20 TABLET ORAL DAILY
Status: ON HOLD | COMMUNITY
End: 2023-05-31

## 2023-04-21 RX ORDER — SODIUM CHLORIDE, SODIUM LACTATE, POTASSIUM CHLORIDE, CALCIUM CHLORIDE 600; 310; 30; 20 MG/100ML; MG/100ML; MG/100ML; MG/100ML
INJECTION, SOLUTION INTRAVENOUS CONTINUOUS
Status: DISCONTINUED | OUTPATIENT
Start: 2023-04-21 | End: 2023-04-27

## 2023-04-21 RX ORDER — OXYCODONE HYDROCHLORIDE 5 MG/1
5 TABLET ORAL
Status: DISCONTINUED | OUTPATIENT
Start: 2023-04-21 | End: 2023-04-28

## 2023-04-21 RX ORDER — METRONIDAZOLE 500 MG/1
500 TABLET ORAL EVERY 8 HOURS
Status: DISCONTINUED | OUTPATIENT
Start: 2023-04-21 | End: 2023-04-25

## 2023-04-21 RX ORDER — SODIUM CHLORIDE, SODIUM LACTATE, POTASSIUM CHLORIDE, AND CALCIUM CHLORIDE .6; .31; .03; .02 G/100ML; G/100ML; G/100ML; G/100ML
500 INJECTION, SOLUTION INTRAVENOUS
Status: DISCONTINUED | OUTPATIENT
Start: 2023-04-21 | End: 2023-04-29

## 2023-04-21 RX ORDER — BISACODYL 10 MG
10 SUPPOSITORY, RECTAL RECTAL
Status: DISCONTINUED | OUTPATIENT
Start: 2023-04-21 | End: 2023-04-24

## 2023-04-21 RX ADMIN — SODIUM CHLORIDE, POTASSIUM CHLORIDE, SODIUM LACTATE AND CALCIUM CHLORIDE: 600; 310; 30; 20 INJECTION, SOLUTION INTRAVENOUS at 17:29

## 2023-04-21 RX ADMIN — METRONIDAZOLE 500 MG: 500 TABLET ORAL at 21:07

## 2023-04-21 RX ADMIN — DULOXETINE HYDROCHLORIDE 30 MG: 30 CAPSULE, DELAYED RELEASE ORAL at 18:37

## 2023-04-21 RX ADMIN — HYDROMORPHONE HYDROCHLORIDE 0.5 MG: 1 INJECTION, SOLUTION INTRAMUSCULAR; INTRAVENOUS; SUBCUTANEOUS at 23:58

## 2023-04-21 RX ADMIN — METRONIDAZOLE 500 MG: 5 INJECTION, SOLUTION INTRAVENOUS at 15:17

## 2023-04-21 RX ADMIN — INSULIN HUMAN 2 UNITS: 100 INJECTION, SOLUTION PARENTERAL at 21:04

## 2023-04-21 RX ADMIN — SODIUM CHLORIDE 1000 ML: 9 INJECTION, SOLUTION INTRAVENOUS at 13:42

## 2023-04-21 RX ADMIN — CEFTRIAXONE SODIUM 1000 MG: 1 INJECTION, POWDER, FOR SOLUTION INTRAMUSCULAR; INTRAVENOUS at 15:17

## 2023-04-21 RX ADMIN — DOCUSATE SODIUM 50 MG AND SENNOSIDES 8.6 MG 2 TABLET: 8.6; 5 TABLET, FILM COATED ORAL at 18:37

## 2023-04-21 ASSESSMENT — ENCOUNTER SYMPTOMS
SHORTNESS OF BREATH: 0
VOMITING: 0
BACK PAIN: 0
DIARRHEA: 0
FOCAL WEAKNESS: 1

## 2023-04-21 ASSESSMENT — FIBROSIS 4 INDEX: FIB4 SCORE: 1.3

## 2023-04-21 ASSESSMENT — PAIN DESCRIPTION - PAIN TYPE: TYPE: ACUTE PAIN

## 2023-04-21 ASSESSMENT — PATIENT HEALTH QUESTIONNAIRE - PHQ9
SUM OF ALL RESPONSES TO PHQ9 QUESTIONS 1 AND 2: 0
1. LITTLE INTEREST OR PLEASURE IN DOING THINGS: NOT AT ALL
2. FEELING DOWN, DEPRESSED, IRRITABLE, OR HOPELESS: NOT AT ALL

## 2023-04-21 NOTE — ED NOTES
Lab called with critical result of procalcitonin of 79.3 at 0428.   Dr. Perez notified of critical lab result at 0430 thru [facility].

## 2023-04-21 NOTE — ED NOTES
Med rec completed per patient at bedside.  Allergies reviewed with patient.  No outpatient antibiotics within the last 30 days.  Patient's preferred pharmacy: [facility] for long-term medications, [facility] on [address] for short-term.    Patient states that his last dose of Ozempic was about 10 days ago and that he is overdue for this medication.    Patient states that he stopped using his atorvastatin about 5 days ago as he felt this medication was causing him to experience muscle pain and weakness in his legs.    Patient reports using 6 pumps of testosterone 1.62% gel (121.5 mg of testosterone) DAILY.

## 2023-04-21 NOTE — ED NOTES
Transported to [facility] by transport staff. On room air, Aox4. Patient care transferred.

## 2023-04-21 NOTE — CONSULTS
Neurology Consultation        Referring Physician: Dr. Ronaldo Perez    Referral Reason: Leg weakness    HPI: Mr. Cline is an 82-year-old man who reports eating some bad food on Sunday, April 16.  Shortly after he developed significant abdominal pain, cramping and diarrhea.  He also felt like his legs became weak which have progressed to the point that he is having difficulty ambulating as his knees will buckle.  He denies any new numbness or tingling.  He does have a history of peripheral neuropathy due to underlying diabetes.  He feels like his arms are weak as well as he is having trouble pulling himself up from lying down.  He has a dull ache throughout his legs but denies any neuropathic type pain symptoms.  He has not noticed any difficulty controlling his bowel or bladder.  He denies any vision changes or difficulty with speech or swallowing.  He also mentions that he has a concern this was caused by Lipitor he was started on about a month ago.  He has not taken it for 5 days.  He reports he has had a bad reaction to Lipitor in the past but his cardiologist convinced him to restart it.    Past Medical History:  Active Ambulatory Problems     Diagnosis Date Noted    BPH (benign prostatic hyperplasia)     Abnormal electrocardiogram     Chronic kidney disease (CKD), stage IV (severe) ([facility])     ACE inhibitor intolerance     Hypercholesterolemia 02/28/2022    Type 2 diabetes mellitus with hyperglycemia ([facility])     Proteinuria     History of skull fracture     LVH (left ventricular hypertrophy) 02/25/2013    Gout 06/06/2012    Essential hypertension 09/20/2019    Hypertensive emergency 02/27/2021    Acquired dilation of ascending aorta and aortic root (HCC) 03/29/2021    Diverticulitis 06/06/2012    Insomnia 02/28/2022    BMI 28.0-28.9,adult 02/28/2023    Altered bowel habits 03/01/2023    Constipation 02/25/2022    Diverticulosis of colon with hemorrhage of large intestine 03/01/2023    Elevation of level of  "transaminase and lactic acid dehydrogenase (LDH) 2023    Dizziness 2023    Diabetic peripheral neuropathy (HCC) 2023    Secondary hyperparathyroidism ([facility]) 2023     Resolved Ambulatory Problems     Diagnosis Date Noted    Dyspnea     Orthopnea     History of urinary tract infection     Benign hypertensive heart disease without heart failure 2013    Dermatochalasis of eyelid 2014    STEMI (ST elevation myocardial infarction) ([facility]) 2015    Pneumonia 2015    SIRS (systemic inflammatory response syndrome) ([facility]) 2015    Purulent nasal discharge 2015    Chronic kidney disease (CKD) 2021    Congestion of both ears 2021    GI bleed 2021    GIB (gastrointestinal bleeding) 12/10/2021    Hypotension 12/10/2021     Past Medical History:   Diagnosis Date    Abdominal pain     Bruxism     Cancer ([facility])     Cataract     Chickenpox     Cholesterol blood decreased     Chronic kidney disease, stage III (moderate) ([facility])     Cold     Cough     Depression     Diabetes     Difficulty breathing     Fever     GERD (gastroesophageal reflux disease)     GOUT     Hyperlipidemia     Hypertension     Indigestion     Influenza     Mixed hyperlipidemia     Ringing in ears     Shortness of breath     Sputum production     Tonsillitis     Type 2 diabetes mellitus ([facility])     Wears glasses     Weight loss          Allergies:  Allergies   Allergen Reactions    Ether Unspecified     \"Violently sick\"       Social History:  Social History     Socioeconomic History    Marital status:      Spouse name: Not on file    Number of children: Not on file    Years of education: Not on file    Highest education level: Bachelor's degree (e.g., BA, AB, BS)   Occupational History    Not on file   Tobacco Use    Smoking status: Former     Packs/day: 0.20     Years: 6.00     Pack years: 1.20     Types: Cigarettes     Quit date:      Years since quittin.3    Smokeless tobacco: " Never    Tobacco comments:     Stopped over 25 years ago.   Vaping Use    Vaping Use: Never used   Substance and Sexual Activity    Alcohol use: No    Drug use: No    Sexual activity: Not on file   Other Topics Concern    Not on file   Social History Narrative    Retired CPA.     Social Determinants of Health     Financial Resource Strain: Not on file   Food Insecurity: Not on file   Transportation Needs: Not on file   Physical Activity: Not on file   Stress: Not on file   Social Connections: Not on file   Intimate Partner Violence: Not on file   Housing Stability: Not on file       Family History:  Family History   Problem Relation Age of Onset    Diabetes Father     Heart Attack Father 79    Cancer Brother        ROS:  All others negative except for what was mentioned in the HPI.    Physical Exam:  Vitals:   Vitals:    04/21/23 1255 04/21/23 1347 04/21/23 1447 04/21/23 1502   BP: 92/58 102/62 119/66 116/66   Pulse: 85 80 80 85   Resp: 19      Temp: 36.9 °C (98.5 °F)      TempSrc: Temporal      SpO2: 93% 91% 88% 90%   Weight:       Height:         General: Awake, no apparent distress, lying on ER stretcher  Mental status: Alert, oriented x3, speech is fluent, comprehension is intact  Cranial Nerves: Pupils are equal round reactive light, extraocular movements are intact and no nystagmus, face is symmetric, facial sensations intact, no dysarthria  Motor: 5 -/5 strength in the bilateral deltoids, 4/5 in the bilateral triceps, 5/5 in the biceps, 5/5 with finger extension and  strength, 2/5 strength in the right hip flexor, 3/5 strength in left hip flexor, 4/5 strength in the right knee extensor, 5/5 in knee flexion, 4/5 with knee extension on the left, 5/5 with ankle dorsiflexion and plantarflexion  Sensory: Light touch intact throughout, proprioception intact at the great toes bilaterally  Reflexes: 1+ and symmetric at the triceps, biceps, brachioradialis and patellas.  Absent reflexes at the Achilles  bilaterally.  Babinski sign present definitely on the right, equivocal on the left.  Coordination: Normal finger-to-nose bilaterally  Gait:  Deferred        Impression:  82-year-old man with 5 to 6 days of right greater than left leg weakness as well as perhaps some upper extremity weakness as well.  This developed concurrently with a GI illness concerning for food poisoning.  Certainly that history would raise the possibility of Guillain-Barré syndrome although the symptoms seem to have developed relatively rapidly in conjunction with the GI illness.  However, he clearly has preserved reflexes in the arms and at the patellas.  Is absent Achilles reflexes can be normal for his age especially in the setting of diabetic polyneuropathy.  In fact his upgoing toe on the right concerns me more for a myelopathic process.  Myopathic process is also a consideration as well as just generalized weakness from his underlying acute renal failure.    Recommendations:  -I will start with an MRI of the T and C-spine to evaluate for myelopathic process.  -If MRIs do not reveal an obvious etiology then  would proceed for lumbar puncture to check for cell count with differential, protein, glucose  -We will check CK level  -Neurology will continue to follow    Suraj Lopez MD  Neurohospitalist

## 2023-04-21 NOTE — ASSESSMENT & PLAN NOTE
Completed 7 days course of antibiotic  Comfort care and patient does not want to repeat any images or labs

## 2023-04-21 NOTE — ED PROVIDER NOTES
ED Provider Note    Primary care provider: DASHA Davis    CHIEF COMPLAINT  Chief Complaint   Patient presents with    GLF     2 days ago. (-) thinners    Extremity Weakness     Chronic lower bilateral weakness.       HPI  Tyrone Cline is a 82 y.o. male who presents to the Emergency Department for weakness.  The patient states that approximately 5 days ago, just after eating a meal he developed fairly abrupt onset of diffuse abdominal pain.  He was quite severe initially, gradually improved over the next several days with Tylenol.  As the pain was improving he started developing bilateral lower extremity weakness and difficulty walking, causing him to fall multiple times.  He did strike his head once.    He normally lives independently but his son has been helping amount for the past several days as he has been unable to walk or get up from a seated position without assistance.    He has had some subjective fevers.  Denies any headache, neck pain, chest pain, cough, shortness of breath, nausea, vomiting, diarrhea, dysuria, numbness or focal weakness.  Denies any back pain.  Generally does feel sore from the falls but no specific joint pain.      External Record Review: CKD level 4, patient of Dr. Olsen's, last follow-up appoint was 4/12, history of gout.  No recent ER visits.    REVIEW OF SYSTEMS  See HPI.     PAST MEDICAL HISTORY   has a past medical history of Abdominal pain, Abnormal electrocardiogram, ACE inhibitor intolerance, BPH (benign prostatic hyperplasia), Bruxism, Cancer (HCC), Cataract, Chickenpox, Cholesterol blood decreased, Chronic kidney disease (CKD) (2/27/2021), Chronic kidney disease, stage III (moderate) (HCC), Cold, Congestion of both ears (2/27/2021), Cough, Depression, Dermatochalasis of eyelid (7/23/2014), Diabetes, Difficulty breathing, Fever, GERD (gastroesophageal reflux disease), GI bleed (12/8/2021), GOUT, Gout, History of skull fracture, History of urinary tract  "infection, Hyperlipidemia, Hypertension, Indigestion, Influenza, Insomnia, Mixed hyperlipidemia, Orthopnea, Pneumonia (2015), Proteinuria, Purulent nasal discharge (2015), Ringing in ears, Shortness of breath, Sputum production, Tonsillitis, Type 2 diabetes mellitus (HCC), Wears glasses, and Weight loss.    SURGICAL HISTORY   has a past surgical history that includes cataract phaco with iol (2012); cataract phaco with iol (2012); appendectomy; other; other; tonsillectomy; blepharoplasty (2014); brow lift (2014); septal reconstruction (2015); cardiac cath, left heart; arthroscopy, knee; colonoscopy,diagnostic (N/A, 2021); upper gi endoscopy,biopsy (N/A, 2021); upper gi endoscopy,ctrl bleed (N/A, 2021); and gastroscopy w/push enterscopy (N/A, 2021).    SOCIAL HISTORY  Social History     Tobacco Use    Smoking status: Former     Packs/day: 0.20     Years: 6.00     Pack years: 1.20     Types: Cigarettes     Quit date: 1965     Years since quittin.3    Smokeless tobacco: Never    Tobacco comments:     Stopped over 25 years ago.   Vaping Use    Vaping Use: Never used   Substance Use Topics    Alcohol use: No    Drug use: No      Social History     Substance and Sexual Activity   Drug Use No       FAMILY HISTORY  Family History   Problem Relation Age of Onset    Diabetes Father     Heart Attack Father 79    Cancer Brother        CURRENT MEDICATIONS  Reviewed.  See Encounter Summary.     ALLERGIES  Allergies   Allergen Reactions    Ether Unspecified     \"Violently sick\"       PHYSICAL EXAM  VITAL SIGNS: /73   Pulse 81   Temp 36.6 °C (97.8 °F) (Temporal)   Resp 16   Ht 1.803 m (5' 11\")   Wt 89.8 kg (198 lb)   SpO2 92%   BMI 27.62 kg/m²   Constitutional: Awake, alert in no apparent distress.  HENT: Normocephalic, Bilateral external ears normal. Nose normal.  Dry mucosal membranes.  Eyes: Conjunctiva normal, non-icteric, EOMI.    Thorax & Lungs: Easy " unlabored respirations, Clear to ascultation bilaterally.  Cardiovascular: Regular rate, Regular rhythm, No murmurs, rubs or gallops. Bilateral pulses symmetrical.   Abdomen:  Soft, nontender, nondistended, normal active bowel sounds.  Mild discomfort with deep palpation, no pulsatile masses.  :    Skin: Visualized skin is  Dry, No erythema, No rash.   Musculoskeletal:   No cyanosis, clubbing or edema. No leg asymmetry.  Hip has good range of motion, there is some discomfort with psoas and obturator technique but patient states this is minimal.  Neurologic: Alert, Grossly non-focal.  Cranial nerves II to XII intact, normal speech, good strength bilateral upper extremities without drift.  Sensation intact to light touch throughout.  The right lower extremity has 3 -/5, cannot hold up against gravity.  Left has 4 out of 5 strength, slight drift.  Psychiatric: Normal affect, Normal mood  Lymphatic:          RADIOLOGY  DX-CHEST-PORTABLE (1 VIEW)   Final Result      No acute cardiac or pulmonary abnormalities are identified.      CT-ABDOMEN-PELVIS W/O   Final Result      1.  Findings suspicious for focal colitis at the hepatic flexure, less likely cholecystitis or duodenitis with secondary involvement of the colon. Infectious, inflammatory and ischemic etiologies are considerations. Underlying mass is not excluded.   2.  Cholelithiasis with distention of the gallbladder   3.  BILATERAL renal atrophy   4.  Subcentimeter LEFT adrenal myelolipoma   5.  12 mm RIGHT adrenal nodule likely an adenoma absent a history of cancer   6.  Subcentimeter RIGHT hepatic lesion, likely a cyst absent a history of cancer   7.  Atherosclerosis   8.  Colonic diverticulosis      CT-HEAD W/O   Final Result      1.  Cerebral atrophy.      2.  White matter lucencies most consistent with small vessel ischemic change versus demyelination or gliosis.      3.  Otherwise, Head CT without contrast with no acute findings. No evidence of acute cerebral  infarction, hemorrhage or mass lesion.         MR-LUMBAR SPINE-W/O    (Results Pending)         COURSE & MEDICAL DECISION MAKING  Pertinent Labs & Imaging studies reviewed. (See chart for details)    Differential diagnoses include but are not limited to: Subacute CVA, subdural hematoma, sepsis, AAA, much less likely would be dissection.    1:12 PM - Nursing notes reviewed, patient seen and examined at bedside.    ED Observation Status? Yes; I am placing the patient in to an observation status due to a diagnostic uncertainty as well as therapeutic intensity. Patient placed in observation status at 1:30 PM:     Observation plan is as follows: IV fluids for hypotension, CT head noncontrast, CT abdomen pelvis with contrast, sepsis evaluation.    Upon Reevaluation, the patient's condition has: Remained unchanged    Patient discharged from ED Observation status at 4/21/2023 4:33 PM     Discussion of management with other medical personnel: Dr. Lopez, neurology, Dr. Heredia, internal medicine.    Escalation of care considered, and ultimately not performed: I considered obtaining a CT with contrast to better characterize the aorta, however due to his CKD this is contraindicated at this time.    Decision tools and prescription drugs considered including, but not limited to: NIH Stroke Scale 3 .    Decision Making:  This is a pleasant 82 y.o. year old male who presents with abdominal pain that was followed by bilateral lower extremity weakness.  Initially I was concerned about possible aortic disaster as this certainly could cause abdominal pain followed by lower extremity weakness.  He could not undergo CTA due to acute on chronic renal insufficiency today.  The noncontrast CT does show some right upper quadrant inflammation, thought not to be gallbladder but likely a focal colitis.  This would certainly explain his abdominal pain though would not entirely explain his bilateral lower extremity weakness.  As it is somewhat  nonfocal without any acute sensory findings, does not appear to be a acute CVA.  He is not a stroke activation candidate as the onset of symptoms is well over 24 hours ago.  His weakness is bilateral, more so on the right than the left.    CT head unremarkable.  At this point differential would include CVA, myelopathy, debilitation from [place], viral illness, colitis.  He does have a significant leukocytosis of 19, broad-spectrum antibiotics were given for possible sepsis due to a abdominal source.  Remainder of infectious work-up today unrevealing.    Patient will be hospitalized in guarded condition.    CRITICAL CARE  The very real possibilty of a deterioration of this patient's condition required the highest level of my preparedness for sudden, emergent intervention.  I provided critical care services, which included medication orders, frequent reevaluations of the patient's condition and response to treatment, ordering and reviewing test results, and discussing the case with various consultants.  The critical care time associated with the care of the patient was 35 minutes. Review chart for interventions. This time is exclusive of any other billable procedures.     FINAL IMPRESSION  1. Colitis    2. Weakness of right lower extremity    3. Leukocytosis, unspecified type    4. Abdominal pain, unspecified abdominal location

## 2023-04-21 NOTE — ED TRIAGE NOTES
"Chief Complaint   Patient presents with    GLF     2 days ago. (-) thinners    Extremity Weakness     Chronic lower bilateral weakness.     BIB EMS for above. Aox4, on room air. [id number].     BP 92/58   Pulse 85   Temp 36.9 °C (98.5 °F) (Temporal)   Resp 19   Ht 1.803 m (5' 11\")   Wt 89.8 kg (198 lb)   SpO2 93%   BMI 27.62 kg/m²     "

## 2023-04-21 NOTE — H&P
Hospital Medicine History & Physical Note    Date of Service  4/21/2023    Primary Care Physician  LLOYD Davis.    Consultants  neurology    Specialist Names: I discussed with Dr. Lopez    Code Status  Full Code    Chief Complaint  Chief Complaint   Patient presents with    GLF     2 days ago. (-) thinners    Extremity Weakness     Chronic lower bilateral weakness.       History of Presenting Illness  Tyrone Cline is a 82 y.o. male who presented 4/21/2023 with abdominal pain and lower extremity weakness.  Mr. Cline has a past medical history of chronic kidney disease followed by nephrology with baseline creatinine about 2.2 as well as non-insulin-dependent diabetes mellitus and hypertension.  He states about 5 days ago he developed epigastric pain that has persisted and worsened.  Then 4 days ago he noticed that he was unable to walk due to his legs giving out.  He usually walks without a walker and does quite well and now his legs buckle and he continues to fall when he tries to walk.  He denies vomiting, diarrhea, fevers.  He was brought to the emergency room with these complaints and here has a creatinine of 5 and white blood cell count 19,000.  CT of the abdomen pelvis reveals colitis.  On exam he is weak both legs though more so on the right and a CT of the head is negative.  Differential diagnosis for the lower extremity weakness includes Guillain-Barré syndrome, lumbar source, as well as possible stroke though unlikely given the bilateral symptoms.  I spoke with Dr. Lopez neurology for consultation.    I discussed the plan of care with his son Skip at bedside.    Review of Systems  Review of Systems   Constitutional:  Positive for malaise/fatigue.   Respiratory:  Negative for shortness of breath.    Gastrointestinal:  Negative for diarrhea and vomiting.   Genitourinary:  Negative for dysuria.   Musculoskeletal:  Negative for back pain.   Neurological:  Positive for focal weakness.    All other systems reviewed and are negative.    Past Medical History   has a past medical history of Abdominal pain, Abnormal electrocardiogram, ACE inhibitor intolerance, BPH (benign prostatic hyperplasia), Bruxism, Cancer (HCC), Cataract, Chickenpox, Cholesterol blood decreased, Chronic kidney disease (CKD) (2/27/2021), Chronic kidney disease, stage III (moderate) (HCC), Cold, Congestion of both ears (2/27/2021), Cough, Depression, Dermatochalasis of eyelid (7/23/2014), Diabetes, Difficulty breathing, Fever, GERD (gastroesophageal reflux disease), GI bleed (12/8/2021), GOUT, Gout, History of skull fracture, History of urinary tract infection, Hyperlipidemia, Hypertension, Indigestion, Influenza, Insomnia, Mixed hyperlipidemia, Orthopnea, Pneumonia (9/2015), Proteinuria, Purulent nasal discharge (12/7/2015), Ringing in ears, Shortness of breath, Sputum production, Tonsillitis, Type 2 diabetes mellitus (HCC), Wears glasses, and Weight loss.    Surgical History   has a past surgical history that includes cataract phaco with iol (11/20/2012); cataract phaco with iol (12/4/2012); appendectomy; other; other; tonsillectomy; blepharoplasty (7/23/2014); brow lift (7/23/2014); septal reconstruction (12/7/2015); cardiac cath, left heart; arthroscopy, knee; pr colonoscopy,diagnostic (N/A, 12/12/2021); pr upper gi endoscopy,biopsy (N/A, 12/12/2021); pr upper gi endoscopy,ctrl bleed (N/A, 12/12/2021); and gastroscopy w/push enterscopy (N/A, 12/12/2021).     Family History  family history includes Cancer in his brother; Diabetes in his father; Heart Attack (age of onset: 79) in his father.   Family history reviewed with patient. There is no family history that is pertinent to the chief complaint.     Social History   reports that he quit smoking about 58 years ago. His smoking use included cigarettes. He has a 1.20 pack-year smoking history. He has never used smokeless tobacco. He reports that he does not drink alcohol and  "does not use drugs.    Allergies  Allergies   Allergen Reactions    Ether Unspecified     \"Violently sick\"       Medications  Prior to Admission Medications   Prescriptions Last Dose Informant Patient Reported? Taking?   B Complex Vitamins (B COMPLEX 1 PO) 4/17/2023 at 1200 Patient Yes No   Sig: Take 1 tablet by mouth every day.   Cholecalciferol (VITAMIN D3) 2000 UNIT Cap 4/17/2023 at PM Patient Yes No   Sig: Take 2,000 Units by mouth 2 times a day.   DULoxetine (CYMBALTA) 30 MG Cap DR Particles 4/17/2023 at PM Patient No No   Sig: Take 1 Capsule by mouth every day.   Finerenone (KERENDIA) 10 MG Tab 4/19/2023 at 1200 Patient Yes No   Sig: Take 10 mg by mouth every day.   Multiple Vitamins-Minerals (OCUVITE ADULT 50+ PO) 4/19/2023 at 1200 Patient Yes No   Sig: Take 1 Capsule by mouth every day.   OZEMPIC, 0.25 OR 0.5 MG/DOSE, 2 MG/1.5ML Solution Pen-injector 4/11/2023 at AM Patient Yes No   Sig: Inject 0.5 mg under the skin every 7 days.   SITagliptin (JANUVIA) 25 MG Tab 4/19/2023 at AM Patient Yes No   Sig: Take 25 mg by mouth every day.   Testosterone (ANDROGEL) 20.25 MG/ACT (1.62%) Gel 4/16/2023 at AM Patient Yes No   Sig: Place 121.5 mg on the skin every day. 6 pumps = 121.5 mg. Apply 3 pumps to each shoulder.   acetaminophen (TYLENOL) 500 MG Tab 4/18/2023 at PM Patient Yes Yes   Sig: Take 1,000 mg by mouth 2 times a day as needed for Mild Pain or Fever. 2 tablets = 1,000 mg.   allopurinol (ZYLOPRIM) 100 MG Tab 4/20/2023 at 1000 Patient Yes No   Sig: Take 200 mg by mouth every day. 2 tablets = 200 mg.   atorvastatin (LIPITOR) 10 MG Tab 4/16/2023 at 1200; STOPPED BY PATIENT Patient Yes Yes   Sig: Take 10 mg by mouth every day.   carvedilol (COREG) 12.5 MG Tab 4/17/2023 at PM Patient No No   Sig: Take 1 Tablet by mouth 2 times a day with meals.   famotidine (PEPCID) 20 MG Tab 4/20/2023 at 2100 Patient Yes Yes   Sig: Take 20 mg by mouth every evening.   olmesartan (BENICAR) 20 MG Tab 4/19/2023 at AM Patient Yes Yes "   Sig: Take 20 mg by mouth every day.   zolpidem (AMBIEN) 10 MG Tab 4/14/2023 at HS Patient No No   Sig: Take 1 Tablet by mouth at bedtime as needed for Sleep for up to 180 days.      Facility-Administered Medications: None       Physical Exam  Temp:  [36.9 °C (98.5 °F)] 36.9 °C (98.5 °F)  Pulse:  [80-85] 80  Resp:  [19] 19  BP: ()/(58-66) 119/66  SpO2:  [88 %-93 %] 88 %  Blood Pressure : 119/66   Temperature: 36.9 °C (98.5 °F)   Pulse: 80   Respiration: 19   Pulse Oximetry: 88 %       Physical Exam  Vitals and nursing note reviewed.   Constitutional:       Appearance: He is ill-appearing and toxic-appearing.   HENT:      Mouth/Throat:      Mouth: Mucous membranes are dry.   Cardiovascular:      Rate and Rhythm: Normal rate and regular rhythm.   Pulmonary:      Breath sounds: Normal breath sounds.   Abdominal:      General: There is distension.      Comments: Epigastric tenderness   Musculoskeletal:      Cervical back: Neck supple.      Right lower leg: No edema.      Left lower leg: No edema.   Skin:     General: Skin is dry.      Coloration: Skin is pale.   Neurological:      Mental Status: He is alert.      Comments: Right leg 2/5  Left 3/5  No patellar reflexes (limited as in the prone position and with my stethoscope )   Psychiatric:         Mood and Affect: Mood normal.         Behavior: Behavior normal.       Laboratory:  Recent Labs     04/21/23  1300   WBC 19.4*   RBC 5.12   HEMOGLOBIN 15.3   HEMATOCRIT 44.0   MCV 85.9   MCH 29.9   MCHC 34.8   RDW 47.7   PLATELETCT 147*   MPV 12.3     Recent Labs     04/21/23  1300   SODIUM 130*   POTASSIUM 3.8   CHLORIDE 92*   CO2 20   GLUCOSE 160*   BUN 80*   CREATININE 5.07*   CALCIUM 9.5     Recent Labs     04/21/23  1300   ALTSGPT 27   ASTSGOT 41   ALKPHOSPHAT 122*   TBILIRUBIN 0.9   LIPASE 21   GLUCOSE 160*         No results for input(s): NTPROBNP in the last 72 hours.      Recent Labs     04/21/23  1300   TROPONINT 53*       Imaging:  DX-CHEST-PORTABLE (1  VIEW)   Final Result      No acute cardiac or pulmonary abnormalities are identified.      CT-ABDOMEN-PELVIS W/O   Final Result      1.  Findings suspicious for focal colitis at the hepatic flexure, less likely cholecystitis or duodenitis with secondary involvement of the colon. Infectious, inflammatory and ischemic etiologies are considerations. Underlying mass is not excluded.   2.  Cholelithiasis with distention of the gallbladder   3.  BILATERAL renal atrophy   4.  Subcentimeter LEFT adrenal myelolipoma   5.  12 mm RIGHT adrenal nodule likely an adenoma absent a history of cancer   6.  Subcentimeter RIGHT hepatic lesion, likely a cyst absent a history of cancer   7.  Atherosclerosis   8.  Colonic diverticulosis      CT-HEAD W/O   Final Result      1.  Cerebral atrophy.      2.  White matter lucencies most consistent with small vessel ischemic change versus demyelination or gliosis.      3.  Otherwise, Head CT without contrast with no acute findings. No evidence of acute cerebral infarction, hemorrhage or mass lesion.                 Assessment/Plan:  Justification for Admission Status  I anticipate this patient will require at least two midnights for appropriate medical management, necessitating inpatient admission because IV fluids and IV antibiotics for acute renal failure and septic shock    Patient will need a Med/Surg bed on MEDICAL service .  The need is secondary to as above.    * Acute kidney failure (HCC)- (present on admission)  Assessment & Plan  Cr is up from baseline 2.2 to 5  Due to dehydration and sepsis   IV fluids  Follow urine output  Check BMP in the morning  Consider [facility] consult if his Cr does not improve 4/22.    Weakness of both lower extremities- (present on admission)  Assessment & Plan  He usually walks without assistance now for the past 4 to 5 days he is unable to walk as his legs buckle.  His right leg feels weaker than the left.  I do not appreciate reflexes but is  little bit limited without a reflex hammer and his prone position.  If you are had a stroke would be unusual to be bilateral and the CT is negative after 4 days of symptoms.  Differential diagnosis does include lumbar source but he does not have any back pain.  Neurology will consult for consideration of Guillain-Barré in the setting of colitis (possibly Campylobacter) versus lumbar source or brain such as stroke.  He did has not had a stroke in the past therefore this is unlikely recrudescence, he has chronic diabetic neuropathy.    Colitis- (present on admission)  Assessment & Plan  Noted on CT scan  IV antibiotics have been ordered    Sepsis (HCC)- (present on admission)  Assessment & Plan  This is Sepsis Present on admission  SIRS criteria identified on my evaluation include: Leukocytosis, with WBC greater than 12,000  Source is colitis  Sepsis protocol initiated  Fluid resuscitation ordered per protocol  Crystalloid Fluid Administration: Fluid resuscitation ordered per standard protocol - 30 mL/kg per current or ideal body weight  IV antibiotics as appropriate for source of sepsis  Reassessment: I have reassessed the patient's hemodynamic status          Essential hypertension- (present on admission)  Assessment & Plan  BP is low due to sepsis and dehydration thus hold outpatient meds    Type 2 diabetes mellitus with hyperglycemia (HCC)- (present on admission)  Assessment & Plan  On Januvia and Ozempic outpatient  Sliding scale insulin    Chronic kidney disease (CKD), stage IV (severe) (HCC)- (present on admission)  Assessment & Plan  Baseline Cr is 2.2 and Cr is now 5  Followed by [facility]    Hyponatremia- (present on admission)  Assessment & Plan  Na 130  hypovolemic    Diabetic peripheral neuropathy (HCC)- (present on admission)  Assessment & Plan  Continue Cymbalta        VTE prophylaxis: SCDs/TEDs

## 2023-04-21 NOTE — ASSESSMENT & PLAN NOTE
MR T spine expansile T2 hyperintense area in the thoracic spinal cord at the levels of T2, T3 and T4.    S/p Laminectomy  Patient refused any other procedures and he wanted to be in comfort care

## 2023-04-22 ENCOUNTER — APPOINTMENT (OUTPATIENT)
Dept: RADIOLOGY | Facility: MEDICAL CENTER | Age: 83
DRG: 853 | End: 2023-04-22
Attending: HOSPITALIST
Payer: MEDICARE

## 2023-04-22 PROBLEM — E87.29 HIGH ANION GAP METABOLIC ACIDOSIS: Status: ACTIVE | Noted: 2023-04-22

## 2023-04-22 LAB
AMORPH CRY #/AREA URNS HPF: PRESENT /HPF
ANION GAP SERPL CALC-SCNC: 17 MMOL/L (ref 7–16)
APPEARANCE UR: CLEAR
BACTERIA #/AREA URNS HPF: ABNORMAL /HPF
BASOPHILS # BLD AUTO: 0.4 % (ref 0–1.8)
BASOPHILS # BLD: 0.06 K/UL (ref 0–0.12)
BILIRUB UR QL STRIP.AUTO: NEGATIVE
BUN SERPL-MCNC: 92 MG/DL (ref 8–22)
CALCIUM SERPL-MCNC: 9 MG/DL (ref 8.5–10.5)
CHLORIDE SERPL-SCNC: 96 MMOL/L (ref 96–112)
CO2 SERPL-SCNC: 17 MMOL/L (ref 20–33)
COLOR UR: YELLOW
CREAT SERPL-MCNC: 4.51 MG/DL (ref 0.5–1.4)
CREAT UR-MCNC: 68.12 MG/DL
CREAT UR-MCNC: 69.62 MG/DL
CREAT UR-MCNC: 70.18 MG/DL
EOSINOPHIL # BLD AUTO: 0.04 K/UL (ref 0–0.51)
EOSINOPHIL NFR BLD: 0.3 % (ref 0–6.9)
EPI CELLS #/AREA URNS HPF: NEGATIVE /HPF
ERYTHROCYTE [DISTWIDTH] IN BLOOD BY AUTOMATED COUNT: 45.1 FL (ref 35.9–50)
GFR SERPLBLD CREATININE-BSD FMLA CKD-EPI: 12 ML/MIN/1.73 M 2
GLUCOSE BLD STRIP.AUTO-MCNC: 184 MG/DL (ref 65–99)
GLUCOSE BLD STRIP.AUTO-MCNC: 190 MG/DL (ref 65–99)
GLUCOSE BLD STRIP.AUTO-MCNC: 192 MG/DL (ref 65–99)
GLUCOSE BLD STRIP.AUTO-MCNC: 201 MG/DL (ref 65–99)
GLUCOSE SERPL-MCNC: 178 MG/DL (ref 65–99)
GLUCOSE UR STRIP.AUTO-MCNC: NEGATIVE MG/DL
HCT VFR BLD AUTO: 41.3 % (ref 42–52)
HGB BLD-MCNC: 14.5 G/DL (ref 14–18)
HYALINE CASTS #/AREA URNS LPF: ABNORMAL /LPF
IMM GRANULOCYTES # BLD AUTO: 0.14 K/UL (ref 0–0.11)
IMM GRANULOCYTES NFR BLD AUTO: 1 % (ref 0–0.9)
KETONES UR STRIP.AUTO-MCNC: NEGATIVE MG/DL
LEUKOCYTE ESTERASE UR QL STRIP.AUTO: NEGATIVE
LYMPHOCYTES # BLD AUTO: 0.35 K/UL (ref 1–4.8)
LYMPHOCYTES NFR BLD: 2.4 % (ref 22–41)
MCH RBC QN AUTO: 29.8 PG (ref 27–33)
MCHC RBC AUTO-ENTMCNC: 35.1 G/DL (ref 33.7–35.3)
MCV RBC AUTO: 84.8 FL (ref 81.4–97.8)
MICRO URNS: ABNORMAL
MICROALBUMIN UR-MCNC: 23.3 MG/DL
MICROALBUMIN/CREAT UR: 335 MG/G (ref 0–30)
MONOCYTES # BLD AUTO: 1.23 K/UL (ref 0–0.85)
MONOCYTES NFR BLD AUTO: 8.4 % (ref 0–13.4)
NEUTROPHILS # BLD AUTO: 12.84 K/UL (ref 1.82–7.42)
NEUTROPHILS NFR BLD: 87.5 % (ref 44–72)
NITRITE UR QL STRIP.AUTO: NEGATIVE
NRBC # BLD AUTO: 0 K/UL
NRBC BLD-RTO: 0 /100 WBC
OSMOLALITY SERPL: 308 MOSM/KG H2O (ref 278–298)
OSMOLALITY UR: 387 MOSM/KG H2O (ref 300–900)
PH UR STRIP.AUTO: 5.5 [PH] (ref 5–8)
PLATELET # BLD AUTO: 136 K/UL (ref 164–446)
PMV BLD AUTO: 12.2 FL (ref 9–12.9)
POTASSIUM SERPL-SCNC: 3.7 MMOL/L (ref 3.6–5.5)
PROT UR QL STRIP: 100 MG/DL
PROT UR-MCNC: 51 MG/DL (ref 0–15)
PROT/CREAT UR: 727 MG/G (ref 15–68)
RBC # BLD AUTO: 4.87 M/UL (ref 4.7–6.1)
RBC # URNS HPF: ABNORMAL /HPF
RBC UR QL AUTO: ABNORMAL
SODIUM SERPL-SCNC: 130 MMOL/L (ref 135–145)
SODIUM UR-SCNC: <20 MMOL/L
SP GR UR STRIP.AUTO: 1.01
UROBILINOGEN UR STRIP.AUTO-MCNC: 0.2 MG/DL
WBC # BLD AUTO: 14.7 K/UL (ref 4.8–10.8)
WBC #/AREA URNS HPF: ABNORMAL /HPF

## 2023-04-22 PROCEDURE — 76775 US EXAM ABDO BACK WALL LIM: CPT

## 2023-04-22 PROCEDURE — 99231 SBSQ HOSP IP/OBS SF/LOW 25: CPT | Performed by: PSYCHIATRY & NEUROLOGY

## 2023-04-22 PROCEDURE — 83935 ASSAY OF URINE OSMOLALITY: CPT

## 2023-04-22 PROCEDURE — 84300 ASSAY OF URINE SODIUM: CPT

## 2023-04-22 PROCEDURE — 83930 ASSAY OF BLOOD OSMOLALITY: CPT

## 2023-04-22 PROCEDURE — 700102 HCHG RX REV CODE 250 W/ 637 OVERRIDE(OP): Performed by: HOSPITALIST

## 2023-04-22 PROCEDURE — 85025 COMPLETE CBC W/AUTO DIFF WBC: CPT

## 2023-04-22 PROCEDURE — 82043 UR ALBUMIN QUANTITATIVE: CPT

## 2023-04-22 PROCEDURE — 770006 HCHG ROOM/CARE - MED/SURG/GYN SEMI*

## 2023-04-22 PROCEDURE — 700105 HCHG RX REV CODE 258: Performed by: HOSPITALIST

## 2023-04-22 PROCEDURE — 72148 MRI LUMBAR SPINE W/O DYE: CPT

## 2023-04-22 PROCEDURE — 36415 COLL VENOUS BLD VENIPUNCTURE: CPT

## 2023-04-22 PROCEDURE — 700111 HCHG RX REV CODE 636 W/ 250 OVERRIDE (IP): Performed by: HOSPITALIST

## 2023-04-22 PROCEDURE — 99232 SBSQ HOSP IP/OBS MODERATE 35: CPT | Performed by: HOSPITALIST

## 2023-04-22 PROCEDURE — 99222 1ST HOSP IP/OBS MODERATE 55: CPT | Performed by: INTERNAL MEDICINE

## 2023-04-22 PROCEDURE — 82962 GLUCOSE BLOOD TEST: CPT | Mod: 91

## 2023-04-22 PROCEDURE — 84156 ASSAY OF PROTEIN URINE: CPT

## 2023-04-22 PROCEDURE — 80048 BASIC METABOLIC PNL TOTAL CA: CPT

## 2023-04-22 PROCEDURE — 82570 ASSAY OF URINE CREATININE: CPT | Mod: 91

## 2023-04-22 PROCEDURE — A9270 NON-COVERED ITEM OR SERVICE: HCPCS | Performed by: HOSPITALIST

## 2023-04-22 PROCEDURE — 81001 URINALYSIS AUTO W/SCOPE: CPT

## 2023-04-22 RX ORDER — SODIUM BICARBONATE 650 MG/1
1300 TABLET ORAL 2 TIMES DAILY
Status: DISCONTINUED | OUTPATIENT
Start: 2023-04-22 | End: 2023-05-24

## 2023-04-22 RX ADMIN — INSULIN HUMAN 2 UNITS: 100 INJECTION, SOLUTION PARENTERAL at 19:39

## 2023-04-22 RX ADMIN — SODIUM CHLORIDE, POTASSIUM CHLORIDE, SODIUM LACTATE AND CALCIUM CHLORIDE: 600; 310; 30; 20 INJECTION, SOLUTION INTRAVENOUS at 02:52

## 2023-04-22 RX ADMIN — METRONIDAZOLE 500 MG: 500 TABLET ORAL at 05:51

## 2023-04-22 RX ADMIN — SODIUM BICARBONATE 1300 MG: 650 TABLET ORAL at 18:03

## 2023-04-22 RX ADMIN — DULOXETINE HYDROCHLORIDE 30 MG: 30 CAPSULE, DELAYED RELEASE ORAL at 18:03

## 2023-04-22 RX ADMIN — METRONIDAZOLE 500 MG: 500 TABLET ORAL at 14:32

## 2023-04-22 RX ADMIN — CEFTRIAXONE SODIUM 2000 MG: 10 INJECTION, POWDER, FOR SOLUTION INTRAVENOUS at 05:51

## 2023-04-22 RX ADMIN — DOCUSATE SODIUM 50 MG AND SENNOSIDES 8.6 MG 2 TABLET: 8.6; 5 TABLET, FILM COATED ORAL at 05:51

## 2023-04-22 RX ADMIN — METRONIDAZOLE 500 MG: 500 TABLET ORAL at 19:41

## 2023-04-22 RX ADMIN — SODIUM CHLORIDE, POTASSIUM CHLORIDE, SODIUM LACTATE AND CALCIUM CHLORIDE: 600; 310; 30; 20 INJECTION, SOLUTION INTRAVENOUS at 10:06

## 2023-04-22 RX ADMIN — SODIUM CHLORIDE, POTASSIUM CHLORIDE, SODIUM LACTATE AND CALCIUM CHLORIDE: 600; 310; 30; 20 INJECTION, SOLUTION INTRAVENOUS at 18:06

## 2023-04-22 RX ADMIN — DOCUSATE SODIUM 50 MG AND SENNOSIDES 8.6 MG 2 TABLET: 8.6; 5 TABLET, FILM COATED ORAL at 18:04

## 2023-04-22 ASSESSMENT — PATIENT HEALTH QUESTIONNAIRE - PHQ9
2. FEELING DOWN, DEPRESSED, IRRITABLE, OR HOPELESS: NOT AT ALL
SUM OF ALL RESPONSES TO PHQ9 QUESTIONS 1 AND 2: 0
1. LITTLE INTEREST OR PLEASURE IN DOING THINGS: NOT AT ALL

## 2023-04-22 ASSESSMENT — COGNITIVE AND FUNCTIONAL STATUS - GENERAL
SUGGESTED CMS G CODE MODIFIER MOBILITY: CL
MOVING FROM LYING ON BACK TO SITTING ON SIDE OF FLAT BED: A LOT
TOILETING: A LOT
MOBILITY SCORE: 11
DRESSING REGULAR LOWER BODY CLOTHING: A LOT
DAILY ACTIVITIY SCORE: 16
STANDING UP FROM CHAIR USING ARMS: A LOT
CLIMB 3 TO 5 STEPS WITH RAILING: TOTAL
MOVING TO AND FROM BED TO CHAIR: A LOT
HELP NEEDED FOR BATHING: A LOT
DRESSING REGULAR UPPER BODY CLOTHING: A LOT
WALKING IN HOSPITAL ROOM: TOTAL
SUGGESTED CMS G CODE MODIFIER DAILY ACTIVITY: CK
TURNING FROM BACK TO SIDE WHILE IN FLAT BAD: A LITTLE

## 2023-04-22 ASSESSMENT — LIFESTYLE VARIABLES
ON A TYPICAL DAY WHEN YOU DRINK ALCOHOL HOW MANY DRINKS DO YOU HAVE: 0
ALCOHOL_USE: NO
HOW MANY TIMES IN THE PAST YEAR HAVE YOU HAD 5 OR MORE DRINKS IN A DAY: 0
HAVE PEOPLE ANNOYED YOU BY CRITICIZING YOUR DRINKING: NO
DOES PATIENT WANT TO STOP DRINKING: NO
TOTAL SCORE: 0
AVERAGE NUMBER OF DAYS PER WEEK YOU HAVE A DRINK CONTAINING ALCOHOL: 0
CONSUMPTION TOTAL: NEGATIVE
TOTAL SCORE: 0
EVER FELT BAD OR GUILTY ABOUT YOUR DRINKING: NO
EVER HAD A DRINK FIRST THING IN THE MORNING TO STEADY YOUR NERVES TO GET RID OF A HANGOVER: NO
TOTAL SCORE: 0
HAVE YOU EVER FELT YOU SHOULD CUT DOWN ON YOUR DRINKING: NO

## 2023-04-22 ASSESSMENT — ENCOUNTER SYMPTOMS
SHORTNESS OF BREATH: 0
FOCAL WEAKNESS: 1
SPUTUM PRODUCTION: 0
BLURRED VISION: 0
MYALGIAS: 0
ABDOMINAL PAIN: 1
PHOTOPHOBIA: 0
VOMITING: 0
HEMOPTYSIS: 0
NAUSEA: 1
CONSTIPATION: 1
HEARTBURN: 0
FEVER: 0
COUGH: 0

## 2023-04-22 ASSESSMENT — PAIN DESCRIPTION - PAIN TYPE: TYPE: ACUTE PAIN

## 2023-04-22 ASSESSMENT — FIBROSIS 4 INDEX: FIB4 SCORE: 4.7

## 2023-04-22 NOTE — PROGRESS NOTES
Neurology Progress Note        Subjective:  Mr. Cline reports he is feeling better this morning and perhaps a little bit stronger in the legs, although it is still very early in the day so it is hard to know for sure.    Objective:  Vitals:  Vitals:    04/21/23 1700 04/21/23 1931 04/22/23 0402 04/22/23 0710   BP: 124/73 113/69 112/58    Pulse: 81 87 88 69   Resp: 16 18 16 18   Temp: 36.6 °C (97.8 °F) 37.2 °C (98.9 °F) 36.2 °C (97.1 °F) 36.7 °C (98 °F)   TempSrc: Temporal Temporal Temporal Temporal   SpO2: 92% 92% 93% 90%   Weight:    89.5 kg (197 lb 5 oz)   Height:         General: Awake, no apparent distress  Mental status: Alert, oriented x3, speech is fluent, comprehension is intact  Cranial Nerves: Pupils are equal round reactive light, extraocular movements are intact and no nystagmus, face is symmetric, facial sensations intact, no dysarthria  Motor: 5 -/5 strength in the bilateral deltoids, 4/5 in the bilateral triceps, 5/5 in the biceps, 5/5 with finger extension and  strength, 2/5 strength in the right hip flexor, 3/5 strength in left hip flexor, 4/5 strength in the right knee extensor, 5/5 in knee flexion, 4/5 with knee extension on the left, 5/5 with ankle dorsiflexion and plantarflexion  Sensory: Light touch intact throughout, proprioception intact at the great toes bilaterally  Reflexes: 1+ and symmetric at the triceps, biceps, brachioradialis and patellas.  Absent reflexes at the Achilles bilaterally.  Babinski sign present definitely on the right, equivocal on the left.  Coordination: Normal finger-to-nose bilaterally  Gait:  Deferred    Recent Labs     04/21/23  1300 04/22/23  0051   WBC 19.4* 14.7*   RBC 5.12 4.87   HEMOGLOBIN 15.3 14.5   HEMATOCRIT 44.0 41.3*   MCV 85.9 84.8   MCH 29.9 29.8   MCHC 34.8 35.1   RDW 47.7 45.1   PLATELETCT 147* 136*   MPV 12.3 12.2     Recent Labs     04/21/23  1300 04/22/23  0051   SODIUM 130* 130*   POTASSIUM 3.8 3.7   CHLORIDE 92* 96   CO2 20 17*   GLUCOSE  160* 178*   BUN 80* 92*   CREATININE 5.07* 4.51*   CALCIUM 9.5 9.0             Recent Labs     04/21/23  1300   CPKTOTAL 745*         Recent Labs     04/21/23  1300 04/22/23  0051   SODIUM 130* 130*   POTASSIUM 3.8 3.7   CHLORIDE 92* 96   CO2 20 17*   GLUCOSE 160* 178*   BUN 80* 92*   CPKTOTAL 745*  --      Recent Labs     04/21/23  1300 04/22/23  0051   SODIUM 130* 130*   POTASSIUM 3.8 3.7   CHLORIDE 92* 96   CO2 20 17*   BUN 80* 92*   CREATININE 5.07* 4.51*   CALCIUM 9.5 9.0         No results found for this or any previous visit.           Imaging: neuroimaging reviewed and directly visualized by me  DX-CHEST-PORTABLE (1 VIEW)   Final Result      No acute cardiac or pulmonary abnormalities are identified.      CT-ABDOMEN-PELVIS W/O   Final Result      1.  Findings suspicious for focal colitis at the hepatic flexure, less likely cholecystitis or duodenitis with secondary involvement of the colon. Infectious, inflammatory and ischemic etiologies are considerations. Underlying mass is not excluded.   2.  Cholelithiasis with distention of the gallbladder   3.  BILATERAL renal atrophy   4.  Subcentimeter LEFT adrenal myelolipoma   5.  12 mm RIGHT adrenal nodule likely an adenoma absent a history of cancer   6.  Subcentimeter RIGHT hepatic lesion, likely a cyst absent a history of cancer   7.  Atherosclerosis   8.  Colonic diverticulosis      CT-HEAD W/O   Final Result      1.  Cerebral atrophy.      2.  White matter lucencies most consistent with small vessel ischemic change versus demyelination or gliosis.      3.  Otherwise, Head CT without contrast with no acute findings. No evidence of acute cerebral infarction, hemorrhage or mass lesion.         MR-LUMBAR SPINE-W/O    (Results Pending)   US-RENAL    (Results Pending)       Impression:  82-year-old man with 5 to 6 days of right greater than left leg weakness as well as perhaps some upper extremity weakness as well.  This developed concurrently with a GI illness  concerning for food poisoning.  Certainly that history would raise the possibility of Guillain-Barré syndrome although the symptoms seem to have developed relatively rapidly in conjunction with the GI illness.  However, he has preserved reflexes in the arms and at the patellas.  Reflexes are absent at the Achilles which can be normal for his age especially in the setting of diabetic polyneuropathy.  In fact his upgoing toe on the right concerns me more for a myelopathic process.  Myopathic process is also a consideration as well as just generalized weakness from his underlying acute renal failure.     Recommendations:  - MRI of the T and C-spine to evaluate for myelopathic process.  -If MRIs do not reveal an obvious etiology then  would proceed for lumbar puncture to check for cell count with differential, protein, glucose  -CK level is mildly elevated, but this is likely not high enough to explain his degree of weakness on a pure myopathy basis.  Mild elevations in CK can be seen in GBS or just from being bed bound for several days.  -Neurology will continue to follow      Suraj Lopez MD  Neurohospitalist

## 2023-04-22 NOTE — CARE PLAN
The patient is Stable - Low risk of patient condition declining or worsening    Shift Goals  Clinical Goals: safety, ACHS  Patient Goals: rest    Progress made toward(s) clinical / shift goals:        Problem: Knowledge Deficit - Standard  Goal: Patient and family/care givers will demonstrate understanding of plan of care, disease process/condition, diagnostic tests and medications  Outcome: Progressing     Problem: Hemodynamics  Goal: Patient's hemodynamics, fluid balance and neurologic status will be stable or improve  Outcome: Progressing     Problem: Fluid Volume  Goal: Fluid volume balance will be maintained  Outcome: Progressing     Problem: Urinary - Renal Perfusion  Goal: Ability to achieve and maintain adequate renal perfusion and functioning will improve  Outcome: Progressing     Problem: Respiratory  Goal: Patient will achieve/maintain optimum respiratory ventilation and gas exchange  Outcome: Progressing     Problem: Mechanical Ventilation  Goal: Safe management of artificial airway and ventilation  Outcome: Progressing  Goal: Successful weaning off mechanical ventilator, spontaneously maintains adequate gas exchange  Outcome: Progressing  Goal: Patient will be able to express needs and understand communication  Outcome: Progressing     Problem: Physical Regulation  Goal: Diagnostic test results will improve  Outcome: Progressing  Goal: Signs and symptoms of infection will decrease  Outcome: Progressing     Problem: Pain - Standard  Goal: Alleviation of pain or a reduction in pain to the patient’s comfort goal  Outcome: Progressing     Problem: Fall Risk  Goal: Patient will remain free from falls  Outcome: Progressing          5yo with no sig PMH with 1wk of cough/yellow rhinorrhea complicated by 1 day of fever (Tmax 103F) and WBC 28.75 at PMD's office today; sent in for further work-up given non-toxic appearance (RVP vs CXR vs urinalysis). PMD [place] [phone]  The above information was copied from a provider's documentation of pre-arrival medical information as obtained.

## 2023-04-22 NOTE — HOSPITAL COURSE
This 82-year-old male with a past medical history significant for CKD stage IV being followed by nephrology, diabetes mellitus with glyco of 6.6, hypertension presented to ER on 4/21/2023 with a complaint of abdominal pain and lower extremity weakness.    S/p laparoscopic cholecystectomy with conversion to open cholecystectomy 4/18 /2023 , patient has completed treatment with IV antibiotics.    His hospital course was complicated with postoperative hypotension, went to ICU vasopressor and transferred back on 4/29 floor.    Of note, patient continues to have bilateral lower extremity weakness; MR T spine expansile T2 hyperintense area in the thoracic spinal cord at the levels of T2, T3 and T4.      Neurosurgery evaluated and s/p laminectomy.  Neurosurgery planning for repeat MRI in 1 month with neurosurgery Dr. Lucas   Nephrology evaluated and recommended for overload.

## 2023-04-22 NOTE — CARE PLAN
The patient is Stable - Low risk of patient condition declining or worsening    Shift Goals  Clinical Goals: pain mgmt, achs  Patient Goals: pain mgmt, rest    Progress made toward(s) clinical / shift goals:    Problem: Knowledge Deficit - Standard  Goal: Patient and family/care givers will demonstrate understanding of plan of care, disease process/condition, diagnostic tests and medications  Outcome: Progressing     Problem: Hemodynamics  Goal: Patient's hemodynamics, fluid balance and neurologic status will be stable or improve  Outcome: Progressing     Problem: Fluid Volume  Goal: Fluid volume balance will be maintained  Outcome: Progressing     Problem: Urinary - Renal Perfusion  Goal: Ability to achieve and maintain adequate renal perfusion and functioning will improve  Outcome: Progressing     Problem: Respiratory  Goal: Patient will achieve/maintain optimum respiratory ventilation and gas exchange  Outcome: Progressing     Problem: Pain - Standard  Goal: Alleviation of pain or a reduction in pain to the patient’s comfort goal  Outcome: Progressing     Problem: Fall Risk  Goal: Patient will remain free from falls  Outcome: Progressing

## 2023-04-22 NOTE — PROGRESS NOTES
[facility] Medicine Daily Progress Note    Date of Service  4/22/2023    Chief Complaint  Tyrone Cline is a 82 y.o. male admitted 4/21/2023 with   Chief Complaint   Patient presents with    GLF     2 days ago. (-) thinners    Extremity Weakness     Chronic lower bilateral weakness.         Hospital Course  This 82-year-old male with a past medical history significant for CKD stage IV being followed by nephrology, diabetes mellitus, hypertension presented to ER on 4/21/2023 with a complaint of abdominal pain and lower extremity weakness.    Patient stated that he had epigastric and abdominal pain on Tuesday CT scan of the abdomen and pelvis consistent with colitis.  WBC 19, creatinine 5.    Since then, he continues to report being weak, unable to walk secondary to come to ER.  There was a concern of Guillain-Barré as well as possible stroke, neurology was consulted, recommended MRI of the CTL spine.    Interval events:  -- No acute events overnight, vital sign has been stable, patient is alert, awake, answering questions appropriately.  -- WBC trending down, today at 14.7, renal function continued to improve, creatinine 4.5.  Patient does have anion gap of 17 likely secondary to renal failure.  -- Pending urine sodium, urine creatinine, renal ultrasound showed atrophic kidneys.  -- Nephrology was contacted, pending evaluation.    I have discussed this patient's plan of care and discharge plan at IDT rounds today with Case Management, Nursing, Nursing leadership, and other members of the IDT team.    Consultants/Specialty  nephrology    Code Status  DNAR/DNI    Disposition  Patient is not medically cleared for discharge.   Anticipate discharge to to home with close outpatient follow-up.  I have placed the appropriate orders for post-discharge needs.    Review of Systems  Review of Systems   Constitutional:  Positive for malaise/fatigue. Negative for fever.   HENT:  Negative for congestion.    Eyes:  Negative  for blurred vision and photophobia.   Respiratory:  Negative for cough, hemoptysis and sputum production.    Gastrointestinal:  Positive for abdominal pain and nausea. Negative for heartburn and vomiting.   Musculoskeletal:  Negative for myalgias.   Neurological:  Positive for focal weakness.   All other systems reviewed and are negative.     Physical Exam  Temp:  [36.2 °C (97.1 °F)-37.2 °C (98.9 °F)] 36.7 °C (98 °F)  Pulse:  [69-88] 69  Resp:  [16-19] 18  BP: ()/(58-73) 112/58  SpO2:  [88 %-93 %] 90 %    Physical Exam  Vitals and nursing note reviewed.   Constitutional:       Appearance: Normal appearance.   HENT:      Head: Normocephalic and atraumatic.   Eyes:      Pupils: Pupils are equal, round, and reactive to light.   Cardiovascular:      Rate and Rhythm: Normal rate.   Pulmonary:      Effort: No respiratory distress.      Breath sounds: Normal breath sounds.   Abdominal:      General: There is no distension.      Palpations: Abdomen is soft.   Musculoskeletal:      Cervical back: Neck supple.      Right lower leg: No edema.      Left lower leg: No edema.   Skin:     General: Skin is warm.   Neurological:      Mental Status: He is alert and oriented to person, place, and time.      Motor: Weakness (RLE weaker than left) present.   Psychiatric:         Mood and Affect: Mood normal.       Fluids    Intake/Output Summary (Last 24 hours) at 4/22/2023 1146  Last data filed at 4/22/2023 1123  Gross per 24 hour   Intake 120 ml   Output --   Net 120 ml       Laboratory  Recent Labs     04/21/23  1300 04/22/23  0051   WBC 19.4* 14.7*   RBC 5.12 4.87   HEMOGLOBIN 15.3 14.5   HEMATOCRIT 44.0 41.3*   MCV 85.9 84.8   MCH 29.9 29.8   MCHC 34.8 35.1   RDW 47.7 45.1   PLATELETCT 147* 136*   MPV 12.3 12.2     Recent Labs     04/21/23  1300 04/22/23  0051   SODIUM 130* 130*   POTASSIUM 3.8 3.7   CHLORIDE 92* 96   CO2 20 17*   GLUCOSE 160* 178*   BUN 80* 92*   CREATININE 5.07* 4.51*   CALCIUM 9.5 9.0                    Imaging  US-RENAL   Final Result      1.  Atrophic kidneys. Echogenic bilateral renal parenchyma could relate to medical renal disease.      2.  No hydronephrosis. No renal calculus.      DX-CHEST-PORTABLE (1 VIEW)   Final Result      No acute cardiac or pulmonary abnormalities are identified.      CT-ABDOMEN-PELVIS W/O   Final Result      1.  Findings suspicious for focal colitis at the hepatic flexure, less likely cholecystitis or duodenitis with secondary involvement of the colon. Infectious, inflammatory and ischemic etiologies are considerations. Underlying mass is not excluded.   2.  Cholelithiasis with distention of the gallbladder   3.  BILATERAL renal atrophy   4.  Subcentimeter LEFT adrenal myelolipoma   5.  12 mm RIGHT adrenal nodule likely an adenoma absent a history of cancer   6.  Subcentimeter RIGHT hepatic lesion, likely a cyst absent a history of cancer   7.  Atherosclerosis   8.  Colonic diverticulosis      CT-HEAD W/O   Final Result      1.  Cerebral atrophy.      2.  White matter lucencies most consistent with small vessel ischemic change versus demyelination or gliosis.      3.  Otherwise, Head CT without contrast with no acute findings. No evidence of acute cerebral infarction, hemorrhage or mass lesion.         MR-LUMBAR SPINE-W/O    (Results Pending)   MR-THORACIC SPINE-W/O    (Results Pending)   MR-CERVICAL SPINE-W/O    (Results Pending)        Assessment/Plan  * Acute kidney failure (HCC)- (present on admission)  Assessment & Plan  Cr is up from baseline 2.2 to 5  Due to dehydration and sepsis   IV fluids   -- I/os, pending urine sodium, urine creatinine, renal ultrasound showed atrophic kidneys.  Nephrology consulted    High anion gap metabolic acidosis  Assessment & Plan  Likely secondary to SANJUANITA and CKD, continue bicarb, monitor, nephrology consulted    Hyponatremia- (present on admission)  Assessment & Plan  Na 130  hypovolemic    Weakness of both lower extremities- (present on  admission)  Assessment & Plan  He usually walks without assistance now for the past 4 to 5 days he is unable to walk as his legs buckle.  His right leg feels weaker than the left.  I do not appreciate reflexes but is little bit limited without a reflex hammer and his prone position.   --- There was a concern of given.-Neurology was consulted, neurology recommended MRI TC LS spine which has been ordered, pending  Patient is able to lift his bilateral lower extremity against gravity with right-sided side more weaker than the left side, noted to have areflexic in the Achilles tendon, neurology is able to elicit patellar reflex    Sepsis (HCC)- (present on admission)  Assessment & Plan  This is Sepsis Present on admission  SIRS criteria identified on my evaluation include: Leukocytosis, with WBC greater than 12,000  Source is colitis  Sepsis protocol initiated  Fluid resuscitation ordered per protocol  Crystalloid Fluid Administration: Fluid resuscitation ordered per standard protocol - 30 mL/kg per current or ideal body weight  IV antibiotics as appropriate for source of sepsis  Reassessment: I have reassessed the patient's hemodynamic status          Colitis- (present on admission)  Assessment & Plan  Noted on CT scan  This associated with abdominal pain, continue IV antibiotics, monitor clinically    Diabetic peripheral neuropathy (HCC)- (present on admission)  Assessment & Plan  Continue Cymbalta    Essential hypertension- (present on admission)  Assessment & Plan  BP is low due to sepsis and dehydration thus hold outpatient meds    Type 2 diabetes mellitus with hyperglycemia (HCC)- (present on admission)  Assessment & Plan  On Januvia and Ozempic outpatient  Sliding scale insulin    Chronic kidney disease (CKD), stage IV (severe) (HCC)- (present on admission)  Assessment & Plan  Baseline Cr is 2.2 and Cr is now 5  Followed by [facility] nephrology  Creatinine improving to 4.5         VTE prophylaxis: scd

## 2023-04-22 NOTE — CONSULTS
Nephrology Consultation    Date of Service  4/22/2023    Referring Physician  Smiley Cain M.D.    Consulting Physician  Juan Bedoya M.D.    Reason for Consultation  SANJUANITA on CKD4    History of Presenting Illness  82 y.o. male with a history of diabetes, hypertension, BPH, CKD 4 who presented 4/21/2023 with weakness and abdominal pains.    He said 6 days ago, on Monday evening, he ate some bad food.  He did not realize how bad the food was until after he ate it.  He started developing really bad stomach cramps and stomach pain.  He was constipated for days.  Over the ensuing few days, patient had poor appetite and poor oral intake.  He finally had had enough, and came to the hospital yesterday, where imaging revealed the patient had colitis.  There was also concern for stroke, but non-contrast head CT did not show any acute changes.  Patient was given ceftriaxone and metronidazole overnight.  He says he finally had a bowel movement yesterday, and is starting to feel better.  Today, patient denies chest pain, shortness of breath, headache, nausea, vomiting.  He denies taking chronic NSAIDs.  Patient was noted to be mildly hypotensive with systolics in the 90s on admission yesterday.        Review of Systems  Review of Systems   Constitutional:  Negative for fever.   Respiratory:  Negative for shortness of breath.    Cardiovascular:  Negative for chest pain.   Gastrointestinal:  Positive for abdominal pain and constipation.   All other systems reviewed and are negative.    Past Medical History  Past Medical History:   Diagnosis Date    Abdominal pain     Abnormal electrocardiogram     ACE inhibitor intolerance     BPH (benign prostatic hyperplasia)     Bruxism     Cancer (HCC)     basal and squamous cell to face    Cataract     robbi IOL    Chickenpox     Cholesterol blood decreased     Chronic kidney disease (CKD) 2/27/2021    Chronic kidney disease, stage III (moderate) (HCC)     Cold     Congestion of both ears  2/27/2021    Cough     Depression     Dermatochalasis of eyelid 7/23/2014    Diabetes     oral meds    Difficulty breathing     Fever     GERD (gastroesophageal reflux disease)     GI bleed 12/8/2021    GOUT     Gout     History of skull fracture     History of urinary tract infection     Hyperlipidemia     Hypertension     Indigestion     Influenza     Insomnia     Mixed hyperlipidemia     Orthopnea     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    Pneumonia 9/2015    Proteinuria     Purulent nasal discharge 12/7/2015    Ringing in ears     Shortness of breath     Sputum production     Tonsillitis     Type 2 diabetes mellitus (HCC)     Wears glasses     Weight loss        Surgical History  Past Surgical History:   Procedure Laterality Date    NY COLONOSCOPY,DIAGNOSTIC N/A 12/12/2021    Procedure: COLONOSCOPY;  Surgeon: Dariel Simons M.D.;  Location: [facility];  Service: Gastroenterology    NY UPPER GI ENDOSCOPY,BIOPSY N/A 12/12/2021    Procedure: GASTROSCOPY, WITH BIOPSY;  Surgeon: Dariel Simons M.D.;  Location: [facility];  Service: Gastroenterology    NY UPPER GI ENDOSCOPY,CTRL BLEED N/A 12/12/2021    Procedure: EGD, WITH CLIP PLACEMENT;  Surgeon: Dariel Simons M.D.;  Location: [facility];  Service: Gastroenterology    GASTROSCOPY W/PUSH ENTERSCOPY N/A 12/12/2021    Procedure: GASTROSCOPY, WITH PUSH ENTEROSCOPY;  Surgeon: Dariel Simons M.D.;  Location: [facility];  Service: Gastroenterology    SEPTAL RECONSTRUCTION  12/7/2015    Procedure: SEPTAL RECONSTRUCTION OPEN WITH  GRAFTS & CONCHAL CART GRAFT;  Surgeon: SYDNIE Gaitan M.D.;  Location: SURGERY SAME DAY [facility];  Service:     BLEPHAROPLASTY  7/23/2014    Performed by Gera Plasencia M.D. at [facility] ORS    BROW LIFT  7/23/2014    Performed by Gera Plasencia M.D. at [facility] ORS    CATARACT PHACO WITH IOL  12/4/2012    Performed by Suraj Gonzalez M.D. at [facility]    CATARACT  PHACO WITH IOL  2012    Performed by Suraj Gonzalez M.D. at [facility]    APPENDECTOMY      ARTHROSCOPY, KNEE      CARDIAC CATH, LEFT HEART      LHC out of concern for STEMI, normal coronaries with minimal atherosclerosis, diagnosed with PNA as cause of symptoms and ST changes.     OTHER      KNEE SURGERY - LEFT.    OTHER      NOSE SURGERY.    TONSILLECTOMY         Family History  Family History   Problem Relation Age of Onset    Diabetes Father     Heart Attack Father 79    Cancer Brother        Social History  Social History     Socioeconomic History    Marital status:      Spouse name: Not on file    Number of children: Not on file    Years of education: Not on file    Highest education level: Bachelor's degree (e.g., BA, AB, BS)   Occupational History    Not on file   Tobacco Use    Smoking status: Former     Packs/day: 0.20     Years: 6.00     Pack years: 1.20     Types: Cigarettes     Quit date: 1965     Years since quittin.3    Smokeless tobacco: Never    Tobacco comments:     Stopped over 25 years ago.   Vaping Use    Vaping Use: Never used   Substance and Sexual Activity    Alcohol use: No    Drug use: No    Sexual activity: Not on file   Other Topics Concern    Not on file   Social History Narrative    Retired CPA.     Social Determinants of Health     Financial Resource Strain: Not on file   Food Insecurity: Not on file   Transportation Needs: Not on file   Physical Activity: Not on file   Stress: Not on file   Social Connections: Not on file   Intimate Partner Violence: Not on file   Housing Stability: Not on file       Medications  Current Facility-Administered Medications   Medication Dose Route Frequency Provider Last Rate Last Admin    sodium bicarbonate tablet 1,300 mg  1,300 mg Oral BID Smiley Cain M.D.        lactated ringers infusion (BOLUS)  1,000 mL Intravenous Once PRN Cb Maldonado M.D.        lactated ringers infusion   Intravenous Continuous Ronaldo OTOOLE  "NADEEM Perez 150 mL/hr at 04/22/23 1006 New Bag at 04/22/23 1006    DULoxetine (CYMBALTA) capsule 30 mg  30 mg Oral Q EVENING Ronaldo Perez M.D.   30 mg at 04/21/23 1837    senna-docusate (PERICOLACE or SENOKOT S) 8.6-50 MG per tablet 2 Tablet  2 Tablet Oral BID Ronaldo Perez M.D.   2 Tablet at 04/22/23 0551    And    polyethylene glycol/lytes (MIRALAX) PACKET 1 Packet  1 Packet Oral QDAY PRN Ronaldo Perez M.D.        And    magnesium hydroxide (MILK OF MAGNESIA) suspension 30 mL  30 mL Oral QDAY PRN Ronaldo Perez M.D.        And    bisacodyl (DULCOLAX) suppository 10 mg  10 mg Rectal QDAY PRN Ronaldo Perez M.D.        lactated ringers infusion (BOLUS)  500 mL Intravenous Once PRN Ronaldo Perez M.D.        acetaminophen (Tylenol) tablet 650 mg  650 mg Oral Q6HRS PRN Ronaldo Perez M.D.        cefTRIAXone (Rocephin) syringe 2,000 mg  2,000 mg Intravenous Q24HRS Ronaldo Perez M.D.   2,000 mg at 04/22/23 0551    metroNIDAZOLE (FLAGYL) tablet 500 mg  500 mg Oral Q8HRS Ronaldo Perez M.D.   500 mg at 04/22/23 1432    insulin regular (HumuLIN R,NovoLIN R) injection  2-9 Units Subcutaneous 4X/DAY ACHS Ronaldo Perez M.D.   2 Units at 04/21/23 2104    And    dextrose 10 % BOLUS 25 g  25 g Intravenous Q15 MIN PRN Ronaldo Perez M.D.        Pharmacy Consult Request ...Pain Management Review 1 Each  1 Each Other PHARMACY TO DOSE Eli Pandya, ADELA.P.R.N.        oxyCODONE immediate-release (ROXICODONE) tablet 5 mg  5 mg Oral Q3HRS PRN ADELA Madera.P.R.N.        Or    oxyCODONE immediate release (ROXICODONE) tablet 10 mg  10 mg Oral Q3HRS PRN Eli A. Mumtaz, A.P.R.N.        Or    HYDROmorphone (Dilaudid) injection 0.5 mg  0.5 mg Intravenous Q3HRS PRN Eli Pandya, A.P.R.N.   0.5 mg at 04/21/23 2169       Allergies  Allergies   Allergen Reactions    Ether Unspecified     \"Violently sick\"       Physical Exam  Temp:  [36.2 °C (97.1 °F)-37.2 °C (98.9 °F)] 36.7 °C (98 °F)  Pulse:  [69-88] 69  Resp:  " [16-18] 18  BP: (112-124)/(58-73) 112/58  SpO2:  [90 %-93 %] 90 %    Physical Exam  Constitutional:       General: He is not in acute distress.     Appearance: He is well-developed. He is not diaphoretic.   HENT:      Head: Normocephalic and atraumatic.      Mouth/Throat:      Mouth: Mucous membranes are moist.      Pharynx: Oropharynx is clear. No oropharyngeal exudate.   Eyes:      General: No scleral icterus.     Extraocular Movements: Extraocular movements intact.   Neck:      Trachea: No tracheal deviation.   Cardiovascular:      Rate and Rhythm: Normal rate.      Heart sounds: Normal heart sounds. No murmur heard.  Pulmonary:      Effort: Pulmonary effort is normal.      Breath sounds: Normal breath sounds. No stridor. No rales.   Abdominal:      General: There is no distension.      Palpations: Abdomen is soft.      Tenderness: There is no abdominal tenderness.   Musculoskeletal:         General: Normal range of motion.      Right lower leg: No edema.      Left lower leg: No edema.   Skin:     General: Skin is warm and dry.   Neurological:      General: No focal deficit present.      Mental Status: He is alert and oriented to person, place, and time.   Psychiatric:         Mood and Affect: Mood normal.         Behavior: Behavior normal.       Fluids  Date 04/22/23 0700 - 04/23/23 0659   Shift 3222-9708 8383-8949 7353-5476 24 Hour Total   INTAKE   P.O. 120   120   Shift Total 120   120   OUTPUT   Shift Total       Weight (kg) 89.5 89.5 89.5 89.5       Laboratory  Labs reviewed, pertinent labs below.  Recent Labs     04/21/23  1300 04/22/23  0051   WBC 19.4* 14.7*   RBC 5.12 4.87   HEMOGLOBIN 15.3 14.5   HEMATOCRIT 44.0 41.3*   MCV 85.9 84.8   MCH 29.9 29.8   MCHC 34.8 35.1   RDW 47.7 45.1   PLATELETCT 147* 136*   MPV 12.3 12.2     Recent Labs     04/21/23  1300 04/22/23  0051   SODIUM 130* 130*   POTASSIUM 3.8 3.7   CHLORIDE 92* 96   CO2 20 17*   GLUCOSE 160* 178*   BUN 80* 92*   CREATININE 5.07* 4.51*    CALCIUM 9.5 9.0                URINALYSIS:  Lab Results   Component Value Date/Time    COLORURINE Yellow 09/01/2022 0758    CLARITY Clear 09/01/2022 0758    SPECGRAVITY 1.013 09/01/2022 0758    PHURINE 6.0 09/01/2022 0758    KETONES Negative 09/01/2022 0758    PROTEINURIN 30 (A) 09/01/2022 0758    BILIRUBINUR Negative 09/01/2022 0758    UROBILU 0.2 09/01/2022 0758    NITRITE Negative 09/01/2022 0758    LEUKESTERAS Negative 09/01/2022 0758    OCCULTBLOOD Negative 09/01/2022 0758     UPC  Lab Results   Component Value Date/Time    TOTPROTUR 52.0 (H) 07/01/2021 1615    TOTPROTUR 53 07/01/2021 1615      Lab Results   Component Value Date/Time    CREATININEU 108.19 07/01/2021 1615       Imaging interpreted by radiologist. Imaging reports reviewed with pertinent findings below  US-RENAL   Final Result      1.  Atrophic kidneys. Echogenic bilateral renal parenchyma could relate to medical renal disease.      2.  No hydronephrosis. No renal calculus.      DX-CHEST-PORTABLE (1 VIEW)   Final Result      No acute cardiac or pulmonary abnormalities are identified.      CT-ABDOMEN-PELVIS W/O   Final Result      1.  Findings suspicious for focal colitis at the hepatic flexure, less likely cholecystitis or duodenitis with secondary involvement of the colon. Infectious, inflammatory and ischemic etiologies are considerations. Underlying mass is not excluded.   2.  Cholelithiasis with distention of the gallbladder   3.  BILATERAL renal atrophy   4.  Subcentimeter LEFT adrenal myelolipoma   5.  12 mm RIGHT adrenal nodule likely an adenoma absent a history of cancer   6.  Subcentimeter RIGHT hepatic lesion, likely a cyst absent a history of cancer   7.  Atherosclerosis   8.  Colonic diverticulosis      CT-HEAD W/O   Final Result      1.  Cerebral atrophy.      2.  White matter lucencies most consistent with small vessel ischemic change versus demyelination or gliosis.      3.  Otherwise, Head CT without contrast with no acute  findings. No evidence of acute cerebral infarction, hemorrhage or mass lesion.         MR-LUMBAR SPINE-W/O    (Results Pending)   MR-THORACIC SPINE-W/O    (Results Pending)   MR-CERVICAL SPINE-W/O    (Results Pending)         Assessment/Plan  82 y.o. male with a history of diabetes, hypertension, BPH, CKD 4 who presented 4/21/2023 with weakness and abdominal pains, found to have colitis, SANJUANITA on CKD 4, and concern for weakness from a neurologic process.    1.  SANJUANITA on CKD 4.  Please monitor and record urine output.  Please obtain urine sample to work patient up with urinalysis, urine protein creatinine ratio, urine albumin creatinine ratio (patient has known history of macroalbuminuria dating back to 2015).  I suspect his SANJUANITA is from prerenal hypovolemia.  For now, as SANJUANITA is improving with fluids, recommend continue IV fluids as below.  Avoid NSAIDs and other nephrotoxins.  Check renal function panel daily.  Patient says he would consider dialysis if absolutely needed.    2.  Hyponatremia, found on admission, I suspect hypovolemic hyponatremia.  Check urine osmolality.  Continue IV fluids as below.  Avoid hypotonic fluids.  Do not order salt tabs.  Check sodium level daily.    3.  Metabolic acidosis, due to SANJUANITA on CKD.  Continue lactated Ringer's infusion for now.  If acidemia does not improve, would switch lactated Ringer's to sodium bicarbonate 150 mEq in 1 L of D5W or sterile water.  Check renal function panel daily.    4.  Azotemia, without symptoms of uremia.  Continue IV fluids.  No acute need for dialysis.  Check renal function panel daily.    5.  Leukocytosis, found on admission.  Unclear etiology.  Check CBC daily.    6.  Thrombocytopenia, slightly worsening.  Mild.  Unclear etiology.  Check CBC daily.    7.  Hypertension.  Patient originally admitted with some hypotension.  His home medications include on losartan 20 mg daily, carvedilol 12.5 mg p.o. twice daily, finerenone 10 mg daily.  If hypertension  worsens, recommend resume his diuretic finerenone first.    Discussed with Dr. Ant Bedoya MD  Nephrology  [facility]

## 2023-04-22 NOTE — PROGRESS NOTES
4 Eyes Skin Assessment Completed by JUAN JOSÉ Goodwin and JUAN JOSÉ Ross.    Head WDL  Ears WDL  Nose WDL  Mouth WDL  Neck WDL  Breast/Chest WDL  Shoulder Blades WDL  Spine WDL  (R) Arm/Elbow/Hand WDL  (L) Arm/Elbow/Hand WDL  Abdomen WDL  Groin WDL  Scrotum/Coccyx/Buttocks WDL  (R) Leg WDL  (L) Leg WDL  (R) Heel/Foot/Toe WDL  (L) Heel/Foot/Toe WDL          Devices In Places Blood Pressure Cuff      Interventions In Place Pillows    Possible Skin Injury No    Pictures Uploaded Into Epic N/A  Wound Consult Placed N/A  RN Wound Prevention Protocol Ordered No

## 2023-04-23 PROBLEM — E87.6 HYPOKALEMIA: Status: ACTIVE | Noted: 2023-04-23

## 2023-04-23 PROBLEM — D69.6 THROMBOCYTOPENIA (HCC): Status: ACTIVE | Noted: 2023-04-23

## 2023-04-23 LAB
ALBUMIN SERPL BCP-MCNC: 2 G/DL (ref 3.2–4.9)
BUN SERPL-MCNC: 82 MG/DL (ref 8–22)
CALCIUM ALBUM COR SERPL-MCNC: 10.4 MG/DL (ref 8.5–10.5)
CALCIUM SERPL-MCNC: 8.8 MG/DL (ref 8.5–10.5)
CHLORIDE SERPL-SCNC: 100 MMOL/L (ref 96–112)
CK SERPL-CCNC: 79 U/L (ref 0–154)
CO2 SERPL-SCNC: 18 MMOL/L (ref 20–33)
CREAT SERPL-MCNC: 3.11 MG/DL (ref 0.5–1.4)
CRP SERPL HS-MCNC: 33.53 MG/DL (ref 0–0.75)
ERYTHROCYTE [DISTWIDTH] IN BLOOD BY AUTOMATED COUNT: 45.1 FL (ref 35.9–50)
ERYTHROCYTE [SEDIMENTATION RATE] IN BLOOD BY WESTERGREN METHOD: 66 MM/HOUR (ref 0–20)
GFR SERPLBLD CREATININE-BSD FMLA CKD-EPI: 19 ML/MIN/1.73 M 2
GLUCOSE BLD STRIP.AUTO-MCNC: 173 MG/DL (ref 65–99)
GLUCOSE BLD STRIP.AUTO-MCNC: 189 MG/DL (ref 65–99)
GLUCOSE BLD STRIP.AUTO-MCNC: 190 MG/DL (ref 65–99)
GLUCOSE BLD STRIP.AUTO-MCNC: 205 MG/DL (ref 65–99)
GLUCOSE SERPL-MCNC: 196 MG/DL (ref 65–99)
HCT VFR BLD AUTO: 39.1 % (ref 42–52)
HGB BLD-MCNC: 13.6 G/DL (ref 14–18)
MAGNESIUM SERPL-MCNC: 2.1 MG/DL (ref 1.5–2.5)
MCH RBC QN AUTO: 29.4 PG (ref 27–33)
MCHC RBC AUTO-ENTMCNC: 34.8 G/DL (ref 33.7–35.3)
MCV RBC AUTO: 84.6 FL (ref 81.4–97.8)
PHOSPHATE SERPL-MCNC: 2.9 MG/DL (ref 2.5–4.5)
PLATELET # BLD AUTO: 137 K/UL (ref 164–446)
PMV BLD AUTO: 11.8 FL (ref 9–12.9)
POTASSIUM SERPL-SCNC: 3 MMOL/L (ref 3.6–5.5)
PROCALCITONIN SERPL-MCNC: 24 NG/ML
RBC # BLD AUTO: 4.62 M/UL (ref 4.7–6.1)
SODIUM SERPL-SCNC: 132 MMOL/L (ref 135–145)
WBC # BLD AUTO: 16.8 K/UL (ref 4.8–10.8)

## 2023-04-23 PROCEDURE — 700101 HCHG RX REV CODE 250: Performed by: HOSPITALIST

## 2023-04-23 PROCEDURE — 99233 SBSQ HOSP IP/OBS HIGH 50: CPT | Performed by: HOSPITALIST

## 2023-04-23 PROCEDURE — 99232 SBSQ HOSP IP/OBS MODERATE 35: CPT | Performed by: PSYCHIATRY & NEUROLOGY

## 2023-04-23 PROCEDURE — 700105 HCHG RX REV CODE 258: Performed by: HOSPITALIST

## 2023-04-23 PROCEDURE — A9270 NON-COVERED ITEM OR SERVICE: HCPCS | Performed by: HOSPITALIST

## 2023-04-23 PROCEDURE — 85027 COMPLETE CBC AUTOMATED: CPT

## 2023-04-23 PROCEDURE — 700102 HCHG RX REV CODE 250 W/ 637 OVERRIDE(OP): Performed by: HOSPITALIST

## 2023-04-23 PROCEDURE — 36415 COLL VENOUS BLD VENIPUNCTURE: CPT

## 2023-04-23 PROCEDURE — A9270 NON-COVERED ITEM OR SERVICE: HCPCS | Performed by: INTERNAL MEDICINE

## 2023-04-23 PROCEDURE — 700102 HCHG RX REV CODE 250 W/ 637 OVERRIDE(OP): Performed by: INTERNAL MEDICINE

## 2023-04-23 PROCEDURE — 86140 C-REACTIVE PROTEIN: CPT

## 2023-04-23 PROCEDURE — 84145 PROCALCITONIN (PCT): CPT

## 2023-04-23 PROCEDURE — 700111 HCHG RX REV CODE 636 W/ 250 OVERRIDE (IP): Performed by: HOSPITALIST

## 2023-04-23 PROCEDURE — 80069 RENAL FUNCTION PANEL: CPT

## 2023-04-23 PROCEDURE — 82962 GLUCOSE BLOOD TEST: CPT | Mod: 91

## 2023-04-23 PROCEDURE — 770006 HCHG ROOM/CARE - MED/SURG/GYN SEMI*

## 2023-04-23 PROCEDURE — 83735 ASSAY OF MAGNESIUM: CPT

## 2023-04-23 PROCEDURE — 85652 RBC SED RATE AUTOMATED: CPT

## 2023-04-23 PROCEDURE — 82550 ASSAY OF CK (CPK): CPT

## 2023-04-23 PROCEDURE — 99232 SBSQ HOSP IP/OBS MODERATE 35: CPT | Performed by: INTERNAL MEDICINE

## 2023-04-23 RX ORDER — CARVEDILOL 12.5 MG/1
12.5 TABLET ORAL 2 TIMES DAILY WITH MEALS
Status: DISCONTINUED | OUTPATIENT
Start: 2023-04-23 | End: 2023-05-26

## 2023-04-23 RX ORDER — POTASSIUM CHLORIDE 20 MEQ/1
40 TABLET, EXTENDED RELEASE ORAL ONCE
Status: COMPLETED | OUTPATIENT
Start: 2023-04-23 | End: 2023-04-23

## 2023-04-23 RX ORDER — CARVEDILOL 12.5 MG/1
12.5 TABLET ORAL 2 TIMES DAILY WITH MEALS
Status: DISCONTINUED | OUTPATIENT
Start: 2023-04-23 | End: 2023-04-23

## 2023-04-23 RX ORDER — SPIRONOLACTONE 50 MG/1
25 TABLET, FILM COATED ORAL
Status: DISCONTINUED | OUTPATIENT
Start: 2023-04-23 | End: 2023-05-24

## 2023-04-23 RX ORDER — ALLOPURINOL 100 MG/1
100 TABLET ORAL DAILY
Status: DISCONTINUED | OUTPATIENT
Start: 2023-04-23 | End: 2023-05-24

## 2023-04-23 RX ORDER — ATORVASTATIN CALCIUM 10 MG/1
10 TABLET, FILM COATED ORAL DAILY
Status: DISCONTINUED | OUTPATIENT
Start: 2023-04-23 | End: 2023-05-24

## 2023-04-23 RX ADMIN — SODIUM CHLORIDE, POTASSIUM CHLORIDE, SODIUM LACTATE AND CALCIUM CHLORIDE: 600; 310; 30; 20 INJECTION, SOLUTION INTRAVENOUS at 21:36

## 2023-04-23 RX ADMIN — SPIRONOLACTONE 25 MG: 50 TABLET ORAL at 16:18

## 2023-04-23 RX ADMIN — POLYETHYLENE GLYCOL 3350 1 PACKET: 17 POWDER, FOR SOLUTION ORAL at 05:49

## 2023-04-23 RX ADMIN — INSULIN HUMAN 2 UNITS: 100 INJECTION, SOLUTION PARENTERAL at 17:49

## 2023-04-23 RX ADMIN — SODIUM CHLORIDE, POTASSIUM CHLORIDE, SODIUM LACTATE AND CALCIUM CHLORIDE: 600; 310; 30; 20 INJECTION, SOLUTION INTRAVENOUS at 08:31

## 2023-04-23 RX ADMIN — POTASSIUM CHLORIDE 40 MEQ: 1500 TABLET, EXTENDED RELEASE ORAL at 11:33

## 2023-04-23 RX ADMIN — SODIUM BICARBONATE 1300 MG: 650 TABLET ORAL at 17:45

## 2023-04-23 RX ADMIN — INSULIN HUMAN 2 UNITS: 100 INJECTION, SOLUTION PARENTERAL at 08:35

## 2023-04-23 RX ADMIN — CARVEDILOL 12.5 MG: 12.5 TABLET, FILM COATED ORAL at 17:51

## 2023-04-23 RX ADMIN — DULOXETINE HYDROCHLORIDE 30 MG: 30 CAPSULE, DELAYED RELEASE ORAL at 17:45

## 2023-04-23 RX ADMIN — METRONIDAZOLE 500 MG: 500 TABLET ORAL at 05:50

## 2023-04-23 RX ADMIN — ATORVASTATIN CALCIUM 10 MG: 10 TABLET, FILM COATED ORAL at 17:45

## 2023-04-23 RX ADMIN — INSULIN HUMAN 3 UNITS: 100 INJECTION, SOLUTION PARENTERAL at 12:25

## 2023-04-23 RX ADMIN — INSULIN HUMAN 2 UNITS: 100 INJECTION, SOLUTION PARENTERAL at 21:30

## 2023-04-23 RX ADMIN — CEFTRIAXONE SODIUM 2000 MG: 10 INJECTION, POWDER, FOR SOLUTION INTRAVENOUS at 05:50

## 2023-04-23 RX ADMIN — POTASSIUM CHLORIDE 40 MEQ: 1500 TABLET, EXTENDED RELEASE ORAL at 08:29

## 2023-04-23 RX ADMIN — SODIUM BICARBONATE 1300 MG: 650 TABLET ORAL at 05:50

## 2023-04-23 RX ADMIN — ALLOPURINOL 100 MG: 100 TABLET ORAL at 10:18

## 2023-04-23 RX ADMIN — DOCUSATE SODIUM 50 MG AND SENNOSIDES 8.6 MG 2 TABLET: 8.6; 5 TABLET, FILM COATED ORAL at 17:45

## 2023-04-23 RX ADMIN — METRONIDAZOLE 500 MG: 500 TABLET ORAL at 14:20

## 2023-04-23 RX ADMIN — SODIUM CHLORIDE, POTASSIUM CHLORIDE, SODIUM LACTATE AND CALCIUM CHLORIDE: 600; 310; 30; 20 INJECTION, SOLUTION INTRAVENOUS at 01:04

## 2023-04-23 RX ADMIN — DOCUSATE SODIUM 50 MG AND SENNOSIDES 8.6 MG 2 TABLET: 8.6; 5 TABLET, FILM COATED ORAL at 05:50

## 2023-04-23 RX ADMIN — CARVEDILOL 12.5 MG: 12.5 TABLET, FILM COATED ORAL at 10:18

## 2023-04-23 RX ADMIN — METRONIDAZOLE 500 MG: 500 TABLET ORAL at 21:29

## 2023-04-23 ASSESSMENT — ENCOUNTER SYMPTOMS
FEVER: 0
SPUTUM PRODUCTION: 0
ABDOMINAL PAIN: 1
HEMOPTYSIS: 0
SHORTNESS OF BREATH: 0
VOMITING: 0
COUGH: 0
HEARTBURN: 0
FOCAL WEAKNESS: 1
BLURRED VISION: 0
NAUSEA: 1
PHOTOPHOBIA: 0
MYALGIAS: 0
ABDOMINAL PAIN: 0

## 2023-04-23 ASSESSMENT — PAIN DESCRIPTION - PAIN TYPE
TYPE: ACUTE PAIN
TYPE: ACUTE PAIN

## 2023-04-23 ASSESSMENT — FIBROSIS 4 INDEX: FIB4 SCORE: 4.67

## 2023-04-23 NOTE — PROGRESS NOTES
Patient is educated on plan of care during course of hospital stay. Patient will remain free from falls and is educated on use of call light for assistance. Patient is currently resting with no complaints at this time. Hourly rounding and fall precautions are in place. Bedside table and call light are within reach.

## 2023-04-23 NOTE — CARE PLAN
The patient is Stable - Low risk of patient condition declining or worsening    Shift Goals  Clinical Goals: achs, pain mgmt, safety  Patient Goals: pain mgmt, comfort    Progress made toward(s) clinical / shift goals:    Problem: Knowledge Deficit - Standard  Goal: Patient and family/care givers will demonstrate understanding of plan of care, disease process/condition, diagnostic tests and medications  Outcome: Progressing     Problem: Urinary - Renal Perfusion  Goal: Ability to achieve and maintain adequate renal perfusion and functioning will improve  Outcome: Progressing     Problem: Respiratory  Goal: Patient will achieve/maintain optimum respiratory ventilation and gas exchange  Outcome: Progressing     Problem: Pain - Standard  Goal: Alleviation of pain or a reduction in pain to the patient’s comfort goal  Outcome: Progressing     Problem: Fall Risk  Goal: Patient will remain free from falls  Outcome: Progressing

## 2023-04-23 NOTE — PROGRESS NOTES
[facility] Medicine Daily Progress Note    Date of Service  4/23/2023    Chief Complaint  Tyrone Cline is a 82 y.o. male admitted 4/21/2023 with   Chief Complaint   Patient presents with    GLF     2 days ago. (-) thinners    Extremity Weakness     Chronic lower bilateral weakness.         Hospital Course  This 82-year-old male with a past medical history significant for CKD stage IV being followed by nephrology, diabetes mellitus, hypertension presented to ER on 4/21/2023 with a complaint of abdominal pain and lower extremity weakness.    Patient stated that he had epigastric and abdominal pain on Tuesday CT scan of the abdomen and pelvis consistent with colitis.  WBC 19, creatinine 5.    Since then, he continues to report being weak, unable to walk secondary to come to ER.  There was a concern of Guillain-Barré as well as possible stroke, neurology was consulted, recommended MRI of the CTL spine.    Interval events:  -- No acute events overnight, vital sign has been stable, patient is alert, awake, answering questions appropriately.  -- WBC trending down, today at 14.7, renal function continued to improve, creatinine 4.5.  Patient does have anion gap of 17 likely secondary to renal failure.  -- Pending urine sodium, urine creatinine, renal ultrasound showed atrophic kidneys.  -- Nephrology was contacted, pending evaluation.    4/23:  -- No acute events overnight, patient has been stable, WBC count elevated to 16.  Patient is being treated for colitis with Rocephin plus Flagyl.  Patient reports that he has bilateral lower extremity weakness has been improving.  MRI of T and C-spine pending, patient stated that he could not tolerate the MRI of the LS spine yesterday.  Ordered with sedation  Discussed with the nursing staff and neurologist  -- We will obtain PT/OT  -- Potassium at 3, will replete as needed, obtain magnesium.  CPK is normal at 79   --procalcitonin still elevated at 24 but trended down from  79  -- I went to the bedside of the patient with nursing staff, examined  , Discussed the plan of care with him.  I also called the neurologist agreed on competing CT scan    Consultants/Specialty  nephrology    Code Status  DNAR/DNI    Disposition  Patient is not medically cleared for discharge.   Anticipate discharge to to home with close outpatient follow-up.  I have placed the appropriate orders for post-discharge needs.    Review of Systems  Review of Systems   Constitutional:  Positive for malaise/fatigue. Negative for fever.   HENT:  Negative for congestion.    Eyes:  Negative for blurred vision and photophobia.   Respiratory:  Negative for cough, hemoptysis and sputum production.    Gastrointestinal:  Positive for abdominal pain and nausea. Negative for heartburn and vomiting.   Musculoskeletal:  Negative for myalgias.   Neurological:  Positive for focal weakness.   All other systems reviewed and are negative.     Physical Exam  Temp:  [36.4 °C (97.6 °F)-37.4 °C (99.4 °F)] 36.4 °C (97.6 °F)  Pulse:  [78-95] 78  Resp:  [17-18] 18  BP: (147-162)/(73-94) 162/73  SpO2:  [90 %-95 %] 90 %    Physical Exam  Vitals and nursing note reviewed.   Constitutional:       Appearance: Normal appearance.   HENT:      Head: Normocephalic and atraumatic.   Eyes:      Pupils: Pupils are equal, round, and reactive to light.   Cardiovascular:      Rate and Rhythm: Normal rate.   Pulmonary:      Effort: No respiratory distress.      Breath sounds: Normal breath sounds.   Abdominal:      General: There is no distension.      Palpations: Abdomen is soft.   Musculoskeletal:      Cervical back: Neck supple.      Right lower leg: No edema.      Left lower leg: No edema.   Skin:     General: Skin is warm.   Neurological:      Mental Status: He is alert and oriented to person, place, and time.      Motor: Weakness (RLE weaker than left, better than yesteraday) present.   Psychiatric:         Mood and Affect: Mood normal.        Fluids    Intake/Output Summary (Last 24 hours) at 4/23/2023 1141  Last data filed at 4/23/2023 0509  Gross per 24 hour   Intake 3557.44 ml   Output 2100 ml   Net 1457.44 ml         Laboratory  Recent Labs     04/21/23  1300 04/22/23  0051 04/23/23  0400   WBC 19.4* 14.7* 16.8*   RBC 5.12 4.87 4.62*   HEMOGLOBIN 15.3 14.5 13.6*   HEMATOCRIT 44.0 41.3* 39.1*   MCV 85.9 84.8 84.6   MCH 29.9 29.8 29.4   MCHC 34.8 35.1 34.8   RDW 47.7 45.1 45.1   PLATELETCT 147* 136* 137*   MPV 12.3 12.2 11.8       Recent Labs     04/21/23  1300 04/22/23  0051 04/23/23  0400   SODIUM 130* 130* 132*   POTASSIUM 3.8 3.7 3.0*   CHLORIDE 92* 96 100   CO2 20 17* 18*   GLUCOSE 160* 178* 196*   BUN 80* 92* 82*   CREATININE 5.07* 4.51* 3.11*   CALCIUM 9.5 9.0 8.8                     Imaging  MR-LUMBAR SPINE-W/O   Final Result      1.  Lower lumbar spine predominant degenerative changes as described in detail above, progressed from 2010 MRI.   2.  Edema at the L4-L5 disc space and L5 superior endplate likely represents degenerative changes. Less likely this could represent a low-grade or early discitis osteomyelitis. Correlate for evidence of infection.   3.  Acute appearing Schmorl's node at L3-L4, which can be a source of pain.      US-RENAL   Final Result      1.  Atrophic kidneys. Echogenic bilateral renal parenchyma could relate to medical renal disease.      2.  No hydronephrosis. No renal calculus.      DX-CHEST-PORTABLE (1 VIEW)   Final Result      No acute cardiac or pulmonary abnormalities are identified.      CT-ABDOMEN-PELVIS W/O   Final Result      1.  Findings suspicious for focal colitis at the hepatic flexure, less likely cholecystitis or duodenitis with secondary involvement of the colon. Infectious, inflammatory and ischemic etiologies are considerations. Underlying mass is not excluded.   2.  Cholelithiasis with distention of the gallbladder   3.  BILATERAL renal atrophy   4.  Subcentimeter LEFT adrenal myelolipoma   5.   12 mm RIGHT adrenal nodule likely an adenoma absent a history of cancer   6.  Subcentimeter RIGHT hepatic lesion, likely a cyst absent a history of cancer   7.  Atherosclerosis   8.  Colonic diverticulosis      CT-HEAD W/O   Final Result      1.  Cerebral atrophy.      2.  White matter lucencies most consistent with small vessel ischemic change versus demyelination or gliosis.      3.  Otherwise, Head CT without contrast with no acute findings. No evidence of acute cerebral infarction, hemorrhage or mass lesion.         MR-THORACIC SPINE-W/O    (Results Pending)   MR-CERVICAL SPINE-W/O    (Results Pending)          Assessment/Plan  * Acute kidney failure (HCC)- (present on admission)  Assessment & Plan  Cr is up from baseline 2.2 to 5  Due to dehydration and sepsis   IV fluids   -- I/os, pending urine sodium, urine creatinine, renal ultrasound showed atrophic kidneys.  Nephrology consulted, appreciate recommendation, renal function improving, continue IV fluid    Hypokalemia  Assessment & Plan  Replete and monitor    Thrombocytopenia (HCC)  Assessment & Plan  137, not actively bleeding, monitor    High anion gap metabolic acidosis  Assessment & Plan  Likely secondary to SANJUANITA and CKD, continue bicarb, monitor, nephrology consulted    Hyponatremia- (present on admission)  Assessment & Plan  Na 130  hypovolemic    Weakness of both lower extremities- (present on admission)  Assessment & Plan  He usually walks without assistance now for the past 4 to 5 days he is unable to walk as his legs buckle.  His right leg feels weaker than the left.  I do not appreciate reflexes but is little bit limited without a reflex hammer and his prone position.   --- There was a concern of GBS  .-Neurology was consulted, neurology recommended MRI TC LS spine which has been ordered, pending  Patient is able to lift his bilateral lower extremity against gravity with right-sided side more weaker than the left side, noted to have areflexic in  the Achilles tendon, neurology is able to elicit patellar reflex   -- MR L spine  Reviewed  Pending MR TC spine    Sepsis (HCC)- (present on admission)  Assessment & Plan  This is Sepsis Present on admission  SIRS criteria identified on my evaluation include: Leukocytosis, with WBC greater than 12,000  Source is colitis  Sepsis protocol initiated  Fluid resuscitation ordered per protocol  Crystalloid Fluid Administration: Fluid resuscitation ordered per standard protocol - 30 mL/kg per current or ideal body weight  IV antibiotics as appropriate for source of sepsis  Reassessment: I have reassessed the patient's hemodynamic status          Colitis- (present on admission)  Assessment & Plan  Noted on CT scan  This associated with abdominal pain, continue IV antibiotics, monitor clinically    Diabetic peripheral neuropathy (HCC)- (present on admission)  Assessment & Plan  Continue Cymbalta    Essential hypertension- (present on admission)  Assessment & Plan  Pressure elevated, start home medication including Coreg.  We will hold ACEI considering patient worsening of renal function    Type 2 diabetes mellitus with hyperglycemia (HCC)- (present on admission)  Assessment & Plan  On Januvia and Ozempic outpatient  Sliding scale insulin    Chronic kidney disease (CKD), stage IV (severe) (HCC)- (present on admission)  Assessment & Plan  Baseline Cr is 2.2 and Cr is now 5  Followed by [facility] nephrology  Creatinine improving to 3.11         VTE prophylaxis: scd  I have performed a physical exam and reviewed and updated ROS and Plan today (4/23/2023). In review of yesterday's note (4/22/2023), there are no changes except as documented above.

## 2023-04-23 NOTE — DISCHARGE PLANNING
[facility]/SCP TCN chart review completed. Collaborated with NIKIA Gracia prior to meeting with the pt. The most current review of medical record, knowledge of pt's PLOF and social support, LACE+ score of 58 and 6 clicks scores of 16 for ADLs and 11 for mobility were considered. Per chart review, patient on IV ABX.    Pt seen at bedside with son Skip. Introduced TCN program. Provided education regarding post acute levels of care. Discussed HTH/SCP plan benefits (Meds to Beds, medical uber and [facility] transitional care). Pt verbalizes understanding. Patient states 3 days ago, he started feeling very weak and fell at home. Prior to this, he endorses being independent with mobility, ADLs, IADLs, and driving. He denies having any other previous falls. He also said he is feeling much better today and feels this new weakness is likely due to new dehydration and sepsis. Patient agreed that if he still feels weak after IV fluids and ABX, he may benefit from PT eval.    Choice proactively obtained for infusion, faxed to [facility]. TCN will continue to follow and collaborate with discharge planning team as additional post acute needs arise. Thank you.     Completed today:  Choice obtained: Infusion  [facility] referral sent 4/22/23

## 2023-04-23 NOTE — CARE PLAN
The patient is Stable - Low risk of patient condition declining or worsening    Shift Goals  Clinical Goals: safety  Patient Goals: rest    Progress made toward(s) clinical / shift goals:        Problem: Knowledge Deficit - Standard  Goal: Patient and family/care givers will demonstrate understanding of plan of care, disease process/condition, diagnostic tests and medications  Outcome: Progressing     Problem: Hemodynamics  Goal: Patient's hemodynamics, fluid balance and neurologic status will be stable or improve  Outcome: Progressing     Problem: Fluid Volume  Goal: Fluid volume balance will be maintained  Outcome: Progressing     Problem: Urinary - Renal Perfusion  Goal: Ability to achieve and maintain adequate renal perfusion and functioning will improve  Outcome: Progressing     Problem: Respiratory  Goal: Patient will achieve/maintain optimum respiratory ventilation and gas exchange  Outcome: Progressing     Problem: Mechanical Ventilation  Goal: Safe management of artificial airway and ventilation  Outcome: Progressing  Goal: Successful weaning off mechanical ventilator, spontaneously maintains adequate gas exchange  Outcome: Progressing  Goal: Patient will be able to express needs and understand communication  Outcome: Progressing     Problem: Physical Regulation  Goal: Diagnostic test results will improve  Outcome: Progressing  Goal: Signs and symptoms of infection will decrease  Outcome: Progressing     Problem: Pain - Standard  Goal: Alleviation of pain or a reduction in pain to the patient’s comfort goal  Outcome: Progressing     Problem: Fall Risk  Goal: Patient will remain free from falls  Outcome: Progressing

## 2023-04-23 NOTE — PROGRESS NOTES
Nephrology Daily Progress Note    Date of Service  4/23/2023    Chief Complaint  82 y.o. male with a history of diabetes, hypertension, BPH, CKD 4 who presented 4/21/2023 with weakness and abdominal pains, found to have colitis, SANJUANITA on CKD 4, and concern for weakness from a neurologic process.    Interval Problem Update  4/23 -urine output 2.1 L in the last 24 hours.  Patient denies chest pain, shortness of breath.    Review of Systems  Review of Systems   Constitutional:  Negative for fever.   Respiratory:  Negative for shortness of breath.    Cardiovascular:  Negative for chest pain.   Gastrointestinal:  Negative for abdominal pain.   All other systems reviewed and are negative.     Physical Exam  Temp:  [36.4 °C (97.6 °F)-37.4 °C (99.4 °F)] 36.4 °C (97.6 °F)  Pulse:  [78-95] 78  Resp:  [17-18] 18  BP: (147-162)/(73-94) 162/73  SpO2:  [90 %-95 %] 90 %    Physical Exam  Constitutional:       General: He is not in acute distress.     Appearance: He is well-developed. He is not diaphoretic.   HENT:      Head: Normocephalic and atraumatic.      Mouth/Throat:      Mouth: Mucous membranes are moist.      Pharynx: Oropharynx is clear. No oropharyngeal exudate.   Eyes:      General: No scleral icterus.     Extraocular Movements: Extraocular movements intact.   Neck:      Trachea: No tracheal deviation.   Cardiovascular:      Rate and Rhythm: Normal rate.      Heart sounds: Normal heart sounds. No murmur heard.  Pulmonary:      Effort: Pulmonary effort is normal.      Breath sounds: Normal breath sounds. No stridor. No rales.   Abdominal:      General: There is no distension.      Palpations: Abdomen is soft.      Tenderness: There is no abdominal tenderness.   Musculoskeletal:         General: Normal range of motion.      Right lower leg: No edema.      Left lower leg: No edema.   Skin:     General: Skin is warm and dry.   Neurological:      General: No focal deficit present.      Mental Status: He is alert and oriented  to person, place, and time.   Psychiatric:         Mood and Affect: Mood normal.         Behavior: Behavior normal.       Fluids    Intake/Output Summary (Last 24 hours) at 4/23/2023 1437  Last data filed at 4/23/2023 0509  Gross per 24 hour   Intake 3557.44 ml   Output 2100 ml   Net 1457.44 ml       Laboratory  Labs reviewed, pertinent labs below.  Recent Labs     04/21/23  1300 04/22/23  0051 04/23/23  0400   WBC 19.4* 14.7* 16.8*   RBC 5.12 4.87 4.62*   HEMOGLOBIN 15.3 14.5 13.6*   HEMATOCRIT 44.0 41.3* 39.1*   MCV 85.9 84.8 84.6   MCH 29.9 29.8 29.4   MCHC 34.8 35.1 34.8   RDW 47.7 45.1 45.1   PLATELETCT 147* 136* 137*   MPV 12.3 12.2 11.8     Recent Labs     04/21/23  1300 04/22/23  0051 04/23/23  0400   SODIUM 130* 130* 132*   POTASSIUM 3.8 3.7 3.0*   CHLORIDE 92* 96 100   CO2 20 17* 18*   GLUCOSE 160* 178* 196*   BUN 80* 92* 82*   CREATININE 5.07* 4.51* 3.11*   CALCIUM 9.5 9.0 8.8               URINALYSIS:  Lab Results   Component Value Date/Time    COLORURINE Yellow 04/22/2023 1537    CLARITY Clear 04/22/2023 1537    SPECGRAVITY 1.013 04/22/2023 1537    PHURINE 5.5 04/22/2023 1537    KETONES Negative 04/22/2023 1537    PROTEINURIN 100 (A) 04/22/2023 1537    BILIRUBINUR Negative 04/22/2023 1537    UROBILU 0.2 04/22/2023 1537    NITRITE Negative 04/22/2023 1537    LEUKESTERAS Negative 04/22/2023 1537    OCCULTBLOOD Small (A) 04/22/2023 1537     [facility]  Lab Results   Component Value Date/Time    TOTPROTUR 51.0 (H) 04/22/2023 1537      Lab Results   Component Value Date/Time    CREATININEU 68.12 04/22/2023 1537    CREATININEU 70.18 04/22/2023 1537         Imaging interpreted by radiologist. Imaging reports reviewed with pertinent findings below  MR-LUMBAR SPINE-W/O   Final Result      1.  Lower lumbar spine predominant degenerative changes as described in detail above, progressed from 2010 MRI.   2.  Edema at the L4-L5 disc space and L5 superior endplate likely represents degenerative changes. Less likely this  could represent a low-grade or early discitis osteomyelitis. Correlate for evidence of infection.   3.  Acute appearing Schmorl's node at L3-L4, which can be a source of pain.      MR-CERVICAL SPINE-W/O         MR-THORACIC SPINE-W/O         US-RENAL   Final Result      1.  Atrophic kidneys. Echogenic bilateral renal parenchyma could relate to medical renal disease.      2.  No hydronephrosis. No renal calculus.      DX-CHEST-PORTABLE (1 VIEW)   Final Result      No acute cardiac or pulmonary abnormalities are identified.      CT-ABDOMEN-PELVIS W/O   Final Result      1.  Findings suspicious for focal colitis at the hepatic flexure, less likely cholecystitis or duodenitis with secondary involvement of the colon. Infectious, inflammatory and ischemic etiologies are considerations. Underlying mass is not excluded.   2.  Cholelithiasis with distention of the gallbladder   3.  BILATERAL renal atrophy   4.  Subcentimeter LEFT adrenal myelolipoma   5.  12 mm RIGHT adrenal nodule likely an adenoma absent a history of cancer   6.  Subcentimeter RIGHT hepatic lesion, likely a cyst absent a history of cancer   7.  Atherosclerosis   8.  Colonic diverticulosis      CT-HEAD W/O   Final Result      1.  Cerebral atrophy.      2.  White matter lucencies most consistent with small vessel ischemic change versus demyelination or gliosis.      3.  Otherwise, Head CT without contrast with no acute findings. No evidence of acute cerebral infarction, hemorrhage or mass lesion.               Current Facility-Administered Medications   Medication Dose Route Frequency Provider Last Rate Last Admin    atorvastatin (LIPITOR) tablet 10 mg  10 mg Oral DAILY AT 1800 Smiley Cain M.D.        allopurinol (ZYLOPRIM) tablet 100 mg  100 mg Oral DAILY Smiley Cain M.D.   100 mg at 04/23/23 1018    carvedilol (COREG) tablet 12.5 mg  12.5 mg Oral BID WITH MEALS Smiley Cain M.D.   12.5 mg at 04/23/23 1018    sodium bicarbonate tablet 1,300 mg  1,300  mg Oral BID Smiley Cain M.D.   1,300 mg at 04/23/23 0550    lactated ringers infusion (BOLUS)  1,000 mL Intravenous Once PRN Cb Maldonado M.D.        lactated ringers infusion   Intravenous Continuous Ronaldo Perez M.D. 150 mL/hr at 04/23/23 0831 New Bag at 04/23/23 0831    DULoxetine (CYMBALTA) capsule 30 mg  30 mg Oral Q EVENING Ronaldo Perez M.D.   30 mg at 04/22/23 1803    senna-docusate (PERICOLACE or SENOKOT S) 8.6-50 MG per tablet 2 Tablet  2 Tablet Oral BID Ronaldo Perez M.D.   2 Tablet at 04/23/23 0550    And    polyethylene glycol/lytes (MIRALAX) PACKET 1 Packet  1 Packet Oral QDAY PRN Ronaldo Perez M.D.   1 Packet at 04/23/23 0549    And    magnesium hydroxide (MILK OF MAGNESIA) suspension 30 mL  30 mL Oral QDAY PRN Ronaldo Perez M.D.        And    bisacodyl (DULCOLAX) suppository 10 mg  10 mg Rectal QDAY PRN Ronaldo Perez M.D.        lactated ringers infusion (BOLUS)  500 mL Intravenous Once PRN Ronaldo Perez M.D.        acetaminophen (Tylenol) tablet 650 mg  650 mg Oral Q6HRS PRN Ronaldo Perez M.D.        cefTRIAXone (Rocephin) syringe 2,000 mg  2,000 mg Intravenous Q24HRS Ronaldo Perez M.D.   2,000 mg at 04/23/23 0550    metroNIDAZOLE (FLAGYL) tablet 500 mg  500 mg Oral Q8HRS Ronaldo Perez M.D.   500 mg at 04/23/23 1420    insulin regular (HumuLIN R,NovoLIN R) injection  2-9 Units Subcutaneous 4X/DAY ACHS Ronaldo Perez M.D.   3 Units at 04/23/23 1225    And    dextrose 10 % BOLUS 25 g  25 g Intravenous Q15 MIN PRN Ronaldo Perez M.D.        Pharmacy Consult Request ...Pain Management Review 1 Each  1 Each Other PHARMACY TO DOSE [facility]        oxyCODONE immediate-release (ROXICODONE) tablet 5 mg  5 mg Oral Q3HRS PRN Eli Pandya, A.P.R.N.        Or    oxyCODONE immediate release (ROXICODONE) tablet 10 mg  10 mg Oral Q3HRS PRN Eli Pandya, A.P.R.N.        Or    HYDROmorphone (Dilaudid) injection 0.5 mg  0.5 mg Intravenous Q3HRS PRCRISSY RAUSCH  NAYA PandyaN.   0.5 mg at 04/21/23 2358         Assessment/Plan  82 y.o. male with a history of diabetes, hypertension, BPH, CKD 4 who presented 4/21/2023 with weakness and abdominal pains, found to have colitis, SANJUANITA on CKD 4, and concern for weakness from a neurologic process.     1.  SANJUANITA on CKD 4.  Nonoliguric.  Creatinine improving.  Patient has known history of macroalbuminuria dating back to 2015.  I suspect his SANJUANITA is from prerenal hypovolemia.  Continue IV fluids as below.  Avoid NSAIDs and other nephrotoxins.  No acute need for dialysis.  Check renal function panel daily.     2.  Hyponatremia, mildly persistent, but improving.  Urine osmolality high, suggesting the presence of ADH, likely from abdominal pain leading to ADH secretion.  Do not order salt tabs.  Avoid hypotonic fluids.  Check sodium level daily.     3.  Metabolic acidosis, due to SANJUANITA on CKD.  Persistent, but slightly improving.  Continue lactated Ringer's infusion for now.  If acidemia does not improve, would switch lactated Ringer's to sodium bicarbonate 150 mEq in 1 L of D5W or sterile water.  Check renal function panel daily.     4.  Azotemia, persistent, but slightly improving.  Patient has no uremic symptoms.  Continue IV fluids.  No acute need for dialysis.  Check renal function panel daily.     5.  Leukocytosis, worsening.  Unclear etiology.  Check CBC daily.     6.  Thrombocytopenia, mildly persistent.  Unclear etiology.  Check CBC daily.     7.  Hypertension.  Patient originally admitted with some hypotension.  His home medications include olmesartan 20 mg daily, carvedilol 12.5 mg p.o. twice daily, finerenone 10 mg daily.  As he has now more hypertensive, agree with resuming carvedilol, also would recommend resuming finerenone, but not on formulary, start spironolactone 25 mg daily.  If hypertension persists, recommend resume olmesartan 20 mg daily.         Juan Bedoya MD  Nephrology  [facility]

## 2023-04-23 NOTE — PROGRESS NOTES
Neurology Progress Note        Subjective:    Ag feels like he is getting stronger.  He would like to try and get out of bed and walk around.  He denies any difficulty with swallowing or breathing.  He denies any new numbness or tingling.  He could not tolerate the MRI scan yesterday due to the noise so only the L spine was completed.    Objective:  Vitals:  Vitals:    04/22/23 2011 04/23/23 0322 04/23/23 0509 04/23/23 0721   BP: (!) 147/85 (!) 157/80  (!) 162/73   Pulse: 95 83  78   Resp: 18 17  18   Temp: 36.7 °C (98 °F) 36.8 °C (98.3 °F)  36.4 °C (97.6 °F)   TempSrc: Temporal Temporal  Temporal   SpO2: 91% 95%  90%   Weight:   88.3 kg (194 lb 10.7 oz)    Height:         General: Awake, no apparent distress  Mental status: Alert, oriented x3, speech is fluent, comprehension is intact  Cranial Nerves: Pupils are equal round reactive light, extraocular movements are intact and no nystagmus, face is symmetric, facial sensations intact, no dysarthria  Motor: 5 -/5 strength in the bilateral deltoids, 4/5 in the bilateral triceps, 5/5 in the biceps, 5/5 with finger extension and  strength, 3/5 strength in the right hip flexor, 4/5 strength in left hip flexor, 4/5 strength in the right knee extensor, 5/5 in knee flexion, 4/5 with knee extension on the left, 5/5 with ankle dorsiflexion and plantarflexion  Sensory: Light touch intact throughout, proprioception intact at the great toes bilaterally  Reflexes: 1+ and symmetric at the triceps, biceps, brachioradialis and patellas.  Absent reflexes at the Achilles bilaterally.  Babinski sign present definitely on the right, equivocal on the left.  Coordination: Normal finger-to-nose bilaterally  Gait:  Deferred    Recent Labs     04/21/23  1300 04/22/23  0051 04/23/23  0400   WBC 19.4* 14.7* 16.8*   RBC 5.12 4.87 4.62*   HEMOGLOBIN 15.3 14.5 13.6*   HEMATOCRIT 44.0 41.3* 39.1*   MCV 85.9 84.8 84.6   MCH 29.9 29.8 29.4   MCHC 34.8 35.1 34.8   RDW 47.7 45.1 45.1    PLATELETCT 147* 136* 137*   MPV 12.3 12.2 11.8     Recent Labs     04/21/23  1300 04/22/23  0051 04/23/23  0400   SODIUM 130* 130* 132*   POTASSIUM 3.8 3.7 3.0*   CHLORIDE 92* 96 100   CO2 20 17* 18*   GLUCOSE 160* 178* 196*   BUN 80* 92* 82*   CREATININE 5.07* 4.51* 3.11*   CALCIUM 9.5 9.0 8.8             Recent Labs     04/21/23  1300   CPKTOTAL 745*         Recent Labs     04/21/23  1300 04/22/23  0051 04/23/23  0400   SODIUM 130* 130* 132*   POTASSIUM 3.8 3.7 3.0*   CHLORIDE 92* 96 100   CO2 20 17* 18*   GLUCOSE 160* 178* 196*   BUN 80* 92* 82*   CPKTOTAL 745*  --   --      Recent Labs     04/21/23  1300 04/22/23  0051 04/23/23  0400 04/23/23  0825   SODIUM 130* 130* 132*  --    POTASSIUM 3.8 3.7 3.0*  --    CHLORIDE 92* 96 100  --    CO2 20 17* 18*  --    BUN 80* 92* 82*  --    CREATININE 5.07* 4.51* 3.11*  --    MAGNESIUM  --   --   --  2.1   PHOSPHORUS  --   --  2.9  --    CALCIUM 9.5 9.0 8.8  --          No results found for this or any previous visit.           Imaging: neuroimaging reviewed and directly visualized by me  MR-LUMBAR SPINE-W/O   Final Result      1.  Lower lumbar spine predominant degenerative changes as described in detail above, progressed from 2010 MRI.   2.  Edema at the L4-L5 disc space and L5 superior endplate likely represents degenerative changes. Less likely this could represent a low-grade or early discitis osteomyelitis. Correlate for evidence of infection.   3.  Acute appearing Schmorl's node at L3-L4, which can be a source of pain.      US-RENAL   Final Result      1.  Atrophic kidneys. Echogenic bilateral renal parenchyma could relate to medical renal disease.      2.  No hydronephrosis. No renal calculus.      DX-CHEST-PORTABLE (1 VIEW)   Final Result      No acute cardiac or pulmonary abnormalities are identified.      CT-ABDOMEN-PELVIS W/O   Final Result      1.  Findings suspicious for focal colitis at the hepatic flexure, less likely cholecystitis or duodenitis with  secondary involvement of the colon. Infectious, inflammatory and ischemic etiologies are considerations. Underlying mass is not excluded.   2.  Cholelithiasis with distention of the gallbladder   3.  BILATERAL renal atrophy   4.  Subcentimeter LEFT adrenal myelolipoma   5.  12 mm RIGHT adrenal nodule likely an adenoma absent a history of cancer   6.  Subcentimeter RIGHT hepatic lesion, likely a cyst absent a history of cancer   7.  Atherosclerosis   8.  Colonic diverticulosis      CT-HEAD W/O   Final Result      1.  Cerebral atrophy.      2.  White matter lucencies most consistent with small vessel ischemic change versus demyelination or gliosis.      3.  Otherwise, Head CT without contrast with no acute findings. No evidence of acute cerebral infarction, hemorrhage or mass lesion.         MR-THORACIC SPINE-W/O    (Results Pending)   MR-CERVICAL SPINE-W/O    (Results Pending)       Impression:  82-year-old man with 5 to 6 days of right greater than left leg weakness as well as perhaps some upper extremity weakness as well.  This developed concurrently with a GI illness concerning for food borne illness.  Certainly that history would raise the possibility of Guillain-Barré syndrome although the symptoms seem to have developed relatively rapidly in conjunction with the GI illness.  However, he has preserved reflexes in the arms and at the patellas.  Reflexes are absent at the Achilles which can be normal for his age especially in the setting of diabetic polyneuropathy.  In fact his upgoing toe on the right concerns me more for a myelopathic process.  Myopathic process is also a consideration as well as just generalized weakness from his underlying acute renal failure.    This is becoming a difficult case.  He is clearly improved today compared to presentation but still not back to baseline.  GBS continues to be lower on the differential due to the relatively preserved reflexes and upgoing toe on the right.  However,  he is clearly improving without any specific treatment other than hydration so this makes me less concerned as well for a myelopathic process whether it be compressive or inflammatory.  He did have a mild elevation in his CK, so mild rhabdomyolysis in the setting of illness could be considered as well although the asymmetry of his leg weakness would be unusual for myopathy.  MRI L spine does raise concern for degenerative changes vs. Diskitis/osteomyelitis.  I would favor degenerative changes, but will defer further evaluation to the primary team.     Recommendations:  - Would still like to attempt MRI of the T and C-spine to evaluate for myelopathic process.  -If MRI T and C spine show no cause of weakness, It would also be reasonable to pursue LP to evaluate for albuminocytological dissociation which would make GBS a more likely diagnosis.  Although since he is already improving and with his poor renal function treating at least with IVIG may be risky.  -CK level is mildly elevated, but this is likely not high enough to explain his degree of weakness on a pure myopathy basis.  Would trend CK level to see if this is improving or worsening.  -PT, OT evaluate and treat.  -Neurology will continue to follow       Suraj Lopez MD  Neurohospitalist

## 2023-04-24 ENCOUNTER — APPOINTMENT (OUTPATIENT)
Dept: RADIOLOGY | Facility: MEDICAL CENTER | Age: 83
DRG: 853 | End: 2023-04-24
Attending: HOSPITALIST
Payer: MEDICARE

## 2023-04-24 LAB
ALBUMIN SERPL BCP-MCNC: 2.2 G/DL (ref 3.2–4.9)
APPEARANCE UR: CLEAR
BACTERIA #/AREA URNS HPF: NEGATIVE /HPF
BILIRUB UR QL STRIP.AUTO: NEGATIVE
BUN SERPL-MCNC: 67 MG/DL (ref 8–22)
CALCIUM ALBUM COR SERPL-MCNC: 10.4 MG/DL (ref 8.5–10.5)
CALCIUM SERPL-MCNC: 9 MG/DL (ref 8.5–10.5)
CHLORIDE SERPL-SCNC: 101 MMOL/L (ref 96–112)
CO2 SERPL-SCNC: 20 MMOL/L (ref 20–33)
COLOR UR: YELLOW
CREAT SERPL-MCNC: 2.2 MG/DL (ref 0.5–1.4)
EPI CELLS #/AREA URNS HPF: NEGATIVE /HPF
ERYTHROCYTE [DISTWIDTH] IN BLOOD BY AUTOMATED COUNT: 44.9 FL (ref 35.9–50)
GFR SERPLBLD CREATININE-BSD FMLA CKD-EPI: 29 ML/MIN/1.73 M 2
GLUCOSE BLD STRIP.AUTO-MCNC: 173 MG/DL (ref 65–99)
GLUCOSE BLD STRIP.AUTO-MCNC: 185 MG/DL (ref 65–99)
GLUCOSE BLD STRIP.AUTO-MCNC: 195 MG/DL (ref 65–99)
GLUCOSE BLD STRIP.AUTO-MCNC: 235 MG/DL (ref 65–99)
GLUCOSE SERPL-MCNC: 190 MG/DL (ref 65–99)
GLUCOSE UR STRIP.AUTO-MCNC: 100 MG/DL
HCT VFR BLD AUTO: 38.4 % (ref 42–52)
HGB BLD-MCNC: 13.7 G/DL (ref 14–18)
HYALINE CASTS #/AREA URNS LPF: ABNORMAL /LPF
KETONES UR STRIP.AUTO-MCNC: NEGATIVE MG/DL
LEUKOCYTE ESTERASE UR QL STRIP.AUTO: NEGATIVE
MCH RBC QN AUTO: 30 PG (ref 27–33)
MCHC RBC AUTO-ENTMCNC: 35.7 G/DL (ref 33.7–35.3)
MCV RBC AUTO: 84 FL (ref 81.4–97.8)
MICRO URNS: ABNORMAL
NITRITE UR QL STRIP.AUTO: NEGATIVE
NT-PROBNP SERPL IA-MCNC: 2929 PG/ML (ref 0–125)
PH UR STRIP.AUTO: 5.5 [PH] (ref 5–8)
PHOSPHATE SERPL-MCNC: 1.3 MG/DL (ref 2.5–4.5)
PLATELET # BLD AUTO: 141 K/UL (ref 164–446)
PMV BLD AUTO: 12.1 FL (ref 9–12.9)
POTASSIUM SERPL-SCNC: 3.3 MMOL/L (ref 3.6–5.5)
PROCALCITONIN SERPL-MCNC: 9.86 NG/ML
PROT UR QL STRIP: 100 MG/DL
RBC # BLD AUTO: 4.57 M/UL (ref 4.7–6.1)
RBC # URNS HPF: ABNORMAL /HPF
RBC UR QL AUTO: ABNORMAL
SODIUM SERPL-SCNC: 138 MMOL/L (ref 135–145)
SP GR UR STRIP.AUTO: 1.01
UROBILINOGEN UR STRIP.AUTO-MCNC: 0.2 MG/DL
WBC # BLD AUTO: 19 K/UL (ref 4.8–10.8)
WBC #/AREA URNS HPF: ABNORMAL /HPF

## 2023-04-24 PROCEDURE — 36415 COLL VENOUS BLD VENIPUNCTURE: CPT

## 2023-04-24 PROCEDURE — 81001 URINALYSIS AUTO W/SCOPE: CPT

## 2023-04-24 PROCEDURE — 83880 ASSAY OF NATRIURETIC PEPTIDE: CPT

## 2023-04-24 PROCEDURE — 87324 CLOSTRIDIUM AG IA: CPT

## 2023-04-24 PROCEDURE — 97535 SELF CARE MNGMENT TRAINING: CPT

## 2023-04-24 PROCEDURE — 700101 HCHG RX REV CODE 250: Performed by: HOSPITALIST

## 2023-04-24 PROCEDURE — 80069 RENAL FUNCTION PANEL: CPT

## 2023-04-24 PROCEDURE — 700105 HCHG RX REV CODE 258: Performed by: HOSPITALIST

## 2023-04-24 PROCEDURE — 97162 PT EVAL MOD COMPLEX 30 MIN: CPT

## 2023-04-24 PROCEDURE — 700102 HCHG RX REV CODE 250 W/ 637 OVERRIDE(OP): Performed by: HOSPITALIST

## 2023-04-24 PROCEDURE — 99233 SBSQ HOSP IP/OBS HIGH 50: CPT | Performed by: HOSPITALIST

## 2023-04-24 PROCEDURE — 97530 THERAPEUTIC ACTIVITIES: CPT

## 2023-04-24 PROCEDURE — 700102 HCHG RX REV CODE 250 W/ 637 OVERRIDE(OP): Performed by: INTERNAL MEDICINE

## 2023-04-24 PROCEDURE — 700111 HCHG RX REV CODE 636 W/ 250 OVERRIDE (IP): Performed by: HOSPITALIST

## 2023-04-24 PROCEDURE — 84145 PROCALCITONIN (PCT): CPT

## 2023-04-24 PROCEDURE — 82962 GLUCOSE BLOOD TEST: CPT

## 2023-04-24 PROCEDURE — 770001 HCHG ROOM/CARE - MED/SURG/GYN PRIV*

## 2023-04-24 PROCEDURE — 85027 COMPLETE CBC AUTOMATED: CPT

## 2023-04-24 PROCEDURE — 99232 SBSQ HOSP IP/OBS MODERATE 35: CPT | Performed by: INTERNAL MEDICINE

## 2023-04-24 PROCEDURE — A9270 NON-COVERED ITEM OR SERVICE: HCPCS | Performed by: HOSPITALIST

## 2023-04-24 PROCEDURE — 71045 X-RAY EXAM CHEST 1 VIEW: CPT

## 2023-04-24 PROCEDURE — 99223 1ST HOSP IP/OBS HIGH 75: CPT | Performed by: PHYSICAL MEDICINE & REHABILITATION

## 2023-04-24 PROCEDURE — 97166 OT EVAL MOD COMPLEX 45 MIN: CPT

## 2023-04-24 PROCEDURE — 87493 C DIFF AMPLIFIED PROBE: CPT

## 2023-04-24 PROCEDURE — A9270 NON-COVERED ITEM OR SERVICE: HCPCS | Performed by: INTERNAL MEDICINE

## 2023-04-24 RX ORDER — FUROSEMIDE 10 MG/ML
20 INJECTION INTRAMUSCULAR; INTRAVENOUS ONCE
Status: COMPLETED | OUTPATIENT
Start: 2023-04-24 | End: 2023-04-24

## 2023-04-24 RX ADMIN — ATORVASTATIN CALCIUM 10 MG: 10 TABLET, FILM COATED ORAL at 18:17

## 2023-04-24 RX ADMIN — SODIUM PHOSPHATE, MONOBASIC, MONOHYDRATE AND SODIUM PHOSPHATE, DIBASIC, ANHYDROUS 30 MMOL: 276; 142 INJECTION, SOLUTION INTRAVENOUS at 09:01

## 2023-04-24 RX ADMIN — INSULIN HUMAN 2 UNITS: 100 INJECTION, SOLUTION PARENTERAL at 22:00

## 2023-04-24 RX ADMIN — INSULIN HUMAN 2 UNITS: 100 INJECTION, SOLUTION PARENTERAL at 18:24

## 2023-04-24 RX ADMIN — SODIUM BICARBONATE 1300 MG: 650 TABLET ORAL at 04:16

## 2023-04-24 RX ADMIN — CEFTRIAXONE SODIUM 2000 MG: 10 INJECTION, POWDER, FOR SOLUTION INTRAVENOUS at 04:16

## 2023-04-24 RX ADMIN — METRONIDAZOLE 500 MG: 500 TABLET ORAL at 22:00

## 2023-04-24 RX ADMIN — CARVEDILOL 12.5 MG: 12.5 TABLET, FILM COATED ORAL at 07:35

## 2023-04-24 RX ADMIN — SODIUM CHLORIDE, POTASSIUM CHLORIDE, SODIUM LACTATE AND CALCIUM CHLORIDE: 600; 310; 30; 20 INJECTION, SOLUTION INTRAVENOUS at 05:10

## 2023-04-24 RX ADMIN — ALLOPURINOL 100 MG: 100 TABLET ORAL at 04:17

## 2023-04-24 RX ADMIN — FUROSEMIDE 20 MG: 10 INJECTION, SOLUTION INTRAMUSCULAR; INTRAVENOUS at 08:50

## 2023-04-24 RX ADMIN — DULOXETINE HYDROCHLORIDE 30 MG: 30 CAPSULE, DELAYED RELEASE ORAL at 18:17

## 2023-04-24 RX ADMIN — CARVEDILOL 12.5 MG: 12.5 TABLET, FILM COATED ORAL at 18:16

## 2023-04-24 RX ADMIN — INSULIN HUMAN 3 UNITS: 100 INJECTION, SOLUTION PARENTERAL at 13:35

## 2023-04-24 RX ADMIN — METRONIDAZOLE 500 MG: 500 TABLET ORAL at 04:16

## 2023-04-24 RX ADMIN — SODIUM CHLORIDE, POTASSIUM CHLORIDE, SODIUM LACTATE AND CALCIUM CHLORIDE: 600; 310; 30; 20 INJECTION, SOLUTION INTRAVENOUS at 22:10

## 2023-04-24 RX ADMIN — METRONIDAZOLE 500 MG: 500 TABLET ORAL at 13:39

## 2023-04-24 RX ADMIN — SODIUM BICARBONATE 1300 MG: 650 TABLET ORAL at 18:17

## 2023-04-24 RX ADMIN — SPIRONOLACTONE 25 MG: 50 TABLET ORAL at 04:16

## 2023-04-24 ASSESSMENT — ENCOUNTER SYMPTOMS
FEVER: 0
VOMITING: 0
NAUSEA: 1
HEMOPTYSIS: 0
BLURRED VISION: 0
ABDOMINAL PAIN: 1
COUGH: 0
PHOTOPHOBIA: 0
FOCAL WEAKNESS: 1
MYALGIAS: 0
ABDOMINAL PAIN: 0
SHORTNESS OF BREATH: 0
HEARTBURN: 0
SPUTUM PRODUCTION: 0

## 2023-04-24 ASSESSMENT — PAIN DESCRIPTION - PAIN TYPE
TYPE: ACUTE PAIN
TYPE: ACUTE PAIN

## 2023-04-24 ASSESSMENT — COGNITIVE AND FUNCTIONAL STATUS - GENERAL
DAILY ACTIVITIY SCORE: 13
SUGGESTED CMS G CODE MODIFIER MOBILITY: CL
PERSONAL GROOMING: A LITTLE
MOVING TO AND FROM BED TO CHAIR: UNABLE
DRESSING REGULAR LOWER BODY CLOTHING: TOTAL
CLIMB 3 TO 5 STEPS WITH RAILING: A LOT
WALKING IN HOSPITAL ROOM: A LOT
TURNING FROM BACK TO SIDE WHILE IN FLAT BAD: A LOT
SUGGESTED CMS G CODE MODIFIER DAILY ACTIVITY: CL
STANDING UP FROM CHAIR USING ARMS: A LOT
TOILETING: TOTAL
MOVING FROM LYING ON BACK TO SITTING ON SIDE OF FLAT BED: UNABLE
MOBILITY SCORE: 10
DRESSING REGULAR UPPER BODY CLOTHING: A LITTLE
HELP NEEDED FOR BATHING: TOTAL

## 2023-04-24 ASSESSMENT — GAIT ASSESSMENTS: GAIT LEVEL OF ASSIST: UNABLE TO PARTICIPATE

## 2023-04-24 ASSESSMENT — ACTIVITIES OF DAILY LIVING (ADL): TOILETING: INDEPENDENT

## 2023-04-24 NOTE — CARE PLAN
The patient is Watcher - Medium risk of patient condition declining or worsening    Shift Goals  Clinical Goals: Safety, abx, NPO at MN, eat meal, keep patient dry after episodes of incont  Patient Goals: Have questions answered, eat, rest    Progress made toward(s) clinical / shift goals:      Problem: Knowledge Deficit - Standard  Goal: Patient and family/care givers will demonstrate understanding of plan of care, disease process/condition, diagnostic tests and medications  Outcome: Progressing  Pt asks questions regarding pending MRI with sedation. Pt understands POC     Problem: Fall Risk  Goal: Patient will remain free from falls  Outcome: Progressing  Pt is a high fall risk. All high fall risk precautions in place, except for room next to nursing station. No room available. Hourly rounding in place. All belongings within reach at bedside. Hourly rounding in place.      Problem: Skin Integrity  Goal: Skin integrity is maintained or improved  Outcome: Progressing   Pt high risk for skin breakdown. Q2 hr turns performed, waffle overlay placed. Pt checked for wetness and episodes of incont during rounding. Skin cleanses and dried, New condom cath applied. Barrier cream used on sacrum and buttocks.     Patient is not progressing towards the following goals:

## 2023-04-24 NOTE — THERAPY
Speech Language Therapy Contact Note    Patient Name: Tyrone Cline  Age:  82 y.o., Sex:  male  Medical Record #: [id number]  Today's Date: 4/24/2023 04/24/23 1021   Treatment Variance   Reason For Missed Therapy Medical - Patient  in Procedure   Total Treatment Time   SLP Time Spent Yes  (5)   Interdisciplinary Plan of Care Collaboration   IDT Collaboration with  Nursing   Collaboration Comments Orders received for CSE. Per RN, pt is NPO for MRI with sedation. SLP to hold evaluation at this time and return as able and as pt medically appropriate.   Session Information   Date / Session Number note only 4/24   Priority   (CSE orders)

## 2023-04-24 NOTE — DISCHARGE PLANNING
[facility] Transitional Care Coordination    Referral from:  Dr. Cain  Insurance Provider on Facesheet:  SCP  Potential Rehab diagnosis:  Debility    Chart review indicates patient has ongoing medical management and therapy needs to possibly meet inpatient rehab facility criteria with the goal of returning to community.      D/C Support:  Son    Physiatry to consult per protocol.    Last Covid test:    Thank you for the referral.

## 2023-04-24 NOTE — PROGRESS NOTES
Nephrology Daily Progress Note    Date of Service  4/24/2023    Chief Complaint  82 y.o. male with a history of diabetes, hypertension, BPH, CKD 4 who presented 4/21/2023 with weakness and abdominal pains, found to have colitis, SANJUANITA on CKD 4, and concern for weakness from a neurologic process.    Interval Problem Update  4/23 -urine output 2.1 L in the last 24 hours.  Patient denies chest pain, shortness of breath.    04/24/23 - Doing well.1.2 L urine oputut yesterday noted.    Review of Systems  Review of Systems   Constitutional:  Negative for fever.   Respiratory:  Negative for shortness of breath.    Cardiovascular:  Negative for chest pain.   Gastrointestinal:  Negative for abdominal pain.   All other systems reviewed and are negative.     Physical Exam  Temp:  [36.4 °C (97.5 °F)-36.9 °C (98.5 °F)] 36.8 °C (98.2 °F)  Pulse:  [85-93] 93  Resp:  [17-34] 30  BP: (156-176)/(85-95) 162/89  SpO2:  [89 %-94 %] 94 %    GEN: alert and oriented. In no acute distress  HEENT: dry oropharyngeal mucous membranes  CV:RRR   PULM: clear to auscultation bilaterally  ABD: soft non tender non distended  EXT: warm well perfused, no lower extremity edema.    Fluids    Intake/Output Summary (Last 24 hours) at 4/24/2023 1506  Last data filed at 4/24/2023 0738  Gross per 24 hour   Intake --   Output 3100 ml   Net -3100 ml         Laboratory  Labs reviewed, pertinent labs below.  Recent Labs     04/22/23  0051 04/23/23  0400 04/24/23  0133   WBC 14.7* 16.8* 19.0*   RBC 4.87 4.62* 4.57*   HEMOGLOBIN 14.5 13.6* 13.7*   HEMATOCRIT 41.3* 39.1* 38.4*   MCV 84.8 84.6 84.0   MCH 29.8 29.4 30.0   MCHC 35.1 34.8 35.7*   RDW 45.1 45.1 44.9   PLATELETCT 136* 137* 141*   MPV 12.2 11.8 12.1       Recent Labs     04/22/23  0051 04/23/23  0400 04/24/23  0133   SODIUM 130* 132* 138   POTASSIUM 3.7 3.0* 3.3*   CHLORIDE 96 100 101   CO2 17* 18* 20   GLUCOSE 178* 196* 190*   BUN 92* 82* 67*   CREATININE 4.51* 3.11* 2.20*   CALCIUM 9.0 8.8 9.0                  URINALYSIS:  Lab Results   Component Value Date/Time    COLORURINE Yellow 04/22/2023 1537    CLARITY Clear 04/22/2023 1537    SPECGRAVITY 1.013 04/22/2023 1537    PHURINE 5.5 04/22/2023 1537    KETONES Negative 04/22/2023 1537    PROTEINURIN 100 (A) 04/22/2023 1537    BILIRUBINUR Negative 04/22/2023 1537    UROBILU 0.2 04/22/2023 1537    NITRITE Negative 04/22/2023 1537    LEUKESTERAS Negative 04/22/2023 1537    OCCULTBLOOD Small (A) 04/22/2023 1537     UPC  Lab Results   Component Value Date/Time    TOTPROTUR 51.0 (H) 04/22/2023 1537      Lab Results   Component Value Date/Time    CREATININEU 68.12 04/22/2023 1537    CREATININEU 70.18 04/22/2023 1537            Current Facility-Administered Medications   Medication Dose Route Frequency Provider Last Rate Last Admin    Pharmacy Consult Request  1 Each Other PHARMACY TO DOSE Smiley Cain M.D.        atorvastatin (LIPITOR) tablet 10 mg  10 mg Oral DAILY AT 1800 Smiley Cain M.D.   10 mg at 04/23/23 1745    allopurinol (ZYLOPRIM) tablet 100 mg  100 mg Oral DAILY Smiley Cain M.D.   100 mg at 04/24/23 0417    carvedilol (COREG) tablet 12.5 mg  12.5 mg Oral BID WITH MEALS Smiley Cain M.D.   12.5 mg at 04/24/23 0735    spironolactone (ALDACTONE) tablet 25 mg  25 mg Oral Q DAY Juan Bedoya M.D.   25 mg at 04/24/23 0416    sodium bicarbonate tablet 1,300 mg  1,300 mg Oral BID Smiley Cain M.D.   1,300 mg at 04/24/23 0416    lactated ringers infusion (BOLUS)  1,000 mL Intravenous Once PRN Cb Maldonado M.D.        lactated ringers infusion   Intravenous Continuous Ronaldo Perez M.D. 150 mL/hr at 04/24/23 0510 New Bag at 04/24/23 0510    DULoxetine (CYMBALTA) capsule 30 mg  30 mg Oral Q EVENING Ronaldo Perez M.D.   30 mg at 04/23/23 1745    lactated ringers infusion (BOLUS)  500 mL Intravenous Once PRN Ronaldo Perez M.D.        acetaminophen (Tylenol) tablet 650 mg  650 mg Oral Q6HRS PRN Ronaldo Perez M.D.        cefTRIAXone (Rocephin) syringe 2,000 mg   2,000 mg Intravenous Q24HRS Ronaldo Perez M.D.   2,000 mg at 04/24/23 0416    metroNIDAZOLE (FLAGYL) tablet 500 mg  500 mg Oral Q8HRS Ronaldo Perez M.D.   500 mg at 04/24/23 1339    insulin regular (HumuLIN R,NovoLIN R) injection  2-9 Units Subcutaneous 4X/DAY ACHS Ronaldo Perez M.D.   3 Units at 04/24/23 1335    And    dextrose 10 % BOLUS 25 g  25 g Intravenous Q15 MIN PRN Ronaldo Perez M.D.        Pharmacy Consult Request ...Pain Management Review 1 Each  1 Each Other PHARMACY TO DOSE Eli Pandya, A.P.R.N.        oxyCODONE immediate-release (ROXICODONE) tablet 5 mg  5 mg Oral Q3HRS PRN Eli Pandya A.P.R.N.        Or    oxyCODONE immediate release (ROXICODONE) tablet 10 mg  10 mg Oral Q3HRS PRN Eli Pandya A.P.R.N.        Or    HYDROmorphone (Dilaudid) injection 0.5 mg  0.5 mg Intravenous Q3HRS PRN Eli Pandya A.P.R.N.   0.5 mg at 04/21/23 2956         Assessment/Plan  82 y.o. male with a history of diabetes, hypertension, BPH, CKD 4 who presented 4/21/2023 with weakness and abdominal pains, found to have colitis, SANJUANITA on CKD 4, and concern for weakness from a neurologic process.     1.  Acute on Chronic Kidney Disease IV.  Nonoliguric. Likely from prerenal hypovolemia.   - Continue IV fluids as below.   - Avoid NSAIDs and other nephrotoxins.     2.  Hyponatremia, mildly persistent, but improving.  High urinary Na suggests the presence of ADH, likely from abdominal pain leading to ADH secretion.    Avoid hypotonic fluids.  Free water restriction. Hydration as above. Continue to monitor.       3.  Metabolic acidosis, due to SANJUANITA on CKD.  Gentle hydration as above with improvement in renal function should improve acidosis. Continue to monitor.     4.  Azotemia, persistent, but slightly improving.  Patient has no uremic symptoms.  Continue IV fluids.  No acute need for dialysis.  Check renal function panel daily.     5.  Hypertension.  Continue antihypertensive regimen per primary.   Recommend resuming home coreg and consider spironolactone 25 mg daily if persists     Elizabeth Goodrich MD  Nephrology   [facility].

## 2023-04-24 NOTE — PROGRESS NOTES
Bedside report received. Assumed care of patient this PM. Assessment completed      Patient is A&O x 4, pt calls for assistance appropriately  Reports 3 /10 pain, no prn medication administered. RN educated patient on his PRN pain meds available and to let RN know when/if his pain increases.   Pt is r/a  Mobility x1 assist to EOB Bed alarm in on, high fall risk  Waffle  overlay placed.. Pt offered assistance with turning in bed.   Barrier cream applied to sacral region after BM incont  Voiding + condom cath  Flatus +             Plan of care reviewed with the patient. Bed is locked and in the lowest position. Call light is within reach. Patient encouraged to voice needs and concerns, all needs met at this time. Hourly rounding in place.

## 2023-04-24 NOTE — PROGRESS NOTES
[facility] Medicine Daily Progress Note    Date of Service  4/24/2023    Chief Complaint  Tyrone Cline is a 82 y.o. male admitted 4/21/2023 with   Chief Complaint   Patient presents with    GLF     2 days ago. (-) thinners    Extremity Weakness     Chronic lower bilateral weakness.         Hospital Course  This 82-year-old male with a past medical history significant for CKD stage IV being followed by nephrology, diabetes mellitus, hypertension presented to ER on 4/21/2023 with a complaint of abdominal pain and lower extremity weakness.    Patient stated that he had epigastric and abdominal pain on Tuesday CT scan of the abdomen and pelvis consistent with colitis.  WBC 19, creatinine 5.    Since then, he continues to report being weak, unable to walk secondary to come to ER.  There was a concern of Guillain-Barré as well as possible stroke, neurology was consulted, recommended MRI of the CTL spine.  MR L spine showing edema at L4-L5 disc space and L5 superior endplate likely present degenerative changes less likely low-grade or early discitis/osteomyelitis.    Pending MRI of C and T-spine.  Neurology continue to follow the patient, plan is to complete MRI to see any myelopathy.  And if patient's weakness does not improve then we will consider obtaining LP    Interval events:  -- No acute events overnight, vital sign has been stable, patient is alert, awake, answering questions appropriately.  -- WBC trending down, today at 14.7, renal function continued to improve, creatinine 4.5.  Patient does have anion gap of 17 likely secondary to renal failure.  -- Pending urine sodium, urine creatinine, renal ultrasound showed atrophic kidneys.  -- Nephrology was contacted, pending evaluation.    4/23:  -- No acute events overnight, patient has been stable, WBC count elevated to 16.  Patient is being treated for colitis with Rocephin plus Flagyl.  Patient reports that he has bilateral lower extremity weakness has been  improving.  MRI of T and C-spine pending, patient stated that he could not tolerate the MRI of the LS spine yesterday.  Ordered with sedation  Discussed with the nursing staff and neurologist  -- We will obtain PT/OT  -- Potassium at 3, will replete as needed, obtain magnesium.  CPK is normal at 79   --procalcitonin still elevated at 24 but trended down from 79  -- I went to the bedside of the patient with nursing staff, examined  , Discussed the plan of care with him.  I also called the neurologist agreed on competing CT scan    4/24:  -- No acute events overnight, patient blood pressure is noted to be elevated, will continue Aldactone plus Coreg.  Unable to provide ACEI considering patient having SANJUANITA on CKD stage III-IV, nephrology following  -- Nursing staff called that patient's breathing has been labored, chest x-ray stat ordered, stopped IV fluid and provided 1 dose of IV Lasix  -- Potassium 3.3, phosphorus 1.3, will provide Neutra-Phos, renal function continues to improve, today creatinine at 2.2, nephrology following, will follow the recommendation      Of note patient is to continue to get worse   -- noted to have loose BM   -- c-dif has been  negative     Once MR of T/C spine is back , will call IR for biopsy  of possible diskitis/OM     will obtain PT/OT.  Patient WBC count continues to remain elevated, currently being treated for colitis with Rocephin and Flagyl    Consultants/Specialty  nephrology    Code Status  DNAR/DNI    Disposition  Patient is not medically cleared for discharge.   Anticipate discharge to to home with close outpatient follow-up.  I have placed the appropriate orders for post-discharge needs.    Review of Systems  Review of Systems   Constitutional:  Positive for malaise/fatigue. Negative for fever.   HENT:  Negative for congestion.    Eyes:  Negative for blurred vision and photophobia.   Respiratory:  Negative for cough, hemoptysis and sputum production.    Gastrointestinal:   Positive for abdominal pain and nausea. Negative for heartburn and vomiting.   Musculoskeletal:  Negative for myalgias.   Neurological:  Positive for focal weakness.   All other systems reviewed and are negative.     Physical Exam  Temp:  [36.4 °C (97.5 °F)-36.9 °C (98.5 °F)] 36.8 °C (98.2 °F)  Pulse:  [85-93] 93  Resp:  [17-34] 30  BP: (156-176)/(85-95) 162/89  SpO2:  [89 %-94 %] 94 %    Physical Exam  Vitals and nursing note reviewed.   Constitutional:       Appearance: Normal appearance.   HENT:      Head: Normocephalic and atraumatic.   Eyes:      Pupils: Pupils are equal, round, and reactive to light.   Cardiovascular:      Rate and Rhythm: Normal rate.   Pulmonary:      Effort: No respiratory distress.      Breath sounds: Normal breath sounds.   Abdominal:      General: There is no distension.      Palpations: Abdomen is soft.   Musculoskeletal:      Cervical back: Neck supple.      Right lower leg: No edema.      Left lower leg: No edema.   Skin:     General: Skin is warm.   Neurological:      Mental Status: He is alert and oriented to person, place, and time.      Motor: Weakness (RLE weaker than left, better than yesteraday) present.   Psychiatric:         Mood and Affect: Mood normal.       Fluids    Intake/Output Summary (Last 24 hours) at 4/24/2023 1001  Last data filed at 4/24/2023 0738  Gross per 24 hour   Intake --   Output 3100 ml   Net -3100 ml         Laboratory  Recent Labs     04/22/23  0051 04/23/23  0400 04/24/23  0133   WBC 14.7* 16.8* 19.0*   RBC 4.87 4.62* 4.57*   HEMOGLOBIN 14.5 13.6* 13.7*   HEMATOCRIT 41.3* 39.1* 38.4*   MCV 84.8 84.6 84.0   MCH 29.8 29.4 30.0   MCHC 35.1 34.8 35.7*   RDW 45.1 45.1 44.9   PLATELETCT 136* 137* 141*   MPV 12.2 11.8 12.1       Recent Labs     04/22/23  0051 04/23/23  0400 04/24/23  0133   SODIUM 130* 132* 138   POTASSIUM 3.7 3.0* 3.3*   CHLORIDE 96 100 101   CO2 17* 18* 20   GLUCOSE 178* 196* 190*   BUN 92* 82* 67*   CREATININE 4.51* 3.11* 2.20*   CALCIUM  9.0 8.8 9.0                     Imaging  DX-CHEST-LIMITED (1 VIEW)   Final Result      Perihilar interstitial edema and basilar atelectasis and/or consolidation. Underlying infection is possible.      MR-LUMBAR SPINE-W/O   Final Result      1.  Lower lumbar spine predominant degenerative changes as described in detail above, progressed from 2010 MRI.   2.  Edema at the L4-L5 disc space and L5 superior endplate likely represents degenerative changes. Less likely this could represent a low-grade or early discitis osteomyelitis. Correlate for evidence of infection.   3.  Acute appearing Schmorl's node at L3-L4, which can be a source of pain.      MR-CERVICAL SPINE-W/O         MR-THORACIC SPINE-W/O         US-RENAL   Final Result      1.  Atrophic kidneys. Echogenic bilateral renal parenchyma could relate to medical renal disease.      2.  No hydronephrosis. No renal calculus.      DX-CHEST-PORTABLE (1 VIEW)   Final Result      No acute cardiac or pulmonary abnormalities are identified.      CT-ABDOMEN-PELVIS W/O   Final Result      1.  Findings suspicious for focal colitis at the hepatic flexure, less likely cholecystitis or duodenitis with secondary involvement of the colon. Infectious, inflammatory and ischemic etiologies are considerations. Underlying mass is not excluded.   2.  Cholelithiasis with distention of the gallbladder   3.  BILATERAL renal atrophy   4.  Subcentimeter LEFT adrenal myelolipoma   5.  12 mm RIGHT adrenal nodule likely an adenoma absent a history of cancer   6.  Subcentimeter RIGHT hepatic lesion, likely a cyst absent a history of cancer   7.  Atherosclerosis   8.  Colonic diverticulosis      CT-HEAD W/O   Final Result      1.  Cerebral atrophy.      2.  White matter lucencies most consistent with small vessel ischemic change versus demyelination or gliosis.      3.  Otherwise, Head CT without contrast with no acute findings. No evidence of acute cerebral infarction, hemorrhage or mass lesion.                 Assessment/Plan  * Acute kidney failure (HCC)- (present on admission)  Assessment & Plan  Cr is up from baseline 2.2 to 5  Due to dehydration and sepsis   IV fluids   -- I/os, pending urine sodium, urine creatinine, renal ultrasound showed atrophic kidneys.  Nephrology consulted, appreciate recommendation, renal function improving, continue IV fluid    4/24:  --Stop IV fluid, as patient was short of breath,  Nephrology following, will follow the recommendation    Hypokalemia  Assessment & Plan  Replete and monitor    Thrombocytopenia ([facility])  Assessment & Plan  141, not actively bleeding, monitor    High anion gap metabolic acidosis  Assessment & Plan  Likely secondary to SANJUANITA and CKD, continue bicarb, monitor, nephrology consulted    Hyponatremia- (present on admission)  Assessment & Plan  Resolved at 138    Weakness of both lower extremities- (present on admission)  Assessment & Plan  He usually walks without assistance now for the past 4 to 5 days he is unable to walk as his legs buckle.  His right leg feels weaker than the left.  I do not appreciate reflexes but is little bit limited without a reflex hammer and his prone position.   --- There was a concern of GBS  .-Neurology was consulted, neurology recommended MRI TC LS spine which has been ordered, pending  Patient is able to lift his bilateral lower extremity against gravity with right-sided side more weaker than the left side, noted to have areflexic in the Achilles tendon, neurology is able to elicit patellar reflex   -- MR L spine  Reviewed  Pending MR TC spine    Sepsis ([facility])- (present on admission)  Assessment & Plan  This is Sepsis Present on admission  SIRS criteria identified on my evaluation include: Leukocytosis, with WBC greater than 12,000  Source is colitis  Sepsis protocol initiated  Fluid resuscitation ordered per protocol  Crystalloid Fluid Administration: Fluid resuscitation ordered per standard protocol - 30 mL/kg per current or  ideal body weight  IV antibiotics as appropriate for source of sepsis  Reassessment: I have reassessed the patient's hemodynamic status          Colitis- (present on admission)  Assessment & Plan  Noted on CT scan  This associated with abdominal pain, continue IV antibiotics, monitor clinically  Patient reports improvement in his abdominal pain and bilateral lower extremity weakness though his right side is more weak than the left   Which continued to worse, today at 19, monitor    Diabetic peripheral neuropathy (HCC)- (present on admission)  Assessment & Plan  Continue Cymbalta    Essential hypertension- (present on admission)  Assessment & Plan  Pressure elevated, start home medication including Coreg.  We will hold ACEI considering patient worsening of renal function    Pneumonia  Assessment & Plan  Continue rocephin + Doxy    Type 2 diabetes mellitus with hyperglycemia (HCC)- (present on admission)  Assessment & Plan  On Januvia and Ozempic outpatient  Sliding scale insulin    Chronic kidney disease (CKD), stage IV (severe) (HCC)- (present on admission)  Assessment & Plan  Baseline Cr is 2.2 and Cr is now 5  Followed by [facility]rology  Creatinine improving to2.2         VTE prophylaxis: scd  I have performed a physical exam and reviewed and updated ROS and Plan today (4/24/2023). In review of yesterday's note (4/23/2023), there are no changes except as documented above.

## 2023-04-24 NOTE — THERAPY
Physical Therapy   Initial Evaluation     Patient Name: Tyrone Cline  Age:  82 y.o., Sex:  male  Medical Record #: [id number]  Today's Date: 4/24/2023     Precautions: Fall Risk    Assessment  Patient is an 82 y.o. male admitted with c/o GLF and BLE weakness, found to have colitis and acute kidney failure. Pt seen for PT evaluation at this time. Pt reports incr weakness for 1 week requiring assist from son, however pt typically living alone and independent with mobility without AD. Today required min-mod A for mobility, completed multiple STS to FWW and able to maintain standing incr time for pericare. Completed SPT to chair with incr unsteadiness and needed assist to prevent premature sit. Encouraged up to chair daily with nursing to promote incr activity tolerance. PT will follow to progress mobility. Pt would benefit from acute rehab at [place] to maximize safety and functional independence. Pt highly motivated to participate, previously active and indep, and has supportive son.       Plan  Treatment Plan : Bed Mobility, Gait Training, Neuro Re-Education / Balance, Self Care / Home Evaluation, Stair Training, Therapeutic Activities, Therapeutic Exercise  Treatment Frequency: 4 Times per Week  Duration: Until Therapy Goals Met    [place] Equipment Recommendations: Unable to determine at this time  Discharge Recommendations: Recommend post-acute placement for additional physical therapy services prior to discharge home (recommend PMR consult)      04/24/23 8823   Prior Living Situation   Housing / Facility 1 Story House   Steps Into Home 4   Steps In Home 0   Rail single rail   Bathroom Set up Walk In Shower;Shower Chair   Equipment Owned Front-Wheel Walker;Single Point Cane   Lives with -  Alone and Able to Care For Self   Comments pt's local son has had to stay with him the last week d/t incr weakness. pt typically indep without AD, drives, manages ADLs   Prior Level of Functional Mobility   Bed Mobility  Independent   Transfer Status Independent   Ambulation Independent   Ambulation Distance community distances   Assistive Devices Used None   Stairs Independent   Comments prior to 1 week ago   Cognition    Level of Consciousness Alert   Comments very pleasant and highly motivated   Balance Assessment   Sitting Balance (Static) Good   Sitting Balance (Dynamic) Fair +   Standing Balance (Static) Fair   Standing Balance (Dynamic) Poor +   Weight Shift Sitting Fair   Weight Shift Standing Fair   Comments standing with FWW   Bed Mobility    Supine to Sit Minimal Assist   Scooting Supervised   Gait Analysis   Gait Level Of Assist Unable to Participate   Comments shuffled steps from EOB to chair with FWW and poor awareness of where chair is behind him   Functional Mobility   Sit to Stand Minimal Assist   Bed, Chair, Wheelchair Transfer Moderate Assist   Transfer Method Stand Step   Comments ultimately needed mod A to SPT safely to chair, tried to sit prematurely, some posterior LOB with shuffled steps. did perform multiple STS prior to SPT for pericare, maintained standing for 2-3 minute bouts.   Short Term Goals    Short Term Goal # 1 pt will perform supine <> sit with SPV in 6 visits to get in/out of bed at home   Short Term Goal # 2 pt will perform all functional xfrs with SPV in 6 visits for improved independence   Short Term Goal # 3 pt will ambulate 150ft with FWW and SPV in 6 visits to access home   Short Term Goal # 4 pt will negotiate 4 steps with single rail and SPV in 6 visits to access home

## 2023-04-24 NOTE — DISCHARGE PLANNING
Received Choice form at 1238  Agency/Facility Name: [facility]  Referral sent per Choice form @ 1320     Received Choice form at 1238  Agency/Facility Name: (1) [facility] ([facility]) [facility] (3) [facility]  Referral sent per Choice form @ 1325

## 2023-04-24 NOTE — CONSULTS
Physical Medicine and Rehabilitation Consultation          Date of initial consultation: 4/24/2023  Consulting provider: Smiley Cain M.D.  Reason for consultation: assess for acute inpatient rehab appropriateness  LOS: 3 Day(s)    Chief complaint: Weakness    HPI: The patient is a 82 y.o. right hand dominant male with a past medical history of CKD stage IV, diabetes, hypertension;  who presented on 4/21/2023 12:47 PM with abdominal pain.  CT found colitis.  Labs remarkable for leukocytosis and SANJUANITA with creatinine 5.  Patient endorsed back pain.  MR LAURAspine found edema at L4-5 suspicious for early discitis/osteomyelitis.  Patient was admitted for SANJUANITA and sepsis.  Patient is currently on ceftriaxone daily through 4/27, Flagyl p.o. 3 times daily through 4/26.  Nephrology was consulted and found his SANJUANITA to be due to hypovolemia, which was treated with IV fluids.  Hospitalization is also been significant for recalcitrant hypertension.    The patient currently reports overall feeling well.  Patient endorses weakness, but denies other symptoms.  Patient reports he was able to sidestep at edge of bed and that was exhausting for him.  Patient wants to be able to return home, and is interested in IPR.  Patient reports his son will be able to help care for him on discharge.  Patient's son works full-time days, but may be able to take time off work.    ROS  Pertinent positives are mentioned in the HPI, all others reviewed and are negative.    Social Hx:  1 SH  4 BETHANY  With: Alone unable to care for self.  Patient's son has stayed with him recently due to weakness    THERAPY:  Restrictions: Fall risk  PT: Functional mobility   4/24: Unable to participate in gait shuffle steps from edge of bed to chair, mod assist transfers, min assist    OT: ADLs  4/24: Total assist lower body dressing and toileting    SLP:   None    IMAGING:  MR L-spine 4/22/23  1.  Lower lumbar spine predominant degenerative changes as described in  detail above, progressed from 2010 MRI.  2.  Edema at the L4-L5 disc space and L5 superior endplate likely represents degenerative changes. Less likely this could represent a low-grade or early discitis osteomyelitis. Correlate for evidence of infection.  3.  Acute appearing Schmorl's node at L3-L4, which can be a source of pain.    PROCEDURES:  None    PMH:  Past Medical History:   Diagnosis Date    Abdominal pain     Abnormal electrocardiogram     ACE inhibitor intolerance     BPH (benign prostatic hyperplasia)     Bruxism     Cancer (HCC)     basal and squamous cell to face    Cataract     robbi IOL    Chickenpox     Cholesterol blood decreased     Chronic kidney disease (CKD) 2/27/2021    Chronic kidney disease, stage III (moderate) (HCC)     Cold     Congestion of both ears 2/27/2021    Cough     Depression     Dermatochalasis of eyelid 7/23/2014    Diabetes     oral meds    Difficulty breathing     Fever     GERD (gastroesophageal reflux disease)     GI bleed 12/8/2021    GOUT     Gout     History of skull fracture     History of urinary tract infection     Hyperlipidemia     Hypertension     Indigestion     Influenza     Insomnia     Mixed hyperlipidemia     Orthopnea     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    Pneumonia 9/2015    Proteinuria     Purulent nasal discharge 12/7/2015    Ringing in ears     Shortness of breath     Sputum production     Tonsillitis     Type 2 diabetes mellitus (HCC)     Wears glasses     Weight loss        PSH:  Past Surgical History:   Procedure Laterality Date    AK COLONOSCOPY,DIAGNOSTIC N/A 12/12/2021    Procedure: COLONOSCOPY;  Surgeon: Dariel Simons M.D.;  Location: SURGERY [facility];  Service: Gastroenterology    AK UPPER GI ENDOSCOPY,BIOPSY N/A 12/12/2021    Procedure: GASTROSCOPY, WITH BIOPSY;  Surgeon: Dariel Simons M.D.;  Location: SURGERY [facility];  Service: Gastroenterology    AK UPPER GI ENDOSCOPY,CTRL BLEED N/A 12/12/2021    Procedure: EGD, WITH CLIP PLACEMENT;  Surgeon: Dariel  TRISTON Simons M.D.;  Location: SURGERY [facility];  Service: Gastroenterology    GASTROSCOPY W/PUSH ENTERSCOPY N/A 12/12/2021    Procedure: GASTROSCOPY, WITH PUSH ENTEROSCOPY;  Surgeon: Dariel Simons M.D.;  Location: SURGERY [facility];  Service: Gastroenterology    SEPTAL RECONSTRUCTION  12/7/2015    Procedure: SEPTAL RECONSTRUCTION OPEN WITH  GRAFTS & CONCHAL CART GRAFT;  Surgeon: SYDNIE Gaitan M.D.;  Location: SURGERY SAME DAY [facility];  Service:     BLEPHAROPLASTY  7/23/2014    Performed by Gera Plasencia M.D. at [facility]    BROW LIFT  7/23/2014    Performed by Gera Plasencia M.D. at [facility]    CATARACT PHACO WITH IOL  12/4/2012    Performed by Suraj Gonzalez M.D. at [facility]    CATARACT PHACO WITH IOL  11/20/2012    Performed by Suraj Gonzalez M.D. at [facility]    APPENDECTOMY      ARTHROSCOPY, KNEE      CARDIAC CATH, LEFT HEART      C out of concern for STEMI, normal coronaries with minimal atherosclerosis, diagnosed with PNA as cause of symptoms and ST changes.     OTHER      KNEE SURGERY - LEFT.    OTHER      NOSE SURGERY.    TONSILLECTOMY         FHX:  Family History   Problem Relation Age of Onset    Diabetes Father     Heart Attack Father 79    Cancer Brother        Medications:  Current Facility-Administered Medications   Medication Dose    sodium phosphate 30 mmol in dextrose 5% 500 mL ivpb  30 mmol    Pharmacy Consult Request  1 Each    atorvastatin (LIPITOR) tablet 10 mg  10 mg    allopurinol (ZYLOPRIM) tablet 100 mg  100 mg    carvedilol (COREG) tablet 12.5 mg  12.5 mg    spironolactone (ALDACTONE) tablet 25 mg  25 mg    sodium bicarbonate tablet 1,300 mg  1,300 mg    lactated ringers infusion (BOLUS)  1,000 mL    lactated ringers infusion      DULoxetine (CYMBALTA) capsule 30 mg  30 mg    lactated ringers infusion (BOLUS)  500 mL    acetaminophen (Tylenol) tablet 650 mg  650 mg    cefTRIAXone (Rocephin)  "syringe 2,000 mg  2,000 mg    metroNIDAZOLE (FLAGYL) tablet 500 mg  500 mg    insulin regular (HumuLIN R,NovoLIN R) injection  2-9 Units    And    dextrose 10 % BOLUS 25 g  25 g    Pharmacy Consult Request ...Pain Management Review 1 Each  1 Each    oxyCODONE immediate-release (ROXICODONE) tablet 5 mg  5 mg    Or    oxyCODONE immediate release (ROXICODONE) tablet 10 mg  10 mg    Or    HYDROmorphone (Dilaudid) injection 0.5 mg  0.5 mg       Allergies:  Allergies   Allergen Reactions    Ether Unspecified     \"Violently sick\"         Physical Exam:  Vitals: BP (!) 162/89   Pulse 93   Temp 36.8 °C (98.2 °F) (Temporal)   Resp (!) 30   Ht 1.803 m (5' 11\")   Wt 88.3 kg (194 lb 10.7 oz)   SpO2 94%   Gen: NAD  Head: NC/AT  Eyes/ Nose/ Mouth: moist mucous membranes  Cardio: RRR, good distal perfusion, warm extremities  Pulm: normal respiratory effort, no cyanosis   Abd: Soft NTND, negative borborygmi   Ext: No peripheral edema. No calf tenderness. No clubbing.    Mental status: follows commands  Speech: fluent, no aphasia or dysarthria      Motor:      Upper Extremity  Myotome R L   Shoulder flexion C5 5 5   Elbow flexion C5 5 5   Wrist extension C6 5 5   Elbow extension C7 5 5   Finger flexion C8 5 5   Finger abduction T1 5 5     Lower Extremity Myotome R L   Hip flexion L2 3/5 4/5   Knee extension L3 4/5 4/5   Ankle dorsiflexion L4 4/5 4/5   Toe extension L5 4/5 4/5   Ankle plantarflexion S1 4/5 4/5     Sensory:   intact to light touch through out    Labs: Reviewed and significant for   Recent Labs     04/22/23  0051 04/23/23  0400 04/24/23  0133   RBC 4.87 4.62* 4.57*   HEMOGLOBIN 14.5 13.6* 13.7*   HEMATOCRIT 41.3* 39.1* 38.4*   PLATELETCT 136* 137* 141*     Recent Labs     04/22/23  0051 04/23/23  0400 04/24/23  0133   SODIUM 130* 132* 138   POTASSIUM 3.7 3.0* 3.3*   CHLORIDE 96 100 101   CO2 17* 18* 20   GLUCOSE 178* 196* 190*   BUN 92* 82* 67*   CREATININE 4.51* 3.11* 2.20*   CALCIUM 9.0 8.8 9.0     Recent " Results (from the past 24 hour(s))   POCT glucose device results    Collection Time: 04/23/23  5:47 PM   Result Value Ref Range    POC Glucose, Blood 190 (H) 65 - 99 mg/dL   POCT glucose device results    Collection Time: 04/23/23  9:29 PM   Result Value Ref Range    POC Glucose, Blood 173 (H) 65 - 99 mg/dL   CBC WITHOUT DIFFERENTIAL    Collection Time: 04/24/23  1:33 AM   Result Value Ref Range    WBC 19.0 (H) 4.8 - 10.8 K/uL    RBC 4.57 (L) 4.70 - 6.10 M/uL    Hemoglobin 13.7 (L) 14.0 - 18.0 g/dL    Hematocrit 38.4 (L) 42.0 - 52.0 %    MCV 84.0 81.4 - 97.8 fL    MCH 30.0 27.0 - 33.0 pg    MCHC 35.7 (H) 33.7 - 35.3 g/dL    RDW 44.9 35.9 - 50.0 fL    Platelet Count 141 (L) 164 - 446 K/uL    MPV 12.1 9.0 - 12.9 fL   Renal Function Panel    Collection Time: 04/24/23  1:33 AM   Result Value Ref Range    Sodium 138 135 - 145 mmol/L    Potassium 3.3 (L) 3.6 - 5.5 mmol/L    Chloride 101 96 - 112 mmol/L    Co2 20 20 - 33 mmol/L    Glucose 190 (H) 65 - 99 mg/dL    Creatinine 2.20 (H) 0.50 - 1.40 mg/dL    Bun 67 (H) 8 - 22 mg/dL    Calcium 9.0 8.5 - 10.5 mg/dL    Phosphorus 1.3 (L) 2.5 - 4.5 mg/dL    Albumin 2.2 (L) 3.2 - 4.9 g/dL   CORRECTED CALCIUM    Collection Time: 04/24/23  1:33 AM   Result Value Ref Range    Correct Calcium 10.4 8.5 - 10.5 mg/dL   ESTIMATED GFR    Collection Time: 04/24/23  1:33 AM   Result Value Ref Range    GFR (CKD-EPI) 29 (A) >60 mL/min/1.73 m 2   POCT glucose device results    Collection Time: 04/24/23  9:16 AM   Result Value Ref Range    POC Glucose, Blood 185 (H) 65 - 99 mg/dL   URINALYSIS    Collection Time: 04/24/23 12:15 PM    Specimen: Urine, Clean Catch   Result Value Ref Range    Color Yellow     Character Clear     Specific Gravity 1.009 <1.035    Ph 5.5 5.0 - 8.0    Glucose 100 (A) Negative mg/dL    Ketones Negative Negative mg/dL    Protein 100 (A) Negative mg/dL    Bilirubin Negative Negative    Urobilinogen, Urine 0.2 Negative    Nitrite Negative Negative    Leukocyte Esterase  Negative Negative    Occult Blood Small (A) Negative    Micro Urine Req Microscopic    URINE MICROSCOPIC (W/UA)    Collection Time: 04/24/23 12:15 PM   Result Value Ref Range    WBC 2-5 (A) /hpf    RBC 10-20 (A) /hpf    Bacteria Negative None /hpf    Epithelial Cells Negative /hpf    Hyaline Cast 0-2 /lpf   proBrain Natriuretic Peptide, NT    Collection Time: 04/24/23 12:39 PM   Result Value Ref Range    NT-proBNP 2929 (H) 0 - 125 pg/mL   PROCALCITONIN    Collection Time: 04/24/23 12:39 PM   Result Value Ref Range    Procalcitonin 9.86 (H) <0.25 ng/mL   POCT glucose device results    Collection Time: 04/24/23  1:32 PM   Result Value Ref Range    POC Glucose, Blood 235 (H) 65 - 99 mg/dL         ASSESSMENT:  Patient is a 82 y.o. male admitted with weakness, found to have sepsis from colitis, and discitis/osteomyelitis, and SANJUANITA     [facility] Code / Diagnosis to Support: 0017.1 - Medically Complex: Infections    Rehabilitation: Impaired ADLs and mobility  Patient is a potential candidate for inpatient rehab based on needs for PT, OT.  Patient will also benefit from family training.  Patient has a good discharge situation which will be home with son.     Barriers to transfer include: Insurance authorization, TCCs to verify disposition, medical clearance and bed availability     All cases are subject to administrative review and recommendations may change    Disposition recommendations:  -Possible candidate for IPR.  Patient has deficits with mobility and ADL secondary to his debility from infections and SANJUANITA, which are both being treated  -Repeat PT, patient will need to be able demonstrate that he can walk short distances of around 10 feet to be able to qualify for IPR  -TCC to verify discharge support through son, patient will need 24/7 physical support for several weeks on discharge  -Would appreciate ID consult to determine duration and frequency of IV antibiotics.  If patient needs long-term antibiotics, which is usually  the case with discitis/osteomyelitis, he will need to be on daily or twice daily dosing.  3 times daily dosing or more frequent will require SNF placement  -PMR to follow in the periphery for rehab appropriateness, please reach out with questions or request for medical management    Medical Complexity:    Debility secondary to infection  -Patient admitted with sepsis secondary to colitis, discitis, acute osteomyelitis  -Would appreciate ID recommendations on duration and frequency of IV antibiotics  -Continue PT OT while in-house  -Patient needs to demonstrate improvement with functional gait to qualify for IPR  -TCC investigating discharge support  -Potential candidate for IPR    Possible GBS infection  -Neurology following, recommending MR T-spine and C-spine to evaluate for myelopathic process  -Reasonable to pursue LP    Leukocytosis  -WBC 19 today, increased from prior  -Elevated procalcitonin  -Currently on ceftriaxone 2 g daily IV, Flagyl p.o. 3 times daily  -Primary team monitoring with serial labs  -Recommend ID consult    Hypokalemia  -Potassium 3.3  -Primary team replacing as needed    Hypertension  -Coreg 12.5 mg twice daily with meals  -Spironolactone 25 mg daily    Diabetes  - On Januvia and Ozempic outpatient  - Sliding scale insulin in house    Diabetic peripheral neuropathy  -Cymbalta    CKD stage IV  -Baseline creatinine is 2.2  -Nephrology following    DVT PPX: SCDs      Thank you for allowing us to participate in the care of this patient.     Patient was seen for >80 minutes on unit/floor of which > 50% of time was spent on counseling and coordination of care regarding the above, including prognosis, risk reduction, benefits of treatment, and options for next stage of care.    Jaime Dockery, DO   Physical Medicine and Rehabilitation     Please note that this dictation was created using voice recognition software. I have made every reasonable attempt to correct obvious errors, but there may be  errors of grammar and possibly content that I did not discover before finalizing the note.

## 2023-04-24 NOTE — THERAPY
"Occupational Therapy   Initial Evaluation     Patient Name: Tyrone Cline  Age:  82 y.o., Sex:  male  Medical Record #: [id number]  Today's Date: 4/24/2023     Precautions  Precautions: (P) Fall Risk    Assessment    Patient is 82 y.o. male admitted for acute kidney failure, BLE weakness, sepsis, colitis, possible GI bleed? Pt normally independent with functional mobility and ADLs living in a [facility] alone, son has been assisting since weakness began on Sunday. Pt able to complete bed mobility and STS transfer with Marcie, max A for LB dressing and toileting. Will continue to see for skilled therapy while admitted as well as recommend post-acute placement.     Plan    Occupational Therapy Initial Treatment Plan   Treatment Interventions: (P) Self Care / Activities of Daily Living, Adaptive Equipment, Neuro Re-Education / Balance, Therapeutic Exercises, Therapeutic Activity  Treatment Frequency: (P) 3 Times per Week  Duration: (P) Until Therapy Goals Met    DC Equipment Recommendations: (P) Unable to determine at this time  Discharge Recommendations: (P) Recommend post-acute placement for additional occupational therapy services prior to discharge home     Subjective    \"I need a shower, I'm sorry about this\"     Objective       04/24/23 0946   Prior Living Situation   Prior Services None   Housing / Facility [address]   Steps Into Home 4   Steps In Home 0   Bathroom Set up Walk In Shower;Shower Chair   Equipment Owned Front-Wheel Walker;Single Point Cane;Tub / Shower Seat   Lives with - Patient's Self Care Capacity Alone and Able to Care For Self   Comments Son had been assisting lately due to weakness   Prior Level of ADL Function   Self Feeding Independent   Grooming / Hygiene Independent   Bathing Independent   Dressing Independent   Toileting Independent   Prior Level of IADL Function   Medication Management Independent   Laundry Independent   Kitchen Mobility Independent   Finances Independent   Home " Management Independent   Shopping Independent   Prior Level Of Mobility Independent Without Device in Community   Driving / Transportation Driving Independent   Occupation (Pre-Hospital Vocational) Retired Due To Age   History of Falls   History of Falls No   Precautions   Precautions Fall Risk   Pain 0 - 10 Group   Therapist Pain Assessment Post Activity Pain Same as Prior to Activity;Nurse Notified   Cognition    Cognition / Consciousness WDL   Level of Consciousness Alert   Comments Very pleasant, cooperative   Active ROM Upper Body   Active ROM Upper Body  WDL   Strength Upper Body   Upper Body Strength  X   Gross Strength Generalized Weakness, Equal Bilaterally.    Sensation Upper Body   Upper Extremity Sensation  WDL   Upper Body Muscle Tone   Upper Body Muscle Tone  WDL   Neurological Concerns   Neurological Concerns No   Coordination Upper Body   Coordination WDL   Balance Assessment   Sitting Balance (Static) Good   Sitting Balance (Dynamic) Fair +   Standing Balance (Static) Fair   Standing Balance (Dynamic) Poor +   Weight Shift Sitting Fair   Weight Shift Standing Fair   Comments w/ FWW   Bed Mobility    Supine to Sit Minimal Assist   Scooting Supervised   ADL Assessment   Grooming Contact Guard Assist;Seated   Upper Body Dressing Minimal Assist   Lower Body Dressing Total Assist   Toileting Total Assist   How much help from another person does the patient currently need...   Putting on and taking off regular lower body clothing? 1   Bathing (including washing, rinsing, and drying)? 1   Toileting, which includes using a toilet, bedpan, or urinal? 1   Putting on and taking off regular upper body clothing? 3   Taking care of personal grooming such as brushing teeth? 3   Eating meals? 4   6 Clicks Daily Activity Score 13   Functional Mobility   Sit to Stand Minimal Assist   Bed, Chair, Wheelchair Transfer Moderate Assist   Transfer Method Stand Step   Mobility bed mobility, multiple STS due to bowel  incontinence, transferred to chair   Comments w/ FWW   Activity Tolerance   Sitting in Chair left seated in chair   Sitting Edge of Bed 10 min   Standing 10 min total   Short Term Goals   Short Term Goal # 1 Pt will complete ADL transfers with supervision   Short Term Goal # 2 Pt will complete LB dressing with supervision   Short Term Goal # 3 Pt will complete toileting with supervision   Education Group   Education Provided Role of Occupational Therapist   Role of Occupational Therapist Patient Response Patient;Acceptance;Explanation   Occupational Therapy Initial Treatment Plan    Treatment Interventions Self Care / Activities of Daily Living;Adaptive Equipment;Neuro Re-Education / Balance;Therapeutic Exercises;Therapeutic Activity   Treatment Frequency 3 Times per Week   Duration Until Therapy Goals Met   Problem List   Problem List Decreased Active Daily Living Skills;Decreased Homemaking Skills;Decreased Upper Extremity Strength Right;Decreased Upper Extremity Strength Left;Decreased Functional Mobility;Decreased Activity Tolerance;Impaired Postural Control / Balance;Impaired Cognitive Function   Anticipated Discharge Equipment and Recommendations   DC Equipment Recommendations Unable to determine at this time   Discharge Recommendations Recommend post-acute placement for additional occupational therapy services prior to discharge home   Interdisciplinary Plan of Care Collaboration   IDT Collaboration with  Nursing;Physical Therapist   Patient Position at End of Therapy Seated;Chair Alarm On;Call Light within Reach;Tray Table within Reach;Phone within Reach   Collaboration Comments RN updated

## 2023-04-25 ENCOUNTER — APPOINTMENT (OUTPATIENT)
Dept: RADIOLOGY | Facility: MEDICAL CENTER | Age: 83
DRG: 853 | End: 2023-04-25
Attending: FAMILY MEDICINE
Payer: MEDICARE

## 2023-04-25 PROBLEM — N18.9 ACUTE KIDNEY INJURY SUPERIMPOSED ON CKD (HCC): Status: ACTIVE | Noted: 2023-04-21

## 2023-04-25 PROBLEM — E83.42 HYPOMAGNESEMIA: Status: ACTIVE | Noted: 2023-04-25

## 2023-04-25 PROBLEM — K80.20 CHOLELITHIASIS: Status: ACTIVE | Noted: 2023-04-25

## 2023-04-25 LAB
ALBUMIN SERPL BCP-MCNC: 1.8 G/DL (ref 3.2–4.9)
ALBUMIN SERPL BCP-MCNC: 1.9 G/DL (ref 3.2–4.9)
ALP SERPL-CCNC: 221 U/L (ref 30–99)
ALT SERPL-CCNC: 27 U/L (ref 2–50)
AST SERPL-CCNC: 32 U/L (ref 12–45)
BILIRUB CONJ SERPL-MCNC: 0.3 MG/DL (ref 0.1–0.5)
BILIRUB INDIRECT SERPL-MCNC: 0.5 MG/DL (ref 0–1)
BILIRUB SERPL-MCNC: 0.8 MG/DL (ref 0.1–1.5)
BUN SERPL-MCNC: 60 MG/DL (ref 8–22)
C DIFF DNA SPEC QL NAA+PROBE: NEGATIVE
C DIFF TOX A+B STL QL IA: NEGATIVE
C DIFF TOX GENS STL QL NAA+PROBE: NORMAL
CALCIUM ALBUM COR SERPL-MCNC: 10.3 MG/DL (ref 8.5–10.5)
CALCIUM SERPL-MCNC: 8.6 MG/DL (ref 8.5–10.5)
CHLORIDE SERPL-SCNC: 105 MMOL/L (ref 96–112)
CO2 SERPL-SCNC: 24 MMOL/L (ref 20–33)
CREAT SERPL-MCNC: 2.15 MG/DL (ref 0.5–1.4)
ERYTHROCYTE [DISTWIDTH] IN BLOOD BY AUTOMATED COUNT: 46.2 FL (ref 35.9–50)
GFR SERPLBLD CREATININE-BSD FMLA CKD-EPI: 30 ML/MIN/1.73 M 2
GLUCOSE BLD STRIP.AUTO-MCNC: 154 MG/DL (ref 65–99)
GLUCOSE BLD STRIP.AUTO-MCNC: 155 MG/DL (ref 65–99)
GLUCOSE BLD STRIP.AUTO-MCNC: 155 MG/DL (ref 65–99)
GLUCOSE BLD STRIP.AUTO-MCNC: 169 MG/DL (ref 65–99)
GLUCOSE SERPL-MCNC: 166 MG/DL (ref 65–99)
HCT VFR BLD AUTO: 39 % (ref 42–52)
HGB BLD-MCNC: 13.4 G/DL (ref 14–18)
INR PPP: 1.48 (ref 0.87–1.13)
LIPASE SERPL-CCNC: 79 U/L (ref 11–82)
MAGNESIUM SERPL-MCNC: 1.5 MG/DL (ref 1.5–2.5)
MCH RBC QN AUTO: 29.5 PG (ref 27–33)
MCHC RBC AUTO-ENTMCNC: 34.4 G/DL (ref 33.7–35.3)
MCV RBC AUTO: 85.9 FL (ref 81.4–97.8)
NT-PROBNP SERPL IA-MCNC: 1567 PG/ML (ref 0–125)
PHOSPHATE SERPL-MCNC: 3 MG/DL (ref 2.5–4.5)
PHOSPHATE SERPL-MCNC: 3.3 MG/DL (ref 2.5–4.5)
PLATELET # BLD AUTO: 178 K/UL (ref 164–446)
PMV BLD AUTO: 12.3 FL (ref 9–12.9)
POTASSIUM SERPL-SCNC: 3 MMOL/L (ref 3.6–5.5)
PROT SERPL-MCNC: 5.4 G/DL (ref 6–8.2)
PROTHROMBIN TIME: 17.6 SEC (ref 12–14.6)
RBC # BLD AUTO: 4.54 M/UL (ref 4.7–6.1)
SODIUM SERPL-SCNC: 141 MMOL/L (ref 135–145)
WBC # BLD AUTO: 23.1 K/UL (ref 4.8–10.8)

## 2023-04-25 PROCEDURE — 85027 COMPLETE CBC AUTOMATED: CPT

## 2023-04-25 PROCEDURE — 97530 THERAPEUTIC ACTIVITIES: CPT | Mod: CQ

## 2023-04-25 PROCEDURE — 83735 ASSAY OF MAGNESIUM: CPT

## 2023-04-25 PROCEDURE — 83690 ASSAY OF LIPASE: CPT

## 2023-04-25 PROCEDURE — 87040 BLOOD CULTURE FOR BACTERIA: CPT | Mod: 91

## 2023-04-25 PROCEDURE — 700105 HCHG RX REV CODE 258: Performed by: FAMILY MEDICINE

## 2023-04-25 PROCEDURE — 770001 HCHG ROOM/CARE - MED/SURG/GYN PRIV*

## 2023-04-25 PROCEDURE — 36415 COLL VENOUS BLD VENIPUNCTURE: CPT

## 2023-04-25 PROCEDURE — 700105 HCHG RX REV CODE 258

## 2023-04-25 PROCEDURE — 80069 RENAL FUNCTION PANEL: CPT

## 2023-04-25 PROCEDURE — 700102 HCHG RX REV CODE 250 W/ 637 OVERRIDE(OP): Performed by: HOSPITALIST

## 2023-04-25 PROCEDURE — 97110 THERAPEUTIC EXERCISES: CPT | Mod: CQ

## 2023-04-25 PROCEDURE — 76705 ECHO EXAM OF ABDOMEN: CPT

## 2023-04-25 PROCEDURE — 99232 SBSQ HOSP IP/OBS MODERATE 35: CPT | Performed by: FAMILY MEDICINE

## 2023-04-25 PROCEDURE — A9270 NON-COVERED ITEM OR SERVICE: HCPCS | Performed by: INTERNAL MEDICINE

## 2023-04-25 PROCEDURE — 700111 HCHG RX REV CODE 636 W/ 250 OVERRIDE (IP): Performed by: HOSPITALIST

## 2023-04-25 PROCEDURE — 99222 1ST HOSP IP/OBS MODERATE 55: CPT | Performed by: SURGERY

## 2023-04-25 PROCEDURE — A9270 NON-COVERED ITEM OR SERVICE: HCPCS | Performed by: FAMILY MEDICINE

## 2023-04-25 PROCEDURE — 700111 HCHG RX REV CODE 636 W/ 250 OVERRIDE (IP): Performed by: FAMILY MEDICINE

## 2023-04-25 PROCEDURE — A9270 NON-COVERED ITEM OR SERVICE: HCPCS | Performed by: HOSPITALIST

## 2023-04-25 PROCEDURE — 80076 HEPATIC FUNCTION PANEL: CPT

## 2023-04-25 PROCEDURE — 84100 ASSAY OF PHOSPHORUS: CPT

## 2023-04-25 PROCEDURE — 85610 PROTHROMBIN TIME: CPT

## 2023-04-25 PROCEDURE — 99231 SBSQ HOSP IP/OBS SF/LOW 25: CPT | Performed by: INTERNAL MEDICINE

## 2023-04-25 PROCEDURE — 83880 ASSAY OF NATRIURETIC PEPTIDE: CPT

## 2023-04-25 PROCEDURE — 700102 HCHG RX REV CODE 250 W/ 637 OVERRIDE(OP): Performed by: FAMILY MEDICINE

## 2023-04-25 PROCEDURE — 700102 HCHG RX REV CODE 250 W/ 637 OVERRIDE(OP): Performed by: INTERNAL MEDICINE

## 2023-04-25 PROCEDURE — 82962 GLUCOSE BLOOD TEST: CPT

## 2023-04-25 RX ORDER — POTASSIUM CHLORIDE 20 MEQ/1
20 TABLET, EXTENDED RELEASE ORAL DAILY
Status: DISCONTINUED | OUTPATIENT
Start: 2023-04-25 | End: 2023-04-26

## 2023-04-25 RX ORDER — LABETALOL HYDROCHLORIDE 5 MG/ML
10 INJECTION, SOLUTION INTRAVENOUS EVERY 4 HOURS PRN
Status: DISCONTINUED | OUTPATIENT
Start: 2023-04-25 | End: 2023-05-25

## 2023-04-25 RX ORDER — MAGNESIUM SULFATE HEPTAHYDRATE 40 MG/ML
2 INJECTION, SOLUTION INTRAVENOUS ONCE
Status: COMPLETED | OUTPATIENT
Start: 2023-04-25 | End: 2023-04-25

## 2023-04-25 RX ORDER — HYDRALAZINE HYDROCHLORIDE 20 MG/ML
10 INJECTION INTRAMUSCULAR; INTRAVENOUS EVERY 6 HOURS PRN
Status: DISCONTINUED | OUTPATIENT
Start: 2023-04-25 | End: 2023-05-25

## 2023-04-25 RX ADMIN — SPIRONOLACTONE 25 MG: 50 TABLET ORAL at 06:02

## 2023-04-25 RX ADMIN — SODIUM CHLORIDE, POTASSIUM CHLORIDE, SODIUM LACTATE AND CALCIUM CHLORIDE: 600; 310; 30; 20 INJECTION, SOLUTION INTRAVENOUS at 06:07

## 2023-04-25 RX ADMIN — CARVEDILOL 12.5 MG: 12.5 TABLET, FILM COATED ORAL at 08:23

## 2023-04-25 RX ADMIN — MAGNESIUM SULFATE HEPTAHYDRATE 2 G: 40 INJECTION, SOLUTION INTRAVENOUS at 17:38

## 2023-04-25 RX ADMIN — POTASSIUM CHLORIDE 20 MEQ: 1500 TABLET, EXTENDED RELEASE ORAL at 08:23

## 2023-04-25 RX ADMIN — SODIUM CHLORIDE, POTASSIUM CHLORIDE, SODIUM LACTATE AND CALCIUM CHLORIDE: 600; 310; 30; 20 INJECTION, SOLUTION INTRAVENOUS at 22:11

## 2023-04-25 RX ADMIN — PIPERACILLIN AND TAZOBACTAM 3.38 G: 3; .375 INJECTION, POWDER, LYOPHILIZED, FOR SOLUTION INTRAVENOUS; PARENTERAL at 20:02

## 2023-04-25 RX ADMIN — ALLOPURINOL 100 MG: 100 TABLET ORAL at 06:02

## 2023-04-25 RX ADMIN — ATORVASTATIN CALCIUM 10 MG: 10 TABLET, FILM COATED ORAL at 17:39

## 2023-04-25 RX ADMIN — CARVEDILOL 12.5 MG: 12.5 TABLET, FILM COATED ORAL at 17:39

## 2023-04-25 RX ADMIN — PIPERACILLIN AND TAZOBACTAM 3.38 G: 3; .375 INJECTION, POWDER, LYOPHILIZED, FOR SOLUTION INTRAVENOUS; PARENTERAL at 16:14

## 2023-04-25 RX ADMIN — DULOXETINE HYDROCHLORIDE 30 MG: 30 CAPSULE, DELAYED RELEASE ORAL at 17:39

## 2023-04-25 RX ADMIN — INSULIN HUMAN 2 UNITS: 100 INJECTION, SOLUTION PARENTERAL at 12:51

## 2023-04-25 RX ADMIN — CEFTRIAXONE SODIUM 2000 MG: 10 INJECTION, POWDER, FOR SOLUTION INTRAVENOUS at 06:02

## 2023-04-25 RX ADMIN — METRONIDAZOLE 500 MG: 500 TABLET ORAL at 06:02

## 2023-04-25 RX ADMIN — SODIUM BICARBONATE 1300 MG: 650 TABLET ORAL at 06:02

## 2023-04-25 RX ADMIN — SODIUM BICARBONATE 1300 MG: 650 TABLET ORAL at 17:39

## 2023-04-25 ASSESSMENT — ENCOUNTER SYMPTOMS
NERVOUS/ANXIOUS: 0
VOMITING: 0
DIAPHORESIS: 0
ABDOMINAL PAIN: 1
CHILLS: 0
FLANK PAIN: 0
PALPITATIONS: 0
HEADACHES: 0
NECK PAIN: 0
SENSORY CHANGE: 0
HEARTBURN: 0
DIARRHEA: 1
SORE THROAT: 0
SPEECH CHANGE: 0
BLURRED VISION: 0
FEVER: 0
WHEEZING: 0
NAUSEA: 0
SHORTNESS OF BREATH: 0
DIZZINESS: 0
WEAKNESS: 1
BACK PAIN: 0
COUGH: 0
FOCAL WEAKNESS: 1
MYALGIAS: 0

## 2023-04-25 ASSESSMENT — COGNITIVE AND FUNCTIONAL STATUS - GENERAL
MOVING FROM LYING ON BACK TO SITTING ON SIDE OF FLAT BED: UNABLE
TURNING FROM BACK TO SIDE WHILE IN FLAT BAD: A LOT
SUGGESTED CMS G CODE MODIFIER MOBILITY: CL
STANDING UP FROM CHAIR USING ARMS: A LOT
MOVING TO AND FROM BED TO CHAIR: UNABLE
CLIMB 3 TO 5 STEPS WITH RAILING: A LOT
MOBILITY SCORE: 10
WALKING IN HOSPITAL ROOM: A LOT

## 2023-04-25 ASSESSMENT — GAIT ASSESSMENTS: GAIT LEVEL OF ASSIST: UNABLE TO PARTICIPATE

## 2023-04-25 ASSESSMENT — PAIN DESCRIPTION - PAIN TYPE: TYPE: ACUTE PAIN

## 2023-04-25 NOTE — DISCHARGE PLANNING
[facility] Transitional Care Coordination    Physiatry consult complete.  Dr. Dockery recommending potential candidate for inpatient rehab.  Per Dr. Dockery -     -Possible candidate for IPR.  Patient has deficits with mobility and ADL secondary to his debility from infections and SANJUANITA, which are both being treated  -Repeat PT, patient will need to be able demonstrate that he can walk short distances of around 10 feet to be able to qualify for IPR  -TCC to verify discharge support through son, patient will need 24/7 physical support for several weeks on discharge  -Would appreciate ID consult to determine duration and frequency of IV antibiotics.  If patient needs long-term antibiotics, which is usually the case with discitis/osteomyelitis, he will need to be on daily or twice daily dosing.  3 times daily dosing or more frequent will require SNF placement      MR thoracic spine pending.  Will monitor progress with therapies.

## 2023-04-25 NOTE — DISCHARGE PLANNING
Care Transition Team Discharge Planning                   Discharge Plan:  Patient has been accepted by [facility] if not accepted by [facility].

## 2023-04-25 NOTE — CONSULTS
CHIEF COMPLAINT: gallbladder sludge     Requesting Physician: Dr. Kali Smith MD     HISTORY OF PRESENT ILLNESS: The patient is a 82 year-old White man who presented to the emergency department on April 21 for lower extremity weakness and some abdominal pain.  This started about 10 days ago when he had epigastric pain.  The day after that he started having bilateral lower extremity weakness.  The pain got quite bad and was associated with diarrhea but resolved on its own.  He now is no longer having any significant abdominal pain, but he is not eating anything either.  He has never had any pain like this before.  The lower extremity weakness has persisted and he is undergoing extensive work-up for that.  CT scan in the emergency department showed hepatic flexure colitis, less likely secondary to gallbladder disease or duodenitis.  He has been on antibiotics for concern about this as well as concerned about some discitis versus osteomyelitis in his lumbar spine.  Repeat ultrasound today shows gallbladder wall thickening to 5 mm and pericholecystic fluid with gallbladder sludge.  The patient denies any abdominal pain whatsoever today.      PAST MEDICAL HISTORY:  has a past medical history of Abdominal pain, Abnormal electrocardiogram, ACE inhibitor intolerance, BPH (benign prostatic hyperplasia), Bruxism, Cancer (HCC), Cataract, Chickenpox, Cholesterol blood decreased, Chronic kidney disease (CKD) (2/27/2021), Chronic kidney disease, stage III (moderate) (HCC), Cold, Congestion of both ears (2/27/2021), Cough, Depression, Dermatochalasis of eyelid (7/23/2014), Diabetes, Difficulty breathing, Fever, GERD (gastroesophageal reflux disease), GI bleed (12/8/2021), GOUT, Gout, History of skull fracture, History of urinary tract infection, Hyperlipidemia, Hypertension, Indigestion, Influenza, Insomnia, Mixed hyperlipidemia, Orthopnea, Pneumonia (9/2015), Proteinuria, Purulent nasal discharge (12/7/2015), Ringing in  "ears, Shortness of breath, Sputum production, Tonsillitis, Type 2 diabetes mellitus (HCC), Wears glasses, and Weight loss.    PAST SURGICAL HISTORY:  has a past surgical history that includes cataract phaco with iol (11/20/2012); cataract phaco with iol (12/4/2012); appendectomy; other; other; tonsillectomy; blepharoplasty (7/23/2014); brow lift (7/23/2014); septal reconstruction (12/7/2015); cardiac cath, left heart; arthroscopy, knee; pr colonoscopy,diagnostic (N/A, 12/12/2021); pr upper gi endoscopy,biopsy (N/A, 12/12/2021); pr upper gi endoscopy,ctrl bleed (N/A, 12/12/2021); and gastroscopy w/push enterscopy (N/A, 12/12/2021).    ALLERGIES:   Allergies   Allergen Reactions    Ether Unspecified     \"Violently sick\"       CURRENT MEDICATIONS:    Home Medications       Reviewed by Es Garg (Pharmacy Tech) on 04/21/23 at 1455  Med List Status: Complete     Medication Last Dose Status   acetaminophen (TYLENOL) 500 MG Tab 4/18/2023 Active   allopurinol (ZYLOPRIM) 100 MG Tab 4/20/2023 Active   atorvastatin (LIPITOR) 10 MG Tab 4/16/2023 Active   B Complex Vitamins (B COMPLEX 1 PO) 4/17/2023 Active   carvedilol (COREG) 12.5 MG Tab 4/17/2023 Active   Cholecalciferol (VITAMIN D3) 2000 UNIT Cap 4/17/2023 Active   DULoxetine (CYMBALTA) 30 MG Cap DR Particles 4/17/2023 Active   famotidine (PEPCID) 20 MG Tab 4/20/2023 Active   Finerenone (KERENDIA) 10 MG Tab 4/19/2023 Active   Multiple Vitamins-Minerals (OCUVITE ADULT 50+ PO) 4/19/2023 Active   olmesartan (BENICAR) 20 MG Tab 4/19/2023 Active   OZEMPIC, 0.25 OR 0.5 MG/DOSE, 2 MG/1.5ML Solution Pen-injector 4/11/2023 Active   SITagliptin (JANUVIA) 25 MG Tab 4/19/2023 Active   Testosterone (ANDROGEL) 20.25 MG/ACT (1.62%) Gel 4/16/2023 Active   zolpidem (AMBIEN) 10 MG Tab 4/14/2023 Active                    FAMILY HISTORY: family history includes Cancer in his brother; Diabetes in his father; Heart Attack (age of onset: 79) in his father.    SOCIAL HISTORY:  reports that " "he quit smoking about 58 years ago. His smoking use included cigarettes. He has a 1.20 pack-year smoking history. He has never used smokeless tobacco. He reports that he does not drink alcohol and does not use drugs.    REVIEW OF SYSTEMS: Review of systems is remarkable for the following lower extremity weakness. The remainder of the comprehensive ROS is negative with the exception of the aforementioned HPI, PMH, and PSH bullets in accordance with CMS guideline.    PHYSICAL EXAMINATION:      Constitutional:     Vital Signs: BP (!) 144/97   Pulse 80   Temp 36.5 °C (97.7 °F) (Temporal)   Resp 18   Ht 1.803 m (5' 11\")   Wt 88.3 kg (194 lb 10.7 oz)   SpO2 95%    General Appearance: appears stated age.  HEENT: The pupils are equal, round, and reactive to light bilaterally. The extraocular muscles are intact bilaterally.. The sclera are an icteric. Nares and oropharynx are clear.   Neck: Supple. No adenopathy.  Respiratory:   Inspection: Unlabored respirations, no intercostal retractions, paradoxical motion, or accessory muscle use.   Auscultation: normal.  Cardiovascular:   Inspection: The skin is warm and dry.  Auscultation: Regular rate and rhythm.   Peripheral Pulses: Normal.   Abdomen:  Inspection: Abdominal inspection reveals no abrasions, contusions, lacerations or penetrating wounds.   Palpation: Palpation is remarkable for no significant tenderness, guarding, or peritoneal findings. No abdominal wall hernias.  Extremities:   Examination of the upper and lower extremities demonstrates no cyanosis edema or clubbing.  Neurologic:   Alert & oriented to person, time and place. Normal motor function. Normal sensory function. No focal deficits noted.    LABORATORY VALUES:   Recent Labs     04/23/23  0400 04/24/23  0133 04/25/23  0544   WBC 16.8* 19.0* 23.1*   RBC 4.62* 4.57* 4.54*   HEMOGLOBIN 13.6* 13.7* 13.4*   HEMATOCRIT 39.1* 38.4* 39.0*   MCV 84.6 84.0 85.9   MCH 29.4 30.0 29.5   MCHC 34.8 35.7* 34.4   RDW " 45.1 44.9 46.2   PLATELETCT 137* 141* 178   MPV 11.8 12.1 12.3     Recent Labs     04/23/23  0400 04/24/23  0133 04/25/23  0544   SODIUM 132* 138 141   POTASSIUM 3.0* 3.3* 3.0*   CHLORIDE 100 101 105   CO2 18* 20 24   GLUCOSE 196* 190* 166*   BUN 82* 67* 60*   CREATININE 3.11* 2.20* 2.15*   CALCIUM 8.8 9.0 8.6     Recent Labs     04/25/23  1204   ASTSGOT 32   ALTSGPT 27   TBILIRUBIN 0.8   IBILIRUBIN 0.5   DBILIRUBIN 0.3   ALKPHOSPHAT 221*            IMAGING:   US-RUQ   Final Result         1.  Acute cholecystitis.   2.  Atrophic right kidney.      DX-CHEST-LIMITED (1 VIEW)   Final Result      Perihilar interstitial edema and basilar atelectasis and/or consolidation. Underlying infection is possible.      MR-LUMBAR SPINE-W/O   Final Result      1.  Lower lumbar spine predominant degenerative changes as described in detail above, progressed from 2010 MRI.   2.  Edema at the L4-L5 disc space and L5 superior endplate likely represents degenerative changes. Less likely this could represent a low-grade or early discitis osteomyelitis. Correlate for evidence of infection.   3.  Acute appearing Schmorl's node at L3-L4, which can be a source of pain.      US-RENAL   Final Result      1.  Atrophic kidneys. Echogenic bilateral renal parenchyma could relate to medical renal disease.      2.  No hydronephrosis. No renal calculus.      DX-CHEST-PORTABLE (1 VIEW)   Final Result      No acute cardiac or pulmonary abnormalities are identified.      CT-ABDOMEN-PELVIS W/O   Final Result      1.  Findings suspicious for focal colitis at the hepatic flexure, less likely cholecystitis or duodenitis with secondary involvement of the colon. Infectious, inflammatory and ischemic etiologies are considerations. Underlying mass is not excluded.   2.  Cholelithiasis with distention of the gallbladder   3.  BILATERAL renal atrophy   4.  Subcentimeter LEFT adrenal myelolipoma   5.  12 mm RIGHT adrenal nodule likely an adenoma absent a history of  cancer   6.  Subcentimeter RIGHT hepatic lesion, likely a cyst absent a history of cancer   7.  Atherosclerosis   8.  Colonic diverticulosis      CT-HEAD W/O   Final Result      1.  Cerebral atrophy.      2.  White matter lucencies most consistent with small vessel ischemic change versus demyelination or gliosis.      3.  Otherwise, Head CT without contrast with no acute findings. No evidence of acute cerebral infarction, hemorrhage or mass lesion.         IR-CONSULT AND TREAT    (Results Pending)   MR-CERVICAL SPINE-WITH    (Results Pending)   MR-THORACIC SPINE-WITH    (Results Pending)   MR-LUMBAR SPINE-WITH    (Results Pending)       ASSESSMENT AND PLAN:   82 year old man with focal colitis at the hepatic flexure, ultrasound showing gallbladder wall thickening with pericholecystic fluid.  The patient does not have abdominal pain.      This is a very complex patient, with multiple sources for a leukocytosis.  I am less concerned about a possible acute cholecystitis as he does not have right upper quadrant pain.  However I believe it would be worth our time to do a HIDA scan.  I discussed with him that this measures the function of the gallbladder.  If it is normal, we can consider that his gallbladder is normal and subtle that question.  If it is abnormal then I can offer him the appropriate treatment.  He is agreeable to this plan.  We will continue to follow.         ____________________________________     Lizz Perea M.D.    DD: 4/25/2023  2:16 PM    [place] Guidelines for Acute Cholecystitis 2018  ACS NSQIP Surgical Risk Calculator

## 2023-04-25 NOTE — CARE PLAN
Pt A&Ox4. On R/o for cdiff. On 2L NC which is not pt's baseline. LR running at 150 ml/hr at shift change. Pt's respiratory status is labored. MD's aware. This RN will inquire about turning off or turning down pt's fluids if respiratory status does not improve. Pt denies pain. No other concerns at this time. Pt educated to alert nurse if new onset pain occurs.     The patient is Watcher - Medium risk of patient condition declining or worsening    Shift Goals  Clinical Goals: safety, antibioics  Patient Goals: comfort  Family Goals: FLEX    Progress made toward(s) clinical / shift goals:  Pt remained free from falls. Calls appropriately. Pt denies pain this shift      Problem: Respiratory  Goal: Patient will achieve/maintain optimum respiratory ventilation and gas exchange  Outcome: Progressing     Problem: Pain - Standard  Goal: Alleviation of pain or a reduction in pain to the patient’s comfort goal  Outcome: Progressing     Problem: Fall Risk  Goal: Patient will remain free from falls  Outcome: Progressing     Problem: Skin Integrity  Goal: Skin integrity is maintained or improved  Outcome: Progressing        Patient is not progressing towards the following goals:

## 2023-04-25 NOTE — DISCHARGE PLANNING
Care Transition Team Assessment      CM spoke with patient regarding DCP. Patient lives alone in a single level home. Patient's son comes in to help him after he started getting weak. Patient states that he has a walker, cane and a shower chair at home. Patient states that before this admission he was independent with his adl's and iadl's along with being ambulatory. Patient states that he still drives himself. CM will continue to follow.       Information Source  Orientation Level: Oriented X4  Information Given By: Patient  Who is responsible for making decisions for patient? : Patient    Readmission Evaluation  Is this a readmission?: No    Elopement Risk  Legal Hold: No  Ambulatory or Self Mobile in Wheelchair: No-Not an Elopement Risk  Elopement Risk: Not at Risk for Elopement    Interdisciplinary Discharge Planning  Lives with - Patient's Self Care Capacity: Alone and Able to Care For Self  Patient or legal guardian wants to designate a caregiver: No  Housing / Facility: [address] [place] [address]  Prior Services: None    Discharge Preparedness  What is your plan after discharge?: Skilled nursing facility, Other (comment) (Inpt Rehab)  What are your discharge supports?: Child  Prior Functional Level: Independent with Activities of Daily Living, Independent with Medication Management, Ambulatory, Drives Self    Functional Assesment  Prior Functional Level: Independent with Activities of Daily Living, Independent with Medication Management, Ambulatory, Drives Self    Finances  Financial Barriers to Discharge: No  Prescription Coverage: Yes    Vision / Hearing Impairment  Vision Impairment : No  Hearing Impairment : No              Domestic Abuse  Have you ever been the victim of abuse or violence?: No  Physical Abuse or Sexual Abuse: No  Verbal Abuse or Emotional Abuse: No  Possible Abuse/Neglect Reported to:: Not Applicable    Psychological Assessment  History of Substance Abuse: None  History of Psychiatric Problems:  No  Non-compliant with Treatment: No  Newly Diagnosed Illness: Yes         Anticipated Discharge Information  Discharge Disposition: Disch to IP rehab facility or distinct part unit (62)

## 2023-04-25 NOTE — PROGRESS NOTES
Received report from off-going nurse.   Assumed pt care at change of shift.   Assessment completed.   Pt is A&Ox 4, vital signs are stable on 2L of oxygen.   Denies pain, no apparent signs of discomfort.   Bed is in low and locked position, call light and belongings in reach, reminded to use call light.  No needs at this time.

## 2023-04-25 NOTE — ASSESSMENT & PLAN NOTE
S/p surgical removal, converted to open cholecystectomy  NAHUN drain-removed on 5/8/2023   -- s/p completion of iv abx  Worsening leukocytosis on 5/24, patient refused any procedures or images and he wants to be on comfort care  Pain is tolerable, but continue oral and IV opiates PRN, adjust to patient's comfort  Going home on hospice on 5/31, orders are in

## 2023-04-25 NOTE — CARE PLAN
The patient is Watcher - Medium risk of patient condition declining or worsening    Shift Goals  Clinical Goals: Safety, ABX, Contact Iso, Stool collection  Patient Goals: Comfort, clean up, MRI  Family Goals: FLEX    Progress made toward(s) clinical / shift goals:  Pt had an increase in bowel movements, becoming very watery with mucous. So pt became C.diff rule out, sample sent, waiting for results. Contact isolation precautions were put in place. Pt had full bedding change to keep clean about 5 times during shift. MRI was put off due to sedation needed. Pt happy to eat tonight.    Problem: Knowledge Deficit - Standard  Goal: Patient and family/care givers will demonstrate understanding of plan of care, disease process/condition, diagnostic tests and medications  Outcome: Progressing     Problem: Fluid Volume  Goal: Fluid volume balance will be maintained  Outcome: Progressing     Problem: Urinary - Renal Perfusion  Goal: Ability to achieve and maintain adequate renal perfusion and functioning will improve  Outcome: Progressing     Problem: Respiratory  Goal: Patient will achieve/maintain optimum respiratory ventilation and gas exchange  Outcome: Progressing     Problem: Pain - Standard  Goal: Alleviation of pain or a reduction in pain to the patient’s comfort goal  Outcome: Progressing     Problem: Fall Risk  Goal: Patient will remain free from falls  Outcome: Progressing     Problem: Skin Integrity  Goal: Skin integrity is maintained or improved  Outcome: Progressing

## 2023-04-25 NOTE — DISCHARGE PLANNING
H[facility] TCN chart review completed. Collaborated with NIKIA Mack.  Current discharge considerations are developing pending therapy recs.  Per chart review and collaboration with CM, PT/OT is pending and MRI is pending.  Per collaboration with CM, obtained HH, faxed to [facility] and given to CM.  Patient seen at bedside.  TCN will continue to follow and collaborate with discharge planning team as additional post acute needs arise. Thank you.    Completed:  Choice obtained: HH choice on 4/24/23.  Infusion on 4/22/23.    [facility] referral sent 4/22/23    Addendum:  1220- PT/OT have recommended post-acute placement.  PT recommends PMR consult.  Discussed recommendations with CM.  This TCN obtained IRF and SNF choice on 4/24/23.  Patient wants first choice to be IRF.  He states he has good family support and that his son can stay with him at discharge for a couple weeks post rehabilitation.  TCC's and MD notified.  This TCN notified Dr. Cain of patients request for IRF and PT recommendation for IRF, requesting physiatry consult.    If IRF declines SNF choice was obtained as a secondary choice: 1) [facility], 2) [facility], 3) [facility].  IRF and SNF choice, faxed to [facility] and given to CM.

## 2023-04-25 NOTE — PROGRESS NOTES
Hospital Medicine Daily Progress Note    Date of Service  4/25/2023    Chief Complaint  Tyrone Cline is a 82 y.o. male admitted 4/21/2023 with Colitis.    Hospital Course  Admitted with sepsis secondary to colitis.  Patient was started empiric coverage with IV Rocephin and Flagyl.  He was also noted to have bilateral lower extremity weakness.  MRI of the lumbar spine was done which showed possible edema.  Neurology was then consulted on the case.  He was also noted to have an SANJUANITA on CKD stage IV.  Nephrology was consulted on the case.    Interval Problem Update  Colitis -WBC trended up to 23,000, C. difficile negative  Cholelithiasis -noted on CT scan, ultrasound right upper quadrant ordered, reviewed  SANJUANITA - crea 2.1  Hypertension - sbp 136-154  Diabetes - -169  Low potassium and magnesium    I have discussed this patient's plan of care and discharge plan at IDT rounds today with Case Management, Nursing, Nursing leadership, and other members of the IDT team.    Consultants/Specialty  general surgery, nephrology, and neurology    Code Status  DNAR/DNI    Disposition  Patient is not medically cleared for discharge.   Anticipate discharge to to an inpatient rehabilitation hospital.  I have placed the appropriate orders for post-discharge needs.    Review of Systems  Review of Systems   Constitutional:  Positive for malaise/fatigue. Negative for chills, diaphoresis and fever.   HENT:  Negative for congestion, hearing loss and sore throat.    Eyes:  Negative for blurred vision.   Respiratory:  Negative for cough, shortness of breath and wheezing.    Cardiovascular:  Negative for chest pain, palpitations and leg swelling.   Gastrointestinal:  Positive for abdominal pain and diarrhea. Negative for heartburn, nausea and vomiting.   Genitourinary:  Negative for dysuria, flank pain and hematuria.   Musculoskeletal:  Negative for back pain, joint pain, myalgias and neck pain.   Skin:  Negative for rash.    Neurological:  Positive for focal weakness and weakness. Negative for dizziness, sensory change, speech change and headaches.   Psychiatric/Behavioral:  The patient is not nervous/anxious.       Physical Exam  Temp:  [36.2 °C (97.2 °F)-37.6 °C (99.6 °F)] 36.2 °C (97.2 °F)  Pulse:  [78-83] 83  Resp:  [17-18] 18  BP: (136-154)/() 154/99  SpO2:  [92 %-95 %] 92 %    Physical Exam  Vitals and nursing note reviewed.   HENT:      Head: Normocephalic and atraumatic.      Nose: No congestion.      Mouth/Throat:      Mouth: Mucous membranes are moist.   Eyes:      Extraocular Movements: Extraocular movements intact.      Conjunctiva/sclera: Conjunctivae normal.   Cardiovascular:      Rate and Rhythm: Normal rate and regular rhythm.      Heart sounds: Murmur heard.   Pulmonary:      Effort: Pulmonary effort is normal.      Breath sounds: Normal breath sounds.   Abdominal:      General: There is distension.      Tenderness: There is abdominal tenderness. There is no guarding or rebound.   Musculoskeletal:      Cervical back: No tenderness.      Right lower leg: No edema.      Left lower leg: No edema.   Skin:     General: Skin is warm and dry.   Neurological:      Mental Status: He is alert and oriented to person, place, and time.      Cranial Nerves: No cranial nerve deficit.      Motor: Weakness (MMT BUE 5/5, RLE 3/5, LLE 4+/5) present.       Fluids    Intake/Output Summary (Last 24 hours) at 4/25/2023 1623  Last data filed at 4/24/2023 2200  Gross per 24 hour   Intake 200 ml   Output --   Net 200 ml       Laboratory  Recent Labs     04/23/23  0400 04/24/23  0133 04/25/23  0544   WBC 16.8* 19.0* 23.1*   RBC 4.62* 4.57* 4.54*   HEMOGLOBIN 13.6* 13.7* 13.4*   HEMATOCRIT 39.1* 38.4* 39.0*   MCV 84.6 84.0 85.9   MCH 29.4 30.0 29.5   MCHC 34.8 35.7* 34.4   RDW 45.1 44.9 46.2   PLATELETCT 137* 141* 178   MPV 11.8 12.1 12.3     Recent Labs     04/23/23  0400 04/24/23  0133 04/25/23  0544   SODIUM 132* 138 141   POTASSIUM  3.0* 3.3* 3.0*   CHLORIDE 100 101 105   CO2 18* 20 24   GLUCOSE 196* 190* 166*   BUN 82* 67* 60*   CREATININE 3.11* 2.20* 2.15*   CALCIUM 8.8 9.0 8.6     Recent Labs     04/25/23  1411   INR 1.48*               Imaging  US-RUQ   Final Result         1.  Acute cholecystitis.   2.  Atrophic right kidney.      DX-CHEST-LIMITED (1 VIEW)   Final Result      Perihilar interstitial edema and basilar atelectasis and/or consolidation. Underlying infection is possible.      MR-LUMBAR SPINE-W/O   Final Result      1.  Lower lumbar spine predominant degenerative changes as described in detail above, progressed from 2010 MRI.   2.  Edema at the L4-L5 disc space and L5 superior endplate likely represents degenerative changes. Less likely this could represent a low-grade or early discitis osteomyelitis. Correlate for evidence of infection.   3.  Acute appearing Schmorl's node at L3-L4, which can be a source of pain.      US-RENAL   Final Result      1.  Atrophic kidneys. Echogenic bilateral renal parenchyma could relate to medical renal disease.      2.  No hydronephrosis. No renal calculus.      DX-CHEST-PORTABLE (1 VIEW)   Final Result      No acute cardiac or pulmonary abnormalities are identified.      CT-ABDOMEN-PELVIS W/O   Final Result      1.  Findings suspicious for focal colitis at the hepatic flexure, less likely cholecystitis or duodenitis with secondary involvement of the colon. Infectious, inflammatory and ischemic etiologies are considerations. Underlying mass is not excluded.   2.  Cholelithiasis with distention of the gallbladder   3.  BILATERAL renal atrophy   4.  Subcentimeter LEFT adrenal myelolipoma   5.  12 mm RIGHT adrenal nodule likely an adenoma absent a history of cancer   6.  Subcentimeter RIGHT hepatic lesion, likely a cyst absent a history of cancer   7.  Atherosclerosis   8.  Colonic diverticulosis      CT-HEAD W/O   Final Result      1.  Cerebral atrophy.      2.  White matter lucencies most  consistent with small vessel ischemic change versus demyelination or gliosis.      3.  Otherwise, Head CT without contrast with no acute findings. No evidence of acute cerebral infarction, hemorrhage or mass lesion.         IR-CONSULT AND TREAT    (Results Pending)   MR-CERVICAL SPINE-WITH    (Results Pending)   MR-THORACIC SPINE-WITH    (Results Pending)   MR-LUMBAR SPINE-WITH    (Results Pending)   NM-BILIARY (HIDA) SCAN WITH CCK    (Results Pending)        Assessment/Plan  * Colitis- (present on admission)  Assessment & Plan  Change to IV Zosyn  Check stool culture    Weakness of both lower extremities- (present on admission)  Assessment & Plan  MRI C-T- L spines with contrast  Check vit b12 and TSH    Sepsis (HCC)- (present on admission)  Assessment & Plan  This is Sepsis Present on admission  SIRS criteria identified on my evaluation include: Leukocytosis, with WBC greater than 12,000  Source is colitis  Sepsis protocol initiated  Fluid resuscitation ordered per protocol  Crystalloid Fluid Administration: Fluid resuscitation ordered per standard protocol - 30 mL/kg per current or ideal body weight  IV antibiotics as appropriate for source of sepsis  Reassessment: I have reassessed the patient's hemodynamic status          Acute kidney injury superimposed on CKD (HCC)- (present on admission)  Assessment & Plan  Careful IVF hydration  NaHCO3  Follow cmp      Hypomagnesemia- (present on admission)  Assessment & Plan  IV Mg 2 g  Follow level    Cholelithiasis- (present on admission)  Assessment & Plan  Possible Cholecystitis  Consult Surgery    Hypokalemia- (present on admission)  Assessment & Plan  Start Kdur  Follow cmp    Thrombocytopenia (HCC)- (present on admission)  Assessment & Plan  Follow cbc    High anion gap metabolic acidosis- (present on admission)  Assessment & Plan  IVF hydration    Hyponatremia- (present on admission)  Assessment & Plan  Follow cmp    Diabetic peripheral neuropathy (HCC)- (present  on admission)  Assessment & Plan  Cymbalta    Essential hypertension- (present on admission)  Assessment & Plan  Coreg, Aldactone  Start IV labetalol and hydralazine as needed with parameters    Type 2 diabetes mellitus with kidney complication, without long-term current use of insulin (HCC)- (present on admission)  Assessment & Plan  Sliding scale insulin    Chronic kidney disease (CKD), stage IV (severe) (HCC)- (present on admission)  Assessment & Plan  Follow cmp    Hypercholesterolemia- (present on admission)  Assessment & Plan  Lipitor         VTE prophylaxis: SCDs/TEDs    I have performed a physical exam and reviewed and updated ROS and Plan today (4/25/2023). In review of yesterday's note (4/24/2023), there are no changes except as documented above.

## 2023-04-26 ENCOUNTER — ANESTHESIA EVENT (OUTPATIENT)
Dept: SURGERY | Facility: MEDICAL CENTER | Age: 83
DRG: 853 | End: 2023-04-26
Payer: MEDICARE

## 2023-04-26 ENCOUNTER — ANESTHESIA (OUTPATIENT)
Dept: SURGERY | Facility: MEDICAL CENTER | Age: 83
DRG: 853 | End: 2023-04-26
Payer: MEDICARE

## 2023-04-26 ENCOUNTER — APPOINTMENT (OUTPATIENT)
Dept: RADIOLOGY | Facility: MEDICAL CENTER | Age: 83
DRG: 853 | End: 2023-04-26
Attending: FAMILY MEDICINE
Payer: MEDICARE

## 2023-04-26 PROBLEM — E83.52 HYPERCALCEMIA: Status: ACTIVE | Noted: 2023-04-26

## 2023-04-26 LAB
ALBUMIN SERPL BCP-MCNC: 1.9 G/DL (ref 3.2–4.9)
ALBUMIN/GLOB SERPL: 0.6 G/DL
ALP SERPL-CCNC: 191 U/L (ref 30–99)
ALT SERPL-CCNC: 23 U/L (ref 2–50)
ANION GAP SERPL CALC-SCNC: 14 MMOL/L (ref 7–16)
AST SERPL-CCNC: 27 U/L (ref 12–45)
BACTERIA BLD CULT: NORMAL
BACTERIA BLD CULT: NORMAL
BILIRUB SERPL-MCNC: 0.9 MG/DL (ref 0.1–1.5)
BUN SERPL-MCNC: 53 MG/DL (ref 8–22)
CALCIUM ALBUM COR SERPL-MCNC: 10.6 MG/DL (ref 8.5–10.5)
CALCIUM SERPL-MCNC: 8.9 MG/DL (ref 8.5–10.5)
CHLORIDE SERPL-SCNC: 107 MMOL/L (ref 96–112)
CO2 SERPL-SCNC: 24 MMOL/L (ref 20–33)
CREAT SERPL-MCNC: 2.21 MG/DL (ref 0.5–1.4)
ERYTHROCYTE [DISTWIDTH] IN BLOOD BY AUTOMATED COUNT: 47.8 FL (ref 35.9–50)
GFR SERPLBLD CREATININE-BSD FMLA CKD-EPI: 29 ML/MIN/1.73 M 2
GLOBULIN SER CALC-MCNC: 3.2 G/DL (ref 1.9–3.5)
GLUCOSE BLD STRIP.AUTO-MCNC: 159 MG/DL (ref 65–99)
GLUCOSE BLD STRIP.AUTO-MCNC: 174 MG/DL (ref 65–99)
GLUCOSE BLD STRIP.AUTO-MCNC: 180 MG/DL (ref 65–99)
GLUCOSE BLD STRIP.AUTO-MCNC: 227 MG/DL (ref 65–99)
GLUCOSE SERPL-MCNC: 163 MG/DL (ref 65–99)
HCT VFR BLD AUTO: 41.1 % (ref 42–52)
HGB BLD-MCNC: 13.8 G/DL (ref 14–18)
MAGNESIUM SERPL-MCNC: 2 MG/DL (ref 1.5–2.5)
MCH RBC QN AUTO: 29.4 PG (ref 27–33)
MCHC RBC AUTO-ENTMCNC: 33.6 G/DL (ref 33.7–35.3)
MCV RBC AUTO: 87.4 FL (ref 81.4–97.8)
NT-PROBNP SERPL IA-MCNC: 1163 PG/ML (ref 0–125)
PHOSPHATE SERPL-MCNC: 3.3 MG/DL (ref 2.5–4.5)
PLATELET # BLD AUTO: 207 K/UL (ref 164–446)
PMV BLD AUTO: 11.9 FL (ref 9–12.9)
POTASSIUM SERPL-SCNC: 3.2 MMOL/L (ref 3.6–5.5)
PROT SERPL-MCNC: 5.1 G/DL (ref 6–8.2)
RBC # BLD AUTO: 4.7 M/UL (ref 4.7–6.1)
SIGNIFICANT IND 70042: NORMAL
SIGNIFICANT IND 70042: NORMAL
SITE SITE: NORMAL
SITE SITE: NORMAL
SODIUM SERPL-SCNC: 145 MMOL/L (ref 135–145)
SOURCE SOURCE: NORMAL
SOURCE SOURCE: NORMAL
TSH SERPL DL<=0.005 MIU/L-ACNC: 0.68 UIU/ML (ref 0.38–5.33)
VIT B12 SERPL-MCNC: >4000 PG/ML (ref 211–911)
WBC # BLD AUTO: 24.3 K/UL (ref 4.8–10.8)

## 2023-04-26 PROCEDURE — 700102 HCHG RX REV CODE 250 W/ 637 OVERRIDE(OP): Performed by: HOSPITALIST

## 2023-04-26 PROCEDURE — 85027 COMPLETE CBC AUTOMATED: CPT

## 2023-04-26 PROCEDURE — 82962 GLUCOSE BLOOD TEST: CPT | Mod: 91

## 2023-04-26 PROCEDURE — 80053 COMPREHEN METABOLIC PANEL: CPT

## 2023-04-26 PROCEDURE — A9270 NON-COVERED ITEM OR SERVICE: HCPCS | Performed by: FAMILY MEDICINE

## 2023-04-26 PROCEDURE — 72158 MRI LUMBAR SPINE W/O & W/DYE: CPT

## 2023-04-26 PROCEDURE — 99232 SBSQ HOSP IP/OBS MODERATE 35: CPT | Performed by: FAMILY MEDICINE

## 2023-04-26 PROCEDURE — 700102 HCHG RX REV CODE 250 W/ 637 OVERRIDE(OP): Performed by: INTERNAL MEDICINE

## 2023-04-26 PROCEDURE — 82607 VITAMIN B-12: CPT

## 2023-04-26 PROCEDURE — 87077 CULTURE AEROBIC IDENTIFY: CPT

## 2023-04-26 PROCEDURE — 84157 ASSAY OF PROTEIN OTHER: CPT

## 2023-04-26 PROCEDURE — 72156 MRI NECK SPINE W/O & W/DYE: CPT

## 2023-04-26 PROCEDURE — A9270 NON-COVERED ITEM OR SERVICE: HCPCS | Performed by: HOSPITALIST

## 2023-04-26 PROCEDURE — 700105 HCHG RX REV CODE 258

## 2023-04-26 PROCEDURE — A9270 NON-COVERED ITEM OR SERVICE: HCPCS | Performed by: INTERNAL MEDICINE

## 2023-04-26 PROCEDURE — 99100 ANES PT EXTEME AGE<1 YR&>70: CPT | Performed by: ANESTHESIOLOGY

## 2023-04-26 PROCEDURE — 99233 SBSQ HOSP IP/OBS HIGH 50: CPT | Performed by: SURGERY

## 2023-04-26 PROCEDURE — 99232 SBSQ HOSP IP/OBS MODERATE 35: CPT | Mod: GC | Performed by: PSYCHIATRY & NEUROLOGY

## 2023-04-26 PROCEDURE — 36415 COLL VENOUS BLD VENIPUNCTURE: CPT

## 2023-04-26 PROCEDURE — 83916 OLIGOCLONAL BANDS: CPT

## 2023-04-26 PROCEDURE — 700102 HCHG RX REV CODE 250 W/ 637 OVERRIDE(OP): Performed by: FAMILY MEDICINE

## 2023-04-26 PROCEDURE — 99232 SBSQ HOSP IP/OBS MODERATE 35: CPT | Performed by: INTERNAL MEDICINE

## 2023-04-26 PROCEDURE — 160035 HCHG PACU - 1ST 60 MINS PHASE I

## 2023-04-26 PROCEDURE — 160002 HCHG RECOVERY MINUTES (STAT)

## 2023-04-26 PROCEDURE — 83880 ASSAY OF NATRIURETIC PEPTIDE: CPT

## 2023-04-26 PROCEDURE — 84100 ASSAY OF PHOSPHORUS: CPT

## 2023-04-26 PROCEDURE — 700117 HCHG RX CONTRAST REV CODE 255: Performed by: FAMILY MEDICINE

## 2023-04-26 PROCEDURE — 01922 ANES N-INVAS IMG/RADJ THER: CPT | Performed by: ANESTHESIOLOGY

## 2023-04-26 PROCEDURE — 82784 ASSAY IGA/IGD/IGG/IGM EACH: CPT

## 2023-04-26 PROCEDURE — 87899 AGENT NOS ASSAY W/OPTIC: CPT | Mod: 91

## 2023-04-26 PROCEDURE — 770001 HCHG ROOM/CARE - MED/SURG/GYN PRIV*

## 2023-04-26 PROCEDURE — 84443 ASSAY THYROID STIM HORMONE: CPT

## 2023-04-26 PROCEDURE — 82945 GLUCOSE OTHER FLUID: CPT

## 2023-04-26 PROCEDURE — 700111 HCHG RX REV CODE 636 W/ 250 OVERRIDE (IP): Performed by: FAMILY MEDICINE

## 2023-04-26 PROCEDURE — A9579 GAD-BASE MR CONTRAST NOS,1ML: HCPCS | Performed by: FAMILY MEDICINE

## 2023-04-26 PROCEDURE — 92610 EVALUATE SWALLOWING FUNCTION: CPT

## 2023-04-26 PROCEDURE — 700111 HCHG RX REV CODE 636 W/ 250 OVERRIDE (IP): Performed by: ANESTHESIOLOGY

## 2023-04-26 PROCEDURE — 72157 MRI CHEST SPINE W/O & W/DYE: CPT

## 2023-04-26 PROCEDURE — 700105 HCHG RX REV CODE 258: Performed by: FAMILY MEDICINE

## 2023-04-26 PROCEDURE — 87045 FECES CULTURE AEROBIC BACT: CPT

## 2023-04-26 PROCEDURE — 86592 SYPHILIS TEST NON-TREP QUAL: CPT

## 2023-04-26 PROCEDURE — 83735 ASSAY OF MAGNESIUM: CPT

## 2023-04-26 RX ORDER — POTASSIUM CHLORIDE 20 MEQ/1
20 TABLET, EXTENDED RELEASE ORAL 2 TIMES DAILY
Status: DISCONTINUED | OUTPATIENT
Start: 2023-04-26 | End: 2023-04-27

## 2023-04-26 RX ORDER — ONDANSETRON 2 MG/ML
4 INJECTION INTRAMUSCULAR; INTRAVENOUS
Status: DISCONTINUED | OUTPATIENT
Start: 2023-04-26 | End: 2023-04-26 | Stop reason: HOSPADM

## 2023-04-26 RX ORDER — HALOPERIDOL 5 MG/ML
1 INJECTION INTRAMUSCULAR
Status: DISCONTINUED | OUTPATIENT
Start: 2023-04-26 | End: 2023-04-26 | Stop reason: HOSPADM

## 2023-04-26 RX ORDER — DIPHENHYDRAMINE HYDROCHLORIDE 50 MG/ML
12.5 INJECTION INTRAMUSCULAR; INTRAVENOUS
Status: DISCONTINUED | OUTPATIENT
Start: 2023-04-26 | End: 2023-04-26 | Stop reason: HOSPADM

## 2023-04-26 RX ORDER — SODIUM CHLORIDE, SODIUM LACTATE, POTASSIUM CHLORIDE, CALCIUM CHLORIDE 600; 310; 30; 20 MG/100ML; MG/100ML; MG/100ML; MG/100ML
INJECTION, SOLUTION INTRAVENOUS CONTINUOUS
Status: DISCONTINUED | OUTPATIENT
Start: 2023-04-26 | End: 2023-04-26 | Stop reason: HOSPADM

## 2023-04-26 RX ADMIN — PROPOFOL 200 MG: 10 INJECTION, EMULSION INTRAVENOUS at 10:01

## 2023-04-26 RX ADMIN — ATORVASTATIN CALCIUM 10 MG: 10 TABLET, FILM COATED ORAL at 18:36

## 2023-04-26 RX ADMIN — SPIRONOLACTONE 25 MG: 50 TABLET ORAL at 04:37

## 2023-04-26 RX ADMIN — CARVEDILOL 12.5 MG: 12.5 TABLET, FILM COATED ORAL at 09:20

## 2023-04-26 RX ADMIN — INSULIN HUMAN 3 UNITS: 100 INJECTION, SOLUTION PARENTERAL at 20:56

## 2023-04-26 RX ADMIN — PIPERACILLIN AND TAZOBACTAM 3.38 G: 3; .375 INJECTION, POWDER, LYOPHILIZED, FOR SOLUTION INTRAVENOUS; PARENTERAL at 04:37

## 2023-04-26 RX ADMIN — SODIUM CHLORIDE, POTASSIUM CHLORIDE, SODIUM LACTATE AND CALCIUM CHLORIDE: 600; 310; 30; 20 INJECTION, SOLUTION INTRAVENOUS at 14:13

## 2023-04-26 RX ADMIN — PIPERACILLIN AND TAZOBACTAM 3.38 G: 3; .375 INJECTION, POWDER, LYOPHILIZED, FOR SOLUTION INTRAVENOUS; PARENTERAL at 13:53

## 2023-04-26 RX ADMIN — SODIUM CHLORIDE, POTASSIUM CHLORIDE, SODIUM LACTATE AND CALCIUM CHLORIDE: 600; 310; 30; 20 INJECTION, SOLUTION INTRAVENOUS at 09:58

## 2023-04-26 RX ADMIN — POTASSIUM CHLORIDE 20 MEQ: 1500 TABLET, EXTENDED RELEASE ORAL at 18:35

## 2023-04-26 RX ADMIN — ALLOPURINOL 100 MG: 100 TABLET ORAL at 04:37

## 2023-04-26 RX ADMIN — SODIUM BICARBONATE 1300 MG: 650 TABLET ORAL at 04:37

## 2023-04-26 RX ADMIN — POTASSIUM CHLORIDE 20 MEQ: 1500 TABLET, EXTENDED RELEASE ORAL at 04:37

## 2023-04-26 RX ADMIN — INSULIN HUMAN 2 UNITS: 100 INJECTION, SOLUTION PARENTERAL at 18:37

## 2023-04-26 RX ADMIN — GADOTERIDOL 18 ML: 279.3 INJECTION, SOLUTION INTRAVENOUS at 11:29

## 2023-04-26 RX ADMIN — CARVEDILOL 12.5 MG: 12.5 TABLET, FILM COATED ORAL at 18:41

## 2023-04-26 RX ADMIN — SODIUM BICARBONATE 1300 MG: 650 TABLET ORAL at 18:36

## 2023-04-26 RX ADMIN — PIPERACILLIN AND TAZOBACTAM 3.38 G: 3; .375 INJECTION, POWDER, LYOPHILIZED, FOR SOLUTION INTRAVENOUS; PARENTERAL at 21:02

## 2023-04-26 RX ADMIN — DULOXETINE HYDROCHLORIDE 30 MG: 30 CAPSULE, DELAYED RELEASE ORAL at 18:35

## 2023-04-26 RX ADMIN — INSULIN HUMAN 2 UNITS: 100 INJECTION, SOLUTION PARENTERAL at 13:42

## 2023-04-26 ASSESSMENT — ENCOUNTER SYMPTOMS
BACK PAIN: 0
WEAKNESS: 1
COUGH: 0
SPEECH CHANGE: 0
FOCAL WEAKNESS: 1
CHILLS: 0
NECK PAIN: 0
HEADACHES: 0
DIZZINESS: 0
SENSORY CHANGE: 0
FLANK PAIN: 0
NERVOUS/ANXIOUS: 0
SHORTNESS OF BREATH: 0
VOMITING: 0
ABDOMINAL PAIN: 1
FEVER: 0
BLURRED VISION: 0
ABDOMINAL PAIN: 0
DIAPHORESIS: 0
PALPITATIONS: 0
DIARRHEA: 1
HEARTBURN: 0
SORE THROAT: 0
MYALGIAS: 0
WHEEZING: 0
NAUSEA: 0

## 2023-04-26 ASSESSMENT — PAIN DESCRIPTION - PAIN TYPE: TYPE: ACUTE PAIN

## 2023-04-26 NOTE — CARE PLAN
The patient is Stable - Low risk of patient condition declining or worsening    Shift Goals  Clinical Goals: abx  Patient Goals: comfort  Family Goals: maría elena    Progress made toward(s) clinical / shift goals:      Patient is not progressing towards the following goals:

## 2023-04-26 NOTE — ANESTHESIA PREPROCEDURE EVALUATION
Case: [id number] Date/Time: 04/26/23 1030    Procedure: MRI-ANESTHESIA CASE    Location: [facility] IMAGING / SURGERY [facility]    Surgeons: [facility]          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 (HCC)   (positive) Essential hypertension   (positive) Hypertensive emergency         (positive) Acute kidney injury superimposed on CKD (HCC)   (positive) Chronic kidney disease (CKD), stage IV (severe) (HCC)      ENDO   (positive) Type 2 diabetes mellitus with kidney complication, without long-term current use of insulin (HCC)       Physical Exam    Airway   Mallampati: II  TM distance: >3 FB  Neck ROM: full       Cardiovascular - normal exam  Rhythm: regular  Rate: normal  (-) murmur     Dental - normal exam           Pulmonary - normal exam  Breath sounds clear to auscultation     Abdominal    Neurological - normal exam                 Anesthesia Plan    ASA 2       Plan - general       Airway plan will be LMA          Induction: intravenous    Postoperative Plan: Postoperative administration of opioids is intended.    Pertinent diagnostic labs and testing reviewed    Informed Consent:    Anesthetic plan and risks discussed with patient.    Use of blood products discussed with: patient whom consented to blood products.

## 2023-04-26 NOTE — CARE PLAN
The patient is Stable - Low risk of patient condition declining or worsening    Shift Goals  Clinical Goals: safety abx  Patient Goals: comfort  Family Goals: maría elena    Progress made toward(s) clinical / shift goals:  Patient included in and updated on plan of care. Pain monitored for throughout shift. Treaded socks on patient, patient calls for any needs.   Problem: Knowledge Deficit - Standard  Goal: Patient and family/care givers will demonstrate understanding of plan of care, disease process/condition, diagnostic tests and medications  Outcome: Progressing     Problem: Pain - Standard  Goal: Alleviation of pain or a reduction in pain to the patient’s comfort goal  Outcome: Progressing     Problem: Fall Risk  Goal: Patient will remain free from falls  Outcome: Progressing       Patient is not progressing towards the following goals:

## 2023-04-26 NOTE — ANESTHESIA PROCEDURE NOTES
Airway    Date/Time: 4/26/2023 10:00 AM  Performed by: Morgan Graf M.D.  Authorized by: Morgan Graf M.D.     Location:  OR  Urgency:  Elective  Indications for Airway Management:  Anesthesia      Spontaneous Ventilation: absent    Sedation Level:  Deep  Preoxygenated: Yes    Final Airway Type:  Supraglottic airway  Final Supraglottic Airway:  Standard LMA    SGA Size:  3  Number of Attempts at Approach:  1

## 2023-04-26 NOTE — DISCHARGE PLANNING
Care Transition Team Discharge Planning    Anticipated Discharge Information  Discharge Disposition: Disch to IP rehab facility or distinct part unit (62)              Discharge Plan:  [facility] is following patient for possible inpatient rehab. If declined at [facility] patient has been accepted at [facility]. CM will continue to follow with all discharge needs.

## 2023-04-26 NOTE — PROGRESS NOTES
Nephrology Daily Progress Note    Date of Service  4/26/2023    Chief Complaint  82 y.o. male with a history of diabetes, hypertension, BPH, CKD 4 who presented 4/21/2023 with weakness and abdominal pains, found to have colitis, SANJUANITA on CKD 4, and concern for weakness from a neurologic process.    Interval Problem Update  4/23 -urine output 2.1 L in the last 24 hours.  Patient denies chest pain, shortness of breath.    04/24/23 - Doing well.1.2 L urine oputut yesterday noted.  04/25/23 -creatinine stabilized at 2.0.  With 1.9 L of urine output noted improved from 1.2 L before.  Patient reports feeling subjectively better.  Evaluated by general surgery for possible cholecystectomy.      Review of Systems  Review of Systems   Constitutional:  Negative for fever.   Respiratory:  Negative for shortness of breath.    Cardiovascular:  Negative for chest pain.   Gastrointestinal:  Negative for abdominal pain.   All other systems reviewed and are negative.     Physical Exam  Temp:  [36.1 °C (96.9 °F)-36.6 °C (97.9 °F)] 36.1 °C (96.9 °F)  Pulse:  [72-83] 72  Resp:  [17-18] 18  BP: (151-156)/() 152/84  SpO2:  [92 %-94 %] 94 %    GEN: alert and oriented. In no acute distress  HEENT: dry oropharyngeal mucous membranes  CV:RRR   PULM: clear to auscultation bilaterally  ABD: soft non tender non distended  EXT: warm well perfused, trace lower extremity edema.    Fluids    Intake/Output Summary (Last 24 hours) at 4/26/2023 1056  Last data filed at 4/26/2023 0148  Gross per 24 hour   Intake --   Output 2000 ml   Net -2000 ml         Laboratory  Labs reviewed, pertinent labs below.  Recent Labs     04/24/23  0133 04/25/23  0544 04/26/23  0512   WBC 19.0* 23.1* 24.3*   RBC 4.57* 4.54* 4.70   HEMOGLOBIN 13.7* 13.4* 13.8*   HEMATOCRIT 38.4* 39.0* 41.1*   MCV 84.0 85.9 87.4   MCH 30.0 29.5 29.4   MCHC 35.7* 34.4 33.6*   RDW 44.9 46.2 47.8   PLATELETCT 141* 178 207   MPV 12.1 12.3 11.9       Recent Labs     04/24/23  013  04/25/23  0544 04/26/23  0512   SODIUM 138 141 145   POTASSIUM 3.3* 3.0* 3.2*   CHLORIDE 101 105 107   CO2 20 24 24   GLUCOSE 190* 166* 163*   BUN 67* 60* 53*   CREATININE 2.20* 2.15* 2.21*   CALCIUM 9.0 8.6 8.9       Recent Labs     04/25/23  1411   INR 1.48*           URINALYSIS:  Lab Results   Component Value Date/Time    COLORURINE Yellow 04/22/2023 1537    CLARITY Clear 04/22/2023 1537    SPECGRAVITY 1.013 04/22/2023 1537    PHURINE 5.5 04/22/2023 1537    KETONES Negative 04/22/2023 1537    PROTEINURIN 100 (A) 04/22/2023 1537    BILIRUBINUR Negative 04/22/2023 1537    UROBILU 0.2 04/22/2023 1537    NITRITE Negative 04/22/2023 1537    LEUKESTERAS Negative 04/22/2023 1537    OCCULTBLOOD Small (A) 04/22/2023 1537     UPC  Lab Results   Component Value Date/Time    TOTPROTUR 51.0 (H) 04/22/2023 1537      Lab Results   Component Value Date/Time    CREATININEU 68.12 04/22/2023 1537    CREATININEU 70.18 04/22/2023 1537            Current Facility-Administered Medications   Medication Dose Route Frequency Provider Last Rate Last Admin    potassium chloride SA (Kdur) tablet 20 mEq  20 mEq Oral BID Félix Smith M.D.        PROPOFOL 10 MG/ML IV EMUL             PROPOFOL 10 MG/ML IV EMUL             piperacillin-tazobactam (Zosyn) 3.375 g in  mL IVPB  3.375 g Intravenous Q8HRS Félix Smith M.D.   Stopped at 04/26/23 0837    labetalol (NORMODYNE/TRANDATE) injection 10 mg  10 mg Intravenous Q4HRS PRN Félix Smith M.D.        hydrALAZINE (APRESOLINE) injection 10 mg  10 mg Intravenous Q6HRS PRN Félix Smith M.D.        atorvastatin (LIPITOR) tablet 10 mg  10 mg Oral DAILY AT 1800 Smiley Cain M.D.   10 mg at 04/25/23 1739    allopurinol (ZYLOPRIM) tablet 100 mg  100 mg Oral DAILY Smiley Cain M.D.   100 mg at 04/26/23 0437    carvedilol (COREG) tablet 12.5 mg  12.5 mg Oral BID WITH MEALS Smiley Cain M.D.   12.5 mg at 04/26/23 0920    spironolactone (ALDACTONE) tablet 25 mg  25 mg Oral Q DAY  Juan Bedoya M.D.   25 mg at 04/26/23 0437    sodium bicarbonate tablet 1,300 mg  1,300 mg Oral BID Smiley Cain M.D.   1,300 mg at 04/26/23 0437    lactated ringers infusion (BOLUS)  1,000 mL Intravenous Once PRN Cb Maldonado M.D.        lactated ringers infusion   Intravenous Continuous ADELA Madera.P.R.NReyna 75 mL/hr at 04/26/23 0958 New Bag at 04/26/23 0958    DULoxetine (CYMBALTA) capsule 30 mg  30 mg Oral Q EVENING Ronaldo ePrez M.D.   30 mg at 04/25/23 1739    lactated ringers infusion (BOLUS)  500 mL Intravenous Once PRN Ronaldo Perez M.D.        acetaminophen (Tylenol) tablet 650 mg  650 mg Oral Q6HRS PRN Ronaldo Perez M.D.        insulin regular (HumuLIN R,NovoLIN R) injection  2-9 Units Subcutaneous 4X/DAY ACHS Ronaldo Perez M.D.   2 Units at 04/25/23 1251    And    dextrose 10 % BOLUS 25 g  25 g Intravenous Q15 MIN PRN Ronaldo Perez M.D.        Pharmacy Consult Request ...Pain Management Review 1 Each  1 Each Other PHARMACY TO DOSE ADELA Madera.P.R.N.        oxyCODONE immediate-release (ROXICODONE) tablet 5 mg  5 mg Oral Q3HRS PRN Eli Pandya A.P.R.N.        Or    oxyCODONE immediate release (ROXICODONE) tablet 10 mg  10 mg Oral Q3HRS PRN Eli Pandya A.P.R.N.        Or    HYDROmorphone (Dilaudid) injection 0.5 mg  0.5 mg Intravenous Q3HRS PRN Eli Pandya A.P.R.N.   0.5 mg at 04/21/23 2610     Facility-Administered Medications Ordered in Other Encounters   Medication Dose Route Frequency Provider Last Rate Last Admin    propofol (DIPRIVAN) injection   Intravenous PRN Morgan Graf M.D.   200 mg at 04/26/23 1001         Assessment/Plan  82 y.o. male with a history of diabetes, hypertension, BPH, CKD 4 who presented 4/21/2023 with weakness and abdominal pains, found to have colitis, SANJUANITA on CKD 4, and concern for weakness from a neurologic process.     1.  Acute on Chronic Kidney Disease IV.  Nonoliguric. Likely from prerenal hypovolemia.  Baseline  creatinine 2.4 to 2.9 mg/dL in 2022.  Patient has reached baseline renal function,,slightly better  - Continue IV fluids as below.   - Avoid NSAIDs and other nephrotoxins.     2.  Hyponatremia, mildly persistent, but improving.  High urinary Na suggests the presence of ADH, likely from abdominal pain leading to ADH secretion.    Avoid hypotonic fluids.  Free water restriction. Hydration as above. Continue to monitor.       3.  Metabolic acidosis, due to SANJUANITA on CKD.  Gentle hydration as above with improvement in renal function should improve acidosis. Continue to monitor.     4.  Azotemia,   Improving.  Patient has no uremic symptoms.  Continue IV fluids.  No acute need for dialysis.  Check renal function panel daily.     5.  Hypertension.  Continue antihypertensive regimen per primary.  Recommend resuming home coreg and consider spironolactone 25 mg daily if persists     6.  Hypokalemia-likely related to post ATN diuresis and reduced p.o. intake.  Replete cautiously.  Continue to monitor.    Elizabeth Goodrich MD  Nephrology   [facility].

## 2023-04-26 NOTE — PROGRESS NOTES
Nephrology Daily Progress Note    Date of Service  4/26/2023    Chief Complaint  82 y.o. male with a history of diabetes, hypertension, BPH, CKD 4 who presented 4/21/2023 with weakness and abdominal pains, found to have colitis, SANJUANITA on CKD 4, and concern for weakness from a neurologic process.    Interval Problem Update  4/23 -urine output 2.1 L in the last 24 hours.  Patient denies chest pain, shortness of breath.    04/24/23 - Doing well.1.2 L urine oputut yesterday noted.  04/25/23 -creatinine stabilized at 2.0.  With 1.9 L of urine output noted improved from 1.2 L before.  Patient reports feeling subjectively better.  Evaluated by general surgery for possible cholecystectomy.  04/26 -has 2.0 L urine output documented yesterday.    Review of Systems  Review of Systems   Constitutional:  Negative for fever.   Respiratory:  Negative for shortness of breath.    Cardiovascular:  Negative for chest pain.   Gastrointestinal:  Negative for abdominal pain.   All other systems reviewed and are negative.     Physical Exam  Temp:  [36.1 °C (96.9 °F)-36.6 °C (97.9 °F)] 36.1 °C (96.9 °F)  Pulse:  [72-83] 72  Resp:  [17-18] 18  BP: (151-156)/() 152/84  SpO2:  [92 %-94 %] 94 %    Deferred as primary team in room will reattempt  Fluids    Intake/Output Summary (Last 24 hours) at 4/26/2023 1102  Last data filed at 4/26/2023 0148  Gross per 24 hour   Intake --   Output 2000 ml   Net -2000 ml         Laboratory  Labs reviewed, pertinent labs below.  Recent Labs     04/24/23  0133 04/25/23  0544 04/26/23  0512   WBC 19.0* 23.1* 24.3*   RBC 4.57* 4.54* 4.70   HEMOGLOBIN 13.7* 13.4* 13.8*   HEMATOCRIT 38.4* 39.0* 41.1*   MCV 84.0 85.9 87.4   MCH 30.0 29.5 29.4   MCHC 35.7* 34.4 33.6*   RDW 44.9 46.2 47.8   PLATELETCT 141* 178 207   MPV 12.1 12.3 11.9       Recent Labs     04/24/23  0133 04/25/23  0544 04/26/23  0512   SODIUM 138 141 145   POTASSIUM 3.3* 3.0* 3.2*   CHLORIDE 101 105 107   CO2 20 24 24   GLUCOSE 190* 166* 163*    BUN 67* 60* 53*   CREATININE 2.20* 2.15* 2.21*   CALCIUM 9.0 8.6 8.9       Recent Labs     04/25/23  1411   INR 1.48*             URINALYSIS:  Lab Results   Component Value Date/Time    COLORURINE Yellow 04/22/2023 1537    CLARITY Clear 04/22/2023 1537    SPECGRAVITY 1.013 04/22/2023 1537    PHURINE 5.5 04/22/2023 1537    KETONES Negative 04/22/2023 1537    PROTEINURIN 100 (A) 04/22/2023 1537    BILIRUBINUR Negative 04/22/2023 1537    UROBILU 0.2 04/22/2023 1537    NITRITE Negative 04/22/2023 1537    LEUKESTERAS Negative 04/22/2023 1537    OCCULTBLOOD Small (A) 04/22/2023 1537     UPC  Lab Results   Component Value Date/Time    TOTPROTUR 51.0 (H) 04/22/2023 1537      Lab Results   Component Value Date/Time    CREATININEU 68.12 04/22/2023 1537    CREATININEU 70.18 04/22/2023 1537            Current Facility-Administered Medications   Medication Dose Route Frequency Provider Last Rate Last Admin    potassium chloride SA (Kdur) tablet 20 mEq  20 mEq Oral BID Félix Smith M.D.        PROPOFOL 10 MG/ML IV EMUL             PROPOFOL 10 MG/ML IV EMUL             piperacillin-tazobactam (Zosyn) 3.375 g in  mL IVPB  3.375 g Intravenous Q8HRS Félix Smith M.D.   Stopped at 04/26/23 0837    labetalol (NORMODYNE/TRANDATE) injection 10 mg  10 mg Intravenous Q4HRS PRN Félix Smith M.D.        hydrALAZINE (APRESOLINE) injection 10 mg  10 mg Intravenous Q6HRS PRN Féilx Smith M.D.        atorvastatin (LIPITOR) tablet 10 mg  10 mg Oral DAILY AT 1800 Subjohn Cain M.D.   10 mg at 04/25/23 1739    allopurinol (ZYLOPRIM) tablet 100 mg  100 mg Oral DAILY Subani Ant, M.D.   100 mg at 04/26/23 0437    carvedilol (COREG) tablet 12.5 mg  12.5 mg Oral BID WITH MEALS Smiley Cain M.D.   12.5 mg at 04/26/23 0920    spironolactone (ALDACTONE) tablet 25 mg  25 mg Oral Q DAY Juan Bedoya M.D.   25 mg at 04/26/23 0437    sodium bicarbonate tablet 1,300 mg  1,300 mg Oral BID Smiley Cain M.D.   1,300 mg at 04/26/23  0437    lactated ringers infusion (BOLUS)  1,000 mL Intravenous Once PRN Cb Maldonado M.D.        lactated ringers infusion   Intravenous Continuous SHALOM MaderaP.R.N. 75 mL/hr at 04/26/23 0958 New Bag at 04/26/23 0958    DULoxetine (CYMBALTA) capsule 30 mg  30 mg Oral Q EVENING Ronaldo Perez M.D.   30 mg at 04/25/23 1739    lactated ringers infusion (BOLUS)  500 mL Intravenous Once PRN Ronaldo Perez M.D.        acetaminophen (Tylenol) tablet 650 mg  650 mg Oral Q6HRS PRN Ronaldo Perez M.D.        insulin regular (HumuLIN R,NovoLIN R) injection  2-9 Units Subcutaneous 4X/DAY ACHS Ronaldo Perez M.D.   2 Units at 04/25/23 1251    And    dextrose 10 % BOLUS 25 g  25 g Intravenous Q15 MIN PRN Ronaldo Perez M.D.        Pharmacy Consult Request ...Pain Management Review 1 Each  1 Each Other PHARMACY TO DOSE Eli Pandya, A.P.R.N.        oxyCODONE immediate-release (ROXICODONE) tablet 5 mg  5 mg Oral Q3HRS PRN Eli Pandya A.P.R.N.        Or    oxyCODONE immediate release (ROXICODONE) tablet 10 mg  10 mg Oral Q3HRS PRN Eli Pandya A.P.R.N.        Or    HYDROmorphone (Dilaudid) injection 0.5 mg  0.5 mg Intravenous Q3HRS PRN Eli Pandya A.P.R.N.   0.5 mg at 04/21/23 3778     Facility-Administered Medications Ordered in Other Encounters   Medication Dose Route Frequency Provider Last Rate Last Admin    propofol (DIPRIVAN) injection   Intravenous PRN Morgan Graf M.D.   200 mg at 04/26/23 1001         Assessment/Plan  82 y.o. male with a history of diabetes, hypertension, BPH, CKD 4 who presented 4/21/2023 with weakness and abdominal pains, found to have colitis, SANJUANITA on CKD 4, and concern for weakness from a neurologic process.     1.  Acute on Chronic Kidney Disease IV.  Nonoliguric. Likely from prerenal hypovolemia.  Baseline creatinine 2.4 to 2.9 mg/dL in 2022.  Patient has reached baseline renal function,slightly better  - Continue IV fluids as below.  May transition  postoperatively if cholecystectomy is planned.  - Avoid NSAIDs and other nephrotoxins.   -Appreciate general surgery recommendations for possible cholecystectomy.    2.  Hyponatremia, mildly persistent, but improving.  High urinary Na suggests the presence of ADH, likely from abdominal pain leading to ADH secretion.    Avoid hypotonic fluids.  Free water restriction. Hydration as above. Continue to monitor.       3.  Metabolic acidosis, due to SANJUANITA on CKD.  Gentle hydration as above with improvement in renal function should improve acidosis. Continue to monitor.     4.  Azotemia,   Improving.  Patient has no uremic symptoms.  Continue IV fluids.  No acute need for dialysis.  Check renal function panel daily.     5.  Hypertension.  Continue antihypertensive regimen per primary.       6.  Hypokalemia-likely related to post ATN diuresis and reduced p.o. intake.  Replete cautiously.  Continue to monitor.    Elizabeth Goodrich MD  Nephrology   [facility].

## 2023-04-26 NOTE — DISCHARGE PLANNING
[facility] Transitional Care Coordination    Rehab following for potential admission.  MR lumbar, thoracic, cervical spine today.  Monitoring progress with therapies.

## 2023-04-26 NOTE — DISCHARGE PLANNING
HTH/SCP TCN chart review completed. Collaborated with NIKIA Clark. Current discharge considerations are IRF and SNF. Awaiting acceptance or decline for IRF, as this is patient's first choice.  Patient has also been accepted [facility].  Patient not currently medically clear as of 4/25.    TCN will continue to follow and collaborate with discharge planning team as additional post acute needs arise. Thank you.    Completed:  PT recommends post acute placement on 4/25   OT recommends post-acute placement on 4/24  Pending SLP evaluation - 4/24  Choice obtained: IRF and SNF choice obtained on 4/24/23.  HH choice on 4/24/23.  Infusion on 4/22/23.    [facility] referral sent 4/22/23

## 2023-04-26 NOTE — DISCHARGE PLANNING
HTH/SCP TCN chart review completed. Collaborated with NIKIA Clark.  Current discharge considerations are for post-acute placement IRF vs. SNF when medically cleared.  IRF and SNF choice obtained by this TCN on 4/24/23.      Patient wants first choice to be IRF.  Per chart review IRF is pending MRI thoracic spine, repeat PT and verification of discharge support per Eden BOWMAN.  as well as I & D consult to determine frequency of ABX.   Per Eden BOWMAN note on 4/25/23 at 9:21 AM, if patient needs long term ABX of 3 times daily dosing or more frequent, he will require SNF placement.       Second choice is SNF: 1) [facility], 2) [facility], 3) [facility].  Per chart review, patient has also been accepted [facility].   [facility] is pending and [facility] declined. Patient seen at bedside and notified of IRF pending and [facility] acceptance.  TCN will continue to follow and collaborate with discharge planning team as additional post acute needs arise. Thank you.    Completed:  PT/OT recommend post-acute placement on 4/24/25.  PT recommends PMR consult.    Choice obtained: IRF and SNF choice obtained on 4/24/23.  HH choice on 4/24/23.  Infusion on 4/22/23.    [facility] referral sent 4/22/23

## 2023-04-26 NOTE — PROGRESS NOTES
"  DATE: 4/26/2023      INTERVAL EVENTS:  No abdominal pain this morning. He is on his way to MRI.    REVIEW OF SYSTEMS:  Review of systems is remarkable for the following no abdominal pain, nausea, vomiting. The remainder of the comprehensive ROS is negative with the exception of the aforementioned HPI, PMH, and PSH bullets in accordance with CMS guideline.    PHYSICAL EXAMINATION:    Vital Signs: BP (!) 152/84   Pulse 72   Temp 36.1 °C (96.9 °F) (Temporal)   Resp 18   Ht 1.803 m (5' 11\")   Wt 88.3 kg (194 lb 10.7 oz)   SpO2 94%   Physical Exam  Constitutional:       Appearance: Normal appearance.   HENT:      Head: Normocephalic and atraumatic.      Right Ear: External ear normal.      Left Ear: External ear normal.      Nose: Nose normal.      Mouth/Throat:      Mouth: Mucous membranes are moist.   Eyes:      Extraocular Movements: Extraocular movements intact.   Cardiovascular:      Rate and Rhythm: Normal rate and regular rhythm.   Pulmonary:      Effort: Pulmonary effort is normal.   Abdominal:      General: Abdomen is flat. There is no distension.      Tenderness: There is no abdominal tenderness. There is no guarding or rebound.   Musculoskeletal:         General: Normal range of motion.   Skin:     General: Skin is warm and dry.      Capillary Refill: Capillary refill takes less than 2 seconds.   Neurological:      General: No focal deficit present.      Mental Status: He is alert and oriented to person, place, and time.   Psychiatric:         Mood and Affect: Mood normal.         Behavior: Behavior normal.       LABORATORY VALUES:   Recent Labs     04/24/23  0133 04/25/23  0544 04/26/23  0512   WBC 19.0* 23.1* 24.3*   RBC 4.57* 4.54* 4.70   HEMOGLOBIN 13.7* 13.4* 13.8*   HEMATOCRIT 38.4* 39.0* 41.1*   MCV 84.0 85.9 87.4   MCH 30.0 29.5 29.4   MCHC 35.7* 34.4 33.6*   RDW 44.9 46.2 47.8   PLATELETCT 141* 178 207   MPV 12.1 12.3 11.9     Recent Labs     04/24/23  0133 04/25/23  0544 04/26/23  0512 "   SODIUM 138 141 145   POTASSIUM 3.3* 3.0* 3.2*   CHLORIDE 101 105 107   CO2 20 24 24   GLUCOSE 190* 166* 163*   BUN 67* 60* 53*   CREATININE 2.20* 2.15* 2.21*   CALCIUM 9.0 8.6 8.9     Recent Labs     04/25/23  1204 04/25/23  1411 04/26/23  0512   ASTSGOT 32  --  27   ALTSGPT 27  --  23   TBILIRUBIN 0.8  --  0.9   IBILIRUBIN 0.5  --   --    DBILIRUBIN 0.3  --   --    ALKPHOSPHAT 221*  --  191*   GLOBULIN  --   --  3.2   INR  --  1.48*  --      Recent Labs     04/25/23  1411   INR 1.48*        IMAGING:   US-RUQ   Final Result         1.  Acute cholecystitis.   2.  Atrophic right kidney.      DX-CHEST-LIMITED (1 VIEW)   Final Result      Perihilar interstitial edema and basilar atelectasis and/or consolidation. Underlying infection is possible.      MR-LUMBAR SPINE-W/O   Final Result      1.  Lower lumbar spine predominant degenerative changes as described in detail above, progressed from 2010 MRI.   2.  Edema at the L4-L5 disc space and L5 superior endplate likely represents degenerative changes. Less likely this could represent a low-grade or early discitis osteomyelitis. Correlate for evidence of infection.   3.  Acute appearing Schmorl's node at L3-L4, which can be a source of pain.      US-RENAL   Final Result      1.  Atrophic kidneys. Echogenic bilateral renal parenchyma could relate to medical renal disease.      2.  No hydronephrosis. No renal calculus.      DX-CHEST-PORTABLE (1 VIEW)   Final Result      No acute cardiac or pulmonary abnormalities are identified.      CT-ABDOMEN-PELVIS W/O   Final Result      1.  Findings suspicious for focal colitis at the hepatic flexure, less likely cholecystitis or duodenitis with secondary involvement of the colon. Infectious, inflammatory and ischemic etiologies are considerations. Underlying mass is not excluded.   2.  Cholelithiasis with distention of the gallbladder   3.  BILATERAL renal atrophy   4.  Subcentimeter LEFT adrenal myelolipoma   5.  12 mm RIGHT adrenal  nodule likely an adenoma absent a history of cancer   6.  Subcentimeter RIGHT hepatic lesion, likely a cyst absent a history of cancer   7.  Atherosclerosis   8.  Colonic diverticulosis      CT-HEAD W/O   Final Result      1.  Cerebral atrophy.      2.  White matter lucencies most consistent with small vessel ischemic change versus demyelination or gliosis.      3.  Otherwise, Head CT without contrast with no acute findings. No evidence of acute cerebral infarction, hemorrhage or mass lesion.         IR-CONSULT AND TREAT    (Results Pending)   NM-BILIARY (HIDA) SCAN WITH CCK    (Results Pending)   MR-CERVICAL SPINE-WITH & W/O    (Results Pending)   MR-THORACIC SPINE-WITH & W/O    (Results Pending)   MR-LUMBAR SPINE-WITH & W/O    (Results Pending)       ASSESSMENT AND PLAN:   82 year old man with colitis vs cholecysitis  No abdominal pain  Leukocytosis remains significant  HIDA scan to evaluate for gallbladder function when able  Will follow.          ____________________________________     Lizz Perea M.D.    DD: 4/26/2023  11:28 AM

## 2023-04-26 NOTE — PROGRESS NOTES
[facility] Medicine Daily Progress Note    Date of Service  4/26/2023    Chief Complaint  Tyrone Cline is a 82 y.o. male admitted 4/21/2023 with Colitis.    Hospital Course  Admitted with sepsis secondary to colitis.  Patient was started on empiric coverage with IV Rocephin and Flagyl.  He was also noted to have bilateral lower extremity weakness.  MRI of the lumbar spine was done which showed possible edema.  Neurology was then consulted on the case.  He was also noted to have an SANJUANITA on CKD stage IV.  Nephrology was consulted on the case.  She was started on a careful IVF hydration.  His CT scan also showed cholelithiasis.  Ultrasound was done which showed possible cholecystitis.  Surgery was then consulted on the case.    Interval Problem Update  Colitis - WBC trended up to 24,000, stool culture pending  Cholelithiasis - for HIDA scan  SANJUANITA - crea 2.2  Hypertension - sbp   Diabetes - -159  Low potassium     I have discussed this patient's plan of care and discharge plan at IDT rounds today with Case Management, Nursing, Nursing leadership, and other members of the IDT team.    Consultants/Specialty  general surgery, nephrology, and neurology    Code Status  DNAR/DNI    Disposition  Patient is not medically cleared for discharge.   Anticipate discharge to to an inpatient rehabilitation hospital.  I have placed the appropriate orders for post-discharge needs.    Review of Systems  Review of Systems   Constitutional:  Positive for malaise/fatigue. Negative for chills, diaphoresis and fever.   HENT:  Negative for congestion, hearing loss and sore throat.    Eyes:  Negative for blurred vision.   Respiratory:  Negative for cough, shortness of breath and wheezing.    Cardiovascular:  Negative for chest pain, palpitations and leg swelling.   Gastrointestinal:  Positive for abdominal pain and diarrhea. Negative for heartburn, nausea and vomiting.   Genitourinary:  Negative for dysuria, flank pain and  hematuria.   Musculoskeletal:  Negative for back pain, joint pain, myalgias and neck pain.   Skin:  Negative for rash.   Neurological:  Positive for focal weakness and weakness. Negative for dizziness, sensory change, speech change and headaches.   Psychiatric/Behavioral:  The patient is not nervous/anxious.       Physical Exam  Temp:  [36.1 °C (96.9 °F)-36.6 °C (97.9 °F)] 36.4 °C (97.5 °F)  Pulse:  [72-89] 85  Resp:  [12-19] 18  BP: ()/() 132/76  SpO2:  [91 %-94 %] 93 %    Physical Exam  Vitals and nursing note reviewed.   HENT:      Head: Normocephalic and atraumatic.      Nose: No congestion.      Mouth/Throat:      Mouth: Mucous membranes are moist.   Eyes:      Extraocular Movements: Extraocular movements intact.      Conjunctiva/sclera: Conjunctivae normal.   Cardiovascular:      Rate and Rhythm: Normal rate and regular rhythm.      Heart sounds: Murmur heard.   Pulmonary:      Effort: Pulmonary effort is normal.      Breath sounds: Normal breath sounds.   Abdominal:      General: There is distension.      Tenderness: There is no abdominal tenderness. There is no guarding or rebound.   Musculoskeletal:      Cervical back: No tenderness.      Right lower leg: No edema.      Left lower leg: No edema.   Skin:     General: Skin is warm and dry.   Neurological:      Mental Status: He is alert and oriented to person, place, and time.      Cranial Nerves: No cranial nerve deficit.      Motor: Weakness (MMT BUE 5/5, RLE 4/5, LLE 4+/5) present.       Fluids    Intake/Output Summary (Last 24 hours) at 4/26/2023 1254  Last data filed at 4/26/2023 0148  Gross per 24 hour   Intake --   Output 2000 ml   Net -2000 ml       Laboratory  Recent Labs     04/24/23  0133 04/25/23  0544 04/26/23  0512   WBC 19.0* 23.1* 24.3*   RBC 4.57* 4.54* 4.70   HEMOGLOBIN 13.7* 13.4* 13.8*   HEMATOCRIT 38.4* 39.0* 41.1*   MCV 84.0 85.9 87.4   MCH 30.0 29.5 29.4   MCHC 35.7* 34.4 33.6*   RDW 44.9 46.2 47.8   PLATELETCT 141* 178 207    MPV 12.1 12.3 11.9     Recent Labs     04/24/23  0133 04/25/23  0544 04/26/23  0512   SODIUM 138 141 145   POTASSIUM 3.3* 3.0* 3.2*   CHLORIDE 101 105 107   CO2 20 24 24   GLUCOSE 190* 166* 163*   BUN 67* 60* 53*   CREATININE 2.20* 2.15* 2.21*   CALCIUM 9.0 8.6 8.9     Recent Labs     04/25/23  1411   INR 1.48*               Imaging  US-RUQ   Final Result         1.  Acute cholecystitis.   2.  Atrophic right kidney.      DX-CHEST-LIMITED (1 VIEW)   Final Result      Perihilar interstitial edema and basilar atelectasis and/or consolidation. Underlying infection is possible.      MR-LUMBAR SPINE-W/O   Final Result      1.  Lower lumbar spine predominant degenerative changes as described in detail above, progressed from 2010 MRI.   2.  Edema at the L4-L5 disc space and L5 superior endplate likely represents degenerative changes. Less likely this could represent a low-grade or early discitis osteomyelitis. Correlate for evidence of infection.   3.  Acute appearing Schmorl's node at L3-L4, which can be a source of pain.      US-RENAL   Final Result      1.  Atrophic kidneys. Echogenic bilateral renal parenchyma could relate to medical renal disease.      2.  No hydronephrosis. No renal calculus.      DX-CHEST-PORTABLE (1 VIEW)   Final Result      No acute cardiac or pulmonary abnormalities are identified.      CT-ABDOMEN-PELVIS W/O   Final Result      1.  Findings suspicious for focal colitis at the hepatic flexure, less likely cholecystitis or duodenitis with secondary involvement of the colon. Infectious, inflammatory and ischemic etiologies are considerations. Underlying mass is not excluded.   2.  Cholelithiasis with distention of the gallbladder   3.  BILATERAL renal atrophy   4.  Subcentimeter LEFT adrenal myelolipoma   5.  12 mm RIGHT adrenal nodule likely an adenoma absent a history of cancer   6.  Subcentimeter RIGHT hepatic lesion, likely a cyst absent a history of cancer   7.  Atherosclerosis   8.  Colonic  diverticulosis      CT-HEAD W/O   Final Result      1.  Cerebral atrophy.      2.  White matter lucencies most consistent with small vessel ischemic change versus demyelination or gliosis.      3.  Otherwise, Head CT without contrast with no acute findings. No evidence of acute cerebral infarction, hemorrhage or mass lesion.         IR-CONSULT AND TREAT    (Results Pending)   NM-BILIARY (HIDA) SCAN WITH CCK    (Results Pending)   MR-CERVICAL SPINE-WITH & W/O    (Results Pending)   MR-THORACIC SPINE-WITH & W/O    (Results Pending)   MR-LUMBAR SPINE-WITH & W/O    (Results Pending)        Assessment/Plan  * Colitis- (present on admission)  Assessment & Plan  IV Zosyn  stool culture pending    Weakness of both lower extremities- (present on admission)  Assessment & Plan  MRI C-T- L spines with contrast    Sepsis (HCC)- (present on admission)  Assessment & Plan  This is Sepsis Present on admission  SIRS criteria identified on my evaluation include: Leukocytosis, with WBC greater than 12,000  Source is colitis  Sepsis protocol initiated  Fluid resuscitation ordered per protocol  Crystalloid Fluid Administration: Fluid resuscitation ordered per standard protocol - 30 mL/kg per current or ideal body weight  IV antibiotics as appropriate for source of sepsis  Reassessment: I have reassessed the patient's hemodynamic status          Acute kidney injury superimposed on CKD (HCC)- (present on admission)  Assessment & Plan  Careful IVF hydration  NaHCO3  Follow cmp      Hypercalcemia- (present on admission)  Assessment & Plan  IVF hydration  Follow cmp    Hypomagnesemia- (present on admission)  Assessment & Plan  IV Mg given  Follow level    Cholelithiasis- (present on admission)  Assessment & Plan  Possible Cholecystitis  IV Zosyn  For HIDA scan  Surgery following    Hypokalemia- (present on admission)  Assessment & Plan  increase Kdur  Follow cmp    Thrombocytopenia (HCC)- (present on admission)  Assessment & Plan  Follow  cbc    High anion gap metabolic acidosis- (present on admission)  Assessment & Plan  IVF hydration    Hyponatremia- (present on admission)  Assessment & Plan  Follow cmp    Diabetic peripheral neuropathy (HCC)- (present on admission)  Assessment & Plan  Cymbalta    Essential hypertension- (present on admission)  Assessment & Plan  Coreg, Aldactone with parameters  IV labetalol and hydralazine as needed with parameters    Type 2 diabetes mellitus with kidney complication, without long-term current use of insulin (HCC)- (present on admission)  Assessment & Plan  SSI    Chronic kidney disease (CKD), stage IV (severe) (HCC)- (present on admission)  Assessment & Plan  Follow cmp    Hypercholesterolemia- (present on admission)  Assessment & Plan  Lipitor         VTE prophylaxis: SCDs/TEDs    I have performed a physical exam and reviewed and updated ROS and Plan today (4/26/2023). In review of yesterday's note (4/25/2023), there are no changes except as documented above.

## 2023-04-26 NOTE — PROGRESS NOTES
Neurology Progress Note        Subjective:    Ag feels like he is getting stronger. No other complaints    Objective:  Vitals:  Vitals:    04/26/23 1300 04/26/23 1330 04/26/23 1430 04/26/23 1439   BP: 136/87 (!) 136/95 127/88 134/88   Pulse: 83 86 86 82   Resp: 17 18 17 18   Temp: 36.4 °C (97.6 °F) 36.5 °C (97.7 °F) 36.9 °C (98.5 °F) 36.6 °C (97.8 °F)   TempSrc: Temporal Temporal Temporal Temporal   SpO2: 93% 95% 95% 95%   Weight:       Height:         General: Awake, no apparent distress  Mental status: Alert, oriented x3, speech is fluent, comprehension is intact  Cranial Nerves: Pupils are equal round reactive light, extraocular movements are intact and no nystagmus, face is symmetric, facial sensations intact, no dysarthria  Motor: 5 -/5 strength in the bilateral deltoids, 4/5 in the bilateral triceps, 5/5 in the biceps, 5/5 with finger extension and  strength, 4/5 strength in the right hip flexor, 4/5 strength in left hip flexor, 4/5 strength in the right knee extensor, 5/5 in knee flexion, 4+/5 with knee extension on the left, 5/5 with ankle dorsiflexion and plantarflexion  Sensory: Light touch intact throughout, proprioception intact at the great toes bilaterally  Reflexes: 2+ and symmetric at the triceps, biceps, brachioradialis and patellas.  Absent reflexes at the Achilles bilaterally.  Babinski sign present definitely on the right, equivocal on the left.  Coordination: Normal finger-to-nose bilaterally  Gait:  Deferred    Recent Labs     04/24/23  0133 04/25/23  0544 04/26/23  0512   WBC 19.0* 23.1* 24.3*   RBC 4.57* 4.54* 4.70   HEMOGLOBIN 13.7* 13.4* 13.8*   HEMATOCRIT 38.4* 39.0* 41.1*   MCV 84.0 85.9 87.4   MCH 30.0 29.5 29.4   MCHC 35.7* 34.4 33.6*   RDW 44.9 46.2 47.8   PLATELETCT 141* 178 207   MPV 12.1 12.3 11.9       Recent Labs     04/24/23  0133 04/25/23  0544 04/26/23  0512   SODIUM 138 141 145   POTASSIUM 3.3* 3.0* 3.2*   CHLORIDE 101 105 107   CO2 20 24 24   GLUCOSE 190* 166* 163*    BUN 67* 60* 53*   CREATININE 2.20* 2.15* 2.21*   CALCIUM 9.0 8.6 8.9       Recent Labs     04/25/23  1411   INR 1.48*                   Recent Labs     04/24/23  0133 04/25/23  0544 04/26/23  0512   SODIUM 138 141 145   POTASSIUM 3.3* 3.0* 3.2*   CHLORIDE 101 105 107   CO2 20 24 24   GLUCOSE 190* 166* 163*   BUN 67* 60* 53*       Recent Labs     04/24/23  0133 04/25/23  0544 04/25/23  1204 04/26/23  0512   SODIUM 138 141  --  145   POTASSIUM 3.3* 3.0*  --  3.2*   CHLORIDE 101 105  --  107   CO2 20 24  --  24   BUN 67* 60*  --  53*   CREATININE 2.20* 2.15*  --  2.21*   MAGNESIUM  --   --  1.5 2.0   PHOSPHORUS 1.3* 3.0 3.3 3.3   CALCIUM 9.0 8.6  --  8.9       Recent Labs     04/25/23  1411   INR 1.48*     No results found for this or any previous visit.           Imaging: neuroimaging reviewed and directly visualized by me  MR-THORACIC SPINE-WITH & W/O   Final Result      1.  There is abnormal expansile T2 hyperintense lesion in the right side of the thoracic spinal cord at the levels of T2 and T3 Mild contrast enhancement is seen. This lesion is suspicious for spinal cord neoplasm. Other remote differential diagnosis    includes transverse myelitis.   2.  Exaggerated thoracic kyphosis.   3.  Thoracic dextroscoliosis.   4.  Minimal degenerative changes.   5.  Right pleural effusion.      MR-LUMBAR SPINE-WITH & W/O   Final Result      1.  Multifocal degenerative disease in the lumbar spine as described above.   2.  Moderate central canal and severe lateral recess stenosis at the level of L4-5. The exiting bilateral L5 nerve roots might have been impinged at the lateral recess.      MR-CERVICAL SPINE-WITH & W/O   Final Result      1.  There is abnormal expansile intramedullary T2 signal intensity lesion in the right cerebellum. Thoracic spinal cord at the level of T2 on T3. Mild diffuse contrast enhancement is seen. This finding is suspicious for spinal cord neoplasm. The other    less likely differential diagnosis  includes transverse myelitis. Follow-up is recommended after 4 weeks.   2.  Mild degenerative disease in the cervical spine as described above.   3.  There is no evidence of infection in the cervical spine.      US-RUQ   Final Result         1.  Acute cholecystitis.   2.  Atrophic right kidney.      DX-CHEST-LIMITED (1 VIEW)   Final Result      Perihilar interstitial edema and basilar atelectasis and/or consolidation. Underlying infection is possible.      MR-LUMBAR SPINE-W/O   Final Result      1.  Lower lumbar spine predominant degenerative changes as described in detail above, progressed from 2010 MRI.   2.  Edema at the L4-L5 disc space and L5 superior endplate likely represents degenerative changes. Less likely this could represent a low-grade or early discitis osteomyelitis. Correlate for evidence of infection.   3.  Acute appearing Schmorl's node at L3-L4, which can be a source of pain.      US-RENAL   Final Result      1.  Atrophic kidneys. Echogenic bilateral renal parenchyma could relate to medical renal disease.      2.  No hydronephrosis. No renal calculus.      DX-CHEST-PORTABLE (1 VIEW)   Final Result      No acute cardiac or pulmonary abnormalities are identified.      CT-ABDOMEN-PELVIS W/O   Final Result      1.  Findings suspicious for focal colitis at the hepatic flexure, less likely cholecystitis or duodenitis with secondary involvement of the colon. Infectious, inflammatory and ischemic etiologies are considerations. Underlying mass is not excluded.   2.  Cholelithiasis with distention of the gallbladder   3.  BILATERAL renal atrophy   4.  Subcentimeter LEFT adrenal myelolipoma   5.  12 mm RIGHT adrenal nodule likely an adenoma absent a history of cancer   6.  Subcentimeter RIGHT hepatic lesion, likely a cyst absent a history of cancer   7.  Atherosclerosis   8.  Colonic diverticulosis      CT-HEAD W/O   Final Result      1.  Cerebral atrophy.      2.  White matter lucencies most consistent with  small vessel ischemic change versus demyelination or gliosis.      3.  Otherwise, Head CT without contrast with no acute findings. No evidence of acute cerebral infarction, hemorrhage or mass lesion.         NM-BILIARY (HIDA) SCAN WITH CCK    (Results Pending)       Impression:  82-year-old man with 8-9 days of right greater than left leg weakness as well as perhaps some upper extremity weakness as well.  This developed concurrently with a GI illness concerning for food borne illness.  Certainly that history would raise the possibility of Guillain-Barré syndrome although the symptoms seem to have developed relatively rapidly in conjunction with the GI illness and he appears to improve without treatment with plasma exchange or IVIG. Furthermore, he has preserved reflexes in the arms and at the patellas.  Reflexes are absent at the Achilles which can be normal for his age especially in the setting of diabetic polyneuropathy.  In fact his upgoing toe on the right concerns me more for a myelopathic process.  Myopathic process is also a consideration as well as just generalized weakness from his underlying acute renal failure. His MRI-Tspine, which suggests an inflammatory vs neoplastic process further support a myelopathic etiology for his asymmetric, non-progressive and somewhat self-resolving weakness.    Ddx include a chord neoplasm vs inflammatory process (chief among them Transverse myelitis).     Recommendations:  -  LP to evaluate for albuminocytological dissociation which would make GBS a more likely diagnosis.  Although since he is already improving and with his poor renal function treating at least with IVIG may be risky.  -CSF cell count and differential, cytology, protein, glucose, VDRL, oligoclonal bands, IgG index, gram stain and cultures  -CSF autoimmune/paraneoplastic panel  -PT, OT evaluate and treat.  -Follow up with Neurology in one month in the community -Repeat MRI-Tspine to assess  progression/resolution of T2-T3 lesion 5/26/2023  -A short course of glucocorticoid therapy may be considered if recovery remains slow/stagnant

## 2023-04-26 NOTE — PROGRESS NOTES
Patient to MRI Inpatient Dept.  Patient informed process and plan of care during this visit.  Anesthesiologist, Dr Graf spoke with patient and discussed provider's plan of care.  MRI total spine with and without contrast under general anesthesia completed.  Report called to Grisel CAN in PACU. Patient taken by this RN and Dr Graf in stable condition to PACU via gurney. Handoff to Grisel CAN.

## 2023-04-26 NOTE — OR NURSING
Patient recovering well post MRI with anesthesia. AAOx4, calm, denies any discomfort. VSS, afebrile, 3L nasal cannula breathing even and unlabored 93%. Report given to Carolina CAN.

## 2023-04-26 NOTE — THERAPY
"Speech Language Pathology   Clinical Swallow Evaluation     Patient Name: Tyrone Cline  AGE:  82 y.o., SEX:  male  Medical Record #: [id number]  Date of Service: 4/26/2023      History of Present Illness  PER EMR- Admitted with sepsis secondary to colitis.  Patient was started on empiric coverage with IV Rocephin and Flagyl.  He was also noted to have bilateral lower extremity weakness.  MRI of the lumbar spine was done which showed possible edema.  Neurology was then consulted on the case.  He was also noted to have an SANJUANITA on CKD stage IV.  Nephrology was consulted on the case.  She was started on a careful IVF hydration.  His CT scan also showed cholelithiasis.  Ultrasound was done which showed possible cholecystitis.  Surgery was then consulted on the case.    PMHx:  Diabetes. No prior SLP services.     General Information:  Vitals  O2 Delivery Device: (P) Nasal Cannula          Communication: (P) WNL  Swallowing: (P) WNL       Oral Mechanism Evaluation:  Dentition: (P) Good   Facial Symmetry: (P) Equal        Labial Observations: (P) WFL   Lingual Observations: (P) Midline  Motor Speech: (P) 100% intelligible speech            Laryngeal Function:  Secretion Management: (P) Adequate  Voice Quality: (P) WFL        Cough: (P) Perceptually WNL         Subjective  (P) \"I am thirsty.\"      Assessment  Current Method of Nutrition: (P) Oral diet Regular CHO/TNO started today.   Positioning: (P) Hernandez's (60-90 degrees)  Bolus Administration: (P) Patient    O2 Delivery Device: (P) Nasal Cannula  Factor(s) Affecting Performance: (P) None               Swallowing Trials:  Swallowing Trials  Thin Liquid (TN0): (P) WFL  Regular (RG7): (P) WFL      Comments: Pt seen during lunch meal with no s/s of dysphagia with TNO water at 3/4 cup in amount and with continuous cup sips. Probable slowness in masticating with pt responding he always \"takes to chew\" related to advise from and MD. No to slight oral residue post swallow " "of soup, tasneem bread, and melon.       Clinical Impressions  Pt with functional swallow and no s/s of difficulty. SLP one additional visit if pt experiences swallow difficulty.     Recommendations  Diet Consistency: (P) CHO Reg/TNO as tolerated  Instrumentation: (P) None indicated at this time  Medication: (P) Whole with liquid  Supervision: (P) Independent  Positioning: (P) Fully upright and midline during oral intake  Risk Management : (P) Small bites/sips, Alternate bites and sips  Oral Care: (P) Pre- and post-meals         SLP Treatment Plan  Treatment Plan: (P) None Indicated  SLP Frequency: (P) N/A - Evaluation Only  Estimated Duration: (P) N/A - Evaluation Only      Anticipated Discharge Needs  Discharge Recommendations: (P) Anticipate that the patient will have no further speech therapy needs after discharge from the hospital            Patient / Family Goals  Patient / Family Goal #1: (P) \"I am thirsty.\"  Goal #1 Outcome: (P) Goal met  Short Term Goals  Short Term Goal # 1: (P) Pt will consume  CHO RG7/TNO without s/s of difficulty during oral intake.  Goal Outcome # 1: (P) Progressing as expected      Samina Swenson, SLP   "

## 2023-04-26 NOTE — PROGRESS NOTES
Received report from off-going nurse.   Assumed pt care at change of shift.   Assessment completed.   Pt is A&Ox 4, vital signs are stable on 2L of oxygen.   Denies pain, no apparent signs of discomfort.   Bed is in low and locked position, call light and belongings in reach, reminded to use call light, bed alarm on.   No needs at this time.

## 2023-04-27 ENCOUNTER — APPOINTMENT (OUTPATIENT)
Dept: RADIOLOGY | Facility: MEDICAL CENTER | Age: 83
DRG: 853 | End: 2023-04-27
Attending: FAMILY MEDICINE
Payer: MEDICARE

## 2023-04-27 PROBLEM — K80.00 CHOLELITHIASIS WITH ACUTE CHOLECYSTITIS: Status: ACTIVE | Noted: 2023-04-25

## 2023-04-27 LAB
ALBUMIN SERPL BCP-MCNC: 1.9 G/DL (ref 3.2–4.9)
ALBUMIN/GLOB SERPL: 0.7 G/DL
ALP SERPL-CCNC: 158 U/L (ref 30–99)
ALT SERPL-CCNC: 20 U/L (ref 2–50)
ANION GAP SERPL CALC-SCNC: 11 MMOL/L (ref 7–16)
AST SERPL-CCNC: 24 U/L (ref 12–45)
BILIRUB SERPL-MCNC: 0.7 MG/DL (ref 0.1–1.5)
BUN SERPL-MCNC: 57 MG/DL (ref 8–22)
BURR CELLS/RBC NFR CSF MANUAL: 0 %
CALCIUM ALBUM COR SERPL-MCNC: 10 MG/DL (ref 8.5–10.5)
CALCIUM SERPL-MCNC: 8.3 MG/DL (ref 8.5–10.5)
CHLORIDE SERPL-SCNC: 107 MMOL/L (ref 96–112)
CLARITY CSF: CLEAR
CO2 SERPL-SCNC: 23 MMOL/L (ref 20–33)
COLOR CSF: COLORLESS
COLOR SPUN CSF: COLORLESS
CREAT SERPL-MCNC: 2.41 MG/DL (ref 0.5–1.4)
CSF COMMENTS 1658: NORMAL
E COLI SXT1+2 STL IA: NORMAL
ERYTHROCYTE [DISTWIDTH] IN BLOOD BY AUTOMATED COUNT: 47.8 FL (ref 35.9–50)
GFR SERPLBLD CREATININE-BSD FMLA CKD-EPI: 26 ML/MIN/1.73 M 2
GLOBULIN SER CALC-MCNC: 2.9 G/DL (ref 1.9–3.5)
GLUCOSE BLD STRIP.AUTO-MCNC: 165 MG/DL (ref 65–99)
GLUCOSE BLD STRIP.AUTO-MCNC: 168 MG/DL (ref 65–99)
GLUCOSE BLD STRIP.AUTO-MCNC: 176 MG/DL (ref 65–99)
GLUCOSE BLD STRIP.AUTO-MCNC: 200 MG/DL (ref 65–99)
GLUCOSE CSF-MCNC: 99 MG/DL (ref 40–80)
GLUCOSE SERPL-MCNC: 180 MG/DL (ref 65–99)
GRAM STN SPEC: NORMAL
HCT VFR BLD AUTO: 37 % (ref 42–52)
HGB BLD-MCNC: 12.6 G/DL (ref 14–18)
MAGNESIUM SERPL-MCNC: 1.8 MG/DL (ref 1.5–2.5)
MCH RBC QN AUTO: 29.6 PG (ref 27–33)
MCHC RBC AUTO-ENTMCNC: 34.1 G/DL (ref 33.7–35.3)
MCV RBC AUTO: 86.9 FL (ref 81.4–97.8)
NT-PROBNP SERPL IA-MCNC: 855 PG/ML (ref 0–125)
PHOSPHATE SERPL-MCNC: 2.4 MG/DL (ref 2.5–4.5)
PLATELET # BLD AUTO: 214 K/UL (ref 164–446)
PMV BLD AUTO: 12 FL (ref 9–12.9)
POTASSIUM SERPL-SCNC: 3.9 MMOL/L (ref 3.6–5.5)
PROT CSF-MCNC: 68 MG/DL (ref 15–45)
PROT SERPL-MCNC: 4.8 G/DL (ref 6–8.2)
RBC # BLD AUTO: 4.26 M/UL (ref 4.7–6.1)
RBC # CSF: 8 CELLS/UL
SIGNIFICANT IND 70042: NORMAL
SIGNIFICANT IND 70042: NORMAL
SITE SITE: NORMAL
SITE SITE: NORMAL
SODIUM SERPL-SCNC: 141 MMOL/L (ref 135–145)
SOURCE SOURCE: NORMAL
SOURCE SOURCE: NORMAL
SPECIMEN VOL CSF: 8.5 ML
TUBE # CSF: 3
TUBE # CSF: 4
WBC # BLD AUTO: 28.1 K/UL (ref 4.8–10.8)
WBC # CSF: 4 CELLS/UL (ref 0–10)

## 2023-04-27 PROCEDURE — A9270 NON-COVERED ITEM OR SERVICE: HCPCS | Performed by: HOSPITALIST

## 2023-04-27 PROCEDURE — 99232 SBSQ HOSP IP/OBS MODERATE 35: CPT | Performed by: FAMILY MEDICINE

## 2023-04-27 PROCEDURE — 84100 ASSAY OF PHOSPHORUS: CPT

## 2023-04-27 PROCEDURE — 700102 HCHG RX REV CODE 250 W/ 637 OVERRIDE(OP): Performed by: FAMILY MEDICINE

## 2023-04-27 PROCEDURE — 700102 HCHG RX REV CODE 250 W/ 637 OVERRIDE(OP): Performed by: HOSPITALIST

## 2023-04-27 PROCEDURE — 009U3ZX DRAINAGE OF SPINAL CANAL, PERCUTANEOUS APPROACH, DIAGNOSTIC: ICD-10-PCS | Performed by: HOSPITALIST

## 2023-04-27 PROCEDURE — 83735 ASSAY OF MAGNESIUM: CPT

## 2023-04-27 PROCEDURE — A9270 NON-COVERED ITEM OR SERVICE: HCPCS | Performed by: FAMILY MEDICINE

## 2023-04-27 PROCEDURE — 87070 CULTURE OTHR SPECIMN AEROBIC: CPT

## 2023-04-27 PROCEDURE — 770001 HCHG ROOM/CARE - MED/SURG/GYN PRIV*

## 2023-04-27 PROCEDURE — 99232 SBSQ HOSP IP/OBS MODERATE 35: CPT | Performed by: INTERNAL MEDICINE

## 2023-04-27 PROCEDURE — 62270 DX LMBR SPI PNXR: CPT | Performed by: HOSPITALIST

## 2023-04-27 PROCEDURE — A9537 TC99M MEBROFENIN: HCPCS

## 2023-04-27 PROCEDURE — 99232 SBSQ HOSP IP/OBS MODERATE 35: CPT | Performed by: PHYSICIAN ASSISTANT

## 2023-04-27 PROCEDURE — 87205 SMEAR GRAM STAIN: CPT

## 2023-04-27 PROCEDURE — 700105 HCHG RX REV CODE 258: Performed by: FAMILY MEDICINE

## 2023-04-27 PROCEDURE — 36415 COLL VENOUS BLD VENIPUNCTURE: CPT

## 2023-04-27 PROCEDURE — 83880 ASSAY OF NATRIURETIC PEPTIDE: CPT

## 2023-04-27 PROCEDURE — 82962 GLUCOSE BLOOD TEST: CPT | Mod: 91

## 2023-04-27 PROCEDURE — 97535 SELF CARE MNGMENT TRAINING: CPT | Mod: CQ

## 2023-04-27 PROCEDURE — 700111 HCHG RX REV CODE 636 W/ 250 OVERRIDE (IP): Performed by: FAMILY MEDICINE

## 2023-04-27 PROCEDURE — 80053 COMPREHEN METABOLIC PANEL: CPT

## 2023-04-27 PROCEDURE — 99232 SBSQ HOSP IP/OBS MODERATE 35: CPT | Performed by: PHYSICAL MEDICINE & REHABILITATION

## 2023-04-27 PROCEDURE — 89051 BODY FLUID CELL COUNT: CPT

## 2023-04-27 PROCEDURE — 97535 SELF CARE MNGMENT TRAINING: CPT | Mod: CO

## 2023-04-27 PROCEDURE — 85027 COMPLETE CBC AUTOMATED: CPT

## 2023-04-27 RX ORDER — MORPHINE SULFATE 4 MG/ML
INJECTION INTRAVENOUS
Status: DISPENSED
Start: 2023-04-27 | End: 2023-04-28

## 2023-04-27 RX ORDER — SODIUM CHLORIDE 9 MG/ML
INJECTION, SOLUTION INTRAVENOUS CONTINUOUS
Status: DISCONTINUED | OUTPATIENT
Start: 2023-04-27 | End: 2023-04-29

## 2023-04-27 RX ORDER — AMLODIPINE BESYLATE 10 MG/1
5 TABLET ORAL
Status: DISCONTINUED | OUTPATIENT
Start: 2023-04-27 | End: 2023-05-18

## 2023-04-27 RX ORDER — MAGNESIUM SULFATE HEPTAHYDRATE 40 MG/ML
2 INJECTION, SOLUTION INTRAVENOUS ONCE
Status: COMPLETED | OUTPATIENT
Start: 2023-04-27 | End: 2023-04-27

## 2023-04-27 RX ORDER — MORPHINE SULFATE 4 MG/ML
2 INJECTION INTRAVENOUS ONCE
Status: ACTIVE | OUTPATIENT
Start: 2023-04-27 | End: 2023-04-28

## 2023-04-27 RX ADMIN — SODIUM CHLORIDE: 9 INJECTION, SOLUTION INTRAVENOUS at 17:07

## 2023-04-27 RX ADMIN — INSULIN HUMAN 2 UNITS: 100 INJECTION, SOLUTION PARENTERAL at 18:00

## 2023-04-27 RX ADMIN — ATORVASTATIN CALCIUM 10 MG: 10 TABLET, FILM COATED ORAL at 17:07

## 2023-04-27 RX ADMIN — PIPERACILLIN AND TAZOBACTAM 3.38 G: 3; .375 INJECTION, POWDER, LYOPHILIZED, FOR SOLUTION INTRAVENOUS; PARENTERAL at 04:48

## 2023-04-27 RX ADMIN — MAGNESIUM SULFATE HEPTAHYDRATE 2 G: 40 INJECTION, SOLUTION INTRAVENOUS at 09:03

## 2023-04-27 RX ADMIN — PIPERACILLIN AND TAZOBACTAM 3.38 G: 3; .375 INJECTION, POWDER, LYOPHILIZED, FOR SOLUTION INTRAVENOUS; PARENTERAL at 13:33

## 2023-04-27 RX ADMIN — PIPERACILLIN AND TAZOBACTAM 3.38 G: 3; .375 INJECTION, POWDER, LYOPHILIZED, FOR SOLUTION INTRAVENOUS; PARENTERAL at 21:11

## 2023-04-27 RX ADMIN — DULOXETINE HYDROCHLORIDE 30 MG: 30 CAPSULE, DELAYED RELEASE ORAL at 17:07

## 2023-04-27 RX ADMIN — SODIUM BICARBONATE 1300 MG: 650 TABLET ORAL at 17:07

## 2023-04-27 RX ADMIN — SODIUM BICARBONATE 1300 MG: 650 TABLET ORAL at 04:47

## 2023-04-27 RX ADMIN — SPIRONOLACTONE 25 MG: 50 TABLET ORAL at 04:48

## 2023-04-27 RX ADMIN — CARVEDILOL 12.5 MG: 12.5 TABLET, FILM COATED ORAL at 09:03

## 2023-04-27 RX ADMIN — AMLODIPINE BESYLATE 5 MG: 10 TABLET ORAL at 09:03

## 2023-04-27 RX ADMIN — INSULIN HUMAN 2 UNITS: 100 INJECTION, SOLUTION PARENTERAL at 13:27

## 2023-04-27 RX ADMIN — POTASSIUM CHLORIDE 20 MEQ: 1500 TABLET, EXTENDED RELEASE ORAL at 04:48

## 2023-04-27 RX ADMIN — ALLOPURINOL 100 MG: 100 TABLET ORAL at 04:47

## 2023-04-27 RX ADMIN — CARVEDILOL 12.5 MG: 12.5 TABLET, FILM COATED ORAL at 17:07

## 2023-04-27 ASSESSMENT — ENCOUNTER SYMPTOMS
SENSORY CHANGE: 0
NAUSEA: 0
NERVOUS/ANXIOUS: 0
SORE THROAT: 0
PALPITATIONS: 0
NECK PAIN: 0
FEVER: 0
MYALGIAS: 0
SPEECH CHANGE: 0
VOMITING: 0
HEARTBURN: 0
FOCAL WEAKNESS: 1
CHILLS: 0
DIAPHORESIS: 0
ABDOMINAL PAIN: 0
BACK PAIN: 0
WHEEZING: 0
HEADACHES: 0
FLANK PAIN: 0
DIARRHEA: 0
DIZZINESS: 0
BLURRED VISION: 0
WEAKNESS: 1
SHORTNESS OF BREATH: 0
COUGH: 0

## 2023-04-27 ASSESSMENT — PAIN DESCRIPTION - PAIN TYPE: TYPE: ACUTE PAIN

## 2023-04-27 NOTE — DISCHARGE PLANNING
"[facility] TCN chart review completed. Collaborated with NIKIA Clark. Current discharge considerations are acute rehab vs. SNF. Note patient accepted by [facility]. TCN attempted to see patient at bedside, but he was out of room for procedure. Per chart review, patient is pending HIDA scan.    PT/OT evals with recommendation for post acute placement. Physiatry consult completed 4/24 with the following recommendation \"Patient is a potential candidate for inpatient rehab based on needs for PT, OT.  Patient will also benefit from family training.  Patient has a good discharge situation which will be home with son.\"    TCN will continue to follow and collaborate with discharge planning team as additional post acute needs arise. Thank you.    Completed:  PT recommends post acute placement on 4/25   OT recommends post-acute placement on 4/24  SLP eval 4/26 with recs for no further needs  Choice obtained: IRF and SNF choice obtained on 4/24/23.  HH choice on 4/24/23.  Infusion on 4/22/23.    [facility] referral sent 4/22/23  "

## 2023-04-27 NOTE — PROCEDURES
Procedure Lumbar Puncture    Date/Time: 4/27/2023 2:26 PM  Performed by: Iman Castaneda M.D.  Authorized by: Iman Castaneda M.D.     Consent:     Consent obtained:  Verbal and written    Consent given by:  Patient    Risks discussed:  Bleeding, infection, pain, headache, nerve damage and repeat procedure    Alternatives discussed:  No treatment  Pre-procedure details:     Procedure purpose:  Diagnostic    Preparation: Patient was prepped and draped in usual sterile fashion    Sedation:     Sedation type:  None  Anesthesia:     Anesthesia method:  Local infiltration    Local anesthetic:  Lidocaine 1% w/o epi  Procedure details:     Lumbar space:  L3-L4 interspace    Patient position:  Sitting    Needle gauge:  18    Needle type:  Spinal needle - Quincke tip    Needle length (in):  5.0    Ultrasound guidance: no      Number of attempts:  4    Opening pressure (cm H2O):  20    Fluid appearance:  Clear    Tubes of fluid:  4    Total volume (ml):  10  Post-procedure:     Puncture site:  Adhesive bandage applied    Patient tolerance of procedure:  Tolerated well, no immediate complications

## 2023-04-27 NOTE — THERAPY
"Physical Therapy Contact Note    Patient Name: Tyrone Cline  Age:  82 y.o., Sex:  male  Medical Record #: [id number]  Today's Date: 4/27/2023    Notified RN of leaking IV. Pt refused therapy due to \"busy day\" with tests, discussed therex and POC. Discussed session tomorrow around 2pm post procedure. Will re-attempt as able.  "

## 2023-04-27 NOTE — PROGRESS NOTES
[facility] Note    HIDA scan came back with non visualization of the gallbladder for 70 minutes. Suspicious for acute cholecystitis.     We discussed risks, benefits, and alternatives with risks, including, but not limited to, bleeding, infection, damage to surrounding structures such as small or large intestine, damage to the common bile duct possibly requiring hepaticojejunostomy, possible cystic duct stump leak requiring further procedures, hernia, need for an open procedure. All questions were answered to the patient's apparent satisfaction and they agreed to proceed.      He will need to be NPO after midnight and will be taken to the operating room tomorrow morning at 7AM for a lap vs. Open cholecystectomy by Dr. Galvan.

## 2023-04-27 NOTE — PROGRESS NOTES
[facility] Medicine Daily Progress Note    Date of Service  4/27/2023    Chief Complaint  Tyrone Cline is a 82 y.o. male admitted 4/21/2023 with Colitis.    Hospital Course  Admitted with sepsis secondary to colitis.  Patient was started on empiric coverage with IV Rocephin and Flagyl.  He was also noted to have bilateral lower extremity weakness.  MRI of the lumbar spine was done which showed possible edema.  Neurology was then consulted on the case.  He was also noted to have an SANJUANITA on CKD stage IV.  Nephrology was consulted on the case.  She was started on a careful IVF hydration.  His CT scan also showed cholelithiasis.  Ultrasound was done which showed possible cholecystitis.  Surgery was then consulted on the case.    Interval Problem Update  Colitis - stool culture pending  Cholelithiasis - HIDA scan showed probable cholecystitis, discussed case with Surgery  SANJUANITA - crea 2.4  Hypertension - sbp 138-165  Diabetes - -227  Low magnesium    Updates given and plan of care discussed with patient's son.    I have discussed this patient's plan of care and discharge plan at IDT rounds today with Case Management, Nursing, Nursing leadership, and other members of the IDT team.    Consultants/Specialty  general surgery, nephrology, and neurology    Code Status  DNAR/DNI    Disposition  Patient is not medically cleared for discharge.   Anticipate discharge to to an inpatient rehabilitation hospital.  I have placed the appropriate orders for post-discharge needs.    Review of Systems  Review of Systems   Constitutional:  Positive for malaise/fatigue. Negative for chills, diaphoresis and fever.   HENT:  Negative for congestion, hearing loss and sore throat.    Eyes:  Negative for blurred vision.   Respiratory:  Negative for cough, shortness of breath and wheezing.    Cardiovascular:  Negative for chest pain, palpitations and leg swelling.   Gastrointestinal:  Negative for abdominal pain, diarrhea, heartburn,  nausea and vomiting.   Genitourinary:  Negative for dysuria, flank pain and hematuria.   Musculoskeletal:  Negative for back pain, joint pain, myalgias and neck pain.   Skin:  Negative for rash.   Neurological:  Positive for focal weakness and weakness. Negative for dizziness, sensory change, speech change and headaches.   Psychiatric/Behavioral:  The patient is not nervous/anxious.       Physical Exam  Temp:  [36.2 °C (97.2 °F)-37 °C (98.6 °F)] 36.8 °C (98.2 °F)  Pulse:  [74-80] 75  Resp:  [16-19] 18  BP: (138-165)/() 141/87  SpO2:  [87 %-95 %] 92 %    Physical Exam  Vitals and nursing note reviewed.   HENT:      Head: Normocephalic and atraumatic.      Nose: No congestion.      Mouth/Throat:      Mouth: Mucous membranes are moist.   Eyes:      Extraocular Movements: Extraocular movements intact.      Conjunctiva/sclera: Conjunctivae normal.   Cardiovascular:      Rate and Rhythm: Normal rate and regular rhythm.      Heart sounds: Murmur heard.   Pulmonary:      Effort: Pulmonary effort is normal.      Breath sounds: Normal breath sounds.   Abdominal:      General: There is distension.      Tenderness: There is no abdominal tenderness. There is no guarding or rebound.   Musculoskeletal:      Cervical back: No tenderness.      Right lower leg: No edema.      Left lower leg: No edema.   Skin:     General: Skin is warm and dry.   Neurological:      Mental Status: He is alert and oriented to person, place, and time.      Cranial Nerves: No cranial nerve deficit.      Motor: Weakness (MMT BUE 5/5, RLE 4/5, LLE 4+/5) present.       Fluids    Intake/Output Summary (Last 24 hours) at 4/27/2023 1512  Last data filed at 4/27/2023 0815  Gross per 24 hour   Intake 820 ml   Output 1350 ml   Net -530 ml       Laboratory  Recent Labs     04/25/23  0544 04/26/23  0512 04/27/23  0553   WBC 23.1* 24.3* 28.1*   RBC 4.54* 4.70 4.26*   HEMOGLOBIN 13.4* 13.8* 12.6*   HEMATOCRIT 39.0* 41.1* 37.0*   MCV 85.9 87.4 86.9   MCH 29.5  29.4 29.6   MCHC 34.4 33.6* 34.1   RDW 46.2 47.8 47.8   PLATELETCT 178 207 214   MPV 12.3 11.9 12.0     Recent Labs     04/25/23  0544 04/26/23  0512 04/27/23  0553   SODIUM 141 145 141   POTASSIUM 3.0* 3.2* 3.9   CHLORIDE 105 107 107   CO2 24 24 23   GLUCOSE 166* 163* 180*   BUN 60* 53* 57*   CREATININE 2.15* 2.21* 2.41*   CALCIUM 8.6 8.9 8.3*     Recent Labs     04/25/23  1411   INR 1.48*               Imaging  NM-HEPATOBILIARY SCAN   Final Result      Hepatobiliary scan findings suspicious for acute cholecystitis.      MR-THORACIC SPINE-WITH & W/O   Final Result      1.  There is abnormal expansile T2 hyperintense lesion in the right side of the thoracic spinal cord at the levels of T2 and T3 Mild contrast enhancement is seen. This lesion is suspicious for spinal cord neoplasm. Other remote differential diagnosis    includes transverse myelitis.   2.  Exaggerated thoracic kyphosis.   3.  Thoracic dextroscoliosis.   4.  Minimal degenerative changes.   5.  Right pleural effusion.      MR-LUMBAR SPINE-WITH & W/O   Final Result      1.  Multifocal degenerative disease in the lumbar spine as described above.   2.  Moderate central canal and severe lateral recess stenosis at the level of L4-5. The exiting bilateral L5 nerve roots might have been impinged at the lateral recess.      MR-CERVICAL SPINE-WITH & W/O   Final Result      1.  There is abnormal expansile intramedullary T2 signal intensity lesion in the right cerebellum. Thoracic spinal cord at the level of T2 on T3. Mild diffuse contrast enhancement is seen. This finding is suspicious for spinal cord neoplasm. The other    less likely differential diagnosis includes transverse myelitis. Follow-up is recommended after 4 weeks.   2.  Mild degenerative disease in the cervical spine as described above.   3.  There is no evidence of infection in the cervical spine.      US-RUQ   Final Result         1.  Acute cholecystitis.   2.  Atrophic right kidney.       DX-CHEST-LIMITED (1 VIEW)   Final Result      Perihilar interstitial edema and basilar atelectasis and/or consolidation. Underlying infection is possible.      MR-LUMBAR SPINE-W/O   Final Result      1.  Lower lumbar spine predominant degenerative changes as described in detail above, progressed from 2010 MRI.   2.  Edema at the L4-L5 disc space and L5 superior endplate likely represents degenerative changes. Less likely this could represent a low-grade or early discitis osteomyelitis. Correlate for evidence of infection.   3.  Acute appearing Schmorl's node at L3-L4, which can be a source of pain.      US-RENAL   Final Result      1.  Atrophic kidneys. Echogenic bilateral renal parenchyma could relate to medical renal disease.      2.  No hydronephrosis. No renal calculus.      DX-CHEST-PORTABLE (1 VIEW)   Final Result      No acute cardiac or pulmonary abnormalities are identified.      CT-ABDOMEN-PELVIS W/O   Final Result      1.  Findings suspicious for focal colitis at the hepatic flexure, less likely cholecystitis or duodenitis with secondary involvement of the colon. Infectious, inflammatory and ischemic etiologies are considerations. Underlying mass is not excluded.   2.  Cholelithiasis with distention of the gallbladder   3.  BILATERAL renal atrophy   4.  Subcentimeter LEFT adrenal myelolipoma   5.  12 mm RIGHT adrenal nodule likely an adenoma absent a history of cancer   6.  Subcentimeter RIGHT hepatic lesion, likely a cyst absent a history of cancer   7.  Atherosclerosis   8.  Colonic diverticulosis      CT-HEAD W/O   Final Result      1.  Cerebral atrophy.      2.  White matter lucencies most consistent with small vessel ischemic change versus demyelination or gliosis.      3.  Otherwise, Head CT without contrast with no acute findings. No evidence of acute cerebral infarction, hemorrhage or mass lesion.              Assessment/Plan  * Colitis- (present on admission)  Assessment & Plan  IV Zosyn  stool  culture pending    Cholelithiasis with acute cholecystitis- (present on admission)  Assessment & Plan  IV Zosyn  Plan for surgery tomorrw    Weakness of both lower extremities- (present on admission)  Assessment & Plan  Transverse Myelitis?  LP today with CSF analysis  Unable to start on steroids due to current infectious process    Sepsis (HCC)- (present on admission)  Assessment & Plan  This is Sepsis Present on admission  SIRS criteria identified on my evaluation include: Leukocytosis, with WBC greater than 12,000  Source is colitis  Sepsis protocol initiated  Fluid resuscitation ordered per protocol  Crystalloid Fluid Administration: Fluid resuscitation ordered per standard protocol - 30 mL/kg per current or ideal body weight  IV antibiotics as appropriate for source of sepsis  Reassessment: I have reassessed the patient's hemodynamic status          Acute kidney injury superimposed on CKD (HCC)- (present on admission)  Assessment & Plan  Careful IVF hydration  NaHCO3  Follow cmp  Hold Aldactone      Hypercalcemia- (present on admission)  Assessment & Plan  IVF hydration  Follow cmp    Hypomagnesemia- (present on admission)  Assessment & Plan  IV Mg 2 g  Follow level    Hypokalemia- (present on admission)  Assessment & Plan  stop Kdur  Follow cmp    Thrombocytopenia (HCC)- (present on admission)  Assessment & Plan  Follow cbc    High anion gap metabolic acidosis- (present on admission)  Assessment & Plan  IVF hydration    Hyponatremia- (present on admission)  Assessment & Plan  Follow cmp    Diabetic peripheral neuropathy (HCC)- (present on admission)  Assessment & Plan  Cymbalta    Essential hypertension- (present on admission)  Assessment & Plan  Coreg,   Start Norvasc  Hold Aldactone   IV labetalol and hydralazine as needed with parameters    Type 2 diabetes mellitus with kidney complication, without long-term current use of insulin (HCC)- (present on admission)  Assessment & Plan  SSI    Chronic kidney  disease (CKD), stage IV (severe) (HCC)- (present on admission)  Assessment & Plan  Follow cmp    Hypercholesterolemia- (present on admission)  Assessment & Plan  Lipitor         VTE prophylaxis: SCDs/TEDs    I have performed a physical exam and reviewed and updated ROS and Plan today (4/27/2023). In review of yesterday's note (4/26/2023), there are no changes except as documented above.

## 2023-04-27 NOTE — PROGRESS NOTES
Nephrology Daily Progress Note    Date of Service  4/27/2023    Chief Complaint  82 y.o. male with a history of diabetes, hypertension, BPH, CKD 4 who presented 4/21/2023 with weakness and abdominal pains, found to have colitis, SANJUANITA on CKD 4, and concern for weakness from a neurologic process.    Interval Problem Update  4/23 -urine output 2.1 L in the last 24 hours.  Patient denies chest pain, shortness of breath.    04/24/23 - Doing well.1.2 L urine oputut yesterday noted.  04/25/23 -creatinine stabilized at 2.0.  With 1.9 L of urine output noted improved from 1.2 L before.  Patient reports feeling subjectively better.  Evaluated by general surgery for possible cholecystectomy.  04/26 -has 2.0 L urine output documented yesterday.  04/27/23 -no overnight events.  Review of Systems  Review of Systems   Constitutional:  Negative for fever.   Respiratory:  Negative for shortness of breath.    Cardiovascular:  Negative for chest pain.   Gastrointestinal:  Negative for abdominal pain.   All other systems reviewed and are negative.     Physical Exam  Temp:  [36.2 °C (97.2 °F)-37 °C (98.6 °F)] 36.5 °C (97.7 °F)  Pulse:  [74-86] 78  Resp:  [16-19] 18  BP: (127-165)/() 165/95  SpO2:  [87 %-95 %] 91 %  Fluids    Intake/Output Summary (Last 24 hours) at 4/27/2023 1410  Last data filed at 4/27/2023 0815  Gross per 24 hour   Intake 820 ml   Output 1350 ml   Net -530 ml         Laboratory  Labs reviewed, pertinent labs below.  Recent Labs     04/25/23  0544 04/26/23  0512 04/27/23  0553   WBC 23.1* 24.3* 28.1*   RBC 4.54* 4.70 4.26*   HEMOGLOBIN 13.4* 13.8* 12.6*   HEMATOCRIT 39.0* 41.1* 37.0*   MCV 85.9 87.4 86.9   MCH 29.5 29.4 29.6   MCHC 34.4 33.6* 34.1   RDW 46.2 47.8 47.8   PLATELETCT 178 207 214   MPV 12.3 11.9 12.0       Recent Labs     04/25/23  0544 04/26/23  0512 04/27/23  0553   SODIUM 141 145 141   POTASSIUM 3.0* 3.2* 3.9   CHLORIDE 105 107 107   CO2 24 24 23   GLUCOSE 166* 163* 180*   BUN 60* 53* 57*    CREATININE 2.15* 2.21* 2.41*   CALCIUM 8.6 8.9 8.3*       Recent Labs     04/25/23  1411   INR 1.48*             URINALYSIS:  Lab Results   Component Value Date/Time    COLORURINE Yellow 04/22/2023 1537    CLARITY Clear 04/22/2023 1537    SPECGRAVITY 1.013 04/22/2023 1537    PHURINE 5.5 04/22/2023 1537    KETONES Negative 04/22/2023 1537    PROTEINURIN 100 (A) 04/22/2023 1537    BILIRUBINUR Negative 04/22/2023 1537    UROBILU 0.2 04/22/2023 1537    NITRITE Negative 04/22/2023 1537    LEUKESTERAS Negative 04/22/2023 1537    OCCULTBLOOD Small (A) 04/22/2023 1537     UPC  Lab Results   Component Value Date/Time    TOTPROTUR 51.0 (H) 04/22/2023 1537      Lab Results   Component Value Date/Time    CREATININEU 68.12 04/22/2023 1537    CREATININEU 70.18 04/22/2023 1537            Current Facility-Administered Medications   Medication Dose Route Frequency Provider Last Rate Last Admin    amLODIPine (NORVASC) tablet 5 mg  5 mg Oral Q DAY Félix Smith M.D.   5 mg at 04/27/23 0903    morphine 4 MG/ML injection 2 mg  2 mg Intravenous Once Morgan SANDY Yu M.D.        morphine 4 mg/mL injection             piperacillin-tazobactam (Zosyn) 3.375 g in  mL IVPB  3.375 g Intravenous Q8HRS Félix Smith M.D. 25 mL/hr at 04/27/23 1333 3.375 g at 04/27/23 1333    labetalol (NORMODYNE/TRANDATE) injection 10 mg  10 mg Intravenous Q4HRS PRN Félix Smith M.D.        hydrALAZINE (APRESOLINE) injection 10 mg  10 mg Intravenous Q6HRS PRN Félix Smith M.D.        atorvastatin (LIPITOR) tablet 10 mg  10 mg Oral DAILY AT 1800 Smiley Cain M.D.   10 mg at 04/26/23 1836    allopurinol (ZYLOPRIM) tablet 100 mg  100 mg Oral DAILY Subani Ant, M.D.   100 mg at 04/27/23 0447    carvedilol (COREG) tablet 12.5 mg  12.5 mg Oral BID WITH MEALS Félix Smith M.D.   12.5 mg at 04/27/23 0903    [Held by provider] spironolactone (ALDACTONE) tablet 25 mg  25 mg Oral Q DAY Félix Smith M.D.   25 mg at 04/27/23 0448    sodium  bicarbonate tablet 1,300 mg  1,300 mg Oral BID Smiley Cain M.D.   1,300 mg at 04/27/23 0447    lactated ringers infusion (BOLUS)  1,000 mL Intravenous Once PRN Cb Maldonado M.D.        lactated ringers infusion   Intravenous Continuous SHALOM MaderaP.R.N. 75 mL/hr at 04/26/23 1413 New Bag at 04/26/23 1413    DULoxetine (CYMBALTA) capsule 30 mg  30 mg Oral Q EVENING Ronaldo Perez M.D.   30 mg at 04/26/23 1835    lactated ringers infusion (BOLUS)  500 mL Intravenous Once PRN Ronaldo Perez M.D.        acetaminophen (Tylenol) tablet 650 mg  650 mg Oral Q6HRS PRN Ronaldo Perez M.D.        insulin regular (HumuLIN R,NovoLIN R) injection  2-9 Units Subcutaneous 4X/DAY ACHS Ronaldo Perez M.D.   2 Units at 04/27/23 1327    And    dextrose 10 % BOLUS 25 g  25 g Intravenous Q15 MIN PRN Ronaldo Perez M.D.        Pharmacy Consult Request ...Pain Management Review 1 Each  1 Each Other PHARMACY TO DOSE Eli Pandya, ADELA.P.R.N.        oxyCODONE immediate-release (ROXICODONE) tablet 5 mg  5 mg Oral Q3HRS PRN Eli Pandya A.P.R.N.        Or    oxyCODONE immediate release (ROXICODONE) tablet 10 mg  10 mg Oral Q3HRS PRN Eli Pandya A.P.R.N.        Or    HYDROmorphone (Dilaudid) injection 0.5 mg  0.5 mg Intravenous Q3HRS PRN ADELA Madera.P.R.N.   0.5 mg at 04/21/23 7386         Assessment/Plan  82 y.o. male with a history of diabetes, hypertension, BPH, CKD 4 who presented 4/21/2023 with weakness and abdominal pains, found to have colitis, SANJUANITA on CKD 4, and concern for weakness from a neurologic process.     1.  Acute on Chronic Kidney Disease IV.  Nonoliguric. Likely from prerenal hypovolemia.  Baseline creatinine 2.4 to 2.9 mg/dL in 2022.  Patient has reached baseline renal function,slightly better  - Continue IV fluids as below.  May transition postoperatively if cholecystectomy is planned.  - Avoid NSAIDs and other nephrotoxins.   -Appreciate general surgery recommendations for  possible cholecystectomy.  -Recommend follow-up as an outpatient.  2.  Hyponatremia, mildly persistent, but improving.  High urinary Na suggests the presence of ADH, likely from abdominal pain leading to ADH secretion.    Avoid hypotonic fluids.  Free water restriction. Hydration as above. Continue to monitor.       3.  Metabolic acidosis, due to SANJUANITA on CKD.  Gentle hydration as above with improvement in renal function should improve acidosis. Continue to monitor.     4.  Azotemia,   Improving.  Patient has no uremic symptoms.  Continue IV fluids.  No acute need for dialysis.  Check renal function panel daily.     5.  Hypertension.  Continue antihypertensive regimen per primary.       6.  Hypokalemia-likely related to post ATN diuresis and reduced p.o. intake.  Replete cautiously.  Continue to monitor.      We will sign off at this time thank you for the consult.      Elizabeth Goodrich MD  Nephrology   [facility].

## 2023-04-27 NOTE — PROGRESS NOTES
"  DATE: 4/27/2023      INTERVAL EVENTS:  No abdominal pain this morning.  WBC increased to 28.  MRIs complete, LP pending.    REVIEW OF SYSTEMS:  Review of systems is remarkable for the following no abdominal pain, nausea, vomiting. The remainder of the comprehensive ROS is negative with the exception of the aforementioned HPI, PMH, and PSH bullets in accordance with CMS guideline.    PHYSICAL EXAMINATION:    Vital Signs: BP (!) 165/95   Pulse 78   Temp 36.5 °C (97.7 °F) (Temporal)   Resp 18   Ht 1.803 m (5' 11\")   Wt 88.3 kg (194 lb 10.7 oz)   SpO2 95%   Physical Exam  Constitutional:       Appearance: Normal appearance.   HENT:      Head: Normocephalic and atraumatic.      Right Ear: External ear normal.      Left Ear: External ear normal.      Nose: Nose normal.      Mouth/Throat:      Mouth: Mucous membranes are moist.   Eyes:      Extraocular Movements: Extraocular movements intact.   Cardiovascular:      Rate and Rhythm: Normal rate and regular rhythm.   Pulmonary:      Effort: Pulmonary effort is normal.   Abdominal:      General: Abdomen is flat. There is no distension.      Tenderness: There is no abdominal tenderness. There is no guarding or rebound.   Musculoskeletal:         General: Normal range of motion.   Skin:     General: Skin is warm and dry.      Capillary Refill: Capillary refill takes less than 2 seconds.   Neurological:      General: No focal deficit present.      Mental Status: He is alert and oriented to person, place, and time.   Psychiatric:         Mood and Affect: Mood normal.         Behavior: Behavior normal.       LABORATORY VALUES:   Recent Labs     04/25/23  0544 04/26/23  0512 04/27/23  0553   WBC 23.1* 24.3* 28.1*   RBC 4.54* 4.70 4.26*   HEMOGLOBIN 13.4* 13.8* 12.6*   HEMATOCRIT 39.0* 41.1* 37.0*   MCV 85.9 87.4 86.9   MCH 29.5 29.4 29.6   MCHC 34.4 33.6* 34.1   RDW 46.2 47.8 47.8   PLATELETCT 178 207 214   MPV 12.3 11.9 12.0     Recent Labs     04/25/23  0544 " 04/26/23  0512 04/27/23  0553   SODIUM 141 145 141   POTASSIUM 3.0* 3.2* 3.9   CHLORIDE 105 107 107   CO2 24 24 23   GLUCOSE 166* 163* 180*   BUN 60* 53* 57*   CREATININE 2.15* 2.21* 2.41*   CALCIUM 8.6 8.9 8.3*     Recent Labs     04/25/23  1204 04/25/23  1411 04/26/23  0512 04/27/23  0553   ASTSGOT 32  --  27 24   ALTSGPT 27  --  23 20   TBILIRUBIN 0.8  --  0.9 0.7   IBILIRUBIN 0.5  --   --   --    DBILIRUBIN 0.3  --   --   --    ALKPHOSPHAT 221*  --  191* 158*   GLOBULIN  --   --  3.2 2.9   INR  --  1.48*  --   --      Recent Labs     04/25/23  1411   INR 1.48*        IMAGING:   MR-THORACIC SPINE-WITH & W/O   Final Result      1.  There is abnormal expansile T2 hyperintense lesion in the right side of the thoracic spinal cord at the levels of T2 and T3 Mild contrast enhancement is seen. This lesion is suspicious for spinal cord neoplasm. Other remote differential diagnosis    includes transverse myelitis.   2.  Exaggerated thoracic kyphosis.   3.  Thoracic dextroscoliosis.   4.  Minimal degenerative changes.   5.  Right pleural effusion.      MR-LUMBAR SPINE-WITH & W/O   Final Result      1.  Multifocal degenerative disease in the lumbar spine as described above.   2.  Moderate central canal and severe lateral recess stenosis at the level of L4-5. The exiting bilateral L5 nerve roots might have been impinged at the lateral recess.      MR-CERVICAL SPINE-WITH & W/O   Final Result      1.  There is abnormal expansile intramedullary T2 signal intensity lesion in the right cerebellum. Thoracic spinal cord at the level of T2 on T3. Mild diffuse contrast enhancement is seen. This finding is suspicious for spinal cord neoplasm. The other    less likely differential diagnosis includes transverse myelitis. Follow-up is recommended after 4 weeks.   2.  Mild degenerative disease in the cervical spine as described above.   3.  There is no evidence of infection in the cervical spine.      US-RUQ   Final Result         1.   Acute cholecystitis.   2.  Atrophic right kidney.      DX-CHEST-LIMITED (1 VIEW)   Final Result      Perihilar interstitial edema and basilar atelectasis and/or consolidation. Underlying infection is possible.      MR-LUMBAR SPINE-W/O   Final Result      1.  Lower lumbar spine predominant degenerative changes as described in detail above, progressed from 2010 MRI.   2.  Edema at the L4-L5 disc space and L5 superior endplate likely represents degenerative changes. Less likely this could represent a low-grade or early discitis osteomyelitis. Correlate for evidence of infection.   3.  Acute appearing Schmorl's node at L3-L4, which can be a source of pain.      US-RENAL   Final Result      1.  Atrophic kidneys. Echogenic bilateral renal parenchyma could relate to medical renal disease.      2.  No hydronephrosis. No renal calculus.      DX-CHEST-PORTABLE (1 VIEW)   Final Result      No acute cardiac or pulmonary abnormalities are identified.      CT-ABDOMEN-PELVIS W/O   Final Result      1.  Findings suspicious for focal colitis at the hepatic flexure, less likely cholecystitis or duodenitis with secondary involvement of the colon. Infectious, inflammatory and ischemic etiologies are considerations. Underlying mass is not excluded.   2.  Cholelithiasis with distention of the gallbladder   3.  BILATERAL renal atrophy   4.  Subcentimeter LEFT adrenal myelolipoma   5.  12 mm RIGHT adrenal nodule likely an adenoma absent a history of cancer   6.  Subcentimeter RIGHT hepatic lesion, likely a cyst absent a history of cancer   7.  Atherosclerosis   8.  Colonic diverticulosis      CT-HEAD W/O   Final Result      1.  Cerebral atrophy.      2.  White matter lucencies most consistent with small vessel ischemic change versus demyelination or gliosis.      3.  Otherwise, Head CT without contrast with no acute findings. No evidence of acute cerebral infarction, hemorrhage or mass lesion.         NM-BILIARY (HIDA) SCAN WITH CCK     (Results Pending)       ASSESSMENT AND PLAN:   82 year old man with colitis vs cholecysitis    - HIDA scan to evaluate for gallbladder function when able  - ACS gray following         ____________________________________     Kenzie Krueger P.A.-C.    DD: 4/26/2023  11:28 AM

## 2023-04-27 NOTE — CARE PLAN
The patient is Stable - Low risk of patient condition declining or worsening    Shift Goals  Clinical Goals: abx  Patient Goals: mri  Family Goals: maría elena    Progress made toward(s) clinical / shift goals:  Patient included in and updated on plan of care. Patient receiving appropriate iv fluids, pain monitored throughout shift.   Problem: Knowledge Deficit - Standard  Goal: Patient and family/care givers will demonstrate understanding of plan of care, disease process/condition, diagnostic tests and medications  Outcome: Progressing     Problem: Hemodynamics  Goal: Patient's hemodynamics, fluid balance and neurologic status will be stable or improve  Outcome: Progressing     Problem: Pain - Standard  Goal: Alleviation of pain or a reduction in pain to the patient’s comfort goal  Outcome: Progressing     Problem: Fall Risk  Goal: Patient will remain free from falls  Outcome: Progressing       Patient is not progressing towards the following goals:

## 2023-04-27 NOTE — PROGRESS NOTES
"    Physical Medicine and Rehabilitation Consultation      Follow up note         Date of initial consultation: 4/24/2023  Consulting provider: Smiley Cain M.D.  Reason for consultation: assess for acute inpatient rehab appropriateness  LOS: 6 Day(s)    Chief complaint: Weakness    HPI: Per Dr. Dockery's consult note: The patient is a 82 y.o. right hand dominant male with a past medical history of CKD stage IV, diabetes, hypertension;  who presented on 4/21/2023 12:47 PM with abdominal pain.  CT found colitis.  Labs remarkable for leukocytosis and SANJUANITA with creatinine 5.  Patient endorsed back pain.  MR L-spine found edema at L4-5 suspicious for early discitis/osteomyelitis.  Patient was admitted for SANJUANITA and sepsis.  Patient is currently on ceftriaxone daily through 4/27, Flagyl p.o. 3 times daily through 4/26.  Nephrology was consulted and found his SANJUANITA to be due to hypovolemia, which was treated with IV fluids.  Hospitalization is also been significant for recalcitrant hypertension.  4/24: The patient currently reports overall feeling well.  Patient endorses weakness, but denies other symptoms.  Patient reports he was able to sidestep at edge of bed and that was exhausting for him.  Patient wants to be able to return home, and is interested in IPR.  Patient reports his son will be able to help care for him on discharge.  Patient's son works full-time days, but may be able to take time off work.\"    4/27: Interval events : WBC continues to trend up. HIDA scan with concern for cholecystitis. LP done. Patient seen and examined at bedside, reports he feels much better. Reports he had been sliding out of bed before he came to the hospital, but is now able to stand EOB much easier. Does not report HA, lightheadedness, SOB, CP, abdominal pain, or changes in numbness/tingling/weakness. Has had regular Bms, last BM 4/27.     ROS  Pertinent positives are mentioned in the HPI, all others reviewed and are " negative.    Social Hx:  1 SH  4 BETHANY  With: Alone unable to care for self.  Patient's son has stayed with him recently due to weakness    THERAPY:  Restrictions: Fall risk  PT: Functional mobility   4/24: Unable to participate in gait shuffle steps from edge of bed to chair, mod assist transfers, min assist  4/25: Min A bed mobility, Mod A sit to stand, Min A transfers , small steps EOB but did not participate in functional gait     OT: ADLs  4/24: Total assist lower body dressing and toileting    SLP:   4/26: Diabetic regular and thins     IMAGING:  MR L-spine 4/22/23  1.  Lower lumbar spine predominant degenerative changes as described in detail above, progressed from 2010 MRI.  2.  Edema at the L4-L5 disc space and L5 superior endplate likely represents degenerative changes. Less likely this could represent a low-grade or early discitis osteomyelitis. Correlate for evidence of infection.  3.  Acute appearing Schmorl's node at L3-L4, which can be a source of pain.    PROCEDURES:  None    PMH:  Past Medical History:   Diagnosis Date    Abdominal pain     Abnormal electrocardiogram     ACE inhibitor intolerance     BPH (benign prostatic hyperplasia)     Bruxism     Cancer (HCC)     basal and squamous cell to face    Cataract     robbi IOL    Chickenpox     Cholesterol blood decreased     Chronic kidney disease (CKD) 2/27/2021    Chronic kidney disease, stage III (moderate) (HCC)     Cold     Congestion of both ears 2/27/2021    Cough     Depression     Dermatochalasis of eyelid 7/23/2014    Diabetes     oral meds    Difficulty breathing     Fever     GERD (gastroesophageal reflux disease)     GI bleed 12/8/2021    GOUT     Gout     History of skull fracture     History of urinary tract infection     Hyperlipidemia     Hypertension     Indigestion     Influenza     Insomnia     Mixed hyperlipidemia     Orthopnea     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    Pneumonia 9/2015    Proteinuria     Purulent nasal discharge 12/7/2015    Ringing  in ears     Shortness of breath     Sputum production     Tonsillitis     Type 2 diabetes mellitus (HCC)     Wears glasses     Weight loss        PSH:  Past Surgical History:   Procedure Laterality Date    PA COLONOSCOPY,DIAGNOSTIC N/A 12/12/2021    Procedure: COLONOSCOPY;  Surgeon: Dariel Simons M.D.;  Location: SURGERY [facility];  Service: Gastroenterology    PA UPPER GI ENDOSCOPY,BIOPSY N/A 12/12/2021    Procedure: GASTROSCOPY, WITH BIOPSY;  Surgeon: Dariel Simons M.D.;  Location: SURGERY [facility];  Service: Gastroenterology    PA UPPER GI ENDOSCOPY,CTRL BLEED N/A 12/12/2021    Procedure: EGD, WITH CLIP PLACEMENT;  Surgeon: Dariel Simons M.D.;  Location: [facility];  Service: Gastroenterology    GASTROSCOPY W/PUSH ENTERSCOPY N/A 12/12/2021    Procedure: GASTROSCOPY, WITH PUSH ENTEROSCOPY;  Surgeon: Dariel Simons M.D.;  Location: [facility];  Service: Gastroenterology    SEPTAL RECONSTRUCTION  12/7/2015    Procedure: SEPTAL RECONSTRUCTION OPEN WITH  GRAFTS & CONCHAL CART GRAFT;  Surgeon: SYDNIE Gaitan M.D.;  Location: SURGERY SAME DAY [facility];  Service:     BLEPHAROPLASTY  7/23/2014    Performed by Gera Plasencia M.D. at [facility] ORS    BROW LIFT  7/23/2014    Performed by Gera Plasencia M.D. at [facility] ORS    CATARACT PHACO WITH IOL  12/4/2012    Performed by Suraj Gonzalez M.D. at [facility] ORS    CATARACT PHACO WITH IOL  11/20/2012    Performed by Suraj Gonzalez M.D. at [facility] ORS    APPENDECTOMY      ARTHROSCOPY, KNEE      CARDIAC CATH, LEFT HEART      LHC out of concern for STEMI, normal coronaries with minimal atherosclerosis, diagnosed with PNA as cause of symptoms and ST changes.     OTHER      KNEE SURGERY - LEFT.    OTHER      NOSE SURGERY.    TONSILLECTOMY         FHX:  Family History   Problem Relation Age of Onset    Diabetes Father     Heart Attack Father 79    Cancer Brother   "      Medications:  Current Facility-Administered Medications   Medication Dose    amLODIPine (NORVASC) tablet 5 mg  5 mg    morphine 4 MG/ML injection 2 mg  2 mg    morphine 4 mg/mL injection      piperacillin-tazobactam (Zosyn) 3.375 g in  mL IVPB  3.375 g    labetalol (NORMODYNE/TRANDATE) injection 10 mg  10 mg    hydrALAZINE (APRESOLINE) injection 10 mg  10 mg    atorvastatin (LIPITOR) tablet 10 mg  10 mg    allopurinol (ZYLOPRIM) tablet 100 mg  100 mg    carvedilol (COREG) tablet 12.5 mg  12.5 mg    [Held by provider] spironolactone (ALDACTONE) tablet 25 mg  25 mg    sodium bicarbonate tablet 1,300 mg  1,300 mg    lactated ringers infusion (BOLUS)  1,000 mL    lactated ringers infusion      DULoxetine (CYMBALTA) capsule 30 mg  30 mg    lactated ringers infusion (BOLUS)  500 mL    acetaminophen (Tylenol) tablet 650 mg  650 mg    insulin regular (HumuLIN R,NovoLIN R) injection  2-9 Units    And    dextrose 10 % BOLUS 25 g  25 g    Pharmacy Consult Request ...Pain Management Review 1 Each  1 Each    oxyCODONE immediate-release (ROXICODONE) tablet 5 mg  5 mg    Or    oxyCODONE immediate release (ROXICODONE) tablet 10 mg  10 mg    Or    HYDROmorphone (Dilaudid) injection 0.5 mg  0.5 mg       Allergies:  Allergies   Allergen Reactions    Ether Unspecified     \"Violently sick\"         Physical Exam:  Vitals: BP (!) 141/87   Pulse 75   Temp 36.8 °C (98.2 °F) (Temporal)   Resp 18   Ht 1.803 m (5' 11\")   Wt 88.3 kg (194 lb 10.7 oz)   SpO2 92%   Gen: NAD, laying comfortably in bed, visitors present   Head: NC/AT  Eyes/ Nose/ Mouth: moist mucous membranes  Cardio: RRR, good distal perfusion, warm extremities  Pulm: normal respiratory effort, no cyanosis, on RA   Abd: Soft NTND, no RUQ pain   Ext: No peripheral edema. No calf tenderness. No clubbing.    Mental status: follows commands  Speech: fluent, no aphasia or dysarthria      Motor:      Upper Extremity  Myotome R L   Shoulder flexion C5 5 5   Elbow flexion " C5 5 5   Wrist extension C6 5 5   Elbow extension C7 5 5   Finger flexion C8 5 5   Finger abduction T1 5 5     Lower Extremity Myotome R L   Hip flexion L2 4/5 4/5   Knee extension L3 5/5 5/5   Ankle dorsiflexion L4 5/5 5/5   Toe extension L5 5/5 5/5   Ankle plantarflexion S1 5/5 5/5     Sensory:   intact to light touch through out    Labs: Reviewed and significant for   Recent Labs     04/25/23  0544 04/25/23  1411 04/26/23  0512 04/27/23  0553   RBC 4.54*  --  4.70 4.26*   HEMOGLOBIN 13.4*  --  13.8* 12.6*   HEMATOCRIT 39.0*  --  41.1* 37.0*   PLATELETCT 178  --  207 214   PROTHROMBTM  --  17.6*  --   --    INR  --  1.48*  --   --        Recent Labs     04/25/23  0544 04/26/23  0512 04/27/23  0553   SODIUM 141 145 141   POTASSIUM 3.0* 3.2* 3.9   CHLORIDE 105 107 107   CO2 24 24 23   GLUCOSE 166* 163* 180*   BUN 60* 53* 57*   CREATININE 2.15* 2.21* 2.41*   CALCIUM 8.6 8.9 8.3*       Recent Results (from the past 24 hour(s))   POCT glucose device results    Collection Time: 04/26/23  6:34 PM   Result Value Ref Range    POC Glucose, Blood 180 (H) 65 - 99 mg/dL   POCT glucose device results    Collection Time: 04/26/23  8:50 PM   Result Value Ref Range    POC Glucose, Blood 227 (H) 65 - 99 mg/dL   CBC WITHOUT DIFFERENTIAL    Collection Time: 04/27/23  5:53 AM   Result Value Ref Range    WBC 28.1 (H) 4.8 - 10.8 K/uL    RBC 4.26 (L) 4.70 - 6.10 M/uL    Hemoglobin 12.6 (L) 14.0 - 18.0 g/dL    Hematocrit 37.0 (L) 42.0 - 52.0 %    MCV 86.9 81.4 - 97.8 fL    MCH 29.6 27.0 - 33.0 pg    MCHC 34.1 33.7 - 35.3 g/dL    RDW 47.8 35.9 - 50.0 fL    Platelet Count 214 164 - 446 K/uL    MPV 12.0 9.0 - 12.9 fL   Comp Metabolic Panel    Collection Time: 04/27/23  5:53 AM   Result Value Ref Range    Sodium 141 135 - 145 mmol/L    Potassium 3.9 3.6 - 5.5 mmol/L    Chloride 107 96 - 112 mmol/L    Co2 23 20 - 33 mmol/L    Anion Gap 11.0 7.0 - 16.0    Glucose 180 (H) 65 - 99 mg/dL    Bun 57 (H) 8 - 22 mg/dL    Creatinine 2.41 (H) 0.50 -  1.40 mg/dL    Calcium 8.3 (L) 8.5 - 10.5 mg/dL    AST(SGOT) 24 12 - 45 U/L    ALT(SGPT) 20 2 - 50 U/L    Alkaline Phosphatase 158 (H) 30 - 99 U/L    Total Bilirubin 0.7 0.1 - 1.5 mg/dL    Albumin 1.9 (L) 3.2 - 4.9 g/dL    Total Protein 4.8 (L) 6.0 - 8.2 g/dL    Globulin 2.9 1.9 - 3.5 g/dL    A-G Ratio 0.7 g/dL   MAGNESIUM    Collection Time: 04/27/23  5:53 AM   Result Value Ref Range    Magnesium 1.8 1.5 - 2.5 mg/dL   PHOSPHORUS    Collection Time: 04/27/23  5:53 AM   Result Value Ref Range    Phosphorus 2.4 (L) 2.5 - 4.5 mg/dL   proBrain Natriuretic Peptide, NT    Collection Time: 04/27/23  5:53 AM   Result Value Ref Range    NT-proBNP 855 (H) 0 - 125 pg/mL   CORRECTED CALCIUM    Collection Time: 04/27/23  5:53 AM   Result Value Ref Range    Correct Calcium 10.0 8.5 - 10.5 mg/dL   ESTIMATED GFR    Collection Time: 04/27/23  5:53 AM   Result Value Ref Range    GFR (CKD-EPI) 26 (A) >60 mL/min/1.73 m 2   POCT glucose device results    Collection Time: 04/27/23  8:55 AM   Result Value Ref Range    POC Glucose, Blood 176 (H) 65 - 99 mg/dL   POCT glucose device results    Collection Time: 04/27/23  1:23 PM   Result Value Ref Range    POC Glucose, Blood 165 (H) 65 - 99 mg/dL   CSF Cell Count    Collection Time: 04/27/23  2:10 PM   Result Value Ref Range    Number Of Tubes 4     Volume 8.5 mL    Color-Body Fluid Colorless     Character-Body Fluid Clear     Supernatant Appearance Colorless     Total RBC Count 8 cells/uL    Crenated RBC 0 %    Total WBC Count 4 0 - 10 cells/uL    Comments see below     CSF Tube Number 3    CSF CULTURE    Collection Time: 04/27/23  2:10 PM    Specimen: CSF   Result Value Ref Range    Significant Indicator NEG     Source CSF     Site -     Culture Result -     Gram Stain Result -          ASSESSMENT:  Patient is a 82 y.o. male admitted with weakness, found to have sepsis from colitis, and discitis/osteomyelitis, and SANJUANITA     [facility] Code / Diagnosis to Support: 0017.1 - Medically Complex:  Infections    Rehabilitation: Impaired ADLs and mobility  Patient is a potential  candidate for inpatient rehab based on needs for PT, OT, pending therapy evals post op.  Patient will also benefit from family training.  Patient has a good discharge situation which will be home with son.     Barriers to transfer include: Insurance authorization, TCCs to verify disposition, medical clearance and bed availability     All cases are subject to administrative review and recommendations may change    Disposition recommendations:  -Possible candidate for IPR, needs repeat therapy after cholecystectomy   -TCC to verify discharge support through son, patient will need 24/7 physical support for several weeks on discharge  -PMR to follow in the periphery for rehab appropriateness, please reach out with questions or request for medical management    Medical Complexity:    Debility secondary to infection secondary to cholecystitis vs discitis   -Patient admitted with sepsis secondary to colitis, discitis, osteomyelitis, now with cholecystitis   -ON IV Zosyn   -Continue PT OT while in-house  -Patient needs to demonstrate improvement with functional gait to qualify for IPR  -TCC investigating discharge support  -Potential candidate for IPR    Acute cholecystitis   - HIDA scan completed, with non visualization of the gall bladder, suspicious for choleycystisits   - to OR tomorrow for open cholecystectomy  - will need repeat therapy evals after surgery     LE weakness vs transvers myelitis   - LP done, CSF analysis pending   - MRI T spine showed  T2 hyperintense lesion in the R side of the thoracic spinal cord at T2 and T3 suspicious for spinal cord neoplasm vs transverse myelitis   - LE strength improved clinically     Acute cholecystitis   - HIDA scan completed, with non visualization of the gall bladder, suspicious for choleycystisits   - to OR tomorrow for open cholecystectomy  - will need repeat therapy evals after surgery      Leukocytosis  -WBC  28.1   -Elevated procalcitonin  -Currently on ceftriaxone 2 g daily IV, Flagyl p.o. 3 times daily  -Primary team monitoring with serial labs  - suspected secondary to cholecystitis     Hypertension  -Coreg 12.5 mg twice daily with meals  -Spironolactone 25 mg daily    Diabetes  - On Januvia and Ozempic outpatient  - Sliding scale insulin in house    Diabetic peripheral neuropathy  -Cymbalta    CKD stage IV  -Cr 2.2 - > 2.41   -Nephrology following    DVT PPX: SCDs      Thank you for allowing us to participate in the care of this patient.     Patient was seen for 38 minutes on unit/floor of which > 50% of time was spent on counseling and coordination of care regarding the above, including prognosis, risk reduction, benefits of treatment, and options for next stage of care.    Shweta Trammell D.O.     Physical Medicine and Rehabilitation     Please note that this dictation was created using voice recognition software. I have made every reasonable attempt to correct obvious errors, but there may be errors of grammar and possibly content that I did not discover before finalizing the note.

## 2023-04-27 NOTE — ANESTHESIA TIME REPORT
Anesthesia Start and Stop Event Times     Date Time Event    4/26/2023 0917 Ready for Procedure     0958 Anesthesia Start     1201 Anesthesia Stop        Responsible Staff  04/26/23    Name Role Begin End    Morgan Graf M.D. Anesth 0958 1201        Overtime Reason:  overtime    Comments:

## 2023-04-27 NOTE — ANESTHESIA POSTPROCEDURE EVALUATION
Patient: Tyrone Cline    Procedure Summary     Date: 04/26/23 Room / Location: [facility] IMAGING / SURGERY [facility]    Anesthesia Start: 0958 Anesthesia Stop: 1201    Procedure: MRI-ANESTHESIA CASE Diagnosis:     Surgeons: [facility] Responsible Provider: Morgan Graf M.D.    Anesthesia Type: general ASA Status: 2          Final Anesthesia Type: general  Last vitals  BP   Blood Pressure : (!) 165/95    Temp   36.5 °C (97.7 °F)    Pulse   78   Resp   18    SpO2   93 %      Anesthesia Post Evaluation    Patient location during evaluation: PACU  Patient participation: complete - patient participated  Level of consciousness: awake and alert    Airway patency: patent  Anesthetic complications: no  Cardiovascular status: hemodynamically stable  Respiratory status: acceptable  Hydration status: euvolemic    PONV: none          There were no known notable events for this encounter.     Nurse Pain Score: 0 (NPRS)

## 2023-04-27 NOTE — THERAPY
Occupational Therapy Contact Note    Patient Name: Tyrone Cline  Age:  82 y.o., Sex:  male  Medical Record #: [id number]  Today's Date: 4/27/2023    Attempted to see pt for OT tx. Educated pt on goals of care and role of OT. Pt receptive to education and verbalized understanding. Encouraged pt to mobilize w/ nrs[facility] staff as tolerated. Pt stating that he had tests today and getting sx tomorrow AM. Will follow up post op as medically appropriate.

## 2023-04-28 ENCOUNTER — ANESTHESIA EVENT (OUTPATIENT)
Dept: SURGERY | Facility: MEDICAL CENTER | Age: 83
DRG: 853 | End: 2023-04-28
Payer: MEDICARE

## 2023-04-28 ENCOUNTER — ANESTHESIA (OUTPATIENT)
Dept: SURGERY | Facility: MEDICAL CENTER | Age: 83
DRG: 853 | End: 2023-04-28
Payer: MEDICARE

## 2023-04-28 ENCOUNTER — APPOINTMENT (OUTPATIENT)
Dept: RADIOLOGY | Facility: MEDICAL CENTER | Age: 83
DRG: 853 | End: 2023-04-28
Attending: SURGERY
Payer: MEDICARE

## 2023-04-28 PROBLEM — I95.81 POSTPROCEDURAL HYPOTENSION: Status: ACTIVE | Noted: 2023-04-28

## 2023-04-28 PROBLEM — Z99.11 ON MECHANICALLY ASSISTED VENTILATION (HCC): Status: ACTIVE | Noted: 2023-04-28

## 2023-04-28 LAB
ALBUMIN SERPL BCP-MCNC: 1.8 G/DL (ref 3.2–4.9)
ALBUMIN SERPL BCP-MCNC: 2 G/DL (ref 3.2–4.9)
ALBUMIN/GLOB SERPL: 0.6 G/DL
ALBUMIN/GLOB SERPL: 0.6 G/DL
ALP SERPL-CCNC: 152 U/L (ref 30–99)
ALP SERPL-CCNC: 158 U/L (ref 30–99)
ALT SERPL-CCNC: 21 U/L (ref 2–50)
ALT SERPL-CCNC: 28 U/L (ref 2–50)
ANION GAP SERPL CALC-SCNC: 13 MMOL/L (ref 7–16)
ANION GAP SERPL CALC-SCNC: 9 MMOL/L (ref 7–16)
AST SERPL-CCNC: 24 U/L (ref 12–45)
AST SERPL-CCNC: 41 U/L (ref 12–45)
BACTERIA STL CULT: NORMAL
BASE EXCESS BLDA CALC-SCNC: -1 MMOL/L (ref -4–3)
BILIRUB SERPL-MCNC: 0.7 MG/DL (ref 0.1–1.5)
BILIRUB SERPL-MCNC: 1 MG/DL (ref 0.1–1.5)
BODY TEMPERATURE: ABNORMAL DEGREES
BREATHS SETTING VENT: 20
BUN SERPL-MCNC: 45 MG/DL (ref 8–22)
BUN SERPL-MCNC: 49 MG/DL (ref 8–22)
C JEJUNI+C COLI AG STL QL: NORMAL
CALCIUM ALBUM COR SERPL-MCNC: 9.6 MG/DL (ref 8.5–10.5)
CALCIUM ALBUM COR SERPL-MCNC: 9.7 MG/DL (ref 8.5–10.5)
CALCIUM SERPL-MCNC: 7.9 MG/DL (ref 8.5–10.5)
CALCIUM SERPL-MCNC: 8 MG/DL (ref 8.5–10.5)
CHLORIDE SERPL-SCNC: 107 MMOL/L (ref 96–112)
CHLORIDE SERPL-SCNC: 108 MMOL/L (ref 96–112)
CO2 BLDA-SCNC: 25 MMOL/L (ref 20–33)
CO2 SERPL-SCNC: 20 MMOL/L (ref 20–33)
CO2 SERPL-SCNC: 22 MMOL/L (ref 20–33)
CORTIS SERPL-MCNC: 35.8 UG/DL (ref 0–23)
CREAT SERPL-MCNC: 2.04 MG/DL (ref 0.5–1.4)
CREAT SERPL-MCNC: 2.1 MG/DL (ref 0.5–1.4)
DELSYS IDSYS: ABNORMAL
E COLI SXT1+2 STL IA: NORMAL
ERYTHROCYTE [DISTWIDTH] IN BLOOD BY AUTOMATED COUNT: 49.3 FL (ref 35.9–50)
ERYTHROCYTE [DISTWIDTH] IN BLOOD BY AUTOMATED COUNT: 52.3 FL (ref 35.9–50)
GFR SERPLBLD CREATININE-BSD FMLA CKD-EPI: 31 ML/MIN/1.73 M 2
GFR SERPLBLD CREATININE-BSD FMLA CKD-EPI: 32 ML/MIN/1.73 M 2
GLOBULIN SER CALC-MCNC: 3.1 G/DL (ref 1.9–3.5)
GLOBULIN SER CALC-MCNC: 3.2 G/DL (ref 1.9–3.5)
GLUCOSE BLD STRIP.AUTO-MCNC: 158 MG/DL (ref 65–99)
GLUCOSE BLD STRIP.AUTO-MCNC: 172 MG/DL (ref 65–99)
GLUCOSE SERPL-MCNC: 175 MG/DL (ref 65–99)
GLUCOSE SERPL-MCNC: 181 MG/DL (ref 65–99)
HCO3 BLDA-SCNC: 23.4 MMOL/L (ref 17–25)
HCT VFR BLD AUTO: 39.5 % (ref 42–52)
HCT VFR BLD AUTO: 39.7 % (ref 42–52)
HGB BLD-MCNC: 12.9 G/DL (ref 14–18)
HGB BLD-MCNC: 13.2 G/DL (ref 14–18)
HOROWITZ INDEX BLDA+IHG-RTO: 167 MM[HG]
LACTATE SERPL-SCNC: 1.7 MMOL/L (ref 0.5–2)
MAGNESIUM SERPL-MCNC: 1.9 MG/DL (ref 1.5–2.5)
MAGNESIUM SERPL-MCNC: 2.1 MG/DL (ref 1.5–2.5)
MCH RBC QN AUTO: 29.3 PG (ref 27–33)
MCH RBC QN AUTO: 29.7 PG (ref 27–33)
MCHC RBC AUTO-ENTMCNC: 32.7 G/DL (ref 33.7–35.3)
MCHC RBC AUTO-ENTMCNC: 33.2 G/DL (ref 33.7–35.3)
MCV RBC AUTO: 88.2 FL (ref 81.4–97.8)
MCV RBC AUTO: 90.8 FL (ref 81.4–97.8)
MODE IMODE: ABNORMAL
NT-PROBNP SERPL IA-MCNC: 1588 PG/ML (ref 0–125)
O2/TOTAL GAS SETTING VFR VENT: 60 %
PCO2 BLDA: 38.6 MMHG (ref 26–37)
PCO2 TEMP ADJ BLDA: 37.8 MMHG (ref 26–37)
PEEP END EXPIRATORY PRESSURE IPEEP: 8 CMH20
PH BLDA: 7.39 [PH] (ref 7.4–7.5)
PH TEMP ADJ BLDA: 7.4 [PH] (ref 7.4–7.5)
PHOSPHATE SERPL-MCNC: 2.4 MG/DL (ref 2.5–4.5)
PLATELET # BLD AUTO: 227 K/UL (ref 164–446)
PLATELET # BLD AUTO: 372 K/UL (ref 164–446)
PMV BLD AUTO: 11.6 FL (ref 9–12.9)
PMV BLD AUTO: 11.7 FL (ref 9–12.9)
PO2 BLDA: 100 MMHG (ref 64–87)
PO2 TEMP ADJ BLDA: 97 MMHG (ref 64–87)
POTASSIUM SERPL-SCNC: 4 MMOL/L (ref 3.6–5.5)
POTASSIUM SERPL-SCNC: 4.2 MMOL/L (ref 3.6–5.5)
PROT SERPL-MCNC: 5 G/DL (ref 6–8.2)
PROT SERPL-MCNC: 5.1 G/DL (ref 6–8.2)
RBC # BLD AUTO: 4.35 M/UL (ref 4.7–6.1)
RBC # BLD AUTO: 4.5 M/UL (ref 4.7–6.1)
SAO2 % BLDA: 98 % (ref 93–99)
SIGNIFICANT IND 70042: NORMAL
SITE SITE: NORMAL
SODIUM SERPL-SCNC: 138 MMOL/L (ref 135–145)
SODIUM SERPL-SCNC: 141 MMOL/L (ref 135–145)
SOURCE SOURCE: NORMAL
SPECIMEN DRAWN FROM PATIENT: ABNORMAL
TIDAL VOLUME IVT: 440 ML
TRIGL SERPL-MCNC: 138 MG/DL (ref 0–149)
WBC # BLD AUTO: 20.3 K/UL (ref 4.8–10.8)
WBC # BLD AUTO: 27.6 K/UL (ref 4.8–10.8)

## 2023-04-28 PROCEDURE — 47600 CHOLECYSTECTOMY: CPT | Performed by: SURGERY

## 2023-04-28 PROCEDURE — 160029 HCHG SURGERY MINUTES - 1ST 30 MINS LEVEL 4: Performed by: SURGERY

## 2023-04-28 PROCEDURE — 87077 CULTURE AEROBIC IDENTIFY: CPT | Mod: 91

## 2023-04-28 PROCEDURE — 94150 VITAL CAPACITY TEST: CPT

## 2023-04-28 PROCEDURE — 700105 HCHG RX REV CODE 258: Performed by: ANESTHESIOLOGY

## 2023-04-28 PROCEDURE — 47600 CHOLECYSTECTOMY: CPT | Mod: AS | Performed by: PHYSICIAN ASSISTANT

## 2023-04-28 PROCEDURE — 87076 CULTURE ANAEROBE IDENT EACH: CPT

## 2023-04-28 PROCEDURE — 83735 ASSAY OF MAGNESIUM: CPT

## 2023-04-28 PROCEDURE — 700111 HCHG RX REV CODE 636 W/ 250 OVERRIDE (IP): Performed by: FAMILY MEDICINE

## 2023-04-28 PROCEDURE — 99232 SBSQ HOSP IP/OBS MODERATE 35: CPT | Performed by: FAMILY MEDICINE

## 2023-04-28 PROCEDURE — 83605 ASSAY OF LACTIC ACID: CPT

## 2023-04-28 PROCEDURE — 99100 ANES PT EXTEME AGE<1 YR&>70: CPT | Performed by: ANESTHESIOLOGY

## 2023-04-28 PROCEDURE — 83880 ASSAY OF NATRIURETIC PEPTIDE: CPT

## 2023-04-28 PROCEDURE — 700101 HCHG RX REV CODE 250: Performed by: SURGERY

## 2023-04-28 PROCEDURE — 700102 HCHG RX REV CODE 250 W/ 637 OVERRIDE(OP): Performed by: FAMILY MEDICINE

## 2023-04-28 PROCEDURE — A9270 NON-COVERED ITEM OR SERVICE: HCPCS | Performed by: FAMILY MEDICINE

## 2023-04-28 PROCEDURE — 87075 CULTR BACTERIA EXCEPT BLOOD: CPT

## 2023-04-28 PROCEDURE — 700111 HCHG RX REV CODE 636 W/ 250 OVERRIDE (IP): Performed by: SURGERY

## 2023-04-28 PROCEDURE — 700111 HCHG RX REV CODE 636 W/ 250 OVERRIDE (IP)

## 2023-04-28 PROCEDURE — 160041 HCHG SURGERY MINUTES - EA ADDL 1 MIN LEVEL 4: Performed by: SURGERY

## 2023-04-28 PROCEDURE — 36620 INSERTION CATHETER ARTERY: CPT | Performed by: ANESTHESIOLOGY

## 2023-04-28 PROCEDURE — 87070 CULTURE OTHR SPECIMN AEROBIC: CPT

## 2023-04-28 PROCEDURE — 85027 COMPLETE CBC AUTOMATED: CPT

## 2023-04-28 PROCEDURE — 700105 HCHG RX REV CODE 258: Performed by: SURGERY

## 2023-04-28 PROCEDURE — 80053 COMPREHEN METABOLIC PANEL: CPT

## 2023-04-28 PROCEDURE — 71045 X-RAY EXAM CHEST 1 VIEW: CPT

## 2023-04-28 PROCEDURE — 0WJG4ZZ INSPECTION OF PERITONEAL CAVITY, PERCUTANEOUS ENDOSCOPIC APPROACH: ICD-10-PCS | Performed by: SURGERY

## 2023-04-28 PROCEDURE — 88304 TISSUE EXAM BY PATHOLOGIST: CPT

## 2023-04-28 PROCEDURE — 84478 ASSAY OF TRIGLYCERIDES: CPT

## 2023-04-28 PROCEDURE — 160009 HCHG ANES TIME/MIN: Performed by: SURGERY

## 2023-04-28 PROCEDURE — 82533 TOTAL CORTISOL: CPT

## 2023-04-28 PROCEDURE — 37799 UNLISTED PX VASCULAR SURGERY: CPT

## 2023-04-28 PROCEDURE — 84100 ASSAY OF PHOSPHORUS: CPT

## 2023-04-28 PROCEDURE — 87205 SMEAR GRAM STAIN: CPT

## 2023-04-28 PROCEDURE — 0FT40ZZ RESECTION OF GALLBLADDER, OPEN APPROACH: ICD-10-PCS | Performed by: SURGERY

## 2023-04-28 PROCEDURE — 160048 HCHG OR STATISTICAL LEVEL 1-5: Performed by: SURGERY

## 2023-04-28 PROCEDURE — 87186 SC STD MICRODIL/AGAR DIL: CPT

## 2023-04-28 PROCEDURE — 700111 HCHG RX REV CODE 636 W/ 250 OVERRIDE (IP): Performed by: ANESTHESIOLOGY

## 2023-04-28 PROCEDURE — 00790 ANES IPER UPR ABD NOS: CPT | Performed by: ANESTHESIOLOGY

## 2023-04-28 PROCEDURE — 700101 HCHG RX REV CODE 250: Performed by: ANESTHESIOLOGY

## 2023-04-28 PROCEDURE — 700105 HCHG RX REV CODE 258: Performed by: FAMILY MEDICINE

## 2023-04-28 PROCEDURE — 700102 HCHG RX REV CODE 250 W/ 637 OVERRIDE(OP): Performed by: HOSPITALIST

## 2023-04-28 PROCEDURE — 99291 CRITICAL CARE FIRST HOUR: CPT | Performed by: SURGERY

## 2023-04-28 PROCEDURE — 770022 HCHG ROOM/CARE - ICU (200)

## 2023-04-28 PROCEDURE — 82962 GLUCOSE BLOOD TEST: CPT

## 2023-04-28 PROCEDURE — 94002 VENT MGMT INPAT INIT DAY: CPT

## 2023-04-28 PROCEDURE — A9270 NON-COVERED ITEM OR SERVICE: HCPCS | Performed by: HOSPITALIST

## 2023-04-28 PROCEDURE — 82803 BLOOD GASES ANY COMBINATION: CPT

## 2023-04-28 PROCEDURE — C1751 CATH, INF, PER/CENT/MIDLINE: HCPCS | Performed by: SURGERY

## 2023-04-28 RX ORDER — HYDROMORPHONE HYDROCHLORIDE 1 MG/ML
.5-1 INJECTION, SOLUTION INTRAMUSCULAR; INTRAVENOUS; SUBCUTANEOUS
Status: DISCONTINUED | OUTPATIENT
Start: 2023-04-28 | End: 2023-04-29

## 2023-04-28 RX ORDER — NOREPINEPHRINE BITARTRATE 0.03 MG/ML
0-1 INJECTION, SOLUTION INTRAVENOUS CONTINUOUS
Status: DISCONTINUED | OUTPATIENT
Start: 2023-04-28 | End: 2023-04-29

## 2023-04-28 RX ORDER — ROCURONIUM BROMIDE 10 MG/ML
INJECTION, SOLUTION INTRAVENOUS PRN
Status: DISCONTINUED | OUTPATIENT
Start: 2023-04-28 | End: 2023-04-28 | Stop reason: SURG

## 2023-04-28 RX ORDER — SODIUM CHLORIDE, SODIUM LACTATE, POTASSIUM CHLORIDE, CALCIUM CHLORIDE 600; 310; 30; 20 MG/100ML; MG/100ML; MG/100ML; MG/100ML
INJECTION, SOLUTION INTRAVENOUS
Status: DISCONTINUED | OUTPATIENT
Start: 2023-04-28 | End: 2023-04-28 | Stop reason: SURG

## 2023-04-28 RX ORDER — FAMOTIDINE 20 MG/1
20 TABLET, FILM COATED ORAL 2 TIMES DAILY
Status: DISCONTINUED | OUTPATIENT
Start: 2023-04-28 | End: 2023-04-28

## 2023-04-28 RX ORDER — SODIUM CHLORIDE 9 MG/ML
INJECTION, SOLUTION INTRAVENOUS
Status: DISCONTINUED | OUTPATIENT
Start: 2023-04-28 | End: 2023-04-28 | Stop reason: SURG

## 2023-04-28 RX ORDER — OXYCODONE HYDROCHLORIDE 10 MG/1
10 TABLET ORAL
Status: DISCONTINUED | OUTPATIENT
Start: 2023-04-28 | End: 2023-04-29

## 2023-04-28 RX ORDER — FAMOTIDINE 20 MG/1
20 TABLET, FILM COATED ORAL DAILY
Status: DISCONTINUED | OUTPATIENT
Start: 2023-04-29 | End: 2023-04-29

## 2023-04-28 RX ORDER — LIDOCAINE HYDROCHLORIDE 20 MG/ML
INJECTION, SOLUTION EPIDURAL; INFILTRATION; INTRACAUDAL; PERINEURAL PRN
Status: DISCONTINUED | OUTPATIENT
Start: 2023-04-28 | End: 2023-04-28 | Stop reason: SURG

## 2023-04-28 RX ORDER — SODIUM CHLORIDE, SODIUM GLUCONATE, SODIUM ACETATE, POTASSIUM CHLORIDE AND MAGNESIUM CHLORIDE 526; 502; 368; 37; 30 MG/100ML; MG/100ML; MG/100ML; MG/100ML; MG/100ML
INJECTION, SOLUTION INTRAVENOUS
Status: DISCONTINUED | OUTPATIENT
Start: 2023-04-28 | End: 2023-04-28 | Stop reason: SURG

## 2023-04-28 RX ORDER — MAGNESIUM SULFATE HEPTAHYDRATE 40 MG/ML
2 INJECTION, SOLUTION INTRAVENOUS ONCE
Status: COMPLETED | OUTPATIENT
Start: 2023-04-28 | End: 2023-04-28

## 2023-04-28 RX ORDER — EPHEDRINE SULFATE 50 MG/ML
INJECTION, SOLUTION INTRAVENOUS PRN
Status: DISCONTINUED | OUTPATIENT
Start: 2023-04-28 | End: 2023-04-28 | Stop reason: SURG

## 2023-04-28 RX ORDER — OXYCODONE HYDROCHLORIDE 5 MG/1
5 TABLET ORAL
Status: DISCONTINUED | OUTPATIENT
Start: 2023-04-28 | End: 2023-04-29

## 2023-04-28 RX ORDER — PHENYLEPHRINE HCL IN 0.9% NACL 0.5 MG/5ML
SYRINGE (ML) INTRAVENOUS PRN
Status: DISCONTINUED | OUTPATIENT
Start: 2023-04-28 | End: 2023-04-28 | Stop reason: HOSPADM

## 2023-04-28 RX ADMIN — PIPERACILLIN AND TAZOBACTAM 3.38 G: 3; .375 INJECTION, POWDER, LYOPHILIZED, FOR SOLUTION INTRAVENOUS; PARENTERAL at 11:53

## 2023-04-28 RX ADMIN — HYDROMORPHONE HYDROCHLORIDE 1 MG: 1 INJECTION, SOLUTION INTRAMUSCULAR; INTRAVENOUS; SUBCUTANEOUS at 22:12

## 2023-04-28 RX ADMIN — EPHEDRINE SULFATE 25 MG: 50 INJECTION, SOLUTION INTRAVENOUS at 15:54

## 2023-04-28 RX ADMIN — SODIUM CHLORIDE: 9 INJECTION, SOLUTION INTRAVENOUS at 17:37

## 2023-04-28 RX ADMIN — Medication 200 MCG: at 15:56

## 2023-04-28 RX ADMIN — PROPOFOL 75 MG: 10 INJECTION, EMULSION INTRAVENOUS at 15:46

## 2023-04-28 RX ADMIN — PIPERACILLIN AND TAZOBACTAM 3.38 G: 3; .375 INJECTION, POWDER, LYOPHILIZED, FOR SOLUTION INTRAVENOUS; PARENTERAL at 20:57

## 2023-04-28 RX ADMIN — SODIUM BICARBONATE 1300 MG: 650 TABLET ORAL at 04:40

## 2023-04-28 RX ADMIN — Medication 200 MCG: at 16:19

## 2023-04-28 RX ADMIN — FENTANYL CITRATE 50 MCG: 50 INJECTION, SOLUTION INTRAMUSCULAR; INTRAVENOUS at 17:36

## 2023-04-28 RX ADMIN — PROPOFOL 5 MCG/KG/MIN: 10 INJECTION, EMULSION INTRAVENOUS at 18:10

## 2023-04-28 RX ADMIN — ALLOPURINOL 100 MG: 100 TABLET ORAL at 04:40

## 2023-04-28 RX ADMIN — PROPOFOL 75 MG: 10 INJECTION, EMULSION INTRAVENOUS at 15:37

## 2023-04-28 RX ADMIN — Medication 200 MCG: at 16:45

## 2023-04-28 RX ADMIN — EPHEDRINE SULFATE 10 MG: 50 INJECTION, SOLUTION INTRAVENOUS at 16:45

## 2023-04-28 RX ADMIN — HYDRALAZINE HYDROCHLORIDE 10 MG: 20 INJECTION INTRAMUSCULAR; INTRAVENOUS at 04:40

## 2023-04-28 RX ADMIN — SODIUM CHLORIDE, SODIUM GLUCONATE, SODIUM ACETATE, POTASSIUM CHLORIDE AND MAGNESIUM CHLORIDE: 526; 502; 368; 37; 30 INJECTION, SOLUTION INTRAVENOUS at 16:50

## 2023-04-28 RX ADMIN — NOREPINEPHRINE BITARTRATE 0.02 MCG/KG/MIN: 1 INJECTION, SOLUTION, CONCENTRATE INTRAVENOUS at 18:14

## 2023-04-28 RX ADMIN — EPHEDRINE SULFATE 25 MG: 50 INJECTION, SOLUTION INTRAVENOUS at 15:58

## 2023-04-28 RX ADMIN — EPHEDRINE SULFATE 10 MG: 50 INJECTION, SOLUTION INTRAVENOUS at 16:19

## 2023-04-28 RX ADMIN — SODIUM CHLORIDE: 9 INJECTION, SOLUTION INTRAVENOUS at 18:26

## 2023-04-28 RX ADMIN — PIPERACILLIN AND TAZOBACTAM 3.38 G: 3; .375 INJECTION, POWDER, LYOPHILIZED, FOR SOLUTION INTRAVENOUS; PARENTERAL at 04:42

## 2023-04-28 RX ADMIN — ROCURONIUM BROMIDE 40 MG: 10 INJECTION, SOLUTION INTRAVENOUS at 15:38

## 2023-04-28 RX ADMIN — MAGNESIUM SULFATE HEPTAHYDRATE 2 G: 40 INJECTION, SOLUTION INTRAVENOUS at 08:03

## 2023-04-28 RX ADMIN — FENTANYL CITRATE 100 MCG: 50 INJECTION, SOLUTION INTRAMUSCULAR; INTRAVENOUS at 17:15

## 2023-04-28 RX ADMIN — LIDOCAINE HYDROCHLORIDE 100 MG: 20 INJECTION, SOLUTION EPIDURAL; INFILTRATION; INTRACAUDAL at 15:37

## 2023-04-28 RX ADMIN — NOREPINEPHRINE BITARTRATE 0.1 MCG/KG/MIN: 1 INJECTION, SOLUTION, CONCENTRATE INTRAVENOUS at 17:01

## 2023-04-28 RX ADMIN — PROPOFOL 50 MG: 10 INJECTION, EMULSION INTRAVENOUS at 15:40

## 2023-04-28 RX ADMIN — CARVEDILOL 12.5 MG: 12.5 TABLET, FILM COATED ORAL at 07:56

## 2023-04-28 RX ADMIN — Medication 200 MCG: at 16:01

## 2023-04-28 RX ADMIN — Medication 1 APPLICATOR: at 20:55

## 2023-04-28 RX ADMIN — Medication 200 MCG: at 15:59

## 2023-04-28 RX ADMIN — PROPOFOL 50 MG: 10 INJECTION, EMULSION INTRAVENOUS at 18:02

## 2023-04-28 RX ADMIN — SODIUM CHLORIDE, POTASSIUM CHLORIDE, SODIUM LACTATE AND CALCIUM CHLORIDE: 600; 310; 30; 20 INJECTION, SOLUTION INTRAVENOUS at 15:33

## 2023-04-28 RX ADMIN — AMLODIPINE BESYLATE 5 MG: 10 TABLET ORAL at 04:40

## 2023-04-28 RX ADMIN — INSULIN HUMAN 2 UNITS: 100 INJECTION, SOLUTION PARENTERAL at 21:05

## 2023-04-28 RX ADMIN — Medication 200 MCG: at 15:53

## 2023-04-28 RX ADMIN — EPHEDRINE SULFATE 10 MG: 50 INJECTION, SOLUTION INTRAVENOUS at 16:03

## 2023-04-28 RX ADMIN — FENTANYL CITRATE 100 MCG: 50 INJECTION, SOLUTION INTRAMUSCULAR; INTRAVENOUS at 17:09

## 2023-04-28 RX ADMIN — SODIUM CHLORIDE: 9 INJECTION, SOLUTION INTRAVENOUS at 06:22

## 2023-04-28 RX ADMIN — HYDROMORPHONE HYDROCHLORIDE 0.5 MG: 1 INJECTION, SOLUTION INTRAMUSCULAR; INTRAVENOUS; SUBCUTANEOUS at 19:53

## 2023-04-28 ASSESSMENT — ENCOUNTER SYMPTOMS
HEADACHES: 0
WEAKNESS: 1
ABDOMINAL PAIN: 0
SPEECH CHANGE: 0
MYALGIAS: 0
FEVER: 0
HEARTBURN: 0
NECK PAIN: 0
FLANK PAIN: 0
DIZZINESS: 0
NAUSEA: 0
BACK PAIN: 0
FOCAL WEAKNESS: 1
NERVOUS/ANXIOUS: 0
PALPITATIONS: 0
DIARRHEA: 0
WHEEZING: 0
SENSORY CHANGE: 0
DIAPHORESIS: 0
VOMITING: 0
SHORTNESS OF BREATH: 0
BLURRED VISION: 0
COUGH: 0
SORE THROAT: 0
CHILLS: 0

## 2023-04-28 ASSESSMENT — PAIN DESCRIPTION - PAIN TYPE
TYPE: ACUTE PAIN
TYPE: ACUTE PAIN;SURGICAL PAIN
TYPE: ACUTE PAIN
TYPE: ACUTE PAIN;SURGICAL PAIN
TYPE: ACUTE PAIN
TYPE: ACUTE PAIN

## 2023-04-28 ASSESSMENT — FIBROSIS 4 INDEX: FIB4 SCORE: 1.87

## 2023-04-28 ASSESSMENT — PULMONARY FUNCTION TESTS: FVC: 1100

## 2023-04-28 NOTE — PROGRESS NOTES
Met withTyrone Cline before surgery  Discussed findings  4/27/2023 10:56 AM     HISTORY/REASON FOR EXAM:  Cholelythiasis        TECHNIQUE/EXAM DESCRIPTION AND NUMBER OF VIEWS:  Hepatobiliary scan.     COMPARISON: None     PROCEDURE:  5.5 mCi Tc 99m-Choletec was administered intravenously, followed by 70 minutes of anterior planar imaging. The gallbladder was not visualized after 70 minutes. 3 mg of morphine was administered an additional 30 minutes of imaging was   performed.     FINDINGS:  There is prompt radiotracer uptake in the hepatic parenchyma. Tracer excretion into the biliary tree occurs by 15 minutes and small bowel by 30 minutes. The gallbladder was not visualized at 70 minutes. Following morphine administration the gallbladder   is still not visualized.     IMPRESSION:     Hepatobiliary scan findings suspicious for acute cholecystitis.           Exam Ended: 04/27/23  1:29 PM           Discussed proposed procedure laparoscopic possible open cholecystectomy  Discussed risk benefits alternatives  This discussed included but not limited to devastating injury to bile duct and/or liver, possible injury to bowel bladder blood vessels ureters blood vessels or other intra-abdominal structures, hernia, bowel obstruction, bile leak, possible need for additional procedures bleeding requiring transfusion pain bleeding infection poor cosmesis  All questions answered and/or discussed.    Informed consent obtained  Plan to proceed with laparoscopic possible open cholecystectomy

## 2023-04-28 NOTE — CARE PLAN
The patient is Stable - Low risk of patient condition declining or worsening    Shift Goals  Clinical Goals: abx  Patient Goals: comfort  Family Goals: updated on poc - gave son Ld an update today    Progress made toward(s) clinical / shift goals:      Patient is not progressing towards the following goals:

## 2023-04-28 NOTE — THERAPY
Occupational Therapy Contact Note    Patient Name: Tyrone Cline  Age:  82 y.o., Sex:  male  Medical Record #: [id number]  Today's Date: 4/28/2023    Attempted to see pt for OT tx. Pt currently off the floor for procedure. Will try again post op as appropriate.

## 2023-04-28 NOTE — PROGRESS NOTES
Assumed care of pt after receiving report from night shift RN. Pt is A&Ox 4, VSS on 0.5L 2 via NC. Pt denies pain at this time. Updated pt on POC - pt verbalized understanding. Pt remains NPO for procedure. Bed alarm on, bed is locked and in lowest position, call light within reach, fall precautions in place. All needs met at this time.        1300: Pt off unit with transport via hospital bed to surgery. On 0.5L O2 via NC.

## 2023-04-28 NOTE — PROGRESS NOTES
[facility] Medicine Daily Progress Note    Date of Service  4/28/2023    Chief Complaint  Tyrone Cline is a 82 y.o. male admitted 4/21/2023 with Colitis.    Hospital Course  Admitted with sepsis secondary to colitis.  Patient was started on empiric coverage with IV Rocephin and Flagyl.  He was also noted to have bilateral lower extremity weakness.  MRI of the lumbar spine was done which showed possible edema.  Neurology was then consulted on the case.  He was also noted to have an SANJUANITA on CKD stage IV.  Nephrology was consulted on the case.  She was started on a careful IVF hydration.  His CT scan also showed cholelithiasis.  Ultrasound was done which showed possible cholecystitis.  Surgery was then consulted on the case.    Interval Problem Update  Colitis - stool culture pending  Cholelithiasis - plan for surgery today  SANJUANITA - crea 2.0  Hypertension - sbp 133-178  Diabetes - -200  Low magnesium    I have discussed this patient's plan of care and discharge plan at IDT rounds today with Case Management, Nursing, Nursing leadership, and other members of the IDT team.    Consultants/Specialty  general surgery, nephrology, and neurology    Code Status  DNAR/DNI    Disposition  Patient is not medically cleared for discharge.   Anticipate discharge to to an inpatient rehabilitation hospital.  I have placed the appropriate orders for post-discharge needs.    Review of Systems  Review of Systems   Constitutional:  Positive for malaise/fatigue. Negative for chills, diaphoresis and fever.   HENT:  Negative for congestion, hearing loss and sore throat.    Eyes:  Negative for blurred vision.   Respiratory:  Negative for cough, shortness of breath and wheezing.    Cardiovascular:  Negative for chest pain, palpitations and leg swelling.   Gastrointestinal:  Negative for abdominal pain, diarrhea, heartburn, nausea and vomiting.   Genitourinary:  Negative for dysuria, flank pain and hematuria.   Musculoskeletal:   Negative for back pain, joint pain, myalgias and neck pain.   Skin:  Negative for rash.   Neurological:  Positive for focal weakness and weakness. Negative for dizziness, sensory change, speech change and headaches.   Psychiatric/Behavioral:  The patient is not nervous/anxious.       Physical Exam  Temp:  [36.3 °C (97.3 °F)-36.6 °C (97.9 °F)] 36.3 °C (97.3 °F)  Pulse:  [74-80] 80  Resp:  [16-18] 16  BP: (133-178)/(71-92) 163/92  SpO2:  [93 %-95 %] 95 %    Physical Exam  Vitals and nursing note reviewed.   HENT:      Head: Normocephalic and atraumatic.      Nose: No congestion.      Mouth/Throat:      Mouth: Mucous membranes are moist.   Eyes:      Extraocular Movements: Extraocular movements intact.      Conjunctiva/sclera: Conjunctivae normal.   Cardiovascular:      Rate and Rhythm: Normal rate and regular rhythm.      Heart sounds: Murmur heard.   Pulmonary:      Effort: Pulmonary effort is normal.      Breath sounds: Normal breath sounds.   Abdominal:      General: There is distension.      Tenderness: There is no abdominal tenderness. There is no guarding or rebound.   Musculoskeletal:      Cervical back: No tenderness.      Right lower leg: No edema.      Left lower leg: No edema.   Skin:     General: Skin is warm and dry.   Neurological:      Mental Status: He is alert and oriented to person, place, and time.      Cranial Nerves: No cranial nerve deficit.      Motor: Weakness (MMT BUE 5/5, RLE 4/5, LLE 4+/5) present.       Fluids    Intake/Output Summary (Last 24 hours) at 4/28/2023 1517  Last data filed at 4/28/2023 1000  Gross per 24 hour   Intake --   Output 1850 ml   Net -1850 ml         Laboratory  Recent Labs     04/26/23  0512 04/27/23  0553 04/28/23  0228   WBC 24.3* 28.1* 20.3*   RBC 4.70 4.26* 4.50*   HEMOGLOBIN 13.8* 12.6* 13.2*   HEMATOCRIT 41.1* 37.0* 39.7*   MCV 87.4 86.9 88.2   MCH 29.4 29.6 29.3   MCHC 33.6* 34.1 33.2*   RDW 47.8 47.8 49.3   PLATELETCT 207 214 227   MPV 11.9 12.0 11.7        Recent Labs     04/26/23  0512 04/27/23  0553 04/28/23  0228   SODIUM 145 141 138   POTASSIUM 3.2* 3.9 4.0   CHLORIDE 107 107 107   CO2 24 23 22   GLUCOSE 163* 180* 181*   BUN 53* 57* 49*   CREATININE 2.21* 2.41* 2.04*   CALCIUM 8.9 8.3* 8.0*                       Imaging  NM-HEPATOBILIARY SCAN   Final Result      Hepatobiliary scan findings suspicious for acute cholecystitis.      MR-THORACIC SPINE-WITH & W/O   Final Result      1.  There is abnormal expansile T2 hyperintense lesion in the right side of the thoracic spinal cord at the levels of T2 and T3 Mild contrast enhancement is seen. This lesion is suspicious for spinal cord neoplasm. Other remote differential diagnosis    includes transverse myelitis.   2.  Exaggerated thoracic kyphosis.   3.  Thoracic dextroscoliosis.   4.  Minimal degenerative changes.   5.  Right pleural effusion.      MR-LUMBAR SPINE-WITH & W/O   Final Result      1.  Multifocal degenerative disease in the lumbar spine as described above.   2.  Moderate central canal and severe lateral recess stenosis at the level of L4-5. The exiting bilateral L5 nerve roots might have been impinged at the lateral recess.      MR-CERVICAL SPINE-WITH & W/O   Final Result      1.  There is abnormal expansile intramedullary T2 signal intensity lesion in the right cerebellum. Thoracic spinal cord at the level of T2 on T3. Mild diffuse contrast enhancement is seen. This finding is suspicious for spinal cord neoplasm. The other    less likely differential diagnosis includes transverse myelitis. Follow-up is recommended after 4 weeks.   2.  Mild degenerative disease in the cervical spine as described above.   3.  There is no evidence of infection in the cervical spine.      US-RUQ   Final Result         1.  Acute cholecystitis.   2.  Atrophic right kidney.      DX-CHEST-LIMITED (1 VIEW)   Final Result      Perihilar interstitial edema and basilar atelectasis and/or consolidation. Underlying infection is  possible.      MR-LUMBAR SPINE-W/O   Final Result      1.  Lower lumbar spine predominant degenerative changes as described in detail above, progressed from 2010 MRI.   2.  Edema at the L4-L5 disc space and L5 superior endplate likely represents degenerative changes. Less likely this could represent a low-grade or early discitis osteomyelitis. Correlate for evidence of infection.   3.  Acute appearing Schmorl's node at L3-L4, which can be a source of pain.      US-RENAL   Final Result      1.  Atrophic kidneys. Echogenic bilateral renal parenchyma could relate to medical renal disease.      2.  No hydronephrosis. No renal calculus.      DX-CHEST-PORTABLE (1 VIEW)   Final Result      No acute cardiac or pulmonary abnormalities are identified.      CT-ABDOMEN-PELVIS W/O   Final Result      1.  Findings suspicious for focal colitis at the hepatic flexure, less likely cholecystitis or duodenitis with secondary involvement of the colon. Infectious, inflammatory and ischemic etiologies are considerations. Underlying mass is not excluded.   2.  Cholelithiasis with distention of the gallbladder   3.  BILATERAL renal atrophy   4.  Subcentimeter LEFT adrenal myelolipoma   5.  12 mm RIGHT adrenal nodule likely an adenoma absent a history of cancer   6.  Subcentimeter RIGHT hepatic lesion, likely a cyst absent a history of cancer   7.  Atherosclerosis   8.  Colonic diverticulosis      CT-HEAD W/O   Final Result      1.  Cerebral atrophy.      2.  White matter lucencies most consistent with small vessel ischemic change versus demyelination or gliosis.      3.  Otherwise, Head CT without contrast with no acute findings. No evidence of acute cerebral infarction, hemorrhage or mass lesion.                Assessment/Plan  * Colitis- (present on admission)  Assessment & Plan  IV Zosyn  stool culture pending    Cholelithiasis with acute cholecystitis- (present on admission)  Assessment & Plan  IV Zosyn  Plan for surgery  today    Weakness of both lower extremities- (present on admission)  Assessment & Plan  Transverse Myelitis?  Follow LP results  Unable to start on steroids due to current infectious process    Sepsis (HCC)- (present on admission)  Assessment & Plan  This is Sepsis Present on admission  SIRS criteria identified on my evaluation include: Leukocytosis, with WBC greater than 12,000  Source is colitis  Sepsis protocol initiated  Fluid resuscitation ordered per protocol  Crystalloid Fluid Administration: Fluid resuscitation ordered per standard protocol - 30 mL/kg per current or ideal body weight  IV antibiotics as appropriate for source of sepsis  Reassessment: I have reassessed the patient's hemodynamic status          Acute kidney injury superimposed on CKD (HCC)- (present on admission)  Assessment & Plan  Careful IVF hydration  NaHCO3  Follow cmp  Hold Aldactone      Hypercalcemia- (present on admission)  Assessment & Plan  IVF hydration  Follow cmp    Hypomagnesemia- (present on admission)  Assessment & Plan  IV Mg 2 g  Follow level    Hypokalemia- (present on admission)  Assessment & Plan  Follow cmp    Thrombocytopenia (HCC)- (present on admission)  Assessment & Plan  Follow cbc    High anion gap metabolic acidosis- (present on admission)  Assessment & Plan  IVF hydration    Hyponatremia- (present on admission)  Assessment & Plan  Follow cmp    Diabetic peripheral neuropathy (HCC)- (present on admission)  Assessment & Plan  Cymbalta    Essential hypertension- (present on admission)  Assessment & Plan  Coreg, Norvasc  Hold Aldactone   IV labetalol and hydralazine as needed with parameters    Type 2 diabetes mellitus with kidney complication, without long-term current use of insulin (HCC)- (present on admission)  Assessment & Plan  SSI    Chronic kidney disease (CKD), stage IV (severe) ([facility])- (present on admission)  Assessment & Plan  Follow cmp    Hypercholesterolemia- (present on admission)  Assessment &  Plan  Lipitor         VTE prophylaxis: SCDs/TEDs    I have performed a physical exam and reviewed and updated ROS and Plan today (4/28/2023). In review of yesterday's note (4/27/2023), there are no changes except as documented above.

## 2023-04-28 NOTE — DISCHARGE PLANNING
[facility] Transitional [facility] Coordination    Rehab following for potential admission.  Chart reflects OR today for cholecystectomy.  Will monitor for PT/OT as clinically appropriate s/p surgery.

## 2023-04-28 NOTE — ANESTHESIA PREPROCEDURE EVALUATION
Case: [id number] Date/Time: 04/28/23 1321    Procedure: CHOLECYSTECTOMY, LAPAROSCOPIC    Location: [facility] OR 12 / SURGERY [facility]    Surgeons: Suraj Galvan M.D.          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 (HCC)   (positive) Essential hypertension   (positive) Hypertensive emergency         (positive) Acute kidney injury superimposed on CKD (HCC)   (positive) Chronic kidney disease (CKD), stage IV (severe) (HCC)      ENDO   (positive) Type 2 diabetes mellitus with kidney complication, without long-term current use of insulin (HCC)      Other   (positive) Cholelithiasis with acute cholecystitis   (positive) Colitis   (positive) Diabetic peripheral neuropathy (HCC)   (positive) LVH (left ventricular hypertrophy)   (positive) Sepsis (HCC)   (positive) Weakness of both lower extremities       Physical Exam    Airway   Mallampati: II  TM distance: >3 FB  Neck ROM: full       Cardiovascular - normal exam  Rhythm: regular  Rate: normal  (-) murmur     Dental - normal exam        Facial Hair   Pulmonary - normal exam  Breath sounds clear to auscultation     Abdominal    Neurological - normal exam                 Anesthesia Plan    ASA 3 (see prob list)   ASA physical status 3 criteria: other (comment)    Plan - general       Airway plan will be ETT          Induction: intravenous    Postoperative Plan: Postoperative administration of opioids is intended.    Pertinent diagnostic labs and testing reviewed    Informed Consent:    Anesthetic plan and risks discussed with patient.    Use of blood products discussed with: patient whom consented to blood products.

## 2023-04-28 NOTE — ASSESSMENT & PLAN NOTE
4/27 HIDA scan consistent with acute cholecystitis.  4/28 Laparoscopic cholecystectomy converted to open cholecystectomy.  4/30 Gallbladder fluid cultures (+) ESBL E Coli.  5/8 DC staples and drain.  Wound dehiscence at medial aspect of wound, fascia intact.  Packed with gauze.  Follow cultures.  Antibiotics per hospital team.

## 2023-04-28 NOTE — CARE PLAN
The patient is Stable - Low risk of patient condition declining or worsening    Shift Goals  Clinical Goals: surgery, remain npo  Patient Goals: comfort  Family Goals: updated on poc - gave leodan Jaffe an update today    Progress made toward(s) clinical / shift goals: Updated pt on POC - pt verbalized understanding. Surgery scheduled for 1330, pt remains npo.     Problem: Knowledge Deficit - Standard  Goal: Patient and family/care givers will demonstrate understanding of plan of care, disease process/condition, diagnostic tests and medications  Outcome: Progressing     Problem: Fall Risk  Goal: Patient will remain free from falls  Outcome: Progressing     Problem: Skin Integrity  Goal: Skin integrity is maintained or improved  Outcome: Progressing       Patient is not progressing towards the following goals:

## 2023-04-28 NOTE — PROGRESS NOTES
"  DATE: 4/28/2023      INTERVAL EVENTS:  No abdominal pain this morning.  HIDA scan 4/27 with acute cholecystitis.    REVIEW OF SYSTEMS:  Review of systems is remarkable for the following no abdominal pain, nausea, vomiting. The remainder of the comprehensive ROS is negative with the exception of the aforementioned HPI, PMH, and PSH bullets in accordance with CMS guideline.    PHYSICAL EXAMINATION:    Vital Signs: BP (!) 167/78   Pulse 80   Temp 36.6 °C (97.9 °F) (Temporal)   Resp 18   Ht 1.803 m (5' 11\")   Wt 88.3 kg (194 lb 10.7 oz)   SpO2 95%   Physical Exam  Constitutional:       Appearance: Normal appearance.   HENT:      Head: Normocephalic and atraumatic.      Right Ear: External ear normal.      Left Ear: External ear normal.      Nose: Nose normal.      Mouth/Throat:      Mouth: Mucous membranes are moist.   Eyes:      Extraocular Movements: Extraocular movements intact.   Cardiovascular:      Rate and Rhythm: Normal rate and regular rhythm.   Pulmonary:      Effort: Pulmonary effort is normal.   Abdominal:      General: Abdomen is flat. There is no distension.      Tenderness: There is no abdominal tenderness. There is no guarding or rebound.   Musculoskeletal:         General: Normal range of motion.   Skin:     General: Skin is warm and dry.      Capillary Refill: Capillary refill takes less than 2 seconds.   Neurological:      General: No focal deficit present.      Mental Status: He is alert and oriented to person, place, and time.   Psychiatric:         Mood and Affect: Mood normal.         Behavior: Behavior normal.       LABORATORY VALUES:   Recent Labs     04/26/23  0512 04/27/23  0553 04/28/23 0228   WBC 24.3* 28.1* 20.3*   RBC 4.70 4.26* 4.50*   HEMOGLOBIN 13.8* 12.6* 13.2*   HEMATOCRIT 41.1* 37.0* 39.7*   MCV 87.4 86.9 88.2   MCH 29.4 29.6 29.3   MCHC 33.6* 34.1 33.2*   RDW 47.8 47.8 49.3   PLATELETCT 207 214 227   MPV 11.9 12.0 11.7     Recent Labs     04/26/23  0512 04/27/23  0553 " 04/28/23  0228   SODIUM 145 141 138   POTASSIUM 3.2* 3.9 4.0   CHLORIDE 107 107 107   CO2 24 23 22   GLUCOSE 163* 180* 181*   BUN 53* 57* 49*   CREATININE 2.21* 2.41* 2.04*   CALCIUM 8.9 8.3* 8.0*     Recent Labs     04/25/23  1204 04/25/23  1411 04/26/23  0512 04/27/23  0553 04/28/23 0228   ASTSGOT 32  --  27 24 24   ALTSGPT 27  --  23 20 21   TBILIRUBIN 0.8  --  0.9 0.7 0.7   IBILIRUBIN 0.5  --   --   --   --    DBILIRUBIN 0.3  --   --   --   --    ALKPHOSPHAT 221*  --  191* 158* 158*   GLOBULIN  --   --  3.2 2.9 3.1   INR  --  1.48*  --   --   --      Recent Labs     04/25/23  1411   INR 1.48*        IMAGING:   NM-HEPATOBILIARY SCAN   Final Result      Hepatobiliary scan findings suspicious for acute cholecystitis.      MR-THORACIC SPINE-WITH & W/O   Final Result      1.  There is abnormal expansile T2 hyperintense lesion in the right side of the thoracic spinal cord at the levels of T2 and T3 Mild contrast enhancement is seen. This lesion is suspicious for spinal cord neoplasm. Other remote differential diagnosis    includes transverse myelitis.   2.  Exaggerated thoracic kyphosis.   3.  Thoracic dextroscoliosis.   4.  Minimal degenerative changes.   5.  Right pleural effusion.      MR-LUMBAR SPINE-WITH & W/O   Final Result      1.  Multifocal degenerative disease in the lumbar spine as described above.   2.  Moderate central canal and severe lateral recess stenosis at the level of L4-5. The exiting bilateral L5 nerve roots might have been impinged at the lateral recess.      MR-CERVICAL SPINE-WITH & W/O   Final Result      1.  There is abnormal expansile intramedullary T2 signal intensity lesion in the right cerebellum. Thoracic spinal cord at the level of T2 on T3. Mild diffuse contrast enhancement is seen. This finding is suspicious for spinal cord neoplasm. The other    less likely differential diagnosis includes transverse myelitis. Follow-up is recommended after 4 weeks.   2.  Mild degenerative disease in  the cervical spine as described above.   3.  There is no evidence of infection in the cervical spine.      US-RUQ   Final Result         1.  Acute cholecystitis.   2.  Atrophic right kidney.      DX-CHEST-LIMITED (1 VIEW)   Final Result      Perihilar interstitial edema and basilar atelectasis and/or consolidation. Underlying infection is possible.      MR-LUMBAR SPINE-W/O   Final Result      1.  Lower lumbar spine predominant degenerative changes as described in detail above, progressed from 2010 MRI.   2.  Edema at the L4-L5 disc space and L5 superior endplate likely represents degenerative changes. Less likely this could represent a low-grade or early discitis osteomyelitis. Correlate for evidence of infection.   3.  Acute appearing Schmorl's node at L3-L4, which can be a source of pain.      US-RENAL   Final Result      1.  Atrophic kidneys. Echogenic bilateral renal parenchyma could relate to medical renal disease.      2.  No hydronephrosis. No renal calculus.      DX-CHEST-PORTABLE (1 VIEW)   Final Result      No acute cardiac or pulmonary abnormalities are identified.      CT-ABDOMEN-PELVIS W/O   Final Result      1.  Findings suspicious for focal colitis at the hepatic flexure, less likely cholecystitis or duodenitis with secondary involvement of the colon. Infectious, inflammatory and ischemic etiologies are considerations. Underlying mass is not excluded.   2.  Cholelithiasis with distention of the gallbladder   3.  BILATERAL renal atrophy   4.  Subcentimeter LEFT adrenal myelolipoma   5.  12 mm RIGHT adrenal nodule likely an adenoma absent a history of cancer   6.  Subcentimeter RIGHT hepatic lesion, likely a cyst absent a history of cancer   7.  Atherosclerosis   8.  Colonic diverticulosis      CT-HEAD W/O   Final Result      1.  Cerebral atrophy.      2.  White matter lucencies most consistent with small vessel ischemic change versus demyelination or gliosis.      3.  Otherwise, Head CT without  contrast with no acute findings. No evidence of acute cerebral infarction, hemorrhage or mass lesion.             ASSESSMENT AND PLAN:   82 year old man with colitis vs cholecysitis    - To OR for laparoscopic cholecystectomy today  - [facility] following         ____________________________________     Kenzie Krueger P.A.-C.    DD: 4/26/2023  11:28 AM

## 2023-04-28 NOTE — DISCHARGE PLANNING
"[facility] chart review completed. Collaborated with NIKIA Clark. Current discharge considerations are IRF vs. SNF. Per chart review, HIDA scan indicates cholecystitis. Patient currently in OR for lap carmelo procedure.    PT/OT evals 4/25 completed with recs for post acute placement. Physiatry consult 4/24 noted \"Patient is a potential candidate for inpatient rehab based on needs for PT, OT.  Patient will also benefit from family training.  Patient has a good discharge situation which will be home with son.\"      TCN will continue to follow and collaborate with discharge planning team as additional post acute needs arise. Thank you.    Completed:  Physiatry consult completed with possible acceptance (see above)  PT recommends post acute placement on 4/25   OT recommends post-acute placement on 4/24  SLP eval 4/26 with recs for no further needs  Choice obtained: IRF and SNF choice obtained on 4/24/23.  HH choice on 4/24/23.  Infusion on 4/22/23.    [facility] referral sent 4/22/23  "

## 2023-04-28 NOTE — PROGRESS NOTES
Neurology Progress Note        Subjective:    Ag feels like he is getting stronger. No other complaints    Objective:  Vitals:  Vitals:    04/27/23 1446 04/27/23 1906 04/28/23 0352 04/28/23 0720   BP: (!) 141/87 133/71 (!) 178/85 (!) 167/78   Pulse: 75 74 75 80   Resp: 18 18 17 18   Temp: 36.8 °C (98.2 °F) 36.6 °C (97.8 °F) 36.3 °C (97.4 °F) 36.6 °C (97.9 °F)   TempSrc: Temporal Temporal Temporal Temporal   SpO2: 92% 95% 93% 95%   Weight:       Height:         General: Awake, no apparent distress  Mental status: Alert, oriented x3, speech is fluent, comprehension is intact  Cranial Nerves: Pupils are equal round reactive light, extraocular movements are intact and no nystagmus, face is symmetric, facial sensations intact, no dysarthria  Motor: 5 /5 strength in the bilateral deltoids, 5/5 in the bilateral triceps, 5/5 in the biceps, 4/5 R 4-/5  L  strength, 4-/5 strength in the right hip flexor, 4/5 strength in left hip flexor, 4/5 strength in the right knee extensor, 5/5 in knee flexion, 4+/5 with knee extension on the left, 5/5 with ankle dorsiflexion and plantarflexion  Sensory: Light touch intact throughout, proprioception intact at the great toes bilaterally  Reflexes: 2+ and symmetric at the triceps, biceps, brachioradialis. Absent reflexes at the patellar and Achilles bilaterally.  Babinski sign present definitely on the right, equivocal on the left.  Coordination: Normal finger-to-nose bilaterally  Gait:  Deferred    Recent Labs     04/26/23  0512 04/27/23  0553 04/28/23  0228   WBC 24.3* 28.1* 20.3*   RBC 4.70 4.26* 4.50*   HEMOGLOBIN 13.8* 12.6* 13.2*   HEMATOCRIT 41.1* 37.0* 39.7*   MCV 87.4 86.9 88.2   MCH 29.4 29.6 29.3   MCHC 33.6* 34.1 33.2*   RDW 47.8 47.8 49.3   PLATELETCT 207 214 227   MPV 11.9 12.0 11.7       Recent Labs     04/26/23  0512 04/27/23  0553 04/28/23  0228   SODIUM 145 141 138   POTASSIUM 3.2* 3.9 4.0   CHLORIDE 107 107 107   CO2 24 23 22   GLUCOSE 163* 180* 181*   BUN 53* 57*  49*   CREATININE 2.21* 2.41* 2.04*   CALCIUM 8.9 8.3* 8.0*       Recent Labs     04/25/23  1411   INR 1.48*                     Recent Labs     04/26/23  0512 04/27/23  0553 04/28/23  0228   SODIUM 145 141 138   POTASSIUM 3.2* 3.9 4.0   CHLORIDE 107 107 107   CO2 24 23 22   GLUCOSE 163* 180* 181*   BUN 53* 57* 49*       Recent Labs     04/26/23  0512 04/27/23  0553 04/28/23  0228   SODIUM 145 141 138   POTASSIUM 3.2* 3.9 4.0   CHLORIDE 107 107 107   CO2 24 23 22   BUN 53* 57* 49*   CREATININE 2.21* 2.41* 2.04*   MAGNESIUM 2.0 1.8 1.9   PHOSPHORUS 3.3 2.4* 2.4*   CALCIUM 8.9 8.3* 8.0*       Recent Labs     04/25/23  1411   INR 1.48*       No results found for this or any previous visit.           Imaging: neuroimaging reviewed and directly visualized by me  NM-HEPATOBILIARY SCAN   Final Result      Hepatobiliary scan findings suspicious for acute cholecystitis.      MR-THORACIC SPINE-WITH & W/O   Final Result      1.  There is abnormal expansile T2 hyperintense lesion in the right side of the thoracic spinal cord at the levels of T2 and T3 Mild contrast enhancement is seen. This lesion is suspicious for spinal cord neoplasm. Other remote differential diagnosis    includes transverse myelitis.   2.  Exaggerated thoracic kyphosis.   3.  Thoracic dextroscoliosis.   4.  Minimal degenerative changes.   5.  Right pleural effusion.      MR-LUMBAR SPINE-WITH & W/O   Final Result      1.  Multifocal degenerative disease in the lumbar spine as described above.   2.  Moderate central canal and severe lateral recess stenosis at the level of L4-5. The exiting bilateral L5 nerve roots might have been impinged at the lateral recess.      MR-CERVICAL SPINE-WITH & W/O   Final Result      1.  There is abnormal expansile intramedullary T2 signal intensity lesion in the right cerebellum. Thoracic spinal cord at the level of T2 on T3. Mild diffuse contrast enhancement is seen. This finding is suspicious for spinal cord neoplasm. The  other    less likely differential diagnosis includes transverse myelitis. Follow-up is recommended after 4 weeks.   2.  Mild degenerative disease in the cervical spine as described above.   3.  There is no evidence of infection in the cervical spine.      US-RUQ   Final Result         1.  Acute cholecystitis.   2.  Atrophic right kidney.      DX-CHEST-LIMITED (1 VIEW)   Final Result      Perihilar interstitial edema and basilar atelectasis and/or consolidation. Underlying infection is possible.      MR-LUMBAR SPINE-W/O   Final Result      1.  Lower lumbar spine predominant degenerative changes as described in detail above, progressed from 2010 MRI.   2.  Edema at the L4-L5 disc space and L5 superior endplate likely represents degenerative changes. Less likely this could represent a low-grade or early discitis osteomyelitis. Correlate for evidence of infection.   3.  Acute appearing Schmorl's node at L3-L4, which can be a source of pain.      US-RENAL   Final Result      1.  Atrophic kidneys. Echogenic bilateral renal parenchyma could relate to medical renal disease.      2.  No hydronephrosis. No renal calculus.      DX-CHEST-PORTABLE (1 VIEW)   Final Result      No acute cardiac or pulmonary abnormalities are identified.      CT-ABDOMEN-PELVIS W/O   Final Result      1.  Findings suspicious for focal colitis at the hepatic flexure, less likely cholecystitis or duodenitis with secondary involvement of the colon. Infectious, inflammatory and ischemic etiologies are considerations. Underlying mass is not excluded.   2.  Cholelithiasis with distention of the gallbladder   3.  BILATERAL renal atrophy   4.  Subcentimeter LEFT adrenal myelolipoma   5.  12 mm RIGHT adrenal nodule likely an adenoma absent a history of cancer   6.  Subcentimeter RIGHT hepatic lesion, likely a cyst absent a history of cancer   7.  Atherosclerosis   8.  Colonic diverticulosis      CT-HEAD W/O   Final Result      1.  Cerebral atrophy.      2.   White matter lucencies most consistent with small vessel ischemic change versus demyelination or gliosis.      3.  Otherwise, Head CT without contrast with no acute findings. No evidence of acute cerebral infarction, hemorrhage or mass lesion.             Impression:  82-year-old man with 9-11 days of right greater than left leg weakness as well as perhaps some upper extremity weakness as well.  This developed concurrently with a GI illness concerning for food borne illness.  Certainly that history would raise the possibility of Guillain-Barré syndrome although the symptoms seem to have developed relatively rapidly in conjunction with the GI illness and he appears to improve without treatment with plasma exchange or IVIG. Furthermore, he has preserved reflexes in the arms.  Reflexes are absent at the Achilles and patellar which can be normal for his age especially in the setting of diabetic polyneuropathy.  In fact his upgoing toe on the right concerns me more for a myelopathic process.    MRI-Tspine remarkable for T2 contrast avid lesion (Neoplastic vs transverse myelitis) which does not explain upper extremity weakness.   CSF analysis is remarkable for elevated glucose which could be explained by elevated serum glucose in the setting of DM. Elevated protein with nl WBC favors a demyelinating process.  Overall pt is clinically improving without IVIG, Steroids or plasma exchange, which is reassuring.   We are hesitant to offer any immuno-modulating therapy in the setting of upcoming cholecystectomy, which may delay healing. Considering his current clinical trajectory, IVIG, steroids or plasma exchange (with ongoing infective process) may be too risky.      Ddx include a chord neoplasm vs inflammatory process (chief among them Transverse myelitis).     Recommendations:  -  LP to evaluate for albuminocytological dissociation which would make GBS a more likely diagnosis.  Although since he is already improving and with  his poor renal function treating at least with IVIG may be risky.  -CSF cell count and differential, cytology, protein, glucose, VDRL, oligoclonal bands, IgG index, gram stain and cultures  -CSF autoimmune/paraneoplastic panel  -PT, OT evaluate and treat.  -Follow up with Neurology in one month in the community   -[facility]/resolution of T2-T3 lesion 5/26/2023  -A short course of glucocorticoid therapy may be considered if recovery remains slow/stagnant

## 2023-04-28 NOTE — THERAPY
Physical Therapy Contact Note    Patient Name: Tyrone Cline  Age:  82 y.o., Sex:  male  Medical Record #: [id number]  Today's Date: 4/28/2023    Pt off floor for procedure, will re-attempt as able.

## 2023-04-29 ENCOUNTER — APPOINTMENT (OUTPATIENT)
Dept: RADIOLOGY | Facility: MEDICAL CENTER | Age: 83
DRG: 853 | End: 2023-04-29
Attending: SURGERY
Payer: MEDICARE

## 2023-04-29 LAB
ALBUMIN SERPL BCP-MCNC: 2 G/DL (ref 3.2–4.9)
ALBUMIN/GLOB SERPL: 0.7 G/DL
ALP SERPL-CCNC: 138 U/L (ref 30–99)
ALT SERPL-CCNC: 26 U/L (ref 2–50)
ANION GAP SERPL CALC-SCNC: 11 MMOL/L (ref 7–16)
AST SERPL-CCNC: 41 U/L (ref 12–45)
BILIRUB SERPL-MCNC: 1 MG/DL (ref 0.1–1.5)
BUN SERPL-MCNC: 47 MG/DL (ref 8–22)
CALCIUM ALBUM COR SERPL-MCNC: 9.1 MG/DL (ref 8.5–10.5)
CALCIUM SERPL-MCNC: 7.5 MG/DL (ref 8.5–10.5)
CHLORIDE SERPL-SCNC: 111 MMOL/L (ref 96–112)
CO2 SERPL-SCNC: 21 MMOL/L (ref 20–33)
CREAT SERPL-MCNC: 2.08 MG/DL (ref 0.5–1.4)
EKG IMPRESSION: NORMAL
ERYTHROCYTE [DISTWIDTH] IN BLOOD BY AUTOMATED COUNT: 51.8 FL (ref 35.9–50)
GFR SERPLBLD CREATININE-BSD FMLA CKD-EPI: 31 ML/MIN/1.73 M 2
GLOBULIN SER CALC-MCNC: 2.7 G/DL (ref 1.9–3.5)
GLUCOSE BLD STRIP.AUTO-MCNC: 181 MG/DL (ref 65–99)
GLUCOSE BLD STRIP.AUTO-MCNC: 182 MG/DL (ref 65–99)
GLUCOSE BLD STRIP.AUTO-MCNC: 243 MG/DL (ref 65–99)
GLUCOSE BLD STRIP.AUTO-MCNC: 279 MG/DL (ref 65–99)
GLUCOSE SERPL-MCNC: 177 MG/DL (ref 65–99)
GRAM STN SPEC: NORMAL
HCT VFR BLD AUTO: 35.6 % (ref 42–52)
HGB BLD-MCNC: 11.5 G/DL (ref 14–18)
MAGNESIUM SERPL-MCNC: 2 MG/DL (ref 1.5–2.5)
MCH RBC QN AUTO: 29.4 PG (ref 27–33)
MCHC RBC AUTO-ENTMCNC: 32.3 G/DL (ref 33.7–35.3)
MCV RBC AUTO: 91 FL (ref 81.4–97.8)
NT-PROBNP SERPL IA-MCNC: 1855 PG/ML (ref 0–125)
PHOSPHATE SERPL-MCNC: 5 MG/DL (ref 2.5–4.5)
PLATELET # BLD AUTO: 345 K/UL (ref 164–446)
PMV BLD AUTO: 11.5 FL (ref 9–12.9)
POTASSIUM SERPL-SCNC: 4.7 MMOL/L (ref 3.6–5.5)
PROT SERPL-MCNC: 4.7 G/DL (ref 6–8.2)
RBC # BLD AUTO: 3.91 M/UL (ref 4.7–6.1)
SIGNIFICANT IND 70042: NORMAL
SITE SITE: NORMAL
SODIUM SERPL-SCNC: 143 MMOL/L (ref 135–145)
SOURCE SOURCE: NORMAL
WBC # BLD AUTO: 22.8 K/UL (ref 4.8–10.8)

## 2023-04-29 PROCEDURE — 99232 SBSQ HOSP IP/OBS MODERATE 35: CPT | Performed by: SURGERY

## 2023-04-29 PROCEDURE — 700102 HCHG RX REV CODE 250 W/ 637 OVERRIDE(OP): Performed by: HOSPITALIST

## 2023-04-29 PROCEDURE — 83735 ASSAY OF MAGNESIUM: CPT

## 2023-04-29 PROCEDURE — 93005 ELECTROCARDIOGRAM TRACING: CPT | Performed by: HOSPITALIST

## 2023-04-29 PROCEDURE — A9270 NON-COVERED ITEM OR SERVICE: HCPCS | Performed by: HOSPITALIST

## 2023-04-29 PROCEDURE — 80053 COMPREHEN METABOLIC PANEL: CPT

## 2023-04-29 PROCEDURE — A9270 NON-COVERED ITEM OR SERVICE: HCPCS | Performed by: FAMILY MEDICINE

## 2023-04-29 PROCEDURE — 93010 ELECTROCARDIOGRAM REPORT: CPT | Performed by: INTERNAL MEDICINE

## 2023-04-29 PROCEDURE — A9270 NON-COVERED ITEM OR SERVICE: HCPCS | Performed by: SURGERY

## 2023-04-29 PROCEDURE — 71045 X-RAY EXAM CHEST 1 VIEW: CPT

## 2023-04-29 PROCEDURE — 82962 GLUCOSE BLOOD TEST: CPT

## 2023-04-29 PROCEDURE — 99233 SBSQ HOSP IP/OBS HIGH 50: CPT | Performed by: HOSPITALIST

## 2023-04-29 PROCEDURE — 99232 SBSQ HOSP IP/OBS MODERATE 35: CPT | Performed by: PHYSICAL MEDICINE & REHABILITATION

## 2023-04-29 PROCEDURE — 770001 HCHG ROOM/CARE - MED/SURG/GYN PRIV*

## 2023-04-29 PROCEDURE — 700105 HCHG RX REV CODE 258: Performed by: SURGERY

## 2023-04-29 PROCEDURE — 84100 ASSAY OF PHOSPHORUS: CPT

## 2023-04-29 PROCEDURE — 700111 HCHG RX REV CODE 636 W/ 250 OVERRIDE (IP): Performed by: SURGERY

## 2023-04-29 PROCEDURE — 700102 HCHG RX REV CODE 250 W/ 637 OVERRIDE(OP): Performed by: SURGERY

## 2023-04-29 PROCEDURE — 700102 HCHG RX REV CODE 250 W/ 637 OVERRIDE(OP): Performed by: FAMILY MEDICINE

## 2023-04-29 PROCEDURE — 83880 ASSAY OF NATRIURETIC PEPTIDE: CPT

## 2023-04-29 PROCEDURE — 85027 COMPLETE CBC AUTOMATED: CPT

## 2023-04-29 RX ORDER — OXYCODONE HYDROCHLORIDE 5 MG/1
5 TABLET ORAL
Status: DISCONTINUED | OUTPATIENT
Start: 2023-04-29 | End: 2023-05-31 | Stop reason: HOSPADM

## 2023-04-29 RX ORDER — OXYCODONE HYDROCHLORIDE 10 MG/1
10 TABLET ORAL
Status: DISCONTINUED | OUTPATIENT
Start: 2023-04-29 | End: 2023-05-31 | Stop reason: HOSPADM

## 2023-04-29 RX ORDER — MORPHINE SULFATE 4 MG/ML
2-4 INJECTION INTRAVENOUS
Status: DISCONTINUED | OUTPATIENT
Start: 2023-04-29 | End: 2023-05-31 | Stop reason: HOSPADM

## 2023-04-29 RX ORDER — FAMOTIDINE 20 MG/1
20 TABLET, FILM COATED ORAL DAILY
Status: DISCONTINUED | OUTPATIENT
Start: 2023-04-30 | End: 2023-05-24

## 2023-04-29 RX ADMIN — CARVEDILOL 12.5 MG: 12.5 TABLET, FILM COATED ORAL at 17:39

## 2023-04-29 RX ADMIN — ALLOPURINOL 100 MG: 100 TABLET ORAL at 05:12

## 2023-04-29 RX ADMIN — INSULIN HUMAN 2 UNITS: 100 INJECTION, SOLUTION PARENTERAL at 08:07

## 2023-04-29 RX ADMIN — ATORVASTATIN CALCIUM 10 MG: 10 TABLET, FILM COATED ORAL at 17:39

## 2023-04-29 RX ADMIN — OXYCODONE HYDROCHLORIDE 10 MG: 10 TABLET ORAL at 08:13

## 2023-04-29 RX ADMIN — Medication 1 APPLICATOR: at 05:13

## 2023-04-29 RX ADMIN — AMLODIPINE BESYLATE 5 MG: 10 TABLET ORAL at 05:12

## 2023-04-29 RX ADMIN — OXYCODONE HYDROCHLORIDE 10 MG: 10 TABLET ORAL at 17:40

## 2023-04-29 RX ADMIN — SODIUM BICARBONATE 1300 MG: 650 TABLET ORAL at 05:12

## 2023-04-29 RX ADMIN — PIPERACILLIN AND TAZOBACTAM 3.38 G: 3; .375 INJECTION, POWDER, LYOPHILIZED, FOR SOLUTION INTRAVENOUS; PARENTERAL at 11:15

## 2023-04-29 RX ADMIN — ACETAMINOPHEN 650 MG: 325 TABLET, FILM COATED ORAL at 10:59

## 2023-04-29 RX ADMIN — DULOXETINE HYDROCHLORIDE 30 MG: 30 CAPSULE, DELAYED RELEASE ORAL at 17:39

## 2023-04-29 RX ADMIN — INSULIN HUMAN 3 UNITS: 100 INJECTION, SOLUTION PARENTERAL at 20:36

## 2023-04-29 RX ADMIN — SODIUM BICARBONATE 1300 MG: 650 TABLET ORAL at 17:40

## 2023-04-29 RX ADMIN — PIPERACILLIN AND TAZOBACTAM 3.38 G: 3; .375 INJECTION, POWDER, LYOPHILIZED, FOR SOLUTION INTRAVENOUS; PARENTERAL at 04:17

## 2023-04-29 RX ADMIN — INSULIN HUMAN 5 UNITS: 100 INJECTION, SOLUTION PARENTERAL at 17:50

## 2023-04-29 RX ADMIN — FAMOTIDINE 20 MG: 20 TABLET, FILM COATED ORAL at 05:12

## 2023-04-29 RX ADMIN — OXYCODONE HYDROCHLORIDE 10 MG: 10 TABLET ORAL at 05:11

## 2023-04-29 ASSESSMENT — ENCOUNTER SYMPTOMS
ABDOMINAL PAIN: 1
DIARRHEA: 0
HEARTBURN: 0
BACK PAIN: 0
NAUSEA: 0
HEADACHES: 0
MYALGIAS: 0
VOMITING: 0
WHEEZING: 0
CHILLS: 0
COUGH: 0
SHORTNESS OF BREATH: 1
DIZZINESS: 0
BLURRED VISION: 0
NECK PAIN: 0
SPEECH CHANGE: 0
SENSORY CHANGE: 0
FOCAL WEAKNESS: 1
FEVER: 0
NERVOUS/ANXIOUS: 0
DIAPHORESIS: 0
FLANK PAIN: 0
PALPITATIONS: 0
WEAKNESS: 1
SORE THROAT: 0

## 2023-04-29 ASSESSMENT — PAIN DESCRIPTION - PAIN TYPE
TYPE: ACUTE PAIN
TYPE: ACUTE PAIN;SURGICAL PAIN
TYPE: ACUTE PAIN

## 2023-04-29 ASSESSMENT — PAIN SCALES - WONG BAKER
WONGBAKER_NUMERICALRESPONSE: DOESN'T HURT AT ALL
WONGBAKER_NUMERICALRESPONSE: HURTS JUST A LITTLE BIT

## 2023-04-29 ASSESSMENT — COPD QUESTIONNAIRES
DURING THE PAST 4 WEEKS HOW MUCH DID YOU FEEL SHORT OF BREATH: SOME OF THE TIME
DO YOU EVER COUGH UP ANY MUCUS OR PHLEGM?: NO/ONLY WITH OCCASIONAL COLDS OR INFECTIONS
COPD SCREENING SCORE: 3
HAVE YOU SMOKED AT LEAST 100 CIGARETTES IN YOUR ENTIRE LIFE: NO/DON'T KNOW

## 2023-04-29 NOTE — PROGRESS NOTES
Pt arrived via bed intubated and monitored accompanied by anesthesia.  On Levo gtt.    For Vital Signs please see flowsheet    Temp: 97.6f  Weight: 89.1kg    4 Eyes Skin Assessment Completed by JUAN JOSÉ Montgomery and JUAN JOSÉ Sexton.    Head Blanching and Redness  Ears WDL  Nose WDL  Mouth WDL  Neck WDL  Breast/Chest WDL  Shoulder Blades WDL  Spine Redness and Blanching  (R) Arm/Elbow/Hand Redness, Blanching, Bruising, and Abrasion  (L) Arm/Elbow/Hand Redness, Blanching, and Bruising  Abdomen WDL  Groin WDL  Scrotum/Coccyx/Buttocks Redness and Blanching  (R) Leg Redness, Blanching, and Bruising  (L) Leg Redness, Blanching, and Bruising  (R) Heel/Foot/Toe Redness and Blanching  (L) Heel/Foot/Toe Redness and Blanching          Devices In Places ECG, Blood Pressure Cuff, Pulse Ox, Florez, Arterial Line, SCD's, ET Tube, and Central Line      Interventions In Place Sacral Mepilex, TAP System, Pillows, Q2 Turns, Low Air Loss Mattress, and Heels Loaded W/Pillows    Possible Skin Injury No    Pictures Uploaded Into Epic N/A  Wound Consult Placed N/A  RN Wound Prevention Protocol Ordered No

## 2023-04-29 NOTE — ANESTHESIA TIME REPORT
Anesthesia Start and Stop Event Times     Date Time Event    4/28/2023 1525 Ready for Procedure     1533 Anesthesia Start     1812 Anesthesia Stop        Responsible Staff  04/28/23    Name Role Begin End    Matias Adame M.D. Anesth 1533 1812        Overtime Reason:  overtime with call-back    Comments:

## 2023-04-29 NOTE — ANESTHESIA PROCEDURE NOTES
Arterial Line  Performed by: Matias Adame M.D.  Authorized by: Matias Adame M.D.     Start Time:  4/28/2023 4:28 PM  End Time:  4/28/2023 4:31 PM  Localization: surface landmarks    Patient Location:  OR  Indication: continuous blood pressure monitoring and blood sampling needed        Other::  Septic, unstable bp  Catheter Size:  20 G  Seldinger Technique?: Yes    Laterality:  Left  Site:  Radial artery  Line Secured:  Antimicrobial disc, tape and transparent dressing  Events: patient tolerated procedure well with no complications

## 2023-04-29 NOTE — ASSESSMENT & PLAN NOTE
Patient was in shock throughout operative case.  Returns to ICU on levophed.   Wean levophed as tolerated.  4/29 Levophed stopped.  Trend SBP.

## 2023-04-29 NOTE — OP REPORT
DATE OF OPERATION:   4/28/2023     PREOPERATIVE DIAGNOSIS: acute cholecystitis.    POSTOPERATIVE DIAGNOSIS: gangrenous cholecystitis.  Sepsis  PROCEDURE PERFORMED: Attempted laparoscopic cholecystectomy with conversion to open cholecystectomy  Subtotal cholecystectomy  SURGEON:    Suraj Galvan M.D.    ASSISTANT:    Kenzie Krueger PA-C.     ANESTHESIOLOGIST:  Matias Adame M.D.    ANESTHESIA:   General endotracheal anesthesia.      INDICATIONS: The patient is a 82 year-old elderly man with clinical and radiographic findings of acute cholecystitis. He is taken to the operating room for planned laparoscopic cholecystectomy.     The nature of the surgical procedure warranted additional skilled operative assistance from a licensed Physician Assistant (DARWIN). The assistant was present during the entire operation. The surgical assistant performed the following: provided assistance with optimal surgical exposure of the operative field.     FINDINGS: Tenderness gallbladder puss    WOUND CLASSIFICATION:  Class IV, Dirty or Infected.    SPECIMEN:    Gallbladder.    ESTIMATED BLOOD LOSS:  150 mL.    PROCEDURE: Following informed consent consent, the patient was properly identified, taken to the operating room and placed in supine position where general endotracheal anesthesia was administered. Intravenous antibiotics were administered by the anesthesiologist in correct time interval. The patient voided prior to surgery. A urinary catheter was not placed. Sequential compression devices were employed. The abdomen was prepped and draped into a sterile field. A timeout was conducted with the full attention and participation of all operating room personnel.    Marcaine 0.5% was used to infiltrate the port sites. A 5 mm infraumbilical midline incision was made and the subcutaneous tissues spread bluntly. The fascia was elevated.  Ralph port was placed.  Carbon dioxide pneumoperitoneum was instilled.  A 5 mm 30 degree  lens and camera was passed into the peritoneal cavity. An 11 mm port was placed in the epigastric midline under direct vision. Two 5 mm right subcostal ports were placed under direct vision.   Pus and severe inflammatory response were present.  Patient developed hypotension requiring vasopressor support  Central line and arterial line were placed by anesthesia  Severe inflammatory response and patient condition precluded safe laparoscopic approach.  The case was converted open right subcostal incision was made the muscle and fascial layers were divided with electrocautery peritoneum was opened to the full extent of the incision.  The gallbladder was freed from the fossa using a top-down technique.  Subtotal cholecystectomy was performed   A drain was left in the gallbladder fossa  The gallbladder fossa was irrigated. All excess irrigant was evacuated from the abdominal cavity. Hemostasis was satisfactory.    The abdominal contents were returned to the normal anatomic position with interposition of Seprafilm. The fascia was approximated with with a pair of running #1 PDS® II sutures. The skin was approximated with interrupted staples.  2 layers  The patient tolerated the procedure well, and there were no apparent complications. All sponge, needle, and instrument counts were correct on 2 separate occasions. The patient was mechanically ventilated and transferred to to the surgical intensive care unit (SICU) in critical condition.  Primary team was updated  Unsuccessful attempt was made to contact family his son by telephone     ____________________________________     Suraj Galvan M.D.    DD: 4/28/2023  5:21 PM

## 2023-04-29 NOTE — PROGRESS NOTES
Trauma / Surgical Daily Progress Note    Date of Service  4/28/2023    Chief Complaint  82 y.o. male admitted 4/21/2023 with shock following open cholecystectomy    Interval Events  Returned from operating room intubated. Requiring very low dose levophed (0.02 mcg/kg) for pressure support.  Difficulty placing rivera in operating room, coude catheter to be placed. Awake and alert, states he is in moderate pain.     Review of Systems  Review of Systems   Unable to perform ROS: Intubated      Vital Signs for last 24 hours  Temp:  [36.3 °C (97.3 °F)-36.6 °C (97.9 °F)] 36.6 °C (97.8 °F)  Pulse:  [67-80] 76  Resp:  [14-20] 20  BP: (105-178)/(65-92) 105/65  SpO2:  [93 %-98 %] 97 %    Hemodynamic parameters for last 24 hours       Respiratory Data     Respiration: 20, Pulse Oximetry: 97 %     Work Of Breathing / Effort: Vented  RUL Breath Sounds: Clear, RML Breath Sounds: Clear, RLL Breath Sounds: Diminished, ANATOLY Breath Sounds: Clear, LLL Breath Sounds: Diminished    Physical Exam  Physical Exam  Vitals and nursing note reviewed. Exam conducted with a chaperone present.   Constitutional:       Appearance: Normal appearance.   HENT:      Head: Normocephalic and atraumatic.      Right Ear: External ear normal.      Left Ear: External ear normal.      Nose: Nose normal.      Mouth/Throat:      Mouth: Mucous membranes are moist.      Comments: Endotracheal tube in place  Eyes:      Pupils: Pupils are equal, round, and reactive to light.   Cardiovascular:      Rate and Rhythm: Normal rate and regular rhythm.      Pulses: Normal pulses.   Pulmonary:      Effort: Pulmonary effort is normal.      Comments: On ventilator  Abdominal:      Palpations: Abdomen is soft.      Tenderness: There is abdominal tenderness.      Comments: Clean dry dressing   Genitourinary:     Comments: No rivera catheter  Musculoskeletal:         General: Normal range of motion.      Cervical back: Normal range of motion.   Skin:     General: Skin is warm  and dry.      Capillary Refill: Capillary refill takes less than 2 seconds.   Neurological:      General: No focal deficit present.      Mental Status: He is alert.       Laboratory  Recent Results (from the past 24 hour(s))   CBC WITHOUT DIFFERENTIAL    Collection Time: 04/28/23  2:28 AM   Result Value Ref Range    WBC 20.3 (H) 4.8 - 10.8 K/uL    RBC 4.50 (L) 4.70 - 6.10 M/uL    Hemoglobin 13.2 (L) 14.0 - 18.0 g/dL    Hematocrit 39.7 (L) 42.0 - 52.0 %    MCV 88.2 81.4 - 97.8 fL    MCH 29.3 27.0 - 33.0 pg    MCHC 33.2 (L) 33.7 - 35.3 g/dL    RDW 49.3 35.9 - 50.0 fL    Platelet Count 227 164 - 446 K/uL    MPV 11.7 9.0 - 12.9 fL   Comp Metabolic Panel    Collection Time: 04/28/23  2:28 AM   Result Value Ref Range    Sodium 138 135 - 145 mmol/L    Potassium 4.0 3.6 - 5.5 mmol/L    Chloride 107 96 - 112 mmol/L    Co2 22 20 - 33 mmol/L    Anion Gap 9.0 7.0 - 16.0    Glucose 181 (H) 65 - 99 mg/dL    Bun 49 (H) 8 - 22 mg/dL    Creatinine 2.04 (H) 0.50 - 1.40 mg/dL    Calcium 8.0 (L) 8.5 - 10.5 mg/dL    AST(SGOT) 24 12 - 45 U/L    ALT(SGPT) 21 2 - 50 U/L    Alkaline Phosphatase 158 (H) 30 - 99 U/L    Total Bilirubin 0.7 0.1 - 1.5 mg/dL    Albumin 2.0 (L) 3.2 - 4.9 g/dL    Total Protein 5.1 (L) 6.0 - 8.2 g/dL    Globulin 3.1 1.9 - 3.5 g/dL    A-G Ratio 0.6 g/dL   MAGNESIUM    Collection Time: 04/28/23  2:28 AM   Result Value Ref Range    Magnesium 1.9 1.5 - 2.5 mg/dL   PHOSPHORUS    Collection Time: 04/28/23  2:28 AM   Result Value Ref Range    Phosphorus 2.4 (L) 2.5 - 4.5 mg/dL   proBrain Natriuretic Peptide, NT    Collection Time: 04/28/23  2:28 AM   Result Value Ref Range    NT-proBNP 1588 (H) 0 - 125 pg/mL   CORRECTED CALCIUM    Collection Time: 04/28/23  2:28 AM   Result Value Ref Range    Correct Calcium 9.6 8.5 - 10.5 mg/dL   ESTIMATED GFR    Collection Time: 04/28/23  2:28 AM   Result Value Ref Range    GFR (CKD-EPI) 32 (A) >60 mL/min/1.73 m 2   POCT glucose device results    Collection Time: 04/28/23  9:35 AM    Result Value Ref Range    POC Glucose, Blood 158 (H) 65 - 99 mg/dL   POCT arterial blood gas device results    Collection Time: 04/28/23  6:57 PM   Result Value Ref Range    Ph 7.391 (L) 7.400 - 7.500    Pco2 38.6 (H) 26.0 - 37.0 mmHg    Po2 100 (H) 64 - 87 mmHg    Tco2 25 20 - 33 mmol/L    S02 98 93 - 99 %    Hco3 23.4 17.0 - 25.0 mmol/L    BE -1 -4 - 3 mmol/L    Body Temp 97.8 F degrees    O2 Therapy 60 %    iPF Ratio 167     Ph Temp Rodger 7.398 (L) 7.400 - 7.500    Pco2 Temp Co 37.8 (H) 26.0 - 37.0 mmHg    Po2 Temp Cor 97 (H) 64 - 87 mmHg    Specimen Arterial     DelSys Vent     Tidal Volume 440 mL    Peep End Expiratory Pressure 8 cmh20    Set Rate 20     Mode APV-CMV        Fluids    Intake/Output Summary (Last 24 hours) at 4/28/2023 2121  Last data filed at 4/28/2023 2000  Gross per 24 hour   Intake 4836.3 ml   Output 1250 ml   Net 3586.3 ml       Core Measures & Quality Metrics  Radiology images reviewed, EKG reviewed, Labs reviewed and Medications reviewed  Florez catheter: No Florez  Central line in place: Shock    DVT Prophylaxis: Contraindicated - High bleeding risk  DVT prophylaxis - mechanical: SCDs  Ulcer prophylaxis: Yes  Antibiotics: Treating active infection/contamination beyond 24 hours perioperative coverage    RAP Score Total: 0  ETOH Screening    Assessment/Plan  Cholelithiasis with acute cholecystitis- (present on admission)  Assessment & Plan  4/27 HIDA scan consistent with acute cholecystitis.  4/28 Plan for Laparoscopic cholecystectomy.    Postprocedural hypotension  Assessment & Plan  Patient was in shock throughout operative case.  Returns to ICU on levophed.   Wean levophed as tolerated.     On mechanically assisted ventilation (HCC)  Assessment & Plan  Remains intubated post operative.    Stabilize and wean to extubate.   Ventilator weaning protocol.     Acute kidney injury superimposed on CKD (HCC)- (present on admission)  Assessment & Plan  Fluid resuscitation and trend renal function.          Discussed patient condition with Family, RN, RT, and Patient.  CRITICAL CARE TIME EXCLUDING PROCEDURES: 40    minutes

## 2023-04-29 NOTE — PROGRESS NOTES
"  DATE: 4/29/2023    Postop day 1 laparoscopic converted to open subtotal cholecystectomy    INTERVAL EVENTS:  Tyrone Cline receiving care in ICU intraoperative hypotension requiring pressor support  He is awake alert and appropriate  He reports pain control is adequate    PHYSICAL EXAMINATION:  Vital Signs: /66   Pulse 87   Temp 37.2 °C (99 °F) (Temporal)   Resp 16   Ht 1.803 m (5' 10.98\")   Wt 89.1 kg (196 lb 6.9 oz)   SpO2 93%     Awake alert appropriate  Breathing with ease  Dressing in place  Drain in place with 90  LABORATORY VALUES:   Recent Labs     04/28/23 0228 04/28/23 2103 04/29/23 0421   WBC 20.3* 27.6* 22.8*   RBC 4.50* 4.35* 3.91*   HEMOGLOBIN 13.2* 12.9* 11.5*   HEMATOCRIT 39.7* 39.5* 35.6*   MCV 88.2 90.8 91.0   MCH 29.3 29.7 29.4   MCHC 33.2* 32.7* 32.3*   RDW 49.3 52.3* 51.8*   PLATELETCT 227 372 345   MPV 11.7 11.6 11.5     Recent Labs     04/28/23 0228 04/28/23 2103 04/29/23 0421   SODIUM 138 141 143   POTASSIUM 4.0 4.2 4.7   CHLORIDE 107 108 111   CO2 22 20 21   GLUCOSE 181* 175* 177*   BUN 49* 45* 47*   CREATININE 2.04* 2.10* 2.08*   CALCIUM 8.0* 7.9* 7.5*     Recent Labs     04/28/23 0228 04/28/23 2103 04/29/23  0421   ASTSGOT 24 41 41   ALTSGPT 21 28 26   TBILIRUBIN 0.7 1.0 1.0   ALKPHOSPHAT 158* 152* 138*   GLOBULIN 3.1 3.2 2.7            IMAGING:   DX-CHEST-PORTABLE (1 VIEW)   Final Result      1.  Mildly improved pulmonary opacities.   2.  Possible left PICC line with tip projecting at the axillary vein.   3.  Likely trace right pleural effusion.      DX-CHEST-LIMITED (1 VIEW)   Final Result      1.  Placement of endotracheal tube with tip projecting over the mid trachea      2.  Left arm catheter present which is presumably venous in projects in the expected position of the left axillary vein      3.  Enlarged cardiac silhouette      NM-HEPATOBILIARY SCAN   Final Result      Hepatobiliary scan findings suspicious for acute cholecystitis.      MR-THORACIC " SPINE-WITH & W/O   Final Result      1.  There is abnormal expansile T2 hyperintense lesion in the right side of the thoracic spinal cord at the levels of T2 and T3 Mild contrast enhancement is seen. This lesion is suspicious for spinal cord neoplasm. Other remote differential diagnosis    includes transverse myelitis.   2.  Exaggerated thoracic kyphosis.   3.  Thoracic dextroscoliosis.   4.  Minimal degenerative changes.   5.  Right pleural effusion.      MR-LUMBAR SPINE-WITH & W/O   Final Result      1.  Multifocal degenerative disease in the lumbar spine as described above.   2.  Moderate central canal and severe lateral recess stenosis at the level of L4-5. The exiting bilateral L5 nerve roots might have been impinged at the lateral recess.      MR-CERVICAL SPINE-WITH & W/O   Final Result      1.  There is abnormal expansile intramedullary T2 signal intensity lesion in the right cerebellum. Thoracic spinal cord at the level of T2 on T3. Mild diffuse contrast enhancement is seen. This finding is suspicious for spinal cord neoplasm. The other    less likely differential diagnosis includes transverse myelitis. Follow-up is recommended after 4 weeks.   2.  Mild degenerative disease in the cervical spine as described above.   3.  There is no evidence of infection in the cervical spine.      US-RUQ   Final Result         1.  Acute cholecystitis.   2.  Atrophic right kidney.      DX-CHEST-LIMITED (1 VIEW)   Final Result      Perihilar interstitial edema and basilar atelectasis and/or consolidation. Underlying infection is possible.      MR-LUMBAR SPINE-W/O   Final Result      1.  Lower lumbar spine predominant degenerative changes as described in detail above, progressed from 2010 MRI.   2.  Edema at the L4-L5 disc space and L5 superior endplate likely represents degenerative changes. Less likely this could represent a low-grade or early discitis osteomyelitis. Correlate for evidence of infection.   3.  Acute appearing  Schmorl's node at L3-L4, which can be a source of pain.      US-RENAL   Final Result      1.  Atrophic kidneys. Echogenic bilateral renal parenchyma could relate to medical renal disease.      2.  No hydronephrosis. No renal calculus.      DX-CHEST-PORTABLE (1 VIEW)   Final Result      No acute cardiac or pulmonary abnormalities are identified.      CT-ABDOMEN-PELVIS W/O   Final Result      1.  Findings suspicious for focal colitis at the hepatic flexure, less likely cholecystitis or duodenitis with secondary involvement of the colon. Infectious, inflammatory and ischemic etiologies are considerations. Underlying mass is not excluded.   2.  Cholelithiasis with distention of the gallbladder   3.  BILATERAL renal atrophy   4.  Subcentimeter LEFT adrenal myelolipoma   5.  12 mm RIGHT adrenal nodule likely an adenoma absent a history of cancer   6.  Subcentimeter RIGHT hepatic lesion, likely a cyst absent a history of cancer   7.  Atherosclerosis   8.  Colonic diverticulosis      CT-HEAD W/O   Final Result      1.  Cerebral atrophy.      2.  White matter lucencies most consistent with small vessel ischemic change versus demyelination or gliosis.      3.  Otherwise, Head CT without contrast with no acute findings. No evidence of acute cerebral infarction, hemorrhage or mass lesion.             ASSESSMENT AND PLAN:  Responding well to therapy  Discussed withTyrone Cline operation, operative findings, transferred to ICU  Recommend   Continue antibiotics  Close monitoring and support  Monitor drain output  Pressors as needed  Monitor abdominal exam follow-up labs    Appreciate ICU and consulting physician care.       ____________________________________     Suraj Galvan M.D.    DD: 4/29/2023  11:42 AM

## 2023-04-29 NOTE — PROGRESS NOTES
"    Physical Medicine and Rehabilitation Consultation      Follow up note         Date of initial consultation: 4/24/2023  Consulting provider: Smiley Cain M.D.  Reason for consultation: assess for acute inpatient rehab appropriateness  LOS: 8 Day(s)    Chief complaint: Weakness    HPI: Per Dr. Dockery's consult note: The patient is a 82 y.o. right hand dominant male with a past medical history of CKD stage IV, diabetes, hypertension;  who presented on 4/21/2023 12:47 PM with abdominal pain.  CT found colitis.  Labs remarkable for leukocytosis and SANJUANITA with creatinine 5.  Patient endorsed back pain.  MR L-spine found edema at L4-5 suspicious for early discitis/osteomyelitis.  Patient was admitted for SANJUANITA and sepsis.  Patient is currently on ceftriaxone daily through 4/27, Flagyl p.o. 3 times daily through 4/26.  Nephrology was consulted and found his SANJUANITA to be due to hypovolemia, which was treated with IV fluids.  Hospitalization is also been significant for recalcitrant hypertension.  4/24: The patient currently reports overall feeling well.  Patient endorses weakness, but denies other symptoms.  Patient reports he was able to sidestep at edge of bed and that was exhausting for him.  Patient wants to be able to return home, and is interested in IPR.  Patient reports his son will be able to help care for him on discharge.  Patient's son works full-time days, but may be able to take time off work.\"    4/27: Interval events : WBC continues to trend up. HIDA scan with concern for cholecystitis. LP done. Patient seen and examined at bedside, reports he feels much better. Reports he had been sliding out of bed before he came to the hospital, but is now able to stand EOB much easier. Does not report HA, lightheadedness, SOB, CP, abdominal pain, or changes in numbness/tingling/weakness. Has had regular Bms, last BM 4/27.     4/29: s/p open cholecystectomy on 4/28 by Dr. Galvan. Post op, WBC 22.8 and Hgb 11.5, BNP " elevated to 1855. Patient seen and examined at bedside, patient reports he notices a ringing in his ears, which was most noticeable after surgery. Otherwise, patient reports he feels ok, has some stomach discomfort, but no pain when laying still. Denies changes in numbness, tingling, or weakness of his legs. Post op therapy evals pending.     ROS  Pertinent positives are mentioned in the HPI, all others reviewed and are negative.    Social Hx:  1 SH  4 BETHANY  With: Alone unable to care for self.  Patient's son has stayed with him recently due to weakness    THERAPY:  Restrictions: Fall risk  PT: Functional mobility   4/24: Unable to participate in gait shuffle steps from edge of bed to chair, mod assist transfers, min assist  4/25: Min A bed mobility, Mod A sit to stand, Min A transfers , small steps EOB but did not participate in functional gait     OT: ADLs  4/24: Total assist lower body dressing and toileting    SLP:   4/26: Diabetic regular and thins     IMAGING:  MR L-spine 4/22/23  1.  Lower lumbar spine predominant degenerative changes as described in detail above, progressed from 2010 MRI.  2.  Edema at the L4-L5 disc space and L5 superior endplate likely represents degenerative changes. Less likely this could represent a low-grade or early discitis osteomyelitis. Correlate for evidence of infection.  3.  Acute appearing Schmorl's node at L3-L4, which can be a source of pain.    PROCEDURES:  4/28 Attempted laparoscopic cholecystectomy with conversion to open cholecystectomy by Dr. Galvan     PMH:  Past Medical History:   Diagnosis Date    Abdominal pain     Abnormal electrocardiogram     ACE inhibitor intolerance     BPH (benign prostatic hyperplasia)     Bruxism     Cancer (HCC)     basal and squamous cell to face    Cataract     robbi IOL    Chickenpox     Cholesterol blood decreased     Chronic kidney disease (CKD) 2/27/2021    Chronic kidney disease, stage III (moderate) (HCC)     Cold     Congestion of both  ears 2/27/2021    Cough     Depression     Dermatochalasis of eyelid 7/23/2014    Diabetes     oral meds    Difficulty breathing     Fever     GERD (gastroesophageal reflux disease)     GI bleed 12/8/2021    GOUT     Gout     History of skull fracture     History of urinary tract infection     Hyperlipidemia     Hypertension     Indigestion     Influenza     Insomnia     Mixed hyperlipidemia     Orthopnea     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    Pneumonia 9/2015    Proteinuria     Purulent nasal discharge 12/7/2015    Ringing in ears     Shortness of breath     Sputum production     Tonsillitis     Type 2 diabetes mellitus (HCC)     Wears glasses     Weight loss        PSH:  Past Surgical History:   Procedure Laterality Date    ROMAN BY LAPAROSCOPY N/A 4/28/2023    Procedure: CHOLECYSTECTOMY, LAPAROSCOPIC ATTEMPTED, OPEN CHOLECYSTECTOMY;  Surgeon: Suraj Galvan M.D.;  Location: [facility];  Service: General    LA COLONOSCOPY,DIAGNOSTIC N/A 12/12/2021    Procedure: COLONOSCOPY;  Surgeon: Dariel Simons M.D.;  Location: [facility];  Service: Gastroenterology    LA UPPER GI ENDOSCOPY,BIOPSY N/A 12/12/2021    Procedure: GASTROSCOPY, WITH BIOPSY;  Surgeon: Dariel Simons M.D.;  Location: [facility];  Service: Gastroenterology    LA UPPER GI ENDOSCOPY,CTRL BLEED N/A 12/12/2021    Procedure: EGD, WITH CLIP PLACEMENT;  Surgeon: Dariel Simons M.D.;  Location: [facility];  Service: Gastroenterology    GASTROSCOPY W/PUSH ENTERSCOPY N/A 12/12/2021    Procedure: GASTROSCOPY, WITH PUSH ENTEROSCOPY;  Surgeon: Dariel Simons M.D.;  Location: [facility];  Service: Gastroenterology    SEPTAL RECONSTRUCTION  12/7/2015    Procedure: SEPTAL RECONSTRUCTION OPEN WITH  GRAFTS & CONCHAL CART GRAFT;  Surgeon: SYDNIE Gaitan M.D.;  Location: SURGERY SAME DAY [facility];  Service:     BLEPHAROPLASTY  7/23/2014    Performed by Gera Plasencia M.D. at [facility]    BROW LIFT   7/23/2014    Performed by Gera Plasencia M.D. at SUR[facility]ERY [facility]    CATARACT PHACO WITH IOL  12/4/2012    Performed by Suraj Gonzalez M.D. at [facility]    CATARACT PHACO WITH IOL  11/20/2012    Performed by Suraj Gonzalez M.D. at [facility] ORS    APPENDECTOMY      ARTHROSCOPY, KNEE      CARDIAC CATH, LEFT HEART      LHC out of concern for STEMI, normal coronaries with minimal atherosclerosis, diagnosed with PNA as cause of symptoms and ST changes.     OTHER      KNEE SURGERY - LEFT.    OTHER      NOSE SURGERY.    TONSILLECTOMY         FHX:  Family History   Problem Relation Age of Onset    Diabetes Father     Heart Attack Father 79    Cancer Brother        Medications:  Current Facility-Administered Medications   Medication Dose    Pharmacy Consult: Enteral tube insertion - review meds/change route/product selection  1 Each    Respiratory Therapy Consult      famotidine (PEPCID) tablet 20 mg  20 mg    Or    famotidine (PEPCID) injection 20 mg  20 mg    Nozin nasal  swab  1 Applicator    oxyCODONE immediate-release (ROXICODONE) tablet 5 mg  5 mg    Or    oxyCODONE immediate release (ROXICODONE) tablet 10 mg  10 mg    Or    HYDROmorphone (Dilaudid) injection 0.5-1 mg  0.5-1 mg    amLODIPine (NORVASC) tablet 5 mg  5 mg    piperacillin-tazobactam (Zosyn) 3.375 g in  mL IVPB  3.375 g    labetalol (NORMODYNE/TRANDATE) injection 10 mg  10 mg    hydrALAZINE (APRESOLINE) injection 10 mg  10 mg    atorvastatin (LIPITOR) tablet 10 mg  10 mg    allopurinol (ZYLOPRIM) tablet 100 mg  100 mg    carvedilol (COREG) tablet 12.5 mg  12.5 mg    [Held by provider] spironolactone (ALDACTONE) tablet 25 mg  25 mg    sodium bicarbonate tablet 1,300 mg  1,300 mg    lactated ringers infusion (BOLUS)  1,000 mL    DULoxetine (CYMBALTA) capsule 30 mg  30 mg    lactated ringers infusion (BOLUS)  500 mL    acetaminophen (Tylenol) tablet 650 mg  650 mg    insulin regular (HumuLIN R,NovoLIN  "R) injection  2-9 Units    And    dextrose 10 % BOLUS 25 g  25 g    Pharmacy Consult Request ...Pain Management Review 1 Each  1 Each       Allergies:  Allergies   Allergen Reactions    Ether Unspecified     \"Violently sick\"         Physical Exam:  Vitals: /66   Pulse 87   Temp 37.2 °C (99 °F) (Temporal)   Resp 16   Ht 1.803 m (5' 10.98\")   Wt 89.1 kg (196 lb 6.9 oz)   SpO2 93%   Gen: NAD, laying comfortably in bed,  no visitors present    Head: NC/AT  Eyes/ Nose/ Mouth: moist mucous membranes  Cardio: RRR, good distal perfusion, warm extremities  Pulm: normal respiratory effort, no cyanosis, on RA   Abd: Soft NT, mildly distended , abdo dressings without strike through   Ext: No peripheral edema. No calf tenderness. No clubbing.    Mental status: follows commands  Speech: fluent, no aphasia or dysarthria      Motor:      Upper Extremity  Myotome R L   Shoulder flexion C5 5 5   Elbow flexion C5 5 5   Wrist extension C6 5 5   Elbow extension C7 5 5   Finger flexion C8 5 5   Finger abduction T1 5 5     Lower Extremity Myotome R L   Hip flexion L2 4, limited by abdo discomfort /5 4, limited by abdo discomfort /5   Knee extension L3 5/5 5/5   Ankle dorsiflexion L4 5/5 5/5   Toe extension L5 5/5 5/5   Ankle plantarflexion S1 5/5 5/5     Sensory:   intact to light touch through out    Labs: Reviewed and significant for   Recent Labs     04/28/23 0228 04/28/23 2103 04/29/23  0421   RBC 4.50* 4.35* 3.91*   HEMOGLOBIN 13.2* 12.9* 11.5*   HEMATOCRIT 39.7* 39.5* 35.6*   PLATELETCT 227 372 345       Recent Labs     04/28/23 0228 04/28/23 2103 04/29/23  0421   SODIUM 138 141 143   POTASSIUM 4.0 4.2 4.7   CHLORIDE 107 108 111   CO2 22 20 21   GLUCOSE 181* 175* 177*   BUN 49* 45* 47*   CREATININE 2.04* 2.10* 2.08*   CALCIUM 8.0* 7.9* 7.5*       Recent Results (from the past 24 hour(s))   CULTURE WOUND W/ GRAM STAIN    Collection Time: 04/28/23  4:12 PM    Specimen: Wound   Result Value Ref Range    Significant " Indicator NEG     Source WND     Site Gall Bladder     Culture Result -     Gram Stain Result -    Anaerobic Culture    Collection Time: 04/28/23  4:12 PM    Specimen: Wound   Result Value Ref Range    Significant Indicator NEG     Source WND     Site Gall Bladder     Culture Result -    POCT arterial blood gas device results    Collection Time: 04/28/23  6:57 PM   Result Value Ref Range    Ph 7.391 (L) 7.400 - 7.500    Pco2 38.6 (H) 26.0 - 37.0 mmHg    Po2 100 (H) 64 - 87 mmHg    Tco2 25 20 - 33 mmol/L    S02 98 93 - 99 %    Hco3 23.4 17.0 - 25.0 mmol/L    BE -1 -4 - 3 mmol/L    Body Temp 97.8 F degrees    O2 Therapy 60 %    iPF Ratio 167     Ph Temp Rodger 7.398 (L) 7.400 - 7.500    Pco2 Temp Co 37.8 (H) 26.0 - 37.0 mmHg    Po2 Temp Cor 97 (H) 64 - 87 mmHg    Specimen Arterial     DelSys Vent     Tidal Volume 440 mL    Peep End Expiratory Pressure 8 cmh20    Set Rate 20     Mode APV-CMV    Triglycerides Starting now and then Every 3 Days    Collection Time: 04/28/23  9:03 PM   Result Value Ref Range    Triglycerides 138 0 - 149 mg/dL   CBC WITHOUT DIFFERENTIAL    Collection Time: 04/28/23  9:03 PM   Result Value Ref Range    WBC 27.6 (H) 4.8 - 10.8 K/uL    RBC 4.35 (L) 4.70 - 6.10 M/uL    Hemoglobin 12.9 (L) 14.0 - 18.0 g/dL    Hematocrit 39.5 (L) 42.0 - 52.0 %    MCV 90.8 81.4 - 97.8 fL    MCH 29.7 27.0 - 33.0 pg    MCHC 32.7 (L) 33.7 - 35.3 g/dL    RDW 52.3 (H) 35.9 - 50.0 fL    Platelet Count 372 164 - 446 K/uL    MPV 11.6 9.0 - 12.9 fL   Comp Metabolic Panel    Collection Time: 04/28/23  9:03 PM   Result Value Ref Range    Sodium 141 135 - 145 mmol/L    Potassium 4.2 3.6 - 5.5 mmol/L    Chloride 108 96 - 112 mmol/L    Co2 20 20 - 33 mmol/L    Anion Gap 13.0 7.0 - 16.0    Glucose 175 (H) 65 - 99 mg/dL    Bun 45 (H) 8 - 22 mg/dL    Creatinine 2.10 (H) 0.50 - 1.40 mg/dL    Calcium 7.9 (L) 8.5 - 10.5 mg/dL    AST(SGOT) 41 12 - 45 U/L    ALT(SGPT) 28 2 - 50 U/L    Alkaline Phosphatase 152 (H) 30 - 99 U/L    Total  Bilirubin 1.0 0.1 - 1.5 mg/dL    Albumin 1.8 (L) 3.2 - 4.9 g/dL    Total Protein 5.0 (L) 6.0 - 8.2 g/dL    Globulin 3.2 1.9 - 3.5 g/dL    A-G Ratio 0.6 g/dL   MAGNESIUM    Collection Time: 04/28/23  9:03 PM   Result Value Ref Range    Magnesium 2.1 1.5 - 2.5 mg/dL   LACTIC ACID    Collection Time: 04/28/23  9:03 PM   Result Value Ref Range    Lactic Acid 1.7 0.5 - 2.0 mmol/L   CORTISOL    Collection Time: 04/28/23  9:03 PM   Result Value Ref Range    Cortisol 35.8 (H) 0.0 - 23.0 ug/dL   CORRECTED CALCIUM    Collection Time: 04/28/23  9:03 PM   Result Value Ref Range    Correct Calcium 9.7 8.5 - 10.5 mg/dL   ESTIMATED GFR    Collection Time: 04/28/23  9:03 PM   Result Value Ref Range    GFR (CKD-EPI) 31 (A) >60 mL/min/1.73 m 2   POCT glucose device results    Collection Time: 04/28/23  9:03 PM   Result Value Ref Range    POC Glucose, Blood 172 (H) 65 - 99 mg/dL   CBC WITHOUT DIFFERENTIAL    Collection Time: 04/29/23  4:21 AM   Result Value Ref Range    WBC 22.8 (H) 4.8 - 10.8 K/uL    RBC 3.91 (L) 4.70 - 6.10 M/uL    Hemoglobin 11.5 (L) 14.0 - 18.0 g/dL    Hematocrit 35.6 (L) 42.0 - 52.0 %    MCV 91.0 81.4 - 97.8 fL    MCH 29.4 27.0 - 33.0 pg    MCHC 32.3 (L) 33.7 - 35.3 g/dL    RDW 51.8 (H) 35.9 - 50.0 fL    Platelet Count 345 164 - 446 K/uL    MPV 11.5 9.0 - 12.9 fL   Comp Metabolic Panel    Collection Time: 04/29/23  4:21 AM   Result Value Ref Range    Sodium 143 135 - 145 mmol/L    Potassium 4.7 3.6 - 5.5 mmol/L    Chloride 111 96 - 112 mmol/L    Co2 21 20 - 33 mmol/L    Anion Gap 11.0 7.0 - 16.0    Glucose 177 (H) 65 - 99 mg/dL    Bun 47 (H) 8 - 22 mg/dL    Creatinine 2.08 (H) 0.50 - 1.40 mg/dL    Calcium 7.5 (L) 8.5 - 10.5 mg/dL    AST(SGOT) 41 12 - 45 U/L    ALT(SGPT) 26 2 - 50 U/L    Alkaline Phosphatase 138 (H) 30 - 99 U/L    Total Bilirubin 1.0 0.1 - 1.5 mg/dL    Albumin 2.0 (L) 3.2 - 4.9 g/dL    Total Protein 4.7 (L) 6.0 - 8.2 g/dL    Globulin 2.7 1.9 - 3.5 g/dL    A-G Ratio 0.7 g/dL   MAGNESIUM     Collection Time: 04/29/23  4:21 AM   Result Value Ref Range    Magnesium 2.0 1.5 - 2.5 mg/dL   PHOSPHORUS    Collection Time: 04/29/23  4:21 AM   Result Value Ref Range    Phosphorus 5.0 (H) 2.5 - 4.5 mg/dL   proBrain Natriuretic Peptide, NT    Collection Time: 04/29/23  4:21 AM   Result Value Ref Range    NT-proBNP 1855 (H) 0 - 125 pg/mL   CORRECTED CALCIUM    Collection Time: 04/29/23  4:21 AM   Result Value Ref Range    Correct Calcium 9.1 8.5 - 10.5 mg/dL   ESTIMATED GFR    Collection Time: 04/29/23  4:21 AM   Result Value Ref Range    GFR (CKD-EPI) 31 (A) >60 mL/min/1.73 m 2   POCT glucose device results    Collection Time: 04/29/23  8:02 AM   Result Value Ref Range    POC Glucose, Blood 182 (H) 65 - 99 mg/dL         ASSESSMENT:  Patient is a 82 y.o. male admitted with weakness, found to have sepsis from colitis, and discitis/osteomyelitis, and SANJUANITA     [facility] Code / Diagnosis to Support: 0017.1 - Medically Complex: Infections    Rehabilitation: Impaired ADLs and mobility  Patient is a potential  candidate for inpatient rehab based on needs for PT, OT, pending therapy evals post op.  Patient will also benefit from family training.  Patient has a good discharge situation which will be home with son.     Barriers to transfer include: Insurance authorization, TCCs to verify disposition, medical clearance and bed availability     All cases are subject to administrative review and recommendations may change    Disposition recommendations:  -Possible candidate for IPR, needs repeat therapy after cholecystectomy, pending post op therapy evals as of 4/29   -TCC to verify discharge support through son   -PMR to follow in the periphery for rehab appropriateness, please reach out with questions or request for medical management    Medical Complexity:    Debility secondary to infection secondary to cholecystitis vs discitis   -Patient admitted with sepsis secondary to colitis, discitis, osteomyelitis, now with cholecystitis    -On  IV Zosyn   -Continue PT OT while in-house  -Patient needs to demonstrate improvement with functional gait to qualify for IPR  -TCC investigating discharge support  -Potential candidate for IPR, pending post op therapy notes     LE weakness vs transvers myelitis   - LP done, CSF analysis + glucose and protein   - MRI T spine showed  T2 hyperintense lesion in the R side of the thoracic spinal cord at T2 and T3 suspicious for spinal cord neoplasm vs transverse myelitis ; however clinically no neurologic deficits   - LE strength improved clinically , adequate strength on exam 4/29      Acute cholecystitis   - HIDA scan completed, with non visualization of the gall bladder, suspicious for choleycystisits   - 4/28 open cholecystectomy by Dr. Galvan   - pending post op therapy evals     Leukocytosis  -WBC  28.1  -> 22.8 4/29   -Elevated procalcitonin  -on Zosyn    -Primary team monitoring with serial labs  - suspected secondary to cholecystitis     Hypertension  -Coreg 12.5 mg twice daily with meals  -Spironolactone 25 mg daily, has been held; BNP elevated on 4/29     Diabetes  - On Januvia and Ozempic outpatient  - Sliding scale insulin in house    Diabetic peripheral neuropathy  -Cymbalta    CKD stage IV  -Cr 2.2 - > 2.41  -> 2.08 4/29   -Nephrology following    DVT PPX: SCDs      Thank you for allowing us to participate in the care of this patient.     Patient was seen for 36   minutes on unit/floor of which > 50% of time was spent on counseling and coordination of care regarding the above, including prognosis, risk reduction, benefits of treatment, and options for next stage of care.    Shweta Trammell D.O.     Physical Medicine and Rehabilitation     Please note that this dictation was created using voice recognition software. I have made every reasonable attempt to correct obvious errors, but there may be errors of grammar and possibly content that I did not discover before finalizing the note.

## 2023-04-29 NOTE — OR SURGEON
Immediate Post OP Note    PreOp Diagnosis: Acute cholecystitis      PostOp Diagnosis: Gangrenous cholecystitis  Sepsis    Procedure(s):  CHOLECYSTECTOMY, LAPAROSCOPIC ATTEMPTED, OPEN CHOLECYSTECTOMY - Wound Class: Clean Contaminated  Subtotal cholecystectomy  Surgeon(s):  Suraj Galvan M.D.    Anesthesiologist/Type of Anesthesia:  Anesthesiologist: Matias Adame M.D./General    Surgical Staff:  Assistant: Kenzie Krueger P.A.-C.  Circulator: Stella Mendez R.N.  Scrub Person: Avni Serna    Specimens removed if any:  ID Type Source Tests Collected by Time Destination   1 : gall bladder Other Other AEROBIC/ANAEROBIC CULTURE (SURGERY) Suraj Galvan M.D. 4/28/2023  4:12 PM    A : gall bladder Other Other PATHOLOGY SPECIMEN Suraj Galvan M.D. 4/28/2023  4:13 PM        Estimated Blood Loss: 150    Findings: Gangrenous cholecystitis    Complications: none        4/28/2023 5:18 PM Suraj Galvan M.D.

## 2023-04-29 NOTE — ANESTHESIA POSTPROCEDURE EVALUATION
Patient: Tyrone Cline    Procedure Summary     Date: 04/28/23 Room / Location: [facility] 12 / SURGERY [facility]    Anesthesia Start: 1533 Anesthesia Stop: 1812    Procedure: CHOLECYSTECTOMY, LAPAROSCOPIC ATTEMPTED, OPEN CHOLECYSTECTOMY (Abdomen) Diagnosis: (cholecystitis)    Surgeons: Suraj Galvan M.D. Responsible Provider: Matias Adame M.D.    Anesthesia Type: general ASA Status: 3          Final Anesthesia Type: general  Last vitals  BP   Blood Pressure : (!) 163/92    Temp   36.3 °C (97.3 °F)    Pulse   80   Resp   16    SpO2   95 %      Anesthesia Post Evaluation    Patient location during evaluation: ICU  Patient participation: complete - patient participated  Level of consciousness: awake and alert    Airway patency: patent  Anesthetic complications: no  Cardiovascular status: hemodynamically stable  Respiratory status: acceptable, ETT and ventilator  Hydration status: euvolemic  Comments: Pt unstable intraop.  Better after norepi started.  Pt awake in ICU.  Florez cath needs to be replaced.    PONV: none          No notable events documented.     Nurse Pain Score: 0 (NPRS)

## 2023-04-29 NOTE — CARE PLAN
The patient is Watcher - Medium risk of patient condition declining or worsening    Shift Goals  Clinical Goals: Hemodynamic stability, pain control  Patient Goals: Comfort  Family Goals: FLEX    Progress made toward(s) clinical / shift goals:    Problem: Knowledge Deficit - Standard  Goal: Patient and family/care givers will demonstrate understanding of plan of care, disease process/condition, diagnostic tests and medications  Outcome: Progressing     Problem: Hemodynamics  Goal: Patient's hemodynamics, fluid balance and neurologic status will be stable or improve  Outcome: Progressing     Problem: Mechanical Ventilation  Goal: Safe management of artificial airway and ventilation  Outcome: Progressing  Goal: Patient will be able to express needs and understand communication  Outcome: Progressing     Problem: Pain - Standard  Goal: Alleviation of pain or a reduction in pain to the patient’s comfort goal  Outcome: Progressing       Patient is not progressing towards the following goals:

## 2023-04-29 NOTE — RESPIRATORY CARE
Extubation    Cuff leak noted yes  Stridor present no     FiO2%: 40 % (04/28/23 2000)  O2 (LPM): 3 (04/28/23 2121)     Patient toleration well  RCP Complete? Yes    Events/Summary/Plan: pt extubated (04/28/23 2121)    VC 1100  NIF -40   Spont 5/8/40% 25 minutes.

## 2023-04-29 NOTE — PROGRESS NOTES
[facility] Medicine Daily Progress Note    Date of Service  4/29/2023    Chief Complaint  Tyrone Cline is a 82 y.o. male admitted 4/21/2023 with Colitis.    Hospital Course  Admitted with sepsis secondary to colitis.  Patient was started on empiric coverage with IV Rocephin and Flagyl.  He was also noted to have bilateral lower extremity weakness.  MRI of the lumbar spine was done which showed possible edema.  Neurology was then consulted on the case.  He was also noted to have an SANJUANITA on CKD stage IV.  Nephrology was consulted on the case.  She was started on a careful IVF hydration.  His CT scan also showed cholelithiasis.  Ultrasound was done which showed cholecystitis.  Surgery was then consulted on the case.  Patient underwent laparoscopic cholecystectomy on 4/28 which needed to be converted to open cholecystectomy, postoperatively the patient was hypotensive and required IV pressor medication.  Continued in the surgical ICU for additional treatment.  On 4/29 the patient referred back to the medical service as he had improved.    Interval Problem Update  Patient seen and examined today.  Data, Medication data reviewed.  Case discussed with nursing as available.  Plan of Care reviewed with patient and notified of changes.   4/29 the patient is seen in the surgical ICU, he is currently complaining of a right upper quadrant surgical area pain intermittent, severe, the patient had tolerated a regular diet, he had a bowel movement already today, he is set up in a chair without difficulty, family is currently at the bedside, the patient is afebrile, heart rate in the 80s to 90s, respiration unlabored around 20, the patient is on 2 L nasal cannula oxygen saturating in the mid 90s, blood pressure in the 120s to 110,  Laboratory data indicate a white cell count of 22.8, hemoglobin 11.5, platelet count 345, chemistry with a sodium of 143, potassium 4.7, glucose 177, BUN 47, creatinine 2.08, calcium 7.5, alk phos  138, albumin 2.0, total protein 4.7, Phos 5.0, cortisol 428 at 35.8, stool studies negative, gallbladder positive for E. coli, surgical specimen, chest x-ray 4/29 shows mildly improved pulmonary opacities, possible left PICC line with tip projecting at the axillary vein, trace pleural effusion    I have discussed this patient's plan of care and discharge plan at IDT rounds today with Case Management, Nursing, Nursing leadership, and other members of the IDT team.    Consultants/Specialty  general surgery, nephrology, and neurology    Code Status  DNAR/DNI    Disposition  Patient is not medically cleared for discharge.   Anticipate discharge to to an inpatient rehabilitation hospital.  I have placed the appropriate orders for post-discharge needs.    Review of Systems  Review of Systems   Constitutional:  Positive for malaise/fatigue. Negative for chills, diaphoresis and fever.   HENT:  Negative for congestion, hearing loss and sore throat.    Eyes:  Negative for blurred vision.   Respiratory:  Positive for shortness of breath. Negative for cough and wheezing.    Cardiovascular:  Negative for chest pain, palpitations and leg swelling.   Gastrointestinal:  Positive for abdominal pain. Negative for diarrhea, heartburn, nausea and vomiting.   Genitourinary:  Negative for dysuria, flank pain and hematuria.   Musculoskeletal:  Negative for back pain, joint pain, myalgias and neck pain.   Skin:  Negative for rash.   Neurological:  Positive for focal weakness and weakness. Negative for dizziness, sensory change, speech change and headaches.   Psychiatric/Behavioral:  The patient is not nervous/anxious.       Physical Exam  Temp:  [36.1 °C (97 °F)-37.2 °C (99 °F)] 37.2 °C (99 °F)  Pulse:  [67-96] 89  Resp:  [10-27] 27  BP: ()/(52-94) 101/56  SpO2:  [91 %-98 %] 93 %    Physical Exam  Vitals and nursing note reviewed.   HENT:      Head: Normocephalic and atraumatic.      Nose: No congestion.      Mouth/Throat:       Mouth: Mucous membranes are moist.   Eyes:      Extraocular Movements: Extraocular movements intact.      Conjunctiva/sclera: Conjunctivae normal.   Cardiovascular:      Rate and Rhythm: Normal rate and regular rhythm.      Heart sounds: Murmur heard.   Pulmonary:      Effort: Pulmonary effort is normal.      Breath sounds: Normal breath sounds.   Abdominal:      Tenderness: There is abdominal tenderness. There is no guarding or rebound.      Comments: Right upper quadrant dressing over the surgical area with a NAHUN drain in place, draining serosanguineous fluid   Musculoskeletal:      Cervical back: No tenderness.      Right lower leg: No edema.      Left lower leg: No edema.   Skin:     General: Skin is warm and dry.      Coloration: Skin is pale.   Neurological:      Mental Status: He is alert and oriented to person, place, and time.      Cranial Nerves: No cranial nerve deficit.      Motor: Weakness (MMT BUE 5/5, RLE 4/5, LLE 4+/5) present.       Fluids    Intake/Output Summary (Last 24 hours) at 4/29/2023 1659  Last data filed at 4/29/2023 1200  Gross per 24 hour   Intake 6019.43 ml   Output 1420 ml   Net 4599.43 ml         Laboratory  Recent Labs     04/28/23 0228 04/28/23 2103 04/29/23  0421   WBC 20.3* 27.6* 22.8*   RBC 4.50* 4.35* 3.91*   HEMOGLOBIN 13.2* 12.9* 11.5*   HEMATOCRIT 39.7* 39.5* 35.6*   MCV 88.2 90.8 91.0   MCH 29.3 29.7 29.4   MCHC 33.2* 32.7* 32.3*   RDW 49.3 52.3* 51.8*   PLATELETCT 227 372 345   MPV 11.7 11.6 11.5       Recent Labs     04/28/23 0228 04/28/23 2103 04/29/23  0421   SODIUM 138 141 143   POTASSIUM 4.0 4.2 4.7   CHLORIDE 107 108 111   CO2 22 20 21   GLUCOSE 181* 175* 177*   BUN 49* 45* 47*   CREATININE 2.04* 2.10* 2.08*   CALCIUM 8.0* 7.9* 7.5*                 Recent Labs     04/28/23 2103   TRIGLYCERIDE 138       Imaging  DX-CHEST-PORTABLE (1 VIEW)   Final Result      1.  Mildly improved pulmonary opacities.   2.  Possible left PICC line with tip projecting at the axillary  vein.   3.  Likely trace right pleural effusion.      DX-CHEST-LIMITED (1 VIEW)   Final Result      1.  Placement of endotracheal tube with tip projecting over the mid trachea      2.  Left arm catheter present which is presumably venous in projects in the expected position of the left axillary vein      3.  Enlarged cardiac silhouette      NM-HEPATOBILIARY SCAN   Final Result      Hepatobiliary scan findings suspicious for acute cholecystitis.      MR-THORACIC SPINE-WITH & W/O   Final Result      1.  There is abnormal expansile T2 hyperintense lesion in the right side of the thoracic spinal cord at the levels of T2 and T3 Mild contrast enhancement is seen. This lesion is suspicious for spinal cord neoplasm. Other remote differential diagnosis    includes transverse myelitis.   2.  Exaggerated thoracic kyphosis.   3.  Thoracic dextroscoliosis.   4.  Minimal degenerative changes.   5.  Right pleural effusion.      MR-LUMBAR SPINE-WITH & W/O   Final Result      1.  Multifocal degenerative disease in the lumbar spine as described above.   2.  Moderate central canal and severe lateral recess stenosis at the level of L4-5. The exiting bilateral L5 nerve roots might have been impinged at the lateral recess.      MR-CERVICAL SPINE-WITH & W/O   Final Result      1.  There is abnormal expansile intramedullary T2 signal intensity lesion in the right cerebellum. Thoracic spinal cord at the level of T2 on T3. Mild diffuse contrast enhancement is seen. This finding is suspicious for spinal cord neoplasm. The other    less likely differential diagnosis includes transverse myelitis. Follow-up is recommended after 4 weeks.   2.  Mild degenerative disease in the cervical spine as described above.   3.  There is no evidence of infection in the cervical spine.      US-RUQ   Final Result         1.  Acute cholecystitis.   2.  Atrophic right kidney.      DX-CHEST-LIMITED (1 VIEW)   Final Result      Perihilar interstitial edema and  basilar atelectasis and/or consolidation. Underlying infection is possible.      MR-LUMBAR SPINE-W/O   Final Result      1.  Lower lumbar spine predominant degenerative changes as described in detail above, progressed from 2010 MRI.   2.  Edema at the L4-L5 disc space and L5 superior endplate likely represents degenerative changes. Less likely this could represent a low-grade or early discitis osteomyelitis. Correlate for evidence of infection.   3.  Acute appearing Schmorl's node at L3-L4, which can be a source of pain.      US-RENAL   Final Result      1.  Atrophic kidneys. Echogenic bilateral renal parenchyma could relate to medical renal disease.      2.  No hydronephrosis. No renal calculus.      DX-CHEST-PORTABLE (1 VIEW)   Final Result      No acute cardiac or pulmonary abnormalities are identified.      CT-ABDOMEN-PELVIS W/O   Final Result      1.  Findings suspicious for focal colitis at the hepatic flexure, less likely cholecystitis or duodenitis with secondary involvement of the colon. Infectious, inflammatory and ischemic etiologies are considerations. Underlying mass is not excluded.   2.  Cholelithiasis with distention of the gallbladder   3.  BILATERAL renal atrophy   4.  Subcentimeter LEFT adrenal myelolipoma   5.  12 mm RIGHT adrenal nodule likely an adenoma absent a history of cancer   6.  Subcentimeter RIGHT hepatic lesion, likely a cyst absent a history of cancer   7.  Atherosclerosis   8.  Colonic diverticulosis      CT-HEAD W/O   Final Result      1.  Cerebral atrophy.      2.  White matter lucencies most consistent with small vessel ischemic change versus demyelination or gliosis.      3.  Otherwise, Head CT without contrast with no acute findings. No evidence of acute cerebral infarction, hemorrhage or mass lesion.                Assessment/Plan  Cholelithiasis with acute cholecystitis- (present on admission)  Assessment & Plan  S/p surgical removal, converted to open cholecystectomy  NAHUN  drain, monitor output  The patient is tolerating a diet already, monitor bowel function    Colitis- (present on admission)  Assessment & Plan  Cultures negative, monitor  Initial admitting impression, currently improved    Hypercalcemia- (present on admission)  Assessment & Plan  IVF hydration  Follow cmp    Hypomagnesemia- (present on admission)  Assessment & Plan  Replace, monitor    Hypokalemia- (present on admission)  Assessment & Plan  Follow cmp    Thrombocytopenia (HCC)- (present on admission)  Assessment & Plan  Follow cbc    High anion gap metabolic acidosis- (present on admission)  Assessment & Plan  IVF hydration    Hyponatremia- (present on admission)  Assessment & Plan  Follow cmp    Weakness of both lower extremities- (present on admission)  Assessment & Plan  T2/T3 intramedullary lesion of unclear chronicity and clinical significance as well as 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  Holding off on immunmodulatory treatment status-post surgery and neurologic improvement  Follow-up pending CSF studies. Follow clinically. PT/OT and rehab as needed.   Neurology was considering immunomodulatory therapy if neurologic decline or cessation of neurologic recovery.   Neurology felt the patient will  need repeat MRI T spine with and without contrast in 1 month.       Sepsis (HCC)- (present on admission)  Assessment & Plan  This is Sepsis Present on admission  SIRS criteria identified on my evaluation include: Leukocytosis, with WBC greater than 12,000  Source is colitis  Sepsis protocol initiated  Fluid resuscitation ordered per protocol  Crystalloid Fluid Administration: Fluid resuscitation ordered per standard protocol - 30 mL/kg per current or ideal body weight  IV antibiotics as appropriate for source of sepsis  Reassessment: I have reassessed the patient's hemodynamic status          Acute kidney injury superimposed on CKD (HCC)- (present on admission)  Assessment  & Plan  Careful IVF hydration  Monitor, avoid nephrotoxins      Diabetic peripheral neuropathy (HCC)- (present on admission)  Assessment & Plan  Cymbalta, medical treatment    Essential hypertension- (present on admission)  Assessment & Plan  Monitor closely, the patient had a hypotensive episode postoperatively  Adjust as indicated    Type 2 diabetes mellitus with kidney complication, without long-term current use of insulin (HCC)- (present on admission)  Assessment & Plan  SSI    Hypercholesterolemia- (present on admission)  Assessment & Plan  Lipitor    Chronic kidney disease (CKD), stage IV (severe) (HCC)- (present on admission)  Assessment & Plan  Follow closely, avoid nephrotoxins, currently stable    Plan  Monitor closely in the patient's postoperative period  Monitor for further possibility of infection, given his initial presentation  Monitor NAHUN output, surgery following  Pain control  Blood pressure control  Monitor renal function closely, avoid nephrotoxins  Hopefully the patient will clear his leukocytosis  Dietary support  PT OT, monitor neurologic recovery, with the patient's concern of L2-L3 spinal cord abnormality and lower extremity weakness  See orders  Medically complex high-risk patient  Thank you for reconsulting with us, we will assume medical care on this gentleman.    My total time spent caring for the patient on the day of the encounter was 52 minutes.   This does not include time spent on separately billable procedures/tests.    VTE prophylaxis: SCDs/TEDs    I have performed a physical exam and reviewed and updated ROS and Plan today (4/29/2023). In review of yesterday's note (4/28/2023), there are no changes except as documented above.        Please note that this dictation was created using voice recognition software. I have made every reasonable attempt to correct obvious errors, but I expect that there are errors of grammar and possibly context that I did not discover before finalizing  the note.

## 2023-04-29 NOTE — DISCHARGE PLANNING
TCN following. HTH/SCP chart review completed. Current dc planning considerations are for post acute placement. Note [facility] and [facility] is following/pending as well. Note that pt is now currently in TICU. As such, TCN will not actively follow at this time. Will resume TCN services once pt returns to floor to assist with dc planning. Please reach out to TCN if services are indicated prior to return to floor. See TCN note from 4/28 for dc planning considerations prior to ICU.     Completed:  Physiatry consult completed with possible acceptance  PT recommends post acute placement on 4/25   OT recommends post-acute placement on 4/24  SLP eval 4/26 with recs for no further needs  Choice obtained: IRF and SNF choice obtained on 4/24/23.  HH choice on 4/24/23.  Infusion on 4/22/23.    [facility] referral sent 4/22/23

## 2023-04-29 NOTE — PROGRESS NOTES
4 Eyes Skin Assessment Completed by JUAN JOSÉ Sexton and JUAN JOSÉ Germain.    Head WDL  Ears WDL  Nose WDL  Mouth WDL  Neck WDL  Breast/Chest WDL  Shoulder Blades {Shoulder Blades:34055}  Spine {Spine:32384}  (R) Arm/Elbow/Hand Bruising  (L) Arm/Elbow/Hand Bruising, elbow slow to gautam, L. Elbow abrasion  Abdomen Incision abdominal incision, covered with dry gauze.  NAHUN x1.  Abdominal lap stab to L. LQ, covered with dressing.  Groin WDL  Scrotum/Coccyx/Buttocks {Scrotum/Coccyx/Buttocks:51188}  (R) Leg WDL, dryness and discoloration to robbi shins/lower legs  (L) Leg WDL,, dryness and discoloration to robbi shins/lower legs  (R) Heel/Foot/Toe Redness and Blanching  (L) Heel/Foot/Toe Redness and Blanching          Devices In Places ECG, Blood Pressure Cuff, Pulse Ox, Florez, Arterial Line, SCD's, ET Tube, and Central Line      Interventions In Place Sacral Mepilex, TAP System, Pillows, and Q2 Turns    Possible Skin Injury No    Pictures Uploaded Into Epic N/A  Wound Consult Placed N/A  RN Wound Prevention Protocol Ordered No

## 2023-04-29 NOTE — ASSESSMENT & PLAN NOTE
Remains intubated post operative.    Stabilize and wean to extubate.   Ventilator weaning protocol.  4/28 Extubated.

## 2023-04-29 NOTE — PROGRESS NOTES
Trauma / Surgical Daily Progress Note    Date of Service  4/29/2023    Chief Complaint  83 y.o. male admitted 4/21/2023 with abdominal pain    Interval Events  Admit to ICU after surgery  Shock state resolved  Extubated overnight  Looks good this morning  Discussed with surgical team  Downgraded from ICU status  Discussed with Medical service for assistance with management of medical issues    Vital Signs for last 24 hours  Temp:  [36.1 °C (97 °F)-37.2 °C (99 °F)] 37.2 °C (99 °F)  Pulse:  [67-96] 84  Resp:  [10-27] 27  BP: ()/(52-94) 102/55  SpO2:  [91 %-98 %] 93 %    Hemodynamic parameters for last 24 hours       Respiratory Data     Respiration: (!) 27, Pulse Oximetry: 93 %     Work Of Breathing / Effort: Vented  RUL Breath Sounds: Clear, RML Breath Sounds: Clear, RLL Breath Sounds: Diminished, ANATOLY Breath Sounds: Clear, LLL Breath Sounds: Diminished    Physical Exam  Physical Exam  Vitals and nursing note reviewed. Exam conducted with a chaperone present.   Constitutional:       Appearance: Normal appearance.   HENT:      Head: Normocephalic and atraumatic.      Right Ear: External ear normal.      Left Ear: External ear normal.      Nose: Nose normal.      Mouth/Throat:      Mouth: Mucous membranes are moist.   Eyes:      Pupils: Pupils are equal, round, and reactive to light.   Cardiovascular:      Rate and Rhythm: Normal rate and regular rhythm.      Pulses: Normal pulses.   Pulmonary:      Effort: Pulmonary effort is normal.   Abdominal:      Palpations: Abdomen is soft.      Tenderness: There is abdominal tenderness.      Comments: Clean dry dressing   Genitourinary:     Comments: No rivera catheter  Musculoskeletal:         General: Normal range of motion.      Cervical back: Normal range of motion.   Skin:     General: Skin is warm and dry.      Capillary Refill: Capillary refill takes less than 2 seconds.   Neurological:      General: No focal deficit present.      Mental Status: He is alert.        Laboratory  Recent Results (from the past 24 hour(s))   CULTURE WOUND W/ GRAM STAIN    Collection Time: 04/28/23  4:12 PM    Specimen: Wound   Result Value Ref Range    Significant Indicator NEG     Source WND     Site Gall Bladder     Culture Result -     Gram Stain Result -    Anaerobic Culture    Collection Time: 04/28/23  4:12 PM    Specimen: Wound   Result Value Ref Range    Significant Indicator NEG     Source WND     Site Gall Bladder     Culture Result Culture in progress.    POCT arterial blood gas device results    Collection Time: 04/28/23  6:57 PM   Result Value Ref Range    Ph 7.391 (L) 7.400 - 7.500    Pco2 38.6 (H) 26.0 - 37.0 mmHg    Po2 100 (H) 64 - 87 mmHg    Tco2 25 20 - 33 mmol/L    S02 98 93 - 99 %    Hco3 23.4 17.0 - 25.0 mmol/L    BE -1 -4 - 3 mmol/L    Body Temp 97.8 F degrees    O2 Therapy 60 %    iPF Ratio 167     Ph Temp Rodger 7.398 (L) 7.400 - 7.500    Pco2 Temp Co 37.8 (H) 26.0 - 37.0 mmHg    Po2 Temp Cor 97 (H) 64 - 87 mmHg    Specimen Arterial     DelSys Vent     Tidal Volume 440 mL    Peep End Expiratory Pressure 8 cmh20    Set Rate 20     Mode APV-CMV    Triglycerides Starting now and then Every 3 Days    Collection Time: 04/28/23  9:03 PM   Result Value Ref Range    Triglycerides 138 0 - 149 mg/dL   CBC WITHOUT DIFFERENTIAL    Collection Time: 04/28/23  9:03 PM   Result Value Ref Range    WBC 27.6 (H) 4.8 - 10.8 K/uL    RBC 4.35 (L) 4.70 - 6.10 M/uL    Hemoglobin 12.9 (L) 14.0 - 18.0 g/dL    Hematocrit 39.5 (L) 42.0 - 52.0 %    MCV 90.8 81.4 - 97.8 fL    MCH 29.7 27.0 - 33.0 pg    MCHC 32.7 (L) 33.7 - 35.3 g/dL    RDW 52.3 (H) 35.9 - 50.0 fL    Platelet Count 372 164 - 446 K/uL    MPV 11.6 9.0 - 12.9 fL   Comp Metabolic Panel    Collection Time: 04/28/23  9:03 PM   Result Value Ref Range    Sodium 141 135 - 145 mmol/L    Potassium 4.2 3.6 - 5.5 mmol/L    Chloride 108 96 - 112 mmol/L    Co2 20 20 - 33 mmol/L    Anion Gap 13.0 7.0 - 16.0    Glucose 175 (H) 65 - 99 mg/dL    Bun 45  (H) 8 - 22 mg/dL    Creatinine 2.10 (H) 0.50 - 1.40 mg/dL    Calcium 7.9 (L) 8.5 - 10.5 mg/dL    AST(SGOT) 41 12 - 45 U/L    ALT(SGPT) 28 2 - 50 U/L    Alkaline Phosphatase 152 (H) 30 - 99 U/L    Total Bilirubin 1.0 0.1 - 1.5 mg/dL    Albumin 1.8 (L) 3.2 - 4.9 g/dL    Total Protein 5.0 (L) 6.0 - 8.2 g/dL    Globulin 3.2 1.9 - 3.5 g/dL    A-G Ratio 0.6 g/dL   MAGNESIUM    Collection Time: 04/28/23  9:03 PM   Result Value Ref Range    Magnesium 2.1 1.5 - 2.5 mg/dL   LACTIC ACID    Collection Time: 04/28/23  9:03 PM   Result Value Ref Range    Lactic Acid 1.7 0.5 - 2.0 mmol/L   CORTISOL    Collection Time: 04/28/23  9:03 PM   Result Value Ref Range    Cortisol 35.8 (H) 0.0 - 23.0 ug/dL   CORRECTED CALCIUM    Collection Time: 04/28/23  9:03 PM   Result Value Ref Range    Correct Calcium 9.7 8.5 - 10.5 mg/dL   ESTIMATED GFR    Collection Time: 04/28/23  9:03 PM   Result Value Ref Range    GFR (CKD-EPI) 31 (A) >60 mL/min/1.73 m 2   POCT glucose device results    Collection Time: 04/28/23  9:03 PM   Result Value Ref Range    POC Glucose, Blood 172 (H) 65 - 99 mg/dL   CBC WITHOUT DIFFERENTIAL    Collection Time: 04/29/23  4:21 AM   Result Value Ref Range    WBC 22.8 (H) 4.8 - 10.8 K/uL    RBC 3.91 (L) 4.70 - 6.10 M/uL    Hemoglobin 11.5 (L) 14.0 - 18.0 g/dL    Hematocrit 35.6 (L) 42.0 - 52.0 %    MCV 91.0 81.4 - 97.8 fL    MCH 29.4 27.0 - 33.0 pg    MCHC 32.3 (L) 33.7 - 35.3 g/dL    RDW 51.8 (H) 35.9 - 50.0 fL    Platelet Count 345 164 - 446 K/uL    MPV 11.5 9.0 - 12.9 fL   Comp Metabolic Panel    Collection Time: 04/29/23  4:21 AM   Result Value Ref Range    Sodium 143 135 - 145 mmol/L    Potassium 4.7 3.6 - 5.5 mmol/L    Chloride 111 96 - 112 mmol/L    Co2 21 20 - 33 mmol/L    Anion Gap 11.0 7.0 - 16.0    Glucose 177 (H) 65 - 99 mg/dL    Bun 47 (H) 8 - 22 mg/dL    Creatinine 2.08 (H) 0.50 - 1.40 mg/dL    Calcium 7.5 (L) 8.5 - 10.5 mg/dL    AST(SGOT) 41 12 - 45 U/L    ALT(SGPT) 26 2 - 50 U/L    Alkaline Phosphatase 138 (H)  30 - 99 U/L    Total Bilirubin 1.0 0.1 - 1.5 mg/dL    Albumin 2.0 (L) 3.2 - 4.9 g/dL    Total Protein 4.7 (L) 6.0 - 8.2 g/dL    Globulin 2.7 1.9 - 3.5 g/dL    A-G Ratio 0.7 g/dL   MAGNESIUM    Collection Time: 04/29/23  4:21 AM   Result Value Ref Range    Magnesium 2.0 1.5 - 2.5 mg/dL   PHOSPHORUS    Collection Time: 04/29/23  4:21 AM   Result Value Ref Range    Phosphorus 5.0 (H) 2.5 - 4.5 mg/dL   proBrain Natriuretic Peptide, NT    Collection Time: 04/29/23  4:21 AM   Result Value Ref Range    NT-proBNP 1855 (H) 0 - 125 pg/mL   CORRECTED CALCIUM    Collection Time: 04/29/23  4:21 AM   Result Value Ref Range    Correct Calcium 9.1 8.5 - 10.5 mg/dL   ESTIMATED GFR    Collection Time: 04/29/23  4:21 AM   Result Value Ref Range    GFR (CKD-EPI) 31 (A) >60 mL/min/1.73 m 2   POCT glucose device results    Collection Time: 04/29/23  8:02 AM   Result Value Ref Range    POC Glucose, Blood 182 (H) 65 - 99 mg/dL   POCT glucose device results    Collection Time: 04/29/23 11:02 AM   Result Value Ref Range    POC Glucose, Blood 181 (H) 65 - 99 mg/dL       Fluids    Intake/Output Summary (Last 24 hours) at 4/29/2023 1232  Last data filed at 4/29/2023 1200  Gross per 24 hour   Intake 6019.43 ml   Output 1400 ml   Net 4619.43 ml       Core Measures & Quality Metrics  Core Measures & Quality Metrics    Assessment/Plan  Postprocedural hypotension  Assessment & Plan  Patient was in shock throughout operative case.  Returns to ICU on levophed.   Wean levophed as tolerated.     On mechanically assisted ventilation (HCC)  Assessment & Plan  Remains intubated post operative.    Stabilize and wean to extubate.   Ventilator weaning protocol.  4/28 Extubated.    Cholelithiasis with acute cholecystitis- (present on admission)  Assessment & Plan  4/27 HIDA scan consistent with acute cholecystitis.  4/28 Laparoscopic cholecystectomy converted to open cholecystectomy.    Acute kidney injury superimposed on CKD (HCC)- (present on  admission)  Assessment & Plan  Fluid resuscitation and trend renal function.     I independently reviewed pertinent clinical lab tests from the last 48 hours and ordered additional follow up clinical lab tests.  I independently reviewed pertinent radiographic images and the radiologist's reports from the last 48 hours and ordered additional follow up radiographic studies.  The details of the available patient records in [facility] (including laboratory tests, culture data, medications, imaging, and other pertinent diagnostic tests) and that information was utilized as warranted in today's medical decision making for this patient.  I personally evaluated the patient condition at bedside.    Care interventions include:   Review of interval medical and surgical history, current medications and outpatient medication reconciliation, interval imaging studies and radiologist interpretation, and interval laboratory values.    Aggregated care time spent evaluating, reassessing, reviewing documentation, providing care, and managing this patient exclusive of procedures: 35 minutes    Phoenix Us MD, FACS

## 2023-04-29 NOTE — ANESTHESIA PROCEDURE NOTES
Airway    Date/Time: 4/28/2023 3:40 PM  Performed by: Matias Adame M.D.  Authorized by: Matias Adame M.D.     Location:  OR  Urgency:  Elective  Difficult Airway: No    Indications for Airway Management:  Anesthesia      Spontaneous Ventilation: absent    Sedation Level:  Deep  Preoxygenated: Yes    Patient Position:  Sniffing  Mask Difficulty Assessment:  1 - vent by mask  Final Airway Type:  Endotracheal airway  Final Endotracheal Airway:  ETT  Cuffed: Yes    Technique Used for Successful ETT Placement:  Direct laryngoscopy  Devices/Methods Used in Placement:  Intubating stylet    Insertion Site:  Oral  Blade Type:  Kimball  Laryngoscope Blade/Videolaryngoscope Blade Size:  3  ETT Size (mm):  8.0  Measured from:  Teeth  ETT to Teeth (cm):  23  Placement Verified by: auscultation and capnometry    Cormack-Lehane Classification:  Grade I - full view of glottis  Number of Attempts at Approach:  1

## 2023-04-30 ENCOUNTER — APPOINTMENT (OUTPATIENT)
Dept: RADIOLOGY | Facility: MEDICAL CENTER | Age: 83
DRG: 853 | End: 2023-04-30
Attending: SURGERY
Payer: MEDICARE

## 2023-04-30 PROBLEM — Z99.11 ON MECHANICALLY ASSISTED VENTILATION (HCC): Status: RESOLVED | Noted: 2023-04-28 | Resolved: 2023-04-30

## 2023-04-30 PROBLEM — K81.0 ACUTE GANGRENOUS CHOLECYSTITIS: Status: ACTIVE | Noted: 2023-04-30

## 2023-04-30 LAB
ALBUMIN SERPL BCP-MCNC: 1.8 G/DL (ref 3.2–4.9)
ALBUMIN/GLOB SERPL: 0.6 G/DL
ALP SERPL-CCNC: 106 U/L (ref 30–99)
ALT SERPL-CCNC: 22 U/L (ref 2–50)
ANION GAP SERPL CALC-SCNC: 11 MMOL/L (ref 7–16)
AST SERPL-CCNC: 25 U/L (ref 12–45)
BACTERIA BLD CULT: NORMAL
BACTERIA BLD CULT: NORMAL
BACTERIA CSF CULT: NORMAL
BACTERIA WND AEROBE CULT: ABNORMAL
BACTERIA WND AEROBE CULT: ABNORMAL
BASOPHILS # BLD AUTO: 0.1 % (ref 0–1.8)
BASOPHILS # BLD: 0.02 K/UL (ref 0–0.12)
BILIRUB SERPL-MCNC: 0.4 MG/DL (ref 0.1–1.5)
BUN SERPL-MCNC: 64 MG/DL (ref 8–22)
CALCIUM ALBUM COR SERPL-MCNC: 9.7 MG/DL (ref 8.5–10.5)
CALCIUM SERPL-MCNC: 7.9 MG/DL (ref 8.5–10.5)
CHLORIDE SERPL-SCNC: 101 MMOL/L (ref 96–112)
CO2 SERPL-SCNC: 22 MMOL/L (ref 20–33)
CREAT SERPL-MCNC: 3.24 MG/DL (ref 0.5–1.4)
EOSINOPHIL # BLD AUTO: 0.03 K/UL (ref 0–0.51)
EOSINOPHIL NFR BLD: 0.2 % (ref 0–6.9)
ERYTHROCYTE [DISTWIDTH] IN BLOOD BY AUTOMATED COUNT: 52 FL (ref 35.9–50)
GFR SERPLBLD CREATININE-BSD FMLA CKD-EPI: 18 ML/MIN/1.73 M 2
GLOBULIN SER CALC-MCNC: 3 G/DL (ref 1.9–3.5)
GLUCOSE BLD STRIP.AUTO-MCNC: 139 MG/DL (ref 65–99)
GLUCOSE BLD STRIP.AUTO-MCNC: 197 MG/DL (ref 65–99)
GLUCOSE BLD STRIP.AUTO-MCNC: 210 MG/DL (ref 65–99)
GLUCOSE BLD STRIP.AUTO-MCNC: 214 MG/DL (ref 65–99)
GLUCOSE SERPL-MCNC: 201 MG/DL (ref 65–99)
GRAM STN SPEC: ABNORMAL
GRAM STN SPEC: NORMAL
HCT VFR BLD AUTO: 30.6 % (ref 42–52)
HCT VFR BLD AUTO: 30.8 % (ref 42–52)
HGB BLD-MCNC: 9.9 G/DL (ref 14–18)
HGB BLD-MCNC: 9.9 G/DL (ref 14–18)
IGG CSF-MCNC: 5.9 MG/DL (ref 0–6)
IMM GRANULOCYTES # BLD AUTO: 0.29 K/UL (ref 0–0.11)
IMM GRANULOCYTES NFR BLD AUTO: 1.5 % (ref 0–0.9)
LYMPHOCYTES # BLD AUTO: 1.34 K/UL (ref 1–4.8)
LYMPHOCYTES NFR BLD: 6.8 % (ref 22–41)
MAGNESIUM SERPL-MCNC: 2.2 MG/DL (ref 1.5–2.5)
MCH RBC QN AUTO: 29.4 PG (ref 27–33)
MCHC RBC AUTO-ENTMCNC: 32.1 G/DL (ref 33.7–35.3)
MCV RBC AUTO: 91.4 FL (ref 81.4–97.8)
MONOCYTES # BLD AUTO: 1.2 K/UL (ref 0–0.85)
MONOCYTES NFR BLD AUTO: 6.1 % (ref 0–13.4)
NEUTROPHILS # BLD AUTO: 16.84 K/UL (ref 1.82–7.42)
NEUTROPHILS NFR BLD: 85.3 % (ref 44–72)
NRBC # BLD AUTO: 0 K/UL
NRBC BLD-RTO: 0 /100 WBC
PHOSPHATE SERPL-MCNC: 5.2 MG/DL (ref 2.5–4.5)
PLATELET # BLD AUTO: 385 K/UL (ref 164–446)
PMV BLD AUTO: 11.5 FL (ref 9–12.9)
POTASSIUM SERPL-SCNC: 4.2 MMOL/L (ref 3.6–5.5)
PROT SERPL-MCNC: 4.8 G/DL (ref 6–8.2)
RBC # BLD AUTO: 3.37 M/UL (ref 4.7–6.1)
SIGNIFICANT IND 70042: ABNORMAL
SIGNIFICANT IND 70042: NORMAL
SITE SITE: ABNORMAL
SITE SITE: NORMAL
SODIUM SERPL-SCNC: 134 MMOL/L (ref 135–145)
SOURCE SOURCE: ABNORMAL
SOURCE SOURCE: NORMAL
WBC # BLD AUTO: 19.7 K/UL (ref 4.8–10.8)

## 2023-04-30 PROCEDURE — 700102 HCHG RX REV CODE 250 W/ 637 OVERRIDE(OP): Performed by: FAMILY MEDICINE

## 2023-04-30 PROCEDURE — 80053 COMPREHEN METABOLIC PANEL: CPT

## 2023-04-30 PROCEDURE — 82962 GLUCOSE BLOOD TEST: CPT | Mod: 91

## 2023-04-30 PROCEDURE — A9270 NON-COVERED ITEM OR SERVICE: HCPCS | Performed by: SURGERY

## 2023-04-30 PROCEDURE — A9270 NON-COVERED ITEM OR SERVICE: HCPCS | Performed by: HOSPITALIST

## 2023-04-30 PROCEDURE — 71045 X-RAY EXAM CHEST 1 VIEW: CPT

## 2023-04-30 PROCEDURE — 85014 HEMATOCRIT: CPT

## 2023-04-30 PROCEDURE — 85025 COMPLETE CBC W/AUTO DIFF WBC: CPT

## 2023-04-30 PROCEDURE — 700102 HCHG RX REV CODE 250 W/ 637 OVERRIDE(OP): Performed by: SURGERY

## 2023-04-30 PROCEDURE — 99024 POSTOP FOLLOW-UP VISIT: CPT | Performed by: PHYSICIAN ASSISTANT

## 2023-04-30 PROCEDURE — A9270 NON-COVERED ITEM OR SERVICE: HCPCS | Performed by: FAMILY MEDICINE

## 2023-04-30 PROCEDURE — 99233 SBSQ HOSP IP/OBS HIGH 50: CPT | Performed by: STUDENT IN AN ORGANIZED HEALTH CARE EDUCATION/TRAINING PROGRAM

## 2023-04-30 PROCEDURE — 84100 ASSAY OF PHOSPHORUS: CPT

## 2023-04-30 PROCEDURE — 85018 HEMOGLOBIN: CPT

## 2023-04-30 PROCEDURE — 700102 HCHG RX REV CODE 250 W/ 637 OVERRIDE(OP): Performed by: HOSPITALIST

## 2023-04-30 PROCEDURE — 770001 HCHG ROOM/CARE - MED/SURG/GYN PRIV*

## 2023-04-30 PROCEDURE — 83735 ASSAY OF MAGNESIUM: CPT

## 2023-04-30 PROCEDURE — 700105 HCHG RX REV CODE 258: Performed by: STUDENT IN AN ORGANIZED HEALTH CARE EDUCATION/TRAINING PROGRAM

## 2023-04-30 RX ORDER — SODIUM CHLORIDE 9 MG/ML
INJECTION, SOLUTION INTRAVENOUS CONTINUOUS
Status: DISCONTINUED | OUTPATIENT
Start: 2023-04-30 | End: 2023-05-03

## 2023-04-30 RX ADMIN — SODIUM BICARBONATE 1300 MG: 650 TABLET ORAL at 17:41

## 2023-04-30 RX ADMIN — ALLOPURINOL 100 MG: 100 TABLET ORAL at 05:22

## 2023-04-30 RX ADMIN — INSULIN HUMAN 3 UNITS: 100 INJECTION, SOLUTION PARENTERAL at 20:42

## 2023-04-30 RX ADMIN — FAMOTIDINE 20 MG: 20 TABLET, FILM COATED ORAL at 05:22

## 2023-04-30 RX ADMIN — AMLODIPINE BESYLATE 5 MG: 10 TABLET ORAL at 05:22

## 2023-04-30 RX ADMIN — CARVEDILOL 12.5 MG: 12.5 TABLET, FILM COATED ORAL at 17:42

## 2023-04-30 RX ADMIN — INSULIN HUMAN 2 UNITS: 100 INJECTION, SOLUTION PARENTERAL at 13:12

## 2023-04-30 RX ADMIN — DULOXETINE HYDROCHLORIDE 30 MG: 30 CAPSULE, DELAYED RELEASE ORAL at 17:41

## 2023-04-30 RX ADMIN — INSULIN HUMAN 3 UNITS: 100 INJECTION, SOLUTION PARENTERAL at 17:47

## 2023-04-30 RX ADMIN — OXYCODONE 5 MG: 5 TABLET ORAL at 20:52

## 2023-04-30 RX ADMIN — SODIUM BICARBONATE 1300 MG: 650 TABLET ORAL at 05:22

## 2023-04-30 RX ADMIN — ATORVASTATIN CALCIUM 10 MG: 10 TABLET, FILM COATED ORAL at 17:41

## 2023-04-30 RX ADMIN — SODIUM CHLORIDE: 9 INJECTION, SOLUTION INTRAVENOUS at 08:53

## 2023-04-30 RX ADMIN — Medication 1 APPLICATOR: at 05:22

## 2023-04-30 RX ADMIN — SODIUM CHLORIDE: 9 INJECTION, SOLUTION INTRAVENOUS at 20:51

## 2023-04-30 RX ADMIN — OXYCODONE 5 MG: 5 TABLET ORAL at 17:52

## 2023-04-30 RX ADMIN — CARVEDILOL 12.5 MG: 12.5 TABLET, FILM COATED ORAL at 08:54

## 2023-04-30 RX ADMIN — Medication 1 APPLICATOR: at 17:41

## 2023-04-30 ASSESSMENT — ENCOUNTER SYMPTOMS
HEADACHES: 0
FEVER: 0
MYALGIAS: 0
PALPITATIONS: 0
NECK PAIN: 0
DIZZINESS: 0
COUGH: 0
WEAKNESS: 1
FLANK PAIN: 0
FOCAL WEAKNESS: 1
SPEECH CHANGE: 0
BACK PAIN: 0
DIARRHEA: 0
DIAPHORESIS: 0
WHEEZING: 0
HEARTBURN: 0
NERVOUS/ANXIOUS: 0
NAUSEA: 0
ABDOMINAL PAIN: 1
SORE THROAT: 0
SHORTNESS OF BREATH: 1
SENSORY CHANGE: 0
CHILLS: 0
VOMITING: 0
BLURRED VISION: 0

## 2023-04-30 ASSESSMENT — PAIN DESCRIPTION - PAIN TYPE
TYPE: ACUTE PAIN

## 2023-04-30 NOTE — CARE PLAN
The patient is Stable - Low risk of patient condition declining or worsening    Shift Goals  Clinical Goals: Pain control, rest, safety  Patient Goals: Rest  Family Goals: FLEX    Progress made toward(s) clinical / shift goals:    Problem: Knowledge Deficit - Standard  Goal: Patient and family/care givers will demonstrate understanding of plan of care, disease process/condition, diagnostic tests and medications  Description: Target End Date:  1-3 days or as soon as patient condition allows    Document in Patient Education    1.  Patient and family/caregiver oriented to unit, equipment, visitation policy and means for communicating concern  2.  Complete/review Learning Assessment  3.  Assess knowledge level of disease process/condition, treatment plan, diagnostic tests and medications  4.  Explain disease process/condition, treatment plan, diagnostic tests and medications  Outcome: Progressing     Problem: Hemodynamics  Goal: Patient's hemodynamics, fluid balance and neurologic status will be stable or improve  Description: Target End Date:  Prior to discharge or change in level of care    Document on Assessment and I/O flowsheet templates    1.  Monitor vital signs, pulse oximetry and cardiac monitor per provider order and/or policy  2.  Maintain blood pressure per provider order  3.  Hemodynamic monitoring per provider order  4.  Manage IV fluids and IV infusions  5.  Monitor intake and output  6.  Daily weights per unit policy or provider order  7.  Assess peripheral pulses and capillary refill  8.  Assess color and body temperature  9.  Position patient for maximum circulation/cardiac output  10. Monitor for signs/symptoms of excessive bleeding  11. Assess mental status, restlessness and changes in level of consciousness  12. Monitor temperature and report fever or hypothermia to provider immediately. Consideration of targeted temperature management.  Outcome: Progressing     Problem: Urinary - Renal  Perfusion  Goal: Ability to achieve and maintain adequate renal perfusion and functioning will improve  Description: Target End Date:  Prior to discharge or change in level of care    Document on I/O and Assessment flowsheet    1.  Urine output will remain greater than 0.5ml/Kg/HR  2.  Monitor amount and/or characteristics of urine per order/policy. Specific gravity per order/policy  3.  Assess signs and symptoms of renal dysfunction  Outcome: Progressing     Problem: Pain - Standard  Goal: Alleviation of pain or a reduction in pain to the patient’s comfort goal  Description: Target End Date:  Prior to discharge or change in level of care    Document on Vitals flowsheet    1.  Document pain using the appropriate pain scale per order or unit policy  2.  Educate and implement non-pharmacologic comfort measures (i.e. relaxation, distraction, massage, cold/heat therapy, etc.)  3.  Pain management medications as ordered  4.  Reassess pain after pain med administration per policy  5.  If opiods administered assess patient's response to pain medication is appropriate per POSS sedation scale  6.  Follow pain management plan developed in collaboration with patient and interdisciplinary team (including palliative care or pain specialists if applicable)  Outcome: Progressing     Problem: Fall Risk  Goal: Patient will remain free from falls  Description: Target End Date:  Prior to discharge or change in level of care    Document interventions on the [place] Fall Risk Assessment    1.  Assess for fall risk factors  2.  Implement fall precautions  Outcome: Progressing     Problem: Skin Integrity  Goal: Skin integrity is maintained or improved  Description: Target End Date:  Prior to discharge or change in level of care    Document interventions on Skin Risk/Jeremy flowsheet groups and corresponding LDA    1.  Assess and monitor skin integrity, appearance and/or temperature  2.  Assess risk factors for impaired skin integrity  and/or pressures ulcers  3.  Implement precautions to protect skin integrity in collaboration with interdisciplinary team  4.  Implement pressure ulcer prevention protocol if at risk for skin breakdown  5.  Confirm wound care consult if at risk for skin breakdown  6.  Ensure patient use of pressure relieving devices  (Low air loss bed, waffle overlay, heel protectors, ROHO cushion, etc)  Outcome: Progressing

## 2023-04-30 NOTE — CARE PLAN
Problem: Knowledge Deficit - Standard  Goal: Patient and family/care givers will demonstrate understanding of plan of care, disease process/condition, diagnostic tests and medications  Outcome: Progressing     Problem: Pain - Standard  Goal: Alleviation of pain or a reduction in pain to the patient’s comfort goal  Outcome: Progressing   The patient is Stable - Low risk of patient condition declining or worsening    Shift Goals  Clinical Goals: pain control, rest, monitor I&O  Patient Goals: rest  Family Goals: FLEX    Progress made toward(s) clinical / shift goals:  Pressure injury prevention interventions in place. Q2 turns in place. Bed alarm on. Pt declines pain at this time. Adequate PO intake for breakfast.

## 2023-04-30 NOTE — PROGRESS NOTES
Received report from RNJen. Pt arrived to unit room T405 via hospital bed. Pt transferred beds to unit bed using a slide board. Belongings at bedside.    Assessment complete.  A&O x 4. Patient calls appropriately.  Patient reporting weakness in BLE, x2 assist to pivot to commode. Bed alarm on.   Patient has 4/10 pain. Pain managed with prescribed medications.  Denies N&V. Tolerating renal diet.  Surgical transverse incision to abdomen, DIP.  RLQ NAHUN drain to bulb suction.  + void, + flatus, + BM.  Patient denies SOB.  SCD's on.    Review plan of care with patient. Call light and personal belongings with in reach. Hourly rounding in place. All needs met at this time.

## 2023-04-30 NOTE — PROGRESS NOTES
Bladder scanned patient as he has not urinated since this morning. His bladder scan resulted 40 ml. His pain has been controlled all day. The only complaints of pain are while coughing.

## 2023-04-30 NOTE — PROGRESS NOTES
"  DATE: 4/30/2023    Post Operative Day  2 laparoscopic cholecystectomy converted to open cholecystectomy.    INTERVAL EVENTS:  Transferred from TICU.  Pain controlled.  No N/V, passing flatus, -BM.  WBC down trending, last 3 hgb 11.5/9.9/9.9.  Drain with 230 cc sanguinous drainage.  Cultures (+) ESBL E Coli.    PHYSICAL EXAMINATION:  Vital Signs: /55   Pulse 79   Temp 36.1 °C (97 °F) (Temporal)   Resp 18   Ht 1.803 m (5' 10.98\")   Wt 89.1 kg (196 lb 6.9 oz)   SpO2 91%     Physical Exam  Vitals and nursing note reviewed.   Constitutional:       General: He is awake. He is not in acute distress.  Abdominal:      General: There is no distension.      Palpations: Abdomen is soft.      Tenderness: There is generalized abdominal tenderness.      Comments: Drain with sanguinous fluid, moderate amount  Dressings CDI   Neurological:      Mental Status: He is alert.   Psychiatric:         Behavior: Behavior is cooperative.         LABORATORY VALUES:   Recent Labs     04/28/23 2103 04/29/23 0421 04/30/23 0222 04/30/23  0901   WBC 27.6* 22.8* 19.7*  --    RBC 4.35* 3.91* 3.37*  --    HEMOGLOBIN 12.9* 11.5* 9.9* 9.9*   HEMATOCRIT 39.5* 35.6* 30.8* 30.6*   MCV 90.8 91.0 91.4  --    MCH 29.7 29.4 29.4  --    MCHC 32.7* 32.3* 32.1*  --    RDW 52.3* 51.8* 52.0*  --    PLATELETCT 372 345 385  --    MPV 11.6 11.5 11.5  --      Recent Labs     04/28/23 2103 04/29/23 0421 04/30/23 0222   SODIUM 141 143 134*   POTASSIUM 4.2 4.7 4.2   CHLORIDE 108 111 101   CO2 20 21 22   GLUCOSE 175* 177* 201*   BUN 45* 47* 64*   CREATININE 2.10* 2.08* 3.24*   CALCIUM 7.9* 7.5* 7.9*     Recent Labs     04/28/23  2103 04/29/23  0421 04/30/23  0222   ASTSGOT 41 41 25   ALTSGPT 28 26 22   TBILIRUBIN 1.0 1.0 0.4   ALKPHOSPHAT 152* 138* 106*   GLOBULIN 3.2 2.7 3.0            IMAGING:   DX-CHEST-PORTABLE (1 VIEW)   Final Result      1.  No significant change.   2.  Possible left PICC line with tip projecting at the axillary vein.    "   DX-CHEST-PORTABLE (1 VIEW)   Final Result      1.  Mildly improved pulmonary opacities.   2.  Possible left PICC line with tip projecting at the axillary vein.   3.  Likely trace right pleural effusion.      DX-CHEST-LIMITED (1 VIEW)   Final Result      1.  Placement of endotracheal tube with tip projecting over the mid trachea      2.  Left arm catheter present which is presumably venous in projects in the expected position of the left axillary vein      3.  Enlarged cardiac silhouette      NM-HEPATOBILIARY SCAN   Final Result      Hepatobiliary scan findings suspicious for acute cholecystitis.      MR-THORACIC SPINE-WITH & W/O   Final Result      1.  There is abnormal expansile T2 hyperintense lesion in the right side of the thoracic spinal cord at the levels of T2 and T3 Mild contrast enhancement is seen. This lesion is suspicious for spinal cord neoplasm. Other remote differential diagnosis    includes transverse myelitis.   2.  Exaggerated thoracic kyphosis.   3.  Thoracic dextroscoliosis.   4.  Minimal degenerative changes.   5.  Right pleural effusion.      MR-LUMBAR SPINE-WITH & W/O   Final Result      1.  Multifocal degenerative disease in the lumbar spine as described above.   2.  Moderate central canal and severe lateral recess stenosis at the level of L4-5. The exiting bilateral L5 nerve roots might have been impinged at the lateral recess.      MR-CERVICAL SPINE-WITH & W/O   Final Result      1.  There is abnormal expansile intramedullary T2 signal intensity lesion in the right cerebellum. Thoracic spinal cord at the level of T2 on T3. Mild diffuse contrast enhancement is seen. This finding is suspicious for spinal cord neoplasm. The other    less likely differential diagnosis includes transverse myelitis. Follow-up is recommended after 4 weeks.   2.  Mild degenerative disease in the cervical spine as described above.   3.  There is no evidence of infection in the cervical spine.      US-RUQ   Final  Result         1.  Acute cholecystitis.   2.  Atrophic right kidney.      DX-CHEST-LIMITED (1 VIEW)   Final Result      Perihilar interstitial edema and basilar atelectasis and/or consolidation. Underlying infection is possible.      MR-LUMBAR SPINE-W/O   Final Result      1.  Lower lumbar spine predominant degenerative changes as described in detail above, progressed from 2010 MRI.   2.  Edema at the L4-L5 disc space and L5 superior endplate likely represents degenerative changes. Less likely this could represent a low-grade or early discitis osteomyelitis. Correlate for evidence of infection.   3.  Acute appearing Schmorl's node at L3-L4, which can be a source of pain.      US-RENAL   Final Result      1.  Atrophic kidneys. Echogenic bilateral renal parenchyma could relate to medical renal disease.      2.  No hydronephrosis. No renal calculus.      DX-CHEST-PORTABLE (1 VIEW)   Final Result      No acute cardiac or pulmonary abnormalities are identified.      CT-ABDOMEN-PELVIS W/O   Final Result      1.  Findings suspicious for focal colitis at the hepatic flexure, less likely cholecystitis or duodenitis with secondary involvement of the colon. Infectious, inflammatory and ischemic etiologies are considerations. Underlying mass is not excluded.   2.  Cholelithiasis with distention of the gallbladder   3.  BILATERAL renal atrophy   4.  Subcentimeter LEFT adrenal myelolipoma   5.  12 mm RIGHT adrenal nodule likely an adenoma absent a history of cancer   6.  Subcentimeter RIGHT hepatic lesion, likely a cyst absent a history of cancer   7.  Atherosclerosis   8.  Colonic diverticulosis      CT-HEAD W/O   Final Result      1.  Cerebral atrophy.      2.  White matter lucencies most consistent with small vessel ischemic change versus demyelination or gliosis.      3.  Otherwise, Head CT without contrast with no acute findings. No evidence of acute cerebral infarction, hemorrhage or mass lesion.         IR-US GUIDED PIV     (Results Pending)       ASSESSMENT AND PLAN:   Cholelithiasis with acute cholecystitis- (present on admission)  Assessment & Plan  4/27 HIDA scan consistent with acute cholecystitis.  4/28 Laparoscopic cholecystectomy converted to open cholecystectomy.    Acute kidney injury superimposed on CKD (HCC)- (present on admission)  Assessment & Plan  Premorbid.  Fluid resuscitation and trend renal function.  Avoid nephrotoxins.    Postprocedural hypotension  Assessment & Plan  Patient was in shock throughout operative case.  Returns to ICU on levophed.   Wean levophed as tolerated.  4/29 Levophed stopped.  Trend SBP.      - Renal diet  - Antibiotics per primary team  - Mobilize as tolerated  - Monitor drain and follow labs       ____________________________________     Kenzie Krueger P.A.-C.    DD: 4/30/2023  12:01 PM

## 2023-04-30 NOTE — PROGRESS NOTES
4 Eyes Skin Assessment Completed by JUAN JOSÉ Ruiz and JUAN JOSÉ Rothman.    Head WDL  Ears WDL  Nose WDL  Mouth WDL  Neck WDL  Breast/Chest WDL  Shoulder Blades WDL  Spine WDL  (R) Arm/Elbow/Hand swelling  (L) Arm/Elbow/Hand LUE PICC, swelling  Abdomen transverse incision, DIP. RLQ NAHUN drain, NAHUN drain site to abdomen.  Groin WDL  Scrotum/Coccyx/Buttocks Redness and Blanching  (R) Leg Bruising and Swelling  (L) Leg Bruising and Swelling  (R) Heel/Foot/Toe Redness, Blanching, and Swelling  (L) Heel/Foot/Toe Redness, Blanching, and Swelling          Devices In Places Pulse Ox, SCD's, and Nasal Cannula, PICC      Interventions In Place NC W/Ear Foams, Sacral Mepilex, TAP System, Pillows, and Pressure Redistribution Mattress    Possible Skin Injury No    Pictures Uploaded Into Epic N/A  Wound Consult Placed N/A  RN Wound Prevention Protocol Ordered No

## 2023-04-30 NOTE — PROGRESS NOTES
Report received from Joseph CAN, assumed care at 0700.  Pt is A0X4, and responds appropriately.  Pt declines any SOB, chest pain, new onset of numbness/ tingling.  Pt rates pain at 3/10, on a scale of 1-10, repositioned pt.  Pt is voiding adequatly and without hesitancy in the urinal.  Pt has + flatus, + bowel sounds, + BM on 4/29.  Pt is a x2 assist to the bedside commode.  Pt is tolerating a regular diet, pt denies any nausea/vomiting at this time.  Plan of care discussed, all questions answered. Explained importance of calling before getting OOB and pt verbalizes understanding. Explained importance of oral care. Call light is within reach, treaded slipper socks on, bed in lowest/ locked position, hourly rounding in place, all needs met at this time.

## 2023-04-30 NOTE — ASSESSMENT & PLAN NOTE
Found to have gangrenous cholecystitis s/p laprascopic converted open cholecystectomy 4/28/2023   -- Completed antibiotic treatment from 4/25- 4/ 29    Surgical course was complicated with postoperative wound infection    Comfort care measures are in place now

## 2023-04-30 NOTE — PROGRESS NOTES
4 Eyes Skin Assessment Completed by Alexus EDMONDSON RN and JUAN JOSÉ Jack.     Head WDL  Ears L ear redness, blanching  Nose WDL  Mouth WDL  Neck WDL  Breast/Chest WDL  Shoulder Blades WDL  Spine WDL  (R) Arm/Elbow/Hand swelling, bruising  (L) Arm/Elbow/Hand LUE PICC, swelling, bruising  Abdomen transverse incision, DIP, CDI. RLQ NAHUN drain to bulb suction, old NAHUN drain site to LLQ abdomen DIP, CDI.  Groin WDL  Scrotum/Coccyx/Buttocks Redness and Blanching, sacral mepilex in place  (R) Leg Bruising and Swelling  (L) Leg Bruising and Swelling  (R) Heel/Foot/Toe redness and blanching, Swelling  (L) Heel/Foot/Toe redness and blanching, Swelling              Devices In Places Pulse Ox, SCD's, and Nasal Cannula w/ grey ear foams, PICC        Interventions In Place NC W/Ear Foams, Sacral Mepilex, TAP System, Pillows, and Pressure Redistribution Mattress, Q2 turns     Possible Skin Injury No     Pictures Uploaded Into Epic N/A  Wound Consult Placed N/A  RN Wound Prevention Protocol Ordered No

## 2023-04-30 NOTE — PROGRESS NOTES
SBAR given to JUAN JOSÉ Ruiz for patient transfer to [facility]. All questions answered. Patient notified and will update family of new room. Patient stable and no complaints of pain except with movement and cough.

## 2023-04-30 NOTE — PROGRESS NOTES
"[facility] Medicine Daily Progress Note    Date of Service  4/30/2023    Chief Complaint  Tyrone Cline is a 82 y.o. male admitted 4/21/2023 with Colitis.    Hospital Course  Admitted with sepsis secondary to colitis.  Patient was started on empiric coverage with IV Rocephin and Flagyl.  He was also noted to have bilateral lower extremity weakness.  MRI of the lumbar spine was done which showed possible edema.  Neurology was then consulted on the case.  He was also noted to have an SANJUANITA on CKD stage IV.  Nephrology was consulted on the case.  He was started on a careful IVF hydration.  His CT scan also showed cholelithiasis.  Ultrasound was done which showed cholecystitis.  Surgery was then consulted on the case and patient underwent laparoscopic cholecystectomy on 4/28 which needed to be converted to open cholecystectomy, patient was persistently hypotensive during surgery and postoperatively  required IV pressor medication, thus he was care for in the surgical ICU for additional treatment.  On 4/29 the patient was transferred back to the medical service for ongoing management.     Interval Problem Update  Patient seen and examined at bedside, overall he feels like he is improving. Still with a \"very sore\" abdomen. But denies nausea, vomiting and is tolerating diet. Does have BM noted in flow sheet although he is unaware of when his last BM was.   - I have reviewed his vitals, he is afebrile his heart rate blood pressure and respiratory rate are stable he is saturating well on 2 L of supplemental oxygen  -I have reviewed his labs his hemoglobin did drop from 11.5-9.9, repeat H/H is stable at 9.9, no evidence of bleeding, will need to trend, repeat CBC in AM.  He did have an increase in his renal function with a BUN/creatinine 64/30.24, I have restarted him on gentle IV fluids at 83 cc an hour, patient states that he is urinating well.  Monitor closely for any signs of retention.  We will repeat BMP this " evening.  Nephrology has signed off however if his renal function again continues to rise we will need to re-consult    -Patient is no longer on antibiotics we will continue to monitor for any signs of infection  -Imaging continues to show a PICC line with tip in left axillary, no longer in correct PICC placement, unsure if ?PICC vs midline  I have asked for VAT team to evaluate this line as if it was a PICC and has dislodged will need to be removed.    - pending PT/OT evaluation for dispo, suspect pt will need placement given his weakness.         I have discussed this patient's plan of care and discharge plan at IDT rounds today with Case Management, Nursing, Nursing leadership, and other members of the IDT team.    Consultants/Specialty  general surgery, nephrology, and neurology    Code Status  DNAR/DNI    Disposition  Patient is not medically cleared for discharge.   Anticipate discharge to to an inpatient rehabilitation hospital.  I have placed the appropriate orders for post-discharge needs.    Review of Systems  Review of Systems   Constitutional:  Positive for malaise/fatigue. Negative for chills, diaphoresis and fever.   HENT:  Negative for congestion, hearing loss and sore throat.    Eyes:  Negative for blurred vision.   Respiratory:  Positive for shortness of breath. Negative for cough and wheezing.    Cardiovascular:  Negative for chest pain, palpitations and leg swelling.   Gastrointestinal:  Positive for abdominal pain. Negative for diarrhea, heartburn, nausea and vomiting.   Genitourinary:  Negative for dysuria, flank pain and hematuria.   Musculoskeletal:  Negative for back pain, joint pain, myalgias and neck pain.   Skin:  Negative for rash.   Neurological:  Positive for focal weakness and weakness. Negative for dizziness, sensory change, speech change and headaches.   Psychiatric/Behavioral:  The patient is not nervous/anxious.       Physical Exam  Temp:  [36.1 °C (97 °F)-36.8 °C (98.2 °F)] 36.1  °C (97 °F)  Pulse:  [74-89] 75  Resp:  [16-27] 18  BP: (100-126)/(54-68) 126/68  SpO2:  [90 %-94 %] 93 %    Physical Exam  Vitals and nursing note reviewed.   HENT:      Head: Normocephalic and atraumatic.      Nose: No congestion.      Mouth/Throat:      Mouth: Mucous membranes are moist.   Eyes:      Extraocular Movements: Extraocular movements intact.      Conjunctiva/sclera: Conjunctivae normal.   Cardiovascular:      Rate and Rhythm: Normal rate and regular rhythm.      Heart sounds: Murmur heard.   Pulmonary:      Effort: Pulmonary effort is normal.      Breath sounds: Normal breath sounds.   Abdominal:      Tenderness: There is abdominal tenderness. There is no guarding or rebound.      Comments: Right upper quadrant dressing over the surgical area with a NAHUN drain in place, draining serosanguineous fluid   Musculoskeletal:      Cervical back: No tenderness.      Right lower leg: No edema.      Left lower leg: No edema.   Skin:     General: Skin is warm and dry.      Coloration: Skin is pale.   Neurological:      Mental Status: He is alert and oriented to person, place, and time.      Cranial Nerves: No cranial nerve deficit.      Motor: Weakness (MMT BUE 5/5, RLE 4/5, LLE 4+/5) present.       Fluids    Intake/Output Summary (Last 24 hours) at 4/30/2023 1152  Last data filed at 4/30/2023 0826  Gross per 24 hour   Intake 437.99 ml   Output 290 ml   Net 147.99 ml       Laboratory  Recent Labs     04/28/23 2103 04/29/23  0421 04/30/23  0222 04/30/23  0901   WBC 27.6* 22.8* 19.7*  --    RBC 4.35* 3.91* 3.37*  --    HEMOGLOBIN 12.9* 11.5* 9.9* 9.9*   HEMATOCRIT 39.5* 35.6* 30.8* 30.6*   MCV 90.8 91.0 91.4  --    MCH 29.7 29.4 29.4  --    MCHC 32.7* 32.3* 32.1*  --    RDW 52.3* 51.8* 52.0*  --    PLATELETCT 372 345 385  --    MPV 11.6 11.5 11.5  --      Recent Labs     04/28/23 2103 04/29/23  0421 04/30/23  0222   SODIUM 141 143 134*   POTASSIUM 4.2 4.7 4.2   CHLORIDE 108 111 101   CO2 20 21 22   GLUCOSE 175* 177*  201*   BUN 45* 47* 64*   CREATININE 2.10* 2.08* 3.24*   CALCIUM 7.9* 7.5* 7.9*               Recent Labs     04/28/23  2103   TRIGLYCERIDE 138       Imaging  DX-CHEST-PORTABLE (1 VIEW)   Final Result      1.  No significant change.   2.  Possible left PICC line with tip projecting at the axillary vein.      DX-CHEST-PORTABLE (1 VIEW)   Final Result      1.  Mildly improved pulmonary opacities.   2.  Possible left PICC line with tip projecting at the axillary vein.   3.  Likely trace right pleural effusion.      DX-CHEST-LIMITED (1 VIEW)   Final Result      1.  Placement of endotracheal tube with tip projecting over the mid trachea      2.  Left arm catheter present which is presumably venous in projects in the expected position of the left axillary vein      3.  Enlarged cardiac silhouette      NM-HEPATOBILIARY SCAN   Final Result      Hepatobiliary scan findings suspicious for acute cholecystitis.      MR-THORACIC SPINE-WITH & W/O   Final Result      1.  There is abnormal expansile T2 hyperintense lesion in the right side of the thoracic spinal cord at the levels of T2 and T3 Mild contrast enhancement is seen. This lesion is suspicious for spinal cord neoplasm. Other remote differential diagnosis    includes transverse myelitis.   2.  Exaggerated thoracic kyphosis.   3.  Thoracic dextroscoliosis.   4.  Minimal degenerative changes.   5.  Right pleural effusion.      MR-LUMBAR SPINE-WITH & W/O   Final Result      1.  Multifocal degenerative disease in the lumbar spine as described above.   2.  Moderate central canal and severe lateral recess stenosis at the level of L4-5. The exiting bilateral L5 nerve roots might have been impinged at the lateral recess.      MR-CERVICAL SPINE-WITH & W/O   Final Result      1.  There is abnormal expansile intramedullary T2 signal intensity lesion in the right cerebellum. Thoracic spinal cord at the level of T2 on T3. Mild diffuse contrast enhancement is seen. This finding is  suspicious for spinal cord neoplasm. The other    less likely differential diagnosis includes transverse myelitis. Follow-up is recommended after 4 weeks.   2.  Mild degenerative disease in the cervical spine as described above.   3.  There is no evidence of infection in the cervical spine.      US-RUQ   Final Result         1.  Acute cholecystitis.   2.  Atrophic right kidney.      DX-CHEST-LIMITED (1 VIEW)   Final Result      Perihilar interstitial edema and basilar atelectasis and/or consolidation. Underlying infection is possible.      MR-LUMBAR SPINE-W/O   Final Result      1.  Lower lumbar spine predominant degenerative changes as described in detail above, progressed from 2010 MRI.   2.  Edema at the L4-L5 disc space and L5 superior endplate likely represents degenerative changes. Less likely this could represent a low-grade or early discitis osteomyelitis. Correlate for evidence of infection.   3.  Acute appearing Schmorl's node at L3-L4, which can be a source of pain.      US-RENAL   Final Result      1.  Atrophic kidneys. Echogenic bilateral renal parenchyma could relate to medical renal disease.      2.  No hydronephrosis. No renal calculus.      DX-CHEST-PORTABLE (1 VIEW)   Final Result      No acute cardiac or pulmonary abnormalities are identified.      CT-ABDOMEN-PELVIS W/O   Final Result      1.  Findings suspicious for focal colitis at the hepatic flexure, less likely cholecystitis or duodenitis with secondary involvement of the colon. Infectious, inflammatory and ischemic etiologies are considerations. Underlying mass is not excluded.   2.  Cholelithiasis with distention of the gallbladder   3.  BILATERAL renal atrophy   4.  Subcentimeter LEFT adrenal myelolipoma   5.  12 mm RIGHT adrenal nodule likely an adenoma absent a history of cancer   6.  Subcentimeter RIGHT hepatic lesion, likely a cyst absent a history of cancer   7.  Atherosclerosis   8.  Colonic diverticulosis      CT-HEAD W/O   Final  Result      1.  Cerebral atrophy.      2.  White matter lucencies most consistent with small vessel ischemic change versus demyelination or gliosis.      3.  Otherwise, Head CT without contrast with no acute findings. No evidence of acute cerebral infarction, hemorrhage or mass lesion.                Assessment/Plan  Acute gangrenous cholecystitis- (present on admission)  Assessment & Plan  Found to have gangrenous cholecystitis s/p laprascopic converted open cholecystectomy 4/28/2023  Surgery continues to follow   No longer on antibiotics, as we have source control   Monitor closely for signs of infection   Post op pain control   Bowel regiment   Mobilize as able   Diet as tolerated     Postprocedural hypotension  Assessment & Plan  Hypotension in OR and post open carmelo  Now improved and off pressors   BP stable   Continue to monitor vitals per protocol     Hypercalcemia- (present on admission)  Assessment & Plan  Likely due to immobility   Improved with IVF hydration  Continue to follow and treat accordingly     Hypomagnesemia- (present on admission)  Assessment & Plan  Repeat mag level tomorrow AM  Goal >2  replace as needed     Cholelithiasis with acute cholecystitis- (present on admission)  Assessment & Plan  S/p surgical removal, converted to open cholecystectomy  NAHUN drain, monitor output  The patient is tolerating a diet already, monitor bowel function    Hypokalemia- (present on admission)  Assessment & Plan  Stable, monitor CMP and replace as needed   - goal K >4    Thrombocytopenia (HCC)- (present on admission)  Assessment & Plan  Resolved, plt are stable     High anion gap metabolic acidosis- (present on admission)  Assessment & Plan  Acute on chronic kidney disease, improving   Resumed gentle IVF today for SANJUANITA on CKD  Stable     Hyponatremia- (present on admission)  Assessment & Plan  Improved, slightly down today at 134, mild and asymptomatic   Cont to monitor BMP     Weakness of both lower extremities-  (present on admission)  Assessment & Plan  T2/T3 intramedullary lesion of unclear chronicity and clinical significance as well as 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  Holding off on immunmodulatory treatment status-post surgery and neurologic improvement  Follow-up pending CSF studies. Follow clinically. PT/OT and rehab as needed.   Neurology was considering immunomodulatory therapy if neurologic decline or cessation of neurologic recovery.   Neurology felt the patient will  need repeat MRI T spine with and without contrast in 1 month.   Pending PT/OT evaluation for possible placement   Stable and unchanged     Sepsis (HCC)- (present on admission)  Assessment & Plan  This is Sepsis Present on admission  SIRS criteria identified on my evaluation include: Leukocytosis, with WBC greater than 12,000  Source is colitis  Sepsis protocol initiated  Fluid resuscitation ordered per protocol  Crystalloid Fluid Administration: Fluid resuscitation ordered per standard protocol - 30 mL/kg per current or ideal body weight  IV antibiotics as appropriate for source of sepsis  Reassessment: I have reassessed the patient's hemodynamic status    Due to gangrenous gallbladder requiring open acrmelo   Improving       Colitis- (present on admission)  Assessment & Plan  Cultures negative, monitor  Initial admitting impression, currently improved  Diet as tolerated     Acute kidney injury superimposed on CKD (HCC)- (present on admission)  Assessment & Plan  Recurrent today, Cr 2.1 ->3.24  Restart IV   Bladder scan to rule out retention   Avoid nephrotoxins   Repeat bmp this evening to trend       Diabetic peripheral neuropathy (HCC)- (present on admission)  Assessment & Plan  Cymbalta, medical treatment    Essential hypertension- (present on admission)  Assessment & Plan  Monitor closely, the patient had a hypotensive episode postoperatively  Off pressors and has since been resumed on  spironolactone , amlodipine and coreg   Monitor BP and adjust medications as needed   Prn hydralazine     Type 2 diabetes mellitus with kidney complication, without long-term current use of insulin (HCC)- (present on admission)  Assessment & Plan  Ha1c 6.4% 4 months ago   On ozempic and januvia outpatient   Continue ISS and hypoglycemic protocol while inpatient     Hypercholesterolemia- (present on admission)  Assessment & Plan  Stable, continue Lipitor    Chronic kidney disease (CKD), stage IV (severe) (HCC)- (present on admission)  Assessment & Plan  Acute on chronic kidney disease   Was improving but up again today from baseline   Cr 2.08 to 3.24  Bladder scan ordered   Restart gentle IVF, repeat BMP this evening   May need to reconsult nephro pending trend   Avoid nephrotoxins         VTE prophylaxis: SCDs/TEDs    I have performed a physical exam and reviewed and updated ROS and Plan today (4/30/2023). In review of yesterday's note (4/29/2023), there are no changes except as documented above.    My total time spent caring for the patient on the day of the encounter was 55 minutes.   This does not include time spent on separately billable procedures/tests.

## 2023-04-30 NOTE — ASSESSMENT & PLAN NOTE
Hypotension in OR and post open carmelo  Now improved and off pressors   BP stable   Continue to monitor vitals per protocol

## 2023-05-01 ENCOUNTER — APPOINTMENT (OUTPATIENT)
Dept: RADIOLOGY | Facility: MEDICAL CENTER | Age: 83
DRG: 853 | End: 2023-05-01
Attending: SURGERY
Payer: MEDICARE

## 2023-05-01 PROBLEM — R93.89 ABNORMAL MRI: Status: ACTIVE | Noted: 2023-05-01

## 2023-05-01 LAB
ALBUMIN SERPL BCP-MCNC: 1.8 G/DL (ref 3.2–4.9)
ALBUMIN/GLOB SERPL: 0.6 G/DL
ALP SERPL-CCNC: 120 U/L (ref 30–99)
ALT SERPL-CCNC: 31 U/L (ref 2–50)
ANION GAP SERPL CALC-SCNC: 11 MMOL/L (ref 7–16)
AST SERPL-CCNC: 42 U/L (ref 12–45)
BASOPHILS # BLD AUTO: 0.1 % (ref 0–1.8)
BASOPHILS # BLD: 0.02 K/UL (ref 0–0.12)
BILIRUB SERPL-MCNC: 0.5 MG/DL (ref 0.1–1.5)
BUN SERPL-MCNC: 65 MG/DL (ref 8–22)
CALCIUM ALBUM COR SERPL-MCNC: 10 MG/DL (ref 8.5–10.5)
CALCIUM SERPL-MCNC: 8.2 MG/DL (ref 8.5–10.5)
CHLORIDE SERPL-SCNC: 104 MMOL/L (ref 96–112)
CO2 SERPL-SCNC: 19 MMOL/L (ref 20–33)
CREAT SERPL-MCNC: 3.14 MG/DL (ref 0.5–1.4)
EOSINOPHIL # BLD AUTO: 0.07 K/UL (ref 0–0.51)
EOSINOPHIL NFR BLD: 0.4 % (ref 0–6.9)
ERYTHROCYTE [DISTWIDTH] IN BLOOD BY AUTOMATED COUNT: 51.5 FL (ref 35.9–50)
GFR SERPLBLD CREATININE-BSD FMLA CKD-EPI: 19 ML/MIN/1.73 M 2
GLOBULIN SER CALC-MCNC: 3.1 G/DL (ref 1.9–3.5)
GLUCOSE BLD STRIP.AUTO-MCNC: 129 MG/DL (ref 65–99)
GLUCOSE BLD STRIP.AUTO-MCNC: 164 MG/DL (ref 65–99)
GLUCOSE BLD STRIP.AUTO-MCNC: 176 MG/DL (ref 65–99)
GLUCOSE SERPL-MCNC: 191 MG/DL (ref 65–99)
HCT VFR BLD AUTO: 28.7 % (ref 42–52)
HGB BLD-MCNC: 9.2 G/DL (ref 14–18)
IMM GRANULOCYTES # BLD AUTO: 0.34 K/UL (ref 0–0.11)
IMM GRANULOCYTES NFR BLD AUTO: 1.8 % (ref 0–0.9)
LYMPHOCYTES # BLD AUTO: 1.2 K/UL (ref 1–4.8)
LYMPHOCYTES NFR BLD: 6.2 % (ref 22–41)
MAGNESIUM SERPL-MCNC: 2.1 MG/DL (ref 1.5–2.5)
MCH RBC QN AUTO: 29.2 PG (ref 27–33)
MCHC RBC AUTO-ENTMCNC: 32.1 G/DL (ref 33.7–35.3)
MCV RBC AUTO: 91.1 FL (ref 81.4–97.8)
MONOCYTES # BLD AUTO: 1.13 K/UL (ref 0–0.85)
MONOCYTES NFR BLD AUTO: 5.9 % (ref 0–13.4)
NEUTROPHILS # BLD AUTO: 16.51 K/UL (ref 1.82–7.42)
NEUTROPHILS NFR BLD: 85.6 % (ref 44–72)
NRBC # BLD AUTO: 0 K/UL
NRBC BLD-RTO: 0 /100 WBC
OLIGOCLONAL BANDS CSF ELPH-IMP: NORMAL
OLIGOCLONAL BANDS CSF IEF: 0 BANDS (ref 0–1)
OLIGOCLONAL BANDS.IT SER+CSF QL: NEGATIVE
PATHOLOGY CONSULT NOTE: NORMAL
PLATELET # BLD AUTO: 468 K/UL (ref 164–446)
PMV BLD AUTO: 11.2 FL (ref 9–12.9)
POTASSIUM SERPL-SCNC: 4.2 MMOL/L (ref 3.6–5.5)
PROT SERPL-MCNC: 4.9 G/DL (ref 6–8.2)
RBC # BLD AUTO: 3.15 M/UL (ref 4.7–6.1)
SODIUM SERPL-SCNC: 134 MMOL/L (ref 135–145)
VDRL CSF QL: NON REACTIVE
WBC # BLD AUTO: 19.3 K/UL (ref 4.8–10.8)

## 2023-05-01 PROCEDURE — 83735 ASSAY OF MAGNESIUM: CPT

## 2023-05-01 PROCEDURE — 770001 HCHG ROOM/CARE - MED/SURG/GYN PRIV*

## 2023-05-01 PROCEDURE — 80053 COMPREHEN METABOLIC PANEL: CPT

## 2023-05-01 PROCEDURE — 71045 X-RAY EXAM CHEST 1 VIEW: CPT

## 2023-05-01 PROCEDURE — A9270 NON-COVERED ITEM OR SERVICE: HCPCS | Performed by: FAMILY MEDICINE

## 2023-05-01 PROCEDURE — A9270 NON-COVERED ITEM OR SERVICE: HCPCS | Performed by: HOSPITALIST

## 2023-05-01 PROCEDURE — A9270 NON-COVERED ITEM OR SERVICE: HCPCS | Performed by: SURGERY

## 2023-05-01 PROCEDURE — 99233 SBSQ HOSP IP/OBS HIGH 50: CPT | Performed by: STUDENT IN AN ORGANIZED HEALTH CARE EDUCATION/TRAINING PROGRAM

## 2023-05-01 PROCEDURE — 85025 COMPLETE CBC W/AUTO DIFF WBC: CPT

## 2023-05-01 PROCEDURE — 700111 HCHG RX REV CODE 636 W/ 250 OVERRIDE (IP): Performed by: SURGERY

## 2023-05-01 PROCEDURE — 700102 HCHG RX REV CODE 250 W/ 637 OVERRIDE(OP): Performed by: FAMILY MEDICINE

## 2023-05-01 PROCEDURE — 97530 THERAPEUTIC ACTIVITIES: CPT

## 2023-05-01 PROCEDURE — 700111 HCHG RX REV CODE 636 W/ 250 OVERRIDE (IP): Performed by: STUDENT IN AN ORGANIZED HEALTH CARE EDUCATION/TRAINING PROGRAM

## 2023-05-01 PROCEDURE — 99024 POSTOP FOLLOW-UP VISIT: CPT | Performed by: PHYSICIAN ASSISTANT

## 2023-05-01 PROCEDURE — 700105 HCHG RX REV CODE 258: Performed by: STUDENT IN AN ORGANIZED HEALTH CARE EDUCATION/TRAINING PROGRAM

## 2023-05-01 PROCEDURE — 99232 SBSQ HOSP IP/OBS MODERATE 35: CPT | Performed by: PHYSICAL MEDICINE & REHABILITATION

## 2023-05-01 PROCEDURE — 700102 HCHG RX REV CODE 250 W/ 637 OVERRIDE(OP): Performed by: HOSPITALIST

## 2023-05-01 PROCEDURE — 8E0ZXY6 ISOLATION: ICD-10-PCS | Performed by: STUDENT IN AN ORGANIZED HEALTH CARE EDUCATION/TRAINING PROGRAM

## 2023-05-01 PROCEDURE — 97112 NEUROMUSCULAR REEDUCATION: CPT

## 2023-05-01 PROCEDURE — 82962 GLUCOSE BLOOD TEST: CPT | Mod: 91

## 2023-05-01 PROCEDURE — 700102 HCHG RX REV CODE 250 W/ 637 OVERRIDE(OP): Performed by: SURGERY

## 2023-05-01 PROCEDURE — 97535 SELF CARE MNGMENT TRAINING: CPT

## 2023-05-01 PROCEDURE — 99232 SBSQ HOSP IP/OBS MODERATE 35: CPT | Mod: GC | Performed by: PSYCHIATRY & NEUROLOGY

## 2023-05-01 RX ORDER — HEPARIN SODIUM 5000 [USP'U]/ML
5000 INJECTION, SOLUTION INTRAVENOUS; SUBCUTANEOUS EVERY 8 HOURS
Status: DISCONTINUED | OUTPATIENT
Start: 2023-05-01 | End: 2023-05-14

## 2023-05-01 RX ADMIN — IMMUNE GLOBULIN INFUSION (HUMAN) 30 G: 100 INJECTION, SOLUTION INTRAVENOUS; SUBCUTANEOUS at 19:59

## 2023-05-01 RX ADMIN — INSULIN HUMAN 2 UNITS: 100 INJECTION, SOLUTION PARENTERAL at 08:19

## 2023-05-01 RX ADMIN — ALLOPURINOL 100 MG: 100 TABLET ORAL at 05:29

## 2023-05-01 RX ADMIN — FAMOTIDINE 20 MG: 10 INJECTION, SOLUTION INTRAVENOUS at 05:29

## 2023-05-01 RX ADMIN — HEPARIN SODIUM 5000 UNITS: 5000 INJECTION, SOLUTION INTRAVENOUS; SUBCUTANEOUS at 22:01

## 2023-05-01 RX ADMIN — CARVEDILOL 12.5 MG: 12.5 TABLET, FILM COATED ORAL at 16:51

## 2023-05-01 RX ADMIN — SODIUM CHLORIDE: 9 INJECTION, SOLUTION INTRAVENOUS at 19:41

## 2023-05-01 RX ADMIN — OXYCODONE 5 MG: 5 TABLET ORAL at 02:25

## 2023-05-01 RX ADMIN — SODIUM CHLORIDE: 9 INJECTION, SOLUTION INTRAVENOUS at 08:10

## 2023-05-01 RX ADMIN — DULOXETINE HYDROCHLORIDE 30 MG: 30 CAPSULE, DELAYED RELEASE ORAL at 16:52

## 2023-05-01 RX ADMIN — SODIUM BICARBONATE 1300 MG: 650 TABLET ORAL at 05:29

## 2023-05-01 RX ADMIN — INSULIN HUMAN 2 UNITS: 100 INJECTION, SOLUTION PARENTERAL at 16:58

## 2023-05-01 RX ADMIN — ATORVASTATIN CALCIUM 10 MG: 10 TABLET, FILM COATED ORAL at 16:52

## 2023-05-01 RX ADMIN — SODIUM BICARBONATE 1300 MG: 650 TABLET ORAL at 16:52

## 2023-05-01 RX ADMIN — HEPARIN SODIUM 5000 UNITS: 5000 INJECTION, SOLUTION INTRAVENOUS; SUBCUTANEOUS at 16:51

## 2023-05-01 RX ADMIN — CARVEDILOL 12.5 MG: 12.5 TABLET, FILM COATED ORAL at 08:10

## 2023-05-01 RX ADMIN — AMLODIPINE BESYLATE 5 MG: 10 TABLET ORAL at 05:30

## 2023-05-01 RX ADMIN — Medication 1 APPLICATOR: at 05:29

## 2023-05-01 ASSESSMENT — ENCOUNTER SYMPTOMS
BACK PAIN: 0
NERVOUS/ANXIOUS: 0
PALPITATIONS: 0
DIARRHEA: 0
HEADACHES: 0
NECK PAIN: 0
HEARTBURN: 0
FEVER: 0
BLURRED VISION: 0
VOMITING: 0
ABDOMINAL PAIN: 1
SHORTNESS OF BREATH: 1
SPEECH CHANGE: 0
FLANK PAIN: 0
WHEEZING: 0
COUGH: 0
SENSORY CHANGE: 0
FOCAL WEAKNESS: 1
NAUSEA: 0
DIAPHORESIS: 0
SORE THROAT: 0
MYALGIAS: 0
WEAKNESS: 1
CHILLS: 0
DIZZINESS: 0

## 2023-05-01 ASSESSMENT — COGNITIVE AND FUNCTIONAL STATUS - GENERAL
EATING MEALS: A LITTLE
MOBILITY SCORE: 7
TURNING FROM BACK TO SIDE WHILE IN FLAT BAD: A LOT
DRESSING REGULAR UPPER BODY CLOTHING: A LOT
DRESSING REGULAR LOWER BODY CLOTHING: A LOT
DAILY ACTIVITIY SCORE: 14
HELP NEEDED FOR BATHING: A LOT
STANDING UP FROM CHAIR USING ARMS: TOTAL
MOVING FROM LYING ON BACK TO SITTING ON SIDE OF FLAT BED: UNABLE
CLIMB 3 TO 5 STEPS WITH RAILING: TOTAL
WALKING IN HOSPITAL ROOM: TOTAL
PERSONAL GROOMING: A LITTLE
SUGGESTED CMS G CODE MODIFIER MOBILITY: CM
SUGGESTED CMS G CODE MODIFIER DAILY ACTIVITY: CK
MOVING TO AND FROM BED TO CHAIR: UNABLE
TOILETING: A LOT

## 2023-05-01 ASSESSMENT — PAIN DESCRIPTION - PAIN TYPE
TYPE: ACUTE PAIN
TYPE: ACUTE PAIN;SURGICAL PAIN
TYPE: ACUTE PAIN;SURGICAL PAIN
TYPE: ACUTE PAIN
TYPE: ACUTE PAIN;SURGICAL PAIN
TYPE: ACUTE PAIN

## 2023-05-01 ASSESSMENT — GAIT ASSESSMENTS: GAIT LEVEL OF ASSIST: UNABLE TO PARTICIPATE

## 2023-05-01 NOTE — PROGRESS NOTES
"  DATE: 5/1/2023    Post Operative Day  3 laparoscopic cholecystectomy converted to open cholecystectomy.    INTERVAL EVENTS:  Pain controlled.  Tolerating diet, passing flatus, -BM.  WBC and hemoglobin grossly unchanged.  Drain with 190 cc sanguinous drainage, thinning out.    PHYSICAL EXAMINATION:  Vital Signs: /74   Pulse 80   Temp 36.3 °C (97.3 °F) (Temporal)   Resp 20   Ht 1.803 m (5' 10.98\")   Wt 89.1 kg (196 lb 6.9 oz)   SpO2 94%     Physical Exam  Vitals and nursing note reviewed.   Constitutional:       General: He is awake. He is not in acute distress.  Abdominal:      General: There is no distension.      Palpations: Abdomen is soft.      Tenderness: There is generalized abdominal tenderness.      Comments: Drain with sanguinous fluid, moderate amount  Dressings CDI   Neurological:      Mental Status: He is alert.   Psychiatric:         Behavior: Behavior is cooperative.         LABORATORY VALUES:   Recent Labs     04/29/23 0421 04/30/23 0222 04/30/23  0901 05/01/23  0544   WBC 22.8* 19.7*  --  19.3*   RBC 3.91* 3.37*  --  3.15*   HEMOGLOBIN 11.5* 9.9* 9.9* 9.2*   HEMATOCRIT 35.6* 30.8* 30.6* 28.7*   MCV 91.0 91.4  --  91.1   MCH 29.4 29.4  --  29.2   MCHC 32.3* 32.1*  --  32.1*   RDW 51.8* 52.0*  --  51.5*   PLATELETCT 345 385  --  468*   MPV 11.5 11.5  --  11.2       Recent Labs     04/29/23 0421 04/30/23 0222 05/01/23  0544   SODIUM 143 134* 134*   POTASSIUM 4.7 4.2 4.2   CHLORIDE 111 101 104   CO2 21 22 19*   GLUCOSE 177* 201* 191*   BUN 47* 64* 65*   CREATININE 2.08* 3.24* 3.14*   CALCIUM 7.5* 7.9* 8.2*       Recent Labs     04/29/23 0421 04/30/23 0222 05/01/23  0544   ASTSGOT 41 25 42   ALTSGPT 26 22 31   TBILIRUBIN 1.0 0.4 0.5   ALKPHOSPHAT 138* 106* 120*   GLOBULIN 2.7 3.0 3.1              IMAGING:   DX-CHEST-PORTABLE (1 VIEW)   Final Result         1.  Pulmonary edema and/or infiltrates are identified, which are stable since the prior exam.   2.  Left PICC line tip terminates in " the left axilla, stable since prior study.      DX-CHEST-PORTABLE (1 VIEW)   Final Result      1.  No significant change.   2.  Possible left PICC line with tip projecting at the axillary vein.      DX-CHEST-PORTABLE (1 VIEW)   Final Result      1.  Mildly improved pulmonary opacities.   2.  Possible left PICC line with tip projecting at the axillary vein.   3.  Likely trace right pleural effusion.      DX-CHEST-LIMITED (1 VIEW)   Final Result      1.  Placement of endotracheal tube with tip projecting over the mid trachea      2.  Left arm catheter present which is presumably venous in projects in the expected position of the left axillary vein      3.  Enlarged cardiac silhouette      NM-HEPATOBILIARY SCAN   Final Result      Hepatobiliary scan findings suspicious for acute cholecystitis.      MR-THORACIC SPINE-WITH & W/O   Final Result      1.  There is abnormal expansile T2 hyperintense lesion in the right side of the thoracic spinal cord at the levels of T2 and T3 Mild contrast enhancement is seen. This lesion is suspicious for spinal cord neoplasm. Other remote differential diagnosis    includes transverse myelitis.   2.  Exaggerated thoracic kyphosis.   3.  Thoracic dextroscoliosis.   4.  Minimal degenerative changes.   5.  Right pleural effusion.      MR-LUMBAR SPINE-WITH & W/O   Final Result      1.  Multifocal degenerative disease in the lumbar spine as described above.   2.  Moderate central canal and severe lateral recess stenosis at the level of L4-5. The exiting bilateral L5 nerve roots might have been impinged at the lateral recess.      MR-CERVICAL SPINE-WITH & W/O   Final Result      1.  There is abnormal expansile intramedullary T2 signal intensity lesion in the right cerebellum. Thoracic spinal cord at the level of T2 on T3. Mild diffuse contrast enhancement is seen. This finding is suspicious for spinal cord neoplasm. The other    less likely differential diagnosis includes transverse myelitis.  Follow-up is recommended after 4 weeks.   2.  Mild degenerative disease in the cervical spine as described above.   3.  There is no evidence of infection in the cervical spine.      US-RUQ   Final Result         1.  Acute cholecystitis.   2.  Atrophic right kidney.      DX-CHEST-LIMITED (1 VIEW)   Final Result      Perihilar interstitial edema and basilar atelectasis and/or consolidation. Underlying infection is possible.      MR-LUMBAR SPINE-W/O   Final Result      1.  Lower lumbar spine predominant degenerative changes as described in detail above, progressed from 2010 MRI.   2.  Edema at the L4-L5 disc space and L5 superior endplate likely represents degenerative changes. Less likely this could represent a low-grade or early discitis osteomyelitis. Correlate for evidence of infection.   3.  Acute appearing Schmorl's node at L3-L4, which can be a source of pain.      US-RENAL   Final Result      1.  Atrophic kidneys. Echogenic bilateral renal parenchyma could relate to medical renal disease.      2.  No hydronephrosis. No renal calculus.      DX-CHEST-PORTABLE (1 VIEW)   Final Result      No acute cardiac or pulmonary abnormalities are identified.      CT-ABDOMEN-PELVIS W/O   Final Result      1.  Findings suspicious for focal colitis at the hepatic flexure, less likely cholecystitis or duodenitis with secondary involvement of the colon. Infectious, inflammatory and ischemic etiologies are considerations. Underlying mass is not excluded.   2.  Cholelithiasis with distention of the gallbladder   3.  BILATERAL renal atrophy   4.  Subcentimeter LEFT adrenal myelolipoma   5.  12 mm RIGHT adrenal nodule likely an adenoma absent a history of cancer   6.  Subcentimeter RIGHT hepatic lesion, likely a cyst absent a history of cancer   7.  Atherosclerosis   8.  Colonic diverticulosis      CT-HEAD W/O   Final Result      1.  Cerebral atrophy.      2.  White matter lucencies most consistent with small vessel ischemic change  versus demyelination or gliosis.      3.  Otherwise, Head CT without contrast with no acute findings. No evidence of acute cerebral infarction, hemorrhage or mass lesion.             ASSESSMENT AND PLAN:   Cholelithiasis with acute cholecystitis- (present on admission)  Assessment & Plan  4/27 HIDA scan consistent with acute cholecystitis.  4/28 Laparoscopic cholecystectomy converted to open cholecystectomy.  4/30 Gallbladder fluid cultures (+) ESBL E Coli.  Antibiotics per hospital team.    Acute kidney injury superimposed on CKD (HCC)- (present on admission)  Assessment & Plan  Premorbid.  Fluid resuscitation and trend renal function.  Avoid nephrotoxins.    Postprocedural hypotension  Assessment & Plan  Patient was in shock throughout operative case.  Returns to ICU on levophed.   Wean levophed as tolerated.  4/29 Levophed stopped.  Trend SBP.      - Renal diet  - Antibiotics per primary team  - Mobilize aggressively  - Monitor drain and follow labs       ____________________________________     Kenzie Krueger P.A.-C.    DD: 4/30/2023  12:01 PM

## 2023-05-01 NOTE — THERAPY
"Speech Language Therapy Contact Note    Patient Name: Tyrone Cline  Age:  83 y.o., Sex:  male  Medical Record #: [id number]  Today's Date: 5/1/2023    Discussed missed therapy with JUAN JOSÉ Boo.        05/01/23 1158   Treatment Variance   Reason For Missed Therapy Non-Medical - Other (Please Comment)   Interdisciplinary Plan of Care Collaboration   IDT Collaboration with  Nursing   Collaboration Comments Per prior SLP, FU only if patient demonstrating difficulty. Discussed with RN, patient tolerating diet without signs of aspiration. Updated CXR unchanged (\"Pulmonary edema and/or infiltrates are identified, which are stable since the prior exam.\"). Service will sign off.       "

## 2023-05-01 NOTE — DISCHARGE PLANNING
Case Management Discharge Planning    Admission Date: 4/21/2023  GMLOS: 5  ALOS: 10    6-Clicks ADL Score: 13  6-Clicks Mobility Score: 10  PT and/or OT Eval ordered: Yes  Post-acute Referrals Ordered: Yes  Post-acute Choice Obtained: Yes  Has referral(s) been sent to post-acute provider:  Yes      Anticipated Discharge Dispo: Discharge Disposition: Disch to  rehab facility or distinct part unit (62)    DME Needed: No    Action(s) Taken: Updated Provider/Nurse on Discharge Plan    Admissions at  [facility] is following Pt pending medical clearance.    This RN CM requested for current Therapy notes from PT and OT  if Pt is appropriate.     Escalations Completed: None    Medically Clear: No    Next Steps:     This RN CM to continue to assist Pt with discharge as needed    Barriers to Discharge: Medical clearance    Is the patient up for discharge tomorrow: No

## 2023-05-01 NOTE — PROGRESS NOTES
[facility] Medicine Daily Progress Note    Date of Service  5/1/2023    Chief Complaint  Tyrone Cline is a 82 y.o. male admitted 4/21/2023 with Colitis.    Hospital Course  Admitted with sepsis secondary to colitis.  Patient was started on empiric coverage with IV Rocephin and Flagyl.  He was also noted to have bilateral lower extremity weakness.  MRI of the lumbar spine was done which showed possible edema.  Neurology was then consulted on the case.  He was also noted to have an SANJUANITA on CKD stage IV.  Nephrology was consulted on the case.  He was started on a careful IVF hydration.  His CT scan also showed cholelithiasis.  Ultrasound was done which showed cholecystitis.  Surgery was then consulted on the case and patient underwent laparoscopic cholecystectomy on 4/28 which needed to be converted to open cholecystectomy, patient was persistently hypotensive during surgery and postoperatively  required IV pressor medication, thus he was care for in the surgical ICU for additional treatment.  On 4/29 the patient was transferred back to the medical service for ongoing management.     Interval Problem Update  Patient seen and examined at bedside, overall he feels well, his abdominal pain is much improved today. Eating okay, denies having BM, tells me he is urinating normally.   - I have reviewed his vitals, he is afebrile his heart rate blood pressure and respiratory rate are stable he is saturating well on 2.5 L of supplemental oxygen  -I have reviewed his labs his hemoglobin continues to slowly downtrend without evidence of bleeding, H/H 9.2/27.7 today, Plt up 468. , renal functio remains elevated above baseline but but is improved from yesterday 65/3.14. urinating, thus far 420 out, post void residual bladder scan pending. Continue gentle IVF, holding aldactone until renal function improved closely to baseline Nephrology had signed off however if his renal function worsens we will need to re-consult  -Start  heparin DVT prophylaxis and monitor H&H    -WBC remains elevated but stable, no fevers, Patient is no longer on antibiotics we will continue to monitor for any signs of infection    - evaluated by PT/OT, concern that his exam is worse with both upper and lower MN signs. BLE weakness 2/4, unable to stand and has posterior rightward drift unable to balance. This is changed/worsened from previous. There was concern for possible transverse myleits/GBS on admission however given improvement in is exam concern for initiating IVIG in the setting of infection this was not done.  I did discuss with physiatry today and they were concerned about a possible structural lesion given his positional weakness.  I did speak with neurosurgery Dr. Ruvalcaba who reviewed imaging and did not feel that this was a neurosurgical issue and recommended ongoing evaluation by neurology.  I have reconsulted neurology Dr. Hicks who evaluated the patient I have reordered a MRI thoracic spine with and without to reevaluate the T2 lesion that was seen previously.  I have also started the patient on IVIG after discussion with neurology for your GBS.  Patient will need very close monitoring for any signs of infection, thrombosis and allergy.      I have discussed this patient's plan of care and discharge plan at IDT rounds today with Case Management, Nursing, Nursing leadership, and other members of the IDT team.    Consultants/Specialty  general surgery, nephrology, and neurology    Code Status  DNAR/DNI    Disposition  Patient is not medically cleared for discharge.   Anticipate discharge to to an inpatient rehabilitation hospital.  I have placed the appropriate orders for post-discharge needs.    Review of Systems  Review of Systems   Constitutional:  Positive for malaise/fatigue. Negative for chills, diaphoresis and fever.   HENT:  Negative for congestion, hearing loss and sore throat.    Eyes:  Negative for blurred vision.   Respiratory:  Positive  for shortness of breath. Negative for cough and wheezing.    Cardiovascular:  Negative for chest pain, palpitations and leg swelling.   Gastrointestinal:  Positive for abdominal pain. Negative for diarrhea, heartburn, nausea and vomiting.   Genitourinary:  Negative for dysuria, flank pain and hematuria.   Musculoskeletal:  Negative for back pain, joint pain, myalgias and neck pain.   Skin:  Negative for rash.   Neurological:  Positive for focal weakness and weakness. Negative for dizziness, sensory change, speech change and headaches.   Psychiatric/Behavioral:  The patient is not nervous/anxious.       Physical Exam  Temp:  [36.3 °C (97.3 °F)-37 °C (98.6 °F)] 36.3 °C (97.3 °F)  Pulse:  [76-88] 80  Resp:  [18-20] 20  BP: (107-137)/(60-74) 131/74  SpO2:  [90 %-94 %] 94 %    Physical Exam  Vitals and nursing note reviewed.   HENT:      Head: Normocephalic and atraumatic.      Nose: No congestion.      Mouth/Throat:      Mouth: Mucous membranes are moist.   Eyes:      Extraocular Movements: Extraocular movements intact.      Conjunctiva/sclera: Conjunctivae normal.   Cardiovascular:      Rate and Rhythm: Normal rate and regular rhythm.      Heart sounds: Murmur heard.   Pulmonary:      Effort: Pulmonary effort is normal.      Breath sounds: Normal breath sounds.   Abdominal:      Tenderness: There is abdominal tenderness. There is no guarding or rebound.      Comments: Right upper quadrant dressing over the surgical area with a NAHUN drain in place, draining serosanguineous fluid   Musculoskeletal:      Cervical back: No tenderness.      Right lower leg: No edema.      Left lower leg: No edema.   Skin:     General: Skin is warm and dry.      Coloration: Skin is pale.   Neurological:      Mental Status: He is alert and oriented to person, place, and time.      Cranial Nerves: No cranial nerve deficit.      Motor: Weakness (Absent reflexes in lower extremity, 2/5 strength in bilateral knee extension, unable to do hip flexion  in seated position) present.       Fluids    Intake/Output Summary (Last 24 hours) at 5/1/2023 1431  Last data filed at 5/1/2023 1200  Gross per 24 hour   Intake --   Output 1170 ml   Net -1170 ml       Laboratory  Recent Labs     04/29/23  0421 04/30/23 0222 04/30/23  0901 05/01/23  0544   WBC 22.8* 19.7*  --  19.3*   RBC 3.91* 3.37*  --  3.15*   HEMOGLOBIN 11.5* 9.9* 9.9* 9.2*   HEMATOCRIT 35.6* 30.8* 30.6* 28.7*   MCV 91.0 91.4  --  91.1   MCH 29.4 29.4  --  29.2   MCHC 32.3* 32.1*  --  32.1*   RDW 51.8* 52.0*  --  51.5*   PLATELETCT 345 385  --  468*   MPV 11.5 11.5  --  11.2     Recent Labs     04/29/23 0421 04/30/23 0222 05/01/23  0544   SODIUM 143 134* 134*   POTASSIUM 4.7 4.2 4.2   CHLORIDE 111 101 104   CO2 21 22 19*   GLUCOSE 177* 201* 191*   BUN 47* 64* 65*   CREATININE 2.08* 3.24* 3.14*   CALCIUM 7.5* 7.9* 8.2*               Recent Labs     04/28/23  2103   TRIGLYCERIDE 138       Imaging  DX-CHEST-PORTABLE (1 VIEW)   Final Result         1.  Pulmonary edema and/or infiltrates are identified, which are stable since the prior exam.   2.  Left PICC line tip terminates in the left axilla, stable since prior study.      DX-CHEST-PORTABLE (1 VIEW)   Final Result      1.  No significant change.   2.  Possible left PICC line with tip projecting at the axillary vein.      DX-CHEST-PORTABLE (1 VIEW)   Final Result      1.  Mildly improved pulmonary opacities.   2.  Possible left PICC line with tip projecting at the axillary vein.   3.  Likely trace right pleural effusion.      DX-CHEST-LIMITED (1 VIEW)   Final Result      1.  Placement of endotracheal tube with tip projecting over the mid trachea      2.  Left arm catheter present which is presumably venous in projects in the expected position of the left axillary vein      3.  Enlarged cardiac silhouette      NM-HEPATOBILIARY SCAN   Final Result      Hepatobiliary scan findings suspicious for acute cholecystitis.      MR-THORACIC SPINE-WITH & W/O   Final  Result      1.  There is abnormal expansile T2 hyperintense lesion in the right side of the thoracic spinal cord at the levels of T2 and T3 Mild contrast enhancement is seen. This lesion is suspicious for spinal cord neoplasm. Other remote differential diagnosis    includes transverse myelitis.   2.  Exaggerated thoracic kyphosis.   3.  Thoracic dextroscoliosis.   4.  Minimal degenerative changes.   5.  Right pleural effusion.      MR-LUMBAR SPINE-WITH & W/O   Final Result      1.  Multifocal degenerative disease in the lumbar spine as described above.   2.  Moderate central canal and severe lateral recess stenosis at the level of L4-5. The exiting bilateral L5 nerve roots might have been impinged at the lateral recess.      MR-CERVICAL SPINE-WITH & W/O   Final Result      1.  There is abnormal expansile intramedullary T2 signal intensity lesion in the right cerebellum. Thoracic spinal cord at the level of T2 on T3. Mild diffuse contrast enhancement is seen. This finding is suspicious for spinal cord neoplasm. The other    less likely differential diagnosis includes transverse myelitis. Follow-up is recommended after 4 weeks.   2.  Mild degenerative disease in the cervical spine as described above.   3.  There is no evidence of infection in the cervical spine.      US-RUQ   Final Result         1.  Acute cholecystitis.   2.  Atrophic right kidney.      DX-CHEST-LIMITED (1 VIEW)   Final Result      Perihilar interstitial edema and basilar atelectasis and/or consolidation. Underlying infection is possible.      MR-LUMBAR SPINE-W/O   Final Result      1.  Lower lumbar spine predominant degenerative changes as described in detail above, progressed from 2010 MRI.   2.  Edema at the L4-L5 disc space and L5 superior endplate likely represents degenerative changes. Less likely this could represent a low-grade or early discitis osteomyelitis. Correlate for evidence of infection.   3.  Acute appearing Schmorl's node at L3-L4,  which can be a source of pain.      US-RENAL   Final Result      1.  Atrophic kidneys. Echogenic bilateral renal parenchyma could relate to medical renal disease.      2.  No hydronephrosis. No renal calculus.      DX-CHEST-PORTABLE (1 VIEW)   Final Result      No acute cardiac or pulmonary abnormalities are identified.      CT-ABDOMEN-PELVIS W/O   Final Result      1.  Findings suspicious for focal colitis at the hepatic flexure, less likely cholecystitis or duodenitis with secondary involvement of the colon. Infectious, inflammatory and ischemic etiologies are considerations. Underlying mass is not excluded.   2.  Cholelithiasis with distention of the gallbladder   3.  BILATERAL renal atrophy   4.  Subcentimeter LEFT adrenal myelolipoma   5.  12 mm RIGHT adrenal nodule likely an adenoma absent a history of cancer   6.  Subcentimeter RIGHT hepatic lesion, likely a cyst absent a history of cancer   7.  Atherosclerosis   8.  Colonic diverticulosis      CT-HEAD W/O   Final Result      1.  Cerebral atrophy.      2.  White matter lucencies most consistent with small vessel ischemic change versus demyelination or gliosis.      3.  Otherwise, Head CT without contrast with no acute findings. No evidence of acute cerebral infarction, hemorrhage or mass lesion.         MR-THORACIC SPINE-WITH & W/O    (Results Pending)          Assessment/Plan  Abnormal MRI- (present on admission)  Assessment & Plan  MRI on admission showing T2/T3  hyperintense lesion in the right side of the thoracic spinal cord at the levels of T2 and T3 Mild contrast enhancement is seen. This lesion is suspicious for spinal cord neoplasm or possible transverse myelitis   Given his exam improved and concern for infection with IVIG, therapy not done  Worsening neuro exam today with worsening weakness, possible exacerbated by weakness and fatigue post operatively   - repeat MRI thoracic spine ordered   - I have re-consulted neurology, discussed case, I have  ordered IVIG    Acute gangrenous cholecystitis- (present on admission)  Assessment & Plan  Found to have gangrenous cholecystitis s/p laprascopic converted open cholecystectomy 4/28/2023  Surgery continues to follow   No longer on antibiotics, as we have source control   Monitor closely for signs of infection   Post op pain control   Bowel regiment   Mobilize as able   Diet as tolerated     Postprocedural hypotension  Assessment & Plan  Hypotension in OR and post open carmelo  Now improved and off pressors   BP stable   Continue to monitor vitals per protocol     Hypercalcemia- (present on admission)  Assessment & Plan  Likely due to immobility   Improved with IVF hydration  Continue to follow and treat accordingly     Hypomagnesemia- (present on admission)  Assessment & Plan  Repeat mag level tomorrow AM  Goal >2  replace as needed     Cholelithiasis with acute cholecystitis- (present on admission)  Assessment & Plan  S/p surgical removal, converted to open cholecystectomy  NAHUN drain, monitor output  The patient is tolerating a diet already, monitor bowel function    Hypokalemia- (present on admission)  Assessment & Plan  Stable, monitor CMP and replace as needed   - goal K >4    Thrombocytopenia (HCC)- (present on admission)  Assessment & Plan  Resolved, plt are stable     High anion gap metabolic acidosis- (present on admission)  Assessment & Plan  Acute on chronic kidney disease, improving   Resumed gentle IVF today for SANJUANITA on CKD  Stable     Hyponatremia- (present on admission)  Assessment & Plan  Improved, slightly down today at 134, mild and asymptomatic   Cont to monitor BMP     Weakness of both lower extremities- (present on admission)  Assessment & Plan  T2/T3 intramedullary lesion of unclear chronicity and clinical significance as well as 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  immunmodulatory treatment was held in the setting of status-post surgery and neurologic  improvement  Follow-up pending CSF studies. Follow clinically. PT/OT and rehab as needed.   Neurology was considering immunomodulatory therapy if neurologic decline or cessation of neurologic recovery.   Neurology felt the patient will  need repeat MRI T spine with and without contrast in 1 month.   Given his change in neurological examination today I have reordered MRI thoracic spine with and without contrast to further evaluate the previously seen lesion at T2-T3  I discussed case with neurology who recommends initiation of IVIG, will need to closely monitor for any adverse effects  I did discuss the case with neurosurgery who did not feel that this was a neurosurgical issue.    Sepsis (HCC)- (present on admission)  Assessment & Plan  This is Sepsis Present on admission  SIRS criteria identified on my evaluation include: Leukocytosis, with WBC greater than 12,000  Source is colitis  Sepsis protocol initiated  Fluid resuscitation ordered per protocol  Crystalloid Fluid Administration: Fluid resuscitation ordered per standard protocol - 30 mL/kg per current or ideal body weight  IV antibiotics as appropriate for source of sepsis  Reassessment: I have reassessed the patient's hemodynamic status    Due to gangrenous gallbladder requiring open carmelo   Improving off antibiotics      Colitis- (present on admission)  Assessment & Plan  Cultures negative, monitor  Initial admitting impression, currently improved  Diet as tolerated     Acute kidney injury superimposed on CKD (HCC)- (present on admission)  Assessment & Plan  Recurrent today, Cr 2.1 ->3.24  Restart IV   Bladder scan to rule out retention   Avoid nephrotoxins   Repeat bmp this evening to trend       Diabetic peripheral neuropathy (HCC)- (present on admission)  Assessment & Plan  Cymbalta, medical treatment    Essential hypertension- (present on admission)  Assessment & Plan  Monitor closely, the patient had a hypotensive episode postoperatively  Off pressors  and has since been resumed on spironolactone , amlodipine and coreg   Monitor BP and adjust medications as needed   Prn hydralazine     Type 2 diabetes mellitus with kidney complication, without long-term current use of insulin (HCC)- (present on admission)  Assessment & Plan  Ha1c 6.4% 4 months ago   On ozempic and januvia outpatient   Continue ISS and hypoglycemic protocol while inpatient     Hypercholesterolemia- (present on admission)  Assessment & Plan  Stable, continue Lipitor    Chronic kidney disease (CKD), stage IV (severe) (HCC)- (present on admission)  Assessment & Plan  Acute on chronic kidney disease   Was improving but up again today from baseline   Cr 2.08 to 3.24  Bladder scan ordered   Gentle IV fluids were restarted  Patient is urinating I am pending a postvoid residual to ensure that he is not retaining  Repeat creatinine remains elevated above his baseline 3.14 but is somewhat improved from yesterday 3.24  Continue gentle IV fluids        VTE prophylaxis: SCDs/TEDs and heparin ppx    I have performed a physical exam and reviewed and updated ROS and Plan today (5/1/2023). In review of yesterday's note (4/30/2023), there are no changes except as documented above.    My total time spent caring for the patient on the day of the encounter was 60 minutes.   This does not include time spent on separately billable procedures/tests.

## 2023-05-01 NOTE — THERAPY
Occupational Therapy  Daily Treatment     Patient Name: Tyrone Cline  Age:  83 y.o., Sex:  male  Medical Record #: [id number]  Today's Date: 5/1/2023     Precautions  Precautions: Fall Risk  Comments: posterior/R lean with sitting, urinary retention complaint    Assessment    Pt seen for OT session. Now s/p laparoscopic cholecystectomy converted to open cholecystectomy, as well as recent imaging shows: abnormal expansile intramedullary T2 signal intensity lesion in the right cerebellum, thoracic spinal cord at the level of T2 on T3 finding is suspicious for spinal cord neoplasm or less likely transverse myelitis, exiting bilateral L5 nerve roots might be impinged d/t severe stenosis, and myocardial infarct finding now present on EKG 4/29. Pt function has worsened since previous session. Pt now with urinary retention, increased weakness in BUEs and BLEs, but BLEs much worse; unable to fully stand this session in prep for C txf. Fatigues very quickly w/seated ADLs and attempted stand. Educated on BUE AROM exercises for shoulders and biceps/triceps, and encouraged elevation/AROM in hands for edema mgmt. Continues to be limited by decreased functional mobility, activity tolerance, coordination, strength, balance, and pain which are currently affecting pt's ability to complete ADLs/IADLs at baseline. Will continue to follow.     Plan    Treatment Plan Status: Continue Current Treatment Plan  Type of Treatment: Self Care / Activities of Daily Living, Adaptive Equipment, Neuro Re-Education / Balance, Therapeutic Exercises, Therapeutic Activity  Treatment Frequency: 3 Times per Week  Treatment Duration: Until Therapy Goals Met    DC Equipment Recommendations: Unable to determine at this time  Discharge Recommendations: Recommend post-acute placement for additional occupational therapy services prior to discharge home     Objective     05/01/23 1043   Precautions   Precautions Fall Risk   Comments weakness in BLEs  is increasing and no with urinary retention, but urgency   Vitals   O2 (LPM) 2.5   O2 Delivery Device Silicone Nasal Cannula   Vitals Comments c/o dizziness as fatigued   Pain 0 - 10 Group   Location Abdomen   Location Orientation Anterior   Therapist Pain Assessment Post Activity;During Activity;Nurse Notified  (not quantified)   Cognition    Cognition / Consciousness WDL   Level of Consciousness Alert   Comments pleasant and appreciative; motivated, but poor endurance   Passive ROM Upper Body   Passive ROM Upper Body WDL   Active ROM Upper Body   Active ROM Upper Body  WDL   Comments BUEs limited by fatigue/weakness   Strength Upper Body   Upper Body Strength  X   Gross Strength Generalized Weakness, Equal Bilaterally.    Comments  and wrist 4/5, but proximally 3+/5   Sensation Upper Body   Upper Extremity Sensation  WDL   Supine Upper Body Exercises   Comments Educated on BUE AROM exercises for shoulders and biceps/triceps, and encouraged elevation/AROM in hands for edema mgmt.   Neuro-Muscular Treatments   Comments pt with posterior/R lateral lean in sitting which worsened as fatigued   Balance   Sitting Balance (Static) Poor +   Sitting Balance (Dynamic) Poor   Standing Balance (Static) Trace   Standing Balance (Dynamic) Dependent   Weight Shift Sitting Poor   Weight Shift Standing Absent   Skilled Intervention Verbal Cuing;Tactile Cuing;Compensatory Strategies;Facilitation;Sequencing;Postural Facilitation   Comments Req max A x2ppl, but unable to straigthen BLEs and stand fully   Bed Mobility    Supine to Sit   (found EOB with PT)   Sit to Supine Maximal Assist   Scooting Moderate Assist   Skilled Intervention Tactile Cuing;Sequencing;Postural Facilitation;Facilitation;Compensatory Strategies;Verbal Cuing   Activities of Daily Living   Eating Minimal Assist  (likely will req assist with cutting food d/t weakness/fatigue)   Grooming Standing;Minimal Assist  (combing hair and wiping face; BUEs fatigues very  quickly and req assist to finish hair)   Lower Body Dressing Maximal Assist   Toileting Total Assist  (urinary retention; attempted seated urination, c/o urgency, but unable to complete)   Skilled Intervention Verbal Cuing;Tactile Cuing;Sequencing;Postural Facilitation;Facilitation;Compensatory Strategies   Functional Mobility   Sit to Stand Maximal Assist  (x2 ppl; unable to fully stand d/t weakness in BLEs)   Bed, Chair, Wheelchair Transfer Unable to Participate   Toilet Transfers Unable to Participate   Mobility w/x2 person A; EOB>STS>BTB   Skilled Intervention Verbal Cuing;Tactile Cuing;Sequencing;Facilitation;Compensatory Strategies;Postural Facilitation   Activity Tolerance   Sitting Edge of Bed 10 min total   Comments fatigues very quickly in sitting as well as BUEs. Unable to fully stand, but c/o fatigue quickly with attempts   Patient / Family Goals   Patient / Family Goal #1 to get stronger   Goal #1 Outcome Goal not met   Short Term Goals   Short Term Goal # 1 Pt will complete ADL transfers with supervision   Goal Outcome # 1 Progressing slower than expected   Short Term Goal # 2 Pt will complete LB dressing with supervision   Goal Outcome # 2 Progressing slower than expected   Short Term Goal # 3 Pt will complete toileting with supervision   Goal Outcome # 3 Progressing slower than expected   Education Group   Education Provided Activities of Daily Living;Pathology of bedrest;Upper Extremity Strengthening   UE Strengthening Patient Response Patient;Acceptance;Explanation;Demonstration;Action Demonstration;Reinforcement Needed   ADL Patient Response Patient;Acceptance;Explanation;Reinforcement Needed;Action Demonstration;Demonstration

## 2023-05-01 NOTE — THERAPY
Physical Therapy   Daily Treatment     Patient Name: Tyrone Cline  Age:  83 y.o., Sex:  male  Medical Record #: [id number]  Today's Date: 5/1/2023     Precautions  Precautions: Fall Risk    Assessment    Pt seen for PT tx session in bed and was agreeable to participate. Pt very motivated to get up and moving. Pt ModA for bed mobility req assist at trunk for rolling, HOB flat, use of rails, and log roll sequencing. Pt demonstrates strong posterior lateral R lean EOB, cued to find midline and finding upright posture but was unable to maintain. Pt quad strength assessed sitting EOB at 2/5 BLE. Pt attempted sit <> stand w/ MaxA x2 with bed height raised but was still unable to stand. Pt c/o dizziness and fatigue, BP measured in sitting (see vitals section below). Today, pt fatigues quickly, decreased activity tolerance, and decreased balance compared to previous sessions. Note, pt had urinary urgency throughout session unable to void sitting EOB and unable to tell that he had voided prior to PT arrival. Pt reports that this is new and hasn't had difficulty voiding prior. PT will continue to follow and progress as able.     Plan    Treatment Plan Status: (P) Continue Current Treatment Plan  Type of Treatment: Bed Mobility, Gait Training, Neuro Re-Education / Balance, Self Care / Home Evaluation, Stair Training, Therapeutic Activities, Therapeutic Exercise  Treatment Frequency: 4 Times per Week  Treatment Duration: Until Therapy Goals Met    DC Equipment Recommendations: (P) Unable to determine at this time  Discharge Recommendations: (P) Recommend post-acute placement for additional physical therapy services prior to discharge home     Objective     05/01/23 1053   Precautions   Precautions Fall Risk   Vitals   O2 (LPM) 2.5   O2 Delivery Device Silicone Nasal Cannula   Vitals Comments SpO2 at 98% throughout session; c/o dizziness BP measured at EOB: 120/71  (Pt has strong urinary urgency but is unable to tell  that he has voided; pt reports this is new and hasn't had difficulty prior)   Pain   Pain Scales 0 to 10 Scale    Intervention Ambulation / Increased Activity   Pain 0 - 10 Group   Location Abdomen   Location Orientation Anterior   Comfort Goal Comfort with Movement;Perform Activity   Therapist Pain Assessment During Activity;Post Activity;Nurse Notified   Cognition    Cognition / Consciousness WDL   Level of Consciousness Alert   Comments Very pleasant and wanted to work with therapy; very motivated to get better   Active ROM Lower Body    Active ROM Lower Body  X   Comments BL hip and knee flexion WDL at beginning of session; sitting EOB AROM limited 2/2 fatigue   Strength Lower Body   Lower Body Strength  X   Comments BLE quad strength 2/5 measured EOB, limited grossly 2/2 fatigue   Supine Lower Body Exercise   Supine Lower Body Exercises Yes   Heel Slide 2 sets of 10;Bilateral   Ankle Pumps 2 sets of 10;Bilateral   Neuro-Muscular Treatments   Neuro-Muscular Treatments Weight Shift Left;Weight Shift Right;Postural Facilitation;Anterior weight shift   Comments pt has strong posterior lateral R lean   Neurological Concerns   Neurological Concerns Yes   Sitting Posture During ADL's Lateral Lean Right;Posterior Lean;Kyphotic;Posterior Pelvic Tilt   Balance   Sitting Balance (Static) Poor +   Sitting Balance (Dynamic) Poor   Standing Balance (Static) Trace +   Standing Balance (Dynamic) Trace   Weight Shift Sitting Poor   Weight Shift Standing Absent   Skilled Intervention Verbal Cuing;Tactile Cuing;Sequencing;Postural Facilitation   Comments w/ physical assist; pt req maxA to stand 2/2 fatigue and weakness   Bed Mobility    Supine to Sit Moderate Assist   Sit to Supine Moderate Assist   Scooting Minimal Assist   Rolling Moderate Assist to Rt.   Skilled Intervention Verbal Cuing;Tactile Cuing;Sequencing;Compensatory Strategies;Facilitation   Comments HOB flat w/ use of rails and log roll sequencing; required assist to  trunk   Gait Analysis   Gait Level Of Assist Unable to Participate   Functional Mobility   Sit to Stand Maximal Assist   Skilled Intervention Verbal Cuing;Tactile Cuing;Sequencing;Postural Facilitation;Facilitation   Comments ultimately needed MaxA x2 for STS; limited 2/2 fatigue   How much difficulty does the patient currently have...   Turning over in bed (including adjusting bedclothes, sheets and blankets)? 2   Sitting down on and standing up from a chair with arms (e.g., wheelchair, bedside commode, etc.) 1   Moving from lying on back to sitting on the side of the bed? 1   How much help from another person does the patient currently need...   Moving to and from a bed to a chair (including a wheelchair)? 1   Need to walk in a hospital room? 1   Climbing 3-5 steps with a railing? 1   6 clicks Mobility Score 7   Activity Tolerance   Sitting in Chair NT   Sitting Edge of Bed 10 min   Comments unable to stand 2/2 fatigue   Short Term Goals    Short Term Goal # 1 pt will perform supine <> sit with SPV in 6 visits to get in/out of bed at home   Goal Outcome # 1 Progressing slower than expected   Short Term Goal # 2 pt will perform all functional xfrs with SPV in 6 visits for improved independence   Goal Outcome # 2 Goal not met   Short Term Goal # 3 pt will ambulate 150ft with FWW and SPV in 6 visits to access home   Goal Outcome # 3 Goal not met   Short Term Goal # 4 pt will negotiate 4 steps with single rail and SPV in 6 visits to access home   Goal Outcome # 4 Goal not met   Physical Therapy Treatment Plan   Physical Therapy Treatment Plan Continue Current Treatment Plan   Anticipated Discharge Equipment and Recommendations   DC Equipment Recommendations Unable to determine at this time   Discharge Recommendations Recommend post-acute placement for additional physical therapy services prior to discharge home   Interdisciplinary Plan of Care Collaboration   IDT Collaboration with  Nursing;Occupational Therapist    Patient Position at End of Therapy In Bed;Call Light within Reach;Phone within Reach;Tray Table within Reach   Collaboration Comments RN updated   Session Information   Date / Session Number  5/1- 3 (1/4, 5/8)

## 2023-05-01 NOTE — ASSESSMENT & PLAN NOTE
MRI on admission showing T2/T3  hyperintense lesion in the right side of the thoracic spinal cord at the levels of T2 and T3 Mild contrast enhancement is seen. This lesion is suspicious for spinal cord neoplasm or possible transverse myelitis       S/p Laminectomy  Neurosurgery planning for repeat MRI in 1 month with neurosurgery Dr. Donaldson  Patient wants to be on comfort care and refused any more images or aggressive treatment

## 2023-05-01 NOTE — PROGRESS NOTES
Bedside report received, assessment completed    A&O x  4, pt calls appropriately  HIGH FALL RISK  Mobility: Up with max assist, FWW  Fall Risk Assessment: High, bed alarm +, door notifications +  Pain Assessment / Reassessment completed, medication provided per MAR  Diet: Renal  LDA:   IV Access: LUE, CDI/ flushed/ SL/ Infusing NS 83mL  NAHUN: RLQ    GI/: urinal void, + flatus, 4/29 BM  DVT Prophylaxis: heparin, SCD on while in bed   Jeremy Score: 18  Procedures:    - 4/28 Lap to Open Zayra  D/C Plan: pending medical clearance     Reviewed plan of care with patient, bed in lowest position and locked, pt resting comfortably now, call light within reach, all needs met at this time. Interventions will be executed per plan of care

## 2023-05-01 NOTE — PROGRESS NOTES
"Neurology Progress Note        Subjective:  Mr Cline reports he feels overall \"a bit stronger\". He further reports that his weakness arises from \"weak abdomen\". Of note, he is post laparoscopic cholecystectomy converted to open cholecystectomy day 3.    Objective:  Vitals:  Vitals:    05/01/23 0322 05/01/23 0530 05/01/23 0846 05/01/23 1521   BP: 123/69 137/70 131/74 128/72   Pulse: 76 77 80 81   Resp: 19  20 18   Temp: 37 °C (98.6 °F)  36.3 °C (97.3 °F) 36.3 °C (97.3 °F)   TempSrc: Temporal  Temporal Temporal   SpO2: 90%  94% 92%   Weight:       Height:         General: Awake, no apparent distress  Mental status: Alert, oriented x3, speech is fluent, comprehension is intact  Cranial Nerves: Pupils are equal round reactive light, extraocular movements are intact and no nystagmus, face is symmetric, facial sensations intact, no dysarthria  Motor: 5 /5 strength in the bilateral deltoids, 5/5 in the bilateral triceps, 5/5 in the biceps, 4/5 R 4-/5  L  strength, 4-/5 strength in the right hip flexor, 4/5 strength in left hip flexor, 4/5 strength in the right knee extensor, 5/5 in knee flexion, 4+/5 with knee extension on the left, 5/5 with ankle dorsiflexion and plantarflexion  Sensory: Light touch intact throughout, proprioception intact at the great toes bilaterally  Reflexes: 2+ and symmetric at the triceps, biceps, brachioradialis. Absent reflexes at the patellar and Achilles bilaterally.  Babinski sign present definitely on the right, equivocal on the left.  Coordination: Normal finger-to-nose bilaterally  Gait:  Deferred    Recent Labs     04/29/23  0421 04/30/23  0222 04/30/23  0901 05/01/23  0544   WBC 22.8* 19.7*  --  19.3*   RBC 3.91* 3.37*  --  3.15*   HEMOGLOBIN 11.5* 9.9* 9.9* 9.2*   HEMATOCRIT 35.6* 30.8* 30.6* 28.7*   MCV 91.0 91.4  --  91.1   MCH 29.4 29.4  --  29.2   MCHC 32.3* 32.1*  --  32.1*   RDW 51.8* 52.0*  --  51.5*   PLATELETCT 345 385  --  468*   MPV 11.5 11.5  --  11.2       Recent Labs "     04/29/23 0421 04/30/23 0222 05/01/23  0544   SODIUM 143 134* 134*   POTASSIUM 4.7 4.2 4.2   CHLORIDE 111 101 104   CO2 21 22 19*   GLUCOSE 177* 201* 191*   BUN 47* 64* 65*   CREATININE 2.08* 3.24* 3.14*   CALCIUM 7.5* 7.9* 8.2*                       Recent Labs     04/28/23  2103   TRIGLYCERIDE 138     Recent Labs     04/29/23 0421 04/30/23 0222 05/01/23  0544   SODIUM 143 134* 134*   POTASSIUM 4.7 4.2 4.2   CHLORIDE 111 101 104   CO2 21 22 19*   GLUCOSE 177* 201* 191*   BUN 47* 64* 65*       Recent Labs     04/29/23 0421 04/30/23 0222 05/01/23  0544   SODIUM 143 134* 134*   POTASSIUM 4.7 4.2 4.2   CHLORIDE 111 101 104   CO2 21 22 19*   BUN 47* 64* 65*   CREATININE 2.08* 3.24* 3.14*   MAGNESIUM 2.0 2.2 2.1   PHOSPHORUS 5.0* 5.2*  --    CALCIUM 7.5* 7.9* 8.2*                        Imaging: neuroimaging reviewed and directly visualized by me  DX-CHEST-PORTABLE (1 VIEW)   Final Result         1.  Pulmonary edema and/or infiltrates are identified, which are stable since the prior exam.   2.  Left PICC line tip terminates in the left axilla, stable since prior study.      DX-CHEST-PORTABLE (1 VIEW)   Final Result      1.  No significant change.   2.  Possible left PICC line with tip projecting at the axillary vein.      DX-CHEST-PORTABLE (1 VIEW)   Final Result      1.  Mildly improved pulmonary opacities.   2.  Possible left PICC line with tip projecting at the axillary vein.   3.  Likely trace right pleural effusion.      DX-CHEST-LIMITED (1 VIEW)   Final Result      1.  Placement of endotracheal tube with tip projecting over the mid trachea      2.  Left arm catheter present which is presumably venous in projects in the expected position of the left axillary vein      3.  Enlarged cardiac silhouette      NM-HEPATOBILIARY SCAN   Final Result      Hepatobiliary scan findings suspicious for acute cholecystitis.      MR-THORACIC SPINE-WITH & W/O   Final Result      1.  There is abnormal expansile T2  hyperintense lesion in the right side of the thoracic spinal cord at the levels of T2 and T3 Mild contrast enhancement is seen. This lesion is suspicious for spinal cord neoplasm. Other remote differential diagnosis    includes transverse myelitis.   2.  Exaggerated thoracic kyphosis.   3.  Thoracic dextroscoliosis.   4.  Minimal degenerative changes.   5.  Right pleural effusion.      MR-LUMBAR SPINE-WITH & W/O   Final Result      1.  Multifocal degenerative disease in the lumbar spine as described above.   2.  Moderate central canal and severe lateral recess stenosis at the level of L4-5. The exiting bilateral L5 nerve roots might have been impinged at the lateral recess.      MR-CERVICAL SPINE-WITH & W/O   Final Result      1.  There is abnormal expansile intramedullary T2 signal intensity lesion in the right cerebellum. Thoracic spinal cord at the level of T2 on T3. Mild diffuse contrast enhancement is seen. This finding is suspicious for spinal cord neoplasm. The other    less likely differential diagnosis includes transverse myelitis. Follow-up is recommended after 4 weeks.   2.  Mild degenerative disease in the cervical spine as described above.   3.  There is no evidence of infection in the cervical spine.      US-RUQ   Final Result         1.  Acute cholecystitis.   2.  Atrophic right kidney.      DX-CHEST-LIMITED (1 VIEW)   Final Result      Perihilar interstitial edema and basilar atelectasis and/or consolidation. Underlying infection is possible.      MR-LUMBAR SPINE-W/O   Final Result      1.  Lower lumbar spine predominant degenerative changes as described in detail above, progressed from 2010 MRI.   2.  Edema at the L4-L5 disc space and L5 superior endplate likely represents degenerative changes. Less likely this could represent a low-grade or early discitis osteomyelitis. Correlate for evidence of infection.   3.  Acute appearing Schmorl's node at L3-L4, which can be a source of pain.      US-RENAL    Final Result      1.  Atrophic kidneys. Echogenic bilateral renal parenchyma could relate to medical renal disease.      2.  No hydronephrosis. No renal calculus.      DX-CHEST-PORTABLE (1 VIEW)   Final Result      No acute cardiac or pulmonary abnormalities are identified.      CT-ABDOMEN-PELVIS W/O   Final Result      1.  Findings suspicious for focal colitis at the hepatic flexure, less likely cholecystitis or duodenitis with secondary involvement of the colon. Infectious, inflammatory and ischemic etiologies are considerations. Underlying mass is not excluded.   2.  Cholelithiasis with distention of the gallbladder   3.  BILATERAL renal atrophy   4.  Subcentimeter LEFT adrenal myelolipoma   5.  12 mm RIGHT adrenal nodule likely an adenoma absent a history of cancer   6.  Subcentimeter RIGHT hepatic lesion, likely a cyst absent a history of cancer   7.  Atherosclerosis   8.  Colonic diverticulosis      CT-HEAD W/O   Final Result      1.  Cerebral atrophy.      2.  White matter lucencies most consistent with small vessel ischemic change versus demyelination or gliosis.      3.  Otherwise, Head CT without contrast with no acute findings. No evidence of acute cerebral infarction, hemorrhage or mass lesion.         MR-THORACIC SPINE-WITH & W/O    (Results Pending)       Impression:  82-year-old man with  right greater than left leg weakness since approx April 17th as well as  upper extremity weakness.  This developed concurrently with a GI illness concerning for food borne illness.  Certainly that history would raise the possibility of Guillain-Barré syndrome although the symptoms seem to have developed relatively rapidly in conjunction with the GI illness and he appears to improve without treatment with plasma exchange or IVIG. Furthermore, he has preserved reflexes in the arms.  Reflexes are absent at the Achilles and patellar which can be normal for his age especially in the setting of diabetic polyneuropathy.   In fact his upgoing toe on the right concerns me more for a myelopathic process.    MRI-Tspine remarkable for T2 contrast avid lesion (Neoplastic vs transverse myelitis) which does not explain upper extremity weakness. Per discussion with neuroradiologist, assessment prior to 1 month post initial image may not yield an interpretable result and recommends against reassessing sooner unless a clear clinical deterioration is noted.  CSF analysis is remarkable for elevated glucose which could be explained by elevated serum glucose in the setting of DM. Elevated protein with nl WBC favors a demyelinating process. No oligoclonal bands seen on electrophoresis, pointing away from MS. VDRL negative points away from tertiary syphilis. CSF cultures NGTD points away from other common infective processes.    Overall pt was clinically improving without IVIG, Steroids or plasma exchange, which is reassuring. His physical exam remains stable in comparison to my exam from 4/28/2023.  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 Pathological evaluation of CSF pending  We are hesitant to offer any immuno-modulating therapy  which may delay healing.         Recommendations:      -PT, OT evaluate and treat.  -Follow up with Neurology in one month in the community   -Repeat MRI-Tspine to assess progression/resolution of T2-T3 lesion 5/26/2023  -Neurology will continue to follow and will assess the need for immunomodulating therapy should a marked clinical deterioration is detected, likely PLEX considering Kidney disease.

## 2023-05-01 NOTE — DISCHARGE PLANNING
Following for post acute services. Pending post procedure therapy evaluations, return to community support/plan.

## 2023-05-01 NOTE — PROGRESS NOTES
"    Physical Medicine and Rehabilitation Consultation      Follow up note         Date of initial consultation: 4/24/2023  Consulting provider: Smiley Cain M.D.  Reason for consultation: assess for acute inpatient rehab appropriateness  LOS: 10 Day(s)    Chief complaint: Weakness    HPI: Per Dr. Dockery's consult note: The patient is a 82 y.o. right hand dominant male with a past medical history of CKD stage IV, diabetes, hypertension;  who presented on 4/21/2023 12:47 PM with abdominal pain.  CT found colitis.  Labs remarkable for leukocytosis and SANJUANITA with creatinine 5.  Patient endorsed back pain.  MR L-spine found edema at L4-5 suspicious for early discitis/osteomyelitis.  Patient was admitted for SANJUANITA and sepsis.  Patient is currently on ceftriaxone daily through 4/27, Flagyl p.o. 3 times daily through 4/26.  Nephrology was consulted and found his SANJUANITA to be due to hypovolemia, which was treated with IV fluids.  Hospitalization is also been significant for recalcitrant hypertension.  4/24: The patient currently reports overall feeling well.  Patient endorses weakness, but denies other symptoms.  Patient reports he was able to sidestep at edge of bed and that was exhausting for him.  Patient wants to be able to return home, and is interested in IPR.  Patient reports his son will be able to help care for him on discharge.  Patient's son works full-time days, but may be able to take time off work.\"    4/27: Interval events : WBC continues to trend up. HIDA scan with concern for cholecystitis. LP done. Patient seen and examined at bedside, reports he feels much better. Reports he had been sliding out of bed before he came to the hospital, but is now able to stand EOB much easier. Does not report HA, lightheadedness, SOB, CP, abdominal pain, or changes in numbness/tingling/weakness. Has had regular Bms, last BM 4/27.     4/29: s/p open cholecystectomy on 4/28 by Dr. Galvan. Post op, WBC 22.8 and Hgb 11.5, BNP " elevated to 1855. Patient seen and examined at bedside, patient reports he notices a ringing in his ears, which was most noticeable after surgery. Otherwise, patient reports he feels ok, has some stomach discomfort, but no pain when laying still. Denies changes in numbness, tingling, or weakness of his legs. Post op therapy evals pending.     5/1: notified by therapy that patient successful straight leg test in supine, but then seated EOB has decreased strength and weakness, unable to perform standing.  Patient functionally weaker than Eval on 4/29 per therapy. Notified hospitalists of positional functional decline and need for neurosurgery to review possible neoplasm on imagining. Neurosurgery reviewed, determined to be a neurosurgical issues. Neurology following, recommending repeat images and starting IVIG for new weakness.   Patient seen and examined at bedside with son. I have re examined the patient's strength in supine with the son present. In supine patient has 5/5 strength. I reo positioned the bed so that the patient is seated at 90 degrees with his legs extended. In this position patient is 2/5 in HF and 3/5 KE 5/5 DF, PF. Patient reports feeling tired, reports that he has abdominal pain when during strength testing. Patient holds is breath and is guarding when attemting to use his core to attempt LE strength testing.   Reports his abdominal pain has been improving each day.     ROS  Pertinent positives are mentioned in the HPI, all others reviewed and are negative.    Social Hx:  1 SH  4 BETHANY  With: Alone unable to care for self.  Patient's son has stayed with him recently due to weakness    THERAPY:  Restrictions: Fall risk  PT: Functional mobility   4/24: Unable to participate in gait shuffle steps from edge of bed to chair, mod assist transfers, min assist  4/25: Min A bed mobility, Mod A sit to stand, Min A transfers , small steps EOB but did not participate in functional gait   5/1 Max A sit  to  stand     OT: ADLs  4/24: Total assist lower body dressing and toileting  5/1 Max A lower body dressing     SLP:   4/26: Diabetic regular and thins     IMAGING:  MR L-spine 4/22/23  1.  Lower lumbar spine predominant degenerative changes as described in detail above, progressed from 2010 MRI.  2.  Edema at the L4-L5 disc space and L5 superior endplate likely represents degenerative changes. Less likely this could represent a low-grade or early discitis osteomyelitis. Correlate for evidence of infection.  3.  Acute appearing Schmorl's node at L3-L4, which can be a source of pain.    Pending repeat images     PROCEDURES:  4/28 Attempted laparoscopic cholecystectomy with conversion to open cholecystectomy by Dr. Galvan     PMH:  Past Medical History:   Diagnosis Date    Abdominal pain     Abnormal electrocardiogram     ACE inhibitor intolerance     BPH (benign prostatic hyperplasia)     Bruxism     Cancer (HCC)     basal and squamous cell to face    Cataract     robbi IOL    Chickenpox     Cholesterol blood decreased     Chronic kidney disease (CKD) 2/27/2021    Chronic kidney disease, stage III (moderate) (HCC)     Cold     Congestion of both ears 2/27/2021    Cough     Depression     Dermatochalasis of eyelid 7/23/2014    Diabetes     oral meds    Difficulty breathing     Fever     GERD (gastroesophageal reflux disease)     GI bleed 12/8/2021    GOUT     Gout     History of skull fracture     History of urinary tract infection     Hyperlipidemia     Hypertension     Indigestion     Influenza     Insomnia     Mixed hyperlipidemia     Orthopnea     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    Pneumonia 9/2015    Proteinuria     Purulent nasal discharge 12/7/2015    Ringing in ears     Shortness of breath     Sputum production     Tonsillitis     Type 2 diabetes mellitus (HCC)     Wears glasses     Weight loss        PSH:  Past Surgical History:   Procedure Laterality Date    ROMAN BY LAPAROSCOPY N/A 4/28/2023    Procedure: CHOLECYSTECTOMY,  LAPAROSCOPIC ATTEMPTED, OPEN CHOLECYSTECTOMY;  Surgeon: Suraj Galvan M.D.;  Location: SURGERY [facility];  Service: General    ID COLONOSCOPY,DIAGNOSTIC N/A 12/12/2021    Procedure: COLONOSCOPY;  Surgeon: Dariel Simons M.D.;  Location: SURGERY [facility];  Service: Gastroenterology    ID UPPER GI ENDOSCOPY,BIOPSY N/A 12/12/2021    Procedure: GASTROSCOPY, WITH BIOPSY;  Surgeon: Dariel Simons M.D.;  Location: SURGERY [facility];  Service: Gastroenterology    ID UPPER GI ENDOSCOPY,CTRL BLEED N/A 12/12/2021    Procedure: EGD, WITH CLIP PLACEMENT;  Surgeon: Dariel Simons M.D.;  Location: SURGERY [facility];  Service: Gastroenterology    GASTROSCOPY W/PUSH ENTERSCOPY N/A 12/12/2021    Procedure: GASTROSCOPY, WITH PUSH ENTEROSCOPY;  Surgeon: Dariel Simons M.D.;  Location: [facility];  Service: Gastroenterology    SEPTAL RECONSTRUCTION  12/7/2015    Procedure: SEPTAL RECONSTRUCTION OPEN WITH  GRAFTS & CONCHAL CART GRAFT;  Surgeon: SYDNIE Gaitan M.D.;  Location: SURGERY SAME DAY [facility];  Service:     BLEPHAROPLASTY  7/23/2014    Performed by Gera Plasencia M.D. at [facility]S    BROW LIFT  7/23/2014    Performed by Gera Plasencia M.D. at [facility]S    CATARACT PHACO WITH IOL  12/4/2012    Performed by Sruaj Gonzalez M.D. at [facility] ORS    CATARACT PHACO WITH IOL  11/20/2012    Performed by Suraj Gonzalez M.D. at [facility] ORS    APPENDECTOMY      ARTHROSCOPY, KNEE      CARDIAC CATH, LEFT HEART      C out of concern for STEMI, normal coronaries with minimal atherosclerosis, diagnosed with PNA as cause of symptoms and ST changes.     OTHER      KNEE SURGERY - LEFT.    OTHER      NOSE SURGERY.    TONSILLECTOMY         FHX:  Family History   Problem Relation Age of Onset    Diabetes Father     Heart Attack Father 79    Cancer Brother        Medications:  Current Facility-Administered Medications   Medication Dose    NS  "infusion      famotidine (PEPCID) tablet 20 mg  20 mg    Or    famotidine (PEPCID) injection 20 mg  20 mg    oxyCODONE immediate-release (ROXICODONE) tablet 5 mg  5 mg    Or    oxyCODONE immediate release (ROXICODONE) tablet 10 mg  10 mg    morphine 4 MG/ML injection 2-4 mg  2-4 mg    Respiratory Therapy Consult      Nozin nasal  swab  1 Applicator    amLODIPine (NORVASC) tablet 5 mg  5 mg    labetalol (NORMODYNE/TRANDATE) injection 10 mg  10 mg    hydrALAZINE (APRESOLINE) injection 10 mg  10 mg    atorvastatin (LIPITOR) tablet 10 mg  10 mg    allopurinol (ZYLOPRIM) tablet 100 mg  100 mg    carvedilol (COREG) tablet 12.5 mg  12.5 mg    [Held by provider] spironolactone (ALDACTONE) tablet 25 mg  25 mg    sodium bicarbonate tablet 1,300 mg  1,300 mg    DULoxetine (CYMBALTA) capsule 30 mg  30 mg    acetaminophen (Tylenol) tablet 650 mg  650 mg    insulin regular (HumuLIN R,NovoLIN R) injection  2-9 Units    And    dextrose 10 % BOLUS 25 g  25 g    Pharmacy Consult Request ...Pain Management Review 1 Each  1 Each       Allergies:  Allergies   Allergen Reactions    Ether Unspecified     \"Violently sick\"         Physical Exam:  Vitals: /74   Pulse 80   Temp 36.3 °C (97.3 °F) (Temporal)   Resp 20   Ht 1.803 m (5' 10.98\")   Wt 89.1 kg (196 lb 6.9 oz)   SpO2 94%   Gen: NAD, laying comfortably in bed,  son in room   Head: NC/AT  Eyes/ Nose/ Mouth: moist mucous membranes  Cardio: RRR, good distal perfusion, warm extremities  Pulm: normal respiratory effort, no cyanosis, on RA   Abd: Soft NT, mildly distended , abdo dressings without strike through   Ext: No peripheral edema. No calf tenderness. No clubbing.    Mental status: follows commands  Speech: fluent, no aphasia or dysarthria      Motor:    In supine:   Upper Extremity  Myotome R L   Shoulder flexion C5 5 5   Elbow flexion C5 5 5   Wrist extension C6 5 5   Elbow extension C7 5 5   Finger flexion C8 5 5   Finger abduction T1 5 5     Lower Extremity " Myotome R L   Hip flexion L2 4, limited by abdo discomfort /5 4, limited by abdo discomfort /5   Knee extension L3 5/5 5/5   Ankle dorsiflexion L4 5/5 5/5   Toe extension L5 5/5 5/5   Ankle plantarflexion S1 5/5 5/5         At SEATED 90 DEGREES IN BED, MMT appears limited by pain when needing to activate core muscles   Upper Extremity  Myotome R L   Shoulder flexion C5 5 5   Elbow flexion C5 5 5   Wrist extension C6 5 5   Elbow extension C7 5 5   Finger flexion C8 5 5   Finger abduction T1 5 5     Lower Extremity Myotome R L   Hip flexion L2 2, limited by abdo discomfort and fatigue  /5 4, limited by abdo discomfort /5   Knee extension L3 3/5 3 /5   Ankle dorsiflexion L4 5/5 5/5   Toe extension L5 5/5 5/5   Ankle plantarflexion S1 5/5 5/5         Sensory:   intact to light touch through out    Labs: Reviewed and significant for   Recent Labs     04/29/23 0421 04/30/23 0222 04/30/23  0901 05/01/23  0544   RBC 3.91* 3.37*  --  3.15*   HEMOGLOBIN 11.5* 9.9* 9.9* 9.2*   HEMATOCRIT 35.6* 30.8* 30.6* 28.7*   PLATELETCT 345 385  --  468*       Recent Labs     04/29/23 0421 04/30/23 0222 05/01/23  0544   SODIUM 143 134* 134*   POTASSIUM 4.7 4.2 4.2   CHLORIDE 111 101 104   CO2 21 22 19*   GLUCOSE 177* 201* 191*   BUN 47* 64* 65*   CREATININE 2.08* 3.24* 3.14*   CALCIUM 7.5* 7.9* 8.2*       Recent Results (from the past 24 hour(s))   POCT glucose device results    Collection Time: 04/30/23  1:10 PM   Result Value Ref Range    POC Glucose, Blood 197 (H) 65 - 99 mg/dL   POCT glucose device results    Collection Time: 04/30/23  5:41 PM   Result Value Ref Range    POC Glucose, Blood 214 (H) 65 - 99 mg/dL   POCT glucose device results    Collection Time: 04/30/23  8:41 PM   Result Value Ref Range    POC Glucose, Blood 210 (H) 65 - 99 mg/dL   Comp Metabolic Panel    Collection Time: 05/01/23  5:44 AM   Result Value Ref Range    Sodium 134 (L) 135 - 145 mmol/L    Potassium 4.2 3.6 - 5.5 mmol/L    Chloride 104 96 - 112 mmol/L     Co2 19 (L) 20 - 33 mmol/L    Anion Gap 11.0 7.0 - 16.0    Glucose 191 (H) 65 - 99 mg/dL    Bun 65 (H) 8 - 22 mg/dL    Creatinine 3.14 (H) 0.50 - 1.40 mg/dL    Calcium 8.2 (L) 8.5 - 10.5 mg/dL    AST(SGOT) 42 12 - 45 U/L    ALT(SGPT) 31 2 - 50 U/L    Alkaline Phosphatase 120 (H) 30 - 99 U/L    Total Bilirubin 0.5 0.1 - 1.5 mg/dL    Albumin 1.8 (L) 3.2 - 4.9 g/dL    Total Protein 4.9 (L) 6.0 - 8.2 g/dL    Globulin 3.1 1.9 - 3.5 g/dL    A-G Ratio 0.6 g/dL   CBC WITH DIFFERENTIAL    Collection Time: 05/01/23  5:44 AM   Result Value Ref Range    WBC 19.3 (H) 4.8 - 10.8 K/uL    RBC 3.15 (L) 4.70 - 6.10 M/uL    Hemoglobin 9.2 (L) 14.0 - 18.0 g/dL    Hematocrit 28.7 (L) 42.0 - 52.0 %    MCV 91.1 81.4 - 97.8 fL    MCH 29.2 27.0 - 33.0 pg    MCHC 32.1 (L) 33.7 - 35.3 g/dL    RDW 51.5 (H) 35.9 - 50.0 fL    Platelet Count 468 (H) 164 - 446 K/uL    MPV 11.2 9.0 - 12.9 fL    Neutrophils-Polys 85.60 (H) 44.00 - 72.00 %    Lymphocytes 6.20 (L) 22.00 - 41.00 %    Monocytes 5.90 0.00 - 13.40 %    Eosinophils 0.40 0.00 - 6.90 %    Basophils 0.10 0.00 - 1.80 %    Immature Granulocytes 1.80 (H) 0.00 - 0.90 %    Nucleated RBC 0.00 /100 WBC    Neutrophils (Absolute) 16.51 (H) 1.82 - 7.42 K/uL    Lymphs (Absolute) 1.20 1.00 - 4.80 K/uL    Monos (Absolute) 1.13 (H) 0.00 - 0.85 K/uL    Eos (Absolute) 0.07 0.00 - 0.51 K/uL    Baso (Absolute) 0.02 0.00 - 0.12 K/uL    Immature Granulocytes (abs) 0.34 (H) 0.00 - 0.11 K/uL    NRBC (Absolute) 0.00 K/uL   MAGNESIUM    Collection Time: 05/01/23  5:44 AM   Result Value Ref Range    Magnesium 2.1 1.5 - 2.5 mg/dL   ESTIMATED GFR    Collection Time: 05/01/23  5:44 AM   Result Value Ref Range    GFR (CKD-EPI) 19 (A) >60 mL/min/1.73 m 2   CORRECTED CALCIUM    Collection Time: 05/01/23  5:44 AM   Result Value Ref Range    Correct Calcium 10.0 8.5 - 10.5 mg/dL   POCT glucose device results    Collection Time: 05/01/23  8:12 AM   Result Value Ref Range    POC Glucose, Blood 164 (H) 65 - 99 mg/dL          ASSESSMENT:  Patient is a 82 y.o. male admitted with weakness, found to have sepsis from colitis, and discitis/osteomyelitis, and SANJUANITA     [facility] Code / Diagnosis to Support: 0017.1 - Medically Complex: Infections    Rehabilitation: Impaired ADLs and mobility  Patient is a potential  candidate for inpatient rehab based on needs for PT, OT, pending therapy evals post op.  Patient will also benefit from family training.  Patient has a good discharge situation which will be home with son.     Barriers to transfer include: Insurance authorization, TCCs to verify disposition, medical clearance and bed availability     All cases are subject to administrative review and recommendations may change    Disposition recommendations:  -Possible candidate for IPR, needs repeat therapy after cholecystectomy, pending post op therapy evals as of 4/29   -TCC to verify discharge support through son   -PMR to follow in the periphery for rehab appropriateness, please reach out with questions or request for medical management    Medical Complexity:    Debility secondary to infection secondary to cholecystitis vs discitis   -Patient admitted with sepsis secondary to colitis, discitis, osteomyelitis, now with cholecystitis   -On  IV Zosyn   -Continue PT OT while in-house  -Patient needs to demonstrate improvement with functional gait to qualify for IPR  -TCC investigating discharge support  -Potential candidate for IPR, pending post op therapy notes     LE weakness vs transvers myelitis   - LP done, CSF analysis + glucose and protein   - MRI T spine showed  T2 hyperintense lesion in the R side of the thoracic spinal cord at T2 and T3 suspicious for spinal cord neoplasm vs transverse myelitis ; however clinically no neurologic deficits   - LE strength improved clinically , adequate strength on exam 4/29    -5/1 LE weakness in seated position, no strength deficits in supine   - Neurosurgery reviewed images, decleared to be not a  neurosurgical issues   - neurology following, started IVIG due to new weakness   - LE positiong weakness is exacerbated when abdominal pain increases due to use of core muscles     Acute cholecystitis   - HIDA scan completed, with non visualization of the gall bladder, suspicious for choleycystisits   - 4/28 open cholecystectomy by Dr. Galvan   - limited by abdo pain     Leukocytosis  -WBC  28.1  -> 22.8 ->  19.3 5/1   -Elevated procalcitonin  -on Zosyn    -Primary team monitoring with serial labs  - suspected secondary to cholecystitis     Hypertension  -Coreg 12.5 mg twice daily with meals  -Spironolactone 25 mg daily, has been held; BNP elevated on 4/29     Diabetes  - On Januvia and Ozempic outpatient  - Sliding scale insulin in house    Diabetic peripheral neuropathy  -Cymbalta    CKD stage IV  -Cr 2.2 - > 2.41  -> 2.08  -> 3.14 5/1   -Nephrology following    DVT PPX: SCDs      Thank you for allowing us to participate in the care of this patient.     Patient was seen for 36   minutes on unit/floor of which > 50% of time was spent on counseling and coordination of care regarding the above, including prognosis, risk reduction, benefits of treatment, and options for next stage of care and discissing case with therapy, hosptialist, and family     Shweta Trammell D.O.     Physical Medicine and Rehabilitation     Please note that this dictation was created using voice recognition software. I have made every reasonable attempt to correct obvious errors, but there may be errors of grammar and possibly content that I did not discover before finalizing the note.

## 2023-05-01 NOTE — DISCHARGE PLANNING
"HTH/SCP TCN chart review completed. Collaborated with NIKIA Galdamez. Current discharge considerations are IRF vs. SNF. Note patient has [facility]. [facility] acute rehab is also following.    Updated PT/OT recommendations today 5/1/23 for post acute placement.    Physiatry consult completed 4/29/23 with the following \"Possible candidate for IPR, needs repeat therapy after cholecystectomy, pending post op therapy evals as of 4/29.\"    TCN will continue to follow and collaborate with discharge planning team as additional post acute needs arise. Thank you.    Completed:  Physiatry consult completed with possible acceptance  PT recommends post acute placement on 4/25   OT recommends post-acute placement on 4/24  SLP eval 4/26 with recs for no further needs  Choice obtained: IRF and SNF choice obtained on 4/24/23.  HH choice on 4/24/23.  Infusion on 4/22/23.    [facility] referral sent 4/22/23  "

## 2023-05-01 NOTE — CARE PLAN
The patient is Stable - Low risk of patient condition declining or worsening    Shift Goals  Clinical Goals: monitor labs, pain control, mobilize, q2 turns  Patient Goals: rest, pain control  Family Goals: update on poc and support    Progress made toward(s) clinical / shift goals: Plan of care and medications discussed with pt. Pt currently on iv fluids to maintain hydration needs, pt voiding frequently, and pt still needs 2L of oxygen for support. Pt reported of abdomen pain 3-4/10. Medicated per MAR for pain. Pain tolerable after interventions. Pt currently on q2 turns and on TAPS to maintain skin integrity. Pt mobilized and got EOB to use urinal with assistance.    Problem: Mobility  Goal: Patient's capacity to carry out activities will improve  Outcome: Progressing     Problem: Knowledge Deficit - Standard  Goal: Patient and family/care givers will demonstrate understanding of plan of care, disease process/condition, diagnostic tests and medications  Outcome: Progressing     Problem: Hemodynamics  Goal: Patient's hemodynamics, fluid balance and neurologic status will be stable or improve  Outcome: Progressing     Problem: Fluid Volume  Goal: Fluid volume balance will be maintained  Outcome: Progressing     Problem: Respiratory  Goal: Patient will achieve/maintain optimum respiratory ventilation and gas exchange  Outcome: Progressing     Problem: Pain - Standard  Goal: Alleviation of pain or a reduction in pain to the patient’s comfort goal  Outcome: Progressing     Problem: Skin Integrity  Goal: Skin integrity is maintained or improved  Outcome: Progressing

## 2023-05-02 ENCOUNTER — APPOINTMENT (OUTPATIENT)
Dept: RADIOLOGY | Facility: MEDICAL CENTER | Age: 83
DRG: 853 | End: 2023-05-02
Attending: SURGERY
Payer: MEDICARE

## 2023-05-02 LAB
ALBUMIN SERPL BCP-MCNC: 1.7 G/DL (ref 3.2–4.9)
ALBUMIN/GLOB SERPL: 0.4 G/DL
ALP SERPL-CCNC: 160 U/L (ref 30–99)
ALT SERPL-CCNC: 35 U/L (ref 2–50)
ANION GAP SERPL CALC-SCNC: 10 MMOL/L (ref 7–16)
AST SERPL-CCNC: 55 U/L (ref 12–45)
BACTERIA SPEC ANAEROBE CULT: ABNORMAL
BACTERIA SPEC ANAEROBE CULT: ABNORMAL
BASOPHILS # BLD AUTO: 0.2 % (ref 0–1.8)
BASOPHILS # BLD: 0.02 K/UL (ref 0–0.12)
BILIRUB SERPL-MCNC: 0.5 MG/DL (ref 0.1–1.5)
BUN SERPL-MCNC: 58 MG/DL (ref 8–22)
CALCIUM ALBUM COR SERPL-MCNC: 10 MG/DL (ref 8.5–10.5)
CALCIUM SERPL-MCNC: 8.2 MG/DL (ref 8.5–10.5)
CHLORIDE SERPL-SCNC: 104 MMOL/L (ref 96–112)
CO2 SERPL-SCNC: 20 MMOL/L (ref 20–33)
CREAT SERPL-MCNC: 2.47 MG/DL (ref 0.5–1.4)
EOSINOPHIL # BLD AUTO: 0.07 K/UL (ref 0–0.51)
EOSINOPHIL NFR BLD: 0.5 % (ref 0–6.9)
ERYTHROCYTE [DISTWIDTH] IN BLOOD BY AUTOMATED COUNT: 49.8 FL (ref 35.9–50)
GFR SERPLBLD CREATININE-BSD FMLA CKD-EPI: 25 ML/MIN/1.73 M 2
GLOBULIN SER CALC-MCNC: 3.8 G/DL (ref 1.9–3.5)
GLUCOSE BLD STRIP.AUTO-MCNC: 139 MG/DL (ref 65–99)
GLUCOSE BLD STRIP.AUTO-MCNC: 165 MG/DL (ref 65–99)
GLUCOSE BLD STRIP.AUTO-MCNC: 174 MG/DL (ref 65–99)
GLUCOSE BLD STRIP.AUTO-MCNC: 201 MG/DL (ref 65–99)
GLUCOSE SERPL-MCNC: 176 MG/DL (ref 65–99)
HCT VFR BLD AUTO: 25.8 % (ref 42–52)
HGB BLD-MCNC: 8.8 G/DL (ref 14–18)
IMM GRANULOCYTES # BLD AUTO: 0.22 K/UL (ref 0–0.11)
IMM GRANULOCYTES NFR BLD AUTO: 1.7 % (ref 0–0.9)
LYMPHOCYTES # BLD AUTO: 1.07 K/UL (ref 1–4.8)
LYMPHOCYTES NFR BLD: 8.3 % (ref 22–41)
MAGNESIUM SERPL-MCNC: 1.8 MG/DL (ref 1.5–2.5)
MCH RBC QN AUTO: 30 PG (ref 27–33)
MCHC RBC AUTO-ENTMCNC: 34.1 G/DL (ref 33.7–35.3)
MCV RBC AUTO: 88.1 FL (ref 81.4–97.8)
MONOCYTES # BLD AUTO: 0.97 K/UL (ref 0–0.85)
MONOCYTES NFR BLD AUTO: 7.5 % (ref 0–13.4)
NEUTROPHILS # BLD AUTO: 10.54 K/UL (ref 1.82–7.42)
NEUTROPHILS NFR BLD: 81.8 % (ref 44–72)
NRBC # BLD AUTO: 0 K/UL
NRBC BLD-RTO: 0 /100 WBC
PLATELET # BLD AUTO: 477 K/UL (ref 164–446)
PMV BLD AUTO: 10.9 FL (ref 9–12.9)
POTASSIUM SERPL-SCNC: 4.1 MMOL/L (ref 3.6–5.5)
PROT SERPL-MCNC: 5.5 G/DL (ref 6–8.2)
RBC # BLD AUTO: 2.93 M/UL (ref 4.7–6.1)
SIGNIFICANT IND 70042: ABNORMAL
SITE SITE: ABNORMAL
SODIUM SERPL-SCNC: 134 MMOL/L (ref 135–145)
SOURCE SOURCE: ABNORMAL
WBC # BLD AUTO: 12.9 K/UL (ref 4.8–10.8)

## 2023-05-02 PROCEDURE — 82962 GLUCOSE BLOOD TEST: CPT

## 2023-05-02 PROCEDURE — 99233 SBSQ HOSP IP/OBS HIGH 50: CPT | Performed by: HOSPITALIST

## 2023-05-02 PROCEDURE — A9270 NON-COVERED ITEM OR SERVICE: HCPCS | Performed by: FAMILY MEDICINE

## 2023-05-02 PROCEDURE — 700102 HCHG RX REV CODE 250 W/ 637 OVERRIDE(OP): Performed by: HOSPITALIST

## 2023-05-02 PROCEDURE — 99232 SBSQ HOSP IP/OBS MODERATE 35: CPT | Mod: GC | Performed by: PSYCHIATRY & NEUROLOGY

## 2023-05-02 PROCEDURE — A9270 NON-COVERED ITEM OR SERVICE: HCPCS | Performed by: SURGERY

## 2023-05-02 PROCEDURE — A9270 NON-COVERED ITEM OR SERVICE: HCPCS | Performed by: HOSPITALIST

## 2023-05-02 PROCEDURE — 700102 HCHG RX REV CODE 250 W/ 637 OVERRIDE(OP): Performed by: SURGERY

## 2023-05-02 PROCEDURE — 700105 HCHG RX REV CODE 258: Performed by: STUDENT IN AN ORGANIZED HEALTH CARE EDUCATION/TRAINING PROGRAM

## 2023-05-02 PROCEDURE — 700102 HCHG RX REV CODE 250 W/ 637 OVERRIDE(OP): Performed by: FAMILY MEDICINE

## 2023-05-02 PROCEDURE — 770001 HCHG ROOM/CARE - MED/SURG/GYN PRIV*

## 2023-05-02 PROCEDURE — 83735 ASSAY OF MAGNESIUM: CPT

## 2023-05-02 PROCEDURE — 99024 POSTOP FOLLOW-UP VISIT: CPT | Performed by: PHYSICIAN ASSISTANT

## 2023-05-02 PROCEDURE — 700111 HCHG RX REV CODE 636 W/ 250 OVERRIDE (IP): Performed by: HOSPITALIST

## 2023-05-02 PROCEDURE — 71045 X-RAY EXAM CHEST 1 VIEW: CPT

## 2023-05-02 PROCEDURE — 85025 COMPLETE CBC W/AUTO DIFF WBC: CPT

## 2023-05-02 PROCEDURE — 700111 HCHG RX REV CODE 636 W/ 250 OVERRIDE (IP): Performed by: STUDENT IN AN ORGANIZED HEALTH CARE EDUCATION/TRAINING PROGRAM

## 2023-05-02 PROCEDURE — 80053 COMPREHEN METABOLIC PANEL: CPT

## 2023-05-02 RX ADMIN — FAMOTIDINE 20 MG: 20 TABLET, FILM COATED ORAL at 05:20

## 2023-05-02 RX ADMIN — ALLOPURINOL 100 MG: 100 TABLET ORAL at 05:19

## 2023-05-02 RX ADMIN — Medication 1 APPLICATOR: at 05:20

## 2023-05-02 RX ADMIN — Medication 1 APPLICATOR: at 17:10

## 2023-05-02 RX ADMIN — INSULIN HUMAN 2 UNITS: 100 INJECTION, SOLUTION PARENTERAL at 13:03

## 2023-05-02 RX ADMIN — DULOXETINE HYDROCHLORIDE 30 MG: 30 CAPSULE, DELAYED RELEASE ORAL at 17:10

## 2023-05-02 RX ADMIN — INSULIN HUMAN 3 UNITS: 100 INJECTION, SOLUTION PARENTERAL at 21:32

## 2023-05-02 RX ADMIN — MORPHINE SULFATE 4 MG: 4 INJECTION, SOLUTION INTRAMUSCULAR; INTRAVENOUS at 21:39

## 2023-05-02 RX ADMIN — HEPARIN SODIUM 5000 UNITS: 5000 INJECTION, SOLUTION INTRAVENOUS; SUBCUTANEOUS at 15:52

## 2023-05-02 RX ADMIN — MORPHINE SULFATE 2 MG: 4 INJECTION, SOLUTION INTRAMUSCULAR; INTRAVENOUS at 05:18

## 2023-05-02 RX ADMIN — SODIUM BICARBONATE 1300 MG: 650 TABLET ORAL at 17:10

## 2023-05-02 RX ADMIN — ATORVASTATIN CALCIUM 10 MG: 10 TABLET, FILM COATED ORAL at 17:10

## 2023-05-02 RX ADMIN — HEPARIN SODIUM 5000 UNITS: 5000 INJECTION, SOLUTION INTRAVENOUS; SUBCUTANEOUS at 21:32

## 2023-05-02 RX ADMIN — AMLODIPINE BESYLATE 5 MG: 10 TABLET ORAL at 05:19

## 2023-05-02 RX ADMIN — SODIUM BICARBONATE 1300 MG: 650 TABLET ORAL at 05:19

## 2023-05-02 RX ADMIN — INSULIN HUMAN 2 UNITS: 100 INJECTION, SOLUTION PARENTERAL at 17:07

## 2023-05-02 RX ADMIN — OXYCODONE 5 MG: 5 TABLET ORAL at 03:01

## 2023-05-02 RX ADMIN — CARVEDILOL 12.5 MG: 12.5 TABLET, FILM COATED ORAL at 17:10

## 2023-05-02 RX ADMIN — SODIUM CHLORIDE: 9 INJECTION, SOLUTION INTRAVENOUS at 18:39

## 2023-05-02 RX ADMIN — HEPARIN SODIUM 5000 UNITS: 5000 INJECTION, SOLUTION INTRAVENOUS; SUBCUTANEOUS at 05:19

## 2023-05-02 RX ADMIN — CARVEDILOL 12.5 MG: 12.5 TABLET, FILM COATED ORAL at 09:28

## 2023-05-02 ASSESSMENT — ENCOUNTER SYMPTOMS
NAUSEA: 0
SENSORY CHANGE: 0
DIARRHEA: 0
CHILLS: 0
DIAPHORESIS: 0
WEAKNESS: 1
COUGH: 0
SHORTNESS OF BREATH: 0
HEARTBURN: 0
VOMITING: 0
NERVOUS/ANXIOUS: 0
DIZZINESS: 0
FEVER: 0
HEADACHES: 0
SPEECH CHANGE: 0
FOCAL WEAKNESS: 0
BLURRED VISION: 0
FLANK PAIN: 0
BACK PAIN: 0
WHEEZING: 0
NECK PAIN: 0
MYALGIAS: 0
ABDOMINAL PAIN: 1
SORE THROAT: 0
PALPITATIONS: 0

## 2023-05-02 ASSESSMENT — PAIN DESCRIPTION - PAIN TYPE
TYPE: ACUTE PAIN

## 2023-05-02 NOTE — CARE PLAN
The patient is Watcher - Medium risk of patient condition declining or worsening    Shift Goals  Clinical Goals: rest, pain control, monitor blood sugar, monitor i/os  Patient Goals: rest  Family Goals: update on poc and support    Progress made toward(s) clinical / shift goals:    Problem: Knowledge Deficit - Standard  Goal: Patient and family/care givers will demonstrate understanding of plan of care, disease process/condition, diagnostic tests and medications  Outcome: Progressing  Note: Pt updated on POC, all questions answered.      Problem: Pain - Standard  Goal: Alleviation of pain or a reduction in pain to the patient’s comfort goal  Outcome: Progressing  Note: Pt denied pain at beginning of shift, per pt pain adequately managed with current pain regime in place.      Problem: Fall Risk  Goal: Patient will remain free from falls  Outcome: Progressing  Note: Fall precautions in place. Pt educated on importance of calling before OOB, pt verbalized understanding.      Problem: Skin Integrity  Goal: Skin integrity is maintained or improved  Outcome: Progressing  Note: Pt continuously refusing q2 turns, pt educated on importance of q2h turns, pt verbalized understanding. Sacral mepliex in place, low airloss mattress in place.         Patient is not progressing towards the following goals:

## 2023-05-02 NOTE — PROGRESS NOTES
Bedside report received.  Assessment complete.  A&O x 4. Patient calls appropriately.  Patient ambulates with max assist. Bed alarm on.   Patient has 2/10 pain. Pain managed with prescribed medications.  Denies N&V. Tolerating renal diet.  Surgical transverse abd incision with staples BERTA, MLI with staples BERTA, lower midline NAHUN DIP.  + void, + flatus, - BM.  Patient denies SOB.  SCD's on.  Review plan with of care with patient. Call light and personal belongings within reach. Hourly rounding in place. All needs met at this time.

## 2023-05-02 NOTE — PROGRESS NOTES
Neurology Progress Note        Subjective:  Mr Cline reports he feels stronger. Of note, he is post laparoscopic cholecystectomy converted to open cholecystectomy day 3.    Objective:  Vitals:  Vitals:    05/01/23 2306 05/02/23 0418 05/02/23 0519 05/02/23 0746   BP: (!) 168/82 121/84 (!) 157/85 (!) 143/82   Pulse: 77 83  85   Resp: 16 16  17   Temp: 37 °C (98.6 °F) 36.6 °C (97.9 °F)  37 °C (98.6 °F)   TempSrc: Temporal Temporal  Temporal   SpO2: 93% 91%  93%   Weight:       Height:         General: Awake, no apparent distress  Mental status: Alert, oriented x3, speech is fluent, comprehension is intact  Cranial Nerves: Pupils are equal round reactive light, extraocular movements are intact and no nystagmus, face is symmetric, facial sensations intact, no dysarthria  Motor: 5 /5 strength in the bilateral deltoids, 5/5 in the bilateral triceps, 5/5 in the biceps, 4/5 R 4-/5  L  strength, 4-/5 strength in the right hip flexor, 4/5 strength in left hip flexor, 4/5 strength in the right knee extensor, 5/5 in knee flexion, 4+/5 with knee extension on the left, 5/5 with ankle dorsiflexion and plantarflexion  Sensory: Light touch intact throughout, proprioception intact at the great toes bilaterally  Reflexes: 2+ and symmetric at the triceps, biceps, brachioradialis. 1+ at patellar. Absent reflexes. Achilles bilaterally.  Babinski sign present definitely on the right, equivocal on the left.  Coordination: Normal finger-to-nose bilaterally  Gait:  Deferred    Recent Labs     04/30/23  0222 04/30/23  0901 05/01/23  0544 05/02/23  0308   WBC 19.7*  --  19.3* 12.9*   RBC 3.37*  --  3.15* 2.93*   HEMOGLOBIN 9.9* 9.9* 9.2* 8.8*   HEMATOCRIT 30.8* 30.6* 28.7* 25.8*   MCV 91.4  --  91.1 88.1   MCH 29.4  --  29.2 30.0   MCHC 32.1*  --  32.1* 34.1   RDW 52.0*  --  51.5* 49.8   PLATELETCT 385  --  468* 477*   MPV 11.5  --  11.2 10.9       Recent Labs     04/30/23 0222 05/01/23  0544 05/02/23  0308   SODIUM 134* 134* 134*    POTASSIUM 4.2 4.2 4.1   CHLORIDE 101 104 104   CO2 22 19* 20   GLUCOSE 201* 191* 176*   BUN 64* 65* 58*   CREATININE 3.24* 3.14* 2.47*   CALCIUM 7.9* 8.2* 8.2*       Recent Labs     04/30/23 0222 05/01/23  0544 05/02/23  0308   SODIUM 134* 134* 134*   POTASSIUM 4.2 4.2 4.1   CHLORIDE 101 104 104   CO2 22 19* 20   GLUCOSE 201* 191* 176*   BUN 64* 65* 58*       Recent Labs     04/30/23 0222 05/01/23  0544 05/02/23  0308   SODIUM 134* 134* 134*   POTASSIUM 4.2 4.2 4.1   CHLORIDE 101 104 104   CO2 22 19* 20   BUN 64* 65* 58*   CREATININE 3.24* 3.14* 2.47*   MAGNESIUM 2.2 2.1 1.8   PHOSPHORUS 5.2*  --   --    CALCIUM 7.9* 8.2* 8.2*                        Imaging: neuroimaging reviewed and directly visualized by me  DX-CHEST-PORTABLE (1 VIEW)   Final Result         1.  Pulmonary edema and/or infiltrates are identified, which are stable since the prior exam.   2.  Cardiomegaly   3.  Atherosclerosis      DX-CHEST-PORTABLE (1 VIEW)   Final Result         1.  Pulmonary edema and/or infiltrates are identified, which are stable since the prior exam.   2.  Left PICC line tip terminates in the left axilla, stable since prior study.      DX-CHEST-PORTABLE (1 VIEW)   Final Result      1.  No significant change.   2.  Possible left PICC line with tip projecting at the axillary vein.      DX-CHEST-PORTABLE (1 VIEW)   Final Result      1.  Mildly improved pulmonary opacities.   2.  Possible left PICC line with tip projecting at the axillary vein.   3.  Likely trace right pleural effusion.      DX-CHEST-LIMITED (1 VIEW)   Final Result      1.  Placement of endotracheal tube with tip projecting over the mid trachea      2.  Left arm catheter present which is presumably venous in projects in the expected position of the left axillary vein      3.  Enlarged cardiac silhouette      NM-HEPATOBILIARY SCAN   Final Result      Hepatobiliary scan findings suspicious for acute cholecystitis.      MR-THORACIC SPINE-WITH & W/O   Final Result       1.  There is abnormal expansile T2 hyperintense lesion in the right side of the thoracic spinal cord at the levels of T2 and T3 Mild contrast enhancement is seen. This lesion is suspicious for spinal cord neoplasm. Other remote differential diagnosis    includes transverse myelitis.   2.  Exaggerated thoracic kyphosis.   3.  Thoracic dextroscoliosis.   4.  Minimal degenerative changes.   5.  Right pleural effusion.      MR-LUMBAR SPINE-WITH & W/O   Final Result      1.  Multifocal degenerative disease in the lumbar spine as described above.   2.  Moderate central canal and severe lateral recess stenosis at the level of L4-5. The exiting bilateral L5 nerve roots might have been impinged at the lateral recess.      MR-CERVICAL SPINE-WITH & W/O   Final Result      1.  There is abnormal expansile intramedullary T2 signal intensity lesion in the right cerebellum. Thoracic spinal cord at the level of T2 on T3. Mild diffuse contrast enhancement is seen. This finding is suspicious for spinal cord neoplasm. The other    less likely differential diagnosis includes transverse myelitis. Follow-up is recommended after 4 weeks.   2.  Mild degenerative disease in the cervical spine as described above.   3.  There is no evidence of infection in the cervical spine.      US-RUQ   Final Result         1.  Acute cholecystitis.   2.  Atrophic right kidney.      DX-CHEST-LIMITED (1 VIEW)   Final Result      Perihilar interstitial edema and basilar atelectasis and/or consolidation. Underlying infection is possible.      MR-LUMBAR SPINE-W/O   Final Result      1.  Lower lumbar spine predominant degenerative changes as described in detail above, progressed from 2010 MRI.   2.  Edema at the L4-L5 disc space and L5 superior endplate likely represents degenerative changes. Less likely this could represent a low-grade or early discitis osteomyelitis. Correlate for evidence of infection.   3.  Acute appearing Schmorl's node at L3-L4, which  can be a source of pain.      US-RENAL   Final Result      1.  Atrophic kidneys. Echogenic bilateral renal parenchyma could relate to medical renal disease.      2.  No hydronephrosis. No renal calculus.      DX-CHEST-PORTABLE (1 VIEW)   Final Result      No acute cardiac or pulmonary abnormalities are identified.      CT-ABDOMEN-PELVIS W/O   Final Result      1.  Findings suspicious for focal colitis at the hepatic flexure, less likely cholecystitis or duodenitis with secondary involvement of the colon. Infectious, inflammatory and ischemic etiologies are considerations. Underlying mass is not excluded.   2.  Cholelithiasis with distention of the gallbladder   3.  BILATERAL renal atrophy   4.  Subcentimeter LEFT adrenal myelolipoma   5.  12 mm RIGHT adrenal nodule likely an adenoma absent a history of cancer   6.  Subcentimeter RIGHT hepatic lesion, likely a cyst absent a history of cancer   7.  Atherosclerosis   8.  Colonic diverticulosis      CT-HEAD W/O   Final Result      1.  Cerebral atrophy.      2.  White matter lucencies most consistent with small vessel ischemic change versus demyelination or gliosis.      3.  Otherwise, Head CT without contrast with no acute findings. No evidence of acute cerebral infarction, hemorrhage or mass lesion.         MR-THORACIC SPINE-WITH & W/O    (Results Pending)       Impression:  82-year-old man with  right greater than left leg weakness since approx April 17th as well as  upper extremity weakness.  This developed concurrently with a GI illness concerning for food borne illness.  Certainly that history would raise the possibility of Guillain-Barré syndrome although the symptoms seem to have developed relatively rapidly in conjunction with the GI illness and he appears to improve without treatment with plasma exchange or IVIG. Furthermore, he has preserved reflexes in the arms.  Reflexes are absent at the Achilles and patellar which can be normal for his age especially in  the setting of diabetic polyneuropathy.  In fact his upgoing toe on the right concerns me more for a myelopathic process.    MRI-Tspine remarkable for T2 contrast avid lesion (Neoplastic vs transverse myelitis) which does not explain upper extremity weakness. Per discussion with neuroradiologist, assessment prior to 1 month post initial image may not yield an interpretable result and recommends against reassessing sooner unless a clear clinical deterioration is noted.  CSF analysis is remarkable for elevated glucose which could be explained by elevated serum glucose in the setting of DM. Elevated protein with nl WBC favors a demyelinating process. No oligoclonal bands seen on electrophoresis, pointing away from MS. VDRL negative points away from tertiary syphilis. CSF cultures NGTD points away from other common infective processes.    Overall pt was clinically improving without IVIG, Steroids or plasma exchange, which is reassuring. His physical exam remains stable in comparison to my exam from 4/28/2023.  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 Pathological evaluation of CSF pending  We are hesitant to offer any immuno-modulating therapy  which may delay healing.         Recommendations:      -PT, OT evaluate and treat.  -Follow up with Neurology in one month in the community   -Repeat MRI-Tspine to assess progression/resolution of T2-T3 lesion 5/26/2023  -Neurology will continue to follow and will assess the need for immunomodulating therapy should a marked clinical deterioration is detected, likely PLEX considering Kidney disease.

## 2023-05-02 NOTE — PROGRESS NOTES
"  DATE: 5/2/2023    Post Operative Day  4 laparoscopic cholecystectomy converted to open cholecystectomy.    INTERVAL EVENTS:  No acute overnight events.  Tolerating diet, passing flatus, + BM.  WBC down to 13.  Drain with 90 cc sanguinous drainage, thinning out.    PHYSICAL EXAMINATION:  Vital Signs: BP (!) 143/82   Pulse 85   Temp 37 °C (98.6 °F) (Temporal)   Resp 17   Ht 1.803 m (5' 10.98\")   Wt 89.1 kg (196 lb 6.9 oz)   SpO2 93%     Physical Exam  Vitals and nursing note reviewed.   Constitutional:       General: He is awake. He is not in acute distress.  Abdominal:      General: There is no distension.      Palpations: Abdomen is soft.      Tenderness: There is generalized abdominal tenderness.      Comments: Drain with sanguinous fluid, moderate amount  Dressings CDI   Neurological:      Mental Status: He is alert.   Psychiatric:         Behavior: Behavior is cooperative.         LABORATORY VALUES:   Recent Labs     04/30/23 0222 04/30/23 0901 05/01/23 0544 05/02/23  0308   WBC 19.7*  --  19.3* 12.9*   RBC 3.37*  --  3.15* 2.93*   HEMOGLOBIN 9.9* 9.9* 9.2* 8.8*   HEMATOCRIT 30.8* 30.6* 28.7* 25.8*   MCV 91.4  --  91.1 88.1   MCH 29.4  --  29.2 30.0   MCHC 32.1*  --  32.1* 34.1   RDW 52.0*  --  51.5* 49.8   PLATELETCT 385  --  468* 477*   MPV 11.5  --  11.2 10.9       Recent Labs     04/30/23 0222 05/01/23 0544 05/02/23  0308   SODIUM 134* 134* 134*   POTASSIUM 4.2 4.2 4.1   CHLORIDE 101 104 104   CO2 22 19* 20   GLUCOSE 201* 191* 176*   BUN 64* 65* 58*   CREATININE 3.24* 3.14* 2.47*   CALCIUM 7.9* 8.2* 8.2*       Recent Labs     04/30/23 0222 05/01/23 0544 05/02/23  0308   ASTSGOT 25 42 55*   ALTSGPT 22 31 35   TBILIRUBIN 0.4 0.5 0.5   ALKPHOSPHAT 106* 120* 160*   GLOBULIN 3.0 3.1 3.8*              IMAGING:   DX-CHEST-PORTABLE (1 VIEW)   Final Result         1.  Pulmonary edema and/or infiltrates are identified, which are stable since the prior exam.   2.  Cardiomegaly   3.  Atherosclerosis    "   DX-CHEST-PORTABLE (1 VIEW)   Final Result         1.  Pulmonary edema and/or infiltrates are identified, which are stable since the prior exam.   2.  Left PICC line tip terminates in the left axilla, stable since prior study.      DX-CHEST-PORTABLE (1 VIEW)   Final Result      1.  No significant change.   2.  Possible left PICC line with tip projecting at the axillary vein.      DX-CHEST-PORTABLE (1 VIEW)   Final Result      1.  Mildly improved pulmonary opacities.   2.  Possible left PICC line with tip projecting at the axillary vein.   3.  Likely trace right pleural effusion.      DX-CHEST-LIMITED (1 VIEW)   Final Result      1.  Placement of endotracheal tube with tip projecting over the mid trachea      2.  Left arm catheter present which is presumably venous in projects in the expected position of the left axillary vein      3.  Enlarged cardiac silhouette      NM-HEPATOBILIARY SCAN   Final Result      Hepatobiliary scan findings suspicious for acute cholecystitis.      MR-THORACIC SPINE-WITH & W/O   Final Result      1.  There is abnormal expansile T2 hyperintense lesion in the right side of the thoracic spinal cord at the levels of T2 and T3 Mild contrast enhancement is seen. This lesion is suspicious for spinal cord neoplasm. Other remote differential diagnosis    includes transverse myelitis.   2.  Exaggerated thoracic kyphosis.   3.  Thoracic dextroscoliosis.   4.  Minimal degenerative changes.   5.  Right pleural effusion.      MR-LUMBAR SPINE-WITH & W/O   Final Result      1.  Multifocal degenerative disease in the lumbar spine as described above.   2.  Moderate central canal and severe lateral recess stenosis at the level of L4-5. The exiting bilateral L5 nerve roots might have been impinged at the lateral recess.      MR-CERVICAL SPINE-WITH & W/O   Final Result      1.  There is abnormal expansile intramedullary T2 signal intensity lesion in the right cerebellum. Thoracic spinal cord at the level of  T2 on T3. Mild diffuse contrast enhancement is seen. This finding is suspicious for spinal cord neoplasm. The other    less likely differential diagnosis includes transverse myelitis. Follow-up is recommended after 4 weeks.   2.  Mild degenerative disease in the cervical spine as described above.   3.  There is no evidence of infection in the cervical spine.      US-RUQ   Final Result         1.  Acute cholecystitis.   2.  Atrophic right kidney.      DX-CHEST-LIMITED (1 VIEW)   Final Result      Perihilar interstitial edema and basilar atelectasis and/or consolidation. Underlying infection is possible.      MR-LUMBAR SPINE-W/O   Final Result      1.  Lower lumbar spine predominant degenerative changes as described in detail above, progressed from 2010 MRI.   2.  Edema at the L4-L5 disc space and L5 superior endplate likely represents degenerative changes. Less likely this could represent a low-grade or early discitis osteomyelitis. Correlate for evidence of infection.   3.  Acute appearing Schmorl's node at L3-L4, which can be a source of pain.      US-RENAL   Final Result      1.  Atrophic kidneys. Echogenic bilateral renal parenchyma could relate to medical renal disease.      2.  No hydronephrosis. No renal calculus.      DX-CHEST-PORTABLE (1 VIEW)   Final Result      No acute cardiac or pulmonary abnormalities are identified.      CT-ABDOMEN-PELVIS W/O   Final Result      1.  Findings suspicious for focal colitis at the hepatic flexure, less likely cholecystitis or duodenitis with secondary involvement of the colon. Infectious, inflammatory and ischemic etiologies are considerations. Underlying mass is not excluded.   2.  Cholelithiasis with distention of the gallbladder   3.  BILATERAL renal atrophy   4.  Subcentimeter LEFT adrenal myelolipoma   5.  12 mm RIGHT adrenal nodule likely an adenoma absent a history of cancer   6.  Subcentimeter RIGHT hepatic lesion, likely a cyst absent a history of cancer   7.   Atherosclerosis   8.  Colonic diverticulosis      CT-HEAD W/O   Final Result      1.  Cerebral atrophy.      2.  White matter lucencies most consistent with small vessel ischemic change versus demyelination or gliosis.      3.  Otherwise, Head CT without contrast with no acute findings. No evidence of acute cerebral infarction, hemorrhage or mass lesion.         MR-THORACIC SPINE-WITH & W/O    (Results Pending)       ASSESSMENT AND PLAN:   Cholelithiasis with acute cholecystitis- (present on admission)  Assessment & Plan  4/27 HIDA scan consistent with acute cholecystitis.  4/28 Laparoscopic cholecystectomy converted to open cholecystectomy.  4/30 Gallbladder fluid cultures (+) ESBL E Coli.  Antibiotics per hospital team.    Acute kidney injury superimposed on CKD (HCC)- (present on admission)  Assessment & Plan  Premorbid.  Fluid resuscitation and trend renal function.  Avoid nephrotoxins.    Postprocedural hypotension  Assessment & Plan  Patient was in shock throughout operative case.  Returns to ICU on levophed.   Wean levophed as tolerated.  4/29 Levophed stopped.  Trend SBP.      - Continue drain  - Mobilize aggressively  - Monitor drain and follow labs       ____________________________________     Kenzie Krueger P.A.-C.    DD: 4/30/2023  12:01 PM

## 2023-05-02 NOTE — PROGRESS NOTES
Hospital Medicine Daily Progress Note    Date of Service  5/2/2023    Chief Complaint  Tyrone Cline is a 82 y.o. male admitted 4/21/2023 with Colitis.    Hospital Course  Admitted with sepsis secondary to colitis.  Patient was started on empiric coverage with IV Rocephin and Flagyl.  He was also noted to have bilateral lower extremity weakness.  MRI of the lumbar spine was done which showed possible edema.  Neurology was then consulted on the case.  He was also noted to have an SANJUANITA on CKD stage IV.  Nephrology was consulted on the case.  He was started on a careful IVF hydration.  His CT scan also showed cholelithiasis.  Ultrasound was done which showed cholecystitis.  Surgery was then consulted on the case and patient underwent laparoscopic cholecystectomy on 4/28 which needed to be converted to open cholecystectomy, patient was persistently hypotensive during surgery and postoperatively  required IV pressor medication, thus he was care for in the surgical ICU for additional treatment.  On 4/29 the patient was transferred back to the medical service for ongoing management.     Interval Problem Update    With increased rate of IV fluids patient's creatinine is slightly improved now at 2.47    he denies any focal deficits but generalized weakness still present is still present    He had apparent focal deficits yesterday for which IVIG was initiated    Await neurology follow-up    12 pm-spoke with neurology team they felt that patient was already improving before the initiation of IVIG and feel that IVIG can be safely discontinued at this time-it has been stopped on 5/2/2023      I have discussed this patient's plan of care and discharge plan at IDT rounds today with Case Management, Nursing, Nursing leadership, and other members of the IDT team.    Consultants/Specialty  general surgery, nephrology, and neurology    Code Status  DNAR/DNI    Disposition  Patient is not medically cleared for discharge.    Anticipate discharge to to an inpatient rehabilitation hospital.  I have placed the appropriate orders for post-discharge needs.    Review of Systems  Review of Systems   Constitutional:  Positive for malaise/fatigue. Negative for chills, diaphoresis and fever.   HENT:  Negative for congestion, hearing loss and sore throat.    Eyes:  Negative for blurred vision.   Respiratory:  Negative for cough, shortness of breath and wheezing.    Cardiovascular:  Negative for chest pain, palpitations and leg swelling.   Gastrointestinal:  Positive for abdominal pain. Negative for diarrhea, heartburn, nausea and vomiting.   Genitourinary:  Negative for dysuria, flank pain and hematuria.   Musculoskeletal:  Negative for back pain, joint pain, myalgias and neck pain.   Skin:  Negative for rash.   Neurological:  Positive for weakness. Negative for dizziness, sensory change, speech change, focal weakness and headaches.   Psychiatric/Behavioral:  The patient is not nervous/anxious.       Physical Exam  Temp:  [36.3 °C (97.3 °F)-37.2 °C (99 °F)] 37 °C (98.6 °F)  Pulse:  [70-85] 85  Resp:  [16-18] 17  BP: (121-168)/(71-85) 143/82  SpO2:  [91 %-94 %] 93 %    Physical Exam  Vitals and nursing note reviewed.   HENT:      Head: Normocephalic and atraumatic.      Nose: No congestion.      Mouth/Throat:      Mouth: Mucous membranes are moist.   Eyes:      Extraocular Movements: Extraocular movements intact.      Conjunctiva/sclera: Conjunctivae normal.   Cardiovascular:      Rate and Rhythm: Normal rate and regular rhythm.      Heart sounds: Murmur heard.   Pulmonary:      Effort: Pulmonary effort is normal.      Breath sounds: Normal breath sounds.   Abdominal:      Tenderness: There is abdominal tenderness. There is no guarding or rebound.      Comments: Right upper quadrant dressing over the surgical area with a NAHUN drain in place, draining serosanguineous fluid   Musculoskeletal:      Cervical back: No tenderness.      Right lower leg:  No edema.      Left lower leg: No edema.   Skin:     General: Skin is warm and dry.      Coloration: Skin is pale.   Neurological:      Mental Status: He is alert and oriented to person, place, and time.      Cranial Nerves: No cranial nerve deficit.      Motor: Weakness present.       Fluids    Intake/Output Summary (Last 24 hours) at 5/2/2023 0932  Last data filed at 5/2/2023 0522  Gross per 24 hour   Intake 240 ml   Output 1865 ml   Net -1625 ml         Laboratory  Recent Labs     04/30/23 0222 04/30/23  0901 05/01/23  0544 05/02/23  0308   WBC 19.7*  --  19.3* 12.9*   RBC 3.37*  --  3.15* 2.93*   HEMOGLOBIN 9.9* 9.9* 9.2* 8.8*   HEMATOCRIT 30.8* 30.6* 28.7* 25.8*   MCV 91.4  --  91.1 88.1   MCH 29.4  --  29.2 30.0   MCHC 32.1*  --  32.1* 34.1   RDW 52.0*  --  51.5* 49.8   PLATELETCT 385  --  468* 477*   MPV 11.5  --  11.2 10.9       Recent Labs     04/30/23 0222 05/01/23 0544 05/02/23  0308   SODIUM 134* 134* 134*   POTASSIUM 4.2 4.2 4.1   CHLORIDE 101 104 104   CO2 22 19* 20   GLUCOSE 201* 191* 176*   BUN 64* 65* 58*   CREATININE 3.24* 3.14* 2.47*   CALCIUM 7.9* 8.2* 8.2*                         Imaging  DX-CHEST-PORTABLE (1 VIEW)   Final Result         1.  Pulmonary edema and/or infiltrates are identified, which are stable since the prior exam.   2.  Cardiomegaly   3.  Atherosclerosis      DX-CHEST-PORTABLE (1 VIEW)   Final Result         1.  Pulmonary edema and/or infiltrates are identified, which are stable since the prior exam.   2.  Left PICC line tip terminates in the left axilla, stable since prior study.      DX-CHEST-PORTABLE (1 VIEW)   Final Result      1.  No significant change.   2.  Possible left PICC line with tip projecting at the axillary vein.      DX-CHEST-PORTABLE (1 VIEW)   Final Result      1.  Mildly improved pulmonary opacities.   2.  Possible left PICC line with tip projecting at the axillary vein.   3.  Likely trace right pleural effusion.      DX-CHEST-LIMITED (1 VIEW)   Final  Result      1.  Placement of endotracheal tube with tip projecting over the mid trachea      2.  Left arm catheter present which is presumably venous in projects in the expected position of the left axillary vein      3.  Enlarged cardiac silhouette      NM-HEPATOBILIARY SCAN   Final Result      Hepatobiliary scan findings suspicious for acute cholecystitis.      MR-THORACIC SPINE-WITH & W/O   Final Result      1.  There is abnormal expansile T2 hyperintense lesion in the right side of the thoracic spinal cord at the levels of T2 and T3 Mild contrast enhancement is seen. This lesion is suspicious for spinal cord neoplasm. Other remote differential diagnosis    includes transverse myelitis.   2.  Exaggerated thoracic kyphosis.   3.  Thoracic dextroscoliosis.   4.  Minimal degenerative changes.   5.  Right pleural effusion.      MR-LUMBAR SPINE-WITH & W/O   Final Result      1.  Multifocal degenerative disease in the lumbar spine as described above.   2.  Moderate central canal and severe lateral recess stenosis at the level of L4-5. The exiting bilateral L5 nerve roots might have been impinged at the lateral recess.      MR-CERVICAL SPINE-WITH & W/O   Final Result      1.  There is abnormal expansile intramedullary T2 signal intensity lesion in the right cerebellum. Thoracic spinal cord at the level of T2 on T3. Mild diffuse contrast enhancement is seen. This finding is suspicious for spinal cord neoplasm. The other    less likely differential diagnosis includes transverse myelitis. Follow-up is recommended after 4 weeks.   2.  Mild degenerative disease in the cervical spine as described above.   3.  There is no evidence of infection in the cervical spine.      US-RUQ   Final Result         1.  Acute cholecystitis.   2.  Atrophic right kidney.      DX-CHEST-LIMITED (1 VIEW)   Final Result      Perihilar interstitial edema and basilar atelectasis and/or consolidation. Underlying infection is possible.      MR-LUMBAR  SPINE-W/O   Final Result      1.  Lower lumbar spine predominant degenerative changes as described in detail above, progressed from 2010 MRI.   2.  Edema at the L4-L5 disc space and L5 superior endplate likely represents degenerative changes. Less likely this could represent a low-grade or early discitis osteomyelitis. Correlate for evidence of infection.   3.  Acute appearing Schmorl's node at L3-L4, which can be a source of pain.      US-RENAL   Final Result      1.  Atrophic kidneys. Echogenic bilateral renal parenchyma could relate to medical renal disease.      2.  No hydronephrosis. No renal calculus.      DX-CHEST-PORTABLE (1 VIEW)   Final Result      No acute cardiac or pulmonary abnormalities are identified.      CT-ABDOMEN-PELVIS W/O   Final Result      1.  Findings suspicious for focal colitis at the hepatic flexure, less likely cholecystitis or duodenitis with secondary involvement of the colon. Infectious, inflammatory and ischemic etiologies are considerations. Underlying mass is not excluded.   2.  Cholelithiasis with distention of the gallbladder   3.  BILATERAL renal atrophy   4.  Subcentimeter LEFT adrenal myelolipoma   5.  12 mm RIGHT adrenal nodule likely an adenoma absent a history of cancer   6.  Subcentimeter RIGHT hepatic lesion, likely a cyst absent a history of cancer   7.  Atherosclerosis   8.  Colonic diverticulosis      CT-HEAD W/O   Final Result      1.  Cerebral atrophy.      2.  White matter lucencies most consistent with small vessel ischemic change versus demyelination or gliosis.      3.  Otherwise, Head CT without contrast with no acute findings. No evidence of acute cerebral infarction, hemorrhage or mass lesion.         MR-THORACIC SPINE-WITH & W/O    (Results Pending)          Assessment/Plan  Abnormal MRI- (present on admission)  Assessment & Plan  MRI on admission showing T2/T3  hyperintense lesion in the right side of the thoracic spinal cord at the levels of T2 and T3 Mild  contrast enhancement is seen. This lesion is suspicious for spinal cord neoplasm or possible transverse myelitis   Given his exam improved and concern for infection with IVIG, therapy not done  Worsening neuro exam today with worsening weakness, possible exacerbated by weakness and fatigue post operatively   - repeat MRI thoracic spine ordered   Neurology reconsulted, discussed case,IVIG has been ordered    Acute gangrenous cholecystitis- (present on admission)  Assessment & Plan  Found to have gangrenous cholecystitis s/p laprascopic converted open cholecystectomy 4/28/2023  Surgery continues to follow   No longer on antibiotics, as we have source control   Monitor closely for signs of infection   Post op pain control   Bowel regiment   Mobilize as able   Diet as tolerated     Postprocedural hypotension  Assessment & Plan  Hypotension in OR and post open carmelo  Now improved and off pressors   BP stable   Continue to monitor vitals per protocol     Hypercalcemia- (present on admission)  Assessment & Plan  Likely due to immobility   Improved with IVF hydration  Continue to follow and treat accordingly     Hypomagnesemia- (present on admission)  Assessment & Plan  resolved  Goal >2  replace as needed     Cholelithiasis with acute cholecystitis- (present on admission)  Assessment & Plan  S/p surgical removal, converted to open cholecystectomy  NAHUN drain, monitor output  The patient is tolerating a diet already, monitor bowel function    Hypokalemia- (present on admission)  Assessment & Plan  Stable, monitor CMP and replace as needed   - goal K >4    Thrombocytopenia (HCC)- (present on admission)  Assessment & Plan  Resolved, plt are stable     High anion gap metabolic acidosis- (present on admission)  Assessment & Plan  Acute on chronic kidney disease, improving   Resumed gentle IVF today for SANJUANITA on CKD  Stable     Hyponatremia- (present on admission)  Assessment & Plan  Improved, slightly down today at 134, mild and  asymptomatic   Cont to monitor BMP     Weakness of both lower extremities- (present on admission)  Assessment & Plan  T2/T3 intramedullary lesion of unclear chronicity and clinical significance as well as 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     Repeat MRI thoracic spine with and without contrast to further evaluate the previously seen lesion at T2-T3      IVIG initiated on 5/1/2023, will need to closely monitor for any adverse effects   neurosurgery  did not feel that this was a neurosurgical issue.    Sepsis ([facility])- (present on admission)  Assessment & Plan  This is Sepsis Present on admission  SIRS criteria identified on my evaluation include: Leukocytosis, with WBC greater than 12,000  Source is colitis  Sepsis protocol initiated  Fluid resuscitation ordered per protocol  Crystalloid Fluid Administration: Fluid resuscitation ordered per standard protocol - 30 mL/kg per current or ideal body weight  IV antibiotics as appropriate for source of sepsis  Reassessment: I have reassessed the patient's hemodynamic status    Due to gangrenous gallbladder requiring open carmelo   resolevd   off antibiotics      Colitis- (present on admission)  Assessment & Plan  Cultures negative, monitor  Initial admitting impression, currently improved  Diet as tolerated     Acute kidney injury superimposed on CKD ([facility])- (present on admission)  Assessment & Plan     IV fluids  Avoid nephrotoxins   Repeat bmp this evening to trend       Diabetic peripheral neuropathy ([facility])- (present on admission)  Assessment & Plan  Cymbalta, medical treatment    Essential hypertension- (present on admission)  Assessment & Plan  Monitor closely, the patient had a hypotensive episode postoperatively  Off pressors and has since been resumed  , amlodipine and coreg   Monitor BP and adjust medications as needed   Prn hydralazine     Type 2 diabetes mellitus with kidney complication, without long-term current use of  insulin (HCC)- (present on admission)  Assessment & Plan  Ha1c 6.4% 4 months ago   On ozempic and januvia outpatient   Continue ISS and hypoglycemic protocol while inpatient     Hypercholesterolemia- (present on admission)  Assessment & Plan  Stable, continue Lipitor    Chronic kidney disease (CKD), stage IV (severe) (HCC)- (present on admission)  Assessment & Plan  Acute on chronic kidney disease       Bladder scan ordered   Gentle IV fluids were restarted on 5/1/2023      Continue gentle IV fluids        VTE prophylaxis: SCDs/TEDs and heparin ppx    I have performed a physical exam and reviewed and updated ROS and Plan today (5/2/2023). In review of yesterday's note (5/1/2023), there are no changes except as documented above.

## 2023-05-02 NOTE — CARE PLAN
The patient is Stable - Low risk of patient condition declining or worsening    Shift Goals  Clinical Goals: pain control, monitor surgical sites, tolerate diet  Patient Goals: rest  Family Goals: update on poc and support    Progress made toward(s) clinical / shift goals:      Patient's pain will be well controlled with scheduled medications and increasing mobility. Patient will remain free from falls with appropriate use of call light and using bed alarms.    Problem: Pain - Standard  Goal: Alleviation of pain or a reduction in pain to the patient’s comfort goal  Outcome: Progressing     Problem: Fall Risk  Goal: Patient will remain free from falls  5/2/2023 1318 by Fifi White, R.N.  Outcome: Progressing  5/2/2023 1316 by Fifi White, R.N.  Outcome: Progressing

## 2023-05-02 NOTE — DISCHARGE PLANNING
[facility]    Follow up for Rehab.  Spoke by phone with patient's son Skip Cline to discuss IRF referral.  Explained specifics of inpatient rehab, to include PMR recommendations from 4/24 -  patient will need to be able demonstrate that he can walk short distances of around 10 feet to be able to qualify for IPR.   Discussed average length of stay IPR with anticipated need for ongoing assist at discharge for safe return to community.  Skip may be able to provide support at discharge, would like to see how his father progresses.     PT 5/01 reflects unable to participate with gait.  Message to PMR requesting follow up.  Will monitor for PMR recommendations.

## 2023-05-03 LAB
ANION GAP SERPL CALC-SCNC: 8 MMOL/L (ref 7–16)
BUN SERPL-MCNC: 41 MG/DL (ref 8–22)
CALCIUM SERPL-MCNC: 8.5 MG/DL (ref 8.5–10.5)
CHLORIDE SERPL-SCNC: 106 MMOL/L (ref 96–112)
CO2 SERPL-SCNC: 21 MMOL/L (ref 20–33)
CREAT SERPL-MCNC: 2.09 MG/DL (ref 0.5–1.4)
ERYTHROCYTE [DISTWIDTH] IN BLOOD BY AUTOMATED COUNT: 50.4 FL (ref 35.9–50)
GFR SERPLBLD CREATININE-BSD FMLA CKD-EPI: 31 ML/MIN/1.73 M 2
GLUCOSE BLD STRIP.AUTO-MCNC: 149 MG/DL (ref 65–99)
GLUCOSE BLD STRIP.AUTO-MCNC: 154 MG/DL (ref 65–99)
GLUCOSE BLD STRIP.AUTO-MCNC: 191 MG/DL (ref 65–99)
GLUCOSE BLD STRIP.AUTO-MCNC: 226 MG/DL (ref 65–99)
GLUCOSE SERPL-MCNC: 153 MG/DL (ref 65–99)
HCT VFR BLD AUTO: 26.7 % (ref 42–52)
HGB BLD-MCNC: 8.9 G/DL (ref 14–18)
MAGNESIUM SERPL-MCNC: 1.7 MG/DL (ref 1.5–2.5)
MCH RBC QN AUTO: 29.7 PG (ref 27–33)
MCHC RBC AUTO-ENTMCNC: 33.3 G/DL (ref 33.7–35.3)
MCV RBC AUTO: 89 FL (ref 81.4–97.8)
PLATELET # BLD AUTO: 524 K/UL (ref 164–446)
PMV BLD AUTO: 10.6 FL (ref 9–12.9)
POTASSIUM SERPL-SCNC: 4.2 MMOL/L (ref 3.6–5.5)
RBC # BLD AUTO: 3 M/UL (ref 4.7–6.1)
SODIUM SERPL-SCNC: 135 MMOL/L (ref 135–145)
WBC # BLD AUTO: 12 K/UL (ref 4.8–10.8)

## 2023-05-03 PROCEDURE — 97535 SELF CARE MNGMENT TRAINING: CPT

## 2023-05-03 PROCEDURE — 700102 HCHG RX REV CODE 250 W/ 637 OVERRIDE(OP): Performed by: SURGERY

## 2023-05-03 PROCEDURE — A9270 NON-COVERED ITEM OR SERVICE: HCPCS | Performed by: FAMILY MEDICINE

## 2023-05-03 PROCEDURE — 80048 BASIC METABOLIC PNL TOTAL CA: CPT

## 2023-05-03 PROCEDURE — A9270 NON-COVERED ITEM OR SERVICE: HCPCS | Performed by: HOSPITALIST

## 2023-05-03 PROCEDURE — 83735 ASSAY OF MAGNESIUM: CPT

## 2023-05-03 PROCEDURE — 770001 HCHG ROOM/CARE - MED/SURG/GYN PRIV*

## 2023-05-03 PROCEDURE — 99024 POSTOP FOLLOW-UP VISIT: CPT | Performed by: PHYSICIAN ASSISTANT

## 2023-05-03 PROCEDURE — 700102 HCHG RX REV CODE 250 W/ 637 OVERRIDE(OP): Performed by: HOSPITALIST

## 2023-05-03 PROCEDURE — 700102 HCHG RX REV CODE 250 W/ 637 OVERRIDE(OP): Performed by: FAMILY MEDICINE

## 2023-05-03 PROCEDURE — 700105 HCHG RX REV CODE 258: Performed by: STUDENT IN AN ORGANIZED HEALTH CARE EDUCATION/TRAINING PROGRAM

## 2023-05-03 PROCEDURE — 99232 SBSQ HOSP IP/OBS MODERATE 35: CPT | Performed by: HOSPITALIST

## 2023-05-03 PROCEDURE — 99232 SBSQ HOSP IP/OBS MODERATE 35: CPT | Mod: GC | Performed by: PSYCHIATRY & NEUROLOGY

## 2023-05-03 PROCEDURE — A9270 NON-COVERED ITEM OR SERVICE: HCPCS | Performed by: SURGERY

## 2023-05-03 PROCEDURE — 97530 THERAPEUTIC ACTIVITIES: CPT

## 2023-05-03 PROCEDURE — 700111 HCHG RX REV CODE 636 W/ 250 OVERRIDE (IP): Performed by: HOSPITALIST

## 2023-05-03 PROCEDURE — 82962 GLUCOSE BLOOD TEST: CPT

## 2023-05-03 PROCEDURE — 85027 COMPLETE CBC AUTOMATED: CPT

## 2023-05-03 PROCEDURE — 700111 HCHG RX REV CODE 636 W/ 250 OVERRIDE (IP): Performed by: STUDENT IN AN ORGANIZED HEALTH CARE EDUCATION/TRAINING PROGRAM

## 2023-05-03 RX ADMIN — HEPARIN SODIUM 5000 UNITS: 5000 INJECTION, SOLUTION INTRAVENOUS; SUBCUTANEOUS at 06:36

## 2023-05-03 RX ADMIN — Medication 1 APPLICATOR: at 17:49

## 2023-05-03 RX ADMIN — MORPHINE SULFATE 4 MG: 4 INJECTION, SOLUTION INTRAMUSCULAR; INTRAVENOUS at 23:22

## 2023-05-03 RX ADMIN — ALLOPURINOL 100 MG: 100 TABLET ORAL at 06:38

## 2023-05-03 RX ADMIN — CARVEDILOL 12.5 MG: 12.5 TABLET, FILM COATED ORAL at 17:49

## 2023-05-03 RX ADMIN — FAMOTIDINE 20 MG: 20 TABLET, FILM COATED ORAL at 06:38

## 2023-05-03 RX ADMIN — INSULIN HUMAN 2 UNITS: 100 INJECTION, SOLUTION PARENTERAL at 21:41

## 2023-05-03 RX ADMIN — SODIUM BICARBONATE 1300 MG: 650 TABLET ORAL at 06:37

## 2023-05-03 RX ADMIN — ATORVASTATIN CALCIUM 10 MG: 10 TABLET, FILM COATED ORAL at 17:49

## 2023-05-03 RX ADMIN — INSULIN HUMAN 3 UNITS: 100 INJECTION, SOLUTION PARENTERAL at 13:38

## 2023-05-03 RX ADMIN — Medication 1 APPLICATOR: at 06:36

## 2023-05-03 RX ADMIN — SODIUM CHLORIDE: 9 INJECTION, SOLUTION INTRAVENOUS at 06:42

## 2023-05-03 RX ADMIN — DULOXETINE HYDROCHLORIDE 30 MG: 30 CAPSULE, DELAYED RELEASE ORAL at 17:49

## 2023-05-03 RX ADMIN — CARVEDILOL 12.5 MG: 12.5 TABLET, FILM COATED ORAL at 09:34

## 2023-05-03 RX ADMIN — SODIUM BICARBONATE 1300 MG: 650 TABLET ORAL at 17:49

## 2023-05-03 RX ADMIN — OXYCODONE HYDROCHLORIDE 10 MG: 10 TABLET ORAL at 21:48

## 2023-05-03 RX ADMIN — HEPARIN SODIUM 5000 UNITS: 5000 INJECTION, SOLUTION INTRAVENOUS; SUBCUTANEOUS at 13:38

## 2023-05-03 RX ADMIN — INSULIN HUMAN 2 UNITS: 100 INJECTION, SOLUTION PARENTERAL at 17:47

## 2023-05-03 RX ADMIN — HEPARIN SODIUM 5000 UNITS: 5000 INJECTION, SOLUTION INTRAVENOUS; SUBCUTANEOUS at 21:41

## 2023-05-03 RX ADMIN — AMLODIPINE BESYLATE 5 MG: 10 TABLET ORAL at 06:37

## 2023-05-03 ASSESSMENT — ENCOUNTER SYMPTOMS
CHILLS: 0
NECK PAIN: 0
BACK PAIN: 0
BLURRED VISION: 0
SENSORY CHANGE: 0
MYALGIAS: 0
ABDOMINAL PAIN: 1
FLANK PAIN: 0
SPEECH CHANGE: 0
FOCAL WEAKNESS: 0
VOMITING: 0
HEARTBURN: 0
DIAPHORESIS: 0
DIZZINESS: 0
COUGH: 0
WEAKNESS: 1
SHORTNESS OF BREATH: 0
WHEEZING: 0
PALPITATIONS: 0
NAUSEA: 0
HEADACHES: 0
NERVOUS/ANXIOUS: 0
FEVER: 0
DIARRHEA: 0
SORE THROAT: 0

## 2023-05-03 ASSESSMENT — PAIN DESCRIPTION - PAIN TYPE
TYPE: ACUTE PAIN

## 2023-05-03 ASSESSMENT — COGNITIVE AND FUNCTIONAL STATUS - GENERAL
DAILY ACTIVITIY SCORE: 15
SUGGESTED CMS G CODE MODIFIER DAILY ACTIVITY: CK
DRESSING REGULAR LOWER BODY CLOTHING: A LOT
WALKING IN HOSPITAL ROOM: TOTAL
EATING MEALS: A LITTLE
TOILETING: A LOT
STANDING UP FROM CHAIR USING ARMS: TOTAL
SUGGESTED CMS G CODE MODIFIER MOBILITY: CM
CLIMB 3 TO 5 STEPS WITH RAILING: TOTAL
TURNING FROM BACK TO SIDE WHILE IN FLAT BAD: A LOT
MOBILITY SCORE: 7
MOVING TO AND FROM BED TO CHAIR: UNABLE
MOVING FROM LYING ON BACK TO SITTING ON SIDE OF FLAT BED: UNABLE
PERSONAL GROOMING: A LITTLE
HELP NEEDED FOR BATHING: A LOT
DRESSING REGULAR UPPER BODY CLOTHING: A LITTLE

## 2023-05-03 ASSESSMENT — GAIT ASSESSMENTS: GAIT LEVEL OF ASSIST: UNABLE TO PARTICIPATE

## 2023-05-03 NOTE — PROGRESS NOTES
Bedside report received.  Assessment complete.  A&O x 4. Patient calls appropriately.  Patient ambulates with max assist. Bed alarm on.   Patient has 0/10 pain. Pain managed with prescribed medications.  Denies N&V. Tolerating renal diet.  Surgical:  transverse abd incision with staples BERTA, MLI with staples BERTA, lower midline NAHUN DIP  + void, + flatus, + BM.  Patient denies SOB.  SCD's on  Review plan with of care with patient. Call light and personal belongings within reach. Hourly rounding in place. All needs met at this time.

## 2023-05-03 NOTE — CARE PLAN
The patient is Stable - Low risk of patient condition declining or worsening    Shift Goals  Clinical Goals: Q2 turns, monitor neuro  Patient Goals: rest  Family Goals: update on poc and support    Progress made toward(s) clinical / shift goals:      Patient will remain free from falls by having physical activity provided, frequent toileting, and utilizing bed alarm. Patient's skin will remain intact with Q2 turns and frequent assessments.     Problem: Fall Risk  Goal: Patient will remain free from falls  Outcome: Progressing     Problem: Skin Integrity  Goal: Skin integrity is maintained or improved  Outcome: Progressing

## 2023-05-03 NOTE — CARE PLAN
Problem: Pain - Standard  Goal: Alleviation of pain or a reduction in pain to the patient’s comfort goal  Description: Target End Date:  Prior to discharge or change in level of care    Document on Vitals flowsheet    1.  Document pain using the appropriate pain scale per order or unit policy  2.  Educate and implement non-pharmacologic comfort measures (i.e. relaxation, distraction, massage, cold/heat therapy, etc.)  3.  Pain management medications as ordered  4.  Reassess pain after pain med administration per policy  5.  If opiods administered assess patient's response to pain medication is appropriate per POSS sedation scale  6.  Follow pain management plan developed in collaboration with patient and interdisciplinary team (including palliative care or pain specialists if applicable)  Outcome: Progressing     Problem: Fall Risk  Goal: Patient will remain free from falls  Description: Target End Date:  Prior to discharge or change in level of care    Document interventions on the [place] Fall Risk Assessment    1.  Assess for fall risk factors  2.  Implement fall precautions  Outcome: Progressing     The patient is Stable - Low risk of patient condition declining or worsening    Shift Goals  Clinical Goals: Neuro consult, f/u MRI  Patient Goals: pain control  Family Goals: update on poc and support    Progress made toward(s) clinical / shift goals: Neuro consulted by MD. Pain controlled with PRN medication. Follow up MRI pending    Patient is not progressing towards the following goals:

## 2023-05-03 NOTE — DISCHARGE PLANNING
1109-  Agency/Facility Name: [facility]  Spoke To:   Outcome: DPA informed  in admissions that pt is pending surgical clearance but will most likely be discharging tomorrow 5/4/23. Per , there will a bed available for pt upon discharge.

## 2023-05-03 NOTE — THERAPY
Occupational Therapy  Daily Treatment     Patient Name: Tyrone Cline  Age:  83 y.o., Sex:  male  Medical Record #: [id number]  Today's Date: 5/3/2023     Precautions  Precautions: Fall Risk  Comments: NAHUN drain x1; urinary retention complaint    Assessment    Pt seen for OT session. Progressing slowly with activity tolerance and unsupported sitting balance. Continues to fatigue quickly with any EOB activity and c/o urinary retention, but is unaware he is completely soiled/wet in bed. Continues to be limited by decreased functional mobility, activity tolerance, cognition, strength, balance, and pain which are currently affecting pt's ability to complete ADLs/IADLs at baseline. Will continue to follow.     Plan    Treatment Plan Status: Continue Current Treatment Plan  Type of Treatment: Self Care / Activities of Daily Living, Adaptive Equipment, Neuro Re-Education / Balance, Therapeutic Exercises, Therapeutic Activity  Treatment Frequency: 3 Times per Week  Treatment Duration: Until Therapy Goals Met    DC Equipment Recommendations: Unable to determine at this time  Discharge Recommendations: Recommend post-acute placement for additional occupational therapy services prior to discharge home     Objective     05/03/23 1058   Precautions   Precautions Fall Risk   Comments NAHUN drain x1; urinary retention complaint   Vitals   O2 (LPM) 2   O2 Delivery Device Nasal Cannula   Vitals Comments SpO2 >90% throughout session   Pain 0 - 10 Group   Location Abdomen   Therapist Pain Assessment Post Activity;During Activity;Nurse Notified  (not quantified)   Cognition    Cognition / Consciousness X   Level of Consciousness Alert   New Learning Impaired   Sequencing Impaired   Comments very pleasant and cooperative; fatigues quickly but more alert this session   Passive ROM Upper Body   Passive ROM Upper Body WDL   Active ROM Upper Body   Active ROM Upper Body  WDL   Strength Upper Body   Upper Body Strength  X   Gross Strength  Generalized Weakness, Equal Bilaterally.    Comments c/o increasing  strength weakness; pt also with B hand edema. 4/5 , but proximally has improved to 4+/5   Sensation Upper Body   Upper Extremity Sensation  WDL   Supine Upper Body Exercises   Comments continued edu on arom/elevation for B hand edema   Other Treatments   Other Treatments Provided Pt with increased alertness and improved sitting balance this session. Able to sit unsupported for ~1 min, but continues to fatigue quickly   Balance   Sitting Balance (Static) Fair -   Sitting Balance (Dynamic) Poor   Standing Balance (Static) Trace +   Standing Balance (Dynamic) Trace   Weight Shift Sitting Poor   Weight Shift Standing Absent   Skilled Intervention Verbal Cuing;Tactile Cuing;Sequencing;Postural Facilitation;Facilitation;Compensatory Strategies   Comments w/x2 HHA. Continues to req BUE support   Bed Mobility    Supine to Sit Moderate Assist   Sit to Supine Moderate Assist   Scooting Maximal Assist   Rolling Moderate Assist to Rt.   Skilled Intervention Verbal Cuing;Tactile Cuing;Sequencing;Postural Facilitation;Facilitation;Compensatory Strategies   Comments HOB elevated, v/cs for logroll   Activities of Daily Living   Grooming Supervision;Seated  (BUEs combing hair and req UUE support for wiping face. improved activity tolerance)   Upper Body Dressing Minimal Assist  (gown)   Lower Body Dressing Maximal Assist  (socks)   Toileting Total Assist  (pt with c/o urgency, saying he can feel that he has to urinate, but when attempts is unable. However, pt soiled/soaked with urine, but pt reports he cannot tell he is wet.)   Skilled Intervention Verbal Cuing;Tactile Cuing;Compensatory Strategies;Sequencing;Facilitation   Functional Mobility   Sit to Stand Moderate Assist  (x2 ppl, full stand with B knee blocking, but able to hold for ~10 secs)   Toilet Transfers Unable to Participate   Mobility STS x2 w/ x2 person assist   Skilled Intervention Verbal  Cuing;Tactile Cuing;Sequencing;Postural Facilitation;Facilitation;Compensatory Strategies   Activity Tolerance   Sitting Edge of Bed 15 min w/ ~1min unsupported during ADLs   Standing ~1 min total   Comments limited by fatigue. Fatigues quickly in sitting and standing, but both are improving   Patient / Family Goals   Patient / Family Goal #1 to get stronger   Goal #1 Outcome Progressing slower than expected   Short Term Goals   Short Term Goal # 1 Pt will complete ADL transfers with supervision   Goal Outcome # 1 Progressing slower than expected   Short Term Goal # 2 Pt will complete LB dressing with supervision   Goal Outcome # 2 Progressing slower than expected   Short Term Goal # 3 Pt will complete toileting with supervision   Goal Outcome # 3 Progressing slower than expected   Education Group   Education Provided Upper Extremity Range of Motion;Pathology of bedrest   Upper Ext ROM Patient Response Patient;Acceptance;Explanation;Reinforcement Needed;No Learning Evidence   Pathology of Bedrest Patient Response Patient;Acceptance;Explanation;Verbal Demonstration;Reinforcement Needed

## 2023-05-03 NOTE — PROGRESS NOTES
Chart reviewed for discharge planning purposes    Per my last consult note on 4/24, patient will need to be able to demonstrate that he can walk short distances to be able to qualify for IPR.  Since that time, patient has not been able to make gains in his functional capacity.  Patient was seen by PT yesterday and was assessed to have 2/5 strength in bilateral quadriceps muscles while sitting edge of bed.  Patient attempted sit to stand with max assist x2 with bed height raised but was still unable to stand.  Patient was complaining of dizziness and fatigue, demonstrated decreased activity tolerance, decreased balance compared to previous sessions.    Assessment  Unfortunately, patient is still not a candidate for inpatient rehab at this time because he is not expected to have a reasonable amount of improvement in a reasonable amount of time using the combined approach.     Plan  Continue with SNF placement     DO MS FAVIOLA Ye - Physical Medicine and Rehabilitation

## 2023-05-03 NOTE — PROGRESS NOTES
Neurology Progress Note        Subjective:  Mr Cline reports he feels stronger. Of note, he is post laparoscopic cholecystectomy converted to open cholecystectomy day 4.    Objective:  Vitals:  Vitals:    05/02/23 1935 05/03/23 0403 05/03/23 0815 05/03/23 1550   BP: (!) 148/82 137/88 (!) 164/89 (!) 143/87   Pulse: 79 76  78   Resp: 18 18 (!) 21 19   Temp: 36.8 °C (98.2 °F) 37 °C (98.6 °F)  36.8 °C (98.2 °F)   TempSrc: Temporal Temporal  Temporal   SpO2: 95% 90% 92% 94%   Weight:       Height:         General: Awake, no apparent distress  Mental status: Alert, oriented x3, speech is fluent, comprehension is intact  Cranial Nerves: Pupils are equal round reactive light, extraocular movements are intact and no nystagmus, face is symmetric, facial sensations intact, no dysarthria  Motor: 5 /5 strength in the bilateral deltoids, 5/5 in the bilateral triceps, 5/5 in the biceps, 4/5 R 4-/5  L  strength, 4-/5 strength in the right hip flexor, 4/5 strength in left hip flexor, 4/5 strength in the right knee extensor, 5/5 in knee flexion, 4+/5 with knee extension on the left, 5/5 with ankle dorsiflexion and plantarflexion  Sensory: Light touch intact throughout, proprioception intact at the great toes bilaterally  Reflexes: 2+ and symmetric at the triceps, biceps, brachioradialis. 1+ at patellar. Absent reflexes. Achilles bilaterally.  Babinski sign present definitely on the right, equivocal on the left.  Coordination: Normal finger-to-nose bilaterally  Gait:  Deferred    Recent Labs     05/01/23  0544 05/02/23  0308 05/03/23  0650   WBC 19.3* 12.9* 12.0*   RBC 3.15* 2.93* 3.00*   HEMOGLOBIN 9.2* 8.8* 8.9*   HEMATOCRIT 28.7* 25.8* 26.7*   MCV 91.1 88.1 89.0   MCH 29.2 30.0 29.7   MCHC 32.1* 34.1 33.3*   RDW 51.5* 49.8 50.4*   PLATELETCT 468* 477* 524*   MPV 11.2 10.9 10.6       Recent Labs     05/01/23  0544 05/02/23  0308 05/03/23  0650   SODIUM 134* 134* 135   POTASSIUM 4.2 4.1 4.2   CHLORIDE 104 104 106   CO2 19* 20  21   GLUCOSE 191* 176* 153*   BUN 65* 58* 41*   CREATININE 3.14* 2.47* 2.09*   CALCIUM 8.2* 8.2* 8.5       Recent Labs     05/01/23  0544 05/02/23  0308 05/03/23  0650   SODIUM 134* 134* 135   POTASSIUM 4.2 4.1 4.2   CHLORIDE 104 104 106   CO2 19* 20 21   GLUCOSE 191* 176* 153*   BUN 65* 58* 41*       Recent Labs     05/01/23  0544 05/02/23  0308 05/03/23  0650   SODIUM 134* 134* 135   POTASSIUM 4.2 4.1 4.2   CHLORIDE 104 104 106   CO2 19* 20 21   BUN 65* 58* 41*   CREATININE 3.14* 2.47* 2.09*   MAGNESIUM 2.1 1.8 1.7   CALCIUM 8.2* 8.2* 8.5                        Imaging: neuroimaging reviewed and directly visualized by me  DX-CHEST-PORTABLE (1 VIEW)   Final Result         1.  Pulmonary edema and/or infiltrates are identified, which are stable since the prior exam.   2.  Cardiomegaly   3.  Atherosclerosis      DX-CHEST-PORTABLE (1 VIEW)   Final Result         1.  Pulmonary edema and/or infiltrates are identified, which are stable since the prior exam.   2.  Left PICC line tip terminates in the left axilla, stable since prior study.      DX-CHEST-PORTABLE (1 VIEW)   Final Result      1.  No significant change.   2.  Possible left PICC line with tip projecting at the axillary vein.      DX-CHEST-PORTABLE (1 VIEW)   Final Result      1.  Mildly improved pulmonary opacities.   2.  Possible left PICC line with tip projecting at the axillary vein.   3.  Likely trace right pleural effusion.      DX-CHEST-LIMITED (1 VIEW)   Final Result      1.  Placement of endotracheal tube with tip projecting over the mid trachea      2.  Left arm catheter present which is presumably venous in projects in the expected position of the left axillary vein      3.  Enlarged cardiac silhouette      NM-HEPATOBILIARY SCAN   Final Result      Hepatobiliary scan findings suspicious for acute cholecystitis.      MR-THORACIC SPINE-WITH & W/O   Final Result      1.  There is abnormal expansile T2 hyperintense lesion in the right side of the thoracic  spinal cord at the levels of T2 and T3 Mild contrast enhancement is seen. This lesion is suspicious for spinal cord neoplasm. Other remote differential diagnosis    includes transverse myelitis.   2.  Exaggerated thoracic kyphosis.   3.  Thoracic dextroscoliosis.   4.  Minimal degenerative changes.   5.  Right pleural effusion.      MR-LUMBAR SPINE-WITH & W/O   Final Result      1.  Multifocal degenerative disease in the lumbar spine as described above.   2.  Moderate central canal and severe lateral recess stenosis at the level of L4-5. The exiting bilateral L5 nerve roots might have been impinged at the lateral recess.      MR-CERVICAL SPINE-WITH & W/O   Final Result      1.  There is abnormal expansile intramedullary T2 signal intensity lesion in the right cerebellum. Thoracic spinal cord at the level of T2 on T3. Mild diffuse contrast enhancement is seen. This finding is suspicious for spinal cord neoplasm. The other    less likely differential diagnosis includes transverse myelitis. Follow-up is recommended after 4 weeks.   2.  Mild degenerative disease in the cervical spine as described above.   3.  There is no evidence of infection in the cervical spine.      US-RUQ   Final Result         1.  Acute cholecystitis.   2.  Atrophic right kidney.      DX-CHEST-LIMITED (1 VIEW)   Final Result      Perihilar interstitial edema and basilar atelectasis and/or consolidation. Underlying infection is possible.      MR-LUMBAR SPINE-W/O   Final Result      1.  Lower lumbar spine predominant degenerative changes as described in detail above, progressed from 2010 MRI.   2.  Edema at the L4-L5 disc space and L5 superior endplate likely represents degenerative changes. Less likely this could represent a low-grade or early discitis osteomyelitis. Correlate for evidence of infection.   3.  Acute appearing Schmorl's node at L3-L4, which can be a source of pain.      US-RENAL   Final Result      1.  Atrophic kidneys. Echogenic  bilateral renal parenchyma could relate to medical renal disease.      2.  No hydronephrosis. No renal calculus.      DX-CHEST-PORTABLE (1 VIEW)   Final Result      No acute cardiac or pulmonary abnormalities are identified.      CT-ABDOMEN-PELVIS W/O   Final Result      1.  Findings suspicious for focal colitis at the hepatic flexure, less likely cholecystitis or duodenitis with secondary involvement of the colon. Infectious, inflammatory and ischemic etiologies are considerations. Underlying mass is not excluded.   2.  Cholelithiasis with distention of the gallbladder   3.  BILATERAL renal atrophy   4.  Subcentimeter LEFT adrenal myelolipoma   5.  12 mm RIGHT adrenal nodule likely an adenoma absent a history of cancer   6.  Subcentimeter RIGHT hepatic lesion, likely a cyst absent a history of cancer   7.  Atherosclerosis   8.  Colonic diverticulosis      CT-HEAD W/O   Final Result      1.  Cerebral atrophy.      2.  White matter lucencies most consistent with small vessel ischemic change versus demyelination or gliosis.      3.  Otherwise, Head CT without contrast with no acute findings. No evidence of acute cerebral infarction, hemorrhage or mass lesion.         MR-THORACIC SPINE-WITH & W/O    (Results Pending)       Impression:  82-year-old man with  right greater than left leg weakness since approx April 17th as well as  upper extremity weakness.  This developed concurrently with a GI illness concerning for food borne illness.  Certainly that history would raise the possibility of Guillain-Barré syndrome although the symptoms seem to have developed relatively rapidly in conjunction with the GI illness and he appears to improve without treatment with plasma exchange or IVIG. Furthermore, he has preserved reflexes in the arms.  Reflexes are absent at the Achilles and patellar which can be normal for his age especially in the setting of diabetic polyneuropathy.  In fact his upgoing toe on the right concerns me  more for a myelopathic process.    MRI-Tspine remarkable for T2 contrast avid lesion (Neoplastic vs transverse myelitis) which does not explain upper extremity weakness. Per discussion with neuroradiologist, assessment prior to 1 month post initial image may not yield an interpretable result and recommends against reassessing sooner unless a clear clinical deterioration is noted.  CSF analysis is remarkable for elevated glucose which could be explained by elevated serum glucose in the setting of DM. Elevated protein with nl WBC favors a demyelinating process. No oligoclonal bands seen on electrophoresis, pointing away from MS. VDRL negative points away from tertiary syphilis. CSF cultures NGTD points away from other common infective processes.    Overall pt was clinically improving without IVIG, Steroids or plasma exchange, which is reassuring. His physical exam remains stable in comparison to my exam from 4/28/2023.  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 Pathological evaluation of CSF pending  We are hesitant to offer any immuno-modulating therapy  which may delay healing.         Recommendations:      -PT, OT evaluate and treat.  -Follow up with Neurology in one month in the community   -Repeat MRI-Tspine to assess progression/resolution of T2-T3 lesion 5/26/2023  -MR Tspine w/ and w/o pending  -Obtain MR brain w/ and w/o  -Neurology will continue to follow and will assess the need for immunomodulating therapy should a marked clinical deterioration is detected, likely PLEX considering Kidney disease.

## 2023-05-03 NOTE — DISCHARGE PLANNING
Case Management Discharge Planning    Admission Date: 4/21/2023  GMLOS: 9.6  ALOS: 12    6-Clicks ADL Score: 14  6-Clicks Mobility Score: 7  PT and/or OT Eval ordered: Yes  Post-acute Referrals Ordered: Yes  Post-acute Choice Obtained: Yes  Has referral(s) been sent to post-acute provider:  Yes      Anticipated Discharge Dispo: Discharge Disposition: D/T to SNF with medicare cert w/planned hosp IP readmit (83)    DME Needed: No    Action(s) Taken: Updated Provider/Nurse on Discharge Plan    Pt was declined at [facility] for not meeting IRF criteria, recommendation from Dr Dockery is SNF.    Pt has been accepted at  [facility] pending medical clearance and bed availability.     Pt was discussed in IDT  rounds today with Dr Yang , Pt 's IVIG  was discontinued and  Pt is still pending Surgical clearance.     Escalations Completed: None    Medically Clear: No    Next Steps:   This RN CM to continue to assist Pt with discharge as needed    Barriers to Discharge:   Pending medical clearance    Is the patient up for discharge tomorrow: No    Addendum: 5/5/23    Pt was discussed in IDT rounds today . Pt is scheduled for MRI.    Addendum: 5/9/23     Pt was dicussed in IDT rounds with Dr Rose.  Pt is not yet medically cleared. Pt has been accepted at [facility] once medically cleared.    Addendum: 5/10/23     Per Dr Cain in IDT rounds today Pt is still pending MRI with sedation.

## 2023-05-03 NOTE — THERAPY
Physical Therapy   Daily Treatment     Patient Name: Tyrone Cline  Age:  83 y.o., Sex:  male  Medical Record #: [id number]  Today's Date: 5/3/2023     Precautions  Precautions: (P) Fall Risk  Comments: (P) NAHUN drain x1; urinary retention complaint    Assessment    Pt seen for PT tx session in bed and was agreeable to participate in therapy. Overall, pt tolerated session much better than previous session. Pt displayed improvements in static seated balance EOB requiring less UE support and improvements w/ BLE quad strength 3/5 EOB. Pt performed 2x sit <> stand w/ MaxA x2 while being able to achieve a full upright posture, hold the supported stand for ~5-10 seconds, and blocking knees to prevent BLE buckling. Pt continues to be limited 2/2 fatigue and abdominal pain. Pt continues to c/o urinary urgency but unable to tell when he has voided. PT will continue to follow and progress as able.     Plan    Treatment Plan Status: (P) Continue Current Treatment Plan  Type of Treatment: Bed Mobility, Gait Training, Neuro Re-Education / Balance, Self Care / Home Evaluation, Stair Training, Therapeutic Activities, Therapeutic Exercise  Treatment Frequency: 4 Times per Week  Treatment Duration: Until Therapy Goals Met    DC Equipment Recommendations: (P) Unable to determine at this time  Discharge Recommendations: (P) Recommend post-acute placement for additional physical therapy services prior to discharge home     Objective     05/03/23 1124   Precautions   Precautions Fall Risk   Comments NAHUN drain x1; urinary retention complaint   Vitals   O2 (LPM) 2   O2 Delivery Device Nasal Cannula   Vitals Comments c/o fatigue in todays session; SpO2 maintained at 96-98% through session  (still continues to c/o urinary urgency but unable to tell when he has voided)   Pain   Pain Scales 0 to 10 Scale    Intervention Ambulation / Increased Activity   Pain 0 - 10 Group   Comfort Goal Comfort with Movement;Perform Activity   Therapist  Pain Assessment During Activity;Post Activity;Nurse Notified   Cognition    Cognition / Consciousness WDL   Level of Consciousness Alert   Comments Very pleasant and motivated to work with therapy; fatigued quickly   Active ROM Lower Body    Active ROM Lower Body  X   Comments BL hip and knee flexion WDL at beginning of session; AROM improved sitting EOB   Strength Lower Body   Lower Body Strength  X   Comments BLE quad strength 3/5 EOB, able to perform full SLR on BLE in supine, limited grossly 2/2 fatigue   Neuro-Muscular Treatments   Neuro-Muscular Treatments Anterior weight shift;Weight Shift Right;Weight Shift Left   Comments Improved posture in seated, decreased posterior lateral R lean more in midline today   Balance   Sitting Balance (Static) Fair -   Sitting Balance (Dynamic) Poor   Standing Balance (Static) Poor -   Standing Balance (Dynamic) Trace   Weight Shift Sitting Poor   Weight Shift Standing Absent   Skilled Intervention Verbal Cuing;Sequencing;Tactile Cuing;Postural Facilitation;Facilitation   Comments w/ physical assist; overall improved seated static balance w/ less reliance on UE support   Bed Mobility    Supine to Sit Moderate Assist   Sit to Supine Moderate Assist   Scooting Moderate Assist   Rolling Moderate Assist to Rt.   Skilled Intervention Verbal Cuing;Tactile Cuing;Sequencing;Facilitation   Comments HOB elevated w/ use of rails and log roll; req assist to trunk to sit and scoot to EOB   Gait Analysis   Gait Level Of Assist Unable to Participate   Functional Mobility   Sit to Stand Maximal Assist  (able to achieve full upright STS, stand for ~5-10 seconds, max VC's for hip ext, BLE blocked to prevent buckling)   Bed, Chair, Wheelchair Transfer Unable to Participate   Toilet Transfers Unable to Participate   Mobility EOB > STS > BTB; 2 person assist   Skilled Intervention Verbal Cuing;Tactile Cuing;Sequencing;Facilitation   How much difficulty does the patient currently have...   Turning  over in bed (including adjusting bedclothes, sheets and blankets)? 2   Sitting down on and standing up from a chair with arms (e.g., wheelchair, bedside commode, etc.) 1   Moving from lying on back to sitting on the side of the bed? 1   How much help from another person does the patient currently need...   Moving to and from a bed to a chair (including a wheelchair)? 1   Need to walk in a hospital room? 1   Climbing 3-5 steps with a railing? 1   6 clicks Mobility Score 7   Activity Tolerance   Sitting Edge of Bed 10 min   Standing 5 min   Comments limited 2/2 fatigue   Short Term Goals    Short Term Goal # 1 pt will perform supine <> sit with SPV in 6 visits to get in/out of bed at home   Goal Outcome # 1 Progressing slower than expected   Short Term Goal # 2 pt will perform all functional xfrs with SPV in 6 visits for improved independence   Goal Outcome # 2 Goal not met   Short Term Goal # 3 pt will ambulate 150ft with FWW and SPV in 6 visits to access home   Goal Outcome # 3 Goal not met   Short Term Goal # 4 pt will negotiate 4 steps with single rail and SPV in 6 visits to access home   Goal Outcome # 4 Goal not met   Physical Therapy Treatment Plan   Physical Therapy Treatment Plan Continue Current Treatment Plan   Anticipated Discharge Equipment and Recommendations   DC Equipment Recommendations Unable to determine at this time   Discharge Recommendations Recommend post-acute placement for additional physical therapy services prior to discharge home   Interdisciplinary Plan of Care Collaboration   IDT Collaboration with  Occupational Therapist;Nursing   Patient Position at End of Therapy In Bed;Bed Alarm On;Call Light within Reach;Tray Table within Reach;Phone within Reach;Family / Friend in Room   Collaboration Comments RN updated   Session Information   Date / Session Number  5/3- 4 (2/4, 5/7)

## 2023-05-03 NOTE — DISCHARGE PLANNING
"HTH/SCP TCN chart review completed. Collaborated with NIKIA Galdamez. Current discharge considerations are IRF vs. SNF. Note patient has been accepted to [facility]. [facility] is also following.     Updated PT/OT recommendations 5/1/23 for post acute placement.     Physiatry consult completed 4/29/23 with the following \"Possible candidate for IPR, needs repeat therapy after cholecystectomy, pending post op therapy evals as of 4/29.\"    Per [facility] TCC note today, \"Explained specifics of inpatient rehab, to include PMR recommendations from 4/24 -  patient will need to be able demonstrate that he can walk short distances of around 10 feet to be able to qualify for IPR. PT 5/1 reflects unable to participate with gait. Will monitor for PMR recommendations.\"     TCN will continue to follow and collaborate with discharge planning team as additional post acute needs arise. Thank you.     Completed:  Physiatry consult completed with possible acceptance  PT recommends post acute placement on 4/25   OT recommends post-acute placement on 4/24  SLP eval 4/26 with recs for no further needs  Choice obtained: IRF and SNF choice obtained on 4/24/23.  HH choice on 4/24/23.  Infusion on 4/22/23.    [facility] referral sent 4/22/23  "

## 2023-05-03 NOTE — PROGRESS NOTES
"  DATE: 5/3/2023    Post Operative Day  5 laparoscopic cholecystectomy converted to open cholecystectomy.    INTERVAL EVENTS:  No acute overnight events.  Tolerating diet, passing flatus, + BM.  WBC stable.  Drain with sanguinous drainage, per charting, a copious amount.    PHYSICAL EXAMINATION:  Vital Signs: BP (!) 164/89   Pulse 76   Temp 37 °C (98.6 °F) (Temporal)   Resp (!) 21   Ht 1.803 m (5' 10.98\")   Wt 89.1 kg (196 lb 6.9 oz)   SpO2 92%     Physical Exam  Vitals and nursing note reviewed.   Constitutional:       General: He is awake. He is not in acute distress.  Abdominal:      General: There is no distension.      Palpations: Abdomen is soft.      Tenderness: There is generalized abdominal tenderness.      Comments: Drain with sanguinous fluid, moderate amount  Dressings CDI   Neurological:      Mental Status: He is alert.   Psychiatric:         Behavior: Behavior is cooperative.         LABORATORY VALUES:   Recent Labs     05/01/23 0544 05/02/23 0308 05/03/23  0650   WBC 19.3* 12.9* 12.0*   RBC 3.15* 2.93* 3.00*   HEMOGLOBIN 9.2* 8.8* 8.9*   HEMATOCRIT 28.7* 25.8* 26.7*   MCV 91.1 88.1 89.0   MCH 29.2 30.0 29.7   MCHC 32.1* 34.1 33.3*   RDW 51.5* 49.8 50.4*   PLATELETCT 468* 477* 524*   MPV 11.2 10.9 10.6     Recent Labs     05/01/23 0544 05/02/23  0308 05/03/23  0650   SODIUM 134* 134* 135   POTASSIUM 4.2 4.1 4.2   CHLORIDE 104 104 106   CO2 19* 20 21   GLUCOSE 191* 176* 153*   BUN 65* 58* 41*   CREATININE 3.14* 2.47* 2.09*   CALCIUM 8.2* 8.2* 8.5     Recent Labs     05/01/23 0544 05/02/23  0308   ASTSGOT 42 55*   ALTSGPT 31 35   TBILIRUBIN 0.5 0.5   ALKPHOSPHAT 120* 160*   GLOBULIN 3.1 3.8*            IMAGING:   DX-CHEST-PORTABLE (1 VIEW)   Final Result         1.  Pulmonary edema and/or infiltrates are identified, which are stable since the prior exam.   2.  Cardiomegaly   3.  Atherosclerosis      DX-CHEST-PORTABLE (1 VIEW)   Final Result         1.  Pulmonary edema and/or infiltrates are " identified, which are stable since the prior exam.   2.  Left PICC line tip terminates in the left axilla, stable since prior study.      DX-CHEST-PORTABLE (1 VIEW)   Final Result      1.  No significant change.   2.  Possible left PICC line with tip projecting at the axillary vein.      DX-CHEST-PORTABLE (1 VIEW)   Final Result      1.  Mildly improved pulmonary opacities.   2.  Possible left PICC line with tip projecting at the axillary vein.   3.  Likely trace right pleural effusion.      DX-CHEST-LIMITED (1 VIEW)   Final Result      1.  Placement of endotracheal tube with tip projecting over the mid trachea      2.  Left arm catheter present which is presumably venous in projects in the expected position of the left axillary vein      3.  Enlarged cardiac silhouette      NM-HEPATOBILIARY SCAN   Final Result      Hepatobiliary scan findings suspicious for acute cholecystitis.      MR-THORACIC SPINE-WITH & W/O   Final Result      1.  There is abnormal expansile T2 hyperintense lesion in the right side of the thoracic spinal cord at the levels of T2 and T3 Mild contrast enhancement is seen. This lesion is suspicious for spinal cord neoplasm. Other remote differential diagnosis    includes transverse myelitis.   2.  Exaggerated thoracic kyphosis.   3.  Thoracic dextroscoliosis.   4.  Minimal degenerative changes.   5.  Right pleural effusion.      MR-LUMBAR SPINE-WITH & W/O   Final Result      1.  Multifocal degenerative disease in the lumbar spine as described above.   2.  Moderate central canal and severe lateral recess stenosis at the level of L4-5. The exiting bilateral L5 nerve roots might have been impinged at the lateral recess.      MR-CERVICAL SPINE-WITH & W/O   Final Result      1.  There is abnormal expansile intramedullary T2 signal intensity lesion in the right cerebellum. Thoracic spinal cord at the level of T2 on T3. Mild diffuse contrast enhancement is seen. This finding is suspicious for spinal cord  neoplasm. The other    less likely differential diagnosis includes transverse myelitis. Follow-up is recommended after 4 weeks.   2.  Mild degenerative disease in the cervical spine as described above.   3.  There is no evidence of infection in the cervical spine.      US-RUQ   Final Result         1.  Acute cholecystitis.   2.  Atrophic right kidney.      DX-CHEST-LIMITED (1 VIEW)   Final Result      Perihilar interstitial edema and basilar atelectasis and/or consolidation. Underlying infection is possible.      MR-LUMBAR SPINE-W/O   Final Result      1.  Lower lumbar spine predominant degenerative changes as described in detail above, progressed from 2010 MRI.   2.  Edema at the L4-L5 disc space and L5 superior endplate likely represents degenerative changes. Less likely this could represent a low-grade or early discitis osteomyelitis. Correlate for evidence of infection.   3.  Acute appearing Schmorl's node at L3-L4, which can be a source of pain.      US-RENAL   Final Result      1.  Atrophic kidneys. Echogenic bilateral renal parenchyma could relate to medical renal disease.      2.  No hydronephrosis. No renal calculus.      DX-CHEST-PORTABLE (1 VIEW)   Final Result      No acute cardiac or pulmonary abnormalities are identified.      CT-ABDOMEN-PELVIS W/O   Final Result      1.  Findings suspicious for focal colitis at the hepatic flexure, less likely cholecystitis or duodenitis with secondary involvement of the colon. Infectious, inflammatory and ischemic etiologies are considerations. Underlying mass is not excluded.   2.  Cholelithiasis with distention of the gallbladder   3.  BILATERAL renal atrophy   4.  Subcentimeter LEFT adrenal myelolipoma   5.  12 mm RIGHT adrenal nodule likely an adenoma absent a history of cancer   6.  Subcentimeter RIGHT hepatic lesion, likely a cyst absent a history of cancer   7.  Atherosclerosis   8.  Colonic diverticulosis      CT-HEAD W/O   Final Result      1.  Cerebral  atrophy.      2.  White matter lucencies most consistent with small vessel ischemic change versus demyelination or gliosis.      3.  Otherwise, Head CT without contrast with no acute findings. No evidence of acute cerebral infarction, hemorrhage or mass lesion.         MR-THORACIC SPINE-WITH & W/O    (Results Pending)       ASSESSMENT AND PLAN:   Cholelithiasis with acute cholecystitis- (present on admission)  Assessment & Plan  4/27 HIDA scan consistent with acute cholecystitis.  4/28 Laparoscopic cholecystectomy converted to open cholecystectomy.  4/30 Gallbladder fluid cultures (+) ESBL E Coli.  Antibiotics per hospital team.    Acute kidney injury superimposed on CKD (HCC)- (present on admission)  Assessment & Plan  Premorbid.  Fluid resuscitation and trend renal function.  Avoid nephrotoxins.    Postprocedural hypotension  Assessment & Plan  Patient was in shock throughout operative case.  Returns to ICU on levophed.   Wean levophed as tolerated.  4/29 Levophed stopped.  Trend SBP.      - Continue drain today  - Mobilize aggressively  - Monitor drain and follow labs       ____________________________________     Kenzie Krueger P.A.-C.    DD: 4/30/2023  12:01 PM

## 2023-05-03 NOTE — PROGRESS NOTES
[facility] Medicine Daily Progress Note    Date of Service  5/3/2023    Chief Complaint  Tyrone Cline is a 82 y.o. male admitted 4/21/2023 with Colitis.    Hospital Course  Admitted with sepsis secondary to colitis.  Patient was started on empiric coverage with IV Rocephin and Flagyl.  He was also noted to have bilateral lower extremity weakness.  MRI of the lumbar spine was done which showed possible edema.  Neurology was then consulted on the case.  He was also noted to have an SANJUANITA on CKD stage IV.  Nephrology was consulted on the case.  He was started on a careful IVF hydration.  His CT scan also showed cholelithiasis.  Ultrasound was done which showed cholecystitis.  Surgery was then consulted on the case and patient underwent laparoscopic cholecystectomy on 4/28 which needed to be converted to open cholecystectomy, patient was persistently hypotensive during surgery and postoperatively  required IV pressor medication, thus he was care for in the surgical ICU for additional treatment.  On 4/29 the patient was transferred back to the medical service for ongoing management.     Interval Problem Update    With increased rate of IV fluids patient's creatinine is slightly improved now at 2.09    20 cc output from the NAHUN drain    Chart reviewed shows that this is well within his baseline creatinine    Patient has no new complaints    generalized weakness still present is still present    Again ,patient is off of IVIG after discussion with neurology team    As per rehab team, patient more appropriate for skilled level      I have discussed this patient's plan of care and discharge plan at IDT rounds today with Case Management, Nursing, Nursing leadership, and other members of the IDT team.    Consultants/Specialty  general surgery, nephrology, and neurology    Code Status  DNAR/DNI    Disposition  Patient is not medically cleared for discharge.   Anticipate discharge to to an inpatient rehabilitation  [facility].  I have placed the appropriate orders for post-discharge needs.    Review of Systems  Review of Systems   Constitutional:  Positive for malaise/fatigue. Negative for chills, diaphoresis and fever.   HENT:  Negative for congestion, hearing loss and sore throat.    Eyes:  Negative for blurred vision.   Respiratory:  Negative for cough, shortness of breath and wheezing.    Cardiovascular:  Negative for chest pain, palpitations and leg swelling.   Gastrointestinal:  Positive for abdominal pain. Negative for diarrhea, heartburn, nausea and vomiting.   Genitourinary:  Negative for dysuria, flank pain and hematuria.   Musculoskeletal:  Negative for back pain, joint pain, myalgias and neck pain.   Skin:  Negative for rash.   Neurological:  Positive for weakness. Negative for dizziness, sensory change, speech change, focal weakness and headaches.   Psychiatric/Behavioral:  The patient is not nervous/anxious.       Physical Exam  Temp:  [36.5 °C (97.7 °F)-37 °C (98.6 °F)] 37 °C (98.6 °F)  Pulse:  [76-83] 76  Resp:  [17-21] 21  BP: (137-164)/(80-89) 164/89  SpO2:  [90 %-95 %] 92 %    Physical Exam  Vitals and nursing note reviewed.   HENT:      Head: Normocephalic and atraumatic.      Nose: No congestion.      Mouth/Throat:      Mouth: Mucous membranes are moist.   Eyes:      Extraocular Movements: Extraocular movements intact.      Conjunctiva/sclera: Conjunctivae normal.   Cardiovascular:      Rate and Rhythm: Normal rate and regular rhythm.      Heart sounds: Murmur heard.   Pulmonary:      Effort: Pulmonary effort is normal.      Breath sounds: Normal breath sounds.   Abdominal:      Tenderness: There is abdominal tenderness. There is no guarding or rebound.      Comments: Right upper quadrant dressing over the surgical area with a NAHUN drain in place, draining serosanguineous fluid   Musculoskeletal:      Cervical back: No tenderness.      Right lower leg: No edema.      Left lower leg: No edema.   Skin:      General: Skin is warm and dry.      Coloration: Skin is pale.   Neurological:      Mental Status: He is alert and oriented to person, place, and time.      Cranial Nerves: No cranial nerve deficit.      Motor: Weakness present.       Fluids    Intake/Output Summary (Last 24 hours) at 5/3/2023 1012  Last data filed at 5/3/2023 0815  Gross per 24 hour   Intake --   Output 20683 ml   Net -28514 ml         Laboratory  Recent Labs     05/01/23  0544 05/02/23  0308 05/03/23  0650   WBC 19.3* 12.9* 12.0*   RBC 3.15* 2.93* 3.00*   HEMOGLOBIN 9.2* 8.8* 8.9*   HEMATOCRIT 28.7* 25.8* 26.7*   MCV 91.1 88.1 89.0   MCH 29.2 30.0 29.7   MCHC 32.1* 34.1 33.3*   RDW 51.5* 49.8 50.4*   PLATELETCT 468* 477* 524*   MPV 11.2 10.9 10.6       Recent Labs     05/01/23  0544 05/02/23  0308 05/03/23  0650   SODIUM 134* 134* 135   POTASSIUM 4.2 4.1 4.2   CHLORIDE 104 104 106   CO2 19* 20 21   GLUCOSE 191* 176* 153*   BUN 65* 58* 41*   CREATININE 3.14* 2.47* 2.09*   CALCIUM 8.2* 8.2* 8.5                         Imaging  DX-CHEST-PORTABLE (1 VIEW)   Final Result         1.  Pulmonary edema and/or infiltrates are identified, which are stable since the prior exam.   2.  Cardiomegaly   3.  Atherosclerosis      DX-CHEST-PORTABLE (1 VIEW)   Final Result         1.  Pulmonary edema and/or infiltrates are identified, which are stable since the prior exam.   2.  Left PICC line tip terminates in the left axilla, stable since prior study.      DX-CHEST-PORTABLE (1 VIEW)   Final Result      1.  No significant change.   2.  Possible left PICC line with tip projecting at the axillary vein.      DX-CHEST-PORTABLE (1 VIEW)   Final Result      1.  Mildly improved pulmonary opacities.   2.  Possible left PICC line with tip projecting at the axillary vein.   3.  Likely trace right pleural effusion.      DX-CHEST-LIMITED (1 VIEW)   Final Result      1.  Placement of endotracheal tube with tip projecting over the mid trachea      2.  Left arm catheter present  which is presumably venous in projects in the expected position of the left axillary vein      3.  Enlarged cardiac silhouette      NM-HEPATOBILIARY SCAN   Final Result      Hepatobiliary scan findings suspicious for acute cholecystitis.      MR-THORACIC SPINE-WITH & W/O   Final Result      1.  There is abnormal expansile T2 hyperintense lesion in the right side of the thoracic spinal cord at the levels of T2 and T3 Mild contrast enhancement is seen. This lesion is suspicious for spinal cord neoplasm. Other remote differential diagnosis    includes transverse myelitis.   2.  Exaggerated thoracic kyphosis.   3.  Thoracic dextroscoliosis.   4.  Minimal degenerative changes.   5.  Right pleural effusion.      MR-LUMBAR SPINE-WITH & W/O   Final Result      1.  Multifocal degenerative disease in the lumbar spine as described above.   2.  Moderate central canal and severe lateral recess stenosis at the level of L4-5. The exiting bilateral L5 nerve roots might have been impinged at the lateral recess.      MR-CERVICAL SPINE-WITH & W/O   Final Result      1.  There is abnormal expansile intramedullary T2 signal intensity lesion in the right cerebellum. Thoracic spinal cord at the level of T2 on T3. Mild diffuse contrast enhancement is seen. This finding is suspicious for spinal cord neoplasm. The other    less likely differential diagnosis includes transverse myelitis. Follow-up is recommended after 4 weeks.   2.  Mild degenerative disease in the cervical spine as described above.   3.  There is no evidence of infection in the cervical spine.      US-RUQ   Final Result         1.  Acute cholecystitis.   2.  Atrophic right kidney.      DX-CHEST-LIMITED (1 VIEW)   Final Result      Perihilar interstitial edema and basilar atelectasis and/or consolidation. Underlying infection is possible.      MR-LUMBAR SPINE-W/O   Final Result      1.  Lower lumbar spine predominant degenerative changes as described in detail above,  progressed from 2010 MRI.   2.  Edema at the L4-L5 disc space and L5 superior endplate likely represents degenerative changes. Less likely this could represent a low-grade or early discitis osteomyelitis. Correlate for evidence of infection.   3.  Acute appearing Schmorl's node at L3-L4, which can be a source of pain.      US-RENAL   Final Result      1.  Atrophic kidneys. Echogenic bilateral renal parenchyma could relate to medical renal disease.      2.  No hydronephrosis. No renal calculus.      DX-CHEST-PORTABLE (1 VIEW)   Final Result      No acute cardiac or pulmonary abnormalities are identified.      CT-ABDOMEN-PELVIS W/O   Final Result      1.  Findings suspicious for focal colitis at the hepatic flexure, less likely cholecystitis or duodenitis with secondary involvement of the colon. Infectious, inflammatory and ischemic etiologies are considerations. Underlying mass is not excluded.   2.  Cholelithiasis with distention of the gallbladder   3.  BILATERAL renal atrophy   4.  Subcentimeter LEFT adrenal myelolipoma   5.  12 mm RIGHT adrenal nodule likely an adenoma absent a history of cancer   6.  Subcentimeter RIGHT hepatic lesion, likely a cyst absent a history of cancer   7.  Atherosclerosis   8.  Colonic diverticulosis      CT-HEAD W/O   Final Result      1.  Cerebral atrophy.      2.  White matter lucencies most consistent with small vessel ischemic change versus demyelination or gliosis.      3.  Otherwise, Head CT without contrast with no acute findings. No evidence of acute cerebral infarction, hemorrhage or mass lesion.         MR-THORACIC SPINE-WITH & W/O    (Results Pending)          Assessment/Plan  Abnormal MRI- (present on admission)  Assessment & Plan  MRI on admission showing T2/T3  hyperintense lesion in the right side of the thoracic spinal cord at the levels of T2 and T3 Mild contrast enhancement is seen. This lesion is suspicious for spinal cord neoplasm or possible transverse myelitis        - repeat MRI thoracic spine ordered       Acute gangrenous cholecystitis- (present on admission)  Assessment & Plan  Found to have gangrenous cholecystitis s/p laprascopic converted open cholecystectomy 4/28/2023  Surgery continues to follow   No longer on antibiotics, as we have source control   Monitor closely for signs of infection   Post op pain control   Bowel regiment   Mobilize as able   Diet as tolerated     Postprocedural hypotension  Assessment & Plan  Hypotension in OR and post open carmelo  Now improved and off pressors   BP stable   Continue to monitor vitals per protocol     Hypercalcemia- (present on admission)  Assessment & Plan  Likely due to immobility   Improved with IVF hydration  Continue to follow and treat accordingly     Hypomagnesemia- (present on admission)  Assessment & Plan  resolved  Goal >2  replace as needed     Cholelithiasis with acute cholecystitis- (present on admission)  Assessment & Plan  S/p surgical removal, converted to open cholecystectomy  NAHUN drain, monitor output  The patient is tolerating a diet already, monitor bowel function    Hypokalemia- (present on admission)  Assessment & Plan  Stable, monitor CMP and replace as needed   - goal K >4    Thrombocytopenia (HCC)- (present on admission)  Assessment & Plan  Resolved, plt are stable     High anion gap metabolic acidosis- (present on admission)  Assessment & Plan  Acute on chronic kidney disease, -appears back at baseline on 5/3/2023    Resolved    Hyponatremia- (present on admission)  Assessment & Plan  Improved, slightly down today at 134, mild and asymptomatic   Cont to monitor BMP     Weakness of both lower extremities- (present on admission)  Assessment & Plan  T2/T3 intramedullary lesion of unclear chronicity and clinical significance as well as 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     Repeat MRI thoracic spine with and without contrast to further evaluate the  previously seen lesion at T2-T3      IVIG initiated on 5/1/2023 and discontinued on 5/2/2023 after discussion with neurology       neurosurgery  did not feel that this was a neurosurgical issue.    Sepsis (HCC)- (present on admission)  Assessment & Plan  This is Sepsis Present on admission  SIRS criteria identified on my evaluation include: Leukocytosis, with WBC greater than 12,000  Source is colitis  Sepsis protocol initiated  Fluid resuscitation ordered per protocol  Crystalloid Fluid Administration: Fluid resuscitation ordered per standard protocol - 30 mL/kg per current or ideal body weight  IV antibiotics as appropriate for source of sepsis  Reassessment: I have reassessed the patient's hemodynamic status    Due to gangrenous gallbladder requiring open carmelo   resolved   off antibiotics      Colitis- (present on admission)  Assessment & Plan  Cultures negative, monitor  Initial admitting impression, currently improved  Diet as tolerated     Acute kidney injury superimposed on CKD (HCC)- (present on admission)  Assessment & Plan   Resolved    Avoid nephrotoxins       Diabetic peripheral neuropathy (HCC)- (present on admission)  Assessment & Plan  Cymbalta, medical treatment    Essential hypertension- (present on admission)  Assessment & Plan  Monitor closely, the patient had a hypotensive episode postoperatively  Off pressors and has since been resumed  , amlodipine and coreg   Monitor BP and adjust medications as needed   Prn hydralazine     Type 2 diabetes mellitus with kidney complication, without long-term current use of insulin (HCC)- (present on admission)  Assessment & Plan  Ha1c 6.4% 4 months ago   On ozempic and januvia outpatient   Continue ISS and hypoglycemic protocol while inpatient     Hypercholesterolemia- (present on admission)  Assessment & Plan  Stable, continue Lipitor    Chronic kidney disease (CKD), stage IV (severe) (HCC)- (present on admission)  Assessment & Plan  Appears to be at  baseline at 5/3/2023                VTE prophylaxis: SCDs/TEDs and heparin ppx    I have performed a physical exam and reviewed and updated ROS and Plan today (5/3/2023). In review of yesterday's note (5/2/2023), there are no changes except as documented above.

## 2023-05-03 NOTE — DISCHARGE PLANNING
"HTH/SCP TCN chart review completed.Current discharge considerations are SNF. Note patient has been accepted to [facility].  Noted per DPA note from 11:09 AM today, \"DPA informed  in admissions (at [facility]) that pt is pending surgical clearance but will most likely be discharging tomorrow 5/4/23. Per , there will a bed available for pt upon discharge\".    Physiatry updated recs on 5/2 @5:28PM with continued recs for SNF as \"patient is still not a candidate for inpatient rehab at this time because he is not expected to have a reasonable amount of improvement in a reasonable amount of time using the combined approach\".     TCN will continue to follow and collaborate with discharge planning team as additional post acute needs arise. Thank you.     Completed:  PT/OT recommends post acute placement on 5/1  SLP eval 4/26 with recs for no further needs  Physiatry recommending SNF as of 5/2 as well  Choice obtained: IRF and SNF choice obtained on 4/24/23.  HH choice on 4/24/23.  Infusion on 4/22/23; see above; accepted to [facility]; possible dc tomorrow  [facility] referral sent 4/22/23      "

## 2023-05-04 ENCOUNTER — ANESTHESIA EVENT (OUTPATIENT)
Dept: SURGERY | Facility: MEDICAL CENTER | Age: 83
DRG: 853 | End: 2023-05-04
Payer: MEDICARE

## 2023-05-04 LAB
ANION GAP SERPL CALC-SCNC: 8 MMOL/L (ref 7–16)
BUN SERPL-MCNC: 35 MG/DL (ref 8–22)
CALCIUM SERPL-MCNC: 9 MG/DL (ref 8.5–10.5)
CHLORIDE SERPL-SCNC: 106 MMOL/L (ref 96–112)
CO2 SERPL-SCNC: 24 MMOL/L (ref 20–33)
CREAT SERPL-MCNC: 2.18 MG/DL (ref 0.5–1.4)
ERYTHROCYTE [DISTWIDTH] IN BLOOD BY AUTOMATED COUNT: 52 FL (ref 35.9–50)
GFR SERPLBLD CREATININE-BSD FMLA CKD-EPI: 29 ML/MIN/1.73 M 2
GLUCOSE BLD STRIP.AUTO-MCNC: 144 MG/DL (ref 65–99)
GLUCOSE BLD STRIP.AUTO-MCNC: 170 MG/DL (ref 65–99)
GLUCOSE BLD STRIP.AUTO-MCNC: 172 MG/DL (ref 65–99)
GLUCOSE BLD STRIP.AUTO-MCNC: 242 MG/DL (ref 65–99)
GLUCOSE SERPL-MCNC: 149 MG/DL (ref 65–99)
HCT VFR BLD AUTO: 28 % (ref 42–52)
HGB BLD-MCNC: 9.3 G/DL (ref 14–18)
MCH RBC QN AUTO: 30 PG (ref 27–33)
MCHC RBC AUTO-ENTMCNC: 33.2 G/DL (ref 33.7–35.3)
MCV RBC AUTO: 90.3 FL (ref 81.4–97.8)
PLATELET # BLD AUTO: 568 K/UL (ref 164–446)
PMV BLD AUTO: 10.2 FL (ref 9–12.9)
POTASSIUM SERPL-SCNC: 4.3 MMOL/L (ref 3.6–5.5)
RBC # BLD AUTO: 3.1 M/UL (ref 4.7–6.1)
SODIUM SERPL-SCNC: 138 MMOL/L (ref 135–145)
WBC # BLD AUTO: 12.5 K/UL (ref 4.8–10.8)

## 2023-05-04 PROCEDURE — 80048 BASIC METABOLIC PNL TOTAL CA: CPT

## 2023-05-04 PROCEDURE — 700102 HCHG RX REV CODE 250 W/ 637 OVERRIDE(OP): Performed by: SURGERY

## 2023-05-04 PROCEDURE — 99232 SBSQ HOSP IP/OBS MODERATE 35: CPT | Performed by: HOSPITALIST

## 2023-05-04 PROCEDURE — A9270 NON-COVERED ITEM OR SERVICE: HCPCS | Performed by: HOSPITALIST

## 2023-05-04 PROCEDURE — 700102 HCHG RX REV CODE 250 W/ 637 OVERRIDE(OP): Performed by: FAMILY MEDICINE

## 2023-05-04 PROCEDURE — A9270 NON-COVERED ITEM OR SERVICE: HCPCS | Performed by: FAMILY MEDICINE

## 2023-05-04 PROCEDURE — 82962 GLUCOSE BLOOD TEST: CPT | Mod: 91

## 2023-05-04 PROCEDURE — 99231 SBSQ HOSP IP/OBS SF/LOW 25: CPT | Performed by: PSYCHIATRY & NEUROLOGY

## 2023-05-04 PROCEDURE — 97530 THERAPEUTIC ACTIVITIES: CPT

## 2023-05-04 PROCEDURE — A9270 NON-COVERED ITEM OR SERVICE: HCPCS | Performed by: SURGERY

## 2023-05-04 PROCEDURE — 99024 POSTOP FOLLOW-UP VISIT: CPT | Performed by: NURSE PRACTITIONER

## 2023-05-04 PROCEDURE — 700102 HCHG RX REV CODE 250 W/ 637 OVERRIDE(OP): Performed by: HOSPITALIST

## 2023-05-04 PROCEDURE — 85027 COMPLETE CBC AUTOMATED: CPT

## 2023-05-04 PROCEDURE — 770001 HCHG ROOM/CARE - MED/SURG/GYN PRIV*

## 2023-05-04 PROCEDURE — 700111 HCHG RX REV CODE 636 W/ 250 OVERRIDE (IP): Performed by: STUDENT IN AN ORGANIZED HEALTH CARE EDUCATION/TRAINING PROGRAM

## 2023-05-04 RX ORDER — FUROSEMIDE 20 MG/1
40 TABLET ORAL
Status: COMPLETED | OUTPATIENT
Start: 2023-05-04 | End: 2023-05-06

## 2023-05-04 RX ADMIN — INSULIN HUMAN 3 UNITS: 100 INJECTION, SOLUTION PARENTERAL at 21:14

## 2023-05-04 RX ADMIN — HEPARIN SODIUM 5000 UNITS: 5000 INJECTION, SOLUTION INTRAVENOUS; SUBCUTANEOUS at 14:01

## 2023-05-04 RX ADMIN — CARVEDILOL 12.5 MG: 12.5 TABLET, FILM COATED ORAL at 08:50

## 2023-05-04 RX ADMIN — SODIUM BICARBONATE 1300 MG: 650 TABLET ORAL at 17:43

## 2023-05-04 RX ADMIN — AMLODIPINE BESYLATE 5 MG: 10 TABLET ORAL at 05:41

## 2023-05-04 RX ADMIN — HEPARIN SODIUM 5000 UNITS: 5000 INJECTION, SOLUTION INTRAVENOUS; SUBCUTANEOUS at 21:14

## 2023-05-04 RX ADMIN — FAMOTIDINE 20 MG: 20 TABLET, FILM COATED ORAL at 05:41

## 2023-05-04 RX ADMIN — DULOXETINE HYDROCHLORIDE 30 MG: 30 CAPSULE, DELAYED RELEASE ORAL at 17:43

## 2023-05-04 RX ADMIN — Medication 1 APPLICATOR: at 17:44

## 2023-05-04 RX ADMIN — ALLOPURINOL 100 MG: 100 TABLET ORAL at 05:41

## 2023-05-04 RX ADMIN — Medication 1 APPLICATOR: at 05:42

## 2023-05-04 RX ADMIN — INSULIN HUMAN 2 UNITS: 100 INJECTION, SOLUTION PARENTERAL at 14:03

## 2023-05-04 RX ADMIN — OXYCODONE HYDROCHLORIDE 10 MG: 10 TABLET ORAL at 21:18

## 2023-05-04 RX ADMIN — SODIUM BICARBONATE 1300 MG: 650 TABLET ORAL at 05:41

## 2023-05-04 RX ADMIN — FUROSEMIDE 40 MG: 20 TABLET ORAL at 14:01

## 2023-05-04 RX ADMIN — INSULIN HUMAN 2 UNITS: 100 INJECTION, SOLUTION PARENTERAL at 17:49

## 2023-05-04 RX ADMIN — ATORVASTATIN CALCIUM 10 MG: 10 TABLET, FILM COATED ORAL at 17:44

## 2023-05-04 RX ADMIN — HEPARIN SODIUM 5000 UNITS: 5000 INJECTION, SOLUTION INTRAVENOUS; SUBCUTANEOUS at 05:42

## 2023-05-04 RX ADMIN — CARVEDILOL 12.5 MG: 12.5 TABLET, FILM COATED ORAL at 17:43

## 2023-05-04 ASSESSMENT — ENCOUNTER SYMPTOMS
HEADACHES: 0
WHEEZING: 0
SPEECH CHANGE: 0
NECK PAIN: 0
FEVER: 0
DIAPHORESIS: 0
WEAKNESS: 1
BLURRED VISION: 0
FOCAL WEAKNESS: 0
DIARRHEA: 0
CHILLS: 0
PALPITATIONS: 0
NAUSEA: 0
FLANK PAIN: 0
NERVOUS/ANXIOUS: 0
DIZZINESS: 0
COUGH: 0
SENSORY CHANGE: 0
HEARTBURN: 0
VOMITING: 0
MYALGIAS: 0
BACK PAIN: 0
SORE THROAT: 0
SHORTNESS OF BREATH: 0
ABDOMINAL PAIN: 0

## 2023-05-04 ASSESSMENT — COGNITIVE AND FUNCTIONAL STATUS - GENERAL
TURNING FROM BACK TO SIDE WHILE IN FLAT BAD: A LOT
STANDING UP FROM CHAIR USING ARMS: TOTAL
CLIMB 3 TO 5 STEPS WITH RAILING: TOTAL
MOVING FROM LYING ON BACK TO SITTING ON SIDE OF FLAT BED: UNABLE
WALKING IN HOSPITAL ROOM: TOTAL
SUGGESTED CMS G CODE MODIFIER MOBILITY: CM
MOBILITY SCORE: 7
MOVING TO AND FROM BED TO CHAIR: UNABLE

## 2023-05-04 ASSESSMENT — PAIN DESCRIPTION - PAIN TYPE
TYPE: ACUTE PAIN
TYPE: ACUTE PAIN

## 2023-05-04 ASSESSMENT — GAIT ASSESSMENTS: GAIT LEVEL OF ASSIST: UNABLE TO PARTICIPATE

## 2023-05-04 NOTE — PROGRESS NOTES
4 Eyes Skin Assessment Completed by JUAN JOSÉ Abernathy and JUAN JOSÉ Melissa.     Head WDL  Ears WDL  Nose WDL  Mouth WDL  Neck WDL  Breast/Chest WDL  Shoulder Blades WDL  Spine WDL  (R) Arm/Elbow/Hand swelling  (L) Arm/Elbow/Hand LUE PICC, swelling  Abdomen transverse incision, DIP. RLQ NAHUN drain, NAHUN drain site to abdomen.  Groin WDL  Scrotum/Coccyx/Buttocks Redness and Blanching  (R) Leg Bruising and Swelling  (L) Leg Bruising and Swelling  (R) Heel/Foot/Toe Redness, Blanching, and Swelling  (L) Heel/Foot/Toe Redness, Blanching, and Swelling              Devices In Places Pulse Ox, SCD's, and Nasal Cannula, PICC        Interventions In Place NC W/Ear Foams, Sacral Mepilex, TAP System, Pillows, and Pressure Redistribution Mattress     Possible Skin Injury No     Pictures Uploaded Into Epic N/A  Wound Consult Placed N/A  RN Wound Prevention Protocol Ordered No

## 2023-05-04 NOTE — PROGRESS NOTES
Assumed care of patient at 0645. Bedside report received. Assessment complete.  AA&Ox4. Denies CP/SOB.  Reporting 0/10 pain. Declined intervention at this time.  Educated patient regarding pharmacologic and non pharmacologic modalities for pain management.  Skin per flowsheets  Tolerating renal diet. Denies N/V.  + void. Last BM 5/3  Pt ambulates x2 assist.  All needs met at this time. Call light within reach. Pt calls appropriately. Bed low and locked, non skid socks in place. Hourly rounding in place.

## 2023-05-04 NOTE — THERAPY
Physical Therapy   Daily Treatment     Patient Name: Tyrone Cline  Age:  83 y.o., Sex:  male  Medical Record #: [id number]  Today's Date: 5/4/2023     Precautions  Precautions: (P) Fall Risk  Comments: (P) NAHUN drain x1; urinary retention complaint    Assessment    Pt tolerated session well considering acute medical state. He demonstrated improved supine to sit today requiring only MIN A. Pt performed 4 sit to stands with variable MIN-MOD A. He reports consistent dizziness throughout session despite stable BP and O2 recorded at 96%. He fatigues easily and was not willing to attempt transfer to chair d/t significant fatigue.         Plan    Treatment Plan Status: (P) Continue Current Treatment Plan  Type of Treatment: Bed Mobility, Gait Training, Neuro Re-Education / Balance, Self Care / Home Evaluation, Stair Training, Therapeutic Activities, Therapeutic Exercise  Treatment Frequency: 4 Times per Week  Treatment Duration: Until Therapy Goals Met    DC Equipment Recommendations: (P) Unable to determine at this time  Discharge Recommendations: (P) Recommend post-acute placement for additional physical therapy services prior to discharge home      Subjective    Pt is pleasant, motivated, and cooperative throughout session. His son is present in room.     Objective       05/04/23 0949   Precautions   Precautions Fall Risk   Comments NAHUN drain x1; urinary retention complaint   Vitals   Pulse 74   Patient BP Position Sitting   Blood Pressure  133/58   Pulse Oximetry 96 %   O2 (LPM) 1   O2 Delivery Device Nasal Cannula   Sitting Lower Body Exercises   Long Arc Quad 2 sets of 10;Bilateral   Marching 2 sets of 10   Balance   Sitting Balance (Static) Fair   Sitting Balance (Dynamic) Poor +   Standing Balance (Static) Poor +   Standing Balance (Dynamic) Trace +   Weight Shift Sitting Fair   Weight Shift Standing Poor   Bed Mobility    Supine to Sit Minimal Assist   Sit to Supine Moderate Assist   Gait Analysis   Gait  Level Of Assist Unable to Participate   Functional Mobility   Sit to Stand Moderate Assist   Comments Pt performed sit to stand transfer x 4 repetitions with significant rest between bouts d/t dizziness. Pt maintained static standing for 15-25s per bout. He reported consistent dizziness. BP recorded in sitting at 133/58 however dizziness present throughout session. Pt was able to perform standing marching 2 bouts of 3 repetitions bilaterally.   How much difficulty does the patient currently have...   Turning over in bed (including adjusting bedclothes, sheets and blankets)? 2   Sitting down on and standing up from a chair with arms (e.g., wheelchair, bedside commode, etc.) 1   Moving from lying on back to sitting on the side of the bed? 1   How much help from another person does the patient currently need...   Moving to and from a bed to a chair (including a wheelchair)? 1   Need to walk in a hospital room? 1   Climbing 3-5 steps with a railing? 1   6 clicks Mobility Score 7   Short Term Goals    Short Term Goal # 1 pt will perform supine <> sit with SPV in 6 visits to get in/out of bed at home   Goal Outcome # 1 Progressing slower than expected   Short Term Goal # 2 pt will perform all functional xfrs with SPV in 6 visits for improved independence   Goal Outcome # 2 Goal not met   Short Term Goal # 3 pt will ambulate 150ft with FWW and SPV in 6 visits to access home   Goal Outcome # 3 Goal not met   Short Term Goal # 4 pt will negotiate 4 steps with single rail and SPV in 6 visits to access home   Goal Outcome # 4 Goal not met   Education Group   Role of Physical Therapist Patient Response Patient;Family;Acceptance;Explanation;Demonstration;Verbal Demonstration;Action Demonstration   Physical Therapy Treatment Plan   Physical Therapy Treatment Plan Continue Current Treatment Plan   Anticipated Discharge Equipment and Recommendations   DC Equipment Recommendations Unable to determine at this time   Discharge  Recommendations Recommend post-acute placement for additional physical therapy services prior to discharge home   Interdisciplinary Plan of Care Collaboration   IDT Collaboration with  Nursing;Family / Caregiver   Patient Position at End of Therapy In Bed;Bed Alarm On;Family / Friend in Room;Phone within Reach;Call Light within Reach;Tray Table within Reach   Session Information   Date / Session Number  5/4- 5 (3/4, 5/7)

## 2023-05-04 NOTE — CARE PLAN
Problem: Knowledge Deficit - Standard  Goal: Patient and family/care givers will demonstrate understanding of plan of care, disease process/condition, diagnostic tests and medications  Outcome: Progressing     Problem: Hemodynamics  Goal: Patient's hemodynamics, fluid balance and neurologic status will be stable or improve  Outcome: Progressing     Problem: Urinary - Renal Perfusion  Goal: Ability to achieve and maintain adequate renal perfusion and functioning will improve  Outcome: Progressing   The patient is Stable - Low risk of patient condition declining or worsening    Shift Goals  Clinical Goals: MRI performed  Patient Goals: Pain Control  Family Goals: N/A    Progress made toward(s) clinical / shift goals:  MRI scheduled 5/5, Urine Output per flowsheets    Patient is not progressing towards the following goals:

## 2023-05-04 NOTE — DISCHARGE PLANNING
"HTH/SCP TCN chart review completed. Collaborated with NIKIA Galdamez. Current discharge considerations are SNF and note patient has been accepted to [facility].  As prior, per DPA note from 11:09 AM on 5/3, \"DPA informed  in admissions (at [facility]) that pt is pending surgical clearance but will most likely be discharging tomorrow 5/4/23. Per , there will a bed available for pt upon discharge\". Pt is still awaiting medical clearance per discussion with NIKIA this AM (5/4).     TCN will continue to follow and collaborate with discharge planning team as additional post acute needs arise. Thank you.     Completed:  PT/OT recommends post acute placement on 5/4 and 5/3  SLP eval 4/26 with recs for no further needs  Physiatry recommending SNF as of 5/2 as well  Choice obtained: IR[facility] (-> declines; recs SNF) and SNF choice obtained on 4/24/23.  HH choice on 4/24/23.  Infusion on 4/22/23; see above; accepted to [facility];  [facility] referral sent 4/22/23  "

## 2023-05-04 NOTE — PROGRESS NOTES
Daily Progress Note:     Date of Service: 5/4/2023    Attending: Dr. Carlos Yang M.D.          Hospital course:  Briefly Mr. Cline is an 83-year-old man who was admitted with abdominal pain underwent a cholecystectomy which ended up being open, with positive cultures for several organisms.  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  The differential on the thoracic imaging abnormality is tumor versus transverse myelitis, need to consider noninflammatory causes and paraneoplastic.  There was some concern that he might have an overlying AIDP.  He may be getting stronger.  At this point we will repeat MRI of the thoracic spine with and without contrast as well as an MRI scan of the brain with and without contrast.    Subjective:  Feels okay, abdominal pain is not worse, does not feel stronger has more difficulty sitting in a chair, no trouble breathing or swallowing.    Consultants/Specialty:  PT and OT notes reviewed          Objective Data:   Physical Exam:   Vitals:   Temp:  [36.5 °C (97.7 °F)-36.8 °C (98.2 °F)] 36.5 °C (97.7 °F)  Pulse:  [71-85] 74  Resp:  [18-20] 18  BP: (133-156)/(58-88) 133/58  SpO2:  [92 %-96 %] 96 %    General: Well-developed, well-nourished, well-hydrated, in no acute distress  HEENT: NC/AT.  PERRLA, EOMI.  External ear normal.  Nares patent, nonedematous nonerythematous.  Moist mucous membranes. Good dentition.  Trachea midline.   Neck: No JVP.  Carotid bruit could not be appreciated.  Nontender, nonnodular thyroid.  No anterior or posterior cervical, occipital, supraclavicular lymphadenopathy  Neuro:   Alert and oriented X4.    Face is symmetric, extraocular movements are intact, hearing is intact to voice, there is no dysarthria  Deltoids  are graded 4/5 as are the triceps, biceps grade 4+, intrinsic hand muscles graded 5 -, lower extremities are antigravity in bed.  DTR 2+ 2+ in the upper extremities  Lymphatic: No edema or lymphadenopathy noted.  Psychiatric: Good mood, congruent affect.  Thought form linear, content appropriate.        Labs:   Recent Labs     05/02/23  0308 05/03/23  0650 05/04/23  0555   WBC 12.9* 12.0* 12.5*   RBC 2.93* 3.00* 3.10*   HEMOGLOBIN 8.8* 8.9* 9.3*   HEMATOCRIT 25.8* 26.7* 28.0*   MCV 88.1 89.0 90.3   MCH 30.0 29.7 30.0   RDW 49.8 50.4* 52.0*   PLATELETCT 477* 524* 568*   MPV 10.9 10.6 10.2   NEUTSPOLYS 81.80*  --   --    LYMPHOCYTES 8.30*  --   --    MONOCYTES 7.50  --   --    EOSINOPHILS 0.50  --   --    BASOPHILS 0.20  --   --      Recent Labs     05/02/23  0308 05/03/23  0650 05/04/23  0555   SODIUM 134* 135 138   POTASSIUM 4.1 4.2 4.3   CHLORIDE 104 106 106   CO2 20 21 24   GLUCOSE 176* 153* 149*   BUN 58* 41* 35*       Imaging:   Pending MRI brain with contrast and repeat thoracic spine MRI with contrast    Assessment and Plan:  Neurologically Mr. Cline is weak, globally from being in bed and recovering from his surgery and infection, though he is clearly weaker in his lower extremities likely related to his cord lesion at T2 (though he does not have a sensory level or brisk reflexes in the lower extremities again the absent reflex change may be due to an overlying distal symmetric sensory neuropathy from diabetes) and he clearly has some weakness in his upper extremities which is not attributable to his general medical condition, nor should his upper extremity weakness be attributed to his thoracic cord lesion.  There had been some consideration of an overlying AIDP however with the intact reflexes in the upper extremity and the stable course of his upper extremity weakness I have less concern about AIDP and I am not inclined to recommend IVIG at this point.  Will reassess after the MRIs are obtained.   Encouraged  Cline to participate as much as he can with PT and OT.    Pedro Luis Schwartz III DO MPH  Neurohospitalist

## 2023-05-04 NOTE — CARE PLAN
Problem: Fall Risk  Goal: Patient will remain free from falls  Description: Target End Date:  Prior to discharge or change in level of care    Document interventions on the [place] Fall Risk Assessment    1.  Assess for fall risk factors  2.  Implement fall precautions  Outcome: Progressing     Problem: Knowledge Deficit - Standard  Goal: Patient and family/care givers will demonstrate understanding of plan of care, disease process/condition, diagnostic tests and medications  Description: Target End Date:  1-3 days or as soon as patient condition allows    Document in Patient Education    1.  Patient and family/caregiver oriented to unit, equipment, visitation policy and means for communicating concern  2.  Complete/review Learning Assessment  3.  Assess knowledge level of disease process/condition, treatment plan, diagnostic tests and medications  4.  Explain disease process/condition, treatment plan, diagnostic tests and medications  Outcome: Progressing     The patient is Stable - Low risk of patient condition declining or worsening    Shift Goals  Clinical Goals: MRI  Patient Goals: pain control  Family Goals: update on poc and support    Progress made toward(s) clinical / shift goals:  Confirmed with MRI pt to go down 5/4, will need anesthesia. Pain controlled with PRN meds    Patient is not progressing towards the following goals:

## 2023-05-04 NOTE — PROGRESS NOTES
[facility] Medicine Daily Progress Note    Date of Service  5/4/2023    Chief Complaint  Tyrone Cline is a 82 y.o. male admitted 4/21/2023 with Colitis.    Hospital Course  Admitted with sepsis secondary to colitis.  Patient was started on empiric coverage with IV Rocephin and Flagyl.  He was also noted to have bilateral lower extremity weakness.  MRI of the lumbar spine was done which showed possible edema.  Neurology was then consulted on the case.  He was also noted to have an SANJUANITA on CKD stage IV.  Nephrology was consulted on the case.  He was started on a careful IVF hydration.  His CT scan also showed cholelithiasis.  Ultrasound was done which showed cholecystitis.  Surgery was then consulted on the case and patient underwent laparoscopic cholecystectomy on 4/28 which needed to be converted to open cholecystectomy, patient was persistently hypotensive during surgery and postoperatively  required IV pressor medication, thus he was care for in the surgical ICU for additional treatment.  On 4/29 the patient was transferred back to the medical service for ongoing management.     Interval Problem Update    Patient creatinine remains at baseline    IV fluids were discontinued yesterday    Surgery team ordered po Lasix for 3 days    Discussed with neurology they are recommending MRI and the brain and thoracic spine with and without contrast-this was ordered    No overnight events    generalized weakness still present is still present        I have discussed this patient's plan of care and discharge plan at IDT rounds today with Case Management, Nursing, Nursing leadership, and other members of the IDT team.    Consultants/Specialty  general surgery, nephrology, and neurology    Code Status  DNAR/DNI    Disposition  Patient is not medically cleared for discharge.   Anticipate discharge to to an inpatient rehabilitation hospital.  I have placed the appropriate orders for post-discharge needs.    Review of  Systems  Review of Systems   Constitutional:  Positive for malaise/fatigue. Negative for chills, diaphoresis and fever.   HENT:  Negative for congestion, hearing loss and sore throat.    Eyes:  Negative for blurred vision.   Respiratory:  Negative for cough, shortness of breath and wheezing.    Cardiovascular:  Negative for chest pain, palpitations and leg swelling.   Gastrointestinal:  Negative for abdominal pain, diarrhea, heartburn, nausea and vomiting.   Genitourinary:  Negative for dysuria, flank pain and hematuria.   Musculoskeletal:  Negative for back pain, joint pain, myalgias and neck pain.   Skin:  Negative for rash.   Neurological:  Positive for weakness. Negative for dizziness, sensory change, speech change, focal weakness and headaches.   Psychiatric/Behavioral:  The patient is not nervous/anxious.       Physical Exam  Temp:  [36.5 °C (97.7 °F)-36.8 °C (98.2 °F)] 36.5 °C (97.7 °F)  Pulse:  [71-85] 74  Resp:  [18-20] 18  BP: (133-156)/(58-88) 133/58  SpO2:  [92 %-96 %] 96 %    Physical Exam  Vitals and nursing note reviewed.   HENT:      Head: Normocephalic and atraumatic.      Nose: No congestion.      Mouth/Throat:      Mouth: Mucous membranes are moist.   Eyes:      Extraocular Movements: Extraocular movements intact.      Conjunctiva/sclera: Conjunctivae normal.   Cardiovascular:      Rate and Rhythm: Normal rate and regular rhythm.      Heart sounds: Murmur heard.   Pulmonary:      Effort: Pulmonary effort is normal.      Breath sounds: Normal breath sounds.   Abdominal:      Tenderness: There is abdominal tenderness. There is no guarding or rebound.      Comments: Right upper quadrant dressing over the surgical area with a NAHUN drain in place, draining serosanguineous fluid   Musculoskeletal:      Cervical back: No tenderness.      Right lower leg: No edema.      Left lower leg: No edema.   Skin:     General: Skin is warm and dry.      Coloration: Skin is pale.   Neurological:      Mental Status: He  is alert and oriented to person, place, and time.      Cranial Nerves: No cranial nerve deficit.      Motor: Weakness present.       Fluids    Intake/Output Summary (Last 24 hours) at 5/4/2023 1416  Last data filed at 5/4/2023 1115  Gross per 24 hour   Intake 180 ml   Output 3170 ml   Net -2990 ml         Laboratory  Recent Labs     05/02/23  0308 05/03/23  0650 05/04/23  0555   WBC 12.9* 12.0* 12.5*   RBC 2.93* 3.00* 3.10*   HEMOGLOBIN 8.8* 8.9* 9.3*   HEMATOCRIT 25.8* 26.7* 28.0*   MCV 88.1 89.0 90.3   MCH 30.0 29.7 30.0   MCHC 34.1 33.3* 33.2*   RDW 49.8 50.4* 52.0*   PLATELETCT 477* 524* 568*   MPV 10.9 10.6 10.2       Recent Labs     05/02/23  0308 05/03/23  0650 05/04/23  0555   SODIUM 134* 135 138   POTASSIUM 4.1 4.2 4.3   CHLORIDE 104 106 106   CO2 20 21 24   GLUCOSE 176* 153* 149*   BUN 58* 41* 35*   CREATININE 2.47* 2.09* 2.18*   CALCIUM 8.2* 8.5 9.0                         Imaging  DX-CHEST-PORTABLE (1 VIEW)   Final Result         1.  Pulmonary edema and/or infiltrates are identified, which are stable since the prior exam.   2.  Cardiomegaly   3.  Atherosclerosis      DX-CHEST-PORTABLE (1 VIEW)   Final Result         1.  Pulmonary edema and/or infiltrates are identified, which are stable since the prior exam.   2.  Left PICC line tip terminates in the left axilla, stable since prior study.      DX-CHEST-PORTABLE (1 VIEW)   Final Result      1.  No significant change.   2.  Possible left PICC line with tip projecting at the axillary vein.      DX-CHEST-PORTABLE (1 VIEW)   Final Result      1.  Mildly improved pulmonary opacities.   2.  Possible left PICC line with tip projecting at the axillary vein.   3.  Likely trace right pleural effusion.      DX-CHEST-LIMITED (1 VIEW)   Final Result      1.  Placement of endotracheal tube with tip projecting over the mid trachea      2.  Left arm catheter present which is presumably venous in projects in the expected position of the left axillary vein      3.   Enlarged cardiac silhouette      NM-HEPATOBILIARY SCAN   Final Result      Hepatobiliary scan findings suspicious for acute cholecystitis.      MR-THORACIC SPINE-WITH & W/O   Final Result      1.  There is abnormal expansile T2 hyperintense lesion in the right side of the thoracic spinal cord at the levels of T2 and T3 Mild contrast enhancement is seen. This lesion is suspicious for spinal cord neoplasm. Other remote differential diagnosis    includes transverse myelitis.   2.  Exaggerated thoracic kyphosis.   3.  Thoracic dextroscoliosis.   4.  Minimal degenerative changes.   5.  Right pleural effusion.      MR-LUMBAR SPINE-WITH & W/O   Final Result      1.  Multifocal degenerative disease in the lumbar spine as described above.   2.  Moderate central canal and severe lateral recess stenosis at the level of L4-5. The exiting bilateral L5 nerve roots might have been impinged at the lateral recess.      MR-CERVICAL SPINE-WITH & W/O   Final Result      1.  There is abnormal expansile intramedullary T2 signal intensity lesion in the right cerebellum. Thoracic spinal cord at the level of T2 on T3. Mild diffuse contrast enhancement is seen. This finding is suspicious for spinal cord neoplasm. The other    less likely differential diagnosis includes transverse myelitis. Follow-up is recommended after 4 weeks.   2.  Mild degenerative disease in the cervical spine as described above.   3.  There is no evidence of infection in the cervical spine.      US-RUQ   Final Result         1.  Acute cholecystitis.   2.  Atrophic right kidney.      DX-CHEST-LIMITED (1 VIEW)   Final Result      Perihilar interstitial edema and basilar atelectasis and/or consolidation. Underlying infection is possible.      MR-LUMBAR SPINE-W/O   Final Result      1.  Lower lumbar spine predominant degenerative changes as described in detail above, progressed from 2010 MRI.   2.  Edema at the L4-L5 disc space and L5 superior endplate likely represents  degenerative changes. Less likely this could represent a low-grade or early discitis osteomyelitis. Correlate for evidence of infection.   3.  Acute appearing Schmorl's node at L3-L4, which can be a source of pain.      US-RENAL   Final Result      1.  Atrophic kidneys. Echogenic bilateral renal parenchyma could relate to medical renal disease.      2.  No hydronephrosis. No renal calculus.      DX-CHEST-PORTABLE (1 VIEW)   Final Result      No acute cardiac or pulmonary abnormalities are identified.      CT-ABDOMEN-PELVIS W/O   Final Result      1.  Findings suspicious for focal colitis at the hepatic flexure, less likely cholecystitis or duodenitis with secondary involvement of the colon. Infectious, inflammatory and ischemic etiologies are considerations. Underlying mass is not excluded.   2.  Cholelithiasis with distention of the gallbladder   3.  BILATERAL renal atrophy   4.  Subcentimeter LEFT adrenal myelolipoma   5.  12 mm RIGHT adrenal nodule likely an adenoma absent a history of cancer   6.  Subcentimeter RIGHT hepatic lesion, likely a cyst absent a history of cancer   7.  Atherosclerosis   8.  Colonic diverticulosis      CT-HEAD W/O   Final Result      1.  Cerebral atrophy.      2.  White matter lucencies most consistent with small vessel ischemic change versus demyelination or gliosis.      3.  Otherwise, Head CT without contrast with no acute findings. No evidence of acute cerebral infarction, hemorrhage or mass lesion.         MR-BRAIN-WITH & W/O    (Results Pending)   MR-THORACIC SPINE-WITH & W/O    (Results Pending)          Assessment/Plan  Abnormal MRI- (present on admission)  Assessment & Plan  MRI on admission showing T2/T3  hyperintense lesion in the right side of the thoracic spinal cord at the levels of T2 and T3 Mild contrast enhancement is seen. This lesion is suspicious for spinal cord neoplasm or possible transverse myelitis       - repeat MRI thoracic spine ordered       Acute gangrenous  cholecystitis- (present on admission)  Assessment & Plan  Found to have gangrenous cholecystitis s/p laprascopic converted open cholecystectomy 4/28/2023  Surgery continues to follow   No longer on antibiotics, as we have source control   Monitor closely for signs of infection   Post op pain control   Bowel regiment   Mobilize as able   Diet as tolerated     Postprocedural hypotension  Assessment & Plan  Hypotension in OR and post open carmelo  Now improved and off pressors   BP stable   Continue to monitor vitals per protocol     Hypercalcemia- (present on admission)  Assessment & Plan  Likely due to immobility   Resolved with IVF hydration  Continue to follow and treat accordingly     Hypomagnesemia- (present on admission)  Assessment & Plan  resolved  Goal >2  replace as needed     Cholelithiasis with acute cholecystitis- (present on admission)  Assessment & Plan  S/p surgical removal, converted to open cholecystectomy  NAHUN drain, monitor output  The patient is tolerating a diet already, monitor bowel function    Hypokalemia- (present on admission)  Assessment & Plan  Stable, monitor CMP and replace as needed   - goal K >4    Thrombocytopenia (HCC)- (present on admission)  Assessment & Plan  Resolved, plt are stable     High anion gap metabolic acidosis- (present on admission)  Assessment & Plan  Acute on chronic kidney disease, -appears back at baseline on 5/3/2023    Resolved    Hyponatremia- (present on admission)  Assessment & Plan  Improved, slightly down today at 134, mild and asymptomatic   Cont to monitor BMP     Weakness of both lower extremities- (present on admission)  Assessment & Plan  T2/T3 intramedullary lesion of unclear chronicity and clinical significance as well as 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     Repeat MRI thoracic spine with and without contrast to further evaluate the previously seen lesion at T2-T3      IVIG initiated on 5/1/2023 and  discontinued on 5/2/2023 after discussion with neurology    MRI of the brain and MRI of thoracic spine has been ordered as per neurology request       neurosurgery  did not feel that this was a neurosurgical issue.    Sepsis (HCC)- (present on admission)  Assessment & Plan  This is Sepsis Present on admission  SIRS criteria identified on my evaluation include: Leukocytosis, with WBC greater than 12,000  Source is colitis  Sepsis protocol initiated  Fluid resuscitation ordered per protocol  Crystalloid Fluid Administration: Fluid resuscitation ordered per standard protocol - 30 mL/kg per current or ideal body weight  IV antibiotics as appropriate for source of sepsis  Reassessment: I have reassessed the patient's hemodynamic status    Due to gangrenous gallbladder requiring open carmelo   resolved   off antibiotics      Colitis- (present on admission)  Assessment & Plan  Cultures negative, monitor  Initial admitting impression, currently improved  Diet as tolerated     Acute kidney injury superimposed on CKD ([facility])- (present on admission)  Assessment & Plan   Resolved    Avoid nephrotoxins       Diabetic peripheral neuropathy (HCC)- (present on admission)  Assessment & Plan  Cymbalta, medical treatment    Essential hypertension- (present on admission)  Assessment & Plan  Monitor closely, the patient had a hypotensive episode postoperatively  Off pressors and has since been resumed  , amlodipine and coreg   Monitor BP and adjust medications as needed   Prn hydralazine     Type 2 diabetes mellitus with kidney complication, without long-term current use of insulin ([facility])- (present on admission)  Assessment & Plan  Ha1c 6.4% 4 months ago   On ozempic and januvia outpatient   Continue ISS and hypoglycemic protocol while inpatient     Hypercholesterolemia- (present on admission)  Assessment & Plan  Stable, continue Lipitor    Chronic kidney disease (CKD), stage IV (severe) ([facility])- (present on admission)  Assessment &  Plan  Appears to be at baseline at 5/3/2023                VTE prophylaxis: SCDs/TEDs and heparin ppx    I have performed a physical exam and reviewed and updated ROS and Plan today (5/4/2023). In review of yesterday's note (5/3/2023), there are no changes except as documented above.

## 2023-05-04 NOTE — PROGRESS NOTES
"  DATE: 5/4/2023    Post Operative Day  6 laparoscopic cholecystectomy.converted to open    INTERVAL EVENTS:  Feeling more bloated this am  NAHUN sanguinous drainage decreasing.   -75ml per nursing notes  WBC slightly up  Appreciate Medicine for primary management     PHYSICAL EXAMINATION:  Vital Signs: /72   Pulse 71   Temp 36.5 °C (97.7 °F) (Temporal)   Resp 18   Ht 1.803 m (5' 10.98\")   Wt 89.1 kg (196 lb 6.9 oz)   SpO2 94%     Physical Exam  Vitals and nursing note reviewed. Exam conducted with a chaperone present (family).   Constitutional:       Appearance: Normal appearance.   Pulmonary:      Effort: Pulmonary effort is normal.   Abdominal:      General: There is distension.      Palpations: Abdomen is soft.      Tenderness: There is abdominal tenderness.      Comments: Clean dry dressing with staples noted  Abdomen slightly distended but not overtly tender    Genitourinary:     Comments: No rivera catheter  Musculoskeletal:         General: Normal range of motion.      Cervical back: Normal range of motion.   Skin:     General: Skin is warm and dry.      Capillary Refill: Capillary refill takes less than 2 seconds.   Neurological:      General: No focal deficit present.      Mental Status: He is alert and oriented to person, place, and time.         LABORATORY VALUES:   Recent Labs     05/02/23  0308 05/03/23  0650 05/04/23  0555   WBC 12.9* 12.0* 12.5*   RBC 2.93* 3.00* 3.10*   HEMOGLOBIN 8.8* 8.9* 9.3*   HEMATOCRIT 25.8* 26.7* 28.0*   MCV 88.1 89.0 90.3   MCH 30.0 29.7 30.0   MCHC 34.1 33.3* 33.2*   RDW 49.8 50.4* 52.0*   PLATELETCT 477* 524* 568*   MPV 10.9 10.6 10.2     Recent Labs     05/02/23  0308 05/03/23  0650 05/04/23  0555   SODIUM 134* 135 138   POTASSIUM 4.1 4.2 4.3   CHLORIDE 104 106 106   CO2 20 21 24   GLUCOSE 176* 153* 149*   BUN 58* 41* 35*   CREATININE 2.47* 2.09* 2.18*   CALCIUM 8.2* 8.5 9.0     Recent Labs     05/02/23  0308   ASTSGOT 55*   ALTSGPT 35   TBILIRUBIN 0.5 "   ALKPHOSPHAT 160*   GLOBULIN 3.8*            IMAGING:   DX-CHEST-PORTABLE (1 VIEW)   Final Result         1.  Pulmonary edema and/or infiltrates are identified, which are stable since the prior exam.   2.  Cardiomegaly   3.  Atherosclerosis      DX-CHEST-PORTABLE (1 VIEW)   Final Result         1.  Pulmonary edema and/or infiltrates are identified, which are stable since the prior exam.   2.  Left PICC line tip terminates in the left axilla, stable since prior study.      DX-CHEST-PORTABLE (1 VIEW)   Final Result      1.  No significant change.   2.  Possible left PICC line with tip projecting at the axillary vein.      DX-CHEST-PORTABLE (1 VIEW)   Final Result      1.  Mildly improved pulmonary opacities.   2.  Possible left PICC line with tip projecting at the axillary vein.   3.  Likely trace right pleural effusion.      DX-CHEST-LIMITED (1 VIEW)   Final Result      1.  Placement of endotracheal tube with tip projecting over the mid trachea      2.  Left arm catheter present which is presumably venous in projects in the expected position of the left axillary vein      3.  Enlarged cardiac silhouette      NM-HEPATOBILIARY SCAN   Final Result      Hepatobiliary scan findings suspicious for acute cholecystitis.      MR-THORACIC SPINE-WITH & W/O   Final Result      1.  There is abnormal expansile T2 hyperintense lesion in the right side of the thoracic spinal cord at the levels of T2 and T3 Mild contrast enhancement is seen. This lesion is suspicious for spinal cord neoplasm. Other remote differential diagnosis    includes transverse myelitis.   2.  Exaggerated thoracic kyphosis.   3.  Thoracic dextroscoliosis.   4.  Minimal degenerative changes.   5.  Right pleural effusion.      MR-LUMBAR SPINE-WITH & W/O   Final Result      1.  Multifocal degenerative disease in the lumbar spine as described above.   2.  Moderate central canal and severe lateral recess stenosis at the level of L4-5. The exiting bilateral L5 nerve  roots might have been impinged at the lateral recess.      MR-CERVICAL SPINE-WITH & W/O   Final Result      1.  There is abnormal expansile intramedullary T2 signal intensity lesion in the right cerebellum. Thoracic spinal cord at the level of T2 on T3. Mild diffuse contrast enhancement is seen. This finding is suspicious for spinal cord neoplasm. The other    less likely differential diagnosis includes transverse myelitis. Follow-up is recommended after 4 weeks.   2.  Mild degenerative disease in the cervical spine as described above.   3.  There is no evidence of infection in the cervical spine.      US-RUQ   Final Result         1.  Acute cholecystitis.   2.  Atrophic right kidney.      DX-CHEST-LIMITED (1 VIEW)   Final Result      Perihilar interstitial edema and basilar atelectasis and/or consolidation. Underlying infection is possible.      MR-LUMBAR SPINE-W/O   Final Result      1.  Lower lumbar spine predominant degenerative changes as described in detail above, progressed from 2010 MRI.   2.  Edema at the L4-L5 disc space and L5 superior endplate likely represents degenerative changes. Less likely this could represent a low-grade or early discitis osteomyelitis. Correlate for evidence of infection.   3.  Acute appearing Schmorl's node at L3-L4, which can be a source of pain.      US-RENAL   Final Result      1.  Atrophic kidneys. Echogenic bilateral renal parenchyma could relate to medical renal disease.      2.  No hydronephrosis. No renal calculus.      DX-CHEST-PORTABLE (1 VIEW)   Final Result      No acute cardiac or pulmonary abnormalities are identified.      CT-ABDOMEN-PELVIS W/O   Final Result      1.  Findings suspicious for focal colitis at the hepatic flexure, less likely cholecystitis or duodenitis with secondary involvement of the colon. Infectious, inflammatory and ischemic etiologies are considerations. Underlying mass is not excluded.   2.  Cholelithiasis with distention of the gallbladder    3.  BILATERAL renal atrophy   4.  Subcentimeter LEFT adrenal myelolipoma   5.  12 mm RIGHT adrenal nodule likely an adenoma absent a history of cancer   6.  Subcentimeter RIGHT hepatic lesion, likely a cyst absent a history of cancer   7.  Atherosclerosis   8.  Colonic diverticulosis      CT-HEAD W/O   Final Result      1.  Cerebral atrophy.      2.  White matter lucencies most consistent with small vessel ischemic change versus demyelination or gliosis.      3.  Otherwise, Head CT without contrast with no acute findings. No evidence of acute cerebral infarction, hemorrhage or mass lesion.         MR-THORACIC SPINE-WITH & W/O    (Results Pending)       ASSESSMENT AND PLAN:   Cholelithiasis with acute cholecystitis- (present on admission)  Assessment & Plan  4/27 HIDA scan consistent with acute cholecystitis.  4/28 Laparoscopic cholecystectomy converted to open cholecystectomy.  4/30 Gallbladder fluid cultures (+) ESBL E Coli.  Antibiotics per hospital team.    Acute kidney injury superimposed on CKD (HCC)- (present on admission)  Assessment & Plan  Premorbid.  Fluid resuscitation and trend renal function.  Avoid nephrotoxins.    Postprocedural hypotension  Assessment & Plan  Patient was in shock throughout operative case.  Returns to ICU on levophed.   Wean levophed as tolerated.  4/29 Levophed stopped.  Trend SBP.           ____________________________________     SHALOM ReisPREGI.    DD: 5/4/2023  9:22 AM

## 2023-05-05 ENCOUNTER — APPOINTMENT (OUTPATIENT)
Dept: RADIOLOGY | Facility: MEDICAL CENTER | Age: 83
DRG: 853 | End: 2023-05-05
Attending: HOSPITALIST
Payer: MEDICARE

## 2023-05-05 ENCOUNTER — ANESTHESIA (OUTPATIENT)
Dept: SURGERY | Facility: MEDICAL CENTER | Age: 83
DRG: 853 | End: 2023-05-05
Payer: MEDICARE

## 2023-05-05 LAB
ALBUMIN SERPL BCP-MCNC: 2 G/DL (ref 3.2–4.9)
ALBUMIN/GLOB SERPL: 0.5 G/DL
ALP SERPL-CCNC: 305 U/L (ref 30–99)
ALT SERPL-CCNC: 77 U/L (ref 2–50)
ANION GAP SERPL CALC-SCNC: 9 MMOL/L (ref 7–16)
AST SERPL-CCNC: 94 U/L (ref 12–45)
BILIRUB SERPL-MCNC: 0.5 MG/DL (ref 0.1–1.5)
BUN SERPL-MCNC: 32 MG/DL (ref 8–22)
CALCIUM ALBUM COR SERPL-MCNC: 10.6 MG/DL (ref 8.5–10.5)
CALCIUM SERPL-MCNC: 9 MG/DL (ref 8.5–10.5)
CHLORIDE SERPL-SCNC: 103 MMOL/L (ref 96–112)
CO2 SERPL-SCNC: 26 MMOL/L (ref 20–33)
CREAT SERPL-MCNC: 1.73 MG/DL (ref 0.5–1.4)
ERYTHROCYTE [DISTWIDTH] IN BLOOD BY AUTOMATED COUNT: 52.3 FL (ref 35.9–50)
GFR SERPLBLD CREATININE-BSD FMLA CKD-EPI: 39 ML/MIN/1.73 M 2
GLOBULIN SER CALC-MCNC: 3.8 G/DL (ref 1.9–3.5)
GLUCOSE BLD STRIP.AUTO-MCNC: 142 MG/DL (ref 65–99)
GLUCOSE BLD STRIP.AUTO-MCNC: 159 MG/DL (ref 65–99)
GLUCOSE BLD STRIP.AUTO-MCNC: 224 MG/DL (ref 65–99)
GLUCOSE SERPL-MCNC: 161 MG/DL (ref 65–99)
HCT VFR BLD AUTO: 29.2 % (ref 42–52)
HGB BLD-MCNC: 9.5 G/DL (ref 14–18)
MCH RBC QN AUTO: 30 PG (ref 27–33)
MCHC RBC AUTO-ENTMCNC: 32.5 G/DL (ref 33.7–35.3)
MCV RBC AUTO: 92.1 FL (ref 81.4–97.8)
PLATELET # BLD AUTO: 624 K/UL (ref 164–446)
PMV BLD AUTO: 10.2 FL (ref 9–12.9)
POTASSIUM SERPL-SCNC: 4.1 MMOL/L (ref 3.6–5.5)
PROT SERPL-MCNC: 5.8 G/DL (ref 6–8.2)
RBC # BLD AUTO: 3.17 M/UL (ref 4.7–6.1)
SODIUM SERPL-SCNC: 138 MMOL/L (ref 135–145)
WBC # BLD AUTO: 13.4 K/UL (ref 4.8–10.8)

## 2023-05-05 PROCEDURE — 700105 HCHG RX REV CODE 258: Performed by: ANESTHESIOLOGY

## 2023-05-05 PROCEDURE — 72157 MRI CHEST SPINE W/O & W/DYE: CPT

## 2023-05-05 PROCEDURE — 770001 HCHG ROOM/CARE - MED/SURG/GYN PRIV*

## 2023-05-05 PROCEDURE — A9579 GAD-BASE MR CONTRAST NOS,1ML: HCPCS | Performed by: HOSPITALIST

## 2023-05-05 PROCEDURE — A9270 NON-COVERED ITEM OR SERVICE: HCPCS | Performed by: HOSPITALIST

## 2023-05-05 PROCEDURE — 700101 HCHG RX REV CODE 250: Performed by: ANESTHESIOLOGY

## 2023-05-05 PROCEDURE — 700111 HCHG RX REV CODE 636 W/ 250 OVERRIDE (IP): Performed by: ANESTHESIOLOGY

## 2023-05-05 PROCEDURE — 160035 HCHG PACU - 1ST 60 MINS PHASE I

## 2023-05-05 PROCEDURE — 700102 HCHG RX REV CODE 250 W/ 637 OVERRIDE(OP): Performed by: HOSPITALIST

## 2023-05-05 PROCEDURE — 700102 HCHG RX REV CODE 250 W/ 637 OVERRIDE(OP): Performed by: SURGERY

## 2023-05-05 PROCEDURE — 01922 ANES N-INVAS IMG/RADJ THER: CPT | Performed by: ANESTHESIOLOGY

## 2023-05-05 PROCEDURE — A9270 NON-COVERED ITEM OR SERVICE: HCPCS | Performed by: SURGERY

## 2023-05-05 PROCEDURE — 99232 SBSQ HOSP IP/OBS MODERATE 35: CPT | Performed by: HOSPITALIST

## 2023-05-05 PROCEDURE — 80053 COMPREHEN METABOLIC PANEL: CPT

## 2023-05-05 PROCEDURE — 82962 GLUCOSE BLOOD TEST: CPT | Mod: 91

## 2023-05-05 PROCEDURE — 700102 HCHG RX REV CODE 250 W/ 637 OVERRIDE(OP): Performed by: FAMILY MEDICINE

## 2023-05-05 PROCEDURE — 99024 POSTOP FOLLOW-UP VISIT: CPT | Performed by: NURSE PRACTITIONER

## 2023-05-05 PROCEDURE — 160002 HCHG RECOVERY MINUTES (STAT)

## 2023-05-05 PROCEDURE — 99100 ANES PT EXTEME AGE<1 YR&>70: CPT | Performed by: ANESTHESIOLOGY

## 2023-05-05 PROCEDURE — A9270 NON-COVERED ITEM OR SERVICE: HCPCS | Performed by: FAMILY MEDICINE

## 2023-05-05 PROCEDURE — 700117 HCHG RX CONTRAST REV CODE 255: Performed by: HOSPITALIST

## 2023-05-05 PROCEDURE — 700111 HCHG RX REV CODE 636 W/ 250 OVERRIDE (IP): Performed by: HOSPITALIST

## 2023-05-05 PROCEDURE — 85027 COMPLETE CBC AUTOMATED: CPT

## 2023-05-05 PROCEDURE — 700111 HCHG RX REV CODE 636 W/ 250 OVERRIDE (IP): Performed by: STUDENT IN AN ORGANIZED HEALTH CARE EDUCATION/TRAINING PROGRAM

## 2023-05-05 PROCEDURE — 99232 SBSQ HOSP IP/OBS MODERATE 35: CPT | Mod: GC | Performed by: PSYCHIATRY & NEUROLOGY

## 2023-05-05 PROCEDURE — 4410588 MR-BRAIN-WITH & W/O

## 2023-05-05 RX ORDER — HYDRALAZINE HYDROCHLORIDE 20 MG/ML
5 INJECTION INTRAMUSCULAR; INTRAVENOUS
Status: DISCONTINUED | OUTPATIENT
Start: 2023-05-05 | End: 2023-05-05 | Stop reason: HOSPADM

## 2023-05-05 RX ORDER — OXYCODONE HCL 5 MG/5 ML
10 SOLUTION, ORAL ORAL
Status: DISCONTINUED | OUTPATIENT
Start: 2023-05-05 | End: 2023-05-05 | Stop reason: HOSPADM

## 2023-05-05 RX ORDER — OXYCODONE HCL 5 MG/5 ML
5 SOLUTION, ORAL ORAL
Status: DISCONTINUED | OUTPATIENT
Start: 2023-05-05 | End: 2023-05-05 | Stop reason: HOSPADM

## 2023-05-05 RX ORDER — PHENYLEPHRINE HCL IN 0.9% NACL 0.5 MG/5ML
SYRINGE (ML) INTRAVENOUS
Status: COMPLETED
Start: 2023-05-05 | End: 2023-05-05

## 2023-05-05 RX ORDER — PHENYLEPHRINE HYDROCHLORIDE 10 MG/ML
INJECTION, SOLUTION INTRAMUSCULAR; INTRAVENOUS; SUBCUTANEOUS PRN
Status: DISCONTINUED | OUTPATIENT
Start: 2023-05-05 | End: 2023-05-05 | Stop reason: SURG

## 2023-05-05 RX ORDER — LABETALOL HYDROCHLORIDE 5 MG/ML
5 INJECTION, SOLUTION INTRAVENOUS
Status: DISCONTINUED | OUTPATIENT
Start: 2023-05-05 | End: 2023-05-05 | Stop reason: HOSPADM

## 2023-05-05 RX ORDER — HYDROMORPHONE HYDROCHLORIDE 1 MG/ML
0.4 INJECTION, SOLUTION INTRAMUSCULAR; INTRAVENOUS; SUBCUTANEOUS
Status: DISCONTINUED | OUTPATIENT
Start: 2023-05-05 | End: 2023-05-05 | Stop reason: HOSPADM

## 2023-05-05 RX ORDER — HYDROMORPHONE HYDROCHLORIDE 1 MG/ML
0.2 INJECTION, SOLUTION INTRAMUSCULAR; INTRAVENOUS; SUBCUTANEOUS
Status: DISCONTINUED | OUTPATIENT
Start: 2023-05-05 | End: 2023-05-05 | Stop reason: HOSPADM

## 2023-05-05 RX ORDER — EPHEDRINE SULFATE 50 MG/ML
5 INJECTION, SOLUTION INTRAVENOUS
Status: DISCONTINUED | OUTPATIENT
Start: 2023-05-05 | End: 2023-05-05 | Stop reason: HOSPADM

## 2023-05-05 RX ORDER — ONDANSETRON 2 MG/ML
4 INJECTION INTRAMUSCULAR; INTRAVENOUS
Status: DISCONTINUED | OUTPATIENT
Start: 2023-05-05 | End: 2023-05-05 | Stop reason: HOSPADM

## 2023-05-05 RX ORDER — DIPHENHYDRAMINE HYDROCHLORIDE 50 MG/ML
12.5 INJECTION INTRAMUSCULAR; INTRAVENOUS
Status: DISCONTINUED | OUTPATIENT
Start: 2023-05-05 | End: 2023-05-05 | Stop reason: HOSPADM

## 2023-05-05 RX ORDER — HALOPERIDOL 5 MG/ML
1 INJECTION INTRAMUSCULAR
Status: DISCONTINUED | OUTPATIENT
Start: 2023-05-05 | End: 2023-05-05 | Stop reason: HOSPADM

## 2023-05-05 RX ORDER — SODIUM CHLORIDE, SODIUM GLUCONATE, SODIUM ACETATE, POTASSIUM CHLORIDE AND MAGNESIUM CHLORIDE 526; 502; 368; 37; 30 MG/100ML; MG/100ML; MG/100ML; MG/100ML; MG/100ML
INJECTION, SOLUTION INTRAVENOUS
Status: DISCONTINUED | OUTPATIENT
Start: 2023-05-05 | End: 2023-05-05 | Stop reason: SURG

## 2023-05-05 RX ORDER — LIDOCAINE HYDROCHLORIDE 10 MG/ML
INJECTION, SOLUTION EPIDURAL; INFILTRATION; INTRACAUDAL; PERINEURAL PRN
Status: DISCONTINUED | OUTPATIENT
Start: 2023-05-05 | End: 2023-05-05 | Stop reason: SURG

## 2023-05-05 RX ORDER — ONDANSETRON 2 MG/ML
INJECTION INTRAMUSCULAR; INTRAVENOUS PRN
Status: DISCONTINUED | OUTPATIENT
Start: 2023-05-05 | End: 2023-05-05 | Stop reason: SURG

## 2023-05-05 RX ORDER — LORAZEPAM 2 MG/ML
2 INJECTION INTRAMUSCULAR
Status: COMPLETED | OUTPATIENT
Start: 2023-05-05 | End: 2023-05-05

## 2023-05-05 RX ORDER — HYDROMORPHONE HYDROCHLORIDE 1 MG/ML
0.1 INJECTION, SOLUTION INTRAMUSCULAR; INTRAVENOUS; SUBCUTANEOUS
Status: DISCONTINUED | OUTPATIENT
Start: 2023-05-05 | End: 2023-05-05 | Stop reason: HOSPADM

## 2023-05-05 RX ADMIN — ATORVASTATIN CALCIUM 10 MG: 10 TABLET, FILM COATED ORAL at 18:00

## 2023-05-05 RX ADMIN — PHENYLEPHRINE HYDROCHLORIDE 100 MCG: 10 INJECTION INTRAVENOUS at 11:55

## 2023-05-05 RX ADMIN — PHENYLEPHRINE HYDROCHLORIDE 100 MCG: 10 INJECTION INTRAVENOUS at 11:35

## 2023-05-05 RX ADMIN — SODIUM CHLORIDE, SODIUM GLUCONATE, SODIUM ACETATE, POTASSIUM CHLORIDE AND MAGNESIUM CHLORIDE: 526; 502; 368; 37; 30 INJECTION, SOLUTION INTRAVENOUS at 11:07

## 2023-05-05 RX ADMIN — INSULIN HUMAN 2 UNITS: 100 INJECTION, SOLUTION PARENTERAL at 18:06

## 2023-05-05 RX ADMIN — LORAZEPAM 2 MG: 2 INJECTION INTRAMUSCULAR; INTRAVENOUS at 10:31

## 2023-05-05 RX ADMIN — CARVEDILOL 12.5 MG: 12.5 TABLET, FILM COATED ORAL at 18:00

## 2023-05-05 RX ADMIN — PHENYLEPHRINE HYDROCHLORIDE 100 MCG: 10 INJECTION INTRAVENOUS at 12:02

## 2023-05-05 RX ADMIN — AMLODIPINE BESYLATE 5 MG: 10 TABLET ORAL at 05:21

## 2023-05-05 RX ADMIN — ALLOPURINOL 100 MG: 100 TABLET ORAL at 05:23

## 2023-05-05 RX ADMIN — PHENYLEPHRINE HYDROCHLORIDE 200 MCG: 10 INJECTION INTRAVENOUS at 11:25

## 2023-05-05 RX ADMIN — EPHEDRINE SULFATE 10 MG: 50 INJECTION, SOLUTION INTRAVENOUS at 11:29

## 2023-05-05 RX ADMIN — PHENYLEPHRINE HYDROCHLORIDE 200 MCG: 10 INJECTION INTRAVENOUS at 11:23

## 2023-05-05 RX ADMIN — ROCURONIUM BROMIDE 60 MG: 10 INJECTION, SOLUTION INTRAVENOUS at 11:18

## 2023-05-05 RX ADMIN — HEPARIN SODIUM 5000 UNITS: 5000 INJECTION, SOLUTION INTRAVENOUS; SUBCUTANEOUS at 05:21

## 2023-05-05 RX ADMIN — PHENYLEPHRINE HYDROCHLORIDE 100 MCG: 10 INJECTION INTRAVENOUS at 12:29

## 2023-05-05 RX ADMIN — INSULIN HUMAN 3 UNITS: 100 INJECTION, SOLUTION PARENTERAL at 22:12

## 2023-05-05 RX ADMIN — PROPOFOL 130 MG: 10 INJECTION, EMULSION INTRAVENOUS at 11:18

## 2023-05-05 RX ADMIN — LIDOCAINE HYDROCHLORIDE 100 MG: 10 INJECTION, SOLUTION EPIDURAL; INFILTRATION; INTRACAUDAL; PERINEURAL at 11:18

## 2023-05-05 RX ADMIN — ONDANSETRON 4 MG: 2 INJECTION INTRAMUSCULAR; INTRAVENOUS at 12:42

## 2023-05-05 RX ADMIN — EPHEDRINE SULFATE 10 MG: 50 INJECTION, SOLUTION INTRAVENOUS at 11:35

## 2023-05-05 RX ADMIN — Medication 1 APPLICATOR: at 05:21

## 2023-05-05 RX ADMIN — SODIUM BICARBONATE 1300 MG: 650 TABLET ORAL at 18:00

## 2023-05-05 RX ADMIN — PHENYLEPHRINE HYDROCHLORIDE 200 MCG: 10 INJECTION INTRAVENOUS at 11:42

## 2023-05-05 RX ADMIN — FUROSEMIDE 40 MG: 20 TABLET ORAL at 05:23

## 2023-05-05 RX ADMIN — GADOTERIDOL 20 ML: 279.3 INJECTION, SOLUTION INTRAVENOUS at 12:30

## 2023-05-05 RX ADMIN — HEPARIN SODIUM 5000 UNITS: 5000 INJECTION, SOLUTION INTRAVENOUS; SUBCUTANEOUS at 22:09

## 2023-05-05 RX ADMIN — FAMOTIDINE 20 MG: 20 TABLET, FILM COATED ORAL at 05:23

## 2023-05-05 RX ADMIN — SODIUM BICARBONATE 1300 MG: 650 TABLET ORAL at 05:26

## 2023-05-05 RX ADMIN — SUGAMMADEX 200 MG: 100 INJECTION, SOLUTION INTRAVENOUS at 12:42

## 2023-05-05 RX ADMIN — PHENYLEPHRINE HYDROCHLORIDE 100 MCG: 10 INJECTION INTRAVENOUS at 11:31

## 2023-05-05 RX ADMIN — EPHEDRINE SULFATE 5 MG: 50 INJECTION, SOLUTION INTRAVENOUS at 11:42

## 2023-05-05 RX ADMIN — DULOXETINE HYDROCHLORIDE 30 MG: 30 CAPSULE, DELAYED RELEASE ORAL at 18:00

## 2023-05-05 RX ADMIN — Medication 1 APPLICATOR: at 22:09

## 2023-05-05 RX ADMIN — FENTANYL CITRATE 100 MCG: 50 INJECTION, SOLUTION INTRAMUSCULAR; INTRAVENOUS at 11:16

## 2023-05-05 ASSESSMENT — ENCOUNTER SYMPTOMS
FOCAL WEAKNESS: 0
WHEEZING: 0
DIAPHORESIS: 0
SORE THROAT: 0
HEARTBURN: 0
FEVER: 0
SENSORY CHANGE: 0
FLANK PAIN: 0
SPEECH CHANGE: 0
HEADACHES: 0
DIARRHEA: 0
NECK PAIN: 0
COUGH: 0
VOMITING: 0
DIZZINESS: 0
BLURRED VISION: 0
NAUSEA: 0
NERVOUS/ANXIOUS: 0
MYALGIAS: 0
BACK PAIN: 0
SHORTNESS OF BREATH: 0
CHILLS: 0
WEAKNESS: 1
ABDOMINAL PAIN: 0
PALPITATIONS: 0

## 2023-05-05 ASSESSMENT — PAIN DESCRIPTION - PAIN TYPE
TYPE: ACUTE PAIN;SURGICAL PAIN
TYPE: ACUTE PAIN;SURGICAL PAIN
TYPE: ACUTE PAIN

## 2023-05-05 NOTE — PROGRESS NOTES
[facility] Medicine Daily Progress Note    Date of Service  5/5/2023    Chief Complaint  Tyrone Cline is a 82 y.o. male admitted 4/21/2023 with Colitis.    Hospital Course  Admitted with sepsis secondary to colitis.  Patient was started on empiric coverage with IV Rocephin and Flagyl.  He was also noted to have bilateral lower extremity weakness.  MRI of the lumbar spine was done which showed possible edema.  Neurology was then consulted on the case.  He was also noted to have an SANJUANITA on CKD stage IV.  Nephrology was consulted on the case.  He was started on a careful IVF hydration.  His CT scan also showed cholelithiasis.  Ultrasound was done which showed cholecystitis.  Surgery was then consulted on the case and patient underwent laparoscopic cholecystectomy on 4/28 which needed to be converted to open cholecystectomy, patient was persistently hypotensive during surgery and postoperatively  required IV pressor medication, thus he was care for in the surgical ICU for additional treatment.  On 4/29 the patient was transferred back to the medical service for ongoing management.     Interval Problem Update    Patient creatinine remains close to baseline        Surgery team ordered po Lasix for 3 days(2 days remaining including today)    Patient requesting some sort of sedation for his MRIs and I have ordered IV Ativan to be given prior to his MRI    neurology  recommending MRI and the brain and thoracic spine with and without contrast-this was ordered    Patient requesting some sort of sedation for his MRIs and I have ordered IV Ativan to be given prior to his MRI    No other complaints        I have discussed this patient's plan of care and discharge plan at IDT rounds today with Case Management, Nursing, Nursing leadership, and other members of the IDT team.    Consultants/Specialty  general surgery, nephrology, and neurology    Code Status  DNAR/DNI    Disposition  Patient is not medically cleared for  discharge.   Anticipate discharge to to an inpatient rehabilitation hospital.  I have placed the appropriate orders for post-discharge needs.    Review of Systems  Review of Systems   Constitutional:  Positive for malaise/fatigue. Negative for chills, diaphoresis and fever.   HENT:  Negative for congestion, hearing loss and sore throat.    Eyes:  Negative for blurred vision.   Respiratory:  Negative for cough, shortness of breath and wheezing.    Cardiovascular:  Negative for chest pain, palpitations and leg swelling.   Gastrointestinal:  Negative for abdominal pain, diarrhea, heartburn, nausea and vomiting.   Genitourinary:  Negative for dysuria, flank pain and hematuria.   Musculoskeletal:  Negative for back pain, joint pain, myalgias and neck pain.   Skin:  Negative for rash.   Neurological:  Positive for weakness. Negative for dizziness, sensory change, speech change, focal weakness and headaches.   Psychiatric/Behavioral:  The patient is not nervous/anxious.       Physical Exam  Temp:  [36.2 °C (97.1 °F)-37 °C (98.6 °F)] 36.6 °C (97.9 °F)  Pulse:  [74-84] 84  Resp:  [17-20] 18  BP: (120-162)/(58-91) 141/82  SpO2:  [95 %-97 %] 95 %    Physical Exam  Vitals and nursing note reviewed.   HENT:      Head: Normocephalic and atraumatic.      Nose: No congestion.      Mouth/Throat:      Mouth: Mucous membranes are moist.   Eyes:      Extraocular Movements: Extraocular movements intact.      Conjunctiva/sclera: Conjunctivae normal.   Cardiovascular:      Rate and Rhythm: Normal rate and regular rhythm.      Heart sounds: Murmur heard.   Pulmonary:      Effort: Pulmonary effort is normal.      Breath sounds: Normal breath sounds.   Abdominal:      Tenderness: There is abdominal tenderness (Mild). There is no guarding or rebound.      Comments: Right upper quadrant dressing over the surgical area with a NAHUN drain in place, draining serosanguineous fluid   Musculoskeletal:      Cervical back: No tenderness.      Right lower  leg: No edema.      Left lower leg: No edema.   Skin:     General: Skin is warm and dry.      Coloration: Skin is pale.   Neurological:      Mental Status: He is alert and oriented to person, place, and time.      Cranial Nerves: No cranial nerve deficit.      Motor: Weakness present.       Fluids    Intake/Output Summary (Last 24 hours) at 5/5/2023 0948  Last data filed at 5/5/2023 0804  Gross per 24 hour   Intake 120 ml   Output 2545 ml   Net -2425 ml         Laboratory  Recent Labs     05/03/23  0650 05/04/23  0555   WBC 12.0* 12.5*   RBC 3.00* 3.10*   HEMOGLOBIN 8.9* 9.3*   HEMATOCRIT 26.7* 28.0*   MCV 89.0 90.3   MCH 29.7 30.0   MCHC 33.3* 33.2*   RDW 50.4* 52.0*   PLATELETCT 524* 568*   MPV 10.6 10.2       Recent Labs     05/03/23  0650 05/04/23  0555   SODIUM 135 138   POTASSIUM 4.2 4.3   CHLORIDE 106 106   CO2 21 24   GLUCOSE 153* 149*   BUN 41* 35*   CREATININE 2.09* 2.18*   CALCIUM 8.5 9.0                         Imaging  DX-CHEST-PORTABLE (1 VIEW)   Final Result         1.  Pulmonary edema and/or infiltrates are identified, which are stable since the prior exam.   2.  Cardiomegaly   3.  Atherosclerosis      DX-CHEST-PORTABLE (1 VIEW)   Final Result         1.  Pulmonary edema and/or infiltrates are identified, which are stable since the prior exam.   2.  Left PICC line tip terminates in the left axilla, stable since prior study.      DX-CHEST-PORTABLE (1 VIEW)   Final Result      1.  No significant change.   2.  Possible left PICC line with tip projecting at the axillary vein.      DX-CHEST-PORTABLE (1 VIEW)   Final Result      1.  Mildly improved pulmonary opacities.   2.  Possible left PICC line with tip projecting at the axillary vein.   3.  Likely trace right pleural effusion.      DX-CHEST-LIMITED (1 VIEW)   Final Result      1.  Placement of endotracheal tube with tip projecting over the mid trachea      2.  Left arm catheter present which is presumably venous in projects in the expected position of  the left axillary vein      3.  Enlarged cardiac silhouette      NM-HEPATOBILIARY SCAN   Final Result      Hepatobiliary scan findings suspicious for acute cholecystitis.      MR-THORACIC SPINE-WITH & W/O   Final Result      1.  There is abnormal expansile T2 hyperintense lesion in the right side of the thoracic spinal cord at the levels of T2 and T3 Mild contrast enhancement is seen. This lesion is suspicious for spinal cord neoplasm. Other remote differential diagnosis    includes transverse myelitis.   2.  Exaggerated thoracic kyphosis.   3.  Thoracic dextroscoliosis.   4.  Minimal degenerative changes.   5.  Right pleural effusion.      MR-LUMBAR SPINE-WITH & W/O   Final Result      1.  Multifocal degenerative disease in the lumbar spine as described above.   2.  Moderate central canal and severe lateral recess stenosis at the level of L4-5. The exiting bilateral L5 nerve roots might have been impinged at the lateral recess.      MR-CERVICAL SPINE-WITH & W/O   Final Result      1.  There is abnormal expansile intramedullary T2 signal intensity lesion in the right cerebellum. Thoracic spinal cord at the level of T2 on T3. Mild diffuse contrast enhancement is seen. This finding is suspicious for spinal cord neoplasm. The other    less likely differential diagnosis includes transverse myelitis. Follow-up is recommended after 4 weeks.   2.  Mild degenerative disease in the cervical spine as described above.   3.  There is no evidence of infection in the cervical spine.      US-RUQ   Final Result         1.  Acute cholecystitis.   2.  Atrophic right kidney.      DX-CHEST-LIMITED (1 VIEW)   Final Result      Perihilar interstitial edema and basilar atelectasis and/or consolidation. Underlying infection is possible.      MR-LUMBAR SPINE-W/O   Final Result      1.  Lower lumbar spine predominant degenerative changes as described in detail above, progressed from 2010 MRI.   2.  Edema at the L4-L5 disc space and L5  superior endplate likely represents degenerative changes. Less likely this could represent a low-grade or early discitis osteomyelitis. Correlate for evidence of infection.   3.  Acute appearing Schmorl's node at L3-L4, which can be a source of pain.      US-RENAL   Final Result      1.  Atrophic kidneys. Echogenic bilateral renal parenchyma could relate to medical renal disease.      2.  No hydronephrosis. No renal calculus.      DX-CHEST-PORTABLE (1 VIEW)   Final Result      No acute cardiac or pulmonary abnormalities are identified.      CT-ABDOMEN-PELVIS W/O   Final Result      1.  Findings suspicious for focal colitis at the hepatic flexure, less likely cholecystitis or duodenitis with secondary involvement of the colon. Infectious, inflammatory and ischemic etiologies are considerations. Underlying mass is not excluded.   2.  Cholelithiasis with distention of the gallbladder   3.  BILATERAL renal atrophy   4.  Subcentimeter LEFT adrenal myelolipoma   5.  12 mm RIGHT adrenal nodule likely an adenoma absent a history of cancer   6.  Subcentimeter RIGHT hepatic lesion, likely a cyst absent a history of cancer   7.  Atherosclerosis   8.  Colonic diverticulosis      CT-HEAD W/O   Final Result      1.  Cerebral atrophy.      2.  White matter lucencies most consistent with small vessel ischemic change versus demyelination or gliosis.      3.  Otherwise, Head CT without contrast with no acute findings. No evidence of acute cerebral infarction, hemorrhage or mass lesion.         MR-BRAIN-WITH & W/O    (Results Pending)   MR-THORACIC SPINE-WITH & W/O    (Results Pending)          Assessment/Plan  Abnormal MRI- (present on admission)  Assessment & Plan  MRI on admission showing T2/T3  hyperintense lesion in the right side of the thoracic spinal cord at the levels of T2 and T3 Mild contrast enhancement is seen. This lesion is suspicious for spinal cord neoplasm or possible transverse myelitis       - repeat MRI thoracic  spine ordered       Acute gangrenous cholecystitis- (present on admission)  Assessment & Plan  Found to have gangrenous cholecystitis s/p laprascopic converted open cholecystectomy 4/28/2023  Surgery continues to follow   No longer on antibiotics, as we have source control   Monitor closely for signs of infection   Post op pain control   Bowel regiment   Mobilize as able   Diet as tolerated     Postprocedural hypotension  Assessment & Plan  Hypotension in OR and post open carmelo  Now improved and off pressors   BP stable   Continue to monitor vitals per protocol     Hypercalcemia- (present on admission)  Assessment & Plan  Likely due to immobility   Resolved with IVF hydration  Continue to follow and treat accordingly     Hypomagnesemia- (present on admission)  Assessment & Plan  resolved  Goal >2  replace as needed     Cholelithiasis with acute cholecystitis- (present on admission)  Assessment & Plan  S/p surgical removal, converted to open cholecystectomy  NAHUN drain, monitor output  The patient is tolerating a diet already, monitor bowel function    Hypokalemia- (present on admission)  Assessment & Plan  Stable, monitor CMP and replace as needed   - goal K >4    Thrombocytopenia (HCC)- (present on admission)  Assessment & Plan  Resolved, plt are stable     High anion gap metabolic acidosis- (present on admission)  Assessment & Plan  Acute on chronic kidney disease, -appears back at baseline on 5/3/2023    Resolved    Hyponatremia- (present on admission)  Assessment & Plan  Improved, slightly down today at 134, mild and asymptomatic   Cont to monitor BMP     Weakness of both lower extremities- (present on admission)  Assessment & Plan  T2/T3 intramedullary lesion of unclear chronicity and clinical significance as well as 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     Repeat MRI thoracic spine with and without contrast to further evaluate the previously seen lesion at  T2-T3      IVIG initiated on 5/1/2023 and discontinued on 5/2/2023 after discussion with neurology    MRI of the brain and MRI of thoracic spine has been ordered as per neurology request       neurosurgery  did not feel that this was a neurosurgical issue.    Sepsis ([facility])- (present on admission)  Assessment & Plan  This is Sepsis Present on admission  SIRS criteria identified on my evaluation include: Leukocytosis, with WBC greater than 12,000  Source is colitis  Sepsis protocol initiated  Fluid resuscitation ordered per protocol  Crystalloid Fluid Administration: Fluid resuscitation ordered per standard protocol - 30 mL/kg per current or ideal body weight  IV antibiotics as appropriate for source of sepsis  Reassessment: I have reassessed the patient's hemodynamic status    Due to gangrenous gallbladder requiring open carmelo   resolved   off antibiotics      Colitis- (present on admission)  Assessment & Plan  Cultures negative, monitor  Initial admitting impression, currently improved  Diet as tolerated     Acute kidney injury superimposed on CKD ([facility])- (present on admission)  Assessment & Plan   Resolved    Avoid nephrotoxins       Diabetic peripheral neuropathy ([facility])- (present on admission)  Assessment & Plan  Cymbalta, medical treatment    Essential hypertension- (present on admission)  Assessment & Plan  Monitor closely, the patient had a hypotensive episode postoperatively  Off pressors and has since been resumed  , amlodipine and coreg   Monitor BP and adjust medications as needed   Prn hydralazine     Type 2 diabetes mellitus with kidney complication, without long-term current use of insulin ([facility])- (present on admission)  Assessment & Plan  Ha1c 6.4% 4 months ago   On ozempic and januvia outpatient   Continue ISS and hypoglycemic protocol while inpatient     Hypercholesterolemia- (present on admission)  Assessment & Plan  Stable, continue Lipitor    Chronic kidney disease (CKD), stage IV (severe) ([facility])-  (present on admission)  Assessment & Plan  Appears to be at baseline at 5/3/2023                VTE prophylaxis: SCDs/TEDs and heparin ppx    I have performed a physical exam and reviewed and updated ROS and Plan today (5/5/2023). In review of yesterday's note (5/4/2023), there are no changes except as documented above.       Discharged

## 2023-05-05 NOTE — CARE PLAN
The patient is Stable - Low risk of patient condition declining or worsening    Shift Goals  Clinical Goals: Skin integrity  Patient Goals: MRI  Family Goals: N/A    Progress made toward(s) clinical / shift goals:       Problem: Knowledge Deficit - Standard  Goal: Patient and family/care givers will demonstrate understanding of plan of care, disease process/condition, diagnostic tests and medications  Outcome: Progressing     Problem: Hemodynamics  Goal: Patient's hemodynamics, fluid balance and neurologic status will be stable or improve  Outcome: Progressing     Problem: Fluid Volume  Goal: Fluid volume balance will be maintained  Outcome: Progressing     Problem: Urinary - Renal Perfusion  Goal: Ability to achieve and maintain adequate renal perfusion and functioning will improve  Outcome: Progressing

## 2023-05-05 NOTE — DISCHARGE PLANNING
[facility]P TCN chart review completed. Collaborated with NIKIA Galdamez. Current discharge considerations are SNF and note patient has been accepted to [facility]. No new TCN needs identified at this time. Pt is still awaiting medical clearance per MD note from today @9:48.      TCN will continue to follow and collaborate with discharge planning team as additional post acute needs arise. Thank you.     Completed:  PT/OT recommends post acute placement on 5/4 and 5/3  SLP eval 4/26 with recs for no further needs  Physiatry recommending SNF as of 5/2 as well  Choice obtained: IRF (-> declines; recs SNF) and SNF choice obtained on 4/24/23.  HH choice on 4/24/23.  Infusion on 4/22/23; see above; accepted to [facility];  [facility] referral sent 4/22/23

## 2023-05-05 NOTE — PROGRESS NOTES
"  DATE: 5/5/2023 4/28 Laparoscopic converted to open cholecystectomy.    INTERVAL EVENTS:    Surgical sites approximated.  NAHUN with nonbilious colored drainage.  24-hour output 45 cc.  Intraoperative cultures positive for ESBL.   AM labs pending.    -Continue drain. Antibiotic therapy, no.  -Surgery to continue to follow.    PHYSICAL EXAMINATION:  Vital Signs: BP (!) 162/86   Pulse 79   Temp 36.8 °C (98.2 °F) (Temporal)   Resp 18   Ht 1.803 m (5' 10.98\")   Wt 89.1 kg (196 lb 6.9 oz)   SpO2 97%     Constitutional: No acute distress.  Resting comfortably.  Afebrile and nontoxic in appearance.  Cardiovascular: Regular rate.  Respiratory: Unlabored  Abdomen: No peritoneal signs.  NAHUN drain with nonbilious colored drainage.  Incisions approximated with no overt signs of infection.  Tolerating oral diet.    LABORATORY VALUES:   Recent Labs     05/03/23  0650 05/04/23  0555   WBC 12.0* 12.5*   RBC 3.00* 3.10*   HEMOGLOBIN 8.9* 9.3*   HEMATOCRIT 26.7* 28.0*   MCV 89.0 90.3   MCH 29.7 30.0   MCHC 33.3* 33.2*   RDW 50.4* 52.0*   PLATELETCT 524* 568*   MPV 10.6 10.2     Recent Labs     05/03/23  0650 05/04/23  0555   SODIUM 135 138   POTASSIUM 4.2 4.3   CHLORIDE 106 106   CO2 21 24   GLUCOSE 153* 149*   BUN 41* 35*   CREATININE 2.09* 2.18*   CALCIUM 8.5 9.0                IMAGING:   DX-CHEST-PORTABLE (1 VIEW)   Final Result         1.  Pulmonary edema and/or infiltrates are identified, which are stable since the prior exam.   2.  Cardiomegaly   3.  Atherosclerosis      DX-CHEST-PORTABLE (1 VIEW)   Final Result         1.  Pulmonary edema and/or infiltrates are identified, which are stable since the prior exam.   2.  Left PICC line tip terminates in the left axilla, stable since prior study.      DX-CHEST-PORTABLE (1 VIEW)   Final Result      1.  No significant change.   2.  Possible left PICC line with tip projecting at the axillary vein.      DX-CHEST-PORTABLE (1 VIEW)   Final Result      1.  Mildly improved pulmonary " opacities.   2.  Possible left PICC line with tip projecting at the axillary vein.   3.  Likely trace right pleural effusion.      DX-CHEST-LIMITED (1 VIEW)   Final Result      1.  Placement of endotracheal tube with tip projecting over the mid trachea      2.  Left arm catheter present which is presumably venous in projects in the expected position of the left axillary vein      3.  Enlarged cardiac silhouette      NM-HEPATOBILIARY SCAN   Final Result      Hepatobiliary scan findings suspicious for acute cholecystitis.      MR-THORACIC SPINE-WITH & W/O   Final Result      1.  There is abnormal expansile T2 hyperintense lesion in the right side of the thoracic spinal cord at the levels of T2 and T3 Mild contrast enhancement is seen. This lesion is suspicious for spinal cord neoplasm. Other remote differential diagnosis    includes transverse myelitis.   2.  Exaggerated thoracic kyphosis.   3.  Thoracic dextroscoliosis.   4.  Minimal degenerative changes.   5.  Right pleural effusion.      MR-LUMBAR SPINE-WITH & W/O   Final Result      1.  Multifocal degenerative disease in the lumbar spine as described above.   2.  Moderate central canal and severe lateral recess stenosis at the level of L4-5. The exiting bilateral L5 nerve roots might have been impinged at the lateral recess.      MR-CERVICAL SPINE-WITH & W/O   Final Result      1.  There is abnormal expansile intramedullary T2 signal intensity lesion in the right cerebellum. Thoracic spinal cord at the level of T2 on T3. Mild diffuse contrast enhancement is seen. This finding is suspicious for spinal cord neoplasm. The other    less likely differential diagnosis includes transverse myelitis. Follow-up is recommended after 4 weeks.   2.  Mild degenerative disease in the cervical spine as described above.   3.  There is no evidence of infection in the cervical spine.      US-RUQ   Final Result         1.  Acute cholecystitis.   2.  Atrophic right kidney.       DX-CHEST-LIMITED (1 VIEW)   Final Result      Perihilar interstitial edema and basilar atelectasis and/or consolidation. Underlying infection is possible.      MR-LUMBAR SPINE-W/O   Final Result      1.  Lower lumbar spine predominant degenerative changes as described in detail above, progressed from 2010 MRI.   2.  Edema at the L4-L5 disc space and L5 superior endplate likely represents degenerative changes. Less likely this could represent a low-grade or early discitis osteomyelitis. Correlate for evidence of infection.   3.  Acute appearing Schmorl's node at L3-L4, which can be a source of pain.      US-RENAL   Final Result      1.  Atrophic kidneys. Echogenic bilateral renal parenchyma could relate to medical renal disease.      2.  No hydronephrosis. No renal calculus.      DX-CHEST-PORTABLE (1 VIEW)   Final Result      No acute cardiac or pulmonary abnormalities are identified.      CT-ABDOMEN-PELVIS W/O   Final Result      1.  Findings suspicious for focal colitis at the hepatic flexure, less likely cholecystitis or duodenitis with secondary involvement of the colon. Infectious, inflammatory and ischemic etiologies are considerations. Underlying mass is not excluded.   2.  Cholelithiasis with distention of the gallbladder   3.  BILATERAL renal atrophy   4.  Subcentimeter LEFT adrenal myelolipoma   5.  12 mm RIGHT adrenal nodule likely an adenoma absent a history of cancer   6.  Subcentimeter RIGHT hepatic lesion, likely a cyst absent a history of cancer   7.  Atherosclerosis   8.  Colonic diverticulosis      CT-HEAD W/O   Final Result      1.  Cerebral atrophy.      2.  White matter lucencies most consistent with small vessel ischemic change versus demyelination or gliosis.      3.  Otherwise, Head CT without contrast with no acute findings. No evidence of acute cerebral infarction, hemorrhage or mass lesion.         MR-BRAIN-WITH & W/O    (Results Pending)   MR-THORACIC SPINE-WITH & W/O    (Results  Pending)       ASSESSMENT AND PLAN:   Cholelithiasis with acute cholecystitis- (present on admission)  Assessment & Plan  4/27 HIDA scan consistent with acute cholecystitis.  4/28 Laparoscopic cholecystectomy converted to open cholecystectomy.  4/30 Gallbladder fluid cultures (+) ESBL E Coli.  Antibiotics per hospital team.    Acute kidney injury superimposed on CKD (HCC)- (present on admission)  Assessment & Plan  Premorbid.  Fluid resuscitation and trend renal function.  Avoid nephrotoxins.    Postprocedural hypotension  Assessment & Plan  Patient was in shock throughout operative case.  Returns to ICU on levophed.   Wean levophed as tolerated.  4/29 Levophed stopped.  Trend SBP.         ____________________________________     KEV Cano.    DD: 5/5/2023  7:47 AM

## 2023-05-05 NOTE — PROGRESS NOTES
Neurology Progress Note        Subjective:  Mr Cline reports he feels stronger. Of note, he is post laparoscopic cholecystectomy converted to open cholecystectomy day 4.    Objective:  Vitals:  Vitals:    05/05/23 1107 05/05/23 1255 05/05/23 1300 05/05/23 1315   BP: (!) 142/90 135/71 136/74 127/67   Pulse: 78  84 79   Resp: 16 20 (!) 22 (!) 21   Temp:  36.4 °C (97.6 °F)     TempSrc:  Temporal     SpO2: 97% 96% 95% 95%   Weight:       Height:         General: Awake, no apparent distress  Mental status: Alert, oriented x3, speech is fluent, comprehension is intact  Cranial Nerves: Pupils are equal round reactive light, extraocular movements are intact and no nystagmus, face is symmetric, facial sensations intact, no dysarthria  Motor: 5 /5 strength in the bilateral deltoids, 5/5 in the bilateral triceps, 5/5 in the biceps, 4/5 R 4-/5  L  strength, 4-/5 strength in the right hip flexor, 4/5 strength in left hip flexor, 4/5 strength in the right knee extensor, 5/5 in knee flexion, 4+/5 with knee extension on the left, 5/5 with ankle dorsiflexion and plantarflexion  Sensory: Light touch intact throughout, proprioception intact at the great toes bilaterally  Reflexes: 2+ and symmetric at the triceps, biceps, brachioradialis. 1+ at patellar. Absent reflexes. Achilles bilaterally.  Babinski sign present definitely on the right, equivocal on the left.  Coordination: Normal finger-to-nose bilaterally  Gait:  Deferred    Recent Labs     05/03/23  0650 05/04/23  0555 05/05/23  1009   WBC 12.0* 12.5* 13.4*   RBC 3.00* 3.10* 3.17*   HEMOGLOBIN 8.9* 9.3* 9.5*   HEMATOCRIT 26.7* 28.0* 29.2*   MCV 89.0 90.3 92.1   MCH 29.7 30.0 30.0   MCHC 33.3* 33.2* 32.5*   RDW 50.4* 52.0* 52.3*   PLATELETCT 524* 568* 624*   MPV 10.6 10.2 10.2       Recent Labs     05/03/23  0650 05/04/23  0555 05/05/23  1009   SODIUM 135 138 138   POTASSIUM 4.2 4.3 4.1   CHLORIDE 106 106 103   CO2 21 24 26   GLUCOSE 153* 149* 161*   BUN 41* 35* 32*    CREATININE 2.09* 2.18* 1.73*   CALCIUM 8.5 9.0 9.0       Recent Labs     05/03/23  0650 05/04/23  0555 05/05/23  1009   SODIUM 135 138 138   POTASSIUM 4.2 4.3 4.1   CHLORIDE 106 106 103   CO2 21 24 26   GLUCOSE 153* 149* 161*   BUN 41* 35* 32*       Recent Labs     05/03/23  0650 05/04/23  0555 05/05/23  1009   SODIUM 135 138 138   POTASSIUM 4.2 4.3 4.1   CHLORIDE 106 106 103   CO2 21 24 26   BUN 41* 35* 32*   CREATININE 2.09* 2.18* 1.73*   MAGNESIUM 1.7  --   --    CALCIUM 8.5 9.0 9.0                        Imaging: neuroimaging reviewed and directly visualized by me  DX-CHEST-PORTABLE (1 VIEW)   Final Result         1.  Pulmonary edema and/or infiltrates are identified, which are stable since the prior exam.   2.  Cardiomegaly   3.  Atherosclerosis      DX-CHEST-PORTABLE (1 VIEW)   Final Result         1.  Pulmonary edema and/or infiltrates are identified, which are stable since the prior exam.   2.  Left PICC line tip terminates in the left axilla, stable since prior study.      DX-CHEST-PORTABLE (1 VIEW)   Final Result      1.  No significant change.   2.  Possible left PICC line with tip projecting at the axillary vein.      DX-CHEST-PORTABLE (1 VIEW)   Final Result      1.  Mildly improved pulmonary opacities.   2.  Possible left PICC line with tip projecting at the axillary vein.   3.  Likely trace right pleural effusion.      DX-CHEST-LIMITED (1 VIEW)   Final Result      1.  Placement of endotracheal tube with tip projecting over the mid trachea      2.  Left arm catheter present which is presumably venous in projects in the expected position of the left axillary vein      3.  Enlarged cardiac silhouette      NM-HEPATOBILIARY SCAN   Final Result      Hepatobiliary scan findings suspicious for acute cholecystitis.      MR-THORACIC SPINE-WITH & W/O   Final Result      1.  There is abnormal expansile T2 hyperintense lesion in the right side of the thoracic spinal cord at the levels of T2 and T3 Mild contrast  enhancement is seen. This lesion is suspicious for spinal cord neoplasm. Other remote differential diagnosis    includes transverse myelitis.   2.  Exaggerated thoracic kyphosis.   3.  Thoracic dextroscoliosis.   4.  Minimal degenerative changes.   5.  Right pleural effusion.      MR-LUMBAR SPINE-WITH & W/O   Final Result      1.  Multifocal degenerative disease in the lumbar spine as described above.   2.  Moderate central canal and severe lateral recess stenosis at the level of L4-5. The exiting bilateral L5 nerve roots might have been impinged at the lateral recess.      MR-CERVICAL SPINE-WITH & W/O   Final Result      1.  There is abnormal expansile intramedullary T2 signal intensity lesion in the right cerebellum. Thoracic spinal cord at the level of T2 on T3. Mild diffuse contrast enhancement is seen. This finding is suspicious for spinal cord neoplasm. The other    less likely differential diagnosis includes transverse myelitis. Follow-up is recommended after 4 weeks.   2.  Mild degenerative disease in the cervical spine as described above.   3.  There is no evidence of infection in the cervical spine.      US-RUQ   Final Result         1.  Acute cholecystitis.   2.  Atrophic right kidney.      DX-CHEST-LIMITED (1 VIEW)   Final Result      Perihilar interstitial edema and basilar atelectasis and/or consolidation. Underlying infection is possible.      MR-LUMBAR SPINE-W/O   Final Result      1.  Lower lumbar spine predominant degenerative changes as described in detail above, progressed from 2010 MRI.   2.  Edema at the L4-L5 disc space and L5 superior endplate likely represents degenerative changes. Less likely this could represent a low-grade or early discitis osteomyelitis. Correlate for evidence of infection.   3.  Acute appearing Schmorl's node at L3-L4, which can be a source of pain.      US-RENAL   Final Result      1.  Atrophic kidneys. Echogenic bilateral renal parenchyma could relate to medical renal  disease.      2.  No hydronephrosis. No renal calculus.      DX-CHEST-PORTABLE (1 VIEW)   Final Result      No acute cardiac or pulmonary abnormalities are identified.      CT-ABDOMEN-PELVIS W/O   Final Result      1.  Findings suspicious for focal colitis at the hepatic flexure, less likely cholecystitis or duodenitis with secondary involvement of the colon. Infectious, inflammatory and ischemic etiologies are considerations. Underlying mass is not excluded.   2.  Cholelithiasis with distention of the gallbladder   3.  BILATERAL renal atrophy   4.  Subcentimeter LEFT adrenal myelolipoma   5.  12 mm RIGHT adrenal nodule likely an adenoma absent a history of cancer   6.  Subcentimeter RIGHT hepatic lesion, likely a cyst absent a history of cancer   7.  Atherosclerosis   8.  Colonic diverticulosis      CT-HEAD W/O   Final Result      1.  Cerebral atrophy.      2.  White matter lucencies most consistent with small vessel ischemic change versus demyelination or gliosis.      3.  Otherwise, Head CT without contrast with no acute findings. No evidence of acute cerebral infarction, hemorrhage or mass lesion.         MR-BRAIN-WITH & W/O    (Results Pending)   MR-THORACIC SPINE-WITH & W/O    (Results Pending)       Impression:  82-year-old man with  right greater than left leg weakness since approx April 17th as well as  upper extremity weakness.  This developed concurrently with a GI illness concerning for food borne illness.  Certainly that history would raise the possibility of Guillain-Barré syndrome although the symptoms seem to have developed relatively rapidly in conjunction with the GI illness and he appears to improve without treatment with plasma exchange or IVIG. Furthermore, he has preserved reflexes in the arms.  Reflexes are absent at the Achilles and patellar which can be normal for his age especially in the setting of diabetic polyneuropathy.  In fact his upgoing toe on the right concerns me more for a  myelopathic process.    MRI-Tspine remarkable for T2 contrast avid lesion (Neoplastic vs transverse myelitis) which does not explain upper extremity weakness. Per discussion with neuroradiologist, assessment prior to 1 month post initial image may not yield an interpretable result and recommends against reassessing sooner unless a clear clinical deterioration is noted.  CSF analysis is remarkable for elevated glucose which could be explained by elevated serum glucose in the setting of DM. Elevated protein with nl WBC favors a demyelinating process. No oligoclonal bands seen on electrophoresis, pointing away from MS. VDRL negative points away from tertiary syphilis. CSF cultures NGTD points away from other common infective processes.    Overall pt was clinically improving without IVIG, Steroids or plasma exchange, which is reassuring. His physical exam remains stable in comparison to my exam from 4/28/2023.  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 Pathological evaluation of CSF pending  We are hesitant to offer any immuno-modulating therapy  which may delay healing.         Recommendations:      -obtain MR Tspine high resolution T1-T4 w/ and w/o   -PT, OT evaluate and treat.  -Follow up with Neurology in one month in the community   -Repeat MRI-Tspine to assess progression/resolution of T2-T3 lesion 5/26/2023    -Neurology will continue to follow and will assess the need for immunomodulating therapy should a marked clinical deterioration is detected, likely PLEX considering Kidney disease.

## 2023-05-05 NOTE — ANESTHESIA PREPROCEDURE EVALUATION
Case: [id number] Date/Time: 05/05/23 1130    Procedure: MRI-ANESTHESIA CASE    Location: [facility] IMAGING / SURGERY [facility]    Surgeons: [facility]          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 (HCC)   (positive) Essential hypertension   (positive) Hypertensive emergency         (positive) Acute kidney injury superimposed on CKD (HCC)   (positive) Chronic kidney disease (CKD), stage IV (severe) (HCC)      ENDO   (positive) Type 2 diabetes mellitus with kidney complication, without long-term current use of insulin (HCC)       Physical Exam    Airway   Mallampati: III  TM distance: >3 FB  Neck ROM: full       Cardiovascular - normal exam  Rhythm: regular  Rate: normal  (-) murmur     Dental - normal exam           Pulmonary - normal exam  Breath sounds clear to auscultation     Abdominal    Neurological - normal exam                 Anesthesia Plan    ASA 3       Plan - general       Airway plan will be ETT    (Patient does not want his DNR suspended during the procedure.  Spoke with son Skip who confirmed DNR should be upheld.  Son informed of plan for general anesthesia and agrees.)      Induction: intravenous    Postoperative Plan: Postoperative administration of opioids is intended.    Pertinent diagnostic labs and testing reviewed    Informed Consent:    Anesthetic plan and risks discussed with patient.

## 2023-05-05 NOTE — CARE PLAN
Problem: Knowledge Deficit - Standard  Goal: Patient and family/care givers will demonstrate understanding of plan of care, disease process/condition, diagnostic tests and medications  Outcome: Progressing     Problem: Pain - Standard  Goal: Alleviation of pain or a reduction in pain to the patient’s comfort goal  Outcome: Progressing   The patient is Stable - Low risk of patient condition declining or worsening    Shift Goals  Clinical Goals: MRI  Patient Goals: pain control  Family Goals: N/A    Progress made toward(s) clinical / shift goals:  MRI rescheduled on 5/5, will need anesthesia. Pain controlled with PRN meds    Patient is not progressing towards the following goals:

## 2023-05-05 NOTE — ANESTHESIA POSTPROCEDURE EVALUATION
Patient: Tyrone Cline    Procedure Summary     Date: 05/05/23 Room / Location: [facility] IMAGING / SURGERY [facility]    Anesthesia Start: 1107 Anesthesia Stop: 1256    Procedure: MRI-ANESTHESIA CASE Diagnosis:     Surgeons: [facility] Responsible Provider: Misti Gonzalez M.D.    Anesthesia Type: general ASA Status: 3          Final Anesthesia Type: general  Last vitals  BP   Blood Pressure : (!) 142/78    Temp   36.2 °C (97.2 °F)    Pulse   75   Resp   19    SpO2   95 %      Anesthesia Post Evaluation    Patient location during evaluation: PACU  Patient participation: complete - patient participated  Level of consciousness: awake and alert    Airway patency: patent  Anesthetic complications: no  Cardiovascular status: hemodynamically stable  Respiratory status: acceptable  Hydration status: euvolemic    PONV: none          No notable events documented.     Nurse Pain Score: 0 (NPRS)

## 2023-05-05 NOTE — OR NURSING
Pt arrives from MRI to PACU at 1255. Pt identification verified by team, pt placed on all monitors with alarms audible, report and care of pt received from Anesthesiologist and RN. Assessment completed, pt provided with warm blankets.     1335- Report called to JUAN JOSÉ Boo on [facility]. Pt placed on transport list.

## 2023-05-05 NOTE — PROGRESS NOTES
Patient to [facility] inpatient dept [facility] on 3L/min oxygen cannula Patient informed process and plan of care during this visit.  Anesthesiologist Dr. Gonzalez spoke with Patient and discussed provider's plan of care.  Consent obtained.  MD also spoke with patient's son via phone before procedure. Patient requests no suspension of DNR during anesthesia. Verified with son per MD. MRI completed without incident. Patient transferred via [facility] to [facility] PACU isolation room with this nurse and Dr. Gonzalez. Patient on 8L/min via mask and pulse oximeter in place. Report given Ishaan DAVIS VSS.

## 2023-05-05 NOTE — ANESTHESIA TIME REPORT
Anesthesia Start and Stop Event Times     Date Time Event    5/5/2023 1107 Ready for Procedure     1107 Anesthesia Start     1256 Anesthesia Stop        Responsible Staff  05/05/23    Name Role Begin End    Misti Gonzalez M.D. Anesth 1107 1256        Overtime Reason:  no overtime (within assigned shift)    Comments:

## 2023-05-05 NOTE — PROGRESS NOTES
Bedside report received, assessment completed    A&O x  4, pt calls appropriately  HIGH FALL RISK  Mobility: Up with max assist, FWW  Fall Risk Assessment: High, bed alarm +, door notifications +  Pain Assessment / Reassessment completed, medication provided per MAR  Diet: Renal  LDA:   IV Access: LUE, CDI/ flushed/ SL  NAHUN: ROSARIO    GI/: urinal void, + flatus, 4/29 BM  DVT Prophylaxis: heparin, SCD on while in bed   Jeremy Score: 18  Procedures:    - 4/28 Lap to Open Zayra  D/C Plan: pending medical clearance & MRI    Reviewed plan of care with patient, bed in lowest position and locked, pt resting comfortably now, call light within reach, all needs met at this time. Interventions will be executed per plan of care

## 2023-05-05 NOTE — ANESTHESIA PROCEDURE NOTES
Airway    Date/Time: 5/5/2023 11:20 AM  Performed by: Misti Gonzalez M.D.  Authorized by: Misti Gonzalez M.D.     Location:  OR  Urgency:  Elective  Indications for Airway Management:  Anesthesia      Spontaneous Ventilation: absent    Sedation Level:  Deep  Preoxygenated: Yes    Patient Position:  Sniffing  Final Airway Type:  Endotracheal airway  Final Endotracheal Airway:  ETT  Cuffed: Yes    Technique Used for Successful ETT Placement:  Direct laryngoscopy    Insertion Site:  Oral  Blade Type:  Darnell  Laryngoscope Blade/Videolaryngoscope Blade Size:  3  ETT Size (mm):  7.0  Measured from:  Teeth  ETT to Teeth (cm):  22  Placement Verified by: auscultation and capnometry    Cormack-Lehane Classification:  Grade I - full view of glottis  Number of Attempts at Approach:  1   Crawford 3

## 2023-05-06 LAB
ANION GAP SERPL CALC-SCNC: 7 MMOL/L (ref 7–16)
BUN SERPL-MCNC: 33 MG/DL (ref 8–22)
CALCIUM SERPL-MCNC: 9 MG/DL (ref 8.5–10.5)
CHLORIDE SERPL-SCNC: 101 MMOL/L (ref 96–112)
CO2 SERPL-SCNC: 28 MMOL/L (ref 20–33)
CREAT SERPL-MCNC: 1.89 MG/DL (ref 0.5–1.4)
GFR SERPLBLD CREATININE-BSD FMLA CKD-EPI: 35 ML/MIN/1.73 M 2
GLUCOSE BLD STRIP.AUTO-MCNC: 145 MG/DL (ref 65–99)
GLUCOSE BLD STRIP.AUTO-MCNC: 193 MG/DL (ref 65–99)
GLUCOSE BLD STRIP.AUTO-MCNC: 210 MG/DL (ref 65–99)
GLUCOSE BLD STRIP.AUTO-MCNC: 223 MG/DL (ref 65–99)
GLUCOSE SERPL-MCNC: 198 MG/DL (ref 65–99)
POTASSIUM SERPL-SCNC: 4.5 MMOL/L (ref 3.6–5.5)
SODIUM SERPL-SCNC: 136 MMOL/L (ref 135–145)

## 2023-05-06 PROCEDURE — 700102 HCHG RX REV CODE 250 W/ 637 OVERRIDE(OP): Performed by: FAMILY MEDICINE

## 2023-05-06 PROCEDURE — A9270 NON-COVERED ITEM OR SERVICE: HCPCS | Performed by: HOSPITALIST

## 2023-05-06 PROCEDURE — 82962 GLUCOSE BLOOD TEST: CPT

## 2023-05-06 PROCEDURE — 99232 SBSQ HOSP IP/OBS MODERATE 35: CPT | Performed by: HOSPITALIST

## 2023-05-06 PROCEDURE — 99232 SBSQ HOSP IP/OBS MODERATE 35: CPT | Performed by: PSYCHIATRY & NEUROLOGY

## 2023-05-06 PROCEDURE — A9270 NON-COVERED ITEM OR SERVICE: HCPCS | Performed by: FAMILY MEDICINE

## 2023-05-06 PROCEDURE — 80048 BASIC METABOLIC PNL TOTAL CA: CPT

## 2023-05-06 PROCEDURE — 700102 HCHG RX REV CODE 250 W/ 637 OVERRIDE(OP): Performed by: HOSPITALIST

## 2023-05-06 PROCEDURE — 770001 HCHG ROOM/CARE - MED/SURG/GYN PRIV*

## 2023-05-06 PROCEDURE — A9270 NON-COVERED ITEM OR SERVICE: HCPCS | Performed by: SURGERY

## 2023-05-06 PROCEDURE — 700102 HCHG RX REV CODE 250 W/ 637 OVERRIDE(OP): Performed by: SURGERY

## 2023-05-06 PROCEDURE — 99024 POSTOP FOLLOW-UP VISIT: CPT | Performed by: NURSE PRACTITIONER

## 2023-05-06 PROCEDURE — 700111 HCHG RX REV CODE 636 W/ 250 OVERRIDE (IP): Performed by: STUDENT IN AN ORGANIZED HEALTH CARE EDUCATION/TRAINING PROGRAM

## 2023-05-06 RX ADMIN — FUROSEMIDE 40 MG: 20 TABLET ORAL at 04:28

## 2023-05-06 RX ADMIN — CARVEDILOL 12.5 MG: 12.5 TABLET, FILM COATED ORAL at 17:01

## 2023-05-06 RX ADMIN — ALLOPURINOL 100 MG: 100 TABLET ORAL at 04:28

## 2023-05-06 RX ADMIN — AMLODIPINE BESYLATE 5 MG: 10 TABLET ORAL at 04:27

## 2023-05-06 RX ADMIN — SODIUM BICARBONATE 1300 MG: 650 TABLET ORAL at 17:01

## 2023-05-06 RX ADMIN — Medication 1 APPLICATOR: at 04:27

## 2023-05-06 RX ADMIN — ATORVASTATIN CALCIUM 10 MG: 10 TABLET, FILM COATED ORAL at 17:02

## 2023-05-06 RX ADMIN — INSULIN HUMAN 3 UNITS: 100 INJECTION, SOLUTION PARENTERAL at 13:20

## 2023-05-06 RX ADMIN — SPIRONOLACTONE 25 MG: 50 TABLET ORAL at 10:47

## 2023-05-06 RX ADMIN — FAMOTIDINE 20 MG: 20 TABLET, FILM COATED ORAL at 04:28

## 2023-05-06 RX ADMIN — HEPARIN SODIUM 5000 UNITS: 5000 INJECTION, SOLUTION INTRAVENOUS; SUBCUTANEOUS at 13:17

## 2023-05-06 RX ADMIN — INSULIN HUMAN 2 UNITS: 100 INJECTION, SOLUTION PARENTERAL at 21:32

## 2023-05-06 RX ADMIN — HEPARIN SODIUM 5000 UNITS: 5000 INJECTION, SOLUTION INTRAVENOUS; SUBCUTANEOUS at 04:27

## 2023-05-06 RX ADMIN — SODIUM BICARBONATE 1300 MG: 650 TABLET ORAL at 04:28

## 2023-05-06 RX ADMIN — DULOXETINE HYDROCHLORIDE 30 MG: 30 CAPSULE, DELAYED RELEASE ORAL at 17:01

## 2023-05-06 RX ADMIN — Medication 1 APPLICATOR: at 17:03

## 2023-05-06 RX ADMIN — CARVEDILOL 12.5 MG: 12.5 TABLET, FILM COATED ORAL at 08:44

## 2023-05-06 RX ADMIN — INSULIN HUMAN 3 UNITS: 100 INJECTION, SOLUTION PARENTERAL at 18:13

## 2023-05-06 RX ADMIN — HEPARIN SODIUM 5000 UNITS: 5000 INJECTION, SOLUTION INTRAVENOUS; SUBCUTANEOUS at 21:31

## 2023-05-06 RX ADMIN — OXYCODONE HYDROCHLORIDE 10 MG: 10 TABLET ORAL at 19:38

## 2023-05-06 ASSESSMENT — COGNITIVE AND FUNCTIONAL STATUS - GENERAL
CLIMB 3 TO 5 STEPS WITH RAILING: TOTAL
DRESSING REGULAR LOWER BODY CLOTHING: A LOT
MOBILITY SCORE: 7
SUGGESTED CMS G CODE MODIFIER DAILY ACTIVITY: CK
MOVING FROM LYING ON BACK TO SITTING ON SIDE OF FLAT BED: UNABLE
TURNING FROM BACK TO SIDE WHILE IN FLAT BAD: A LOT
EATING MEALS: A LITTLE
DRESSING REGULAR UPPER BODY CLOTHING: A LITTLE
WALKING IN HOSPITAL ROOM: TOTAL
TOILETING: A LOT
HELP NEEDED FOR BATHING: A LOT
DAILY ACTIVITIY SCORE: 15
MOVING TO AND FROM BED TO CHAIR: UNABLE
PERSONAL GROOMING: A LITTLE
SUGGESTED CMS G CODE MODIFIER MOBILITY: CM
STANDING UP FROM CHAIR USING ARMS: TOTAL

## 2023-05-06 ASSESSMENT — PAIN DESCRIPTION - PAIN TYPE
TYPE: ACUTE PAIN
TYPE: ACUTE PAIN;SURGICAL PAIN

## 2023-05-06 ASSESSMENT — ENCOUNTER SYMPTOMS
SPEECH CHANGE: 0
NERVOUS/ANXIOUS: 0
WHEEZING: 0
HEARTBURN: 0
HEADACHES: 0
VOMITING: 0
SORE THROAT: 0
NECK PAIN: 0
BACK PAIN: 0
DIZZINESS: 0
FLANK PAIN: 0
PALPITATIONS: 0
BLURRED VISION: 0
SENSORY CHANGE: 0
MYALGIAS: 0
NAUSEA: 0
CHILLS: 0
DIAPHORESIS: 0
ABDOMINAL PAIN: 0
SHORTNESS OF BREATH: 0
COUGH: 0
FEVER: 0
FOCAL WEAKNESS: 0
WEAKNESS: 1
DIARRHEA: 0

## 2023-05-06 NOTE — PROGRESS NOTES
Assumed care of patient at 0645. Bedside report received. Assessment complete.  AA&Ox4. Denies CP/SOB.  Reporting 4/10 pain. Declined intervention at this time.  Educated patient regarding pharmacologic and non pharmacologic modalities for pain management.  Skin per flowsheets  Tolerating renal diet. Denies N/V.  + void. Last BM 5/5  Pt ambulates max assist d/t BLE weakness and pain.  All needs met at this time. Call light within reach. Pt calls appropriately. Bed low and locked, non skid socks in place. Hourly rounding in place.     Full range of motion of upper and lower extremities, no joint tenderness/swelling.

## 2023-05-06 NOTE — PROGRESS NOTES
[facility] Medicine Daily Progress Note    Date of Service  5/6/2023    Chief Complaint  Tyrone Cline is a 82 y.o. male admitted 4/21/2023 with Colitis.    Hospital Course  Admitted with sepsis secondary to colitis.  Patient was started on empiric coverage with IV Rocephin and Flagyl.  He was also noted to have bilateral lower extremity weakness.  MRI of the lumbar spine was done which showed possible edema.  Neurology was then consulted on the case.  He was also noted to have an SANJUANITA on CKD stage IV.  Nephrology was consulted on the case.  He was started on a careful IVF hydration.  His CT scan also showed cholelithiasis.  Ultrasound was done which showed cholecystitis.  Surgery was then consulted on the case and patient underwent laparoscopic cholecystectomy on 4/28 which needed to be converted to open cholecystectomy, patient was persistently hypotensive during surgery and postoperatively  required IV pressor medication, thus he was care for in the surgical ICU for additional treatment.  On 4/29 the patient was transferred back to the medical service for ongoing management.     Interval Problem Update    Patient creatinine remains close to baseline at 1.89    Patient completed MRI of the brain did not show an acute abnormality.    MRI of the thoracic spine-shows the expansile nonenhancing signal involving thoracic cord T1-T4 appears unchanged.  There is a lesion extra-axial at the level of T3-T4 that deforms the cord.  Radiologist is recommending repeat MRI with additional thin sections through T 1 to T6      No other complaints        I have discussed this patient's plan of care and discharge plan at IDT rounds today with Case Management, Nursing, Nursing leadership, and other members of the IDT team.    Consultants/Specialty  general surgery, nephrology, and neurology    Code Status  DNAR/DNI    Disposition  Patient is not medically cleared for discharge.   Anticipate discharge to to an inpatient  [facility] [place].  I have placed the appropriate orders for post-discharge needs.    Review of Systems  Review of Systems   Constitutional:  Positive for malaise/fatigue. Negative for chills, diaphoresis and fever.   HENT:  Negative for congestion, hearing loss and sore throat.    Eyes:  Negative for blurred vision.   Respiratory:  Negative for cough, shortness of breath and wheezing.    Cardiovascular:  Negative for chest pain, palpitations and leg swelling.   Gastrointestinal:  Negative for abdominal pain, diarrhea, heartburn, nausea and vomiting.   Genitourinary:  Negative for dysuria, flank pain and hematuria.   Musculoskeletal:  Negative for back pain, joint pain, myalgias and neck pain.   Skin:  Negative for rash.   Neurological:  Positive for weakness. Negative for dizziness, sensory change, speech change, focal weakness and headaches.   Psychiatric/Behavioral:  The patient is not nervous/anxious.       Physical Exam  Temp:  [36.2 °C (97.2 °F)-36.8 °C (98.3 °F)] 36.8 °C (98.3 °F)  Pulse:  [74-84] 75  Resp:  [16-23] 18  BP: (127-142)/(67-90) 130/76  SpO2:  [93 %-97 %] 96 %    Physical Exam  Vitals and nursing note reviewed.   HENT:      Head: Normocephalic and atraumatic.      Nose: No congestion.      Mouth/Throat:      Mouth: Mucous membranes are moist.   Eyes:      Extraocular Movements: Extraocular movements intact.      Conjunctiva/sclera: Conjunctivae normal.   Cardiovascular:      Rate and Rhythm: Normal rate and regular rhythm.      Heart sounds: Murmur heard.   Pulmonary:      Effort: Pulmonary effort is normal.      Breath sounds: Normal breath sounds.   Abdominal:      Tenderness: There is abdominal tenderness (Mild). There is no guarding or rebound.      Comments: Right upper quadrant dressing over the surgical area with a NAHUN drain in place, draining serosanguineous fluid   Musculoskeletal:      Cervical back: No tenderness.      Right lower leg: No edema.      Left lower leg: No edema.    Skin:     General: Skin is warm and dry.      Coloration: Skin is pale.   Neurological:      Mental Status: He is alert and oriented to person, place, and time.      Cranial Nerves: No cranial nerve deficit.      Motor: Weakness present.       Fluids    Intake/Output Summary (Last 24 hours) at 5/6/2023 0854  Last data filed at 5/6/2023 0436  Gross per 24 hour   Intake 1020 ml   Output 2125 ml   Net -1105 ml         Laboratory  Recent Labs     05/04/23  0555 05/05/23  1009   WBC 12.5* 13.4*   RBC 3.10* 3.17*   HEMOGLOBIN 9.3* 9.5*   HEMATOCRIT 28.0* 29.2*   MCV 90.3 92.1   MCH 30.0 30.0   MCHC 33.2* 32.5*   RDW 52.0* 52.3*   PLATELETCT 568* 624*   MPV 10.2 10.2       Recent Labs     05/04/23  0555 05/05/23  1009 05/06/23  0223   SODIUM 138 138 136   POTASSIUM 4.3 4.1 4.5   CHLORIDE 106 103 101   CO2 24 26 28   GLUCOSE 149* 161* 198*   BUN 35* 32* 33*   CREATININE 2.18* 1.73* 1.89*   CALCIUM 9.0 9.0 9.0                         Imaging  MR-THORACIC SPINE-WITH & W/O   Final Result         Stable appearance of expansile nonenhancing signal abnormality involving the upper thoracic cord between T1 and T4. Also noted is a small extra-axial collection/web along the right posterolateral spinal canal at T3-4 that deforms the cord and displaces    it anteriorly and to the left.   This could represent an Arachnoid web or arachnoid cyst with extensive mass effect and secondary edema of the cord.   Recommend additional thin section T2 and post contrast images through T1-T6 to better assess the abnormality.      MR-BRAIN-WITH & W/O   Final Result         No acute intracranial process.      Age-related volume loss and chronic microvascular ischemic changes.         DX-CHEST-PORTABLE (1 VIEW)   Final Result         1.  Pulmonary edema and/or infiltrates are identified, which are stable since the prior exam.   2.  Cardiomegaly   3.  Atherosclerosis      DX-CHEST-PORTABLE (1 VIEW)   Final Result         1.  Pulmonary edema and/or  infiltrates are identified, which are stable since the prior exam.   2.  Left PICC line tip terminates in the left axilla, stable since prior study.      DX-CHEST-PORTABLE (1 VIEW)   Final Result      1.  No significant change.   2.  Possible left PICC line with tip projecting at the axillary vein.      DX-CHEST-PORTABLE (1 VIEW)   Final Result      1.  Mildly improved pulmonary opacities.   2.  Possible left PICC line with tip projecting at the axillary vein.   3.  Likely trace right pleural effusion.      DX-CHEST-LIMITED (1 VIEW)   Final Result      1.  Placement of endotracheal tube with tip projecting over the mid trachea      2.  Left arm catheter present which is presumably venous in projects in the expected position of the left axillary vein      3.  Enlarged cardiac silhouette      NM-HEPATOBILIARY SCAN   Final Result      Hepatobiliary scan findings suspicious for acute cholecystitis.      MR-THORACIC SPINE-WITH & W/O   Final Result      1.  There is abnormal expansile T2 hyperintense lesion in the right side of the thoracic spinal cord at the levels of T2 and T3 Mild contrast enhancement is seen. This lesion is suspicious for spinal cord neoplasm. Other remote differential diagnosis    includes transverse myelitis.   2.  Exaggerated thoracic kyphosis.   3.  Thoracic dextroscoliosis.   4.  Minimal degenerative changes.   5.  Right pleural effusion.      MR-LUMBAR SPINE-WITH & W/O   Final Result      1.  Multifocal degenerative disease in the lumbar spine as described above.   2.  Moderate central canal and severe lateral recess stenosis at the level of L4-5. The exiting bilateral L5 nerve roots might have been impinged at the lateral recess.      MR-CERVICAL SPINE-WITH & W/O   Final Result      1.  There is abnormal expansile intramedullary T2 signal intensity lesion in the right cerebellum. Thoracic spinal cord at the level of T2 on T3. Mild diffuse contrast enhancement is seen. This finding is suspicious  for spinal cord neoplasm. The other    less likely differential diagnosis includes transverse myelitis. Follow-up is recommended after 4 weeks.   2.  Mild degenerative disease in the cervical spine as described above.   3.  There is no evidence of infection in the cervical spine.      US-RUQ   Final Result         1.  Acute cholecystitis.   2.  Atrophic right kidney.      DX-CHEST-LIMITED (1 VIEW)   Final Result      Perihilar interstitial edema and basilar atelectasis and/or consolidation. Underlying infection is possible.      MR-LUMBAR SPINE-W/O   Final Result      1.  Lower lumbar spine predominant degenerative changes as described in detail above, progressed from 2010 MRI.   2.  Edema at the L4-L5 disc space and L5 superior endplate likely represents degenerative changes. Less likely this could represent a low-grade or early discitis osteomyelitis. Correlate for evidence of infection.   3.  Acute appearing Schmorl's node at L3-L4, which can be a source of pain.      US-RENAL   Final Result      1.  Atrophic kidneys. Echogenic bilateral renal parenchyma could relate to medical renal disease.      2.  No hydronephrosis. No renal calculus.      DX-CHEST-PORTABLE (1 VIEW)   Final Result      No acute cardiac or pulmonary abnormalities are identified.      CT-ABDOMEN-PELVIS W/O   Final Result      1.  Findings suspicious for focal colitis at the hepatic flexure, less likely cholecystitis or duodenitis with secondary involvement of the colon. Infectious, inflammatory and ischemic etiologies are considerations. Underlying mass is not excluded.   2.  Cholelithiasis with distention of the gallbladder   3.  BILATERAL renal atrophy   4.  Subcentimeter LEFT adrenal myelolipoma   5.  12 mm RIGHT adrenal nodule likely an adenoma absent a history of cancer   6.  Subcentimeter RIGHT hepatic lesion, likely a cyst absent a history of cancer   7.  Atherosclerosis   8.  Colonic diverticulosis      CT-HEAD W/O   Final Result       1.  Cerebral atrophy.      2.  White matter lucencies most consistent with small vessel ischemic change versus demyelination or gliosis.      3.  Otherwise, Head CT without contrast with no acute findings. No evidence of acute cerebral infarction, hemorrhage or mass lesion.         MR-THORACIC SPINE-WITH & W/O    (Results Pending)          Assessment/Plan  Abnormal MRI- (present on admission)  Assessment & Plan  MRI on admission showing T2/T3  hyperintense lesion in the right side of the thoracic spinal cord at the levels of T2 and T3 Mild contrast enhancement is seen. This lesion is suspicious for spinal cord neoplasm or possible transverse myelitis       5/6 MRI thoracic spine reorders with radiology request for the thin sections through the T1-T6      Acute gangrenous cholecystitis- (present on admission)  Assessment & Plan  Found to have gangrenous cholecystitis s/p laprascopic converted open cholecystectomy 4/28/2023  Surgery continues to follow   No longer on antibiotics, as we have source control   Monitor closely for signs of infection   Post op pain control   Bowel regiment   Mobilize as able   Diet as tolerated     Postprocedural hypotension  Assessment & Plan  Hypotension in OR and post open carmelo  Now improved and off pressors   BP stable   Continue to monitor vitals per protocol     Hypercalcemia- (present on admission)  Assessment & Plan  Likely due to immobility   Resolved with IVF hydration  Continue to follow and treat accordingly     Hypomagnesemia- (present on admission)  Assessment & Plan  resolved  Goal >2  replace as needed     Cholelithiasis with acute cholecystitis- (present on admission)  Assessment & Plan  S/p surgical removal, converted to open cholecystectomy  NAHUN drain, monitor output  The patient is tolerating a diet already, monitor bowel function    Hypokalemia- (present on admission)  Assessment & Plan  Stable, monitor CMP and replace as needed   - goal K >4    Thrombocytopenia  (HCC)- (present on admission)  Assessment & Plan  Resolved, plt are stable     High anion gap metabolic acidosis- (present on admission)  Assessment & Plan  Acute on chronic kidney disease, -appears back at baseline on 5/3/2023    Resolved    Hyponatremia- (present on admission)  Assessment & Plan  Improved, slightly down today at 134, mild and asymptomatic   Cont to monitor BMP     Weakness of both lower extremities- (present on admission)  Assessment & Plan  T2/T3 intramedullary lesion of unclear chronicity and clinical significance as well as 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     Repeat MRI thoracic spine with and without contrast to further evaluate the previously seen lesion at Q2-E3-avjrbnbqm however radiology requesting additional images on 5/6/23      IVIG initiated on 5/1/2023 and discontinued on 5/2/2023 after discussion with neurology    MRI of the brain and MRI of thoracic spine has been ordered as per neurology request       neurosurgery  did not feel that this was a neurosurgical issue.    Sepsis ([facility])- (present on admission)  Assessment & Plan  This is Sepsis Present on admission  SIRS criteria identified on my evaluation include: Leukocytosis, with WBC greater than 12,000  Source is colitis  Sepsis protocol initiated  Fluid resuscitation ordered per protocol  Crystalloid Fluid Administration: Fluid resuscitation ordered per standard protocol - 30 mL/kg per current or ideal body weight  IV antibiotics as appropriate for source of sepsis  Reassessment: I have reassessed the patient's hemodynamic status    Due to gangrenous gallbladder requiring open carmelo   resolved   off antibiotics      Colitis- (present on admission)  Assessment & Plan  Cultures negative, monitor  Initial admitting impression, currently improved  Diet as tolerated     Acute kidney injury superimposed on CKD (HCC)- (present on admission)  Assessment & Plan   Resolved    Avoid  nephrotoxins       Diabetic peripheral neuropathy (HCC)- (present on admission)  Assessment & Plan  Cymbalta, medical treatment    Essential hypertension- (present on admission)  Assessment & Plan  Monitor closely, the patient had a hypotensive episode postoperatively  Off pressors and has since been resumed  , amlodipine and coreg   Monitor BP and adjust medications as needed   Prn hydralazine     Type 2 diabetes mellitus with kidney complication, without long-term current use of insulin (HCC)- (present on admission)  Assessment & Plan  Ha1c 6.4% 4 months ago   On ozempic and januvia outpatient   Continue ISS and hypoglycemic protocol while inpatient     Hypercholesterolemia- (present on admission)  Assessment & Plan  Stable, continue Lipitor    Chronic kidney disease (CKD), stage IV (severe) (HCC)- (present on admission)  Assessment & Plan  Appears to be at baseline at 5/3/2023                VTE prophylaxis: SCDs/TEDs and heparin ppx    I have performed a physical exam and reviewed and updated ROS and Plan today (5/6/2023). In review of yesterday's note (5/5/2023), there are no changes except as documented above.

## 2023-05-06 NOTE — PROGRESS NOTES
4 Eyes Skin Assessment Completed by Josemanuel RN and JUAN JOSÉ Vaughan.     Head WDL  Ears WDL  Nose WDL  Mouth WDL  Neck WDL  Breast/Chest WDL  Shoulder Blades WDL  Spine WDL  (R) Arm/Elbow/Hand swelling  (L) Arm/Elbow/Hand LUE PICC, swelling  Abdomen transverse incision, DIP. RLQ NAHUN drain, Umbilical Incision Closed with Staples   Groin Condom Catheter  Scrotum/Coccyx/Buttocks Redness and Blanching  (R) Leg Bruising and Swelling  (L) Leg Bruising and Swelling  (R) Heel/Foot/Toe Redness, Blanching, and Swelling  (L) Heel/Foot/Toe Redness, Blanching, and Swelling              Devices In Places Pulse Ox, SCD's, and Nasal Cannula, PICC, Condom Catheter         Interventions In Place NC W/Ear Foams, Sacral Mepilex, TAP System, Pillows, and Pressure Redistribution Mattress     Possible Skin Injury No     Pictures Uploaded Into Epic N/A  Wound Consult Placed N/A  RN Wound Prevention Protocol Ordered No

## 2023-05-06 NOTE — CARE PLAN
Problem: Knowledge Deficit - Standard  Goal: Patient and family/care givers will demonstrate understanding of plan of care, disease process/condition, diagnostic tests and medications  Outcome: Progressing     Problem: Hemodynamics  Goal: Patient's hemodynamics, fluid balance and neurologic status will be stable or improve  Outcome: Progressing     Problem: Fluid Volume  Goal: Fluid volume balance will be maintained  Outcome: Progressing   The patient is Stable - Low risk of patient condition declining or worsening    Shift Goals  Clinical Goals: Maintain Skin Integrity  Patient Goals: Rest, Comfort  Family Goals: N/A    Progress made toward(s) clinical / shift goals:  Q2 Turns in place, Q Shift Skin Note placed, COSME in place     Patient is not progressing towards the following goals:

## 2023-05-06 NOTE — PROGRESS NOTES
LUE Central line removed per MD order, Blue Tip in tact, pressure held for 5 minutes, patient instructed to lay flat for 15 minutes

## 2023-05-06 NOTE — CARE PLAN
The patient is Stable - Low risk of patient condition declining or worsening    Shift Goals  Clinical Goals: Mobility, skin integrity, MRI  Patient Goals: rest, comfort  Family Goals: not present    Progress made toward(s) clinical / shift goals:        Problem: Knowledge Deficit - Standard  Goal: Patient and family/care givers will demonstrate understanding of plan of care, disease process/condition, diagnostic tests and medications  Outcome: Progressing     Problem: Hemodynamics  Goal: Patient's hemodynamics, fluid balance and neurologic status will be stable or improve  Outcome: Progressing     Problem: Fluid Volume  Goal: Fluid volume balance will be maintained  Outcome: Progressing     Problem: Pain - Standard  Goal: Alleviation of pain or a reduction in pain to the patient’s comfort goal  Outcome: Progressing     Problem: Fall Risk  Goal: Patient will remain free from falls  Outcome: Progressing     Problem: Skin Integrity  Goal: Skin integrity is maintained or improved  Outcome: Progressing       Patient is not progressing towards the following goals:

## 2023-05-06 NOTE — PROGRESS NOTES
Neurology Progress Note        Subjective:  Mr Cline reports he feels stronger. Of note, he is post laparoscopic cholecystectomy converted to open cholecystectomy day 4.    Objective:  Vitals:  Vitals:    05/05/23 1547 05/05/23 2143 05/06/23 0436 05/06/23 0727   BP: 133/88 135/75 134/77 130/76   Pulse: 80 77 74 75   Resp: 19 18 20 18   Temp: 36.3 °C (97.3 °F) 36.7 °C (98.1 °F) 36.7 °C (98.1 °F) 36.8 °C (98.3 °F)   TempSrc: Temporal Temporal Temporal Temporal   SpO2: 93% 97% 96% 96%   Weight:       Height:         General: Awake, no apparent distress  Mental status: Alert, oriented x3, speech is fluent, comprehension is intact  Cranial Nerves: Pupils are equal round, extraocular movements are intact and no nystagmus, face is symmetric, facial sensations, no dysarthria, hearing intact to voice  He has normal coordination in his upper extremities    Recent Labs     05/04/23 0555 05/05/23  1009   WBC 12.5* 13.4*   RBC 3.10* 3.17*   HEMOGLOBIN 9.3* 9.5*   HEMATOCRIT 28.0* 29.2*   MCV 90.3 92.1   MCH 30.0 30.0   MCHC 33.2* 32.5*   RDW 52.0* 52.3*   PLATELETCT 568* 624*   MPV 10.2 10.2       Recent Labs     05/04/23 0555 05/05/23  1009 05/06/23 0223   SODIUM 138 138 136   POTASSIUM 4.3 4.1 4.5   CHLORIDE 106 103 101   CO2 24 26 28   GLUCOSE 149* 161* 198*   BUN 35* 32* 33*   CREATININE 2.18* 1.73* 1.89*   CALCIUM 9.0 9.0 9.0       Recent Labs     05/04/23  0555 05/05/23  1009 05/06/23  0223   SODIUM 138 138 136   POTASSIUM 4.3 4.1 4.5   CHLORIDE 106 103 101   CO2 24 26 28   GLUCOSE 149* 161* 198*   BUN 35* 32* 33*       Recent Labs     05/04/23  0555 05/05/23  1009 05/06/23 0223   SODIUM 138 138 136   POTASSIUM 4.3 4.1 4.5   CHLORIDE 106 103 101   CO2 24 26 28   BUN 35* 32* 33*   CREATININE 2.18* 1.73* 1.89*   CALCIUM 9.0 9.0 9.0                        Imaging: neuroimaging reviewed and directly visualized by me  MR-THORACIC SPINE-WITH & W/O   Final Result         Stable appearance of expansile nonenhancing signal  abnormality involving the upper thoracic cord between T1 and T4. Also noted is a small extra-axial collection/web along the right posterolateral spinal canal at T3-4 that deforms the cord and displaces    it anteriorly and to the left.   This could represent an Arachnoid web or arachnoid cyst with extensive mass effect and secondary edema of the cord.   Recommend additional thin section T2 and post contrast images through T1-T6 to better assess the abnormality.      MR-BRAIN-WITH & W/O   Final Result         No acute intracranial process.      Age-related volume loss and chronic microvascular ischemic changes.         DX-CHEST-PORTABLE (1 VIEW)   Final Result         1.  Pulmonary edema and/or infiltrates are identified, which are stable since the prior exam.   2.  Cardiomegaly   3.  Atherosclerosis      DX-CHEST-PORTABLE (1 VIEW)   Final Result         1.  Pulmonary edema and/or infiltrates are identified, which are stable since the prior exam.   2.  Left PICC line tip terminates in the left axilla, stable since prior study.      DX-CHEST-PORTABLE (1 VIEW)   Final Result      1.  No significant change.   2.  Possible left PICC line with tip projecting at the axillary vein.      DX-CHEST-PORTABLE (1 VIEW)   Final Result      1.  Mildly improved pulmonary opacities.   2.  Possible left PICC line with tip projecting at the axillary vein.   3.  Likely trace right pleural effusion.      DX-CHEST-LIMITED (1 VIEW)   Final Result      1.  Placement of endotracheal tube with tip projecting over the mid trachea      2.  Left arm catheter present which is presumably venous in projects in the expected position of the left axillary vein      3.  Enlarged cardiac silhouette      NM-HEPATOBILIARY SCAN   Final Result      Hepatobiliary scan findings suspicious for acute cholecystitis.      MR-THORACIC SPINE-WITH & W/O   Final Result      1.  There is abnormal expansile T2 hyperintense lesion in the right side of the thoracic  spinal cord at the levels of T2 and T3 Mild contrast enhancement is seen. This lesion is suspicious for spinal cord neoplasm. Other remote differential diagnosis    includes transverse myelitis.   2.  Exaggerated thoracic kyphosis.   3.  Thoracic dextroscoliosis.   4.  Minimal degenerative changes.   5.  Right pleural effusion.      MR-LUMBAR SPINE-WITH & W/O   Final Result      1.  Multifocal degenerative disease in the lumbar spine as described above.   2.  Moderate central canal and severe lateral recess stenosis at the level of L4-5. The exiting bilateral L5 nerve roots might have been impinged at the lateral recess.      MR-CERVICAL SPINE-WITH & W/O   Final Result      1.  There is abnormal expansile intramedullary T2 signal intensity lesion in the right cerebellum. Thoracic spinal cord at the level of T2 on T3. Mild diffuse contrast enhancement is seen. This finding is suspicious for spinal cord neoplasm. The other    less likely differential diagnosis includes transverse myelitis. Follow-up is recommended after 4 weeks.   2.  Mild degenerative disease in the cervical spine as described above.   3.  There is no evidence of infection in the cervical spine.      US-RUQ   Final Result         1.  Acute cholecystitis.   2.  Atrophic right kidney.      DX-CHEST-LIMITED (1 VIEW)   Final Result      Perihilar interstitial edema and basilar atelectasis and/or consolidation. Underlying infection is possible.      MR-LUMBAR SPINE-W/O   Final Result      1.  Lower lumbar spine predominant degenerative changes as described in detail above, progressed from 2010 MRI.   2.  Edema at the L4-L5 disc space and L5 superior endplate likely represents degenerative changes. Less likely this could represent a low-grade or early discitis osteomyelitis. Correlate for evidence of infection.   3.  Acute appearing Schmorl's node at L3-L4, which can be a source of pain.      US-RENAL   Final Result      1.  Atrophic kidneys. Echogenic  bilateral renal parenchyma could relate to medical renal disease.      2.  No hydronephrosis. No renal calculus.      DX-CHEST-PORTABLE (1 VIEW)   Final Result      No acute cardiac or pulmonary abnormalities are identified.      CT-ABDOMEN-PELVIS W/O   Final Result      1.  Findings suspicious for focal colitis at the hepatic flexure, less likely cholecystitis or duodenitis with secondary involvement of the colon. Infectious, inflammatory and ischemic etiologies are considerations. Underlying mass is not excluded.   2.  Cholelithiasis with distention of the gallbladder   3.  BILATERAL renal atrophy   4.  Subcentimeter LEFT adrenal myelolipoma   5.  12 mm RIGHT adrenal nodule likely an adenoma absent a history of cancer   6.  Subcentimeter RIGHT hepatic lesion, likely a cyst absent a history of cancer   7.  Atherosclerosis   8.  Colonic diverticulosis      CT-HEAD W/O   Final Result      1.  Cerebral atrophy.      2.  White matter lucencies most consistent with small vessel ischemic change versus demyelination or gliosis.      3.  Otherwise, Head CT without contrast with no acute findings. No evidence of acute cerebral infarction, hemorrhage or mass lesion.         MR-THORACIC SPINE-WITH & W/O    (Results Pending)       Impression:  82-year-old man with  right greater than left leg weakness since approx April 17th as well as  upper extremity weakness.  This developed concurrently with a GI illness concerning for food borne illness.  MRI-Tspine remarkable for increased T2 signal at T2-3 with apparent constriction of the cord at T3-T4, awaiting repeat thoracic spine MRI with thin cuts through this region.    Recommendations:   -MR Tspine high with fine cuts through T1-T4 w/ and w/o, has been ordered.  -Continue PT, OT.  -Follow up with Neurology in one month in the community   -Neurology will continue to follow.      Pedro Luis Schwartz III, DO, MPH  Neurohospitalist

## 2023-05-06 NOTE — PROGRESS NOTES
"  DATE: 5/6/2023 4/28 Laparoscopic converted to open cholecystectomy.     INTERVAL EVENTS:     Surgical sites approximated.  NAHUN with nonbilious colored drainage.  24-hour output 45 cc.  Intraoperative cultures positive for ESBL.   AM labs pending.     -Continue drain. Antibiotic therapy, no.  -DC umbilical hernia staples. Continue surgical site staples and NAHUN.  -Surgery to continue to follow.    PHYSICAL EXAMINATION:  Vital Signs: /77   Pulse 74   Temp 36.7 °C (98.1 °F) (Temporal)   Resp 20   Ht 1.803 m (5' 10.98\")   Wt 89.1 kg (196 lb 6.9 oz)   SpO2 96%     Constitutional: No acute distress.  Resting comfortably.  Afebrile and nontoxic in appearance.  Cardiovascular: Regular rate.  Respiratory: Unlabored  Abdomen: No peritoneal signs.  NAHUN drain with nonbilious colored drainage. Incisions approximated with no overt signs of infection.  Tolerating oral diet    LABORATORY VALUES:   Recent Labs     05/04/23  0555 05/05/23  1009   WBC 12.5* 13.4*   RBC 3.10* 3.17*   HEMOGLOBIN 9.3* 9.5*   HEMATOCRIT 28.0* 29.2*   MCV 90.3 92.1   MCH 30.0 30.0   MCHC 33.2* 32.5*   RDW 52.0* 52.3*   PLATELETCT 568* 624*   MPV 10.2 10.2     Recent Labs     05/04/23  0555 05/05/23  1009 05/06/23  0223   SODIUM 138 138 136   POTASSIUM 4.3 4.1 4.5   CHLORIDE 106 103 101   CO2 24 26 28   GLUCOSE 149* 161* 198*   BUN 35* 32* 33*   CREATININE 2.18* 1.73* 1.89*   CALCIUM 9.0 9.0 9.0     Recent Labs     05/05/23  1009   ASTSGOT 94*   ALTSGPT 77*   TBILIRUBIN 0.5   ALKPHOSPHAT 305*   GLOBULIN 3.8*            IMAGING:   MR-THORACIC SPINE-WITH & W/O   Final Result         Stable appearance of expansile nonenhancing signal abnormality involving the upper thoracic cord between T1 and T4. Also noted is a small extra-axial collection/web along the right posterolateral spinal canal at T3-4 that deforms the cord and displaces    it anteriorly and to the left.   This could represent an Arachnoid web or arachnoid cyst with extensive mass " effect and secondary edema of the cord.   Recommend additional thin section T2 and post contrast images through T1-T6 to better assess the abnormality.      MR-BRAIN-WITH & W/O   Final Result         No acute intracranial process.      Age-related volume loss and chronic microvascular ischemic changes.         DX-CHEST-PORTABLE (1 VIEW)   Final Result         1.  Pulmonary edema and/or infiltrates are identified, which are stable since the prior exam.   2.  Cardiomegaly   3.  Atherosclerosis      DX-CHEST-PORTABLE (1 VIEW)   Final Result         1.  Pulmonary edema and/or infiltrates are identified, which are stable since the prior exam.   2.  Left PICC line tip terminates in the left axilla, stable since prior study.      DX-CHEST-PORTABLE (1 VIEW)   Final Result      1.  No significant change.   2.  Possible left PICC line with tip projecting at the axillary vein.      DX-CHEST-PORTABLE (1 VIEW)   Final Result      1.  Mildly improved pulmonary opacities.   2.  Possible left PICC line with tip projecting at the axillary vein.   3.  Likely trace right pleural effusion.      DX-CHEST-LIMITED (1 VIEW)   Final Result      1.  Placement of endotracheal tube with tip projecting over the mid trachea      2.  Left arm catheter present which is presumably venous in projects in the expected position of the left axillary vein      3.  Enlarged cardiac silhouette      NM-HEPATOBILIARY SCAN   Final Result      Hepatobiliary scan findings suspicious for acute cholecystitis.      MR-THORACIC SPINE-WITH & W/O   Final Result      1.  There is abnormal expansile T2 hyperintense lesion in the right side of the thoracic spinal cord at the levels of T2 and T3 Mild contrast enhancement is seen. This lesion is suspicious for spinal cord neoplasm. Other remote differential diagnosis    includes transverse myelitis.   2.  Exaggerated thoracic kyphosis.   3.  Thoracic dextroscoliosis.   4.  Minimal degenerative changes.   5.  Right pleural  effusion.      MR-LUMBAR SPINE-WITH & W/O   Final Result      1.  Multifocal degenerative disease in the lumbar spine as described above.   2.  Moderate central canal and severe lateral recess stenosis at the level of L4-5. The exiting bilateral L5 nerve roots might have been impinged at the lateral recess.      MR-CERVICAL SPINE-WITH & W/O   Final Result      1.  There is abnormal expansile intramedullary T2 signal intensity lesion in the right cerebellum. Thoracic spinal cord at the level of T2 on T3. Mild diffuse contrast enhancement is seen. This finding is suspicious for spinal cord neoplasm. The other    less likely differential diagnosis includes transverse myelitis. Follow-up is recommended after 4 weeks.   2.  Mild degenerative disease in the cervical spine as described above.   3.  There is no evidence of infection in the cervical spine.      US-RUQ   Final Result         1.  Acute cholecystitis.   2.  Atrophic right kidney.      DX-CHEST-LIMITED (1 VIEW)   Final Result      Perihilar interstitial edema and basilar atelectasis and/or consolidation. Underlying infection is possible.      MR-LUMBAR SPINE-W/O   Final Result      1.  Lower lumbar spine predominant degenerative changes as described in detail above, progressed from 2010 MRI.   2.  Edema at the L4-L5 disc space and L5 superior endplate likely represents degenerative changes. Less likely this could represent a low-grade or early discitis osteomyelitis. Correlate for evidence of infection.   3.  Acute appearing Schmorl's node at L3-L4, which can be a source of pain.      US-RENAL   Final Result      1.  Atrophic kidneys. Echogenic bilateral renal parenchyma could relate to medical renal disease.      2.  No hydronephrosis. No renal calculus.      DX-CHEST-PORTABLE (1 VIEW)   Final Result      No acute cardiac or pulmonary abnormalities are identified.      CT-ABDOMEN-PELVIS W/O   Final Result      1.  Findings suspicious for focal colitis at the  hepatic flexure, less likely cholecystitis or duodenitis with secondary involvement of the colon. Infectious, inflammatory and ischemic etiologies are considerations. Underlying mass is not excluded.   2.  Cholelithiasis with distention of the gallbladder   3.  BILATERAL renal atrophy   4.  Subcentimeter LEFT adrenal myelolipoma   5.  12 mm RIGHT adrenal nodule likely an adenoma absent a history of cancer   6.  Subcentimeter RIGHT hepatic lesion, likely a cyst absent a history of cancer   7.  Atherosclerosis   8.  Colonic diverticulosis      CT-HEAD W/O   Final Result      1.  Cerebral atrophy.      2.  White matter lucencies most consistent with small vessel ischemic change versus demyelination or gliosis.      3.  Otherwise, Head CT without contrast with no acute findings. No evidence of acute cerebral infarction, hemorrhage or mass lesion.             ASSESSMENT AND PLAN:   Cholelithiasis with acute cholecystitis- (present on admission)  Assessment & Plan  4/27 HIDA scan consistent with acute cholecystitis.  4/28 Laparoscopic cholecystectomy converted to open cholecystectomy.  4/30 Gallbladder fluid cultures (+) ESBL E Coli.  Antibiotics per hospital team.    Acute kidney injury superimposed on CKD (HCC)- (present on admission)  Assessment & Plan  Premorbid.  Fluid resuscitation and trend renal function.  Avoid nephrotoxins.    Postprocedural hypotension  Assessment & Plan  Patient was in shock throughout operative case.  Returns to ICU on levophed.   Wean levophed as tolerated.  4/29 Levophed stopped.  Trend SBP.         ____________________________________     KEV Cano.    DD: 5/6/2023  7:14 AM

## 2023-05-07 ENCOUNTER — APPOINTMENT (OUTPATIENT)
Dept: RADIOLOGY | Facility: MEDICAL CENTER | Age: 83
DRG: 853 | End: 2023-05-07
Attending: HOSPITALIST
Payer: MEDICARE

## 2023-05-07 PROBLEM — I95.81 POSTPROCEDURAL HYPOTENSION: Status: RESOLVED | Noted: 2023-04-28 | Resolved: 2023-05-07

## 2023-05-07 LAB
ANION GAP SERPL CALC-SCNC: 11 MMOL/L (ref 7–16)
BUN SERPL-MCNC: 35 MG/DL (ref 8–22)
CALCIUM SERPL-MCNC: 9.1 MG/DL (ref 8.5–10.5)
CHLORIDE SERPL-SCNC: 101 MMOL/L (ref 96–112)
CO2 SERPL-SCNC: 26 MMOL/L (ref 20–33)
CREAT SERPL-MCNC: 2.19 MG/DL (ref 0.5–1.4)
ERYTHROCYTE [DISTWIDTH] IN BLOOD BY AUTOMATED COUNT: 53.1 FL (ref 35.9–50)
GFR SERPLBLD CREATININE-BSD FMLA CKD-EPI: 29 ML/MIN/1.73 M 2
GLUCOSE BLD STRIP.AUTO-MCNC: 128 MG/DL (ref 65–99)
GLUCOSE BLD STRIP.AUTO-MCNC: 136 MG/DL (ref 65–99)
GLUCOSE BLD STRIP.AUTO-MCNC: 152 MG/DL (ref 65–99)
GLUCOSE BLD STRIP.AUTO-MCNC: 219 MG/DL (ref 65–99)
GLUCOSE SERPL-MCNC: 146 MG/DL (ref 65–99)
HCT VFR BLD AUTO: 29.3 % (ref 42–52)
HGB BLD-MCNC: 9.4 G/DL (ref 14–18)
MCH RBC QN AUTO: 30 PG (ref 27–33)
MCHC RBC AUTO-ENTMCNC: 32.1 G/DL (ref 33.7–35.3)
MCV RBC AUTO: 93.6 FL (ref 81.4–97.8)
PLATELET # BLD AUTO: 525 K/UL (ref 164–446)
PMV BLD AUTO: 10.2 FL (ref 9–12.9)
POTASSIUM SERPL-SCNC: 4.4 MMOL/L (ref 3.6–5.5)
RBC # BLD AUTO: 3.13 M/UL (ref 4.7–6.1)
SODIUM SERPL-SCNC: 138 MMOL/L (ref 135–145)
WBC # BLD AUTO: 13.4 K/UL (ref 4.8–10.8)

## 2023-05-07 PROCEDURE — 36415 COLL VENOUS BLD VENIPUNCTURE: CPT

## 2023-05-07 PROCEDURE — 700102 HCHG RX REV CODE 250 W/ 637 OVERRIDE(OP): Performed by: SURGERY

## 2023-05-07 PROCEDURE — 700111 HCHG RX REV CODE 636 W/ 250 OVERRIDE (IP): Performed by: HOSPITALIST

## 2023-05-07 PROCEDURE — 700102 HCHG RX REV CODE 250 W/ 637 OVERRIDE(OP): Performed by: HOSPITALIST

## 2023-05-07 PROCEDURE — 700111 HCHG RX REV CODE 636 W/ 250 OVERRIDE (IP): Performed by: STUDENT IN AN ORGANIZED HEALTH CARE EDUCATION/TRAINING PROGRAM

## 2023-05-07 PROCEDURE — A9270 NON-COVERED ITEM OR SERVICE: HCPCS | Performed by: HOSPITALIST

## 2023-05-07 PROCEDURE — 99024 POSTOP FOLLOW-UP VISIT: CPT | Performed by: NURSE PRACTITIONER

## 2023-05-07 PROCEDURE — 700102 HCHG RX REV CODE 250 W/ 637 OVERRIDE(OP): Performed by: FAMILY MEDICINE

## 2023-05-07 PROCEDURE — 82962 GLUCOSE BLOOD TEST: CPT | Mod: 91

## 2023-05-07 PROCEDURE — 85027 COMPLETE CBC AUTOMATED: CPT

## 2023-05-07 PROCEDURE — 99232 SBSQ HOSP IP/OBS MODERATE 35: CPT | Performed by: HOSPITALIST

## 2023-05-07 PROCEDURE — A9270 NON-COVERED ITEM OR SERVICE: HCPCS | Performed by: FAMILY MEDICINE

## 2023-05-07 PROCEDURE — 770001 HCHG ROOM/CARE - MED/SURG/GYN PRIV*

## 2023-05-07 PROCEDURE — A9270 NON-COVERED ITEM OR SERVICE: HCPCS | Performed by: SURGERY

## 2023-05-07 PROCEDURE — 80048 BASIC METABOLIC PNL TOTAL CA: CPT

## 2023-05-07 RX ORDER — LORAZEPAM 2 MG/ML
2 INJECTION INTRAMUSCULAR ONCE
Status: COMPLETED | OUTPATIENT
Start: 2023-05-07 | End: 2023-05-07

## 2023-05-07 RX ADMIN — INSULIN HUMAN 3 UNITS: 100 INJECTION, SOLUTION PARENTERAL at 21:21

## 2023-05-07 RX ADMIN — SPIRONOLACTONE 25 MG: 50 TABLET ORAL at 04:26

## 2023-05-07 RX ADMIN — HEPARIN SODIUM 5000 UNITS: 5000 INJECTION, SOLUTION INTRAVENOUS; SUBCUTANEOUS at 21:19

## 2023-05-07 RX ADMIN — AMLODIPINE BESYLATE 5 MG: 10 TABLET ORAL at 04:26

## 2023-05-07 RX ADMIN — DULOXETINE HYDROCHLORIDE 30 MG: 30 CAPSULE, DELAYED RELEASE ORAL at 17:29

## 2023-05-07 RX ADMIN — SODIUM BICARBONATE 1300 MG: 650 TABLET ORAL at 04:26

## 2023-05-07 RX ADMIN — LORAZEPAM 2 MG: 2 INJECTION INTRAMUSCULAR; INTRAVENOUS at 10:39

## 2023-05-07 RX ADMIN — Medication 1 APPLICATOR: at 17:28

## 2023-05-07 RX ADMIN — ATORVASTATIN CALCIUM 10 MG: 10 TABLET, FILM COATED ORAL at 17:29

## 2023-05-07 RX ADMIN — OXYCODONE HYDROCHLORIDE 10 MG: 10 TABLET ORAL at 00:56

## 2023-05-07 RX ADMIN — Medication 1 APPLICATOR: at 04:27

## 2023-05-07 RX ADMIN — HEPARIN SODIUM 5000 UNITS: 5000 INJECTION, SOLUTION INTRAVENOUS; SUBCUTANEOUS at 04:27

## 2023-05-07 RX ADMIN — ALLOPURINOL 100 MG: 100 TABLET ORAL at 04:26

## 2023-05-07 RX ADMIN — INSULIN HUMAN 2 UNITS: 100 INJECTION, SOLUTION PARENTERAL at 13:48

## 2023-05-07 RX ADMIN — CARVEDILOL 12.5 MG: 12.5 TABLET, FILM COATED ORAL at 08:40

## 2023-05-07 RX ADMIN — HEPARIN SODIUM 5000 UNITS: 5000 INJECTION, SOLUTION INTRAVENOUS; SUBCUTANEOUS at 13:48

## 2023-05-07 RX ADMIN — CARVEDILOL 12.5 MG: 12.5 TABLET, FILM COATED ORAL at 17:29

## 2023-05-07 RX ADMIN — SODIUM BICARBONATE 1300 MG: 650 TABLET ORAL at 17:29

## 2023-05-07 RX ADMIN — FAMOTIDINE 20 MG: 20 TABLET, FILM COATED ORAL at 04:26

## 2023-05-07 ASSESSMENT — ENCOUNTER SYMPTOMS
DIAPHORESIS: 0
CHILLS: 0
COUGH: 0
WHEEZING: 0
DIZZINESS: 0
MYALGIAS: 0
PALPITATIONS: 0
SENSORY CHANGE: 0
SPEECH CHANGE: 0
FLANK PAIN: 0
FOCAL WEAKNESS: 0
BLURRED VISION: 0
DIARRHEA: 0
VOMITING: 0
FEVER: 0
SORE THROAT: 0
NAUSEA: 0
NERVOUS/ANXIOUS: 0
HEADACHES: 0
SHORTNESS OF BREATH: 0
NECK PAIN: 0
BACK PAIN: 0
HEARTBURN: 0
ABDOMINAL PAIN: 0
WEAKNESS: 1

## 2023-05-07 ASSESSMENT — PAIN DESCRIPTION - PAIN TYPE
TYPE: ACUTE PAIN
TYPE: ACUTE PAIN
TYPE: ACUTE PAIN;SURGICAL PAIN
TYPE: ACUTE PAIN
TYPE: ACUTE PAIN

## 2023-05-07 NOTE — CARE PLAN
The patient is Stable - Low risk of patient condition declining or worsening    Shift Goals  Clinical Goals: skin integrity, pain management  Patient Goals: rest, MRI  Family Goals: N/A    Progress made toward(s) clinical / shift goals:  Patient able to reposition self in bed with minimal assistance. Pain well controlled with PRN oxy, rest, repositioning.   Problem: Knowledge Deficit - Standard  Goal: Patient and family/care givers will demonstrate understanding of plan of care, disease process/condition, diagnostic tests and medications  Description: Target End Date:  1-3 days or as soon as patient condition allows    Document in Patient Education    1.  Patient and family/caregiver oriented to unit, equipment, visitation policy and means for communicating concern  2.  Complete/review Learning Assessment  3.  Assess knowledge level of disease process/condition, treatment plan, diagnostic tests and medications  4.  Explain disease process/condition, treatment plan, diagnostic tests and medications  Outcome: Progressing     Problem: Pain - Standard  Goal: Alleviation of pain or a reduction in pain to the patient’s comfort goal  Description: Target End Date:  Prior to discharge or change in level of care    Document on Vitals flowsheet    1.  Document pain using the appropriate pain scale per order or unit policy  2.  Educate and implement non-pharmacologic comfort measures (i.e. relaxation, distraction, massage, cold/heat therapy, etc.)  3.  Pain management medications as ordered  4.  Reassess pain after pain med administration per policy  5.  If opiods administered assess patient's response to pain medication is appropriate per POSS sedation scale  6.  Follow pain management plan developed in collaboration with patient and interdisciplinary team (including palliative care or pain specialists if applicable)  Outcome: Progressing     Problem: Fall Risk  Goal: Patient will remain free from falls  Description: Target  End Date:  Prior to discharge or change in level of care    Document interventions on the [place] Fall Risk Assessment    1.  Assess for fall risk factors  2.  Implement fall precautions  Outcome: Progressing     Problem: Skin Integrity  Goal: Skin integrity is maintained or improved  Description: Target End Date:  Prior to discharge or change in level of care    Document interventions on Skin Risk/Jeremy flowsheet groups and corresponding LDA    1.  Assess and monitor skin integrity, appearance and/or temperature  2.  Assess risk factors for impaired skin integrity and/or pressures ulcers  3.  Implement precautions to protect skin integrity in collaboration with interdisciplinary team  4.  Implement pressure ulcer prevention protocol if at risk for skin breakdown  5.  Confirm wound care consult if at risk for skin breakdown  6.  Ensure patient use of pressure relieving devices  (Low air loss bed, waffle overlay, heel protectors, ROHO cushion, etc)  Outcome: Progressing       Patient is not progressing towards the following goals:

## 2023-05-07 NOTE — PROGRESS NOTES
4 Eyes Skin Assessment Completed by JUAN JOSÉ Abernathy and JUAN JOSÉ Nielsen.     Head WDL  Ears WDL  Nose WDL  Mouth WDL  Neck WDL  Breast/Chest WDL  Shoulder Blades WDL  Spine WDL  (R) Arm/Elbow/Hand swelling  (L) Arm/Elbow/Hand LUE PICC, swelling  Abdomen transverse incision, DIP. RLQ NAHUN drain, Umbilical Incision Closed with Staples   Groin Condom Catheter  Scrotum/Coccyx/Buttocks Redness and Blanching  (R) Leg Bruising and Swelling  (L) Leg Bruising and Swelling  (R) Heel/Foot/Toe Redness, Blanching, and Swelling  (L) Heel/Foot/Toe Redness, Blanching, and Swelling              Devices In Places Pulse Ox, SCD's, and Nasal Cannula, PICC, Condom Catheter         Interventions In Place NC W/Ear Foams, Sacral Mepilex, TAP System, Pillows, and Pressure Redistribution Mattress     Possible Skin Injury No     Pictures Uploaded Into Epic N/A  Wound Consult Placed N/A  RN Wound Prevention Protocol Ordered No

## 2023-05-07 NOTE — PROGRESS NOTES
"  DATE: 5/7/2023 4/28 Laparoscopic converted to open cholecystectomy.    INTERVAL EVENTS:    Surgical sites approximated.  NAHUN with nonbilious colored drainage.  24-hour output 50 cc.  A.m. labs pending.    -Continue drain and staples.      PHYSICAL EXAMINATION:  Vital Signs: /64   Pulse 74   Temp 36.8 °C (98.2 °F) (Temporal)   Resp 18   Ht 1.803 m (5' 10.98\")   Wt 89.1 kg (196 lb 6.9 oz)   SpO2 94%     Constitutional: No acute distress.  Resting comfortably.  Afebrile and nontoxic in appearance.  Cardiovascular: Regular rate.  Respiratory: Unlabored  Abdomen: No peritoneal signs.  NAHUN drain with nonbilious colored drainage. Incisions approximated with no overt signs of infection.  Tolerating oral diet.     LABORATORY VALUES:   Recent Labs     05/05/23  1009 05/07/23  0613   WBC 13.4* 13.4*   RBC 3.17* 3.13*   HEMOGLOBIN 9.5* 9.4*   HEMATOCRIT 29.2* 29.3*   MCV 92.1 93.6   MCH 30.0 30.0   MCHC 32.5* 32.1*   RDW 52.3* 53.1*   PLATELETCT 624* 525*   MPV 10.2 10.2     Recent Labs     05/05/23  1009 05/06/23  0223 05/07/23  0613   SODIUM 138 136 138   POTASSIUM 4.1 4.5 4.4   CHLORIDE 103 101 101   CO2 26 28 26   GLUCOSE 161* 198* 146*   BUN 32* 33* 35*   CREATININE 1.73* 1.89* 2.19*   CALCIUM 9.0 9.0 9.1     Recent Labs     05/05/23  1009   ASTSGOT 94*   ALTSGPT 77*   TBILIRUBIN 0.5   ALKPHOSPHAT 305*   GLOBULIN 3.8*            IMAGING:   MR-THORACIC SPINE-WITH & W/O   Final Result         Stable appearance of expansile nonenhancing signal abnormality involving the upper thoracic cord between T1 and T4. Also noted is a small extra-axial collection/web along the right posterolateral spinal canal at T3-4 that deforms the cord and displaces    it anteriorly and to the left.   This could represent an Arachnoid web or arachnoid cyst with extensive mass effect and secondary edema of the cord.   Recommend additional thin section T2 and post contrast images through T1-T6 to better assess the abnormality.    "   MR-BRAIN-WITH & W/O   Final Result         No acute intracranial process.      Age-related volume loss and chronic microvascular ischemic changes.         DX-CHEST-PORTABLE (1 VIEW)   Final Result         1.  Pulmonary edema and/or infiltrates are identified, which are stable since the prior exam.   2.  Cardiomegaly   3.  Atherosclerosis      DX-CHEST-PORTABLE (1 VIEW)   Final Result         1.  Pulmonary edema and/or infiltrates are identified, which are stable since the prior exam.   2.  Left PICC line tip terminates in the left axilla, stable since prior study.      DX-CHEST-PORTABLE (1 VIEW)   Final Result      1.  No significant change.   2.  Possible left PICC line with tip projecting at the axillary vein.      DX-CHEST-PORTABLE (1 VIEW)   Final Result      1.  Mildly improved pulmonary opacities.   2.  Possible left PICC line with tip projecting at the axillary vein.   3.  Likely trace right pleural effusion.      DX-CHEST-LIMITED (1 VIEW)   Final Result      1.  Placement of endotracheal tube with tip projecting over the mid trachea      2.  Left arm catheter present which is presumably venous in projects in the expected position of the left axillary vein      3.  Enlarged cardiac silhouette      NM-HEPATOBILIARY SCAN   Final Result      Hepatobiliary scan findings suspicious for acute cholecystitis.      MR-THORACIC SPINE-WITH & W/O   Final Result      1.  There is abnormal expansile T2 hyperintense lesion in the right side of the thoracic spinal cord at the levels of T2 and T3 Mild contrast enhancement is seen. This lesion is suspicious for spinal cord neoplasm. Other remote differential diagnosis    includes transverse myelitis.   2.  Exaggerated thoracic kyphosis.   3.  Thoracic dextroscoliosis.   4.  Minimal degenerative changes.   5.  Right pleural effusion.      MR-LUMBAR SPINE-WITH & W/O   Final Result      1.  Multifocal degenerative disease in the lumbar spine as described above.   2.  Moderate  central canal and severe lateral recess stenosis at the level of L4-5. The exiting bilateral L5 nerve roots might have been impinged at the lateral recess.      MR-CERVICAL SPINE-WITH & W/O   Final Result      1.  There is abnormal expansile intramedullary T2 signal intensity lesion in the right cerebellum. Thoracic spinal cord at the level of T2 on T3. Mild diffuse contrast enhancement is seen. This finding is suspicious for spinal cord neoplasm. The other    less likely differential diagnosis includes transverse myelitis. Follow-up is recommended after 4 weeks.   2.  Mild degenerative disease in the cervical spine as described above.   3.  There is no evidence of infection in the cervical spine.      US-RUQ   Final Result         1.  Acute cholecystitis.   2.  Atrophic right kidney.      DX-CHEST-LIMITED (1 VIEW)   Final Result      Perihilar interstitial edema and basilar atelectasis and/or consolidation. Underlying infection is possible.      MR-LUMBAR SPINE-W/O   Final Result      1.  Lower lumbar spine predominant degenerative changes as described in detail above, progressed from 2010 MRI.   2.  Edema at the L4-L5 disc space and L5 superior endplate likely represents degenerative changes. Less likely this could represent a low-grade or early discitis osteomyelitis. Correlate for evidence of infection.   3.  Acute appearing Schmorl's node at L3-L4, which can be a source of pain.      US-RENAL   Final Result      1.  Atrophic kidneys. Echogenic bilateral renal parenchyma could relate to medical renal disease.      2.  No hydronephrosis. No renal calculus.      DX-CHEST-PORTABLE (1 VIEW)   Final Result      No acute cardiac or pulmonary abnormalities are identified.      CT-ABDOMEN-PELVIS W/O   Final Result      1.  Findings suspicious for focal colitis at the hepatic flexure, less likely cholecystitis or duodenitis with secondary involvement of the colon. Infectious, inflammatory and ischemic etiologies are  considerations. Underlying mass is not excluded.   2.  Cholelithiasis with distention of the gallbladder   3.  BILATERAL renal atrophy   4.  Subcentimeter LEFT adrenal myelolipoma   5.  12 mm RIGHT adrenal nodule likely an adenoma absent a history of cancer   6.  Subcentimeter RIGHT hepatic lesion, likely a cyst absent a history of cancer   7.  Atherosclerosis   8.  Colonic diverticulosis      CT-HEAD W/O   Final Result      1.  Cerebral atrophy.      2.  White matter lucencies most consistent with small vessel ischemic change versus demyelination or gliosis.      3.  Otherwise, Head CT without contrast with no acute findings. No evidence of acute cerebral infarction, hemorrhage or mass lesion.         MR-THORACIC SPINE-WITH & W/O    (Results Pending)       ASSESSMENT AND PLAN:   Cholelithiasis with acute cholecystitis- (present on admission)  Assessment & Plan  4/27 HIDA scan consistent with acute cholecystitis.  4/28 Laparoscopic cholecystectomy converted to open cholecystectomy.  4/30 Gallbladder fluid cultures (+) ESBL E Coli.  Antibiotics per hospital team.     Acute kidney injury superimposed on CKD (HCC)- (present on admission)  Assessment & Plan  Premorbid.  Fluid resuscitation and trend renal function.  Avoid nephrotoxins.         ____________________________________     KEV Cano.    DD: 5/7/2023  8:43 AM

## 2023-05-07 NOTE — PROGRESS NOTES
Received report from previous shift RN. Assumed care of patient at 1900.  Assessment complete.  Patient A&O x 4. Patient calls appropriately.  Patient ambulates with max assist. Bed alarm on.   Patient has 7/10 pain. Pain managed with prescribed medications.  Denies N&V. Tolerating renal diet.  Transverse incision x2 to abdomen, RUQ incision with staples, umbilical incision BERTA.   + void via condom catheter, + flatus, +5/5 BM.  Patient on 3L NC, denies SOB. Pt encouraged to use IS. IS within reach.  SCD's on.     Reviewed plan of care with patient. Call light and personal belongings within reach. Hourly rounding in place. All needs met at this time.

## 2023-05-07 NOTE — PROGRESS NOTES
[facility] Medicine Daily Progress Note    Date of Service  5/7/2023    Chief Complaint  Tyrone Cline is a 82 y.o. male admitted 4/21/2023 with Colitis.    Hospital Course  Admitted with sepsis secondary to colitis.  Patient was started on empiric coverage with IV Rocephin and Flagyl.  He was also noted to have bilateral lower extremity weakness.  MRI of the lumbar spine was done which showed possible edema.  Neurology was then consulted on the case.  He was also noted to have an SANJUANITA on CKD stage IV.  Nephrology was consulted on the case.  He was started on a careful IVF hydration.  His CT scan also showed cholelithiasis.  Ultrasound was done which showed cholecystitis.  Surgery was then consulted on the case and patient underwent laparoscopic cholecystectomy on 4/28 which needed to be converted to open cholecystectomy, patient was persistently hypotensive during surgery and postoperatively  required IV pressor medication, thus he was care for in the surgical ICU for additional treatment.  On 4/29 the patient was transferred back to the medical service for ongoing management.     Interval Problem Update    Patient creatinine remains close to baseline at 2.19    Patient encouraged to continue with his oral fluid input    Afebrile    MRI of the thoracic spine-shows the expansile nonenhancing signal involving thoracic cord T1-T4 appears unchanged.  There is a lesion extra-axial at the level of T3-T4 that deforms the cord.  Radiologist is recommending repeat MRI with additional thin sections through T 1 to T6-----> we are awaiting this on a repeat MRI to be completed    Patient will require Ativan prior to his MRI    I have discussed this patient's plan of care and discharge plan at IDT rounds today with Case Management, Nursing, Nursing leadership, and other members of the IDT team.    Consultants/Specialty  general surgery, nephrology, and neurology    Code Status  DNAR/DNI    Disposition  Patient is not  medically cleared for discharge.   Anticipate discharge to to an inpatient rehabilitation hospital.  I have placed the appropriate orders for post-discharge needs.    Review of Systems  Review of Systems   Constitutional:  Positive for malaise/fatigue. Negative for chills, diaphoresis and fever.   HENT:  Negative for congestion, hearing loss and sore throat.    Eyes:  Negative for blurred vision.   Respiratory:  Negative for cough, shortness of breath and wheezing.    Cardiovascular:  Negative for chest pain, palpitations and leg swelling.   Gastrointestinal:  Negative for abdominal pain, diarrhea, heartburn, nausea and vomiting.   Genitourinary:  Negative for dysuria, flank pain and hematuria.   Musculoskeletal:  Negative for back pain, joint pain, myalgias and neck pain.   Skin:  Negative for rash.   Neurological:  Positive for weakness. Negative for dizziness, sensory change, speech change, focal weakness and headaches.   Psychiatric/Behavioral:  The patient is not nervous/anxious.       Physical Exam  Temp:  [36.6 °C (97.9 °F)-36.8 °C (98.2 °F)] 36.8 °C (98.2 °F)  Pulse:  [62-84] 74  Resp:  [18] 18  BP: (121-149)/(61-81) 124/64  SpO2:  [91 %-95 %] 94 %    Physical Exam  Vitals and nursing note reviewed.   HENT:      Head: Normocephalic and atraumatic.      Nose: No congestion.      Mouth/Throat:      Mouth: Mucous membranes are moist.   Eyes:      Extraocular Movements: Extraocular movements intact.      Conjunctiva/sclera: Conjunctivae normal.   Cardiovascular:      Rate and Rhythm: Normal rate and regular rhythm.      Heart sounds: Murmur heard.   Pulmonary:      Effort: Pulmonary effort is normal.      Breath sounds: Normal breath sounds.   Abdominal:      Tenderness: There is abdominal tenderness (Mild). There is no guarding or rebound.      Comments: Right upper quadrant dressing over the surgical area with a NAHUN drain in place, draining serosanguineous fluid   Musculoskeletal:      Cervical back: No  tenderness.      Right lower leg: No edema.      Left lower leg: No edema.   Skin:     General: Skin is warm and dry.      Coloration: Skin is pale.   Neurological:      Mental Status: He is alert and oriented to person, place, and time.      Cranial Nerves: No cranial nerve deficit.      Motor: Weakness present.       Fluids    Intake/Output Summary (Last 24 hours) at 5/7/2023 0922  Last data filed at 5/7/2023 0359  Gross per 24 hour   Intake --   Output 2280 ml   Net -2280 ml         Laboratory  Recent Labs     05/05/23  1009 05/07/23  0613   WBC 13.4* 13.4*   RBC 3.17* 3.13*   HEMOGLOBIN 9.5* 9.4*   HEMATOCRIT 29.2* 29.3*   MCV 92.1 93.6   MCH 30.0 30.0   MCHC 32.5* 32.1*   RDW 52.3* 53.1*   PLATELETCT 624* 525*   MPV 10.2 10.2       Recent Labs     05/05/23  1009 05/06/23  0223 05/07/23  0613   SODIUM 138 136 138   POTASSIUM 4.1 4.5 4.4   CHLORIDE 103 101 101   CO2 26 28 26   GLUCOSE 161* 198* 146*   BUN 32* 33* 35*   CREATININE 1.73* 1.89* 2.19*   CALCIUM 9.0 9.0 9.1                         Imaging  MR-THORACIC SPINE-WITH & W/O   Final Result         Stable appearance of expansile nonenhancing signal abnormality involving the upper thoracic cord between T1 and T4. Also noted is a small extra-axial collection/web along the right posterolateral spinal canal at T3-4 that deforms the cord and displaces    it anteriorly and to the left.   This could represent an Arachnoid web or arachnoid cyst with extensive mass effect and secondary edema of the cord.   Recommend additional thin section T2 and post contrast images through T1-T6 to better assess the abnormality.      MR-BRAIN-WITH & W/O   Final Result         No acute intracranial process.      Age-related volume loss and chronic microvascular ischemic changes.         DX-CHEST-PORTABLE (1 VIEW)   Final Result         1.  Pulmonary edema and/or infiltrates are identified, which are stable since the prior exam.   2.  Cardiomegaly   3.  Atherosclerosis       DX-CHEST-PORTABLE (1 VIEW)   Final Result         1.  Pulmonary edema and/or infiltrates are identified, which are stable since the prior exam.   2.  Left PICC line tip terminates in the left axilla, stable since prior study.      DX-CHEST-PORTABLE (1 VIEW)   Final Result      1.  No significant change.   2.  Possible left PICC line with tip projecting at the axillary vein.      DX-CHEST-PORTABLE (1 VIEW)   Final Result      1.  Mildly improved pulmonary opacities.   2.  Possible left PICC line with tip projecting at the axillary vein.   3.  Likely trace right pleural effusion.      DX-CHEST-LIMITED (1 VIEW)   Final Result      1.  Placement of endotracheal tube with tip projecting over the mid trachea      2.  Left arm catheter present which is presumably venous in projects in the expected position of the left axillary vein      3.  Enlarged cardiac silhouette      NM-HEPATOBILIARY SCAN   Final Result      Hepatobiliary scan findings suspicious for acute cholecystitis.      MR-THORACIC SPINE-WITH & W/O   Final Result      1.  There is abnormal expansile T2 hyperintense lesion in the right side of the thoracic spinal cord at the levels of T2 and T3 Mild contrast enhancement is seen. This lesion is suspicious for spinal cord neoplasm. Other remote differential diagnosis    includes transverse myelitis.   2.  Exaggerated thoracic kyphosis.   3.  Thoracic dextroscoliosis.   4.  Minimal degenerative changes.   5.  Right pleural effusion.      MR-LUMBAR SPINE-WITH & W/O   Final Result      1.  Multifocal degenerative disease in the lumbar spine as described above.   2.  Moderate central canal and severe lateral recess stenosis at the level of L4-5. The exiting bilateral L5 nerve roots might have been impinged at the lateral recess.      MR-CERVICAL SPINE-WITH & W/O   Final Result      1.  There is abnormal expansile intramedullary T2 signal intensity lesion in the right cerebellum. Thoracic spinal cord at the level of  T2 on T3. Mild diffuse contrast enhancement is seen. This finding is suspicious for spinal cord neoplasm. The other    less likely differential diagnosis includes transverse myelitis. Follow-up is recommended after 4 weeks.   2.  Mild degenerative disease in the cervical spine as described above.   3.  There is no evidence of infection in the cervical spine.      US-RUQ   Final Result         1.  Acute cholecystitis.   2.  Atrophic right kidney.      DX-CHEST-LIMITED (1 VIEW)   Final Result      Perihilar interstitial edema and basilar atelectasis and/or consolidation. Underlying infection is possible.      MR-LUMBAR SPINE-W/O   Final Result      1.  Lower lumbar spine predominant degenerative changes as described in detail above, progressed from 2010 MRI.   2.  Edema at the L4-L5 disc space and L5 superior endplate likely represents degenerative changes. Less likely this could represent a low-grade or early discitis osteomyelitis. Correlate for evidence of infection.   3.  Acute appearing Schmorl's node at L3-L4, which can be a source of pain.      US-RENAL   Final Result      1.  Atrophic kidneys. Echogenic bilateral renal parenchyma could relate to medical renal disease.      2.  No hydronephrosis. No renal calculus.      DX-CHEST-PORTABLE (1 VIEW)   Final Result      No acute cardiac or pulmonary abnormalities are identified.      CT-ABDOMEN-PELVIS W/O   Final Result      1.  Findings suspicious for focal colitis at the hepatic flexure, less likely cholecystitis or duodenitis with secondary involvement of the colon. Infectious, inflammatory and ischemic etiologies are considerations. Underlying mass is not excluded.   2.  Cholelithiasis with distention of the gallbladder   3.  BILATERAL renal atrophy   4.  Subcentimeter LEFT adrenal myelolipoma   5.  12 mm RIGHT adrenal nodule likely an adenoma absent a history of cancer   6.  Subcentimeter RIGHT hepatic lesion, likely a cyst absent a history of cancer   7.   Atherosclerosis   8.  Colonic diverticulosis      CT-HEAD W/O   Final Result      1.  Cerebral atrophy.      2.  White matter lucencies most consistent with small vessel ischemic change versus demyelination or gliosis.      3.  Otherwise, Head CT without contrast with no acute findings. No evidence of acute cerebral infarction, hemorrhage or mass lesion.         MR-THORACIC SPINE-WITH & W/O    (Results Pending)          Assessment/Plan  Abnormal MRI- (present on admission)  Assessment & Plan  MRI on admission showing T2/T3  hyperintense lesion in the right side of the thoracic spinal cord at the levels of T2 and T3 Mild contrast enhancement is seen. This lesion is suspicious for spinal cord neoplasm or possible transverse myelitis       5/6 MRI thoracic spine reorders with radiology request for the thin sections through the T1-T6      Acute gangrenous cholecystitis- (present on admission)  Assessment & Plan  Found to have gangrenous cholecystitis s/p laprascopic converted open cholecystectomy 4/28/2023  Surgery continues to follow   No longer on antibiotics, as we have source control   Monitor closely for signs of infection   Post op pain control   Bowel regiment   Mobilize as able   Diet as tolerated     Hypercalcemia- (present on admission)  Assessment & Plan  Likely due to immobility   Resolved with IVF hydration  Continue to follow and treat accordingly     Hypomagnesemia- (present on admission)  Assessment & Plan  resolved  Goal >2  replace as needed     Cholelithiasis with acute cholecystitis- (present on admission)  Assessment & Plan  S/p surgical removal, converted to open cholecystectomy  NAHUN drain, monitor output  The patient is tolerating a diet already, monitor bowel function    Hypokalemia- (present on admission)  Assessment & Plan  Stable, monitor CMP and replace as needed   - goal K >4    Thrombocytopenia (HCC)- (present on admission)  Assessment & Plan  Resolved, plt are stable     High anion gap  metabolic acidosis- (present on admission)  Assessment & Plan  Acute on chronic kidney disease, -appears back at baseline on 5/3/2023    Resolved    Hyponatremia- (present on admission)  Assessment & Plan  Improved, slightly down today at 134, mild and asymptomatic   Cont to monitor BMP     Weakness of both lower extremities- (present on admission)  Assessment & Plan  T2/T3 intramedullary lesion of unclear chronicity and clinical significance as well as 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     Repeat MRI thoracic spine with and without contrast to further evaluate the previously seen lesion at [id number] however radiology requesting additional images on 5/6/23      IVIG initiated on 5/1/2023 and discontinued on 5/2/2023 after discussion with neurology    MRI of the brain and MRI of thoracic spine has been ordered as per neurology request       neurosurgery  did not feel that this was a neurosurgical issue.    Sepsis (HCC)- (present on admission)  Assessment & Plan  This is Sepsis Present on admission  SIRS criteria identified on my evaluation include: Leukocytosis, with WBC greater than 12,000  Source is colitis  Sepsis protocol initiated  Fluid resuscitation ordered per protocol  Crystalloid Fluid Administration: Fluid resuscitation ordered per standard protocol - 30 mL/kg per current or ideal body weight  IV antibiotics as appropriate for source of sepsis  Reassessment: I have reassessed the patient's hemodynamic status    Due to gangrenous gallbladder requiring open carmelo   resolved   off antibiotics      Colitis- (present on admission)  Assessment & Plan  Cultures negative, monitor  Initial admitting impression, currently improved  Diet as tolerated     Acute kidney injury superimposed on CKD ([facility])- (present on admission)  Assessment & Plan   Resolved    Avoid nephrotoxins       Diabetic peripheral neuropathy (HCC)- (present on admission)  Assessment &  Plan  Cymbalta, medical treatment    Essential hypertension- (present on admission)  Assessment & Plan  Monitor closely, the patient had a hypotensive episode postoperatively  Off pressors and has since been resumed  , amlodipine and coreg   Monitor BP and adjust medications as needed   Prn hydralazine     Type 2 diabetes mellitus with kidney complication, without long-term current use of insulin (HCC)- (present on admission)  Assessment & Plan  Ha1c 6.4% 4 months ago   On ozempic and januvia outpatient   Continue ISS and hypoglycemic protocol while inpatient     Hypercholesterolemia- (present on admission)  Assessment & Plan  Stable, continue Lipitor    Chronic kidney disease (CKD), stage IV (severe) (HCC)- (present on admission)  Assessment & Plan  Appears to be at baseline at 5/3/2023                VTE prophylaxis: SCDs/TEDs and heparin ppx    I have performed a physical exam and reviewed and updated ROS and Plan today (5/7/2023). In review of yesterday's note (5/6/2023), there are no changes except as documented above.

## 2023-05-07 NOTE — PROGRESS NOTES
Assumed care of patient at 0645. Bedside report received. Assessment complete.  AA&Ox4. Denies CP/SOB.  Reporting 0/10 pain. Declined intervention at this time.  Educated patient regarding pharmacologic and non pharmacologic modalities for pain management.  Skin per flowsheets  Tolerating renal diet. Denies N/V.  + void. Last BM 5/5  Pt ambulates max assist d/t BLE Weakness.  All needs met at this time. Call light within reach. Pt calls appropriately. Bed low and locked, non skid socks in place. Hourly rounding in place.

## 2023-05-07 NOTE — CARE PLAN
Problem: Knowledge Deficit - Standard  Goal: Patient and family/care givers will demonstrate understanding of plan of care, disease process/condition, diagnostic tests and medications  Outcome: Progressing     Problem: Hemodynamics  Goal: Patient's hemodynamics, fluid balance and neurologic status will be stable or improve  Outcome: Progressing     Problem: Fluid Volume  Goal: Fluid volume balance will be maintained  Outcome: Progressing   The patient is Stable - Low risk of patient condition declining or worsening    Shift Goals  Clinical Goals: MRI Performed  Patient Goals: MRI Performed  Family Goals: N/A    Progress made toward(s) clinical / shift goals:  Educated patient on plan of care this shift, patient output per flowsheets, MRI unable to be performed this shift, scheduled 5/8    Patient is not progressing towards the following goals:

## 2023-05-08 ENCOUNTER — APPOINTMENT (OUTPATIENT)
Dept: RADIOLOGY | Facility: MEDICAL CENTER | Age: 83
DRG: 853 | End: 2023-05-08
Attending: HOSPITALIST
Payer: MEDICARE

## 2023-05-08 LAB
ANION GAP SERPL CALC-SCNC: 12 MMOL/L (ref 7–16)
BUN SERPL-MCNC: 36 MG/DL (ref 8–22)
CALCIUM SERPL-MCNC: 9.4 MG/DL (ref 8.5–10.5)
CHLORIDE SERPL-SCNC: 96 MMOL/L (ref 96–112)
CO2 SERPL-SCNC: 25 MMOL/L (ref 20–33)
CREAT SERPL-MCNC: 1.98 MG/DL (ref 0.5–1.4)
ERYTHROCYTE [DISTWIDTH] IN BLOOD BY AUTOMATED COUNT: 52.6 FL (ref 35.9–50)
GFR SERPLBLD CREATININE-BSD FMLA CKD-EPI: 33 ML/MIN/1.73 M 2
GLUCOSE BLD STRIP.AUTO-MCNC: 131 MG/DL (ref 65–99)
GLUCOSE BLD STRIP.AUTO-MCNC: 135 MG/DL (ref 65–99)
GLUCOSE BLD STRIP.AUTO-MCNC: 164 MG/DL (ref 65–99)
GLUCOSE BLD STRIP.AUTO-MCNC: 179 MG/DL (ref 65–99)
GLUCOSE SERPL-MCNC: 142 MG/DL (ref 65–99)
HCT VFR BLD AUTO: 30.8 % (ref 42–52)
HGB BLD-MCNC: 9.7 G/DL (ref 14–18)
MCH RBC QN AUTO: 29 PG (ref 27–33)
MCHC RBC AUTO-ENTMCNC: 31.5 G/DL (ref 33.7–35.3)
MCV RBC AUTO: 92.2 FL (ref 81.4–97.8)
PLATELET # BLD AUTO: 523 K/UL (ref 164–446)
PMV BLD AUTO: 10.4 FL (ref 9–12.9)
POTASSIUM SERPL-SCNC: 4.3 MMOL/L (ref 3.6–5.5)
RBC # BLD AUTO: 3.34 M/UL (ref 4.7–6.1)
SODIUM SERPL-SCNC: 133 MMOL/L (ref 135–145)
WBC # BLD AUTO: 13.3 K/UL (ref 4.8–10.8)

## 2023-05-08 PROCEDURE — 72157 MRI CHEST SPINE W/O & W/DYE: CPT

## 2023-05-08 PROCEDURE — 700117 HCHG RX CONTRAST REV CODE 255: Performed by: HOSPITALIST

## 2023-05-08 PROCEDURE — 700102 HCHG RX REV CODE 250 W/ 637 OVERRIDE(OP): Performed by: SURGERY

## 2023-05-08 PROCEDURE — A9270 NON-COVERED ITEM OR SERVICE: HCPCS | Performed by: FAMILY MEDICINE

## 2023-05-08 PROCEDURE — A9270 NON-COVERED ITEM OR SERVICE: HCPCS | Performed by: HOSPITALIST

## 2023-05-08 PROCEDURE — 99232 SBSQ HOSP IP/OBS MODERATE 35: CPT | Performed by: HOSPITALIST

## 2023-05-08 PROCEDURE — 99232 SBSQ HOSP IP/OBS MODERATE 35: CPT | Performed by: PSYCHIATRY & NEUROLOGY

## 2023-05-08 PROCEDURE — 770001 HCHG ROOM/CARE - MED/SURG/GYN PRIV*

## 2023-05-08 PROCEDURE — 700102 HCHG RX REV CODE 250 W/ 637 OVERRIDE(OP): Performed by: FAMILY MEDICINE

## 2023-05-08 PROCEDURE — 700111 HCHG RX REV CODE 636 W/ 250 OVERRIDE (IP): Performed by: STUDENT IN AN ORGANIZED HEALTH CARE EDUCATION/TRAINING PROGRAM

## 2023-05-08 PROCEDURE — A9579 GAD-BASE MR CONTRAST NOS,1ML: HCPCS | Performed by: HOSPITALIST

## 2023-05-08 PROCEDURE — 99024 POSTOP FOLLOW-UP VISIT: CPT | Performed by: NURSE PRACTITIONER

## 2023-05-08 PROCEDURE — 700102 HCHG RX REV CODE 250 W/ 637 OVERRIDE(OP): Performed by: HOSPITALIST

## 2023-05-08 PROCEDURE — 82962 GLUCOSE BLOOD TEST: CPT | Mod: 91

## 2023-05-08 PROCEDURE — A9270 NON-COVERED ITEM OR SERVICE: HCPCS | Performed by: SURGERY

## 2023-05-08 PROCEDURE — 80048 BASIC METABOLIC PNL TOTAL CA: CPT

## 2023-05-08 PROCEDURE — 85027 COMPLETE CBC AUTOMATED: CPT

## 2023-05-08 PROCEDURE — 36415 COLL VENOUS BLD VENIPUNCTURE: CPT

## 2023-05-08 PROCEDURE — 700111 HCHG RX REV CODE 636 W/ 250 OVERRIDE (IP): Performed by: HOSPITALIST

## 2023-05-08 RX ORDER — LORAZEPAM 2 MG/ML
2 INJECTION INTRAMUSCULAR ONCE
Status: COMPLETED | OUTPATIENT
Start: 2023-05-08 | End: 2023-05-08

## 2023-05-08 RX ADMIN — INSULIN HUMAN 2 UNITS: 100 INJECTION, SOLUTION PARENTERAL at 21:31

## 2023-05-08 RX ADMIN — HEPARIN SODIUM 5000 UNITS: 5000 INJECTION, SOLUTION INTRAVENOUS; SUBCUTANEOUS at 21:25

## 2023-05-08 RX ADMIN — CARVEDILOL 12.5 MG: 12.5 TABLET, FILM COATED ORAL at 17:19

## 2023-05-08 RX ADMIN — GADOTERIDOL 18 ML: 279.3 INJECTION, SOLUTION INTRAVENOUS at 15:10

## 2023-05-08 RX ADMIN — DULOXETINE HYDROCHLORIDE 30 MG: 30 CAPSULE, DELAYED RELEASE ORAL at 17:19

## 2023-05-08 RX ADMIN — ATORVASTATIN CALCIUM 10 MG: 10 TABLET, FILM COATED ORAL at 17:19

## 2023-05-08 RX ADMIN — LORAZEPAM 1 MG: 2 INJECTION INTRAMUSCULAR; INTRAVENOUS at 13:51

## 2023-05-08 RX ADMIN — HEPARIN SODIUM 5000 UNITS: 5000 INJECTION, SOLUTION INTRAVENOUS; SUBCUTANEOUS at 04:44

## 2023-05-08 RX ADMIN — AMLODIPINE BESYLATE 5 MG: 10 TABLET ORAL at 04:44

## 2023-05-08 RX ADMIN — INSULIN HUMAN 2 UNITS: 100 INJECTION, SOLUTION PARENTERAL at 13:55

## 2023-05-08 RX ADMIN — ALLOPURINOL 100 MG: 100 TABLET ORAL at 04:44

## 2023-05-08 RX ADMIN — Medication 1 APPLICATOR: at 04:44

## 2023-05-08 RX ADMIN — SODIUM BICARBONATE 1300 MG: 650 TABLET ORAL at 17:19

## 2023-05-08 RX ADMIN — HEPARIN SODIUM 5000 UNITS: 5000 INJECTION, SOLUTION INTRAVENOUS; SUBCUTANEOUS at 13:52

## 2023-05-08 RX ADMIN — SODIUM BICARBONATE 1300 MG: 650 TABLET ORAL at 04:44

## 2023-05-08 RX ADMIN — SPIRONOLACTONE 25 MG: 50 TABLET ORAL at 04:44

## 2023-05-08 RX ADMIN — CARVEDILOL 12.5 MG: 12.5 TABLET, FILM COATED ORAL at 09:09

## 2023-05-08 RX ADMIN — Medication 1 APPLICATOR: at 17:19

## 2023-05-08 RX ADMIN — FAMOTIDINE 20 MG: 20 TABLET, FILM COATED ORAL at 04:44

## 2023-05-08 ASSESSMENT — ENCOUNTER SYMPTOMS
FEVER: 0
SORE THROAT: 0
NERVOUS/ANXIOUS: 0
BACK PAIN: 0
ABDOMINAL PAIN: 0
DIAPHORESIS: 0
DIZZINESS: 0
SHORTNESS OF BREATH: 0
NAUSEA: 0
HEARTBURN: 0
FLANK PAIN: 0
WEAKNESS: 1
NECK PAIN: 0
PALPITATIONS: 0
MYALGIAS: 0
CHILLS: 0
FOCAL WEAKNESS: 0
VOMITING: 0
HEADACHES: 0
WHEEZING: 0
BLURRED VISION: 0
DIARRHEA: 0
SENSORY CHANGE: 0
SPEECH CHANGE: 0
COUGH: 0

## 2023-05-08 NOTE — PROGRESS NOTES
Hospital Medicine Daily Progress Note    Date of Service  5/8/2023    Chief Complaint  Tyrone Cline is a 82 y.o. male admitted 4/21/2023 with Colitis.    Hospital Course  Admitted with sepsis secondary to colitis.  Patient was started on empiric coverage with IV Rocephin and Flagyl.  He was also noted to have bilateral lower extremity weakness.  MRI of the lumbar spine was done which showed possible edema.  Neurology was then consulted on the case.  He was also noted to have an SANJUANITA on CKD stage IV.  Nephrology was consulted on the case.  He was started on a careful IVF hydration.  His CT scan also showed cholelithiasis.  Ultrasound was done which showed cholecystitis.  Surgery was then consulted on the case and patient underwent laparoscopic cholecystectomy on 4/28 which needed to be converted to open cholecystectomy, patient was persistently hypotensive during surgery and postoperatively  required IV pressor medication, thus he was care for in the surgical ICU for additional treatment.  On 4/29 the patient was transferred back to the medical service for ongoing management.     Interval Problem Update    Patient creatinine remains close to baseline at 1.98    Surgery has removed his staples and is NAHUN drain and has signed off on 5/8/2023      Afebrile    Patient complaining midline abdominal discomfort crampy intensity 4 out of 10 intermittent with no radiation    MRI of the thoracic spine-shows the expansile nonenhancing signal involving thoracic cord T1-T4 appears unchanged.  There is a lesion extra-axial at the level of T3-T4 that deforms the cord.  Radiologist is recommending repeat MRI with additional thin sections through T 1 to T6-----> MRI needs to be done with anesthesia and is planned to be done this morning on 5/8/2023        I have discussed this patient's plan of care and discharge plan at IDT rounds today with Case Management, Nursing, Nursing leadership, and other members of the IDT  team.    Consultants/Specialty  general surgery, nephrology, and neurology    Code Status  DNAR/DNI    Disposition  Patient is not medically cleared for discharge.   Anticipate discharge to to an inpatient rehabilitation hospital.  I have placed the appropriate orders for post-discharge needs.    Review of Systems  Review of Systems   Constitutional:  Positive for malaise/fatigue. Negative for chills, diaphoresis and fever.   HENT:  Negative for congestion, hearing loss and sore throat.    Eyes:  Negative for blurred vision.   Respiratory:  Negative for cough, shortness of breath and wheezing.    Cardiovascular:  Negative for chest pain, palpitations and leg swelling.   Gastrointestinal:  Negative for abdominal pain, diarrhea, heartburn, nausea and vomiting.   Genitourinary:  Negative for dysuria, flank pain and hematuria.   Musculoskeletal:  Negative for back pain, joint pain, myalgias and neck pain.   Skin:  Negative for rash.   Neurological:  Positive for weakness. Negative for dizziness, sensory change, speech change, focal weakness and headaches.   Psychiatric/Behavioral:  The patient is not nervous/anxious.       Physical Exam  Temp:  [36.1 °C (97 °F)-37.1 °C (98.8 °F)] 36.6 °C (97.9 °F)  Pulse:  [79-82] 82  Resp:  [18-20] 20  BP: (126-139)/(71-75) 131/75  SpO2:  [91 %-95 %] 91 %    Physical Exam  Vitals and nursing note reviewed.   HENT:      Head: Normocephalic and atraumatic.      Nose: No congestion.      Mouth/Throat:      Mouth: Mucous membranes are moist.   Eyes:      Extraocular Movements: Extraocular movements intact.      Conjunctiva/sclera: Conjunctivae normal.   Cardiovascular:      Rate and Rhythm: Normal rate and regular rhythm.      Heart sounds: Murmur heard.   Pulmonary:      Effort: Pulmonary effort is normal.      Breath sounds: Normal breath sounds.   Abdominal:      Tenderness: There is abdominal tenderness (Mild). There is no guarding or rebound.   Musculoskeletal:      Cervical back: No  tenderness.      Right lower leg: No edema.      Left lower leg: No edema.   Skin:     General: Skin is warm and dry.      Coloration: Skin is pale.   Neurological:      Mental Status: He is alert and oriented to person, place, and time.      Cranial Nerves: No cranial nerve deficit.      Motor: Weakness present.       Fluids    Intake/Output Summary (Last 24 hours) at 5/8/2023 1216  Last data filed at 5/8/2023 0900  Gross per 24 hour   Intake 100 ml   Output 2375 ml   Net -2275 ml         Laboratory  Recent Labs     05/07/23  0613 05/08/23 0513   WBC 13.4* 13.3*   RBC 3.13* 3.34*   HEMOGLOBIN 9.4* 9.7*   HEMATOCRIT 29.3* 30.8*   MCV 93.6 92.2   MCH 30.0 29.0   MCHC 32.1* 31.5*   RDW 53.1* 52.6*   PLATELETCT 525* 523*   MPV 10.2 10.4       Recent Labs     05/06/23 0223 05/07/23 0613 05/08/23 0513   SODIUM 136 138 133*   POTASSIUM 4.5 4.4 4.3   CHLORIDE 101 101 96   CO2 28 26 25   GLUCOSE 198* 146* 142*   BUN 33* 35* 36*   CREATININE 1.89* 2.19* 1.98*   CALCIUM 9.0 9.1 9.4                         Imaging  MR-THORACIC SPINE-WITH & W/O   Final Result         Stable appearance of expansile nonenhancing signal abnormality involving the upper thoracic cord between T1 and T4. Also noted is a small extra-axial collection/web along the right posterolateral spinal canal at T3-4 that deforms the cord and displaces    it anteriorly and to the left.   This could represent an Arachnoid web or arachnoid cyst with extensive mass effect and secondary edema of the cord.   Recommend additional thin section T2 and post contrast images through T1-T6 to better assess the abnormality.      MR-BRAIN-WITH & W/O   Final Result         No acute intracranial process.      Age-related volume loss and chronic microvascular ischemic changes.         DX-CHEST-PORTABLE (1 VIEW)   Final Result         1.  Pulmonary edema and/or infiltrates are identified, which are stable since the prior exam.   2.  Cardiomegaly   3.  Atherosclerosis       DX-CHEST-PORTABLE (1 VIEW)   Final Result         1.  Pulmonary edema and/or infiltrates are identified, which are stable since the prior exam.   2.  Left PICC line tip terminates in the left axilla, stable since prior study.      DX-CHEST-PORTABLE (1 VIEW)   Final Result      1.  No significant change.   2.  Possible left PICC line with tip projecting at the axillary vein.      DX-CHEST-PORTABLE (1 VIEW)   Final Result      1.  Mildly improved pulmonary opacities.   2.  Possible left PICC line with tip projecting at the axillary vein.   3.  Likely trace right pleural effusion.      DX-CHEST-LIMITED (1 VIEW)   Final Result      1.  Placement of endotracheal tube with tip projecting over the mid trachea      2.  Left arm catheter present which is presumably venous in projects in the expected position of the left axillary vein      3.  Enlarged cardiac silhouette      NM-HEPATOBILIARY SCAN   Final Result      Hepatobiliary scan findings suspicious for acute cholecystitis.      MR-THORACIC SPINE-WITH & W/O   Final Result      1.  There is abnormal expansile T2 hyperintense lesion in the right side of the thoracic spinal cord at the levels of T2 and T3 Mild contrast enhancement is seen. This lesion is suspicious for spinal cord neoplasm. Other remote differential diagnosis    includes transverse myelitis.   2.  Exaggerated thoracic kyphosis.   3.  Thoracic dextroscoliosis.   4.  Minimal degenerative changes.   5.  Right pleural effusion.      MR-LUMBAR SPINE-WITH & W/O   Final Result      1.  Multifocal degenerative disease in the lumbar spine as described above.   2.  Moderate central canal and severe lateral recess stenosis at the level of L4-5. The exiting bilateral L5 nerve roots might have been impinged at the lateral recess.      MR-CERVICAL SPINE-WITH & W/O   Final Result      1.  There is abnormal expansile intramedullary T2 signal intensity lesion in the right cerebellum. Thoracic spinal cord at the level of  T2 on T3. Mild diffuse contrast enhancement is seen. This finding is suspicious for spinal cord neoplasm. The other    less likely differential diagnosis includes transverse myelitis. Follow-up is recommended after 4 weeks.   2.  Mild degenerative disease in the cervical spine as described above.   3.  There is no evidence of infection in the cervical spine.      US-RUQ   Final Result         1.  Acute cholecystitis.   2.  Atrophic right kidney.      DX-CHEST-LIMITED (1 VIEW)   Final Result      Perihilar interstitial edema and basilar atelectasis and/or consolidation. Underlying infection is possible.      MR-LUMBAR SPINE-W/O   Final Result      1.  Lower lumbar spine predominant degenerative changes as described in detail above, progressed from 2010 MRI.   2.  Edema at the L4-L5 disc space and L5 superior endplate likely represents degenerative changes. Less likely this could represent a low-grade or early discitis osteomyelitis. Correlate for evidence of infection.   3.  Acute appearing Schmorl's node at L3-L4, which can be a source of pain.      US-RENAL   Final Result      1.  Atrophic kidneys. Echogenic bilateral renal parenchyma could relate to medical renal disease.      2.  No hydronephrosis. No renal calculus.      DX-CHEST-PORTABLE (1 VIEW)   Final Result      No acute cardiac or pulmonary abnormalities are identified.      CT-ABDOMEN-PELVIS W/O   Final Result      1.  Findings suspicious for focal colitis at the hepatic flexure, less likely cholecystitis or duodenitis with secondary involvement of the colon. Infectious, inflammatory and ischemic etiologies are considerations. Underlying mass is not excluded.   2.  Cholelithiasis with distention of the gallbladder   3.  BILATERAL renal atrophy   4.  Subcentimeter LEFT adrenal myelolipoma   5.  12 mm RIGHT adrenal nodule likely an adenoma absent a history of cancer   6.  Subcentimeter RIGHT hepatic lesion, likely a cyst absent a history of cancer   7.   Atherosclerosis   8.  Colonic diverticulosis      CT-HEAD W/O   Final Result      1.  Cerebral atrophy.      2.  White matter lucencies most consistent with small vessel ischemic change versus demyelination or gliosis.      3.  Otherwise, Head CT without contrast with no acute findings. No evidence of acute cerebral infarction, hemorrhage or mass lesion.         MR-THORACIC SPINE-WITH & W/O    (Results Pending)          Assessment/Plan  Abnormal MRI- (present on admission)  Assessment & Plan  MRI on admission showing T2/T3  hyperintense lesion in the right side of the thoracic spinal cord at the levels of T2 and T3 Mild contrast enhancement is seen. This lesion is suspicious for spinal cord neoplasm or possible transverse myelitis       5/8 MRI thoracic spine reorders with radiology request for the thin sections through the T1-T6-to be completed with anesthesia      Acute gangrenous cholecystitis- (present on admission)  Assessment & Plan  Found to have gangrenous cholecystitis s/p laprascopic converted open cholecystectomy 4/28/2023  Surgery continues to follow   No longer on antibiotics, as we have source control   Monitor closely for signs of infection   Post op pain control   Bowel regiment   Mobilize as able   Diet as tolerated     Hypercalcemia- (present on admission)  Assessment & Plan  Likely due to immobility   Resolved with IVF hydration  Continue to follow and treat accordingly     Hypomagnesemia- (present on admission)  Assessment & Plan  resolved  Goal >2  replace as needed     Cholelithiasis with acute cholecystitis- (present on admission)  Assessment & Plan  S/p surgical removal, converted to open cholecystectomy  NAHUN drain-removed on 5/8/2023  The patient is tolerating a diet already, monitor bowel function    Hypokalemia- (present on admission)  Assessment & Plan  Stable, monitor CMP and replace as needed   - goal K >4    Thrombocytopenia (HCC)- (present on admission)  Assessment & Plan  Resolved,  plt are stable     High anion gap metabolic acidosis- (present on admission)  Assessment & Plan  Acute on chronic kidney disease, -appears back at baseline on 5/3/2023    Resolved    Hyponatremia- (present on admission)  Assessment & Plan  mild and asymptomatic   Cont to monitor BMP     Weakness of both lower extremities- (present on admission)  Assessment & Plan  T2/T3 intramedullary lesion of unclear chronicity and clinical significance as well as 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     Repeat MRI thoracic spine with and without contrast to further evaluate the previously seen lesion at [id number] however radiology requesting additional images on 5/6/23      IVIG initiated on 5/1/2023 and discontinued on 5/2/2023 after discussion with neurology           neurosurgery  did not feel that this was a neurosurgical issue.    Sepsis (HCC)- (present on admission)  Assessment & Plan  This is Sepsis Present on admission  SIRS criteria identified on my evaluation include: Leukocytosis, with WBC greater than 12,000  Source is colitis  Sepsis protocol initiated  Fluid resuscitation ordered per protocol  Crystalloid Fluid Administration: Fluid resuscitation ordered per standard protocol - 30 mL/kg per current or ideal body weight  IV antibiotics as appropriate for source of sepsis  Reassessment: I have reassessed the patient's hemodynamic status    Due to gangrenous gallbladder requiring open carmelo   resolved   off antibiotics      Colitis- (present on admission)  Assessment & Plan  Cultures negative, monitor  Initial admitting impression, currently improved  Diet as tolerated     Acute kidney injury superimposed on CKD (HCC)- (present on admission)  Assessment & Plan   Resolved    Avoid nephrotoxins       Diabetic peripheral neuropathy (HCC)- (present on admission)  Assessment & Plan  Cymbalta, medical treatment    Essential hypertension- (present on admission)  Assessment &  Plan  Monitor closely, the patient had a hypotensive episode postoperatively  Off pressors and has since been resumed  , amlodipine and coreg   Monitor BP and adjust medications as needed   Prn hydralazine     Type 2 diabetes mellitus with kidney complication, without long-term current use of insulin (HCC)- (present on admission)  Assessment & Plan  Ha1c 6.4% 4 months ago   On ozempic and januvia outpatient   Continue ISS and hypoglycemic protocol while inpatient     Hypercholesterolemia- (present on admission)  Assessment & Plan  Stable, continue Lipitor    Chronic kidney disease (CKD), stage IV (severe) (HCC)- (present on admission)  Assessment & Plan  Appears to be at baseline at 5/3/2023                VTE prophylaxis: SCDs/TEDs and heparin ppx    I have performed a physical exam and reviewed and updated ROS and Plan today (5/8/2023). In review of yesterday's note (5/7/2023), there are no changes except as documented above.

## 2023-05-08 NOTE — CARE PLAN
Problem: Knowledge Deficit - Standard  Goal: Patient and family/care givers will demonstrate understanding of plan of care, disease process/condition, diagnostic tests and medications  Outcome: Progressing     Problem: Hemodynamics  Goal: Patient's hemodynamics, fluid balance and neurologic status will be stable or improve  Outcome: Progressing     Problem: Fluid Volume  Goal: Fluid volume balance will be maintained  Outcome: Progressing   The patient is Stable - Low risk of patient condition declining or worsening    Shift Goals  Clinical Goals: MRI Performed  Patient Goals: MRI Performed  Family Goals: N/A    Progress made toward(s) clinical / shift goals:  MRI performed, pain medicated per MAR, educated patient on plan of care this shift, patient verbalized understanding     Patient is not progressing towards the following goals:

## 2023-05-08 NOTE — DISCHARGE INSTRUCTIONS
Open Cholecystectomy, Care After  This sheet gives you information about how to care for yourself after your procedure. Your health care provider may also give you more specific instructions. If you have problems or questions, contact your health care provider.  What can I expect after the procedure?  After the procedure, it is common to have:  Pain at your incision site. You will be given medicines to control this pain.  Mild nausea or vomiting.  Follow these instructions at home:  Incision care    Follow instructions from your health care provider about how to take care of your incision. Make sure you:  Wash your hands with soap and water before you change your bandage (dressing). If soap and water are not available, use hand .  Change your dressing as told by your health care provider.  Leave stitches (sutures), skin glue, or adhesive strips in place. These skin closures may need to be in place for 2 weeks or longer. If adhesive strip edges start to loosen and curl up, you may trim the loose edges. Do not remove adhesive strips completely unless your health care provider tells you to do that.  Do not take baths, swim, or use a hot tub until your health care provider approves. Ask your health care provider if you can take showers. You may only be allowed to take sponge baths for bathing.  Check your incision area every day for signs of infection. Check for:  More redness, swelling, or pain.  More fluid or blood.  Warmth.  Pus or a bad smell.  Activity  Do not drive or use heavy machinery while taking prescription pain medicine.  Do not lift anything that is heavier than 10 lb (4.5 kg) until your health care provider approves.  Do not play contact sports until your health care provider approves.  Do not drive for 24 hours if you were given a medicine to help you relax (sedative).  Rest as needed. Do not return to work or school until your health care provider approves.  General instructions  Take  over-the-counter and prescription medicines only as told by your health care provider.  To prevent or treat constipation while you are taking prescription pain medicine, your health care provider may recommend that you:  Drink enough fluid to keep your urine clear or pale yellow.  Take over-the-counter or prescription medicines.  Eat foods that are high in fiber, such as fresh fruits and vegetables, whole grains, and beans.  Limit foods that are high in fat and processed sugars, such as fried and sweet foods.  Contact a health care provider if:  You develop a rash.  You have more redness, swelling, or pain around your incision.  You have more fluid or blood coming from your incision.  Your incision feels warm to the touch.  You have pus or a bad smell coming from your incision.  You have a fever.  Your incision breaks open.  Get help right away if:  You have trouble breathing.  You have chest pain.  You have increasing pain in your shoulders.  You faint or feel dizzy when you stand.  You have severe pain in your abdomen.  You have nausea or vomiting that lasts for more than one day.  You have leg pain.  This information is not intended to replace advice given to you by your health care provider. Make sure you discuss any questions you have with your health care provider.  Document Released: 04/04/2005 Document Revised: 11/30/2018 Document Reviewed: 06/05/2017  [employer] Patient Education © 2020 [employer]

## 2023-05-08 NOTE — PROGRESS NOTES
"  DATE: 5/8/2023 4/28 Laparoscopic converted to open cholecystectomy.    INTERVAL EVENTS:    DC staples and drain.  Follow up in clinic within one weeks time upon discharge.  [facility] to sign off at this time.  Please reconsult should the need arise.  Thank you, for allowing us to participate in this patients care.    PHYSICAL EXAMINATION:  Vital Signs: /75   Pulse 82   Temp 36.6 °C (97.9 °F) (Temporal)   Resp 20   Ht 1.803 m (5' 10.98\")   Wt 89.1 kg (196 lb 6.9 oz)   SpO2 91%     Constitutional: No acute distress.  Resting comfortably.  Afebrile and nontoxic in appearance.  Cardiovascular: Regular rate.  Respiratory: Unlabored  Abdomen: No peritoneal signs.  NAHUN drain with nonbilious colored drainage. Incisions approximated with no overt signs of infection.  Tolerating oral diet.       LABORATORY VALUES:   Recent Labs     05/05/23  1009 05/07/23  0613 05/08/23  0513   WBC 13.4* 13.4* 13.3*   RBC 3.17* 3.13* 3.34*   HEMOGLOBIN 9.5* 9.4* 9.7*   HEMATOCRIT 29.2* 29.3* 30.8*   MCV 92.1 93.6 92.2   MCH 30.0 30.0 29.0   MCHC 32.5* 32.1* 31.5*   RDW 52.3* 53.1* 52.6*   PLATELETCT 624* 525* 523*   MPV 10.2 10.2 10.4     Recent Labs     05/06/23  0223 05/07/23  0613 05/08/23  0513   SODIUM 136 138 133*   POTASSIUM 4.5 4.4 4.3   CHLORIDE 101 101 96   CO2 28 26 25   GLUCOSE 198* 146* 142*   BUN 33* 35* 36*   CREATININE 1.89* 2.19* 1.98*   CALCIUM 9.0 9.1 9.4     Recent Labs     05/05/23  1009   ASTSGOT 94*   ALTSGPT 77*   TBILIRUBIN 0.5   ALKPHOSPHAT 305*   GLOBULIN 3.8*            IMAGING:   MR-THORACIC SPINE-WITH & W/O   Final Result         Stable appearance of expansile nonenhancing signal abnormality involving the upper thoracic cord between T1 and T4. Also noted is a small extra-axial collection/web along the right posterolateral spinal canal at T3-4 that deforms the cord and displaces    it anteriorly and to the left.   This could represent an Arachnoid web or arachnoid cyst with extensive mass effect and " secondary edema of the cord.   Recommend additional thin section T2 and post contrast images through T1-T6 to better assess the abnormality.      MR-BRAIN-WITH & W/O   Final Result         No acute intracranial process.      Age-related volume loss and chronic microvascular ischemic changes.         DX-CHEST-PORTABLE (1 VIEW)   Final Result         1.  Pulmonary edema and/or infiltrates are identified, which are stable since the prior exam.   2.  Cardiomegaly   3.  Atherosclerosis      DX-CHEST-PORTABLE (1 VIEW)   Final Result         1.  Pulmonary edema and/or infiltrates are identified, which are stable since the prior exam.   2.  Left PICC line tip terminates in the left axilla, stable since prior study.      DX-CHEST-PORTABLE (1 VIEW)   Final Result      1.  No significant change.   2.  Possible left PICC line with tip projecting at the axillary vein.      DX-CHEST-PORTABLE (1 VIEW)   Final Result      1.  Mildly improved pulmonary opacities.   2.  Possible left PICC line with tip projecting at the axillary vein.   3.  Likely trace right pleural effusion.      DX-CHEST-LIMITED (1 VIEW)   Final Result      1.  Placement of endotracheal tube with tip projecting over the mid trachea      2.  Left arm catheter present which is presumably venous in projects in the expected position of the left axillary vein      3.  Enlarged cardiac silhouette      NM-HEPATOBILIARY SCAN   Final Result      Hepatobiliary scan findings suspicious for acute cholecystitis.      MR-THORACIC SPINE-WITH & W/O   Final Result      1.  There is abnormal expansile T2 hyperintense lesion in the right side of the thoracic spinal cord at the levels of T2 and T3 Mild contrast enhancement is seen. This lesion is suspicious for spinal cord neoplasm. Other remote differential diagnosis    includes transverse myelitis.   2.  Exaggerated thoracic kyphosis.   3.  Thoracic dextroscoliosis.   4.  Minimal degenerative changes.   5.  Right pleural effusion.       MR-LUMBAR SPINE-WITH & W/O   Final Result      1.  Multifocal degenerative disease in the lumbar spine as described above.   2.  Moderate central canal and severe lateral recess stenosis at the level of L4-5. The exiting bilateral L5 nerve roots might have been impinged at the lateral recess.      MR-CERVICAL SPINE-WITH & W/O   Final Result      1.  There is abnormal expansile intramedullary T2 signal intensity lesion in the right cerebellum. Thoracic spinal cord at the level of T2 on T3. Mild diffuse contrast enhancement is seen. This finding is suspicious for spinal cord neoplasm. The other    less likely differential diagnosis includes transverse myelitis. Follow-up is recommended after 4 weeks.   2.  Mild degenerative disease in the cervical spine as described above.   3.  There is no evidence of infection in the cervical spine.      US-RUQ   Final Result         1.  Acute cholecystitis.   2.  Atrophic right kidney.      DX-CHEST-LIMITED (1 VIEW)   Final Result      Perihilar interstitial edema and basilar atelectasis and/or consolidation. Underlying infection is possible.      MR-LUMBAR SPINE-W/O   Final Result      1.  Lower lumbar spine predominant degenerative changes as described in detail above, progressed from 2010 MRI.   2.  Edema at the L4-L5 disc space and L5 superior endplate likely represents degenerative changes. Less likely this could represent a low-grade or early discitis osteomyelitis. Correlate for evidence of infection.   3.  Acute appearing Schmorl's node at L3-L4, which can be a source of pain.      US-RENAL   Final Result      1.  Atrophic kidneys. Echogenic bilateral renal parenchyma could relate to medical renal disease.      2.  No hydronephrosis. No renal calculus.      DX-CHEST-PORTABLE (1 VIEW)   Final Result      No acute cardiac or pulmonary abnormalities are identified.      CT-ABDOMEN-PELVIS W/O   Final Result      1.  Findings suspicious for focal colitis at the hepatic  flexure, less likely cholecystitis or duodenitis with secondary involvement of the colon. Infectious, inflammatory and ischemic etiologies are considerations. Underlying mass is not excluded.   2.  Cholelithiasis with distention of the gallbladder   3.  BILATERAL renal atrophy   4.  Subcentimeter LEFT adrenal myelolipoma   5.  12 mm RIGHT adrenal nodule likely an adenoma absent a history of cancer   6.  Subcentimeter RIGHT hepatic lesion, likely a cyst absent a history of cancer   7.  Atherosclerosis   8.  Colonic diverticulosis      CT-HEAD W/O   Final Result      1.  Cerebral atrophy.      2.  White matter lucencies most consistent with small vessel ischemic change versus demyelination or gliosis.      3.  Otherwise, Head CT without contrast with no acute findings. No evidence of acute cerebral infarction, hemorrhage or mass lesion.         MR-THORACIC SPINE-WITH & W/O    (Results Pending)       ASSESSMENT AND PLAN:   Cholelithiasis with acute cholecystitis- (present on admission)  Assessment & Plan  4/27 HIDA scan consistent with acute cholecystitis.  4/28 Laparoscopic cholecystectomy converted to open cholecystectomy.  4/30 Gallbladder fluid cultures (+) ESBL E Coli.  5/8 DC staples and drain.   Antibiotics per hospital team.     Acute kidney injury superimposed on CKD (HCC)- (present on admission)  Assessment & Plan  Premorbid.  Fluid resuscitation and trend renal function.  Avoid nephrotoxins.         ___________________________________KEV Sommer.    DD: 5/8/2023  9:34 AM

## 2023-05-08 NOTE — PROGRESS NOTES
Received report from previous shift RN. Assumed care of patient at 1900.  Assessment complete.  Patient A&O x 4. Patient calls appropriately.  Patient ambulates with max assist. Bed alarm on.   Patient has 3/10 pain. Pain managed with prescribed medications.  Denies N&V. Tolerating renal diet.  Transverse incision x2 to abdomen, RUQ incision with staples/gauze/silk tape, umbilical incision BERTA.   + void via condom catheter, + flatus, +5/5 BM.  Patient on 3L NC, denies SOB. Pt encouraged to use IS. IS within reach.  SCD's on.     Reviewed plan of care with patient. Call light and personal belongings within reach. Hourly rounding in place. All needs met at this time.

## 2023-05-08 NOTE — CARE PLAN
The patient is Stable - Low risk of patient condition declining or worsening    Shift Goals  Clinical Goals: skin integrity, MRI  Patient Goals: MRI, go home  Family Goals: N/A    Progress made toward(s) clinical / shift goals:    Problem: Knowledge Deficit - Standard  Goal: Patient and family/care givers will demonstrate understanding of plan of care, disease process/condition, diagnostic tests and medications  Description: Target End Date:  1-3 days or as soon as patient condition allows    Document in Patient Education    1.  Patient and family/caregiver oriented to unit, equipment, visitation policy and means for communicating concern  2.  Complete/review Learning Assessment  3.  Assess knowledge level of disease process/condition, treatment plan, diagnostic tests and medications  4.  Explain disease process/condition, treatment plan, diagnostic tests and medications  Outcome: Progressing     Problem: Pain - Standard  Goal: Alleviation of pain or a reduction in pain to the patient’s comfort goal  Description: Target End Date:  Prior to discharge or change in level of care    Document on Vitals flowsheet    1.  Document pain using the appropriate pain scale per order or unit policy  2.  Educate and implement non-pharmacologic comfort measures (i.e. relaxation, distraction, massage, cold/heat therapy, etc.)  3.  Pain management medications as ordered  4.  Reassess pain after pain med administration per policy  5.  If opiods administered assess patient's response to pain medication is appropriate per POSS sedation scale  6.  Follow pain management plan developed in collaboration with patient and interdisciplinary team (including palliative care or pain specialists if applicable)  Outcome: Progressing     Problem: Fall Risk  Goal: Patient will remain free from falls  Description: Target End Date:  Prior to discharge or change in level of care    Document interventions on the [place] Fall Risk Assessment    1.   Assess for fall risk factors  2.  Implement fall precautions  Outcome: Progressing     Problem: Skin Integrity  Goal: Skin integrity is maintained or improved  Description: Target End Date:  Prior to discharge or change in level of care    Document interventions on Skin Risk/[place] flowsheet groups and corresponding LDA    1.  Assess and monitor skin integrity, appearance and/or temperature  2.  Assess risk factors for impaired skin integrity and/or pressures ulcers  3.  Implement precautions to protect skin integrity in collaboration with interdisciplinary team  4.  Implement pressure ulcer prevention protocol if at risk for skin breakdown  5.  Confirm wound care consult if at risk for skin breakdown  6.  Ensure patient use of pressure relieving devices  (Low air loss bed, waffle overlay, heel protectors, ROHO cushion, etc)  Outcome: Progressing       Patient is not progressing towards the following goals:      Problem: Mobility  Goal: Patient's capacity to carry out activities will improve  Description: Target End Date:  Prior to discharge or change in level of care    1.  Assess for barriers to mobility/activity  2.  Implement activity per interdisciplinary team recommendations  3.  Target activity level identified and patient/family/caregiver aware of goal  4.  Provide assistive devices  5.  Instruct patient/caregiver on proper use of assistive/adaptive devices  6.  Schedule activities and rest periods to decrease effects of fatigue  7.  Encourage mobilization to extent of ability  8.  Maintain proper body alignment  9.  Provide adequate pain management to allow progressive mobilization  10. Implement pace maker precautions as needed  Outcome: Not Progressing  Patient continuing to experience BLE weakness and is unable to ambulate at this time. MRI still pending.

## 2023-05-08 NOTE — PROGRESS NOTES
4 Eyes Skin Assessment Completed by Josemanuel RN and JUAN JOSÉ Vaughan.    Head WDL  Ears WDL  Nose WDL  Mouth WDL  Neck WDL  Breast/Chest WDL  Shoulder Blades WDL  Spine WDL  (R) Arm/Elbow/Hand WDL  (L) Arm/Elbow/Hand WDL  Abdomen Umbilical Incision/ RUQ Transverse Incision Closed with Steri-Strips, RLQ Previous NAHUN Drain Site closed with Dry Gauze and Tegaderm   Groin Condom Catheter to Penis, Application Site CDI   Scrotum/Coccyx/Buttocks Redness and Blanching, Mepilex applied   (R) Leg Edema  (L) Leg Edema  (R) Heel/Foot/Toe WDL  (L) Heel/Foot/Toe WDL          Devices In Places Blood Pressure Cuff, Pulse Ox, Condom Cath, and Nasal Cannula      Interventions In Place Gray Ear Foams, Sacral Mepilex, TAP System, Pillows, Q2 Turns, Low Air Loss Mattress, and Heels Loaded W/Pillows    Possible Skin Injury No    Pictures Uploaded Into Epic N/A  Wound Consult Placed N/A  RN Wound Prevention Protocol Ordered No

## 2023-05-08 NOTE — PROGRESS NOTES
Assumed care of patient at 0645. Bedside report received. Assessment complete.  AA&Ox4. Denies CP/SOB.  Reporting 4/10 pain. Declined intervention at this time.  Educated patient regarding pharmacologic and non pharmacologic modalities for pain management.  Skin per flowsheets  Tolerating renal diet. Denies N/V.  + void. Last BM 5/5  Pt unable to tolerate oob activity, BLE Weakness, requiring Max assistance w ambulation.  All needs met at this time. Call light within reach. Pt calls appropriately. Bed low and locked, non skid socks in place. Hourly rounding in place.

## 2023-05-09 ENCOUNTER — APPOINTMENT (OUTPATIENT)
Dept: RADIOLOGY | Facility: MEDICAL CENTER | Age: 83
DRG: 853 | End: 2023-05-09
Attending: HOSPITALIST
Payer: MEDICARE

## 2023-05-09 LAB
ANION GAP SERPL CALC-SCNC: 12 MMOL/L (ref 7–16)
BUN SERPL-MCNC: 36 MG/DL (ref 8–22)
CALCIUM SERPL-MCNC: 9.5 MG/DL (ref 8.5–10.5)
CHLORIDE SERPL-SCNC: 105 MMOL/L (ref 96–112)
CO2 SERPL-SCNC: 27 MMOL/L (ref 20–33)
CREAT SERPL-MCNC: 2.22 MG/DL (ref 0.5–1.4)
ERYTHROCYTE [DISTWIDTH] IN BLOOD BY AUTOMATED COUNT: 50.4 FL (ref 35.9–50)
GFR SERPLBLD CREATININE-BSD FMLA CKD-EPI: 29 ML/MIN/1.73 M 2
GLUCOSE BLD STRIP.AUTO-MCNC: 169 MG/DL (ref 65–99)
GLUCOSE BLD STRIP.AUTO-MCNC: 188 MG/DL (ref 65–99)
GLUCOSE BLD STRIP.AUTO-MCNC: 190 MG/DL (ref 65–99)
GLUCOSE BLD STRIP.AUTO-MCNC: 273 MG/DL (ref 65–99)
GLUCOSE SERPL-MCNC: 228 MG/DL (ref 65–99)
GRAM STN SPEC: NORMAL
HCT VFR BLD AUTO: 28.1 % (ref 42–52)
HGB BLD-MCNC: 9.3 G/DL (ref 14–18)
MCH RBC QN AUTO: 30.1 PG (ref 27–33)
MCHC RBC AUTO-ENTMCNC: 33.1 G/DL (ref 33.7–35.3)
MCV RBC AUTO: 90.9 FL (ref 81.4–97.8)
PLATELET # BLD AUTO: 500 K/UL (ref 164–446)
PMV BLD AUTO: 10.5 FL (ref 9–12.9)
POTASSIUM SERPL-SCNC: 4.6 MMOL/L (ref 3.6–5.5)
RBC # BLD AUTO: 3.09 M/UL (ref 4.7–6.1)
SIGNIFICANT IND 70042: NORMAL
SITE SITE: NORMAL
SODIUM SERPL-SCNC: 144 MMOL/L (ref 135–145)
SOURCE SOURCE: NORMAL
WBC # BLD AUTO: 13.3 K/UL (ref 4.8–10.8)

## 2023-05-09 PROCEDURE — A9270 NON-COVERED ITEM OR SERVICE: HCPCS | Performed by: HOSPITALIST

## 2023-05-09 PROCEDURE — 85027 COMPLETE CBC AUTOMATED: CPT

## 2023-05-09 PROCEDURE — A9270 NON-COVERED ITEM OR SERVICE: HCPCS | Performed by: FAMILY MEDICINE

## 2023-05-09 PROCEDURE — A9270 NON-COVERED ITEM OR SERVICE: HCPCS | Performed by: SURGERY

## 2023-05-09 PROCEDURE — 87205 SMEAR GRAM STAIN: CPT

## 2023-05-09 PROCEDURE — 0T9B80Z DRAINAGE OF BLADDER WITH DRAINAGE DEVICE, VIA NATURAL OR ARTIFICIAL OPENING ENDOSCOPIC: ICD-10-PCS | Performed by: UROLOGY

## 2023-05-09 PROCEDURE — 700105 HCHG RX REV CODE 258: Performed by: HOSPITALIST

## 2023-05-09 PROCEDURE — 700102 HCHG RX REV CODE 250 W/ 637 OVERRIDE(OP): Performed by: FAMILY MEDICINE

## 2023-05-09 PROCEDURE — 99232 SBSQ HOSP IP/OBS MODERATE 35: CPT | Performed by: HOSPITALIST

## 2023-05-09 PROCEDURE — 700111 HCHG RX REV CODE 636 W/ 250 OVERRIDE (IP): Performed by: STUDENT IN AN ORGANIZED HEALTH CARE EDUCATION/TRAINING PROGRAM

## 2023-05-09 PROCEDURE — 87070 CULTURE OTHR SPECIMN AEROBIC: CPT

## 2023-05-09 PROCEDURE — 87077 CULTURE AEROBIC IDENTIFY: CPT | Mod: 91

## 2023-05-09 PROCEDURE — 700102 HCHG RX REV CODE 250 W/ 637 OVERRIDE(OP): Performed by: HOSPITALIST

## 2023-05-09 PROCEDURE — 82962 GLUCOSE BLOOD TEST: CPT

## 2023-05-09 PROCEDURE — 770001 HCHG ROOM/CARE - MED/SURG/GYN PRIV*

## 2023-05-09 PROCEDURE — 80048 BASIC METABOLIC PNL TOTAL CA: CPT

## 2023-05-09 PROCEDURE — 700111 HCHG RX REV CODE 636 W/ 250 OVERRIDE (IP): Performed by: HOSPITALIST

## 2023-05-09 PROCEDURE — 87186 SC STD MICRODIL/AGAR DIL: CPT

## 2023-05-09 PROCEDURE — 99024 POSTOP FOLLOW-UP VISIT: CPT | Performed by: PHYSICIAN ASSISTANT

## 2023-05-09 PROCEDURE — 36415 COLL VENOUS BLD VENIPUNCTURE: CPT

## 2023-05-09 PROCEDURE — 700102 HCHG RX REV CODE 250 W/ 637 OVERRIDE(OP): Performed by: SURGERY

## 2023-05-09 RX ORDER — POLYETHYLENE GLYCOL 3350 17 G/17G
1 POWDER, FOR SOLUTION ORAL
Status: DISCONTINUED | OUTPATIENT
Start: 2023-05-09 | End: 2023-05-24

## 2023-05-09 RX ORDER — BISACODYL 10 MG
10 SUPPOSITORY, RECTAL RECTAL
Status: DISCONTINUED | OUTPATIENT
Start: 2023-05-09 | End: 2023-05-24

## 2023-05-09 RX ORDER — TAMSULOSIN HYDROCHLORIDE 0.4 MG/1
0.4 CAPSULE ORAL
Status: DISCONTINUED | OUTPATIENT
Start: 2023-05-09 | End: 2023-05-28

## 2023-05-09 RX ORDER — DOXYCYCLINE 100 MG/1
100 TABLET ORAL EVERY 12 HOURS
Status: DISCONTINUED | OUTPATIENT
Start: 2023-05-09 | End: 2023-05-11

## 2023-05-09 RX ORDER — SODIUM CHLORIDE 9 MG/ML
1000 INJECTION, SOLUTION INTRAVENOUS CONTINUOUS
Status: DISCONTINUED | OUTPATIENT
Start: 2023-05-09 | End: 2023-05-09

## 2023-05-09 RX ORDER — AMOXICILLIN 250 MG
2 CAPSULE ORAL 2 TIMES DAILY
Status: DISCONTINUED | OUTPATIENT
Start: 2023-05-09 | End: 2023-05-24

## 2023-05-09 RX ORDER — DEXAMETHASONE SODIUM PHOSPHATE 4 MG/ML
4 INJECTION, SOLUTION INTRA-ARTICULAR; INTRALESIONAL; INTRAMUSCULAR; INTRAVENOUS; SOFT TISSUE EVERY 6 HOURS
Status: DISCONTINUED | OUTPATIENT
Start: 2023-05-09 | End: 2023-05-20

## 2023-05-09 RX ADMIN — OXYCODONE HYDROCHLORIDE 10 MG: 10 TABLET ORAL at 20:53

## 2023-05-09 RX ADMIN — CARVEDILOL 12.5 MG: 12.5 TABLET, FILM COATED ORAL at 17:20

## 2023-05-09 RX ADMIN — SODIUM BICARBONATE 1300 MG: 650 TABLET ORAL at 06:20

## 2023-05-09 RX ADMIN — DULOXETINE HYDROCHLORIDE 30 MG: 30 CAPSULE, DELAYED RELEASE ORAL at 17:20

## 2023-05-09 RX ADMIN — INSULIN HUMAN 5 UNITS: 100 INJECTION, SOLUTION PARENTERAL at 22:46

## 2023-05-09 RX ADMIN — INSULIN HUMAN 2 UNITS: 100 INJECTION, SOLUTION PARENTERAL at 18:35

## 2023-05-09 RX ADMIN — HEPARIN SODIUM 5000 UNITS: 5000 INJECTION, SOLUTION INTRAVENOUS; SUBCUTANEOUS at 13:37

## 2023-05-09 RX ADMIN — TAMSULOSIN HYDROCHLORIDE 0.4 MG: 0.4 CAPSULE ORAL at 17:20

## 2023-05-09 RX ADMIN — SPIRONOLACTONE 25 MG: 50 TABLET ORAL at 06:20

## 2023-05-09 RX ADMIN — HEPARIN SODIUM 5000 UNITS: 5000 INJECTION, SOLUTION INTRAVENOUS; SUBCUTANEOUS at 06:20

## 2023-05-09 RX ADMIN — DEXAMETHASONE SODIUM PHOSPHATE 4 MG: 4 INJECTION, SOLUTION INTRA-ARTICULAR; INTRALESIONAL; INTRAMUSCULAR; INTRAVENOUS; SOFT TISSUE at 13:36

## 2023-05-09 RX ADMIN — ATORVASTATIN CALCIUM 10 MG: 10 TABLET, FILM COATED ORAL at 17:20

## 2023-05-09 RX ADMIN — FAMOTIDINE 20 MG: 20 TABLET, FILM COATED ORAL at 06:20

## 2023-05-09 RX ADMIN — Medication 1 APPLICATOR: at 17:20

## 2023-05-09 RX ADMIN — SODIUM CHLORIDE 1000 ML: 9 INJECTION, SOLUTION INTRAVENOUS at 09:45

## 2023-05-09 RX ADMIN — Medication 1 APPLICATOR: at 06:20

## 2023-05-09 RX ADMIN — DOCUSATE SODIUM 50 MG AND SENNOSIDES 8.6 MG 2 TABLET: 8.6; 5 TABLET, FILM COATED ORAL at 17:20

## 2023-05-09 RX ADMIN — AMLODIPINE BESYLATE 5 MG: 10 TABLET ORAL at 06:20

## 2023-05-09 RX ADMIN — INSULIN HUMAN 2 UNITS: 100 INJECTION, SOLUTION PARENTERAL at 13:42

## 2023-05-09 RX ADMIN — HEPARIN SODIUM 5000 UNITS: 5000 INJECTION, SOLUTION INTRAVENOUS; SUBCUTANEOUS at 22:44

## 2023-05-09 RX ADMIN — AMPICILLIN AND SULBACTAM 3 G: 2; 1 INJECTION, POWDER, FOR SOLUTION INTRAMUSCULAR; INTRAVENOUS at 18:34

## 2023-05-09 RX ADMIN — DEXAMETHASONE SODIUM PHOSPHATE 4 MG: 4 INJECTION, SOLUTION INTRA-ARTICULAR; INTRALESIONAL; INTRAMUSCULAR; INTRAVENOUS; SOFT TISSUE at 18:09

## 2023-05-09 RX ADMIN — SODIUM BICARBONATE 1300 MG: 650 TABLET ORAL at 17:20

## 2023-05-09 RX ADMIN — DOXYCYCLINE 100 MG: 100 TABLET, FILM COATED ORAL at 18:34

## 2023-05-09 RX ADMIN — INSULIN HUMAN 2 UNITS: 100 INJECTION, SOLUTION PARENTERAL at 09:48

## 2023-05-09 RX ADMIN — CARVEDILOL 12.5 MG: 12.5 TABLET, FILM COATED ORAL at 09:45

## 2023-05-09 RX ADMIN — ALLOPURINOL 100 MG: 100 TABLET ORAL at 06:20

## 2023-05-09 ASSESSMENT — ENCOUNTER SYMPTOMS
PALPITATIONS: 0
ABDOMINAL PAIN: 0
FLANK PAIN: 0
SPEECH CHANGE: 0
SORE THROAT: 0
HEARTBURN: 0
BLURRED VISION: 0
DIARRHEA: 0
DIZZINESS: 0
MYALGIAS: 0
NERVOUS/ANXIOUS: 0
WEAKNESS: 1
DEPRESSION: 0
SHORTNESS OF BREATH: 0
FEVER: 0
COUGH: 0

## 2023-05-09 ASSESSMENT — PAIN DESCRIPTION - PAIN TYPE: TYPE: ACUTE PAIN

## 2023-05-09 NOTE — DISCHARGE PLANNING
"[facility] TCN chart review completed. Collaborated with NIKIA Galdamez.  Current discharge considerations are for SNF.  Per chart review and collaboration with NIKIA, patient has been accepted to [facility].    Patient seen at bedside and is agreeable to go to [facility] when medically cleared.  Per collaboration with NIKIA, plan for a repeat MRI today.   Patient seen at bedside and stated \"make sure I am sedated for the MRI tonight\".  TCN notified NIKIA Galdamez of patients concerns.  TCN will continue to follow and collaborate with discharge planning team as additional post acute needs arise. Thank you.    Completed:  PT/OT recommends post acute placement on 5/4/23 and 5/3/23.     SLP eval 4/26 with recs for no further needs  Physiatry recommending SNF as of 5/2 as well  Choice obtained: IR[facility] (-> declines; recs SNF) and SNF choice obtained on 4/24/23.  HH choice on 4/24/23.  Infusion on 4/22/23; see above; accepted to [facility];  [facility] referral sent 4/22/23  "

## 2023-05-09 NOTE — CARE PLAN
Problem: Fall Risk  Goal: Patient will remain free from falls  Description: Target End Date:  Prior to discharge or change in level of care    Document interventions on the [place] Fall Risk Assessment    1.  Assess for fall risk factors  2.  Implement fall precautions  Outcome: Progressing     Problem: Pain - Standard  Goal: Alleviation of pain or a reduction in pain to the patient’s comfort goal  Description: Target End Date:  Prior to discharge or change in level of care    Document on Vitals flowsheet    1.  Document pain using the appropriate pain scale per order or unit policy  2.  Educate and implement non-pharmacologic comfort measures (i.e. relaxation, distraction, massage, cold/heat therapy, etc.)  3.  Pain management medications as ordered  4.  Reassess pain after pain med administration per policy  5.  If opiods administered assess patient's response to pain medication is appropriate per POSS sedation scale  6.  Follow pain management plan developed in collaboration with patient and interdisciplinary team (including palliative care or pain specialists if applicable)  Outcome: Progressing     Problem: Urinary - Renal Perfusion  Goal: Ability to achieve and maintain adequate renal perfusion and functioning will improve  Description: Target End Date:  Prior to discharge or change in level of care    Document on I/O and Assessment flowsheet    1.  Urine output will remain greater than 0.5ml/Kg/HR  2.  Monitor amount and/or characteristics of urine per order/policy. Specific gravity per order/policy  3.  Assess signs and symptoms of renal dysfunction  Outcome: Progressing     Problem: Knowledge Deficit - Standard  Goal: Patient and family/care givers will demonstrate understanding of plan of care, disease process/condition, diagnostic tests and medications  Description: Target End Date:  1-3 days or as soon as patient condition allows    Document in Patient Education    1.  Patient and family/caregiver  oriented to unit, equipment, visitation policy and means for communicating concern  2.  Complete/review Learning Assessment  3.  Assess knowledge level of disease process/condition, treatment plan, diagnostic tests and medications  4.  Explain disease process/condition, treatment plan, diagnostic tests and medications  Outcome: Progressing     The patient is Stable - Low risk of patient condition declining or worsening    Shift Goals  Clinical Goals: NeuroSurg consult after repeat MRI  Patient Goals: pain control  Family Goals: N/A    Progress made toward(s) clinical / shift goals:  Minimal c/o pain this shift. Q2H turns on TAPS in place. Pt states his extremities feel stronger than they were working with PT/OT    Patient is not progressing towards the following goals:

## 2023-05-09 NOTE — PROGRESS NOTES
Neurology Progress Note  05/08/23        Subjective:  Mr Cline reports he feels stronger. Of note, he is post laparoscopic cholecystectomy converted to open cholecystectomy day 4.  He reported having his MRI last night    Objective:  Vitals:  Vitals:    05/07/23 1925 05/08/23 0315 05/08/23 0742 05/08/23 1549   BP: 126/73 130/71 131/75 128/67   Pulse: 82 81 82 79   Resp: 18 18 20 18   Temp: 37.1 °C (98.8 °F) 36.1 °C (97 °F) 36.6 °C (97.9 °F) 36.6 °C (97.9 °F)   TempSrc: Temporal Temporal Temporal Temporal   SpO2: 95% 93% 91% 92%   Weight:       Height:         General: Awake, no apparent distress  Mental status: Alert, oriented x3, speech is fluent, comprehension is intact  Cranial Nerves: Pupils are equal round, extraocular movements are intact and no nystagmus, face is symmetric, facial sensations, no dysarthria, hearing intact to voice  He has normal coordination in his upper extremities    Recent Labs     05/07/23 0613 05/08/23 0513   WBC 13.4* 13.3*   RBC 3.13* 3.34*   HEMOGLOBIN 9.4* 9.7*   HEMATOCRIT 29.3* 30.8*   MCV 93.6 92.2   MCH 30.0 29.0   MCHC 32.1* 31.5*   RDW 53.1* 52.6*   PLATELETCT 525* 523*   MPV 10.2 10.4       Recent Labs     05/06/23 0223 05/07/23 0613 05/08/23 0513   SODIUM 136 138 133*   POTASSIUM 4.5 4.4 4.3   CHLORIDE 101 101 96   CO2 28 26 25   GLUCOSE 198* 146* 142*   BUN 33* 35* 36*   CREATININE 1.89* 2.19* 1.98*   CALCIUM 9.0 9.1 9.4       Recent Labs     05/06/23 0223 05/07/23 0613 05/08/23 0513   SODIUM 136 138 133*   POTASSIUM 4.5 4.4 4.3   CHLORIDE 101 101 96   CO2 28 26 25   GLUCOSE 198* 146* 142*   BUN 33* 35* 36*       Recent Labs     05/06/23  0223 05/07/23  0613 05/08/23  0513   SODIUM 136 138 133*   POTASSIUM 4.5 4.4 4.3   CHLORIDE 101 101 96   CO2 28 26 25   BUN 33* 35* 36*   CREATININE 1.89* 2.19* 1.98*   CALCIUM 9.0 9.1 9.4                        Imaging: neuroimaging reviewed and directly visualized by me  MR-THORACIC SPINE-WITH & W/O   Final Result      1.   Limited study due to the motion artifact.   2.  When compared with the previous MRI again noted is expansile T2 hyperintense area in the thoracic spinal cord at the levels of T2, T3 and T4. There is also prominent dorsal right-sided subarachnoid space at this level. The differential diagnosis    includes intramedullary spinal cord tumor and extramedullary cyst/web with cord compression and edema.   3.  Pre and postcontrast MR examination of the thoracic spine is recommended with contrast under General anesthesia.   4.  1 -2 mm  T2-weighted sequences from T2 to T6 with smaller field of view is recommended for further characterization.      MR-THORACIC SPINE-WITH & W/O   Final Result         Stable appearance of expansile nonenhancing signal abnormality involving the upper thoracic cord between T1 and T4. Also noted is a small extra-axial collection/web along the right posterolateral spinal canal at T3-4 that deforms the cord and displaces    it anteriorly and to the left.   This could represent an Arachnoid web or arachnoid cyst with extensive mass effect and secondary edema of the cord.   Recommend additional thin section T2 and post contrast images through T1-T6 to better assess the abnormality.      MR-BRAIN-WITH & W/O   Final Result         No acute intracranial process.      Age-related volume loss and chronic microvascular ischemic changes.         DX-CHEST-PORTABLE (1 VIEW)   Final Result         1.  Pulmonary edema and/or infiltrates are identified, which are stable since the prior exam.   2.  Cardiomegaly   3.  Atherosclerosis      DX-CHEST-PORTABLE (1 VIEW)   Final Result         1.  Pulmonary edema and/or infiltrates are identified, which are stable since the prior exam.   2.  Left PICC line tip terminates in the left axilla, stable since prior study.      DX-CHEST-PORTABLE (1 VIEW)   Final Result      1.  No significant change.   2.  Possible left PICC line with tip projecting at the axillary vein.       DX-CHEST-PORTABLE (1 VIEW)   Final Result      1.  Mildly improved pulmonary opacities.   2.  Possible left PICC line with tip projecting at the axillary vein.   3.  Likely trace right pleural effusion.      DX-CHEST-LIMITED (1 VIEW)   Final Result      1.  Placement of endotracheal tube with tip projecting over the mid trachea      2.  Left arm catheter present which is presumably venous in projects in the expected position of the left axillary vein      3.  Enlarged cardiac silhouette      NM-HEPATOBILIARY SCAN   Final Result      Hepatobiliary scan findings suspicious for acute cholecystitis.      MR-THORACIC SPINE-WITH & W/O   Final Result      1.  There is abnormal expansile T2 hyperintense lesion in the right side of the thoracic spinal cord at the levels of T2 and T3 Mild contrast enhancement is seen. This lesion is suspicious for spinal cord neoplasm. Other remote differential diagnosis    includes transverse myelitis.   2.  Exaggerated thoracic kyphosis.   3.  Thoracic dextroscoliosis.   4.  Minimal degenerative changes.   5.  Right pleural effusion.      MR-LUMBAR SPINE-WITH & W/O   Final Result      1.  Multifocal degenerative disease in the lumbar spine as described above.   2.  Moderate central canal and severe lateral recess stenosis at the level of L4-5. The exiting bilateral L5 nerve roots might have been impinged at the lateral recess.      MR-CERVICAL SPINE-WITH & W/O   Final Result      1.  There is abnormal expansile intramedullary T2 signal intensity lesion in the right cerebellum. Thoracic spinal cord at the level of T2 on T3. Mild diffuse contrast enhancement is seen. This finding is suspicious for spinal cord neoplasm. The other    less likely differential diagnosis includes transverse myelitis. Follow-up is recommended after 4 weeks.   2.  Mild degenerative disease in the cervical spine as described above.   3.  There is no evidence of infection in the cervical spine.      US-RUQ   Final  Result         1.  Acute cholecystitis.   2.  Atrophic right kidney.      DX-CHEST-LIMITED (1 VIEW)   Final Result      Perihilar interstitial edema and basilar atelectasis and/or consolidation. Underlying infection is possible.      MR-LUMBAR SPINE-W/O   Final Result      1.  Lower lumbar spine predominant degenerative changes as described in detail above, progressed from 2010 MRI.   2.  Edema at the L4-L5 disc space and L5 superior endplate likely represents degenerative changes. Less likely this could represent a low-grade or early discitis osteomyelitis. Correlate for evidence of infection.   3.  Acute appearing Schmorl's node at L3-L4, which can be a source of pain.      US-RENAL   Final Result      1.  Atrophic kidneys. Echogenic bilateral renal parenchyma could relate to medical renal disease.      2.  No hydronephrosis. No renal calculus.      DX-CHEST-PORTABLE (1 VIEW)   Final Result      No acute cardiac or pulmonary abnormalities are identified.      CT-ABDOMEN-PELVIS W/O   Final Result      1.  Findings suspicious for focal colitis at the hepatic flexure, less likely cholecystitis or duodenitis with secondary involvement of the colon. Infectious, inflammatory and ischemic etiologies are considerations. Underlying mass is not excluded.   2.  Cholelithiasis with distention of the gallbladder   3.  BILATERAL renal atrophy   4.  Subcentimeter LEFT adrenal myelolipoma   5.  12 mm RIGHT adrenal nodule likely an adenoma absent a history of cancer   6.  Subcentimeter RIGHT hepatic lesion, likely a cyst absent a history of cancer   7.  Atherosclerosis   8.  Colonic diverticulosis      CT-HEAD W/O   Final Result      1.  Cerebral atrophy.      2.  White matter lucencies most consistent with small vessel ischemic change versus demyelination or gliosis.      3.  Otherwise, Head CT without contrast with no acute findings. No evidence of acute cerebral infarction, hemorrhage or mass lesion.           Mr-Thoracic  Spine-With & W/O    5/8/2023 2:48 PM    HISTORY/REASON FOR EXAM:  ?Arachnoid web or arachnoid cyst with extensive mass effect and secondary edema of the cord.Recommended additional thin section T2 and post contrast images through T1-T6 to better assess the abnormality      TECHNIQUE/EXAM DESCRIPTION:  MRI of the thoracic spine without and with contrast.    The study was performed on a [employer] Signa 1.5 Mariah MRI scanner. T1 sagittal, T2 fast spin-echo sagittal, and T2 axial images were obtained of the thoracic spine. T1 post-contrast fat suppressed sagittal images were obtained. Optional T1 post-contrast   axial images may be obtained.    18 mL ProHance contrast was administered intravenously.    COMPARISON:  Previous MRIs dated 5/5/2023 and 4/26/23    FINDINGS: When compared with the previous MRI again noted is expansile possibly enhancing T2 hyperintense area in the thoracic spinal cord at the levels of T2, T3 and T4. There is also prominent dorsal right-sided subarachnoid space at this level.          Impression:  82-year-old man with  right greater than left leg weakness since approx April 17th as well as  upper extremity weakness.  This developed concurrently with a GI illness, and he ended up with a open cholecystectomy.  MRI-Tspine remarkable for increased T2 signal at T2-3 with apparent constriction of the cord at T3-T4, awaiting repeat thoracic spine MRI with thin cuts through this region.  The repeat scan today is not sufficient to make a diagnosis.    Recommendations:   -MR Tspine high with fine cuts through T1-T4 w/ and w/o, has been ordered, will need anesthesia.  -Continue PT, OT.  -Hopefully will need a neurosurgical consultation after the repeat MRI.     Pedro Luis Schwartz III, DO, MPH  Neurohospitalist

## 2023-05-09 NOTE — DISCHARGE PLANNING
Care Transition Team Discharge Planning    Anticipated Discharge Information  Discharge Disposition: D/T to SNF with medicare cert w/planned hosp IP readmit (83)              Discharge Plan:  SNF    Pt has a completed PASRR and the number is [id number].

## 2023-05-09 NOTE — PROGRESS NOTES
Assumed care of patient at 0645. Bedside report received. Assessment complete.  AA&Ox4. Denies CP/SOB. Patient Lethargic at this time, responds to voice and command   Reporting 0/10 pain. Declined intervention at this time.  Educated patient regarding pharmacologic and non pharmacologic modalities for pain management.  Skin per flowsheets  Tolerating renal diet. Denies N/V.  + void. Last BM 5/5  Pt ambulates Max assist d/t BLE   All needs met at this time. Call light within reach. Pt calls appropriately. Bed low and locked, non skid socks in place. Hourly rounding in place.

## 2023-05-09 NOTE — PROGRESS NOTES
"  DATE: 5/9/2023    Post Operative Day  11 laparoscopic cholecystectomy converted to open cholecystectomy.    INTERVAL EVENTS:  Lethargy on exam, answering questions appropriately.  Superficial wound dehiscence at medial aspect of incision.  Tolerating diet, + BM.  WBC stable at 13.3    PHYSICAL EXAMINATION:  Vital Signs: /74   Pulse 69   Temp 36.7 °C (98.1 °F) (Temporal)   Resp 17   Ht 1.803 m (5' 10.98\")   Wt 89.1 kg (196 lb 6.9 oz)   SpO2 94%     Physical Exam  Vitals and nursing note reviewed.   Constitutional:       General: He is awake. He is not in acute distress.  Abdominal:      General: There is no distension.      Palpations: Abdomen is soft.      Tenderness: There is generalized abdominal tenderness.      Comments: Drain with sanguinous fluid, moderate amount  Dressings CDI   Neurological:      Mental Status: He is alert.   Psychiatric:         Behavior: Behavior is cooperative.         LABORATORY VALUES:   Recent Labs     05/07/23  0613 05/08/23 0513 05/09/23  0232   WBC 13.4* 13.3* 13.3*   RBC 3.13* 3.34* 3.09*   HEMOGLOBIN 9.4* 9.7* 9.3*   HEMATOCRIT 29.3* 30.8* 28.1*   MCV 93.6 92.2 90.9   MCH 30.0 29.0 30.1   MCHC 32.1* 31.5* 33.1*   RDW 53.1* 52.6* 50.4*   PLATELETCT 525* 523* 500*   MPV 10.2 10.4 10.5       Recent Labs     05/07/23 0613 05/08/23 0513 05/09/23  0232   SODIUM 138 133* 144   POTASSIUM 4.4 4.3 4.6   CHLORIDE 101 96 105   CO2 26 25 27   GLUCOSE 146* 142* 228*   BUN 35* 36* 36*   CREATININE 2.19* 1.98* 2.22*   CALCIUM 9.1 9.4 9.5                    IMAGING:   MR-THORACIC SPINE-WITH & W/O   Final Result      1.  Limited study due to the motion artifact.   2.  When compared with the previous MRI again noted is expansile T2 hyperintense area in the thoracic spinal cord at the levels of T2, T3 and T4. There is also prominent dorsal right-sided subarachnoid space at this level. The differential diagnosis    includes intramedullary spinal cord tumor and extramedullary cyst/web " with cord compression and edema.   3.  Pre and postcontrast MR examination of the thoracic spine is recommended with contrast under General anesthesia.   4.  1 -2 mm  T2-weighted sequences from T2 to T6 with smaller field of view is recommended for further characterization.      MR-THORACIC SPINE-WITH & W/O   Final Result         Stable appearance of expansile nonenhancing signal abnormality involving the upper thoracic cord between T1 and T4. Also noted is a small extra-axial collection/web along the right posterolateral spinal canal at T3-4 that deforms the cord and displaces    it anteriorly and to the left.   This could represent an Arachnoid web or arachnoid cyst with extensive mass effect and secondary edema of the cord.   Recommend additional thin section T2 and post contrast images through T1-T6 to better assess the abnormality.      MR-BRAIN-WITH & W/O   Final Result         No acute intracranial process.      Age-related volume loss and chronic microvascular ischemic changes.         DX-CHEST-PORTABLE (1 VIEW)   Final Result         1.  Pulmonary edema and/or infiltrates are identified, which are stable since the prior exam.   2.  Cardiomegaly   3.  Atherosclerosis      DX-CHEST-PORTABLE (1 VIEW)   Final Result         1.  Pulmonary edema and/or infiltrates are identified, which are stable since the prior exam.   2.  Left PICC line tip terminates in the left axilla, stable since prior study.      DX-CHEST-PORTABLE (1 VIEW)   Final Result      1.  No significant change.   2.  Possible left PICC line with tip projecting at the axillary vein.      DX-CHEST-PORTABLE (1 VIEW)   Final Result      1.  Mildly improved pulmonary opacities.   2.  Possible left PICC line with tip projecting at the axillary vein.   3.  Likely trace right pleural effusion.      DX-CHEST-LIMITED (1 VIEW)   Final Result      1.  Placement of endotracheal tube with tip projecting over the mid trachea      2.  Left arm catheter present  which is presumably venous in projects in the expected position of the left axillary vein      3.  Enlarged cardiac silhouette      NM-HEPATOBILIARY SCAN   Final Result      Hepatobiliary scan findings suspicious for acute cholecystitis.      MR-THORACIC SPINE-WITH & W/O   Final Result      1.  There is abnormal expansile T2 hyperintense lesion in the right side of the thoracic spinal cord at the levels of T2 and T3 Mild contrast enhancement is seen. This lesion is suspicious for spinal cord neoplasm. Other remote differential diagnosis    includes transverse myelitis.   2.  Exaggerated thoracic kyphosis.   3.  Thoracic dextroscoliosis.   4.  Minimal degenerative changes.   5.  Right pleural effusion.      MR-LUMBAR SPINE-WITH & W/O   Final Result      1.  Multifocal degenerative disease in the lumbar spine as described above.   2.  Moderate central canal and severe lateral recess stenosis at the level of L4-5. The exiting bilateral L5 nerve roots might have been impinged at the lateral recess.      MR-CERVICAL SPINE-WITH & W/O   Final Result      1.  There is abnormal expansile intramedullary T2 signal intensity lesion in the right cerebellum. Thoracic spinal cord at the level of T2 on T3. Mild diffuse contrast enhancement is seen. This finding is suspicious for spinal cord neoplasm. The other    less likely differential diagnosis includes transverse myelitis. Follow-up is recommended after 4 weeks.   2.  Mild degenerative disease in the cervical spine as described above.   3.  There is no evidence of infection in the cervical spine.      US-RUQ   Final Result         1.  Acute cholecystitis.   2.  Atrophic right kidney.      DX-CHEST-LIMITED (1 VIEW)   Final Result      Perihilar interstitial edema and basilar atelectasis and/or consolidation. Underlying infection is possible.      MR-LUMBAR SPINE-W/O   Final Result      1.  Lower lumbar spine predominant degenerative changes as described in detail above,  progressed from 2010 MRI.   2.  Edema at the L4-L5 disc space and L5 superior endplate likely represents degenerative changes. Less likely this could represent a low-grade or early discitis osteomyelitis. Correlate for evidence of infection.   3.  Acute appearing Schmorl's node at L3-L4, which can be a source of pain.      US-RENAL   Final Result      1.  Atrophic kidneys. Echogenic bilateral renal parenchyma could relate to medical renal disease.      2.  No hydronephrosis. No renal calculus.      DX-CHEST-PORTABLE (1 VIEW)   Final Result      No acute cardiac or pulmonary abnormalities are identified.      CT-ABDOMEN-PELVIS W/O   Final Result      1.  Findings suspicious for focal colitis at the hepatic flexure, less likely cholecystitis or duodenitis with secondary involvement of the colon. Infectious, inflammatory and ischemic etiologies are considerations. Underlying mass is not excluded.   2.  Cholelithiasis with distention of the gallbladder   3.  BILATERAL renal atrophy   4.  Subcentimeter LEFT adrenal myelolipoma   5.  12 mm RIGHT adrenal nodule likely an adenoma absent a history of cancer   6.  Subcentimeter RIGHT hepatic lesion, likely a cyst absent a history of cancer   7.  Atherosclerosis   8.  Colonic diverticulosis      CT-HEAD W/O   Final Result      1.  Cerebral atrophy.      2.  White matter lucencies most consistent with small vessel ischemic change versus demyelination or gliosis.      3.  Otherwise, Head CT without contrast with no acute findings. No evidence of acute cerebral infarction, hemorrhage or mass lesion.             ASSESSMENT AND PLAN:   Cholelithiasis with acute cholecystitis- (present on admission)  Assessment & Plan  4/27 HIDA scan consistent with acute cholecystitis.  4/28 Laparoscopic cholecystectomy converted to open cholecystectomy.  4/30 Gallbladder fluid cultures (+) ESBL E Coli.  5/8 DC staples and drain.  Wound dehiscence at medial aspect of wound, fascia intact.  Packed  with gauze.  Follow cultures.  Antibiotics per hospital team.    Acute kidney injury superimposed on CKD (HCC)- (present on admission)  Assessment & Plan  Premorbid.  Fluid resuscitation and trend renal function.  Avoid nephrotoxins.      - Wound care consult  - Follow up on cultures, abx pre hospital team  - Mobilize aggressively         ____________________________________     Kenzie Krueger P.A.-C.    DD: 4/30/2023  12:01 PM

## 2023-05-09 NOTE — CONSULTS
Surgery Neurosurgery Consultation    Date of Service  5/9/2023    Referring Physician  Smiley Cain M.D.    Consulting Physician  Fidencio Springer M.D.    Reason for Consultation  Thoracic MRI findings    History of Presenting Illness  83 y.o. male who presented 4/21/2023 with a very complicated picture.  I have reviewed hospitalist, neurology and general surgery notes.  He has stage IV kidney disease, has undergone a cholecystectomy.  With regards to his neurologic symptoms.  He notes that on the day of admission, he had immediate and complete loss of lower extremity strength.  The patient corroborates clinical notes that he has improved significantly since his admission.  His symptoms have waxed and waned a bit but overall, he is much better and he feels that he continues to improve.  At this point, he denies neurologic symptoms other than weakness in the lower extremities.  Specifically, he denies bowel or bladder dysfunction, denies numbness in the lower extremities.  He did not have any neurologic symptoms prior to his event on 4/21.  He has multiple MRIs of the brain, thoracic spine which I have reviewed.  The radiology review is noted below.  It is difficult for me to see any specific intra medullary lesion due to the motion artifact on the lumbar MRI completed yesterday.  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  This is most clearly noted on his MRI from 5/5/2023.  There is T2 signal change within the spinal cord more proximally over multiple levels.  It would be highly unusual to have an extra medullary and an intramedullary process at the same time which is the suggestion on the MRI report.  Intradural empyemas are quite rare, intramedullary empyemas are even more rare.  His clinical history does not fit with an intramedullary tumor given its rapid onset and subsequent dramatic improvement.  I do think an MRI with and without contrast in the  thoracic spine with general anesthesia is necessary to get a more clear picture of the anatomy    Review of Systems  Review of Systems   Neurological:  Positive for weakness.     Past Medical History   has a past medical history of Abdominal pain, Abnormal electrocardiogram, ACE inhibitor intolerance, BPH (benign prostatic hyperplasia), Bruxism, Cancer (HCC), Cataract, Chickenpox, Cholesterol blood decreased, Chronic kidney disease (CKD) (2/27/2021), Chronic kidney disease, stage III (moderate) (HCC), Cold, Congestion of both ears (2/27/2021), Cough, Depression, Dermatochalasis of eyelid (7/23/2014), Diabetes, Difficulty breathing, Fever, GERD (gastroesophageal reflux disease), GI bleed (12/8/2021), GOUT, Gout, History of skull fracture, History of urinary tract infection, Hyperlipidemia, Hypertension, Indigestion, Influenza, Insomnia, Mixed hyperlipidemia, Orthopnea, Pneumonia (9/2015), Proteinuria, Purulent nasal discharge (12/7/2015), Ringing in ears, Shortness of breath, Sputum production, Tonsillitis, Type 2 diabetes mellitus (HCC), Wears glasses, and Weight loss.    Surgical History   has a past surgical history that includes cataract phaco with iol (11/20/2012); cataract phaco with iol (12/4/2012); appendectomy; other; other; tonsillectomy; blepharoplasty (7/23/2014); brow lift (7/23/2014); septal reconstruction (12/7/2015); cardiac cath, left heart; arthroscopy, knee; pr colonoscopy,diagnostic (N/A, 12/12/2021); pr upper gi endoscopy,biopsy (N/A, 12/12/2021); pr upper gi endoscopy,ctrl bleed (N/A, 12/12/2021); gastroscopy w/push enterscopy (N/A, 12/12/2021); and carmelo by laparoscopy (N/A, 4/28/2023).    Family History  family history includes Cancer in his brother; Diabetes in his father; Heart Attack (age of onset: 79) in his father.    Social History   reports that he quit smoking about 58 years ago. His smoking use included cigarettes. He has a 1.20 pack-year smoking history. He has never used smokeless  tobacco. He reports that he does not drink alcohol and does not use drugs.    Medications  Prior to Admission Medications   Prescriptions Last Dose Informant Patient Reported? Taking?   B Complex Vitamins (B COMPLEX 1 PO) 4/17/2023 at 1200 Patient Yes No   Sig: Take 1 tablet by mouth every day.   Cholecalciferol (VITAMIN D3) 2000 UNIT Cap 4/17/2023 at PM Patient Yes No   Sig: Take 2,000 Units by mouth 2 times a day.   DULoxetine (CYMBALTA) 30 MG Cap DR Particles 4/17/2023 at PM Patient No No   Sig: Take 1 Capsule by mouth every day.   Finerenone (KERENDIA) 10 MG Tab 4/19/2023 at 1200 Patient Yes No   Sig: Take 10 mg by mouth every day.   Multiple Vitamins-Minerals (OCUVITE ADULT 50+ PO) 4/19/2023 at 1200 Patient Yes No   Sig: Take 1 Capsule by mouth every day.   OZEMPIC, 0.25 OR 0.5 MG/DOSE, 2 MG/1.5ML Solution Pen-injector 4/11/2023 at AM Patient Yes No   Sig: Inject 0.5 mg under the skin every 7 days.   SITagliptin (JANUVIA) 25 MG Tab 4/19/2023 at AM Patient Yes No   Sig: Take 25 mg by mouth every day.   Testosterone (ANDROGEL) 20.25 MG/ACT (1.62%) Gel 4/16/2023 at AM Patient Yes No   Sig: Place 121.5 mg on the skin every day. 6 pumps = 121.5 mg. Apply 3 pumps to each shoulder.   acetaminophen (TYLENOL) 500 MG Tab 4/18/2023 at PM Patient Yes Yes   Sig: Take 1,000 mg by mouth 2 times a day as needed for Mild Pain or Fever. 2 tablets = 1,000 mg.   allopurinol (ZYLOPRIM) 100 MG Tab 4/20/2023 at 1000 Patient Yes No   Sig: Take 200 mg by mouth every day. 2 tablets = 200 mg.   atorvastatin (LIPITOR) 10 MG Tab 4/16/2023 at 1200; STOPPED BY PATIENT Patient Yes Yes   Sig: Take 10 mg by mouth every day.   carvedilol (COREG) 12.5 MG Tab 4/17/2023 at PM Patient No No   Sig: Take 1 Tablet by mouth 2 times a day with meals.   famotidine (PEPCID) 20 MG Tab 4/20/2023 at 2100 Patient Yes Yes   Sig: Take 20 mg by mouth every evening.   olmesartan (BENICAR) 20 MG Tab 4/19/2023 at AM Patient Yes Yes   Sig: Take 20 mg by mouth every  "day.   zolpidem (AMBIEN) 10 MG Tab 4/14/2023 at HS Patient No No   Sig: Take 1 Tablet by mouth at bedtime as needed for Sleep for up to 180 days.      Facility-Administered Medications: None       Allergies  Allergies   Allergen Reactions    Ether Unspecified     \"Violently sick\"       Physical Exam  Temp:  [36.2 °C (97.1 °F)-37 °C (98.6 °F)] 36.7 °C (98.1 °F)  Pulse:  [60-79] 69  Resp:  [17-18] 17  BP: (128-143)/(67-78) 137/74  SpO2:  [92 %-97 %] 94 %    Physical Exam  Vitals and nursing note reviewed.   Constitutional:       Appearance: Normal appearance.   HENT:      Head: Normocephalic and atraumatic.   Eyes:      Extraocular Movements: Extraocular movements intact.      Conjunctiva/sclera: Conjunctivae normal.      Pupils: Pupils are equal, round, and reactive to light.   Neurological:      Mental Status: He is alert and oriented to person, place, and time.      Comments: Motor is 4+/5 throughout all extremities.  Unfortunately, patient has not been ambulatory in some time given his chronic illnesses and so cannot test gait.  Proprioception intact in the bilateral lower extremities, no sensory deficits to light touch or pinprick, no sensory level.   Psychiatric:         Mood and Affect: Mood normal.         Behavior: Behavior normal.         Thought Content: Thought content normal.         Judgment: Judgment normal.       Fluids  Date 05/09/23 0700 - 05/10/23 0659   Shift [id number] 0872-0045 5910-0659 24 Hour Total   INTAKE   P.O. 60   60   Shift Total 60   60   OUTPUT   Urine 100   100   Shift Total 100   100   Weight (kg) 89.1 89.1 89.1 89.1       Laboratory  Recent Labs     05/07/23  0613 05/08/23  0513 05/09/23  0232   WBC 13.4* 13.3* 13.3*   RBC 3.13* 3.34* 3.09*   HEMOGLOBIN 9.4* 9.7* 9.3*   HEMATOCRIT 29.3* 30.8* 28.1*   MCV 93.6 92.2 90.9   MCH 30.0 29.0 30.1   MCHC 32.1* 31.5* 33.1*   RDW 53.1* 52.6* 50.4*   PLATELETCT 525* 523* 500*   MPV 10.2 10.4 10.5     Recent Labs     05/07/23  0613 " 05/08/23  0513 05/09/23  0232   SODIUM 138 133* 144   POTASSIUM 4.4 4.3 4.6   CHLORIDE 101 96 105   CO2 26 25 27   GLUCOSE 146* 142* 228*   BUN 35* 36* 36*   CREATININE 2.19* 1.98* 2.22*   CALCIUM 9.1 9.4 9.5                     Imaging  MR-THORACIC SPINE-WITH & W/O   Final Result      1.  Limited study due to the motion artifact.   2.  When compared with the previous MRI again noted is expansile T2 hyperintense area in the thoracic spinal cord at the levels of T2, T3 and T4. There is also prominent dorsal right-sided subarachnoid space at this level. The differential diagnosis    includes intramedullary spinal cord tumor and extramedullary cyst/web with cord compression and edema.   3.  Pre and postcontrast MR examination of the thoracic spine is recommended with contrast under General anesthesia.   4.  1 -2 mm  T2-weighted sequences from T2 to T6 with smaller field of view is recommended for further characterization.      MR-THORACIC SPINE-WITH & W/O   Final Result         Stable appearance of expansile nonenhancing signal abnormality involving the upper thoracic cord between T1 and T4. Also noted is a small extra-axial collection/web along the right posterolateral spinal canal at T3-4 that deforms the cord and displaces    it anteriorly and to the left.   This could represent an Arachnoid web or arachnoid cyst with extensive mass effect and secondary edema of the cord.   Recommend additional thin section T2 and post contrast images through T1-T6 to better assess the abnormality.      MR-BRAIN-WITH & W/O   Final Result         No acute intracranial process.      Age-related volume loss and chronic microvascular ischemic changes.         DX-CHEST-PORTABLE (1 VIEW)   Final Result         1.  Pulmonary edema and/or infiltrates are identified, which are stable since the prior exam.   2.  Cardiomegaly   3.  Atherosclerosis      DX-CHEST-PORTABLE (1 VIEW)   Final Result         1.  Pulmonary edema and/or infiltrates are  identified, which are stable since the prior exam.   2.  Left PICC line tip terminates in the left axilla, stable since prior study.      DX-CHEST-PORTABLE (1 VIEW)   Final Result      1.  No significant change.   2.  Possible left PICC line with tip projecting at the axillary vein.      DX-CHEST-PORTABLE (1 VIEW)   Final Result      1.  Mildly improved pulmonary opacities.   2.  Possible left PICC line with tip projecting at the axillary vein.   3.  Likely trace right pleural effusion.      DX-CHEST-LIMITED (1 VIEW)   Final Result      1.  Placement of endotracheal tube with tip projecting over the mid trachea      2.  Left arm catheter present which is presumably venous in projects in the expected position of the left axillary vein      3.  Enlarged cardiac silhouette      NM-HEPATOBILIARY SCAN   Final Result      Hepatobiliary scan findings suspicious for acute cholecystitis.      MR-THORACIC SPINE-WITH & W/O   Final Result      1.  There is abnormal expansile T2 hyperintense lesion in the right side of the thoracic spinal cord at the levels of T2 and T3 Mild contrast enhancement is seen. This lesion is suspicious for spinal cord neoplasm. Other remote differential diagnosis    includes transverse myelitis.   2.  Exaggerated thoracic kyphosis.   3.  Thoracic dextroscoliosis.   4.  Minimal degenerative changes.   5.  Right pleural effusion.      MR-LUMBAR SPINE-WITH & W/O   Final Result      1.  Multifocal degenerative disease in the lumbar spine as described above.   2.  Moderate central canal and severe lateral recess stenosis at the level of L4-5. The exiting bilateral L5 nerve roots might have been impinged at the lateral recess.      MR-CERVICAL SPINE-WITH & W/O   Final Result      1.  There is abnormal expansile intramedullary T2 signal intensity lesion in the right cerebellum. Thoracic spinal cord at the level of T2 on T3. Mild diffuse contrast enhancement is seen. This finding is suspicious for spinal cord  neoplasm. The other    less likely differential diagnosis includes transverse myelitis. Follow-up is recommended after 4 weeks.   2.  Mild degenerative disease in the cervical spine as described above.   3.  There is no evidence of infection in the cervical spine.      US-RUQ   Final Result         1.  Acute cholecystitis.   2.  Atrophic right kidney.      DX-CHEST-LIMITED (1 VIEW)   Final Result      Perihilar interstitial edema and basilar atelectasis and/or consolidation. Underlying infection is possible.      MR-LUMBAR SPINE-W/O   Final Result      1.  Lower lumbar spine predominant degenerative changes as described in detail above, progressed from 2010 MRI.   2.  Edema at the L4-L5 disc space and L5 superior endplate likely represents degenerative changes. Less likely this could represent a low-grade or early discitis osteomyelitis. Correlate for evidence of infection.   3.  Acute appearing Schmorl's node at L3-L4, which can be a source of pain.      US-RENAL   Final Result      1.  Atrophic kidneys. Echogenic bilateral renal parenchyma could relate to medical renal disease.      2.  No hydronephrosis. No renal calculus.      DX-CHEST-PORTABLE (1 VIEW)   Final Result      No acute cardiac or pulmonary abnormalities are identified.      CT-ABDOMEN-PELVIS W/O   Final Result      1.  Findings suspicious for focal colitis at the hepatic flexure, less likely cholecystitis or duodenitis with secondary involvement of the colon. Infectious, inflammatory and ischemic etiologies are considerations. Underlying mass is not excluded.   2.  Cholelithiasis with distention of the gallbladder   3.  BILATERAL renal atrophy   4.  Subcentimeter LEFT adrenal myelolipoma   5.  12 mm RIGHT adrenal nodule likely an adenoma absent a history of cancer   6.  Subcentimeter RIGHT hepatic lesion, likely a cyst absent a history of cancer   7.  Atherosclerosis   8.  Colonic diverticulosis      CT-HEAD W/O   Final Result      1.  Cerebral  atrophy.      2.  White matter lucencies most consistent with small vessel ischemic change versus demyelination or gliosis.      3.  Otherwise, Head CT without contrast with no acute findings. No evidence of acute cerebral infarction, hemorrhage or mass lesion.         IR-US GUIDED PIV    (Results Pending)       Assessment/Plan  I will order him a thoracic MRI with and without contrast under general anesthesia.    Plan to follow up after MRI under anesthesia    Continue Decadron

## 2023-05-09 NOTE — CARE PLAN
Problem: Knowledge Deficit - Standard  Goal: Patient and family/care givers will demonstrate understanding of plan of care, disease process/condition, diagnostic tests and medications  Outcome: Progressing     Problem: Hemodynamics  Goal: Patient's hemodynamics, fluid balance and neurologic status will be stable or improve  Outcome: Progressing     Problem: Fluid Volume  Goal: Fluid volume balance will be maintained  Outcome: Progressing   The patient is Stable - Low risk of patient condition declining or worsening    Shift Goals  Clinical Goals: Neurosurg Consult  Patient Goals: Neurosurg Consult  Family Goals: N/A    Progress made toward(s) clinical / shift goals:  Neurology consulted this shift, repeat MRI ordered, Patient bladder scanned x1 this shift, requiring straight catheter to be performed     Patient is not progressing towards the following goals:

## 2023-05-09 NOTE — PROGRESS NOTES
[facility] Medicine Daily Progress Note    Date of Service  5/9/2023    Chief Complaint  Tyrone Cline is a 82 y.o. male admitted 4/21/2023 with Colitis.    Hospital Course  This 82-year-old male with a past medical history significant for CKD stage IV being followed by nephrology, diabetes mellitus, hypertension presented to ER on 4/21/2023 with a complaint of abdominal pain and lower extremity weakness.    Patient stated that he had epigastric and abdominal pain on Tuesday; CT scan of the abdomen and pelvis consistent with colitis but did show cholelithiasis with distention of gallbladder but less likely cholecystitis, ultrasound of the abdomen consistent with acute cholecystitis, HIDA scan showing acute carmelo; General surgery was consulted and he underwent  attempted laparoscopic cholecystectomy with conversion to open cholecystectomy; Subtotal cholecystectomy, patient has completed treatment with IV antibiotics.  Patient did have a NAHUN drain in place, which has been removed on 5/8/2023    His hospital course was complicated with postoperative hypotension, went to ICU vasopressor and fell back on 4/29 floor.    Of note, patient continues to have bilateral lower extremity weakness; MR T spine expansile T2 hyperintense area in the thoracic spinal cord at the levels of T2, T3 and T4. There is also prominent dorsal right-sided subarachnoid space at this level.  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     Interval events:  -- No acute events overnight, medicine has been stable, patient is currently requiring 3 to of oxygen, he is alert, and awake  --WBC 13.3, renal function at 2.2, continue to follow-up with nephrology  -- MRI of  T-spine reviewed, contacted neurosurgery  has been consulted, he will be evaluating the patient today.  He recommended continue Decadron 4 mg every 6 hrs   -- WBC is elevated at 13.3, creatinine at 2.22, nephrology  following,      I have discussed this patient's plan of care and discharge plan at IDT rounds today with Case Management, Nursing, Nursing leadership, and other members of the IDT team.    Consultants/Specialty  general surgery, nephrology, and neurology    Code Status  DNAR/DNI    Disposition  Patient is not medically cleared for discharge.   Anticipate discharge to to an inpatient rehabilitation hospital.  I have placed the appropriate orders for post-discharge needs.    Review of Systems  Review of Systems   Constitutional:  Positive for malaise/fatigue. Negative for fever.   HENT:  Negative for congestion, hearing loss and sore throat.    Eyes:  Negative for blurred vision.   Respiratory:  Negative for cough and shortness of breath.    Cardiovascular:  Negative for chest pain, palpitations and leg swelling.   Gastrointestinal:  Negative for abdominal pain, diarrhea and heartburn.   Genitourinary:  Negative for dysuria, flank pain and hematuria.   Musculoskeletal:  Negative for myalgias.   Skin:  Negative for rash.   Neurological:  Positive for weakness. Negative for dizziness and speech change.   Psychiatric/Behavioral:  Negative for depression. The patient is not nervous/anxious.    All other systems reviewed and are negative.     Physical Exam  Temp:  [36.2 °C (97.1 °F)-37 °C (98.6 °F)] 36.7 °C (98.1 °F)  Pulse:  [60-79] 69  Resp:  [17-18] 17  BP: (128-143)/(67-78) 137/74  SpO2:  [92 %-97 %] 94 %    Physical Exam  Vitals and nursing note reviewed.   Constitutional:       Appearance: Normal appearance.   HENT:      Head: Normocephalic and atraumatic.   Eyes:      Extraocular Movements: Extraocular movements intact.      Conjunctiva/sclera: Conjunctivae normal.   Cardiovascular:      Rate and Rhythm: Normal rate and regular rhythm.   Pulmonary:      Effort: Pulmonary effort is normal.      Breath sounds: Normal breath sounds.   Abdominal:      Tenderness: There is abdominal tenderness (Mild). There is no  guarding or rebound.   Musculoskeletal:      Cervical back: Neck supple. No tenderness.      Right lower leg: No edema.      Left lower leg: No edema.   Skin:     General: Skin is dry.      Coloration: Skin is pale.   Neurological:      Mental Status: He is alert and oriented to person, place, and time.      Cranial Nerves: No cranial nerve deficit.      Motor: Weakness present.       Fluids    Intake/Output Summary (Last 24 hours) at 5/9/2023 1203  Last data filed at 5/9/2023 1000  Gross per 24 hour   Intake 240 ml   Output 1470 ml   Net -1230 ml         Laboratory  Recent Labs     05/07/23  0613 05/08/23  0513 05/09/23  0232   WBC 13.4* 13.3* 13.3*   RBC 3.13* 3.34* 3.09*   HEMOGLOBIN 9.4* 9.7* 9.3*   HEMATOCRIT 29.3* 30.8* 28.1*   MCV 93.6 92.2 90.9   MCH 30.0 29.0 30.1   MCHC 32.1* 31.5* 33.1*   RDW 53.1* 52.6* 50.4*   PLATELETCT 525* 523* 500*   MPV 10.2 10.4 10.5       Recent Labs     05/07/23  0613 05/08/23  0513 05/09/23  0232   SODIUM 138 133* 144   POTASSIUM 4.4 4.3 4.6   CHLORIDE 101 96 105   CO2 26 25 27   GLUCOSE 146* 142* 228*   BUN 35* 36* 36*   CREATININE 2.19* 1.98* 2.22*   CALCIUM 9.1 9.4 9.5                         Imaging  MR-THORACIC SPINE-WITH & W/O   Final Result      1.  Limited study due to the motion artifact.   2.  When compared with the previous MRI again noted is expansile T2 hyperintense area in the thoracic spinal cord at the levels of T2, T3 and T4. There is also prominent dorsal right-sided subarachnoid space at this level. The differential diagnosis    includes intramedullary spinal cord tumor and extramedullary cyst/web with cord compression and edema.   3.  Pre and postcontrast MR examination of the thoracic spine is recommended with contrast under General anesthesia.   4.  1 -2 mm  T2-weighted sequences from T2 to T6 with smaller field of view is recommended for further characterization.      MR-THORACIC SPINE-WITH & W/O   Final Result         Stable appearance of expansile  nonenhancing signal abnormality involving the upper thoracic cord between T1 and T4. Also noted is a small extra-axial collection/web along the right posterolateral spinal canal at T3-4 that deforms the cord and displaces    it anteriorly and to the left.   This could represent an Arachnoid web or arachnoid cyst with extensive mass effect and secondary edema of the cord.   Recommend additional thin section T2 and post contrast images through T1-T6 to better assess the abnormality.      MR-BRAIN-WITH & W/O   Final Result         No acute intracranial process.      Age-related volume loss and chronic microvascular ischemic changes.         DX-CHEST-PORTABLE (1 VIEW)   Final Result         1.  Pulmonary edema and/or infiltrates are identified, which are stable since the prior exam.   2.  Cardiomegaly   3.  Atherosclerosis      DX-CHEST-PORTABLE (1 VIEW)   Final Result         1.  Pulmonary edema and/or infiltrates are identified, which are stable since the prior exam.   2.  Left PICC line tip terminates in the left axilla, stable since prior study.      DX-CHEST-PORTABLE (1 VIEW)   Final Result      1.  No significant change.   2.  Possible left PICC line with tip projecting at the axillary vein.      DX-CHEST-PORTABLE (1 VIEW)   Final Result      1.  Mildly improved pulmonary opacities.   2.  Possible left PICC line with tip projecting at the axillary vein.   3.  Likely trace right pleural effusion.      DX-CHEST-LIMITED (1 VIEW)   Final Result      1.  Placement of endotracheal tube with tip projecting over the mid trachea      2.  Left arm catheter present which is presumably venous in projects in the expected position of the left axillary vein      3.  Enlarged cardiac silhouette      NM-HEPATOBILIARY SCAN   Final Result      Hepatobiliary scan findings suspicious for acute cholecystitis.      MR-THORACIC SPINE-WITH & W/O   Final Result      1.  There is abnormal expansile T2 hyperintense lesion in the right side  of the thoracic spinal cord at the levels of T2 and T3 Mild contrast enhancement is seen. This lesion is suspicious for spinal cord neoplasm. Other remote differential diagnosis    includes transverse myelitis.   2.  Exaggerated thoracic kyphosis.   3.  Thoracic dextroscoliosis.   4.  Minimal degenerative changes.   5.  Right pleural effusion.      MR-LUMBAR SPINE-WITH & W/O   Final Result      1.  Multifocal degenerative disease in the lumbar spine as described above.   2.  Moderate central canal and severe lateral recess stenosis at the level of L4-5. The exiting bilateral L5 nerve roots might have been impinged at the lateral recess.      MR-CERVICAL SPINE-WITH & W/O   Final Result      1.  There is abnormal expansile intramedullary T2 signal intensity lesion in the right cerebellum. Thoracic spinal cord at the level of T2 on T3. Mild diffuse contrast enhancement is seen. This finding is suspicious for spinal cord neoplasm. The other    less likely differential diagnosis includes transverse myelitis. Follow-up is recommended after 4 weeks.   2.  Mild degenerative disease in the cervical spine as described above.   3.  There is no evidence of infection in the cervical spine.      US-RUQ   Final Result         1.  Acute cholecystitis.   2.  Atrophic right kidney.      DX-CHEST-LIMITED (1 VIEW)   Final Result      Perihilar interstitial edema and basilar atelectasis and/or consolidation. Underlying infection is possible.      MR-LUMBAR SPINE-W/O   Final Result      1.  Lower lumbar spine predominant degenerative changes as described in detail above, progressed from 2010 MRI.   2.  Edema at the L4-L5 disc space and L5 superior endplate likely represents degenerative changes. Less likely this could represent a low-grade or early discitis osteomyelitis. Correlate for evidence of infection.   3.  Acute appearing Schmorl's node at L3-L4, which can be a source of pain.      US-RENAL   Final Result      1.  Atrophic  kidneys. Echogenic bilateral renal parenchyma could relate to medical renal disease.      2.  No hydronephrosis. No renal calculus.      DX-CHEST-PORTABLE (1 VIEW)   Final Result      No acute cardiac or pulmonary abnormalities are identified.      CT-ABDOMEN-PELVIS W/O   Final Result      1.  Findings suspicious for focal colitis at the hepatic flexure, less likely cholecystitis or duodenitis with secondary involvement of the colon. Infectious, inflammatory and ischemic etiologies are considerations. Underlying mass is not excluded.   2.  Cholelithiasis with distention of the gallbladder   3.  BILATERAL renal atrophy   4.  Subcentimeter LEFT adrenal myelolipoma   5.  12 mm RIGHT adrenal nodule likely an adenoma absent a history of cancer   6.  Subcentimeter RIGHT hepatic lesion, likely a cyst absent a history of cancer   7.  Atherosclerosis   8.  Colonic diverticulosis      CT-HEAD W/O   Final Result      1.  Cerebral atrophy.      2.  White matter lucencies most consistent with small vessel ischemic change versus demyelination or gliosis.      3.  Otherwise, Head CT without contrast with no acute findings. No evidence of acute cerebral infarction, hemorrhage or mass lesion.                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  Neurology evaluated, patient received IVIG on 5/1, discontinued 5/2    Patient does have right lower extremity weakness more than left.  MR T spine showing ; MR T spine expansile T2 hyperintense area in the thoracic spinal cord at the levels of T2, T3 and T4. There is also prominent dorsal right-sided subarachnoid space at this level.  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    -- NS called and recs decadron 4  mg every 6 hrs     Abnormal MRI- (present on admission)  Assessment & Plan  MRI on admission showing T2/T3  hyperintense lesion in the right side of the thoracic spinal cord at the levels of T2 and T3 Mild contrast enhancement is seen. This lesion is suspicious for spinal cord neoplasm or possible transverse myelitis       5/8 MRI thoracic spine reviewed and called Neurosurgery    Acute gangrenous cholecystitis- (present on admission)  Assessment & Plan  Found to have gangrenous cholecystitis s/p laprascopic converted open cholecystectomy 4/28/2023   -- Completed antibiotic treatment from 4/25- 4/ 29, drain has been removed.  Follow surgical recommendation.    Surgical course was complicated with postoperative wound infection, culture has been obtained, wound care has been consulted    Hypomagnesemia- (present on admission)  Assessment & Plan  Replete as needed    Cholelithiasis with acute cholecystitis- (present on admission)  Assessment & Plan  S/p surgical removal, converted to open cholecystectomy  NAHUN drain-removed on 5/8/2023  The patient is tolerating a diet already, monitor bowel function    Hypokalemia- (present on admission)  Assessment & Plan  Stable, monitor CMP and replace as needed   - goal K >4    Hyponatremia- (present on admission)  Assessment & Plan  Resolved    Colitis- (present on admission)  Assessment & Plan  Cultures negative, monitor  Initial admitting impression, currently improved  Diet as tolerated     Acute kidney injury superimposed on CKD (HCC)- (present on admission)  Assessment & Plan   Resolved    Avoid nephrotoxins       Diabetic peripheral neuropathy (HCC)- (present on admission)  Assessment & Plan  Cymbalta, medical treatment    Essential hypertension- (present on admission)  Assessment & Plan  Continue amlodipine and coreg    Hypercholesterolemia- (present on admission)  Assessment & Plan  Stable, continue Lipitor    Chronic kidney disease (CKD), stage IV (severe) (HCC)- (present  on admission)  Assessment & Plan  Appears to be at baseline at 5/3/2023            Hypercalcemia- (present on admission)  Assessment & Plan  Likely due to immobility   Resolved with IVF hydration  Continue to follow and treat accordingly     Thrombocytopenia (HCC)- (present on admission)  Assessment & Plan  Resolved, plt are stable     High anion gap metabolic acidosis- (present on admission)  Assessment & Plan  Acute on chronic kidney disease, -appears back at baseline on 5/3/2023    Resolved    Sepsis (HCC)- (present on admission)  Assessment & Plan  This is Sepsis Present on admission  SIRS criteria identified on my evaluation include: Leukocytosis, with WBC greater than 12,000  Source is colitis  Sepsis protocol initiated  Fluid resuscitation ordered per protocol  Crystalloid Fluid Administration: Fluid resuscitation ordered per standard protocol - 30 mL/kg per current or ideal body weight  IV antibiotics as appropriate for source of sepsis  Reassessment: I have reassessed the patient's hemodynamic status    Due to gangrenous gallbladder requiring open carmelo    -- Today patient was noted to have surgical  wound with drainage    Type 2 diabetes mellitus with kidney complication, without long-term current use of insulin (HCC)- (present on admission)  Assessment & Plan  Ha1c 6.4% 4 months ago   On ozempic and januvia outpatient   Continue ISS and hypoglycemic protocol while inpatient         VTE prophylaxis: SCDs/TEDs and heparin ppx

## 2023-05-09 NOTE — THERAPY
"Occupational Therapy Contact Note    Patient Name: Tyrone Cline  Age:  83 y.o., Sex:  male  Medical Record #: [id number]  Today's Date: 5/9/2023    Discussed missed therapy with RN. Attempted to see pt for OT tx. RN informed OT that pt is extremely lethargic and unable to participate at this time. Will hold OT tx today. RN agreed. Will continue to follow.        05/09/23 5414   Treatment Variance   Reason For Missed Therapy Medical - Patient not Able To Participate  (RN stated hold OT tx today . Pt extremely lethargic and not able to participate.)   Anticipated Discharge Equipment and Recommendations   DC Equipment Recommendations Unable to determine at this time   Discharge Recommendations Recommend post-acute placement for additional occupational therapy services prior to discharge home   Interdisciplinary Plan of Care Collaboration   IDT Collaboration with  Nursing;Occupational Therapist   Collaboration Comments Attempted OT tx today. RN stated \" pt is not able to participate due to severe lethargy due to medication\". OT tx held per RN request.   Session Information   Date / Session Number  5/3 #4 (2/3. 5/7) Attempted 5/9. Pt not able to participate.       "

## 2023-05-10 PROBLEM — R33.9 URINARY RETENTION: Status: ACTIVE | Noted: 2023-05-10

## 2023-05-10 LAB
ERYTHROCYTE [DISTWIDTH] IN BLOOD BY AUTOMATED COUNT: 48.6 FL (ref 35.9–50)
GLUCOSE BLD STRIP.AUTO-MCNC: 173 MG/DL (ref 65–99)
GLUCOSE BLD STRIP.AUTO-MCNC: 191 MG/DL (ref 65–99)
GLUCOSE BLD STRIP.AUTO-MCNC: 210 MG/DL (ref 65–99)
GLUCOSE BLD STRIP.AUTO-MCNC: 251 MG/DL (ref 65–99)
HCT VFR BLD AUTO: 27.8 % (ref 42–52)
HGB BLD-MCNC: 9.2 G/DL (ref 14–18)
MCH RBC QN AUTO: 29.8 PG (ref 27–33)
MCHC RBC AUTO-ENTMCNC: 33.1 G/DL (ref 33.7–35.3)
MCV RBC AUTO: 90 FL (ref 81.4–97.8)
PLATELET # BLD AUTO: 468 K/UL (ref 164–446)
PMV BLD AUTO: 10.4 FL (ref 9–12.9)
RBC # BLD AUTO: 3.09 M/UL (ref 4.7–6.1)
WBC # BLD AUTO: 14.6 K/UL (ref 4.8–10.8)

## 2023-05-10 PROCEDURE — 700102 HCHG RX REV CODE 250 W/ 637 OVERRIDE(OP): Performed by: HOSPITALIST

## 2023-05-10 PROCEDURE — 97602 WOUND(S) CARE NON-SELECTIVE: CPT

## 2023-05-10 PROCEDURE — A9270 NON-COVERED ITEM OR SERVICE: HCPCS | Performed by: SURGERY

## 2023-05-10 PROCEDURE — A9270 NON-COVERED ITEM OR SERVICE: HCPCS | Performed by: HOSPITALIST

## 2023-05-10 PROCEDURE — 770001 HCHG ROOM/CARE - MED/SURG/GYN PRIV*

## 2023-05-10 PROCEDURE — 700111 HCHG RX REV CODE 636 W/ 250 OVERRIDE (IP): Performed by: HOSPITALIST

## 2023-05-10 PROCEDURE — 700105 HCHG RX REV CODE 258: Performed by: HOSPITALIST

## 2023-05-10 PROCEDURE — 85027 COMPLETE CBC AUTOMATED: CPT

## 2023-05-10 PROCEDURE — 99233 SBSQ HOSP IP/OBS HIGH 50: CPT | Performed by: HOSPITALIST

## 2023-05-10 PROCEDURE — 700102 HCHG RX REV CODE 250 W/ 637 OVERRIDE(OP): Performed by: SURGERY

## 2023-05-10 PROCEDURE — 700111 HCHG RX REV CODE 636 W/ 250 OVERRIDE (IP): Performed by: STUDENT IN AN ORGANIZED HEALTH CARE EDUCATION/TRAINING PROGRAM

## 2023-05-10 PROCEDURE — 99024 POSTOP FOLLOW-UP VISIT: CPT | Performed by: NURSE PRACTITIONER

## 2023-05-10 PROCEDURE — 700102 HCHG RX REV CODE 250 W/ 637 OVERRIDE(OP): Performed by: FAMILY MEDICINE

## 2023-05-10 PROCEDURE — A9270 NON-COVERED ITEM OR SERVICE: HCPCS | Performed by: FAMILY MEDICINE

## 2023-05-10 PROCEDURE — 36415 COLL VENOUS BLD VENIPUNCTURE: CPT

## 2023-05-10 PROCEDURE — 82962 GLUCOSE BLOOD TEST: CPT

## 2023-05-10 RX ADMIN — INSULIN HUMAN 2 UNITS: 100 INJECTION, SOLUTION PARENTERAL at 17:39

## 2023-05-10 RX ADMIN — DULOXETINE HYDROCHLORIDE 30 MG: 30 CAPSULE, DELAYED RELEASE ORAL at 17:30

## 2023-05-10 RX ADMIN — HEPARIN SODIUM 5000 UNITS: 5000 INJECTION, SOLUTION INTRAVENOUS; SUBCUTANEOUS at 05:20

## 2023-05-10 RX ADMIN — INSULIN HUMAN 2 UNITS: 100 INJECTION, SOLUTION PARENTERAL at 12:14

## 2023-05-10 RX ADMIN — Medication 1 APPLICATOR: at 17:30

## 2023-05-10 RX ADMIN — CARVEDILOL 12.5 MG: 12.5 TABLET, FILM COATED ORAL at 17:30

## 2023-05-10 RX ADMIN — AMLODIPINE BESYLATE 5 MG: 10 TABLET ORAL at 05:21

## 2023-05-10 RX ADMIN — Medication 1 APPLICATOR: at 05:22

## 2023-05-10 RX ADMIN — OXYCODONE HYDROCHLORIDE 10 MG: 10 TABLET ORAL at 05:16

## 2023-05-10 RX ADMIN — DEXAMETHASONE SODIUM PHOSPHATE 4 MG: 4 INJECTION, SOLUTION INTRA-ARTICULAR; INTRALESIONAL; INTRAMUSCULAR; INTRAVENOUS; SOFT TISSUE at 17:29

## 2023-05-10 RX ADMIN — AMPICILLIN AND SULBACTAM 3 G: 2; 1 INJECTION, POWDER, FOR SOLUTION INTRAMUSCULAR; INTRAVENOUS at 05:23

## 2023-05-10 RX ADMIN — CARVEDILOL 12.5 MG: 12.5 TABLET, FILM COATED ORAL at 09:29

## 2023-05-10 RX ADMIN — HEPARIN SODIUM 5000 UNITS: 5000 INJECTION, SOLUTION INTRAVENOUS; SUBCUTANEOUS at 14:45

## 2023-05-10 RX ADMIN — SPIRONOLACTONE 25 MG: 50 TABLET ORAL at 05:21

## 2023-05-10 RX ADMIN — HEPARIN SODIUM 5000 UNITS: 5000 INJECTION, SOLUTION INTRAVENOUS; SUBCUTANEOUS at 22:17

## 2023-05-10 RX ADMIN — SODIUM BICARBONATE 1300 MG: 650 TABLET ORAL at 17:30

## 2023-05-10 RX ADMIN — ATORVASTATIN CALCIUM 10 MG: 10 TABLET, FILM COATED ORAL at 17:30

## 2023-05-10 RX ADMIN — DOCUSATE SODIUM 50 MG AND SENNOSIDES 8.6 MG 2 TABLET: 8.6; 5 TABLET, FILM COATED ORAL at 17:30

## 2023-05-10 RX ADMIN — TAMSULOSIN HYDROCHLORIDE 0.4 MG: 0.4 CAPSULE ORAL at 09:30

## 2023-05-10 RX ADMIN — DEXAMETHASONE SODIUM PHOSPHATE 4 MG: 4 INJECTION, SOLUTION INTRA-ARTICULAR; INTRALESIONAL; INTRAMUSCULAR; INTRAVENOUS; SOFT TISSUE at 05:20

## 2023-05-10 RX ADMIN — AMPICILLIN AND SULBACTAM 3 G: 2; 1 INJECTION, POWDER, FOR SOLUTION INTRAMUSCULAR; INTRAVENOUS at 17:29

## 2023-05-10 RX ADMIN — DOXYCYCLINE 100 MG: 100 TABLET, FILM COATED ORAL at 17:30

## 2023-05-10 RX ADMIN — DEXAMETHASONE SODIUM PHOSPHATE 4 MG: 4 INJECTION, SOLUTION INTRA-ARTICULAR; INTRALESIONAL; INTRAMUSCULAR; INTRAVENOUS; SOFT TISSUE at 12:14

## 2023-05-10 RX ADMIN — FAMOTIDINE 20 MG: 20 TABLET, FILM COATED ORAL at 05:21

## 2023-05-10 RX ADMIN — OXYCODONE HYDROCHLORIDE 10 MG: 10 TABLET ORAL at 22:31

## 2023-05-10 RX ADMIN — INSULIN HUMAN 5 UNITS: 100 INJECTION, SOLUTION PARENTERAL at 22:18

## 2023-05-10 RX ADMIN — SODIUM BICARBONATE 1300 MG: 650 TABLET ORAL at 05:20

## 2023-05-10 RX ADMIN — ALLOPURINOL 100 MG: 100 TABLET ORAL at 05:21

## 2023-05-10 RX ADMIN — INSULIN HUMAN 3 UNITS: 100 INJECTION, SOLUTION PARENTERAL at 09:33

## 2023-05-10 RX ADMIN — DEXAMETHASONE SODIUM PHOSPHATE 4 MG: 4 INJECTION, SOLUTION INTRA-ARTICULAR; INTRALESIONAL; INTRAMUSCULAR; INTRAVENOUS; SOFT TISSUE at 01:00

## 2023-05-10 RX ADMIN — DOXYCYCLINE 100 MG: 100 TABLET, FILM COATED ORAL at 05:22

## 2023-05-10 ASSESSMENT — ENCOUNTER SYMPTOMS
DIARRHEA: 0
PALPITATIONS: 0
DIZZINESS: 0
MYALGIAS: 0
FEVER: 0
COUGH: 0
WEAKNESS: 1
DEPRESSION: 0
SPEECH CHANGE: 0
SHORTNESS OF BREATH: 0
FLANK PAIN: 0
NERVOUS/ANXIOUS: 0
BLURRED VISION: 0
ABDOMINAL PAIN: 0
SORE THROAT: 0
HEARTBURN: 0

## 2023-05-10 ASSESSMENT — PAIN DESCRIPTION - PAIN TYPE
TYPE: ACUTE PAIN

## 2023-05-10 NOTE — DISCHARGE PLANNING
HTH/SCP TCN chart review completed. Collaborated with NIKIA Galdamez.  Current discharge considerations are for SNF when medically cleared.  Per chart review, patient has been accepted to [facility].  TCN will continue to follow and collaborate with discharge planning team as additional post acute needs arise. Thank you.    Completed:  PT/OT recommends post acute placement on 5/4/23 and 5/3/23.     SLP eval 4/26 with recs for no further needs  Physiatry recommending SNF as of 5/2 as well  Choice obtained: IRF (-> declines; recs SNF) and SNF choice obtained on 4/24/23.  HH choice on 4/24/23.  Infusion on 4/22/23; see above; accepted to [facility];  [facility] referral sent 4/22/23

## 2023-05-10 NOTE — CARE PLAN
The patient is Watcher - Medium risk of patient condition declining or worsening    Shift Goals  Clinical Goals: rivera cath, safety, MRI in am  Patient Goals: MRI in am, rest, pain management  Family Goals: supportive care, update    Progress made toward(s) clinical / shift goals:    Reports adequate pain management.   Good po intake.    Patient is not progressing towards the following goals:  Rivera to dd due to retention.  Wound care.     Problem: Respiratory  Goal: Patient will achieve/maintain optimum respiratory ventilation and gas exchange  5/10/2023 0501 by Alee Tee V, R.N.  Outcome: Not Progressing  Note: Pt on 2L o2 nc. RA at baseline. Encouraged to turn, cough, deep breath. Weaning as able. Denies shortness of breath.  5/10/2023 0501 by Alee Tee V, R.N.  Note: Pt on 2L o2 nc. RA at baseline. Encouraged to turn, cough, deep breath. Weaning as able. Denies shortness of breath.

## 2023-05-10 NOTE — OP REPORT
DATE OF SERVICE:  05/09/2023     PREOPERATIVE DIAGNOSIS:  Bladder neck contracture.     POSTOPERATIVE DIAGNOSIS:  Bladder neck contracture.     PROCEDURE:  Cystoscopy with complex catheterization.     DESCRIPTION OF PROCEDURE:  The patient's penis was prepped and draped in   sterile fashion.  A 2% Xylocaine jelly was used for anesthetic.  Initially   attempted to pass a catheter and I met resistance.  I then took the flexible   ureteroscope, and I was able to navigate through the prostatic urethra into   the bladder neck, there was a bladder neck contracture.  The scope itself was   able to pass into the bladder.  There was limited visualization because of   some slightly bloody urine but my impression was there was no other   abnormalities or active bleeding.  Wire was passed through the scope.  I then   attempted to pass a #16-[place] tip catheter over the wire that was not   successful.  We then tried a #14-Kenyan catheter over the wire which was also   not successful.  I removed the wire and then I was able to pass a #14-Kenyan   coude tip catheter to navigate that into the bladder.  With that, we had   return of light pink urine.  Plan will be to leave this indwelling while he is   here.  He did void around the wire as he does have a #16-Kenyan bladder neck,   so I do not think he needs surgery.  I think once he is up and ambulatory, he   will be able to urinate adequately.  At this point, the catheter can be left   indwelling as was required medically but once ready for discharge, it can be   removed.  He is to follow up with Dr. Collin Thopmson, his established urologist   as an outpatient.        ______________________________  MD SILVESTRE Abel/HENRY    DD:  05/09/2023 19:52  DT:  05/09/2023 20:49    Job#:  [id number]

## 2023-05-10 NOTE — WOUND TEAM
[facility]  Inpatient Services  Initial Wound and Skin Care Evaluation    Admission Date: 4/21/2023     Last order of IP CONSULT TO WOUND CARE was found on 5/10/2023 from Hospital Encounter on 4/21/2023     HPI, PMH, SH: Reviewed    Past Surgical History:   Procedure Laterality Date    ROMAN BY LAPAROSCOPY N/A 4/28/2023    Procedure: CHOLECYSTECTOMY, LAPAROSCOPIC ATTEMPTED, OPEN CHOLECYSTECTOMY;  Surgeon: Suraj Galvan M.D.;  Location: [facility];  Service: General    WI COLONOSCOPY,DIAGNOSTIC N/A 12/12/2021    Procedure: COLONOSCOPY;  Surgeon: Dariel Simons M.D.;  Location: [facility];  Service: Gastroenterology    WI UPPER GI ENDOSCOPY,BIOPSY N/A 12/12/2021    Procedure: GASTROSCOPY, WITH BIOPSY;  Surgeon: Dariel Simons M.D.;  Location: [facility];  Service: Gastroenterology    WI UPPER GI ENDOSCOPY,CTRL BLEED N/A 12/12/2021    Procedure: EGD, WITH CLIP PLACEMENT;  Surgeon: Dariel Simons M.D.;  Location: [facility];  Service: Gastroenterology    GASTROSCOPY W/PUSH ENTERSCOPY N/A 12/12/2021    Procedure: GASTROSCOPY, WITH PUSH ENTEROSCOPY;  Surgeon: Dariel Simons M.D.;  Location: [facility];  Service: Gastroenterology    SEPTAL RECONSTRUCTION  12/7/2015    Procedure: SEPTAL RECONSTRUCTION OPEN WITH  GRAFTS & CONCHAL CART GRAFT;  Surgeon: SYDNIE Gaitan M.D.;  Location: SURGERY SAME DAY [facility];  Service:     BLEPHAROPLASTY  7/23/2014    Performed by Gera Plasencia M.D. at [facility]S    BROW LIFT  7/23/2014    Performed by Gera Plasencia M.D. at [facility]    CATARACT PHACO WITH IOL  12/4/2012    Performed by Suraj Gonzalez M.D. at [facility]    CATARACT PHACO WITH IOL  11/20/2012    Performed by Suraj Gonzalez M.D. at [facility]    APPENDECTOMY      ARTHROSCOPY, KNEE      CARDIAC CATH, LEFT HEART      LHC out of concern for STEMI, normal coronaries with minimal  atherosclerosis, diagnosed with PNA as cause of symptoms and ST changes.     OTHER      KNEE SURGERY - LEFT.    OTHER      NOSE SURGERY.    TONSILLECTOMY       Social History     Tobacco Use    Smoking status: Former     Packs/day: 0.20     Years: 6.00     Pack years: 1.20     Types: Cigarettes     Quit date: 1965     Years since quittin.3    Smokeless tobacco: Never    Tobacco comments:     Stopped over 25 years ago.   Vaping Use    Vaping Use: Never used   Substance Use Topics    Alcohol use: No     Chief Complaint   Patient presents with    GLF     2 days ago. (-) thinners    Extremity Weakness     Chronic lower bilateral weakness.     Diagnosis: Acute kidney failure (HCC) [N17.9]  Sepsis (HCC) [A41.9]    Unit where seen by Wound Team: T405/00     WOUND CONSULT/FOLLOW UP RELATED TO:  abdominal incision dehiscence      WOUND HISTORY:  Pt underwent an open cholecystectomy on  with Dr. Galvan.     WOUND ASSESSMENT/LDA    Wound 23 Incision Abdomen Umbilical Incision/ RUQ Transverse Incision (Active)   Wound Image   05/10/23 1200   Site Assessment Red;Drainage 05/10/23 1200   Periwound Assessment Whitten 05/10/23 1200   Margins Defined edges;Unattached edges 05/10/23 1200   Closure Open to air 05/10/23 1200   Drainage Amount Moderate 05/10/23 1200   Drainage Description Purulent;Serosanguineous 05/10/23 1200   Treatments Cleansed;Site care 05/10/23 1200   Wound Cleansing Approved Wound Cleanser 05/10/23 1200   Periwound Protectant Skin Protectant Wipes to Periwound 05/10/23 1200   Dressing Cleansing/Solutions Not Applicable 05/10/23 1200   Dressing Options Hydrofiber Silver;Silicone Adhesive Foam 05/10/23 1200   Dressing Changed New 05/10/23 1200   Dressing Status Clean;Intact;Dry 05/10/23 1200   Dressing Change/Treatment Frequency Every 48 hrs, and As Needed 05/10/23 1200   NEXT Dressing Change/Treatment Date 05/12/23 05/10/23 1200   NEXT Weekly Photo (Inpatient Only) 05/17/23 05/10/23 1200   Number of  Staples Removed 5 05/06/23 1400   Non-staged Wound Description Full thickness 05/10/23 1200   Wound Length (cm) 0.5 cm 05/10/23 1200   Wound Width (cm) 1.5 cm 05/10/23 1200   Wound Depth (cm) 2.5 cm 05/10/23 1200   Wound Surface Area (cm^2) 0.75 cm^2 05/10/23 1200   Wound Volume (cm^3) 1.875 cm^3 05/10/23 1200   Shape circular area along incision 05/10/23 1200   Exposed Structures None 05/10/23 1200   WOUND NURSE ONLY - Time Spent with Patient (mins) 30 05/10/23 1200       Vascular:    REX:   No results found.    Lab Values:    Lab Results   Component Value Date/Time    WBC 14.6 (H) 05/10/2023 08:36 AM    RBC 3.09 (L) 05/10/2023 08:36 AM    HEMOGLOBIN 9.2 (L) 05/10/2023 08:36 AM    HEMATOCRIT 27.8 (L) 05/10/2023 08:36 AM    CREACTPROT 33.53 (H) 04/23/2023 10:32 AM    SEDRATEWES 66 (H) 04/23/2023 10:32 AM    HBA1C 6.6 (H) 01/03/2023 08:45 AM        Culture Results show:  Recent Results (from the past 720 hour(s))   CULTURE WOUND W/ GRAM STAIN    Collection Time: 04/28/23  4:12 PM    Specimen: Wound   Result Value Ref Range    Significant Indicator POS (POS)     Source WND     Site Gall Bladder     Culture Result - (A)     Gram Stain Result Moderate Gram negative rods.     Culture Result (A)      Escherichia coli ESBL  Moderate growth  Extended Spectrum Beta-lactamase (ESBL) isolated.  ESBL's may be clinically resistant to therapy with  Penicillins,Cephalosporins or Aztreonam despite  apparent in vitro susceptibility to some of these agents.  The patient requires contact isolation.  Please contact pharmacy or an Infectious Disease Specialist  if you have any questions about appropriate therapy.         Susceptibility    Escherichia coli esbl - DENICE     Ampicillin >16 Resistant mcg/mL     Ceftriaxone >32 Resistant mcg/mL     Cefazolin >16 Resistant mcg/mL     Ciprofloxacin >2 Resistant mcg/mL     Cefepime >16 Resistant mcg/mL     Cefuroxime >16 Resistant mcg/mL     Ampicillin/sulbactam 16/8 Intermediate mcg/mL      Ertapenem <=0.5 Sensitive mcg/mL     Tobramycin >8 Resistant mcg/mL     Gentamicin >8 Resistant mcg/mL     Minocycline <=4 Sensitive mcg/mL     Moxifloxacin >4 Resistant mcg/mL     Pip/Tazobactam <=8 Sensitive mcg/mL     Trimeth/Sulfa <=0.5/9.5 Sensitive mcg/mL     Tigecycline <=2 Sensitive mcg/mL       Pain Level/Medicated:  Denies pain        INTERVENTIONS BY WOUND TEAM:  Chart and images reviewed. Discussed with bedside RN. All areas of concern (based on picture review, LDA review and discussion with bedside RN) have been thoroughly assessed. Documentation of areas based on significant findings. This RN in to assess patient. Performed standard wound care which includes appropriate positioning, dressing removal and non-selective debridement. Pictures and measurements obtained weekly if/when required.    Preparation for Dressing removal: NA, previous dressing removed with ease   Non-selectively Debrided with:  wound cleanser and gauze.  Sharp debridement: NA  Latonya wound: Cleansed with wound cleanser and gauze, Prepped with no sting skin prep   Primary Dressing: Strip of Aquacel Ag tucked onto wound and used to fill wound bed  Secondary (Outer) Dressing: silicone adhesive foam     [facility] Discharge Planning  Number of Clinicians necessary to complete wound care: 1  Is patient requiring IV pain medications for dressing changes: No  Length of time for dressing change 15 min. (This does not include chart review, pre-medication time, set up, clean up or time spent charting.)    Interdisciplinary consultation: Patient, Bedside RN    EVALUATION / RATIONALE FOR TREATMENT:  Most Recent Date:    5/10/23: Patient with small dehiscence along transverse abdominal incision. Small amount of purulence expressed following cleansing. Aquacel Ag Hydrofiber applied to manage bioburden, absorb exudate, and maintain a moist wound environment without laterally wicking exudate therefore reducing latonya-wound maceration.       Goals: Steady decrease in wound area and depth weekly.    WOUND TEAM PLAN OF CARE ([X] for frequency of wound follow up,):   Nursing to follow dressing orders written for wound care. Contact wound team if area fails to progress, deteriorates or with any questions/concerns if something comes up before next scheduled follow up (See below as to whether wound is following and frequency of wound follow up)  Dressing changes by wound team:                   Follow up 3 times weekly:                NPWT change 3 times weekly:     Follow up 1-2 times weekly:      Follow up Bi-Monthly:           Follow up Monthly (High Risk):                        Follow up as needed:   X  Other (explain):     NURSING PLAN OF CARE ORDERS (X):  Dressing changes: See Dressing Care orders: X  Skin care: See Skin Care orders:   RN Prevention Protocol:   Rectal tube care: See Rectal Tube Care orders:   Other orders:    RSKIN:   CURRENTLY IN PLACE (X), APPLIED THIS VISIT (A), ORDERED (O):   Q shift Jeremy:  X  Q shift pressure point assessments:  X    Surface/Positioning   Standard Mattress/Trauma Bed        X  Low Airloss          ICU Low Airloss   Bariatric COSME     Waffle cushion        Waffle Overlay          Reposition q 2 hours    O  TAPs Turning system     Z Les Pillow     Offloading/Redistribution   Sacral Offloading Dressing (Silicone dressing)   O  Heel Offloading Dressing (Silicone dressing)       O  Heel float boots (Prevalon boot)             Float Heels off Bed with Pillows           Respiratory NA  Silicone O2 tubing         Gray Foam Ear protectors     Cannula fixation Device (Tender )          High flow offloading Clip    Elastic head band offloading device      Anchorfast                                                         Trach with Optifoam split foam             Containment/Moisture Prevention FLEX    Rectal tube or BMS    Purwick/Condom Cath        Florez Catheter    Barrier wipes           Barrier paste        Antifungal tx      Interdry        Mobilization FLEX      Up to chair        Ambulate      PT/OT      Nutrition       Dietician        Diabetes Education      PO   X  TF     TPN     NPO   # days     Other        Anticipated discharge plans: TBD  LTACH:        SNF/Rehab:                  Home Health Care:           Outpatient Wound Center:            Self/Family Care:        Other:                  Vac Discharge Needs:   Vac Discharge plan is purely a recommendation from wound team and not a requirement for discharge unless otherwise stated by physician.  Not Applicable Pt not on a wound vac:     X  Regular Vac while inpatient, alternative dressing at DC:        Regular Vac in use and continued at DC:            Reg. Vac w/ Skin Sub/Biologic in use. Will need to be changed 2x wkly:      Veraflo Vac while inpatient, ok to transition to Regular Vac on Discharge (Bedside RN to Clamp small instillation tubing at time of DC):           Veraflo Vac while inpatient, would benefit from remaining on Veraflo Vac upon discharge:

## 2023-05-10 NOTE — PROGRESS NOTES
"  DATE: 5/10/2023    4/28 Laparoscopic converted to open cholecystectomy    INTERVAL EVENTS:     Interval removal of right upper quadrant transverse staples on 5/8.  Small portion of wound dehiscences to medical portion of incision noted on 5/9.     - Wound care team for dressing.   - Preliminary incision cultures negative. Antibiotics per Hospitalist team  - Follow up in clinic within one weeks time upon discharge.  ACS [place] to sign off at this time.  Please reconsult should the need arise.  Thank you, for allowing us to participate in this patients care.    PHYSICAL EXAMINATION:  Vital Signs: /72   Pulse 74   Temp 37 °C (98.6 °F) (Temporal)   Resp 18   Ht 1.803 m (5' 10.98\")   Wt 89.1 kg (196 lb 6.9 oz)   SpO2 92%     Constitutional: No acute distress.  Resting comfortably.  Afebrile and nontoxic in appearance.  Cardiovascular: Regular rate.  Respiratory: Unlabored  Abdomen: No peritoneal signs. Incisions approximated with small portion of medial wound dehiscences. Tolerating oral diet.     LABORATORY VALUES:   Recent Labs     05/08/23  0513 05/09/23  0232   WBC 13.3* 13.3*   RBC 3.34* 3.09*   HEMOGLOBIN 9.7* 9.3*   HEMATOCRIT 30.8* 28.1*   MCV 92.2 90.9   MCH 29.0 30.1   MCHC 31.5* 33.1*   RDW 52.6* 50.4*   PLATELETCT 523* 500*   MPV 10.4 10.5     Recent Labs     05/08/23  0513 05/09/23  0232   SODIUM 133* 144   POTASSIUM 4.3 4.6   CHLORIDE 96 105   CO2 25 27   GLUCOSE 142* 228*   BUN 36* 36*   CREATININE 1.98* 2.22*   CALCIUM 9.4 9.5                IMAGING:   IR-US GUIDED PIV   Final Result    Ultrasound-guided PERIPHERAL IV INSERTION performed by    qualified nursing staff as above.      MR-THORACIC SPINE-WITH & W/O   Final Result      1.  Limited study due to the motion artifact.   2.  When compared with the previous MRI again noted is expansile T2 hyperintense area in the thoracic spinal cord at the levels of T2, T3 and T4. There is also prominent dorsal right-sided subarachnoid space at this " level. The differential diagnosis    includes intramedullary spinal cord tumor and extramedullary cyst/web with cord compression and edema.   3.  Pre and postcontrast MR examination of the thoracic spine is recommended with contrast under General anesthesia.   4.  1 -2 mm  T2-weighted sequences from T2 to T6 with smaller field of view is recommended for further characterization.      MR-THORACIC SPINE-WITH & W/O   Final Result         Stable appearance of expansile nonenhancing signal abnormality involving the upper thoracic cord between T1 and T4. Also noted is a small extra-axial collection/web along the right posterolateral spinal canal at T3-4 that deforms the cord and displaces    it anteriorly and to the left.   This could represent an Arachnoid web or arachnoid cyst with extensive mass effect and secondary edema of the cord.   Recommend additional thin section T2 and post contrast images through T1-T6 to better assess the abnormality.      MR-BRAIN-WITH & W/O   Final Result         No acute intracranial process.      Age-related volume loss and chronic microvascular ischemic changes.         DX-CHEST-PORTABLE (1 VIEW)   Final Result         1.  Pulmonary edema and/or infiltrates are identified, which are stable since the prior exam.   2.  Cardiomegaly   3.  Atherosclerosis      DX-CHEST-PORTABLE (1 VIEW)   Final Result         1.  Pulmonary edema and/or infiltrates are identified, which are stable since the prior exam.   2.  Left PICC line tip terminates in the left axilla, stable since prior study.      DX-CHEST-PORTABLE (1 VIEW)   Final Result      1.  No significant change.   2.  Possible left PICC line with tip projecting at the axillary vein.      DX-CHEST-PORTABLE (1 VIEW)   Final Result      1.  Mildly improved pulmonary opacities.   2.  Possible left PICC line with tip projecting at the axillary vein.   3.  Likely trace right pleural effusion.      DX-CHEST-LIMITED (1 VIEW)   Final Result      1.   Placement of endotracheal tube with tip projecting over the mid trachea      2.  Left arm catheter present which is presumably venous in projects in the expected position of the left axillary vein      3.  Enlarged cardiac silhouette      NM-HEPATOBILIARY SCAN   Final Result      Hepatobiliary scan findings suspicious for acute cholecystitis.      MR-THORACIC SPINE-WITH & W/O   Final Result      1.  There is abnormal expansile T2 hyperintense lesion in the right side of the thoracic spinal cord at the levels of T2 and T3 Mild contrast enhancement is seen. This lesion is suspicious for spinal cord neoplasm. Other remote differential diagnosis    includes transverse myelitis.   2.  Exaggerated thoracic kyphosis.   3.  Thoracic dextroscoliosis.   4.  Minimal degenerative changes.   5.  Right pleural effusion.      MR-LUMBAR SPINE-WITH & W/O   Final Result      1.  Multifocal degenerative disease in the lumbar spine as described above.   2.  Moderate central canal and severe lateral recess stenosis at the level of L4-5. The exiting bilateral L5 nerve roots might have been impinged at the lateral recess.      MR-CERVICAL SPINE-WITH & W/O   Final Result      1.  There is abnormal expansile intramedullary T2 signal intensity lesion in the right cerebellum. Thoracic spinal cord at the level of T2 on T3. Mild diffuse contrast enhancement is seen. This finding is suspicious for spinal cord neoplasm. The other    less likely differential diagnosis includes transverse myelitis. Follow-up is recommended after 4 weeks.   2.  Mild degenerative disease in the cervical spine as described above.   3.  There is no evidence of infection in the cervical spine.      US-RUQ   Final Result         1.  Acute cholecystitis.   2.  Atrophic right kidney.      DX-CHEST-LIMITED (1 VIEW)   Final Result      Perihilar interstitial edema and basilar atelectasis and/or consolidation. Underlying infection is possible.      MR-LUMBAR SPINE-W/O   Final  Result      1.  Lower lumbar spine predominant degenerative changes as described in detail above, progressed from 2010 MRI.   2.  Edema at the L4-L5 disc space and L5 superior endplate likely represents degenerative changes. Less likely this could represent a low-grade or early discitis osteomyelitis. Correlate for evidence of infection.   3.  Acute appearing Schmorl's node at L3-L4, which can be a source of pain.      US-RENAL   Final Result      1.  Atrophic kidneys. Echogenic bilateral renal parenchyma could relate to medical renal disease.      2.  No hydronephrosis. No renal calculus.      DX-CHEST-PORTABLE (1 VIEW)   Final Result      No acute cardiac or pulmonary abnormalities are identified.      CT-ABDOMEN-PELVIS W/O   Final Result      1.  Findings suspicious for focal colitis at the hepatic flexure, less likely cholecystitis or duodenitis with secondary involvement of the colon. Infectious, inflammatory and ischemic etiologies are considerations. Underlying mass is not excluded.   2.  Cholelithiasis with distention of the gallbladder   3.  BILATERAL renal atrophy   4.  Subcentimeter LEFT adrenal myelolipoma   5.  12 mm RIGHT adrenal nodule likely an adenoma absent a history of cancer   6.  Subcentimeter RIGHT hepatic lesion, likely a cyst absent a history of cancer   7.  Atherosclerosis   8.  Colonic diverticulosis      CT-HEAD W/O   Final Result      1.  Cerebral atrophy.      2.  White matter lucencies most consistent with small vessel ischemic change versus demyelination or gliosis.      3.  Otherwise, Head CT without contrast with no acute findings. No evidence of acute cerebral infarction, hemorrhage or mass lesion.         MR-THORACIC SPINE-WITH & W/O    (Results Pending)       ASSESSMENT AND PLAN:   Cholelithiasis with acute cholecystitis- (present on admission)  Assessment & Plan  4/27 HIDA scan consistent with acute cholecystitis.  4/28 Laparoscopic cholecystectomy converted to open  cholecystectomy.  4/30 Gallbladder fluid cultures (+) ESBL E Coli.  5/8 DC staples and drain.  Wound dehiscence at medial aspect of wound, fascia intact.  Packed with gauze.  Follow cultures.  Antibiotics per hospital team.    Acute kidney injury superimposed on CKD (HCC)- (present on admission)  Assessment & Plan  Premorbid.  Fluid resuscitation and trend renal function.  Avoid nephrotoxins.         ____________________________________     KEV Cano.    DD: 5/10/2023  8:36 AM

## 2023-05-10 NOTE — CONSULTS
DATE OF SERVICE:  05/09/2023     REASON FOR CONSULTATION:  Urinary retention.     HISTORY OF PRESENT ILLNESS:  The patient is a gentleman who had some increased   difficulty voiding, noted also some hematuria.  Attempts for passing a   catheter by the nursing staff was not successful.  The patient relates he does   feel that his bladder is not emptying to completion.  He admits to having   prior urologic surgery with the transurethral resection of the prostate some   years ago.  He denies kidney or bladder abnormalities.     PHYSICAL EXAMINATION:    GENERAL:  He is awake, alert.  He is in minimal distress.  ABDOMEN:  Soft.  There is some fullness suprapubic area.  GENITOURINARY:  Shows normal, uncircumcised penis, testicles normal.     ASSESSMENT:  Urinary retention with possible hematuria.  He has prior TURP. He   may have a bladder neck contracture causing the difficulties with   catheterization.     PLAN:  Will be for cystoscopy and complex catheterization, possible dilation.        ______________________________  MD SILVESTRE Abel/EHNRY    DD:  05/09/2023 19:50  DT:  05/09/2023 21:03    Job#:  [id number]

## 2023-05-10 NOTE — DISCHARGE PLANNING
[facility] TCN chart review completed. Collaborated with NIKIA Galdamez.  Current discharge considerations are for SNF when medically cleared.  Per chart review, patient has been accepted to [facility].  TCN will continue to follow and collaborate with discharge planning team as additional post acute needs arise. Thank you.    Completed:  PT/OT recommends post acute placement on 5/4/23 and 5/3/23.     SLP eval 4/26 with recs for no further needs  Physiatry recommending SNF as of 5/2 as well  Choice obtained: IRF (-> declines; recs SNF) and SNF choice obtained on 4/24/23.  HH choice on 4/24/23.  Infusion on 4/22/23; see above; accepted to [facility];  [facility] referral sent 4/22/23    Addendum:    1645- Per Hospital Medicine Note on 5/10/23 at 2:40 PM by Dr. Smiley Quiroz, (Patient had urinary retention along with hematuria, nursing were not able to pass the Florez, patient  underwent cystoscopy with complex catheterization by Dr. De La Torre on  5/9/2023.  MRI of thoracic spine.  Noted to have wound dehiscence on the medial aspect, wound culture pending, will continue Unasyn plus doxycycline).

## 2023-05-10 NOTE — PROGRESS NOTES
Neurosurgery Progress Note    Subjective:  83 y.o. male who presented 2023 with a very complicated picture.  He has stage IV CKD. S/p cholecystectomy.  With regards to his neurologic symptoms.  Reports on the day of admission he had immediate and complete loss of lower extremity strength that has waxed and waned but has overall improved significantly since admission.   Currently denies neurologic symptoms other than weakness in the lower extremities.    Denies bowel or bladder dysfunction.  Denies numbness in the lower extremities.    Denies radicular pain.     S/p multiple MRIs of the brain, thoracic spine.  Due to the motion artifact on the thoracic MRI completed  it is difficult to see any specific intra medullary lesion, though there is clearly an extradural mass which could represent arachnoid webs/bands, abscess, hematoma.  MRI from 2023 shows T2 signal change within the spinal cord more proximally over multiple levels.      Exam:  Awake, alert oriented x4.   Appropriate and cooperative.   Motor exam reveals 4+/5 in all extremities.   Sensation intact to light touch.     BP  Min: 124/68  Max: 130/72  Pulse  Av.5  Min: 71  Max: 80  Resp  Av.5  Min: 17  Max: 18  Temp  Av.7 °C (98.1 °F)  Min: 36.5 °C (97.7 °F)  Max: 37 °C (98.6 °F)  SpO2  Av.8 %  Min: 92 %  Max: 94 %    No data recorded    Recent Labs     23  0232 05/10/23  0836   WBC 13.3* 13.3* 14.6*   RBC 3.34* 3.09* 3.09*   HEMOGLOBIN 9.7* 9.3* 9.2*   HEMATOCRIT 30.8* 28.1* 27.8*   MCV 92.2 90.9 90.0   MCH 29.0 30.1 29.8   MCHC 31.5* 33.1* 33.1*   RDW 52.6* 50.4* 48.6   PLATELETCT 523* 500* 468*   MPV 10.4 10.5 10.4     Recent Labs     23  0232   SODIUM 133* 144   POTASSIUM 4.3 4.6   CHLORIDE 96 105   CO2 25 27   GLUCOSE 142* 228*   BUN 36* 36*   CREATININE 1.98* 2.22*   CALCIUM 9.4 9.5               Intake/Output                         23 0700 - 05/10/23 0659 05/10/23 0700 -  05/11/23 0659     [id number] 1900-0659 Total [id number] 1900-0659 Total                 Intake    P.O.  180  480 660  --  -- --    P.O. 180 480 660 -- -- --    I.V.  --  387.1 387.1  --  -- --    Volume (mL) (NS infusion 1,000 mL) -- 387.1 387.1 -- -- --    IV Piggyback  --  96.2 96.2  --  -- --    Volume (mL) (ampicillin/sulbactam (UNASYN) 3 g in  mL IVPB) -- 96.2 96.2 -- -- --    Total Intake 180 963.2 1143.2 -- -- --       Output    Urine  100  1250 1350  450  -- 450    Output (mL) ([REMOVED] External Urinary Catheter (Condom) 05/09/23 1950) 100 500 600 -- -- --    Output (mL) (Urethral Catheter) -- 750 750 450 -- 450    Stool  --  -- --  --  -- --    Number of Times Stooled -- 8 x 8 x -- -- --    Total Output 100 1250 1350 450 -- 450       Net I/O     80 -286.8 -206.8 -450 -- -450              Intake/Output Summary (Last 24 hours) at 5/10/2023 0937  Last data filed at 5/10/2023 0730  Gross per 24 hour   Intake 1143.24 ml   Output 1800 ml   Net -656.76 ml             dexamethasone  4 mg Q6HRS    senna-docusate  2 Tablet BID    And    polyethylene glycol/lytes  1 Packet QDAY PRN    And    magnesium hydroxide  30 mL QDAY PRN    And    bisacodyl  10 mg QDAY PRN    tamsulosin  0.4 mg AFTER BREAKFAST    ampicillin-sulbactam (UNASYN) IV  3 g Q12HRS    doxycycline monohydrate  100 mg Q12HRS    heparin  5,000 Units Q8HRS    famotidine  20 mg DAILY    oxyCODONE immediate-release  5 mg Q3HRS PRN    Or    oxyCODONE immediate-release  10 mg Q3HRS PRN    morphine injection  2-4 mg Q3HRS PRN    Respiratory Therapy Consult   Continuous RT    Nozin nasal  swab  1 Applicator BID    amLODIPine  5 mg Q DAY    labetalol  10 mg Q4HRS PRN    hydrALAZINE  10 mg Q6HRS PRN    atorvastatin  10 mg DAILY AT 1800    allopurinol  100 mg DAILY    carvedilol  12.5 mg BID WITH MEALS    spironolactone  25 mg Q DAY    sodium bicarbonate  1,300 mg BID    DULoxetine  30 mg Q EVENING    acetaminophen  650 mg Q6HRS PRN    insulin regular   "2-9 Units 4X/DAY ACHS    And    dextrose bolus  25 g Q15 MIN PRN    Pharmacy Consult Request  1 Each PHARMACY TO DOSE       Assessment and Plan:  Hospital day # 19  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   Per Dr. Springer's note, \"It would be highly unusual to have an extra medullary and an intramedullary process at the same time which is the suggestion on the MRI report.  Intradural empyemas are quite rare, intramedullary empyemas are even more rare.  His clinical history does not fit with an intramedullary tumor given its rapid onset and subsequent dramatic improvement... An MRI with and without contrast in the thoracic spine with general anesthesia is necessary to get a more clear picture of the anatomy.\"  MRI pending.   Following.     Chemical prophylactic DVT therapy: Yes - Heparin 5000 units/q 8hrs  Start date/time: per hospitalist                   "

## 2023-05-10 NOTE — THERAPY
Physical Therapy Contact Note    Patient Name: Tyrone Cline  Age:  83 y.o., Sex:  male  Medical Record #: [id number]  Today's Date: 5/10/2023    PT treatment attempted. Pt declined to work with PT until after MRI is complete, appeared fatigued. Will reattempt session post MRI as able. RN aware.    Arlyn Vázquez, PT, DPT    [employer] [id number]

## 2023-05-10 NOTE — PROGRESS NOTES
[facility] Medicine Daily Progress Note    Date of Service  5/10/2023    Chief Complaint  Tyrone Cline is a 82 y.o. male admitted 4/21/2023 with Colitis.    Hospital Course  This 82-year-old male with a past medical history significant for CKD stage IV being followed by nephrology, diabetes mellitus, hypertension presented to ER on 4/21/2023 with a complaint of abdominal pain and lower extremity weakness.    Patient stated that he had epigastric and abdominal pain on Tuesday; CT scan of the abdomen and pelvis consistent with colitis but did show cholelithiasis with distention of gallbladder but less likely cholecystitis, ultrasound of the abdomen consistent with acute cholecystitis, HIDA scan showing acute carmelo; General surgery was consulted and he underwent  attempted laparoscopic cholecystectomy with conversion to open cholecystectomy; Subtotal cholecystectomy, patient has completed treatment with IV antibiotics.  Patient did have a NAHUN drain in place, which has been removed on 5/8/2023    His hospital course was complicated with postoperative hypotension, went to ICU vasopressor and fell back on 4/29 floor.    Of note, patient continues to have bilateral lower extremity weakness; MR T spine expansile T2 hyperintense area in the thoracic spinal cord at the levels of T2, T3 and T4. There is also prominent dorsal right-sided subarachnoid space at this level.  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     Interval events:  -- No acute events overnight, medicine has been stable, patient is currently requiring 3 to of oxygen, he is alert, and awake  --WBC 13.3, renal function at 2.2, continue to follow-up with nephrology  -- MRI of  T-spine reviewed, contacted neurosurgery  has been consulted, he will be evaluating the patient today.  He recommended continue Decadron 4 mg every 6 hrs   -- WBC is elevated at 13.3, creatinine at 2.22,  nephrology following    5/10:  -- No acute events overnight, medicine has been stable, patient is alert, awake.  Patient had urinary retention along with hematuria, nursing were not able to pass the Florez, patient  underwent cystoscopy with complex catheterization by Dr. De La Torre on  5/9/2023  -- Neurosurgery continue to follow the patient, patient will need MRI of the thoracic spine with this anesthesia, n.p.o. at midnight.  --In the meantime we will continue Decadron 4 mg every 6 hours as per NS recs  --He was noted to have wound dehiscence on the medial aspect, wound culture pending, will continue Unasyn plus doxycycline.  Patient to be out of bed 3 times daily with meals.    Plan of care has been discussed the patient, nursing staff, case management  I have discussed this patient's plan of care and discharge plan at IDT rounds today with Case Management, Nursing, Nursing leadership, and other members of the IDT team.    Consultants/Specialty  general surgery, nephrology, and neurology    Code Status  DNAR/DNI    Disposition  Patient is not medically cleared for discharge.   Anticipate discharge to to an inpatient rehabilitation hospital.  I have placed the appropriate orders for post-discharge needs.    Review of Systems  Review of Systems   Constitutional:  Positive for malaise/fatigue. Negative for fever.   HENT:  Negative for congestion, hearing loss and sore throat.    Eyes:  Negative for blurred vision.   Respiratory:  Negative for cough and shortness of breath.    Cardiovascular:  Negative for chest pain, palpitations and leg swelling.   Gastrointestinal:  Negative for abdominal pain, diarrhea and heartburn.   Genitourinary:  Negative for dysuria, flank pain and hematuria.   Musculoskeletal:  Negative for myalgias.   Skin:  Negative for rash.   Neurological:  Positive for weakness. Negative for dizziness and speech change.   Psychiatric/Behavioral:  Negative for depression. The patient is not  nervous/anxious.    All other systems reviewed and are negative.       Physical Exam  Temp:  [36.5 °C (97.7 °F)-37 °C (98.6 °F)] 36.5 °C (97.7 °F)  Pulse:  [66-80] 66  Resp:  [17-18] 17  BP: (119-130)/(68-73) 119/73  SpO2:  [92 %-94 %] 93 %    Physical Exam  Vitals and nursing note reviewed.   Constitutional:       Appearance: Normal appearance.   HENT:      Head: Normocephalic and atraumatic.   Eyes:      Extraocular Movements: Extraocular movements intact.      Conjunctiva/sclera: Conjunctivae normal.   Cardiovascular:      Rate and Rhythm: Normal rate and regular rhythm.   Pulmonary:      Effort: Pulmonary effort is normal.      Breath sounds: Normal breath sounds.   Abdominal:      Tenderness: There is abdominal tenderness (Mild). There is no guarding or rebound.   Musculoskeletal:      Cervical back: Neck supple. No tenderness.      Right lower leg: No edema.      Left lower leg: No edema.   Skin:     General: Skin is dry.      Coloration: Skin is pale.   Neurological:      Mental Status: He is alert and oriented to person, place, and time.      Cranial Nerves: No cranial nerve deficit.      Motor: Weakness present.         Fluids    Intake/Output Summary (Last 24 hours) at 5/10/2023 1440  Last data filed at 5/10/2023 1135  Gross per 24 hour   Intake 1143.24 ml   Output 1950 ml   Net -806.76 ml         Laboratory  Recent Labs     05/08/23 0513 05/09/23  0232 05/10/23  0836   WBC 13.3* 13.3* 14.6*   RBC 3.34* 3.09* 3.09*   HEMOGLOBIN 9.7* 9.3* 9.2*   HEMATOCRIT 30.8* 28.1* 27.8*   MCV 92.2 90.9 90.0   MCH 29.0 30.1 29.8   MCHC 31.5* 33.1* 33.1*   RDW 52.6* 50.4* 48.6   PLATELETCT 523* 500* 468*   MPV 10.4 10.5 10.4       Recent Labs     05/08/23 0513 05/09/23  0232   SODIUM 133* 144   POTASSIUM 4.3 4.6   CHLORIDE 96 105   CO2 25 27   GLUCOSE 142* 228*   BUN 36* 36*   CREATININE 1.98* 2.22*   CALCIUM 9.4 9.5                         Imaging  IR-US GUIDED PIV   Final Result    Ultrasound-guided PERIPHERAL IV  INSERTION performed by    qualified nursing staff as above.      MR-THORACIC SPINE-WITH & W/O   Final Result      1.  Limited study due to the motion artifact.   2.  When compared with the previous MRI again noted is expansile T2 hyperintense area in the thoracic spinal cord at the levels of T2, T3 and T4. There is also prominent dorsal right-sided subarachnoid space at this level. The differential diagnosis    includes intramedullary spinal cord tumor and extramedullary cyst/web with cord compression and edema.   3.  Pre and postcontrast MR examination of the thoracic spine is recommended with contrast under General anesthesia.   4.  1 -2 mm  T2-weighted sequences from T2 to T6 with smaller field of view is recommended for further characterization.      MR-THORACIC SPINE-WITH & W/O   Final Result         Stable appearance of expansile nonenhancing signal abnormality involving the upper thoracic cord between T1 and T4. Also noted is a small extra-axial collection/web along the right posterolateral spinal canal at T3-4 that deforms the cord and displaces    it anteriorly and to the left.   This could represent an Arachnoid web or arachnoid cyst with extensive mass effect and secondary edema of the cord.   Recommend additional thin section T2 and post contrast images through T1-T6 to better assess the abnormality.      MR-BRAIN-WITH & W/O   Final Result         No acute intracranial process.      Age-related volume loss and chronic microvascular ischemic changes.         DX-CHEST-PORTABLE (1 VIEW)   Final Result         1.  Pulmonary edema and/or infiltrates are identified, which are stable since the prior exam.   2.  Cardiomegaly   3.  Atherosclerosis      DX-CHEST-PORTABLE (1 VIEW)   Final Result         1.  Pulmonary edema and/or infiltrates are identified, which are stable since the prior exam.   2.  Left PICC line tip terminates in the left axilla, stable since prior study.      DX-CHEST-PORTABLE (1 VIEW)   Final  Result      1.  No significant change.   2.  Possible left PICC line with tip projecting at the axillary vein.      DX-CHEST-PORTABLE (1 VIEW)   Final Result      1.  Mildly improved pulmonary opacities.   2.  Possible left PICC line with tip projecting at the axillary vein.   3.  Likely trace right pleural effusion.      DX-CHEST-LIMITED (1 VIEW)   Final Result      1.  Placement of endotracheal tube with tip projecting over the mid trachea      2.  Left arm catheter present which is presumably venous in projects in the expected position of the left axillary vein      3.  Enlarged cardiac silhouette      NM-HEPATOBILIARY SCAN   Final Result      Hepatobiliary scan findings suspicious for acute cholecystitis.      MR-THORACIC SPINE-WITH & W/O   Final Result      1.  There is abnormal expansile T2 hyperintense lesion in the right side of the thoracic spinal cord at the levels of T2 and T3 Mild contrast enhancement is seen. This lesion is suspicious for spinal cord neoplasm. Other remote differential diagnosis    includes transverse myelitis.   2.  Exaggerated thoracic kyphosis.   3.  Thoracic dextroscoliosis.   4.  Minimal degenerative changes.   5.  Right pleural effusion.      MR-LUMBAR SPINE-WITH & W/O   Final Result      1.  Multifocal degenerative disease in the lumbar spine as described above.   2.  Moderate central canal and severe lateral recess stenosis at the level of L4-5. The exiting bilateral L5 nerve roots might have been impinged at the lateral recess.      MR-CERVICAL SPINE-WITH & W/O   Final Result      1.  There is abnormal expansile intramedullary T2 signal intensity lesion in the right cerebellum. Thoracic spinal cord at the level of T2 on T3. Mild diffuse contrast enhancement is seen. This finding is suspicious for spinal cord neoplasm. The other    less likely differential diagnosis includes transverse myelitis. Follow-up is recommended after 4 weeks.   2.  Mild degenerative disease in the  cervical spine as described above.   3.  There is no evidence of infection in the cervical spine.      US-RUQ   Final Result         1.  Acute cholecystitis.   2.  Atrophic right kidney.      DX-CHEST-LIMITED (1 VIEW)   Final Result      Perihilar interstitial edema and basilar atelectasis and/or consolidation. Underlying infection is possible.      MR-LUMBAR SPINE-W/O   Final Result      1.  Lower lumbar spine predominant degenerative changes as described in detail above, progressed from 2010 MRI.   2.  Edema at the L4-L5 disc space and L5 superior endplate likely represents degenerative changes. Less likely this could represent a low-grade or early discitis osteomyelitis. Correlate for evidence of infection.   3.  Acute appearing Schmorl's node at L3-L4, which can be a source of pain.      US-RENAL   Final Result      1.  Atrophic kidneys. Echogenic bilateral renal parenchyma could relate to medical renal disease.      2.  No hydronephrosis. No renal calculus.      DX-CHEST-PORTABLE (1 VIEW)   Final Result      No acute cardiac or pulmonary abnormalities are identified.      CT-ABDOMEN-PELVIS W/O   Final Result      1.  Findings suspicious for focal colitis at the hepatic flexure, less likely cholecystitis or duodenitis with secondary involvement of the colon. Infectious, inflammatory and ischemic etiologies are considerations. Underlying mass is not excluded.   2.  Cholelithiasis with distention of the gallbladder   3.  BILATERAL renal atrophy   4.  Subcentimeter LEFT adrenal myelolipoma   5.  12 mm RIGHT adrenal nodule likely an adenoma absent a history of cancer   6.  Subcentimeter RIGHT hepatic lesion, likely a cyst absent a history of cancer   7.  Atherosclerosis   8.  Colonic diverticulosis      CT-HEAD W/O   Final Result      1.  Cerebral atrophy.      2.  White matter lucencies most consistent with small vessel ischemic change versus demyelination or gliosis.      3.  Otherwise, Head CT without contrast  with no acute findings. No evidence of acute cerebral infarction, hemorrhage or mass lesion.         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  Neurology evaluated, patient received IVIG on 5/1, discontinued 5/2    Patient does have right lower extremity weakness more than left.  MR T spine showing ; MR T spine expansile T2 hyperintense area in the thoracic spinal cord at the levels of T2, T3 and T4. There is also prominent dorsal right-sided subarachnoid space at this level.  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    -- NS called and recs decadron 4 mg every 6 hrs   Need MRI of T spine    Urinary retention  Assessment & Plan  rivera    Abnormal MRI- (present on admission)  Assessment & Plan  MRI on admission showing T2/T3  hyperintense lesion in the right side of the thoracic spinal cord at the levels of T2 and T3 Mild contrast enhancement is seen. This lesion is suspicious for spinal cord neoplasm or possible transverse myelitis       5/8 MRI thoracic spine reviewed and called Neurosurgery    Acute gangrenous cholecystitis- (present on admission)  Assessment & Plan  Found to have gangrenous cholecystitis s/p laprascopic converted open cholecystectomy 4/28/2023   -- Completed antibiotic treatment from 4/25- 4/ 29, drain has been removed.  Follow surgical recommendation.    Surgical course was complicated with postoperative wound infection, culture has been obtained, wound care has been consulted  Continue Unasyn and Doxy    Hypomagnesemia- (present on admission)  Assessment & Plan  Replete as needed    Cholelithiasis with acute cholecystitis- (present on admission)  Assessment &  Plan  S/p surgical removal, converted to open cholecystectomy  NAHUN drain-removed on 5/8/2023  The patient is tolerating a diet already, monitor bowel function    Hypokalemia- (present on admission)  Assessment & Plan  Stable, monitor CMP and replace as needed   - goal K >4    Hyponatremia- (present on admission)  Assessment & Plan  Resolved    Colitis- (present on admission)  Assessment & Plan  Cultures negative, monitor  Initial admitting impression, currently improved  Diet as tolerated     Acute kidney injury superimposed on CKD (HCC)- (present on admission)  Assessment & Plan   Resolved    Avoid nephrotoxins       Diabetic peripheral neuropathy (HCC)- (present on admission)  Assessment & Plan  Cymbalta, medical treatment    Essential hypertension- (present on admission)  Assessment & Plan  Continue amlodipine and coreg    Hypercholesterolemia- (present on admission)  Assessment & Plan  Stable, continue Lipitor    Chronic kidney disease (CKD), stage IV (severe) (HCC)- (present on admission)  Assessment & Plan  Appears to be at baseline at 5/3/2023            Hypercalcemia- (present on admission)  Assessment & Plan  Likely due to immobility   Resolved with IVF hydration  Continue to follow and treat accordingly     Thrombocytopenia (HCC)- (present on admission)  Assessment & Plan  Resolved, plt are stable     High anion gap metabolic acidosis- (present on admission)  Assessment & Plan  Acute on chronic kidney disease, -appears back at baseline on 5/3/2023    Resolved    Sepsis (HCC)- (present on admission)  Assessment & Plan  This is Sepsis Present on admission  SIRS criteria identified on my evaluation include: Leukocytosis, with WBC greater than 12,000  Source is colitis  Sepsis protocol initiated  Fluid resuscitation ordered per protocol  Crystalloid Fluid Administration: Fluid resuscitation ordered per standard protocol - 30 mL/kg per current or ideal body weight  IV antibiotics as appropriate for source of  sepsis  Reassessment: I have reassessed the patient's hemodynamic status    Due to gangrenous gallbladder requiring open carmelo    -- Today patient was noted to have surgical  wound with drainage   -- wound cx pending     Type 2 diabetes mellitus with kidney complication, without long-term current use of insulin (HCC)- (present on admission)  Assessment & Plan  Ha1c 6.4% 4 months ago   On ozempic and januvia outpatient   Continue ISS and hypoglycemic protocol while inpatient         VTE prophylaxis: SCDs/TEDs and heparin ppx    I have performed a physical exam and reviewed and updated ROS and Plan today (5/10/2023). In review of yesterday's note (5/9/2023), there are no changes except as documented above.

## 2023-05-11 ENCOUNTER — ANESTHESIA EVENT (OUTPATIENT)
Dept: RADIOLOGY | Facility: MEDICAL CENTER | Age: 83
DRG: 853 | End: 2023-05-11
Payer: MEDICARE

## 2023-05-11 ENCOUNTER — APPOINTMENT (OUTPATIENT)
Dept: RADIOLOGY | Facility: MEDICAL CENTER | Age: 83
DRG: 853 | End: 2023-05-11
Attending: NEUROLOGICAL SURGERY
Payer: MEDICARE

## 2023-05-11 ENCOUNTER — ANESTHESIA (OUTPATIENT)
Dept: RADIOLOGY | Facility: MEDICAL CENTER | Age: 83
DRG: 853 | End: 2023-05-11
Payer: MEDICARE

## 2023-05-11 LAB
ALBUMIN SERPL BCP-MCNC: 2 G/DL (ref 3.2–4.9)
BACTERIA WND AEROBE CULT: ABNORMAL
BACTERIA WND AEROBE CULT: ABNORMAL
BUN SERPL-MCNC: 48 MG/DL (ref 8–22)
CALCIUM ALBUM COR SERPL-MCNC: 11.7 MG/DL (ref 8.5–10.5)
CALCIUM SERPL-MCNC: 10.1 MG/DL (ref 8.5–10.5)
CHLORIDE SERPL-SCNC: 103 MMOL/L (ref 96–112)
CO2 SERPL-SCNC: 25 MMOL/L (ref 20–33)
CREAT SERPL-MCNC: 2.08 MG/DL (ref 0.5–1.4)
ERYTHROCYTE [DISTWIDTH] IN BLOOD BY AUTOMATED COUNT: 49.5 FL (ref 35.9–50)
GFR SERPLBLD CREATININE-BSD FMLA CKD-EPI: 31 ML/MIN/1.73 M 2
GLUCOSE BLD STRIP.AUTO-MCNC: 196 MG/DL (ref 65–99)
GLUCOSE BLD STRIP.AUTO-MCNC: 203 MG/DL (ref 65–99)
GLUCOSE BLD STRIP.AUTO-MCNC: 216 MG/DL (ref 65–99)
GLUCOSE BLD STRIP.AUTO-MCNC: 272 MG/DL (ref 65–99)
GLUCOSE SERPL-MCNC: 244 MG/DL (ref 65–99)
GRAM STN SPEC: ABNORMAL
HCT VFR BLD AUTO: 27.5 % (ref 42–52)
HGB BLD-MCNC: 9.1 G/DL (ref 14–18)
MCH RBC QN AUTO: 29.7 PG (ref 27–33)
MCHC RBC AUTO-ENTMCNC: 33.1 G/DL (ref 33.7–35.3)
MCV RBC AUTO: 89.9 FL (ref 81.4–97.8)
PHOSPHATE SERPL-MCNC: 3 MG/DL (ref 2.5–4.5)
PLATELET # BLD AUTO: 463 K/UL (ref 164–446)
PMV BLD AUTO: 10.4 FL (ref 9–12.9)
POTASSIUM SERPL-SCNC: 4.8 MMOL/L (ref 3.6–5.5)
RBC # BLD AUTO: 3.06 M/UL (ref 4.7–6.1)
SIGNIFICANT IND 70042: ABNORMAL
SITE SITE: ABNORMAL
SODIUM SERPL-SCNC: 136 MMOL/L (ref 135–145)
SOURCE SOURCE: ABNORMAL
WBC # BLD AUTO: 15 K/UL (ref 4.8–10.8)

## 2023-05-11 PROCEDURE — A9270 NON-COVERED ITEM OR SERVICE: HCPCS | Performed by: FAMILY MEDICINE

## 2023-05-11 PROCEDURE — A9270 NON-COVERED ITEM OR SERVICE: HCPCS | Performed by: SURGERY

## 2023-05-11 PROCEDURE — 85027 COMPLETE CBC AUTOMATED: CPT

## 2023-05-11 PROCEDURE — 700105 HCHG RX REV CODE 258: Performed by: ANESTHESIOLOGY

## 2023-05-11 PROCEDURE — 82962 GLUCOSE BLOOD TEST: CPT | Mod: 91

## 2023-05-11 PROCEDURE — 700105 HCHG RX REV CODE 258: Performed by: HOSPITALIST

## 2023-05-11 PROCEDURE — 160002 HCHG RECOVERY MINUTES (STAT)

## 2023-05-11 PROCEDURE — 700101 HCHG RX REV CODE 250: Performed by: ANESTHESIOLOGY

## 2023-05-11 PROCEDURE — 4410588 MR-THORACIC SPINE-WITH & W/O

## 2023-05-11 PROCEDURE — 700102 HCHG RX REV CODE 250 W/ 637 OVERRIDE(OP): Performed by: FAMILY MEDICINE

## 2023-05-11 PROCEDURE — A9270 NON-COVERED ITEM OR SERVICE: HCPCS | Performed by: HOSPITALIST

## 2023-05-11 PROCEDURE — 80069 RENAL FUNCTION PANEL: CPT

## 2023-05-11 PROCEDURE — 700102 HCHG RX REV CODE 250 W/ 637 OVERRIDE(OP): Performed by: SURGERY

## 2023-05-11 PROCEDURE — 700117 HCHG RX CONTRAST REV CODE 255: Performed by: NEUROLOGICAL SURGERY

## 2023-05-11 PROCEDURE — 99100 ANES PT EXTEME AGE<1 YR&>70: CPT | Performed by: ANESTHESIOLOGY

## 2023-05-11 PROCEDURE — A9579 GAD-BASE MR CONTRAST NOS,1ML: HCPCS | Performed by: NEUROLOGICAL SURGERY

## 2023-05-11 PROCEDURE — 160035 HCHG PACU - 1ST 60 MINS PHASE I

## 2023-05-11 PROCEDURE — 700102 HCHG RX REV CODE 250 W/ 637 OVERRIDE(OP): Performed by: HOSPITALIST

## 2023-05-11 PROCEDURE — 36415 COLL VENOUS BLD VENIPUNCTURE: CPT

## 2023-05-11 PROCEDURE — 01922 ANES N-INVAS IMG/RADJ THER: CPT | Performed by: ANESTHESIOLOGY

## 2023-05-11 PROCEDURE — 770001 HCHG ROOM/CARE - MED/SURG/GYN PRIV*

## 2023-05-11 PROCEDURE — 700111 HCHG RX REV CODE 636 W/ 250 OVERRIDE (IP): Performed by: STUDENT IN AN ORGANIZED HEALTH CARE EDUCATION/TRAINING PROGRAM

## 2023-05-11 PROCEDURE — 700111 HCHG RX REV CODE 636 W/ 250 OVERRIDE (IP): Performed by: HOSPITALIST

## 2023-05-11 PROCEDURE — 99233 SBSQ HOSP IP/OBS HIGH 50: CPT | Performed by: HOSPITALIST

## 2023-05-11 RX ORDER — LIDOCAINE HYDROCHLORIDE 20 MG/ML
INJECTION, SOLUTION EPIDURAL; INFILTRATION; INTRACAUDAL; PERINEURAL PRN
Status: DISCONTINUED | OUTPATIENT
Start: 2023-05-11 | End: 2023-05-11 | Stop reason: SURG

## 2023-05-11 RX ORDER — EPHEDRINE SULFATE 50 MG/ML
5 INJECTION, SOLUTION INTRAVENOUS
Status: DISCONTINUED | OUTPATIENT
Start: 2023-05-11 | End: 2023-05-11 | Stop reason: HOSPADM

## 2023-05-11 RX ORDER — SODIUM CHLORIDE, SODIUM LACTATE, POTASSIUM CHLORIDE, CALCIUM CHLORIDE 600; 310; 30; 20 MG/100ML; MG/100ML; MG/100ML; MG/100ML
INJECTION, SOLUTION INTRAVENOUS CONTINUOUS
Status: DISCONTINUED | OUTPATIENT
Start: 2023-05-11 | End: 2023-05-11 | Stop reason: HOSPADM

## 2023-05-11 RX ORDER — SODIUM CHLORIDE, SODIUM LACTATE, POTASSIUM CHLORIDE, CALCIUM CHLORIDE 600; 310; 30; 20 MG/100ML; MG/100ML; MG/100ML; MG/100ML
INJECTION, SOLUTION INTRAVENOUS
Status: DISCONTINUED | OUTPATIENT
Start: 2023-05-11 | End: 2023-05-11 | Stop reason: SURG

## 2023-05-11 RX ORDER — HALOPERIDOL 5 MG/ML
1 INJECTION INTRAMUSCULAR
Status: DISCONTINUED | OUTPATIENT
Start: 2023-05-11 | End: 2023-05-11 | Stop reason: HOSPADM

## 2023-05-11 RX ORDER — HYDRALAZINE HYDROCHLORIDE 20 MG/ML
5 INJECTION INTRAMUSCULAR; INTRAVENOUS
Status: DISCONTINUED | OUTPATIENT
Start: 2023-05-11 | End: 2023-05-11 | Stop reason: HOSPADM

## 2023-05-11 RX ORDER — ONDANSETRON 2 MG/ML
4 INJECTION INTRAMUSCULAR; INTRAVENOUS
Status: DISCONTINUED | OUTPATIENT
Start: 2023-05-11 | End: 2023-05-11 | Stop reason: HOSPADM

## 2023-05-11 RX ORDER — DIPHENHYDRAMINE HYDROCHLORIDE 50 MG/ML
12.5 INJECTION INTRAMUSCULAR; INTRAVENOUS
Status: DISCONTINUED | OUTPATIENT
Start: 2023-05-11 | End: 2023-05-11 | Stop reason: HOSPADM

## 2023-05-11 RX ADMIN — INSULIN HUMAN 3 UNITS: 100 INJECTION, SOLUTION PARENTERAL at 17:59

## 2023-05-11 RX ADMIN — INSULIN HUMAN 3 UNITS: 100 INJECTION, SOLUTION PARENTERAL at 08:36

## 2023-05-11 RX ADMIN — DEXAMETHASONE SODIUM PHOSPHATE 4 MG: 4 INJECTION, SOLUTION INTRA-ARTICULAR; INTRALESIONAL; INTRAMUSCULAR; INTRAVENOUS; SOFT TISSUE at 08:18

## 2023-05-11 RX ADMIN — ALLOPURINOL 100 MG: 100 TABLET ORAL at 04:39

## 2023-05-11 RX ADMIN — AMPICILLIN AND SULBACTAM 3 G: 2; 1 INJECTION, POWDER, FOR SOLUTION INTRAMUSCULAR; INTRAVENOUS at 04:46

## 2023-05-11 RX ADMIN — LIDOCAINE HYDROCHLORIDE 40 MG: 20 INJECTION, SOLUTION EPIDURAL; INFILTRATION; INTRACAUDAL at 12:02

## 2023-05-11 RX ADMIN — DEXAMETHASONE SODIUM PHOSPHATE 4 MG: 4 INJECTION, SOLUTION INTRA-ARTICULAR; INTRALESIONAL; INTRAMUSCULAR; INTRAVENOUS; SOFT TISSUE at 11:20

## 2023-05-11 RX ADMIN — DOXYCYCLINE 100 MG: 100 TABLET, FILM COATED ORAL at 04:39

## 2023-05-11 RX ADMIN — EPHEDRINE SULFATE 25 MG: 50 INJECTION, SOLUTION INTRAVENOUS at 12:13

## 2023-05-11 RX ADMIN — HEPARIN SODIUM 5000 UNITS: 5000 INJECTION, SOLUTION INTRAVENOUS; SUBCUTANEOUS at 15:03

## 2023-05-11 RX ADMIN — INSULIN HUMAN 2 UNITS: 100 INJECTION, SOLUTION PARENTERAL at 11:22

## 2023-05-11 RX ADMIN — HEPARIN SODIUM 5000 UNITS: 5000 INJECTION, SOLUTION INTRAVENOUS; SUBCUTANEOUS at 04:38

## 2023-05-11 RX ADMIN — SODIUM BICARBONATE 1300 MG: 650 TABLET ORAL at 04:39

## 2023-05-11 RX ADMIN — DEXAMETHASONE SODIUM PHOSPHATE 4 MG: 4 INJECTION, SOLUTION INTRA-ARTICULAR; INTRALESIONAL; INTRAMUSCULAR; INTRAVENOUS; SOFT TISSUE at 17:52

## 2023-05-11 RX ADMIN — DEXAMETHASONE SODIUM PHOSPHATE 4 MG: 4 INJECTION, SOLUTION INTRA-ARTICULAR; INTRALESIONAL; INTRAMUSCULAR; INTRAVENOUS; SOFT TISSUE at 01:31

## 2023-05-11 RX ADMIN — FAMOTIDINE 20 MG: 20 TABLET, FILM COATED ORAL at 04:39

## 2023-05-11 RX ADMIN — PIPERACILLIN AND TAZOBACTAM 4.5 G: 4; .5 INJECTION, POWDER, LYOPHILIZED, FOR SOLUTION INTRAVENOUS; PARENTERAL at 17:54

## 2023-05-11 RX ADMIN — TAMSULOSIN HYDROCHLORIDE 0.4 MG: 0.4 CAPSULE ORAL at 08:17

## 2023-05-11 RX ADMIN — SPIRONOLACTONE 25 MG: 50 TABLET ORAL at 04:39

## 2023-05-11 RX ADMIN — SODIUM CHLORIDE, POTASSIUM CHLORIDE, SODIUM LACTATE AND CALCIUM CHLORIDE: 600; 310; 30; 20 INJECTION, SOLUTION INTRAVENOUS at 12:02

## 2023-05-11 RX ADMIN — HEPARIN SODIUM 5000 UNITS: 5000 INJECTION, SOLUTION INTRAVENOUS; SUBCUTANEOUS at 20:31

## 2023-05-11 RX ADMIN — EPHEDRINE SULFATE 25 MG: 50 INJECTION, SOLUTION INTRAVENOUS at 12:02

## 2023-05-11 RX ADMIN — OXYCODONE HYDROCHLORIDE 10 MG: 10 TABLET ORAL at 04:50

## 2023-05-11 RX ADMIN — DOCUSATE SODIUM 50 MG AND SENNOSIDES 8.6 MG 2 TABLET: 8.6; 5 TABLET, FILM COATED ORAL at 17:51

## 2023-05-11 RX ADMIN — SODIUM BICARBONATE 1300 MG: 650 TABLET ORAL at 17:51

## 2023-05-11 RX ADMIN — ATORVASTATIN CALCIUM 10 MG: 10 TABLET, FILM COATED ORAL at 17:52

## 2023-05-11 RX ADMIN — CARVEDILOL 12.5 MG: 12.5 TABLET, FILM COATED ORAL at 17:51

## 2023-05-11 RX ADMIN — CARVEDILOL 12.5 MG: 12.5 TABLET, FILM COATED ORAL at 08:17

## 2023-05-11 RX ADMIN — INSULIN HUMAN 5 UNITS: 100 INJECTION, SOLUTION PARENTERAL at 20:31

## 2023-05-11 RX ADMIN — OXYCODONE HYDROCHLORIDE 10 MG: 10 TABLET ORAL at 20:41

## 2023-05-11 RX ADMIN — DOCUSATE SODIUM 50 MG AND SENNOSIDES 8.6 MG 2 TABLET: 8.6; 5 TABLET, FILM COATED ORAL at 04:39

## 2023-05-11 RX ADMIN — DULOXETINE HYDROCHLORIDE 30 MG: 30 CAPSULE, DELAYED RELEASE ORAL at 17:51

## 2023-05-11 RX ADMIN — GADOTERIDOL 20 ML: 279.3 INJECTION, SOLUTION INTRAVENOUS at 13:44

## 2023-05-11 RX ADMIN — EPHEDRINE SULFATE 25 MG: 50 INJECTION, SOLUTION INTRAVENOUS at 12:31

## 2023-05-11 RX ADMIN — PIPERACILLIN AND TAZOBACTAM 4.5 G: 4; .5 INJECTION, POWDER, LYOPHILIZED, FOR SOLUTION INTRAVENOUS; PARENTERAL at 15:01

## 2023-05-11 RX ADMIN — AMLODIPINE BESYLATE 5 MG: 10 TABLET ORAL at 04:39

## 2023-05-11 ASSESSMENT — PAIN DESCRIPTION - PAIN TYPE
TYPE: ACUTE PAIN

## 2023-05-11 ASSESSMENT — ENCOUNTER SYMPTOMS
SPEECH CHANGE: 0
SORE THROAT: 0
DEPRESSION: 0
NERVOUS/ANXIOUS: 0
BLURRED VISION: 0
DIZZINESS: 0
DIARRHEA: 0
HEARTBURN: 0
FLANK PAIN: 0
PALPITATIONS: 0
MYALGIAS: 0
WEAKNESS: 1
COUGH: 0
SHORTNESS OF BREATH: 0
ABDOMINAL PAIN: 0
FEVER: 0

## 2023-05-11 ASSESSMENT — PAIN SCALES - GENERAL: PAIN_LEVEL: 1

## 2023-05-11 NOTE — PROGRESS NOTES
4 Eyes Skin Assessment Completed by JUAN JOSÉ Up and JUAN JOSÉ Arthur.     Head WDL  Ears WDL  Nose WDL  Mouth WDL  Neck WDL  Breast/Chest WDL  Shoulder Blades WDL  Spine WDL  (R) Arm/Elbow/Hand WDL  (L) Arm/Elbow/Hand WDL  Abdomen  RUQ transverse umbilical Incision  with Steri-Strips and dressing    RLQ Previous NAHUN Drain Site  with gauze and tegaderm   Groin Condom  Florez catheter to Penis, CDI   Scrotum/Coccyx/Buttocks Redness and Blanching, Mepilex applied   (R) Leg Edema  (L) Leg Edema  (R) Heel/Foot/Toe WDL  (L) Heel/Foot/Toe WDL       Devices In Places Blood Pressure Cuff, Pulse Ox, Condom Cath, and Nasal Cannula       Interventions In Place Gray Ear Foams, Sacral Mepilex, TAP System, Pillows, Q2 Turns, Low Air Loss Mattress, and Heels Loaded W/Pillows     Possible Skin Injury No     Pictures Uploaded Into Epic N/A  Wound Consult Placed N/A  RN Wound Prevention Protocol Ordered No

## 2023-05-11 NOTE — PROGRESS NOTES
[facility] Medicine Daily Progress Note    Date of Service  5/11/2023    Chief Complaint  Tyrone Cline is a 82 y.o. male admitted 4/21/2023 with Colitis.    Hospital Course  This 82-year-old male with a past medical history significant for CKD stage IV being followed by nephrology, diabetes mellitus, hypertension presented to ER on 4/21/2023 with a complaint of abdominal pain and lower extremity weakness.    Patient stated that he had epigastric and abdominal pain on Tuesday; CT scan of the abdomen and pelvis consistent with colitis but did show cholelithiasis with distention of gallbladder but less likely cholecystitis, ultrasound of the abdomen consistent with acute cholecystitis, HIDA scan showing acute carmelo; General surgery was consulted and he underwent  attempted laparoscopic cholecystectomy with conversion to open cholecystectomy; Subtotal cholecystectomy, patient has completed treatment with IV antibiotics.  Patient did have a NAHUN drain in place, which has been removed on 5/8/2023    His hospital course was complicated with postoperative hypotension, went to ICU vasopressor and fell back on 4/29 floor.    Of note, patient continues to have bilateral lower extremity weakness; MR T spine expansile T2 hyperintense area in the thoracic spinal cord at the levels of T2, T3 and T4. There is also prominent dorsal right-sided subarachnoid space at this level.  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     Interval events:  -- No acute events overnight, medicine has been stable, patient is currently requiring 3 to of oxygen, he is alert, and awake  --WBC 13.3, renal function at 2.2, continue to follow-up with nephrology  -- MRI of  T-spine reviewed, contacted neurosurgery  has been consulted, he will be evaluating the patient today.  He recommended continue Decadron 4 mg every 6 hrs   -- WBC is elevated at 13.3, creatinine at 2.22,  nephrology following    5/10:  -- No acute events overnight, medicine has been stable, patient is alert, awake.  Patient had urinary retention along with hematuria, nursing were not able to pass the Florez, patient  underwent cystoscopy with complex catheterization by Dr. De La Torre on  5/9/2023  -- Neurosurgery continue to follow the patient, patient will need MRI of the thoracic spine with this anesthesia, n.p.o. at midnight.  --In the meantime we will continue Decadron 4 mg every 6 hours as per NS recs  --He was noted to have wound dehiscence on the medial aspect, wound culture pending, will continue Unasyn plus doxycycline.  Patient to be out of bed 3 times daily with meals.    Plan of care has been discussed the patient, nursing staff, case management    5/11:  -- No acute events overnight, culture from the wound growing ESBL E. coli, will need aggressive wound care, patient antibiotics were switched from Unasyn plus doxycycline to Zosyn as it is susceptible to Zosun .  -- Continue isolation per protocol, patient will be undergoing MRI of the T-spine.  --Neurosurgery following, will follow their recommendation.  --Patient is noted to have dark urine coming out, will continue IV fluid for 1 L.  Discussed with the patient, nursing staff at bedside      I have discussed this patient's plan of care and discharge plan at IDT rounds today with Case Management, Nursing, Nursing leadership, and other members of the IDT team.    Consultants/Specialty  general surgery, nephrology, and neurology    Code Status  DNAR/DNI    Disposition  Patient is not medically cleared for discharge.   Anticipate discharge to to an inpatient rehabilitation hospital.  I have placed the appropriate orders for post-discharge needs.    Review of Systems  Review of Systems   Constitutional:  Positive for malaise/fatigue. Negative for fever.   HENT:  Negative for congestion, hearing loss and sore throat.    Eyes:  Negative for blurred vision.    Respiratory:  Negative for cough and shortness of breath.    Cardiovascular:  Negative for chest pain, palpitations and leg swelling.   Gastrointestinal:  Negative for abdominal pain, diarrhea and heartburn.   Genitourinary:  Negative for dysuria, flank pain and hematuria.   Musculoskeletal:  Negative for myalgias.   Skin:  Negative for rash.   Neurological:  Positive for weakness. Negative for dizziness and speech change.   Psychiatric/Behavioral:  Negative for depression. The patient is not nervous/anxious.    All other systems reviewed and are negative.       Physical Exam  Temp:  [36.2 °C (97.2 °F)-36.9 °C (98.4 °F)] 36.2 °C (97.2 °F)  Pulse:  [61-71] 61  Resp:  [16-20] 16  BP: (121-138)/(66-76) 130/71  SpO2:  [91 %-99 %] 97 %    Physical Exam  Vitals and nursing note reviewed.   Constitutional:       Appearance: Normal appearance.   HENT:      Head: Normocephalic and atraumatic.   Eyes:      Extraocular Movements: Extraocular movements intact.      Conjunctiva/sclera: Conjunctivae normal.   Cardiovascular:      Rate and Rhythm: Normal rate and regular rhythm.   Pulmonary:      Effort: Pulmonary effort is normal.      Breath sounds: Normal breath sounds.   Abdominal:      Tenderness: There is abdominal tenderness (Mild). There is no guarding or rebound.   Musculoskeletal:      Cervical back: Neck supple. No tenderness.      Right lower leg: No edema.      Left lower leg: No edema.   Skin:     General: Skin is dry.      Coloration: Skin is pale.   Neurological:      Mental Status: He is alert and oriented to person, place, and time.      Cranial Nerves: No cranial nerve deficit.      Motor: Weakness present.         Fluids    Intake/Output Summary (Last 24 hours) at 5/11/2023 1457  Last data filed at 5/11/2023 1305  Gross per 24 hour   Intake 409 ml   Output 1200 ml   Net -791 ml         Laboratory  Recent Labs     05/09/23  0232 05/10/23  0836 05/11/23  0419   WBC 13.3* 14.6* 15.0*   RBC 3.09* 3.09* 3.06*    HEMOGLOBIN 9.3* 9.2* 9.1*   HEMATOCRIT 28.1* 27.8* 27.5*   MCV 90.9 90.0 89.9   MCH 30.1 29.8 29.7   MCHC 33.1* 33.1* 33.1*   RDW 50.4* 48.6 49.5   PLATELETCT 500* 468* 463*   MPV 10.5 10.4 10.4       Recent Labs     05/09/23  0232 05/11/23  0419   SODIUM 144 136   POTASSIUM 4.6 4.8   CHLORIDE 105 103   CO2 27 25   GLUCOSE 228* 244*   BUN 36* 48*   CREATININE 2.22* 2.08*   CALCIUM 9.5 10.1                         Imaging  IR-US GUIDED PIV   Final Result    Ultrasound-guided PERIPHERAL IV INSERTION performed by    qualified nursing staff as above.      MR-THORACIC SPINE-WITH & W/O   Final Result      1.  Limited study due to the motion artifact.   2.  When compared with the previous MRI again noted is expansile T2 hyperintense area in the thoracic spinal cord at the levels of T2, T3 and T4. There is also prominent dorsal right-sided subarachnoid space at this level. The differential diagnosis    includes intramedullary spinal cord tumor and extramedullary cyst/web with cord compression and edema.   3.  Pre and postcontrast MR examination of the thoracic spine is recommended with contrast under General anesthesia.   4.  1 -2 mm  T2-weighted sequences from T2 to T6 with smaller field of view is recommended for further characterization.      MR-THORACIC SPINE-WITH & W/O   Final Result         Stable appearance of expansile nonenhancing signal abnormality involving the upper thoracic cord between T1 and T4. Also noted is a small extra-axial collection/web along the right posterolateral spinal canal at T3-4 that deforms the cord and displaces    it anteriorly and to the left.   This could represent an Arachnoid web or arachnoid cyst with extensive mass effect and secondary edema of the cord.   Recommend additional thin section T2 and post contrast images through T1-T6 to better assess the abnormality.      MR-BRAIN-WITH & W/O   Final Result         No acute intracranial process.      Age-related volume loss and chronic  microvascular ischemic changes.         DX-CHEST-PORTABLE (1 VIEW)   Final Result         1.  Pulmonary edema and/or infiltrates are identified, which are stable since the prior exam.   2.  Cardiomegaly   3.  Atherosclerosis      DX-CHEST-PORTABLE (1 VIEW)   Final Result         1.  Pulmonary edema and/or infiltrates are identified, which are stable since the prior exam.   2.  Left PICC line tip terminates in the left axilla, stable since prior study.      DX-CHEST-PORTABLE (1 VIEW)   Final Result      1.  No significant change.   2.  Possible left PICC line with tip projecting at the axillary vein.      DX-CHEST-PORTABLE (1 VIEW)   Final Result      1.  Mildly improved pulmonary opacities.   2.  Possible left PICC line with tip projecting at the axillary vein.   3.  Likely trace right pleural effusion.      DX-CHEST-LIMITED (1 VIEW)   Final Result      1.  Placement of endotracheal tube with tip projecting over the mid trachea      2.  Left arm catheter present which is presumably venous in projects in the expected position of the left axillary vein      3.  Enlarged cardiac silhouette      NM-HEPATOBILIARY SCAN   Final Result      Hepatobiliary scan findings suspicious for acute cholecystitis.      MR-THORACIC SPINE-WITH & W/O   Final Result      1.  There is abnormal expansile T2 hyperintense lesion in the right side of the thoracic spinal cord at the levels of T2 and T3 Mild contrast enhancement is seen. This lesion is suspicious for spinal cord neoplasm. Other remote differential diagnosis    includes transverse myelitis.   2.  Exaggerated thoracic kyphosis.   3.  Thoracic dextroscoliosis.   4.  Minimal degenerative changes.   5.  Right pleural effusion.      MR-LUMBAR SPINE-WITH & W/O   Final Result      1.  Multifocal degenerative disease in the lumbar spine as described above.   2.  Moderate central canal and severe lateral recess stenosis at the level of L4-5. The exiting bilateral L5 nerve roots might have  been impinged at the lateral recess.      MR-CERVICAL SPINE-WITH & W/O   Final Result      1.  There is abnormal expansile intramedullary T2 signal intensity lesion in the right cerebellum. Thoracic spinal cord at the level of T2 on T3. Mild diffuse contrast enhancement is seen. This finding is suspicious for spinal cord neoplasm. The other    less likely differential diagnosis includes transverse myelitis. Follow-up is recommended after 4 weeks.   2.  Mild degenerative disease in the cervical spine as described above.   3.  There is no evidence of infection in the cervical spine.      US-RUQ   Final Result         1.  Acute cholecystitis.   2.  Atrophic right kidney.      DX-CHEST-LIMITED (1 VIEW)   Final Result      Perihilar interstitial edema and basilar atelectasis and/or consolidation. Underlying infection is possible.      MR-LUMBAR SPINE-W/O   Final Result      1.  Lower lumbar spine predominant degenerative changes as described in detail above, progressed from 2010 MRI.   2.  Edema at the L4-L5 disc space and L5 superior endplate likely represents degenerative changes. Less likely this could represent a low-grade or early discitis osteomyelitis. Correlate for evidence of infection.   3.  Acute appearing Schmorl's node at L3-L4, which can be a source of pain.      US-RENAL   Final Result      1.  Atrophic kidneys. Echogenic bilateral renal parenchyma could relate to medical renal disease.      2.  No hydronephrosis. No renal calculus.      DX-CHEST-PORTABLE (1 VIEW)   Final Result      No acute cardiac or pulmonary abnormalities are identified.      CT-ABDOMEN-PELVIS W/O   Final Result      1.  Findings suspicious for focal colitis at the hepatic flexure, less likely cholecystitis or duodenitis with secondary involvement of the colon. Infectious, inflammatory and ischemic etiologies are considerations. Underlying mass is not excluded.   2.  Cholelithiasis with distention of the gallbladder   3.  BILATERAL  renal atrophy   4.  Subcentimeter LEFT adrenal myelolipoma   5.  12 mm RIGHT adrenal nodule likely an adenoma absent a history of cancer   6.  Subcentimeter RIGHT hepatic lesion, likely a cyst absent a history of cancer   7.  Atherosclerosis   8.  Colonic diverticulosis      CT-HEAD W/O   Final Result      1.  Cerebral atrophy.      2.  White matter lucencies most consistent with small vessel ischemic change versus demyelination or gliosis.      3.  Otherwise, Head CT without contrast with no acute findings. No evidence of acute cerebral infarction, hemorrhage or mass lesion.         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  Neurology evaluated, patient received IVIG on 5/1, discontinued 5/2    Patient does have right lower extremity weakness more than left.  MR T spine showing ; MR T spine expansile T2 hyperintense area in the thoracic spinal cord at the levels of T2, T3 and T4. There is also prominent dorsal right-sided subarachnoid space at this level.  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    -- NS called and recs decadron 4 mg every 6 hrs   Need MRI of T spine anesthesia, which will be completed today.  NS following    Urinary retention  Assessment & Plan  rivera    Abnormal MRI- (present on admission)  Assessment & Plan  MRI on admission showing T2/T3  hyperintense lesion in the right side of the thoracic spinal cord at the levels of T2 and T3 Mild contrast enhancement is seen. This lesion is suspicious for spinal cord neoplasm or possible transverse myelitis       5/8 MRI thoracic spine reviewed and called Neurosurgery  Pending T-spine with anesthesia MR T spine with  anastheisa    Acute gangrenous cholecystitis- (present on admission)  Assessment & Plan  Found to have gangrenous cholecystitis s/p laprascopic converted open cholecystectomy 4/28/2023   -- Completed antibiotic treatment from 4/25- 4/ 29, drain has been removed.  Follow surgical recommendation.    Surgical course was complicated with postoperative wound infection, culture has been obtained, wound care has been consulted  Wound cx growing ESBL E coli, continue Zosyn    Hypomagnesemia- (present on admission)  Assessment & Plan  Replete as needed    Cholelithiasis with acute cholecystitis- (present on admission)  Assessment & Plan  S/p surgical removal, converted to open cholecystectomy  NAHUN drain-removed on 5/8/2023  The patient is tolerating a diet already, monitor bowel function    Hypokalemia- (present on admission)  Assessment & Plan  Stable, monitor CMP and replace as needed   - goal K >4    Hyponatremia- (present on admission)  Assessment & Plan  Resolved    Colitis- (present on admission)  Assessment & Plan  Cultures negative, monitor  Initial admitting impression, currently improved  Diet as tolerated     Acute kidney injury superimposed on CKD (HCC)- (present on admission)  Assessment & Plan   Resolved    Avoid nephrotoxins       Diabetic peripheral neuropathy (HCC)- (present on admission)  Assessment & Plan  Cymbalta, medical treatment    Essential hypertension- (present on admission)  Assessment & Plan  Continue amlodipine and coreg    Hypercholesterolemia- (present on admission)  Assessment & Plan  Stable, continue Lipitor    Chronic kidney disease (CKD), stage IV (severe) (HCC)- (present on admission)  Assessment & Plan  Appears to be at baseline at 5/3/2023            Hypercalcemia- (present on admission)  Assessment & Plan  Likely due to immobility   Resolved with IVF hydration  Continue to follow and treat accordingly     Thrombocytopenia (HCC)- (present on admission)  Assessment & Plan  Resolved, plt  are stable     High anion gap metabolic acidosis- (present on admission)  Assessment & Plan  Acute on chronic kidney disease, -appears back at baseline on 5/3/2023    Resolved    Sepsis (HCC)- (present on admission)  Assessment & Plan  This is Sepsis Present on admission  SIRS criteria identified on my evaluation include: Leukocytosis, with WBC greater than 12,000  Source is colitis  Sepsis protocol initiated  Fluid resuscitation ordered per protocol  Crystalloid Fluid Administration: Fluid resuscitation ordered per standard protocol - 30 mL/kg per current or ideal body weight  IV antibiotics as appropriate for source of sepsis  Reassessment: I have reassessed the patient's hemodynamic status    Due to gangrenous gallbladder requiring open carmelo    -- Today patient was noted to have surgical  wound with drainage   -- wound cx pending  ESBL E coli , continue zosyn    Type 2 diabetes mellitus with kidney complication, without long-term current use of insulin (HCC)- (present on admission)  Assessment & Plan  Ha1c 6.4% 4 months ago   On ozempic and januvia outpatient   Continue ISS and hypoglycemic protocol while inpatient         VTE prophylaxis: SCDs/TEDs and heparin ppx    I have performed a physical exam and reviewed and updated ROS and Plan today (5/11/2023). In review of yesterday's note (5/10/2023), there are no changes except as documented above.

## 2023-05-11 NOTE — CARE PLAN
Problem: Knowledge Deficit - Standard  Goal: Patient and family/care givers will demonstrate understanding of plan of care, disease process/condition, diagnostic tests and medications  5/11/2023 0331 by Ta Car R.N.  Outcome: Progressing  5/11/2023 0330 by Ta Car R.N.  Reactivated  5/11/2023 0330 by Ta Car R.N.  Reactivated     Problem: Pain - Standard  Goal: Alleviation of pain or a reduction in pain to the patient’s comfort goal  5/11/2023 0331 by Ta Car R.N.  Outcome: Progressing  5/11/2023 0330 by Ta Car R.N.  Reactivated  5/11/2023 0330 by Ta Car R.N.  Reactivated   The patient is Stable - Low risk of patient condition declining or worsening    Shift Goals  Clinical Goals: MRI at 10am  Patient Goals: pain control/rest  Family Goals: supportive care, update    Progress made toward(s) clinical / shift goals:  pain controlled as per PRN meds, provided enough rest and sleep, NPO maintained after MN, for MRI 5/11/2023 at 10am

## 2023-05-11 NOTE — ANESTHESIA PROCEDURE NOTES
Airway    Date/Time: 5/11/2023 12:14 PM    Performed by: Aleksander Skinner M.D.  Authorized by: Aleksander Skinner M.D.    Location:  OR  Urgency:  Elective  Indications for Airway Management:  Anesthesia      Spontaneous Ventilation: absent    Sedation Level:  Deep  Preoxygenated: Yes    Final Airway Type:  Supraglottic airway  Final Supraglottic Airway:  Standard LMA    SGA Size:  5  Number of Attempts at Approach:  1

## 2023-05-11 NOTE — ANESTHESIA POSTPROCEDURE EVALUATION
Patient: Tyrone Cline    Procedure Summary     Date: 05/11/23 Room / Location: [facility]    Anesthesia Start: 1201 Anesthesia Stop:     Procedure: MR-THORACIC SPINE-WITH & W/O Diagnosis:       Acute kidney failure (HCC)      Sepsis (HCC)      Acute kidney failure (HCC)      Sepsis (HCC)    Scheduled Providers:  Responsible Provider: Aleksander Skinner M.D.    Anesthesia Type: general ASA Status: 3          Final Anesthesia Type: general  Last vitals  BP   Blood Pressure : 126/74    Temp   36.5 °C (97.7 °F)    Pulse   71   Resp   16    SpO2   91 %      Anesthesia Post Evaluation    Patient location during evaluation: PACU  Patient participation: complete - patient participated  Level of consciousness: awake and alert  Pain score: 1    Airway patency: patent  Anesthetic complications: no  Cardiovascular status: hemodynamically stable  Respiratory status: acceptable  Hydration status: euvolemic    PONV: none          No notable events documented.     Nurse Pain Score: 8 (NPRS)

## 2023-05-11 NOTE — ANESTHESIA TIME REPORT
1DPO: doing well graft attached but prominent d folds.  start post op drops.  continue on-back positioning for 24 hrs. Anesthesia Start and Stop Event Times     Date Time Event    5/11/2023 1155 Ready for Procedure     1201 Anesthesia Start     1306 Anesthesia Stop        Responsible Staff  05/11/23    Name Role Begin End    Aleksander Skinner M.D. Anesth 1201 1306        Overtime Reason:  no overtime (within assigned shift)    Comments:

## 2023-05-11 NOTE — PROGRESS NOTES
4 Eyes Skin Assessment Completed by JUAN JOSÉ Espinosa and Ta Abernathy RN.     Head WDL  Ears WDL  Nose WDL  Mouth WDL  Neck WDL  Breast/Chest WDL  Shoulder Blades WDL  Spine WDL  (R) Arm/Elbow/Hand WDL  (L) Arm/Elbow/Hand WDL  Abdomen : RUQ transverse umbilical Incision  with Steri-Strips and dressing over de-hissed portion (silver strip packing and adhesive foam   RLQ - Previous NAHUN Drain Site  with gauze and tegaderm   Groin: Florez catheter to Penis, CDI   Scrotum/Coccyx/Buttocks Redness and Blanching, Mepilex applied   (R) Leg Edema  (L) Leg Edema  (R) Heel/Foot/Toe WDL  (L) Heel/Foot/Toe WDL        Devices In Places: 20g PIV RAC CDI, Pulse Ox, Florez, and Nasal Cannula        Interventions In Place Gray Ear Foams, Sacral Mepilex, TAP System, Pillows, Q2 Turns, Low Air Loss Mattress, and Heels Loaded W/Pillows     Possible Skin Injury No     Pictures Uploaded Into Epic N/A  Wound Consult Placed N/A  RN Wound Prevention Protocol Ordered No

## 2023-05-11 NOTE — THERAPY
Occupational Therapy Contact Note    Patient Name: Tyrone Cline  Age:  83 y.o., Sex:  male  Medical Record #: [id number]  Today's Date: 5/11/2023    Attempted to see pt for OT session; pt in MRI with sedation. Pt reported to PT he didn't want to work with therapy until his MRI was completed. Will reattempt OT session as appropriate/able after imaging has results.

## 2023-05-11 NOTE — CARE PLAN
The patient is Watcher - Medium risk of patient condition declining or worsening    Shift Goals  Clinical Goals: ambulation, skin integrity  Patient Goals: MRI in am, rest, pain management  Family Goals: supportive care, update    Progress made toward(s) clinical / shift goals:  Comfort measures provided for complaints of pain.      Patient is not progressing towards the following goals: N/A

## 2023-05-11 NOTE — PROGRESS NOTES
Bedside report received.Assessment complete.  A&O x 4. Patient calls appropriately.  Patient ambulates with x2 assist. Patient declines pain at this time.Comfort measures provided and repositioning.  Denies N&V. Tolerating renal diet.    RUQ transverse umbilical Incision  with Steri-Strips and dressing. RLQ Previous NAHUN Drain Site  with gauze and tegaderm     + void, + flatus, Last BM 05/10   Patient denies SOB.    Patient  is pleasant  and cooperative with plan of care.  Review plan with of care with patient. Call light and personal belongings within reach. Hourly rounding in place. All needs met at this time.

## 2023-05-11 NOTE — DISCHARGE PLANNING
[facility]P TCN chart review completed. Collaborated with NIKIA Galdamez.  As prior, current discharge considerations are for SNF when medically cleared.  Per chart review, patient has been accepted to [facility].  TCN will continue to follow and collaborate with discharge planning team as additional post acute needs arise. Thank you.     Completed:  PT/OT recommends post acute placement on 5/4/23 and 5/3/23.     SLP  5/1 recs for no further needs  Physiatry recommending SNF as of 5/2 as well  Choice obtained: IRF (-> declines; recs SNF) and SNF choice obtained on 4/24/23.  HH choice on 4/24/23.  Infusion on 4/22/23; see above; accepted to [facility];  [facility] referral sent 4/22/23

## 2023-05-11 NOTE — PROGRESS NOTES
Patient to MRI inpatient dept. Patient informed process and plan of care during this visit.  Anesthesiologist Brody spoke with Patient and discussed provider's plan of care.  Consent obtained.  MRI completed without incident. Report called to Sharon CAN from [facility] PACU. Patient transported to [facility] PACU in stable condition.

## 2023-05-11 NOTE — ANESTHESIA PREPROCEDURE EVALUATION
Date/Time: 05/11/23 0850    Procedure: MR-THORACIC SPINE-WITH & W/O    Diagnosis:       Acute kidney failure (HCC) [N17.9]      Sepsis (HCC) [A41.9]      Acute kidney failure (HCC) [N17.9]      Sepsis (HCC) [A41.9]    Location: [facility] - [facility]          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 (HCC)   (positive) Essential hypertension   (positive) Hypertensive emergency         (positive) Acute kidney injury superimposed on CKD (HCC)   (positive) Chronic kidney disease (CKD), stage IV (severe) (HCC)      ENDO   (positive) Type 2 diabetes mellitus with kidney complication, without long-term current use of insulin (HCC)       Physical Exam    Airway   Mallampati: II  TM distance: >3 FB  Neck ROM: full       Cardiovascular - normal exam  Rhythm: regular  Rate: normal  (-) murmur     Dental - normal exam           Pulmonary - normal exam  Breath sounds clear to auscultation     Abdominal    Neurological - normal exam                 Anesthesia Plan    ASA 3       Plan - general       Airway plan will be LMA    (  (positive) Acquired dilation of ascending aorta and aortic root (HCC)  (positive) Essential hypertension  (positive) Hypertensive emergency      (positive) Acute kidney injury superimposed on CKD (HCC)  (positive) Chronic kidney disease (CKD), stage IV (severe) (HCC)    ENDO  (positive) Type 2 diabetes mellitus with kidney complication, without long-term current use of insulin (HCC)  )      Induction: intravenous    Postoperative Plan: Postoperative administration of opioids is intended.    Pertinent diagnostic labs and testing reviewed    Informed Consent:    Anesthetic plan and risks discussed with patient.    Use of blood products discussed with: patient whom consented to blood products.

## 2023-05-11 NOTE — CARE PLAN
The patient is Stable - Low risk of patient condition declining or worsening    Shift Goals  Clinical Goals: skin integrity, MRI  Patient Goals: pain control/rest  Family Goals: supportive care, update    Progress made toward(s) clinical / shift goals:  Comfort measures provided,  Q2H turns provided    Patient is not progressing towards the following goals: N/A

## 2023-05-12 LAB
ALBUMIN SERPL BCP-MCNC: 2.1 G/DL (ref 3.2–4.9)
BUN SERPL-MCNC: 56 MG/DL (ref 8–22)
CALCIUM ALBUM COR SERPL-MCNC: 11.3 MG/DL (ref 8.5–10.5)
CALCIUM SERPL-MCNC: 9.8 MG/DL (ref 8.5–10.5)
CHLORIDE SERPL-SCNC: 100 MMOL/L (ref 96–112)
CO2 SERPL-SCNC: 26 MMOL/L (ref 20–33)
CREAT SERPL-MCNC: 2.32 MG/DL (ref 0.5–1.4)
ERYTHROCYTE [DISTWIDTH] IN BLOOD BY AUTOMATED COUNT: 49.1 FL (ref 35.9–50)
GFR SERPLBLD CREATININE-BSD FMLA CKD-EPI: 27 ML/MIN/1.73 M 2
GLUCOSE BLD STRIP.AUTO-MCNC: 233 MG/DL (ref 65–99)
GLUCOSE BLD STRIP.AUTO-MCNC: 241 MG/DL (ref 65–99)
GLUCOSE BLD STRIP.AUTO-MCNC: 249 MG/DL (ref 65–99)
GLUCOSE BLD STRIP.AUTO-MCNC: 270 MG/DL (ref 65–99)
GLUCOSE SERPL-MCNC: 316 MG/DL (ref 65–99)
HCT VFR BLD AUTO: 26.8 % (ref 42–52)
HGB BLD-MCNC: 8.5 G/DL (ref 14–18)
MCH RBC QN AUTO: 28.8 PG (ref 27–33)
MCHC RBC AUTO-ENTMCNC: 31.7 G/DL (ref 33.7–35.3)
MCV RBC AUTO: 90.8 FL (ref 81.4–97.8)
PHOSPHATE SERPL-MCNC: 2.9 MG/DL (ref 2.5–4.5)
PLATELET # BLD AUTO: 457 K/UL (ref 164–446)
PMV BLD AUTO: 10.8 FL (ref 9–12.9)
POTASSIUM SERPL-SCNC: 5.2 MMOL/L (ref 3.6–5.5)
RBC # BLD AUTO: 2.95 M/UL (ref 4.7–6.1)
SODIUM SERPL-SCNC: 135 MMOL/L (ref 135–145)
WBC # BLD AUTO: 13.3 K/UL (ref 4.8–10.8)

## 2023-05-12 PROCEDURE — 80069 RENAL FUNCTION PANEL: CPT

## 2023-05-12 PROCEDURE — 86235 NUCLEAR ANTIGEN ANTIBODY: CPT | Mod: 91

## 2023-05-12 PROCEDURE — A9270 NON-COVERED ITEM OR SERVICE: HCPCS | Performed by: HOSPITALIST

## 2023-05-12 PROCEDURE — A9270 NON-COVERED ITEM OR SERVICE: HCPCS | Performed by: SURGERY

## 2023-05-12 PROCEDURE — 36415 COLL VENOUS BLD VENIPUNCTURE: CPT

## 2023-05-12 PROCEDURE — 700102 HCHG RX REV CODE 250 W/ 637 OVERRIDE(OP): Performed by: HOSPITALIST

## 2023-05-12 PROCEDURE — 82164 ANGIOTENSIN I ENZYME TEST: CPT

## 2023-05-12 PROCEDURE — 82784 ASSAY IGA/IGD/IGG/IGM EACH: CPT

## 2023-05-12 PROCEDURE — A9270 NON-COVERED ITEM OR SERVICE: HCPCS | Performed by: FAMILY MEDICINE

## 2023-05-12 PROCEDURE — 99233 SBSQ HOSP IP/OBS HIGH 50: CPT | Performed by: HOSPITALIST

## 2023-05-12 PROCEDURE — 86051 AQUAPORIN-4 ANTB ELISA: CPT

## 2023-05-12 PROCEDURE — 82042 OTHER SOURCE ALBUMIN QUAN EA: CPT

## 2023-05-12 PROCEDURE — 82040 ASSAY OF SERUM ALBUMIN: CPT

## 2023-05-12 PROCEDURE — 84446 ASSAY OF VITAMIN E: CPT

## 2023-05-12 PROCEDURE — 83516 IMMUNOASSAY NONANTIBODY: CPT

## 2023-05-12 PROCEDURE — 700102 HCHG RX REV CODE 250 W/ 637 OVERRIDE(OP): Performed by: FAMILY MEDICINE

## 2023-05-12 PROCEDURE — 770001 HCHG ROOM/CARE - MED/SURG/GYN PRIV*

## 2023-05-12 PROCEDURE — 700111 HCHG RX REV CODE 636 W/ 250 OVERRIDE (IP): Performed by: STUDENT IN AN ORGANIZED HEALTH CARE EDUCATION/TRAINING PROGRAM

## 2023-05-12 PROCEDURE — 700111 HCHG RX REV CODE 636 W/ 250 OVERRIDE (IP): Performed by: INTERNAL MEDICINE

## 2023-05-12 PROCEDURE — 82962 GLUCOSE BLOOD TEST: CPT | Mod: 91

## 2023-05-12 PROCEDURE — 700105 HCHG RX REV CODE 258: Performed by: INTERNAL MEDICINE

## 2023-05-12 PROCEDURE — 700102 HCHG RX REV CODE 250 W/ 637 OVERRIDE(OP): Performed by: SURGERY

## 2023-05-12 PROCEDURE — 86362 MOG-IGG1 ANTB CBA EACH: CPT

## 2023-05-12 PROCEDURE — 97530 THERAPEUTIC ACTIVITIES: CPT

## 2023-05-12 PROCEDURE — 99223 1ST HOSP IP/OBS HIGH 75: CPT | Performed by: INTERNAL MEDICINE

## 2023-05-12 PROCEDURE — 700105 HCHG RX REV CODE 258: Performed by: HOSPITALIST

## 2023-05-12 PROCEDURE — 85027 COMPLETE CBC AUTOMATED: CPT

## 2023-05-12 PROCEDURE — 82525 ASSAY OF COPPER: CPT

## 2023-05-12 PROCEDURE — 99232 SBSQ HOSP IP/OBS MODERATE 35: CPT | Performed by: PSYCHIATRY & NEUROLOGY

## 2023-05-12 PROCEDURE — 86592 SYPHILIS TEST NON-TREP QUAL: CPT

## 2023-05-12 PROCEDURE — 700111 HCHG RX REV CODE 636 W/ 250 OVERRIDE (IP): Performed by: HOSPITALIST

## 2023-05-12 RX ADMIN — DEXAMETHASONE SODIUM PHOSPHATE 4 MG: 4 INJECTION, SOLUTION INTRA-ARTICULAR; INTRALESIONAL; INTRAMUSCULAR; INTRAVENOUS; SOFT TISSUE at 00:13

## 2023-05-12 RX ADMIN — INSULIN HUMAN 3 UNITS: 100 INJECTION, SOLUTION PARENTERAL at 21:12

## 2023-05-12 RX ADMIN — FAMOTIDINE 20 MG: 20 TABLET, FILM COATED ORAL at 05:16

## 2023-05-12 RX ADMIN — POLYETHYLENE GLYCOL 3350 1 PACKET: 17 POWDER, FOR SOLUTION ORAL at 21:13

## 2023-05-12 RX ADMIN — MEROPENEM 500 MG: 500 INJECTION INTRAVENOUS at 12:42

## 2023-05-12 RX ADMIN — ALLOPURINOL 100 MG: 100 TABLET ORAL at 05:16

## 2023-05-12 RX ADMIN — INSULIN HUMAN 3 UNITS: 100 INJECTION, SOLUTION PARENTERAL at 17:03

## 2023-05-12 RX ADMIN — INSULIN HUMAN 5 UNITS: 100 INJECTION, SOLUTION PARENTERAL at 12:45

## 2023-05-12 RX ADMIN — AMLODIPINE BESYLATE 5 MG: 10 TABLET ORAL at 05:15

## 2023-05-12 RX ADMIN — CARVEDILOL 12.5 MG: 12.5 TABLET, FILM COATED ORAL at 16:58

## 2023-05-12 RX ADMIN — SODIUM BICARBONATE 1300 MG: 650 TABLET ORAL at 16:58

## 2023-05-12 RX ADMIN — DOCUSATE SODIUM 50 MG AND SENNOSIDES 8.6 MG 2 TABLET: 8.6; 5 TABLET, FILM COATED ORAL at 16:58

## 2023-05-12 RX ADMIN — DEXAMETHASONE SODIUM PHOSPHATE 4 MG: 4 INJECTION, SOLUTION INTRA-ARTICULAR; INTRALESIONAL; INTRAMUSCULAR; INTRAVENOUS; SOFT TISSUE at 05:16

## 2023-05-12 RX ADMIN — OXYCODONE HYDROCHLORIDE 10 MG: 10 TABLET ORAL at 21:20

## 2023-05-12 RX ADMIN — OXYCODONE HYDROCHLORIDE 10 MG: 10 TABLET ORAL at 08:31

## 2023-05-12 RX ADMIN — DEXAMETHASONE SODIUM PHOSPHATE 4 MG: 4 INJECTION, SOLUTION INTRA-ARTICULAR; INTRALESIONAL; INTRAMUSCULAR; INTRAVENOUS; SOFT TISSUE at 12:43

## 2023-05-12 RX ADMIN — SPIRONOLACTONE 25 MG: 50 TABLET ORAL at 05:16

## 2023-05-12 RX ADMIN — CARVEDILOL 12.5 MG: 12.5 TABLET, FILM COATED ORAL at 08:29

## 2023-05-12 RX ADMIN — DOCUSATE SODIUM 50 MG AND SENNOSIDES 8.6 MG 2 TABLET: 8.6; 5 TABLET, FILM COATED ORAL at 05:16

## 2023-05-12 RX ADMIN — HEPARIN SODIUM 5000 UNITS: 5000 INJECTION, SOLUTION INTRAVENOUS; SUBCUTANEOUS at 05:16

## 2023-05-12 RX ADMIN — DULOXETINE HYDROCHLORIDE 30 MG: 30 CAPSULE, DELAYED RELEASE ORAL at 16:58

## 2023-05-12 RX ADMIN — PIPERACILLIN AND TAZOBACTAM 4.5 G: 4; .5 INJECTION, POWDER, LYOPHILIZED, FOR SOLUTION INTRAVENOUS; PARENTERAL at 02:16

## 2023-05-12 RX ADMIN — INSULIN HUMAN 3 UNITS: 100 INJECTION, SOLUTION PARENTERAL at 08:16

## 2023-05-12 RX ADMIN — SODIUM BICARBONATE 1300 MG: 650 TABLET ORAL at 05:16

## 2023-05-12 RX ADMIN — ATORVASTATIN CALCIUM 10 MG: 10 TABLET, FILM COATED ORAL at 16:58

## 2023-05-12 RX ADMIN — TAMSULOSIN HYDROCHLORIDE 0.4 MG: 0.4 CAPSULE ORAL at 08:29

## 2023-05-12 RX ADMIN — HEPARIN SODIUM 5000 UNITS: 5000 INJECTION, SOLUTION INTRAVENOUS; SUBCUTANEOUS at 14:01

## 2023-05-12 RX ADMIN — MEROPENEM 500 MG: 500 INJECTION INTRAVENOUS at 21:19

## 2023-05-12 RX ADMIN — DEXAMETHASONE SODIUM PHOSPHATE 4 MG: 4 INJECTION, SOLUTION INTRA-ARTICULAR; INTRALESIONAL; INTRAMUSCULAR; INTRAVENOUS; SOFT TISSUE at 16:58

## 2023-05-12 RX ADMIN — OXYCODONE HYDROCHLORIDE 10 MG: 10 TABLET ORAL at 05:27

## 2023-05-12 ASSESSMENT — COGNITIVE AND FUNCTIONAL STATUS - GENERAL
WALKING IN HOSPITAL ROOM: TOTAL
MOVING TO AND FROM BED TO CHAIR: A LOT
WALKING IN HOSPITAL ROOM: TOTAL
DRESSING REGULAR UPPER BODY CLOTHING: A LITTLE
SUGGESTED CMS G CODE MODIFIER MOBILITY: CM
MOVING TO AND FROM BED TO CHAIR: UNABLE
CLIMB 3 TO 5 STEPS WITH RAILING: TOTAL
MOBILITY SCORE: 11
TURNING FROM BACK TO SIDE WHILE IN FLAT BAD: A LITTLE
TURNING FROM BACK TO SIDE WHILE IN FLAT BAD: UNABLE
CLIMB 3 TO 5 STEPS WITH RAILING: TOTAL
MOVING FROM LYING ON BACK TO SITTING ON SIDE OF FLAT BED: A LOT
SUGGESTED CMS G CODE MODIFIER MOBILITY: CL
STANDING UP FROM CHAIR USING ARMS: A LOT
SUGGESTED CMS G CODE MODIFIER DAILY ACTIVITY: CK
MOVING FROM LYING ON BACK TO SITTING ON SIDE OF FLAT BED: UNABLE
TOILETING: A LOT
STANDING UP FROM CHAIR USING ARMS: A LOT
HELP NEEDED FOR BATHING: A LITTLE
DAILY ACTIVITIY SCORE: 19
MOBILITY SCORE: 7
DRESSING REGULAR LOWER BODY CLOTHING: A LITTLE

## 2023-05-12 ASSESSMENT — ENCOUNTER SYMPTOMS
DIZZINESS: 0
MYALGIAS: 0
WEAKNESS: 1
SHORTNESS OF BREATH: 0
DIARRHEA: 0
HEARTBURN: 0
SORE THROAT: 0
PALPITATIONS: 0
FEVER: 0
SPEECH CHANGE: 0
COUGH: 0
ABDOMINAL PAIN: 0
NERVOUS/ANXIOUS: 0
DEPRESSION: 0
BLURRED VISION: 0

## 2023-05-12 ASSESSMENT — PAIN DESCRIPTION - PAIN TYPE
TYPE: ACUTE PAIN

## 2023-05-12 ASSESSMENT — GAIT ASSESSMENTS: GAIT LEVEL OF ASSIST: UNABLE TO PARTICIPATE

## 2023-05-12 NOTE — PROGRESS NOTES
[facility] Medicine Daily Progress Note    Date of Service  5/12/2023    Chief Complaint  Tyrone Cline is a 82 y.o. male admitted 4/21/2023 with Colitis.    Hospital Course  This 82-year-old male with a past medical history significant for CKD stage IV being followed by nephrology, diabetes mellitus, hypertension presented to ER on 4/21/2023 with a complaint of abdominal pain and lower extremity weakness.    Patient stated that he had epigastric and abdominal pain on Tuesday; CT scan of the abdomen and pelvis consistent with colitis but did show cholelithiasis with distention of gallbladder but less likely cholecystitis, ultrasound of the abdomen consistent with acute cholecystitis, HIDA scan showing acute carmelo; General surgery was consulted and he underwent  attempted laparoscopic cholecystectomy with conversion to open cholecystectomy; Subtotal cholecystectomy, patient has completed treatment with IV antibiotics.  Patient did have a NAHUN drain in place, which has been removed on 5/8/2023    His hospital course was complicated with postoperative hypotension, went to ICU vasopressor and fell back on 4/29 floor.    Of note, patient continues to have bilateral lower extremity weakness; MR T spine expansile T2 hyperintense area in the thoracic spinal cord at the levels of T2, T3 and T4. There is also prominent dorsal right-sided subarachnoid space at this level.  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     Interval events:  -- No acute events overnight, medicine has been stable, patient is currently requiring 3 to of oxygen, he is alert, and awake  --WBC 13.3, renal function at 2.2, continue to follow-up with nephrology  -- MRI of  T-spine reviewed, contacted neurosurgery  has been consulted, he will be evaluating the patient today.  He recommended continue Decadron 4 mg every 6 hrs   -- WBC is elevated at 13.3, creatinine at 2.22,  nephrology following    5/10:  -- No acute events overnight, medicine has been stable, patient is alert, awake.  Patient had urinary retention along with hematuria, nursing were not able to pass the Florez, patient  underwent cystoscopy with complex catheterization by Dr. De La Torre on  5/9/2023  -- Neurosurgery continue to follow the patient, patient will need MRI of the thoracic spine with this anesthesia, n.p.o. at midnight.  --In the meantime we will continue Decadron 4 mg every 6 hours as per NS recs  --He was noted to have wound dehiscence on the medial aspect, wound culture pending, will continue Unasyn plus doxycycline.  Patient to be out of bed 3 times daily with meals.    Plan of care has been discussed the patient, nursing staff, case management    5/11:  -- No acute events overnight, culture from the wound growing ESBL E. coli, will need aggressive wound care, patient antibiotics were switched from Unasyn plus doxycycline to Zosyn as it is susceptible to Zosun .  -- Continue isolation per protocol, patient will be undergoing MRI of the T-spine.  --Neurosurgery following, will follow their recommendation.  --Patient is noted to have dark urine coming out, will continue IV fluid for 1 L.  Discussed with the patient, nursing staff at bedside    5/12:  - no  acute events overnight, vital sign has been stable, patient is alert, awake, answering questions appropriately.  Patient renal function continued to get worse, will continue IV fluid  For 1 L.  If patient renal function continued to get worse, consider calling nephrology    MRI of the thoracic spine reviewed.  Neurosurgery PA continue to follow.  Neurosurgeon Dr. Ndiaye will be evaluating the patient on Sunday  -- Blood glucose continues to remain elevated, will adjust insulin accordingly.  WBC is elevated at 13.3, monitor.  Patient's abdominal wound culture grew ESBL E. coli, was on Zosyn, discussed with ID, changed to meropenem.  Please follow ID  recommendation.    Continue renal diet    I have discussed this patient's plan of care and discharge plan at IDT rounds today with Case Management, Nursing, Nursing leadership, and other members of the IDT team.    Consultants/Specialty  general surgery, nephrology, and neurology    Code Status  DNAR/DNI    Disposition  Patient is not medically cleared for discharge.   Anticipate discharge to to an inpatient rehabilitation hospital.  I have placed the appropriate orders for post-discharge needs.    Review of Systems  Review of Systems   Constitutional:  Positive for malaise/fatigue. Negative for fever.   HENT:  Negative for congestion, hearing loss and sore throat.    Eyes:  Negative for blurred vision.   Respiratory:  Negative for cough and shortness of breath.    Cardiovascular:  Negative for chest pain, palpitations and leg swelling.   Gastrointestinal:  Negative for abdominal pain, diarrhea and heartburn.   Genitourinary:  Negative for dysuria.   Musculoskeletal:  Negative for myalgias.   Neurological:  Positive for weakness. Negative for dizziness and speech change.   Psychiatric/Behavioral:  Negative for depression. The patient is not nervous/anxious.    All other systems reviewed and are negative.       Physical Exam  Temp:  [36.5 °C (97.7 °F)-37 °C (98.6 °F)] 36.5 °C (97.7 °F)  Pulse:  [63-74] 74  Resp:  [16-18] 18  BP: (114-137)/(62-73) 131/73  SpO2:  [91 %-94 %] 91 %    Physical Exam  Vitals and nursing note reviewed.   Constitutional:       Appearance: Normal appearance.   HENT:      Head: Normocephalic and atraumatic.   Eyes:      Extraocular Movements: Extraocular movements intact.      Pupils: Pupils are equal, round, and reactive to light.   Cardiovascular:      Rate and Rhythm: Normal rate and regular rhythm.   Pulmonary:      Effort: Pulmonary effort is normal.      Breath sounds: Normal breath sounds.   Abdominal:      Tenderness: There is abdominal tenderness (Mild). There is no guarding or  rebound.   Musculoskeletal:      Cervical back: Neck supple.      Right lower leg: No edema.      Left lower leg: No edema.   Skin:     General: Skin is dry.      Coloration: Skin is pale.   Neurological:      Mental Status: He is alert and oriented to person, place, and time.      Cranial Nerves: No cranial nerve deficit.      Motor: Weakness present.         Fluids    Intake/Output Summary (Last 24 hours) at 5/12/2023 1417  Last data filed at 5/12/2023 1000  Gross per 24 hour   Intake --   Output 1750 ml   Net -1750 ml         Laboratory  Recent Labs     05/10/23  0836 05/11/23  0419 05/12/23  0130   WBC 14.6* 15.0* 13.3*   RBC 3.09* 3.06* 2.95*   HEMOGLOBIN 9.2* 9.1* 8.5*   HEMATOCRIT 27.8* 27.5* 26.8*   MCV 90.0 89.9 90.8   MCH 29.8 29.7 28.8   MCHC 33.1* 33.1* 31.7*   RDW 48.6 49.5 49.1   PLATELETCT 468* 463* 457*   MPV 10.4 10.4 10.8       Recent Labs     05/11/23 0419 05/12/23  0130   SODIUM 136 135   POTASSIUM 4.8 5.2   CHLORIDE 103 100   CO2 25 26   GLUCOSE 244* 316*   BUN 48* 56*   CREATININE 2.08* 2.32*   CALCIUM 10.1 9.8                         Imaging  MR-THORACIC SPINE-WITH & W/O   Final Result      Compared with the previous MRI ,again noted is expansile lesion in the thoracic spinal cord at the levels of T2, T3 and T4. There is prominent right dorsal subarachnoid space at the level of T3 with flattening of the spinal cord. There is mild contrast    enhancement noted in the expanded portion of the spinal cord. 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 Other differential diagnosis includes transverse myelitis However the possibility of right-sided dorsal arachnoid cyst/arachnoid    web at the level of T3 causing spinal cord edema cannot be entirely ruled out. Despite multiple MRs, it is difficult to characterize the tumor. Therefore if there is any neurosurgical planning, CT myelogram is recommended for further characterization.      IR-US GUIDED PIV   Final Result     Ultrasound-guided PERIPHERAL IV INSERTION performed by    qualified nursing staff as above.      MR-THORACIC SPINE-WITH & W/O   Final Result      1.  Limited study due to the motion artifact.   2.  When compared with the previous MRI again noted is expansile T2 hyperintense area in the thoracic spinal cord at the levels of T2, T3 and T4. There is also prominent dorsal right-sided subarachnoid space at this level. The differential diagnosis    includes intramedullary spinal cord tumor and extramedullary cyst/web with cord compression and edema.   3.  Pre and postcontrast MR examination of the thoracic spine is recommended with contrast under General anesthesia.   4.  1 -2 mm  T2-weighted sequences from T2 to T6 with smaller field of view is recommended for further characterization.      MR-THORACIC SPINE-WITH & W/O   Final Result         Stable appearance of expansile nonenhancing signal abnormality involving the upper thoracic cord between T1 and T4. Also noted is a small extra-axial collection/web along the right posterolateral spinal canal at T3-4 that deforms the cord and displaces    it anteriorly and to the left.   This could represent an Arachnoid web or arachnoid cyst with extensive mass effect and secondary edema of the cord.   Recommend additional thin section T2 and post contrast images through T1-T6 to better assess the abnormality.      MR-BRAIN-WITH & W/O   Final Result         No acute intracranial process.      Age-related volume loss and chronic microvascular ischemic changes.         DX-CHEST-PORTABLE (1 VIEW)   Final Result         1.  Pulmonary edema and/or infiltrates are identified, which are stable since the prior exam.   2.  Cardiomegaly   3.  Atherosclerosis      DX-CHEST-PORTABLE (1 VIEW)   Final Result         1.  Pulmonary edema and/or infiltrates are identified, which are stable since the prior exam.   2.  Left PICC line tip terminates in the left axilla, stable since prior study.       DX-CHEST-PORTABLE (1 VIEW)   Final Result      1.  No significant change.   2.  Possible left PICC line with tip projecting at the axillary vein.      DX-CHEST-PORTABLE (1 VIEW)   Final Result      1.  Mildly improved pulmonary opacities.   2.  Possible left PICC line with tip projecting at the axillary vein.   3.  Likely trace right pleural effusion.      DX-CHEST-LIMITED (1 VIEW)   Final Result      1.  Placement of endotracheal tube with tip projecting over the mid trachea      2.  Left arm catheter present which is presumably venous in projects in the expected position of the left axillary vein      3.  Enlarged cardiac silhouette      NM-HEPATOBILIARY SCAN   Final Result      Hepatobiliary scan findings suspicious for acute cholecystitis.      MR-THORACIC SPINE-WITH & W/O   Final Result      1.  There is abnormal expansile T2 hyperintense lesion in the right side of the thoracic spinal cord at the levels of T2 and T3 Mild contrast enhancement is seen. This lesion is suspicious for spinal cord neoplasm. Other remote differential diagnosis    includes transverse myelitis.   2.  Exaggerated thoracic kyphosis.   3.  Thoracic dextroscoliosis.   4.  Minimal degenerative changes.   5.  Right pleural effusion.      MR-LUMBAR SPINE-WITH & W/O   Final Result      1.  Multifocal degenerative disease in the lumbar spine as described above.   2.  Moderate central canal and severe lateral recess stenosis at the level of L4-5. The exiting bilateral L5 nerve roots might have been impinged at the lateral recess.      MR-CERVICAL SPINE-WITH & W/O   Final Result      1.  There is abnormal expansile intramedullary T2 signal intensity lesion in the right cerebellum. Thoracic spinal cord at the level of T2 on T3. Mild diffuse contrast enhancement is seen. This finding is suspicious for spinal cord neoplasm. The other    less likely differential diagnosis includes transverse myelitis. Follow-up is recommended after 4 weeks.   2.   Mild degenerative disease in the cervical spine as described above.   3.  There is no evidence of infection in the cervical spine.      US-RUQ   Final Result         1.  Acute cholecystitis.   2.  Atrophic right kidney.      DX-CHEST-LIMITED (1 VIEW)   Final Result      Perihilar interstitial edema and basilar atelectasis and/or consolidation. Underlying infection is possible.      MR-LUMBAR SPINE-W/O   Final Result      1.  Lower lumbar spine predominant degenerative changes as described in detail above, progressed from 2010 MRI.   2.  Edema at the L4-L5 disc space and L5 superior endplate likely represents degenerative changes. Less likely this could represent a low-grade or early discitis osteomyelitis. Correlate for evidence of infection.   3.  Acute appearing Schmorl's node at L3-L4, which can be a source of pain.      US-RENAL   Final Result      1.  Atrophic kidneys. Echogenic bilateral renal parenchyma could relate to medical renal disease.      2.  No hydronephrosis. No renal calculus.      DX-CHEST-PORTABLE (1 VIEW)   Final Result      No acute cardiac or pulmonary abnormalities are identified.      CT-ABDOMEN-PELVIS W/O   Final Result      1.  Findings suspicious for focal colitis at the hepatic flexure, less likely cholecystitis or duodenitis with secondary involvement of the colon. Infectious, inflammatory and ischemic etiologies are considerations. Underlying mass is not excluded.   2.  Cholelithiasis with distention of the gallbladder   3.  BILATERAL renal atrophy   4.  Subcentimeter LEFT adrenal myelolipoma   5.  12 mm RIGHT adrenal nodule likely an adenoma absent a history of cancer   6.  Subcentimeter RIGHT hepatic lesion, likely a cyst absent a history of cancer   7.  Atherosclerosis   8.  Colonic diverticulosis      CT-HEAD W/O   Final Result      1.  Cerebral atrophy.      2.  White matter lucencies most consistent with small vessel ischemic change versus demyelination or gliosis.      3.   Otherwise, Head CT without contrast with no acute findings. No evidence of acute cerebral infarction, hemorrhage or mass lesion.                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  Neurology evaluated, patient received IVIG on 5/1, discontinued 5/2    Patient does have right lower extremity weakness more than left.  MR T spine showing ; MR T spine expansile T2 hyperintense area in the thoracic spinal cord at the levels of T2, T3 and T4. There is also prominent dorsal right-sided subarachnoid space at this level.  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    -- NS called and recs decadron 4 mg every 6 hrs   oon 5/11, MRI of the lumbar spine completed.  Neurosurgery following, will follow their recommendation    Urinary retention  Assessment & Plan  rivera    Abnormal MRI- (present on admission)  Assessment & Plan  MRI on admission showing T2/T3  hyperintense lesion in the right side of the thoracic spinal cord at the levels of T2 and T3 Mild contrast enhancement is seen. This lesion is suspicious for spinal cord neoplasm or possible transverse myelitis       5/8 MRI thoracic spine reviewed and called Neurosurgery  On 5/11: MRI with anaesthesia will completed     Acute gangrenous cholecystitis- (present on admission)  Assessment & Plan  Found to have gangrenous cholecystitis s/p laprascopic converted open cholecystectomy 4/28/2023   -- Completed antibiotic treatment from 4/25- 4/ 29, drain has been removed.  Follow surgical recommendation.    Surgical course was complicated with postoperative wound infection, culture has been obtained, wound care has been consulted  Wound cx growing ESBL E coli, continue meropeneam    Hypomagnesemia-  (present on admission)  Assessment & Plan  Replete as needed    Cholelithiasis with acute cholecystitis- (present on admission)  Assessment & Plan  S/p surgical removal, converted to open cholecystectomy  NAHUN drain-removed on 5/8/2023  The patient is tolerating a diet already, monitor bowel function    Hypokalemia- (present on admission)  Assessment & Plan  Stable, monitor CMP and replace as needed   - goal K >4    Hyponatremia- (present on admission)  Assessment & Plan  Resolved    Colitis- (present on admission)  Assessment & Plan  Cultures negative, monitor  Initial admitting impression, currently improved  Diet as tolerated     Acute kidney injury superimposed on CKD (HCC)- (present on admission)  Assessment & Plan   Resolved    Avoid nephrotoxins       Diabetic peripheral neuropathy (HCC)- (present on admission)  Assessment & Plan  Cymbalta, medical treatment    Essential hypertension- (present on admission)  Assessment & Plan  Continue amlodipine and coreg    Hypercholesterolemia- (present on admission)  Assessment & Plan  Stable, continue Lipitor    Chronic kidney disease (CKD), stage IV (severe) (HCC)- (present on admission)  Assessment & Plan  Appears to be at baseline at 5/3/2023            Hypercalcemia- (present on admission)  Assessment & Plan  Likely due to immobility   Resolved with IVF hydration  Continue to follow and treat accordingly     Thrombocytopenia (HCC)- (present on admission)  Assessment & Plan  Resolved, plt are stable     High anion gap metabolic acidosis- (present on admission)  Assessment & Plan  Acute on chronic kidney disease, -appears back at baseline on 5/3/2023    Resolved    Sepsis (HCC)- (present on admission)  Assessment & Plan  This is Sepsis Present on admission  SIRS criteria identified on my evaluation include: Leukocytosis, with WBC greater than 12,000  Source is colitis  Sepsis protocol initiated  Fluid resuscitation ordered per protocol  Crystalloid Fluid Administration:  Fluid resuscitation ordered per standard protocol - 30 mL/kg per current or ideal body weight  IV antibiotics as appropriate for source of sepsis  Reassessment: I have reassessed the patient's hemodynamic status    Due to gangrenous gallbladder requiring open carmelo    -- Today patient was noted to have surgical  wound with drainage   -- wound cx pending  ESBL E coli; id consulted, will switch to meropenem    Type 2 diabetes mellitus with kidney complication, without long-term current use of insulin (HCC)- (present on admission)  Assessment & Plan  Ha1c 6.4% 4 months ago   On ozempic and januvia outpatient   Continue ISS and hypoglycemic protocol while inpatient         VTE prophylaxis: SCDs/TEDs and heparin ppx    I have performed a physical exam and reviewed and updated ROS and Plan today (5/12/2023). In review of yesterday's note (5/11/2023), there are no changes except as documented above.

## 2023-05-12 NOTE — CONSULTS
"INFECTIOUS DISEASES INPATIENT CONSULT NOTE     Date of Service:5/12/2023    Consult Requested By: Smiley Cain M.D.    Reason for Consultation: ESBL infection    History of Present Illness:   Tyrone Cline is a 83 y.o. diabetic male originally admitted 4/21/2023 due to abdominal pain and lower extremity weakness.  Known chronic kidney disease followed by nephrology with baseline creatinine about 2.2 Approx 4 days PTA he noticed that he was unable to walk due to his legs giving out.  Denied fever, vomiting, diarrhea.   In ED Afebrile +leukocytosis 19  SANJUANITA on CKD creatinine of 5   CT of the abdomen pelvis reveals colitis.    Found to have gangrenous cholecystitis and required open carmelo on 4/28-treated with ceftraixone/flagyl (4/21-4/25) then Zosyn 4/25 through 4/29  S/p multiple MRIs of the brain, thoracic spine.  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  MRI from 5/5/2023 shows T2 signal change within the spinal cord more proximally over multiple levels.     Repeat thoracic MRI w/ contrast under anesthesia 5/11\" definitely some sort of subdural cyst and possibly an abnormal process in the cord. Dr. Springer discussed imaging findings with multiple radiologists.\"  On steroids  On 5/10 noted to have wound dehiscence  Currently on Zosyn  Infectious Diseases consulted for antibiotic recommendation and management        Review Of Systems:  All other systems are reviewed and are negative     PMH:   Past Medical History:   Diagnosis Date    Abdominal pain     Abnormal electrocardiogram     ACE inhibitor intolerance     BPH (benign prostatic hyperplasia)     Bruxism     Cancer (HCC)     basal and squamous cell to face    Cataract     robbi IOL    Chickenpox     Cholesterol blood decreased     Chronic kidney disease (CKD) 2/27/2021    Chronic kidney disease, stage III (moderate) (HCC)     Cold     Congestion of both ears 2/27/2021    Cough     Depression     Dermatochalasis " of eyelid 7/23/2014    Diabetes     oral meds    Difficulty breathing     Fever     GERD (gastroesophageal reflux disease)     GI bleed 12/8/2021    GOUT     Gout     History of skull fracture     History of urinary tract infection     Hyperlipidemia     Hypertension     Indigestion     Influenza     Insomnia     Mixed hyperlipidemia     Orthopnea     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    Pneumonia 9/2015    Proteinuria     Purulent nasal discharge 12/7/2015    Ringing in ears     Shortness of breath     Sputum production     Tonsillitis     Type 2 diabetes mellitus (HCC)     Wears glasses     Weight loss          PSH:  Past Surgical History:   Procedure Laterality Date    ROMAN BY LAPAROSCOPY N/A 4/28/2023    Procedure: CHOLECYSTECTOMY, LAPAROSCOPIC ATTEMPTED, OPEN CHOLECYSTECTOMY;  Surgeon: Suraj Galvan M.D.;  Location: [facility];  Service: General    AL COLONOSCOPY,DIAGNOSTIC N/A 12/12/2021    Procedure: COLONOSCOPY;  Surgeon: Dariel Simons M.D.;  Location: [facility];  Service: Gastroenterology    AL UPPER GI ENDOSCOPY,BIOPSY N/A 12/12/2021    Procedure: GASTROSCOPY, WITH BIOPSY;  Surgeon: Dariel Simons M.D.;  Location: [facility];  Service: Gastroenterology    AL UPPER GI ENDOSCOPY,CTRL BLEED N/A 12/12/2021    Procedure: EGD, WITH CLIP PLACEMENT;  Surgeon: Dariel Simons M.D.;  Location: [facility];  Service: Gastroenterology    GASTROSCOPY W/PUSH ENTERSCOPY N/A 12/12/2021    Procedure: GASTROSCOPY, WITH PUSH ENTEROSCOPY;  Surgeon: Dariel Simons M.D.;  Location: [facility];  Service: Gastroenterology    SEPTAL RECONSTRUCTION  12/7/2015    Procedure: SEPTAL RECONSTRUCTION OPEN WITH  GRAFTS & CONCHAL CART GRAFT;  Surgeon: SYDNIE Gaitan M.D.;  Location: SURGERY SAME DAY [facility];  Service:     BLEPHAROPLASTY  7/23/2014    Performed by Gera Plasencia M.D. at SURGERY [facility] ORS    BROW LIFT  7/23/2014    Performed by Gera Plasencia M.D. at SURGERY  [facility]    CATARACT PHACO WITH IOL  2012    Performed by Suraj Gonzalez M.D. at SURGERY [facility]    CATARACT PHACO WITH IOL  2012    Performed by Suraj Gonzalez M.D. at SUR[facility]ERY [facility]    APPENDECTOMY      ARTHROSCOPY, KNEE      CARDIAC CATH, LEFT HEART      LHC out of concern for STEMI, normal coronaries with minimal atherosclerosis, diagnosed with PNA as cause of symptoms and ST changes.     OTHER      KNEE SURGERY - LEFT.    OTHER      NOSE SURGERY.    TONSILLECTOMY         FAMILY HX:  Family History   Problem Relation Age of Onset    Diabetes Father     Heart Attack Father 79    Cancer Brother        SOCIAL HX:  Social History     Socioeconomic History    Marital status:      Spouse name: Not on file    Number of children: Not on file    Years of education: Not on file    Highest education level: Bachelor's degree (e.g., BA, AB, BS)   Occupational History    Not on file   Tobacco Use    Smoking status: Former     Packs/day: 0.20     Years: 6.00     Pack years: 1.20     Types: Cigarettes     Quit date: 1965     Years since quittin.3    Smokeless tobacco: Never    Tobacco comments:     Stopped over 25 years ago.   Vaping Use    Vaping Use: Never used   Substance and Sexual Activity    Alcohol use: No    Drug use: No    Sexual activity: Not on file   Other Topics Concern    Not on file   Social History Narrative    Retired CPA.     Social Determinants of Health     Financial Resource Strain: Low Risk  (2021)    Overall Financial Resource Strain (CARDIA)     Difficulty of Paying Living Expenses: Not very hard   Food Insecurity: No Food Insecurity (2021)    Hunger Vital Sign     Worried About Running Out of Food in the Last Year: Never true     Ran Out of Food in the Last Year: Never true   Transportation Needs: No Transportation Needs (2021)    PRAPARE - Transportation     Lack of Transportation (Medical): No     Lack of Transportation  "(Non-Medical): No   Physical Activity: Insufficiently Active (2021)    Exercise Vital Sign     Days of Exercise per Week: 3 days     Minutes of Exercise per Session: 20 min   Stress: Stress Concern Present (2021)    [employer] - Occupational Stress Questionnaire     Feeling of Stress : To some extent   Social Connections: Moderately Isolated (2021)    Social Connection and Isolation Panel [NHANES]     Frequency of Communication with Friends and Family: More than three times a week     Frequency of Social Gatherings with Friends and Family: Once a week     Attends Yarsanism Services: Never     Active Member of Clubs or Organizations: Yes     Attends Club or Organization Meetings: More than 4 times per year     Marital Status:    Intimate Partner Violence: Not on file   Housing Stability: Low Risk  (2021)    Housing Stability Vital Sign     Unable to Pay for Housing in the Last Year: No     Number of Places Lived in the Last Year: 1     Unstable Housing in the Last Year: No     Social History     Tobacco Use   Smoking Status Former    Packs/day: 0.20    Years: 6.00    Pack years: 1.20    Types: Cigarettes    Quit date: 1965    Years since quittin.3   Smokeless Tobacco Never   Tobacco Comments    Stopped over 25 years ago.   Vaping Use    Vaping Use: Never used     Social History     Substance and Sexual Activity   Alcohol Use No       Allergies/Intolerances:  Allergies   Allergen Reactions    Ether Unspecified     \"Violently sick\"         Other Current Medications:    Current Facility-Administered Medications:     meropenem (Merrem) 500 mg in  mL IV-MBP, 500 mg, Intravenous, Q8HRS, Aimee Tena M.D.    dexamethasone (DECADRON) injection 4 mg, 4 mg, Intravenous, Q6HRS, Smiley Cain M.D., 4 mg at 23 0516    senna-docusate (PERICOLACE or SENOKOT S) 8.6-50 MG per tablet 2 Tablet, 2 Tablet, Oral, BID, 2 Tablet at 23 0516 **AND** " polyethylene glycol/lytes (MIRALAX) PACKET 1 Packet, 1 Packet, Oral, QDAY PRN **AND** magnesium hydroxide (MILK OF MAGNESIA) suspension 30 mL, 30 mL, Oral, QDAY PRN **AND** bisacodyl (DULCOLAX) suppository 10 mg, 10 mg, Rectal, QDAY PRN, Smiley Cain M.D.    tamsulosin (FLOMAX) capsule 0.4 mg, 0.4 mg, Oral, AFTER BREAKFAST, Smiley Cain M.D., 0.4 mg at 05/12/23 0829    heparin injection 5,000 Units, 5,000 Units, Subcutaneous, Q8HRS, Mell Masters M.D., 5,000 Units at 05/12/23 0516    famotidine (PEPCID) tablet 20 mg, 20 mg, Oral, DAILY, 20 mg at 05/12/23 0516 **OR** [DISCONTINUED] famotidine (PEPCID) injection 20 mg, 20 mg, Intravenous, DAILY, Pedro Luis Us M.D., 20 mg at 05/01/23 0529    oxyCODONE immediate-release (ROXICODONE) tablet 5 mg, 5 mg, Oral, Q3HRS PRN, 5 mg at 05/02/23 0301 **OR** oxyCODONE immediate release (ROXICODONE) tablet 10 mg, 10 mg, Oral, Q3HRS PRN, 10 mg at 05/12/23 0831 **OR** [DISCONTINUED] HYDROmorphone (Dilaudid) injection 0.5-1 mg, 0.5-1 mg, Intravenous, Q2HRS PRN, Ema Scanlon M.D., 1 mg at 04/28/23 2212    morphine 4 MG/ML injection 2-4 mg, 2-4 mg, Intravenous, Q3HRS PRN, Eric Fowler M.D., 4 mg at 05/03/23 2322    Respiratory Therapy Consult, , Nebulization, Continuous RT, Pedro Luis Us M.D.    amLODIPine (NORVASC) tablet 5 mg, 5 mg, Oral, Q DAY, Félix Smith M.D., 5 mg at 05/12/23 0515    labetalol (NORMODYNE/TRANDATE) injection 10 mg, 10 mg, Intravenous, Q4HRS PRN, Félix Smith M.D.    hydrALAZINE (APRESOLINE) injection 10 mg, 10 mg, Intravenous, Q6HRS PRN, Félix Smith M.D., 10 mg at 04/28/23 0440    atorvastatin (LIPITOR) tablet 10 mg, 10 mg, Oral, DAILY AT 1800, Smiley Cain M.D., 10 mg at 05/11/23 1752    allopurinol (ZYLOPRIM) tablet 100 mg, 100 mg, Oral, DAILY, Smiley Cain M.D., 100 mg at 05/12/23 0586    carvedilol (COREG) tablet 12.5 mg, 12.5 mg, Oral, BID WITH MEALS, Félix Smith M.D., 12.5 mg at 05/12/23 0896     "spironolactone (ALDACTONE) tablet 25 mg, 25 mg, Oral, Q DAY, Félix Smith M.D., 25 mg at 23 0516    sodium bicarbonate tablet 1,300 mg, 1,300 mg, Oral, BID, Smiley Cain M.D., 1,300 mg at 23 0516    DULoxetine (CYMBALTA) capsule 30 mg, 30 mg, Oral, Q EVENING, Ronaldo Perez M.D., 30 mg at 23 1751    acetaminophen (Tylenol) tablet 650 mg, 650 mg, Oral, Q6HRS PRN, Ronaldo Perez M.D., 650 mg at 23 1059    insulin regular (HumuLIN R,NovoLIN R) injection, 2-9 Units, Subcutaneous, 4X/DAY ACHS, 3 Units at 23 0816 **AND** POC blood glucose manual result, , , Q AC AND BEDTIME(S) **AND** NOTIFY MD and PharmD, , , Once **AND** Administer 20 grams of glucose (approximately 8 ounces of fruit juice) every 15 minutes PRN FSBG less than 70 mg/dL, , , PRN **AND** dextrose 10 % BOLUS 25 g, 25 g, Intravenous, Q15 MIN PRN, Ronaldo Perez M.D.    Pharmacy Consult Request ...Pain Management Review 1 Each, 1 Each, Other, PHARMACY TO DOSE, Eli Pandya, A.P.R.N.      Most Recent Vital Signs:  /73   Pulse 74   Temp 36.5 °C (97.7 °F) (Temporal)   Resp 18   Ht 1.803 m (5' 10.98\")   Wt 89.1 kg (196 lb 6.9 oz)   SpO2 91%   BMI 27.41 kg/m²   Temp  Av.6 °C (97.9 °F)  Min: 36.1 °C (96.9 °F)  Max: 37.6 °C (99.6 °F)    Physical Exam:  General: well-appearing, well nourished no acute distress  HEENT: NCAT, PERRLA, sclera anicteric, PERRL, EOMI,  no oral lesions   Neck: supple, no lymphadenopathy  Chest: CTAB, unlabored.  Cardiac: RRR  Abdomen: + bowel sounds, soft, non-distended wound with medial dehiscence-no purulent drainage minimal tenderness  Extremities: No cyanosis, clubbing. no edema, 2+ pulses  Skin: ecchymosis  Neuro: Alert and oriented times 3, Speech fluent CN intact CACERES difficulty with memory  Psych: Mood normal Affect normal    Pertinent Lab Results:  Recent Labs     05/10/23  0836 23  0419 23  0130   WBC 14.6* 15.0* 13.3*      Recent Labs     05/10/23  0836 " 05/11/23 0419 05/12/23 0130   HEMOGLOBIN 9.2* 9.1* 8.5*   HEMATOCRIT 27.8* 27.5* 26.8*   MCV 90.0 89.9 90.8   MCH 29.8 29.7 28.8   PLATELETCT 468* 463* 457*         Recent Labs     05/11/23 0419 05/12/23 0130   SODIUM 136 135   POTASSIUM 4.8 5.2   CHLORIDE 103 100   CO2 25 26   CREATININE 2.08* 2.32*        Recent Labs     05/11/23 0419 05/12/23 0130   ALBUMIN 2.0* 2.1*        Pertinent Micro:  Results       Procedure Component Value Units Date/Time    CULTURE WOUND W/ GRAM STAIN [[id number]]  (Abnormal)  (Susceptibility) Collected: 05/09/23 0950    Order Status: Completed Specimen: Wound from Abdominal Updated: 05/11/23 0947     Significant Indicator POS     Source WND     Site ABDOMINAL     Culture Result -     Gram Stain Result Moderate WBCs.  Moderate Gram negative rods.       Culture Result Escherichia coli ESBL  Moderate growth  Extended Spectrum Beta-lactamase (ESBL) isolated.  ESBL's may be clinically resistant to therapy with  Penicillins,Cephalosporins or Aztreonam despite  apparent in vitro susceptibility to some of these agents.  The patient requires contact isolation.  Please contact pharmacy or an Infectious Disease Specialist  if you have any questions about appropriate therapy.      Narrative:      Collected By: [id number] YARY BARCENAS  Collected By: [id number] YARY BARCENAS    Susceptibility       Escherichia coli esbl (1)       Antibiotic Interpretation Microscan   Method Status    Ampicillin Resistant >16 mcg/mL DENICE Final    Ceftriaxone Resistant >32 mcg/mL DENICE Final    Cefazolin Resistant >16 mcg/mL DENICE Final    Ciprofloxacin Resistant >2 mcg/mL DENICE Final    Cefepime Resistant >16 mcg/mL DENICE Final    Cefuroxime Resistant >16 mcg/mL DENICE Final    Ampicillin/sulbactam Resistant >16/8 mcg/mL DENICE Final    Ertapenem Sensitive <=0.5 mcg/mL DENICE Final    Tobramycin Resistant >8 mcg/mL DENICE Final    Gentamicin Resistant >8 mcg/mL DENICE Final    Minocycline Sensitive <=4 mcg/mL DENICE Final     Moxifloxacin Resistant >4 mcg/mL DENICE Final    Pip/Tazobactam Sensitive <=8 mcg/mL DENICE Final    Trimeth/Sulfa Sensitive <=0.5/9.5 mcg/mL DENICE Final    Tigecycline Sensitive <=2 mcg/mL DENICE Final                       GRAM STAIN [[id number]] Collected: 05/09/23 0950    Order Status: Completed Specimen: Wound Updated: 05/09/23 1615     Significant Indicator .     Source WND     Site ABDOMINAL     Gram Stain Result Moderate WBCs.  Moderate Gram negative rods.      Narrative:      Collected By: [id number] YARY BARCENAS  Collected By: [id number] YARY BARCENAS          Blood Culture   Date Value Ref Range Status   04/13/2015 No growth after 5 days of incubation.  Final   04/13/2015 No growth after 5 days of incubation.  Final        Studies:    IMPRESSION:   Surgical site infection due to ESBL Ecoli  S/p gangrenous gallbladder  Abnormal MRI spine  CKD  DM, poorly controlled  Leukocytosis  thrombocytosis    PLAN:   DC Zosyn  Start meropenem  Continue for 7 days  Wound care  Adjust dose for renal failure  Keep BS under 150 to help control current infection        Plan of care discussed with ROSSY Cain M.D.. Will continue to follow    Aimee Tena M.D.

## 2023-05-12 NOTE — PROGRESS NOTES
Assumed care of patient at 0645. Bedside report received. Assessment complete.  AA&Ox4. Denies CP/SOB.  Reporting 7/10 pain. Medicated per MAR.   Educated patient regarding pharmacologic and non pharmacologic modalities for pain management.  Skin per flowsheets  Tolerating Renal diet. Denies N/V.  + void via Florez Catheter. Last BM 5/10  Pt ambulates Max assist, BLE Weakness noted.  All needs met at this time. Call light within reach. Pt calls appropriately. Bed low and locked, non skid socks in place. Hourly rounding in place.

## 2023-05-12 NOTE — CARE PLAN
Problem: Knowledge Deficit - Standard  Goal: Patient and family/care givers will demonstrate understanding of plan of care, disease process/condition, diagnostic tests and medications  5/11/2023 2325 by Ta Car R.N.  Outcome: Progressing  5/11/2023 2325 by Ta Car R.N.  Reactivated     Problem: Pain - Standard  Goal: Alleviation of pain or a reduction in pain to the patient’s comfort goal  5/11/2023 2325 by Ta Cra R.N.  Outcome: Progressing  5/11/2023 2325 by Ta Car R.N.  Reactivated   The patient is Stable - Low risk of patient condition declining or worsening    Shift Goals  Clinical Goals: pain control  Patient Goals: pain control/rest  Family Goals: supportive care, update    Progress made toward(s) clinical / shift goals: pain controlled with PRN meds, given enough rest and sleep, safety maintained

## 2023-05-12 NOTE — PROGRESS NOTES
"Neurology Follow-up  Neurohospitalist Service, [facility]    Referring Physician: Smiley Cain M.D.    Chief Complaint   Patient presents with    GLF     2 days ago. (-) thinners    Extremity Weakness     Chronic lower bilateral weakness.       Interval history:   -Overall patient has not noticed any worsening or changes in his strength  -He is still not able to walk  -He has his MRI done yesterday    Review of systems: In addition to what is detailed in the HPI above, all other systems reviewed and are negative.    Past Medical History:    has a past medical history of Abdominal pain, Abnormal electrocardiogram, ACE inhibitor intolerance, BPH (benign prostatic hyperplasia), Bruxism, Cancer ([facility]), Cataract, Chickenpox, Cholesterol blood decreased, Chronic kidney disease (CKD) (2/27/2021), Chronic kidney disease, stage III (moderate) (HCC), Cold, Congestion of both ears (2/27/2021), Cough, Depression, Dermatochalasis of eyelid (7/23/2014), Diabetes, Difficulty breathing, Fever, GERD (gastroesophageal reflux disease), GI bleed (12/8/2021), GOUT, Gout, History of skull fracture, History of urinary tract infection, Hyperlipidemia, Hypertension, Indigestion, Influenza, Insomnia, Mixed hyperlipidemia, Orthopnea, Pneumonia (9/2015), Proteinuria, Purulent nasal discharge (12/7/2015), Ringing in ears, Shortness of breath, Sputum production, Tonsillitis, Type 2 diabetes mellitus (HCC), Wears glasses, and Weight loss.    FHx:  family history includes Cancer in his brother; Diabetes in his father; Heart Attack (age of onset: 79) in his father.    SHx:   reports that he quit smoking about 58 years ago. His smoking use included cigarettes. He has a 1.20 pack-year smoking history. He has never used smokeless tobacco. He reports that he does not drink alcohol and does not use drugs.    Allergies:  Allergies   Allergen Reactions    Ether Unspecified     \"Violently sick\"       Medications:    Current " Facility-Administered Medications:     meropenem (Merrem) 500 mg in  mL IV-MBP, 500 mg, Intravenous, Q8HRS, Aimee Tena M.D., Stopped at 05/12/23 1312    dexamethasone (DECADRON) injection 4 mg, 4 mg, Intravenous, Q6HRS, Smiley Cain M.D., 4 mg at 05/12/23 1243    senna-docusate (PERICOLACE or SENOKOT S) 8.6-50 MG per tablet 2 Tablet, 2 Tablet, Oral, BID, 2 Tablet at 05/12/23 0516 **AND** polyethylene glycol/lytes (MIRALAX) PACKET 1 Packet, 1 Packet, Oral, QDAY PRN **AND** magnesium hydroxide (MILK OF MAGNESIA) suspension 30 mL, 30 mL, Oral, QDAY PRN **AND** bisacodyl (DULCOLAX) suppository 10 mg, 10 mg, Rectal, QDAY PRN, Smiley Cain M.D.    tamsulosin (FLOMAX) capsule 0.4 mg, 0.4 mg, Oral, AFTER BREAKFAST, Smiley Cain M.D., 0.4 mg at 05/12/23 0829    heparin injection 5,000 Units, 5,000 Units, Subcutaneous, Q8HRS, Mell Masters M.D., 5,000 Units at 05/12/23 1401    famotidine (PEPCID) tablet 20 mg, 20 mg, Oral, DAILY, 20 mg at 05/12/23 0516 **OR** [DISCONTINUED] famotidine (PEPCID) injection 20 mg, 20 mg, Intravenous, DAILY, Pedro Luis Us M.D., 20 mg at 05/01/23 0529    oxyCODONE immediate-release (ROXICODONE) tablet 5 mg, 5 mg, Oral, Q3HRS PRN, 5 mg at 05/02/23 0301 **OR** oxyCODONE immediate release (ROXICODONE) tablet 10 mg, 10 mg, Oral, Q3HRS PRN, 10 mg at 05/12/23 0831 **OR** [DISCONTINUED] HYDROmorphone (Dilaudid) injection 0.5-1 mg, 0.5-1 mg, Intravenous, Q2HRS PRN, Ema Scanlon M.D., 1 mg at 04/28/23 2212    morphine 4 MG/ML injection 2-4 mg, 2-4 mg, Intravenous, Q3HRS PRN, Eric Fowler M.D., 4 mg at 05/03/23 2322    Respiratory Therapy Consult, , Nebulization, Continuous RT, Pedro Luis Us M.D.    amLODIPine (NORVASC) tablet 5 mg, 5 mg, Oral, Q DAY, Félix Smith M.D., 5 mg at 05/12/23 0515    labetalol (NORMODYNE/TRANDATE) injection 10 mg, 10 mg, Intravenous, Q4HRS PRN, Félix Smith M.D.    hydrALAZINE (APRESOLINE) injection 10 mg, 10 mg, Intravenous,  "Q6HRS PRN, Félix Smith M.D., 10 mg at 04/28/23 0440    atorvastatin (LIPITOR) tablet 10 mg, 10 mg, Oral, DAILY AT 1800, Smiley Cain M.D., 10 mg at 05/11/23 1752    allopurinol (ZYLOPRIM) tablet 100 mg, 100 mg, Oral, DAILY, Smiley Cain M.D., 100 mg at 05/12/23 0516    carvedilol (COREG) tablet 12.5 mg, 12.5 mg, Oral, BID WITH MEALS, Félix Smith M.D., 12.5 mg at 05/12/23 0829    spironolactone (ALDACTONE) tablet 25 mg, 25 mg, Oral, Q DAY, Félix Smith M.D., 25 mg at 05/12/23 0516    sodium bicarbonate tablet 1,300 mg, 1,300 mg, Oral, BID, Smiley Cain M.D., 1,300 mg at 05/12/23 0516    DULoxetine (CYMBALTA) capsule 30 mg, 30 mg, Oral, Q EVENING, Ronaldo Perez M.D., 30 mg at 05/11/23 1751    acetaminophen (Tylenol) tablet 650 mg, 650 mg, Oral, Q6HRS PRN, Ronaldo Perez M.D., 650 mg at 04/29/23 1059    insulin regular (HumuLIN R,NovoLIN R) injection, 2-9 Units, Subcutaneous, 4X/DAY ACHS, 5 Units at 05/12/23 1245 **AND** POC blood glucose manual result, , , Q AC AND BEDTIME(S) **AND** NOTIFY MD and PharmD, , , Once **AND** Administer 20 grams of glucose (approximately 8 ounces of fruit juice) every 15 minutes PRN FSBG less than 70 mg/dL, , , PRN **AND** dextrose 10 % BOLUS 25 g, 25 g, Intravenous, Q15 MIN PRN, Ronaldo Perez M.D.    Pharmacy Consult Request ...Pain Management Review 1 Each, 1 Each, Other, PHARMACY TO DOSE, Eli A. Mumtaz, A.P.R.N.    Physical Examination:     General: Patient is awake and in no acute distress    NEUROLOGICAL EXAM:     /73   Pulse 74   Temp 36.5 °C (97.7 °F) (Temporal)   Resp 18   Ht 1.803 m (5' 10.98\")   Wt 89.1 kg (196 lb 6.9 oz)   SpO2 91%   BMI 27.41 kg/m²       Mental status: Awake, alert and fully oriented  Speech and language: Speech is clear and fluent. The patient is able to name and repeat, and follow commands  Cranial nerve exam: Pupils are equal, round and reactive to light bilaterally. Visual fields are full. There is no nystagmus. " Extraocular muscles are intact. Face is symmetric.   Motor exam: There is no pronator drift.  4/5 in bilateral lower extremities, right slightly weaker than the left  Sensory exam: Reacts to tactile in all 4 extremities, no neglect to double stim   Deep tendon reflexes:  2+ throughout.   Gait: Deferred due to patient preference    Objective Data:    Labs:  Lab Results   Component Value Date/Time    PROTHROMBTM 17.6 (H) 04/25/2023 02:11 PM    INR 1.48 (H) 04/25/2023 02:11 PM      Lab Results   Component Value Date/Time    WBC 13.3 (H) 05/12/2023 01:30 AM    RBC 2.95 (L) 05/12/2023 01:30 AM    HEMOGLOBIN 8.5 (L) 05/12/2023 01:30 AM    HEMATOCRIT 26.8 (L) 05/12/2023 01:30 AM    MCV 90.8 05/12/2023 01:30 AM    MCH 28.8 05/12/2023 01:30 AM    MCHC 31.7 (L) 05/12/2023 01:30 AM    MPV 10.8 05/12/2023 01:30 AM    NEUTSPOLYS 81.80 (H) 05/02/2023 03:08 AM    LYMPHOCYTES 8.30 (L) 05/02/2023 03:08 AM    MONOCYTES 7.50 05/02/2023 03:08 AM    EOSINOPHILS 0.50 05/02/2023 03:08 AM    BASOPHILS 0.20 05/02/2023 03:08 AM    HYPOCHROMIA 1+ 07/18/2014 12:15 PM      Lab Results   Component Value Date/Time    SODIUM 135 05/12/2023 01:30 AM    POTASSIUM 5.2 05/12/2023 01:30 AM    CHLORIDE 100 05/12/2023 01:30 AM    CO2 26 05/12/2023 01:30 AM    GLUCOSE 316 (H) 05/12/2023 01:30 AM    BUN 56 (H) 05/12/2023 01:30 AM    CREATININE 2.32 (H) 05/12/2023 01:30 AM    CREATININE 1.4 02/06/2009 03:00 AM      Lab Results   Component Value Date/Time    CHOLSTRLTOT 151 04/03/2023 04:24 PM    LDL 79 04/03/2023 04:24 PM    HDL 35 (A) 04/03/2023 04:24 PM    TRIGLYCERIDE 138 04/28/2023 09:03 PM       Lab Results   Component Value Date/Time    ALKPHOSPHAT 305 (H) 05/05/2023 10:09 AM    ASTSGOT 94 (H) 05/05/2023 10:09 AM    ALTSGPT 77 (H) 05/05/2023 10:09 AM    TBILIRUBIN 0.5 05/05/2023 10:09 AM        Imaging/Testing:    I interpreted and/or reviewed the patient's neuroimaging    MR-THORACIC SPINE-WITH & W/O   Final Result      Compared with the previous MRI  ,again noted is expansile lesion in the thoracic spinal cord at the levels of T2, T3 and T4. There is prominent right dorsal subarachnoid space at the level of T3 with flattening of the spinal cord. There is mild contrast    enhancement noted in the expanded portion of the spinal cord. 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 Other differential diagnosis includes transverse myelitis However the possibility of right-sided dorsal arachnoid cyst/arachnoid    web at the level of T3 causing spinal cord edema cannot be entirely ruled out. Despite multiple MRs, it is difficult to characterize the tumor. Therefore if there is any neurosurgical planning, CT myelogram is recommended for further characterization.      IR-US GUIDED PIV   Final Result    Ultrasound-guided PERIPHERAL IV INSERTION performed by    qualified nursing staff as above.      MR-THORACIC SPINE-WITH & W/O   Final Result      1.  Limited study due to the motion artifact.   2.  When compared with the previous MRI again noted is expansile T2 hyperintense area in the thoracic spinal cord at the levels of T2, T3 and T4. There is also prominent dorsal right-sided subarachnoid space at this level. The differential diagnosis    includes intramedullary spinal cord tumor and extramedullary cyst/web with cord compression and edema.   3.  Pre and postcontrast MR examination of the thoracic spine is recommended with contrast under General anesthesia.   4.  1 -2 mm  T2-weighted sequences from T2 to T6 with smaller field of view is recommended for further characterization.      MR-THORACIC SPINE-WITH & W/O   Final Result         Stable appearance of expansile nonenhancing signal abnormality involving the upper thoracic cord between T1 and T4. Also noted is a small extra-axial collection/web along the right posterolateral spinal canal at T3-4 that deforms the cord and displaces    it anteriorly and to the left.   This could represent an Arachnoid web  or arachnoid cyst with extensive mass effect and secondary edema of the cord.   Recommend additional thin section T2 and post contrast images through T1-T6 to better assess the abnormality.      MR-BRAIN-WITH & W/O   Final Result         No acute intracranial process.      Age-related volume loss and chronic microvascular ischemic changes.         DX-CHEST-PORTABLE (1 VIEW)   Final Result         1.  Pulmonary edema and/or infiltrates are identified, which are stable since the prior exam.   2.  Cardiomegaly   3.  Atherosclerosis      DX-CHEST-PORTABLE (1 VIEW)   Final Result         1.  Pulmonary edema and/or infiltrates are identified, which are stable since the prior exam.   2.  Left PICC line tip terminates in the left axilla, stable since prior study.      DX-CHEST-PORTABLE (1 VIEW)   Final Result      1.  No significant change.   2.  Possible left PICC line with tip projecting at the axillary vein.      DX-CHEST-PORTABLE (1 VIEW)   Final Result      1.  Mildly improved pulmonary opacities.   2.  Possible left PICC line with tip projecting at the axillary vein.   3.  Likely trace right pleural effusion.      DX-CHEST-LIMITED (1 VIEW)   Final Result      1.  Placement of endotracheal tube with tip projecting over the mid trachea      2.  Left arm catheter present which is presumably venous in projects in the expected position of the left axillary vein      3.  Enlarged cardiac silhouette      NM-HEPATOBILIARY SCAN   Final Result      Hepatobiliary scan findings suspicious for acute cholecystitis.      MR-THORACIC SPINE-WITH & W/O   Final Result      1.  There is abnormal expansile T2 hyperintense lesion in the right side of the thoracic spinal cord at the levels of T2 and T3 Mild contrast enhancement is seen. This lesion is suspicious for spinal cord neoplasm. Other remote differential diagnosis    includes transverse myelitis.   2.  Exaggerated thoracic kyphosis.   3.  Thoracic dextroscoliosis.   4.  Minimal  degenerative changes.   5.  Right pleural effusion.      MR-LUMBAR SPINE-WITH & W/O   Final Result      1.  Multifocal degenerative disease in the lumbar spine as described above.   2.  Moderate central canal and severe lateral recess stenosis at the level of L4-5. The exiting bilateral L5 nerve roots might have been impinged at the lateral recess.      MR-CERVICAL SPINE-WITH & W/O   Final Result      1.  There is abnormal expansile intramedullary T2 signal intensity lesion in the right cerebellum. Thoracic spinal cord at the level of T2 on T3. Mild diffuse contrast enhancement is seen. This finding is suspicious for spinal cord neoplasm. The other    less likely differential diagnosis includes transverse myelitis. Follow-up is recommended after 4 weeks.   2.  Mild degenerative disease in the cervical spine as described above.   3.  There is no evidence of infection in the cervical spine.      US-RUQ   Final Result         1.  Acute cholecystitis.   2.  Atrophic right kidney.      DX-CHEST-LIMITED (1 VIEW)   Final Result      Perihilar interstitial edema and basilar atelectasis and/or consolidation. Underlying infection is possible.      MR-LUMBAR SPINE-W/O   Final Result      1.  Lower lumbar spine predominant degenerative changes as described in detail above, progressed from 2010 MRI.   2.  Edema at the L4-L5 disc space and L5 superior endplate likely represents degenerative changes. Less likely this could represent a low-grade or early discitis osteomyelitis. Correlate for evidence of infection.   3.  Acute appearing Schmorl's node at L3-L4, which can be a source of pain.      US-RENAL   Final Result      1.  Atrophic kidneys. Echogenic bilateral renal parenchyma could relate to medical renal disease.      2.  No hydronephrosis. No renal calculus.      DX-CHEST-PORTABLE (1 VIEW)   Final Result      No acute cardiac or pulmonary abnormalities are identified.      CT-ABDOMEN-PELVIS W/O   Final Result      1.   Findings suspicious for focal colitis at the hepatic flexure, less likely cholecystitis or duodenitis with secondary involvement of the colon. Infectious, inflammatory and ischemic etiologies are considerations. Underlying mass is not excluded.   2.  Cholelithiasis with distention of the gallbladder   3.  BILATERAL renal atrophy   4.  Subcentimeter LEFT adrenal myelolipoma   5.  12 mm RIGHT adrenal nodule likely an adenoma absent a history of cancer   6.  Subcentimeter RIGHT hepatic lesion, likely a cyst absent a history of cancer   7.  Atherosclerosis   8.  Colonic diverticulosis      CT-HEAD W/O   Final Result      1.  Cerebral atrophy.      2.  White matter lucencies most consistent with small vessel ischemic change versus demyelination or gliosis.      3.  Otherwise, Head CT without contrast with no acute findings. No evidence of acute cerebral infarction, hemorrhage or mass lesion.             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     Based on his overall clinical course and imaging findings so far; transverse myelitis still remains a possibility; especially neuromyelitis optica and I would like to repeat the spinal tap; my differential include transverse myelitis versus CNS lymphoma versus intramedullary tumor/mass.    Recommendations:  -We will repeat the spinal tap and send CSF for cell count, glucose, protein, IgG index, paraneoplastic panel, VZV and HSV PCR, cryptococcal, CSF VDRL, ACE levels  -I will request high-volume tap and send for CSF cytology and CSF LDH  -We will also send blood work for serum vitamin D levels, serum copper, FELICITA, anti- AQP4 and anti-MOG  antibodies  -PT/OT  -Neurosurgery is consulted, appreciate their recs    Thank you for letting me participate in the care for this patient.  Neurology will continue to follow.    Aldo Ray MD  Board certified in Neurology and Epilepsy (ABPN)   Board certified in Headache Medicine (NS)  Neurohospitalist, Acute Care Services

## 2023-05-12 NOTE — PROGRESS NOTES
4 Eyes Skin Assessment Completed by Jesús RN and Ta Abernathy RN.     Head WDL  Ears WDL  Nose WDL  Mouth WDL  Neck WDL  Breast/Chest WDL  Shoulder Blades WDL  Spine WDL  (R) Arm/Elbow/Hand WDL  (L) Arm/Elbow/Hand WDL  Abdomen : RUQ transverse umbilical Incision  with Steri-Strips and dressing over de-hissed portion (silver strip packing and adhesive foam, changed daily by this RN)  RLQ - Previous NAHUN Drain Site  with gauze and tegaderm   Groin: Florez catheter to Penis, CDI   Scrotum/Coccyx/Buttocks Redness and Blanching, Mepilex applied   (R) Leg Edema  (L) Leg Edema  (R) Heel/Foot/Toe WDL  (L) Heel/Foot/Toe WDL        Devices In Places: 20g PIV RAC CDI, Pulse Ox, Florez, and Nasal Cannula        Interventions In Place Gray Ear Foams, Sacral Mepilex, TAP System, Pillows, Q2 Turns, Low Air Loss Mattress, and Heels Loaded W/Pillows     Possible Skin Injury No     Pictures Uploaded Into Epic N/A  Wound Consult Placed N/A  RN Wound Prevention Protocol Ordered No

## 2023-05-12 NOTE — THERAPY
Physical Therapy   Daily Treatment     Patient Name: Tyrone Cline  Age:  83 y.o., Sex:  male  Medical Record #: [id number]  Today's Date: 5/12/2023    Precautions: Fall Risk    Assessment  Pt now s/p thoracic MRI and willing to work with PT. Confirmed with neurosurgery PA, pt remains appropriate for mobilization while awaiting possible sx intervention. Pt is highly motivated to participate and receptive to edu. Pt required min A for sup > sit and was able to maintain balance sitting EOB to participate in seated LE exercises. Pt fatigues quickly and deferred STS, however was able to demo lateral scooting along EOB with min A. Encouraged pt to sit EOB 2-3x/day with nursing staff to promote incr activity tolerance. Pt verbalizes understanding and is in agreement. PT will follow.     Plan  Continue Current Treatment Plan  Type of Treatment: Bed Mobility, Gait Training, Neuro Re-Education / Balance, Self Care / Home Evaluation, Stair Training, Therapeutic Activities, Therapeutic Exercise  Treatment Frequency: 4 Times per Week  Treatment Duration: Until Therapy Goals Met    DC Equipment Recommendations: Unable to determine at this time  Discharge Recommendations: Recommend post-acute placement for additional physical therapy services prior to discharge home     05/12/23 1518   Cognition    Level of Consciousness Alert   Comments highly motivated and receptive to edu, good insight into current dx and limitations   Other Treatments   Other Treatments Provided instructed in seated LAQs, marches, DF/PF, supine SLR, heel slides, APs   Balance   Sitting Balance (Static) Fair   Sitting Balance (Dynamic) Fair   Weight Shift Sitting Fair   Comments initially required BUE but progressed to being able to maintain balance without UE support or assist, and completed LE AROM   Bed Mobility    Supine to Sit Minimal Assist   Sit to Supine Moderate Assist   Scooting Minimal Assist (seated and supine)   Skilled Intervention Verbal  Cuing;Sequencing   Comments laterally scooting along EOB min A   Gait Analysis   Gait Level Of Assist Unable to Participate   Functional Mobility   Comments fatigued quickly sitting EOB and deferred STS at this, pt reports has not sat EOB since last therapy session ~1 week ago   Short Term Goals    Short Term Goal # 1 pt will perform supine <> sit with SPV in 6 visits to get in/out of bed at home   Goal Outcome # 1 Progressing slower than expected   Short Term Goal # 2 pt will perform all functional xfrs with SPV in 6 visits for improved independence   Goal Outcome # 2 Goal not met   Short Term Goal # 3 pt will ambulate 150ft with FWW and SPV in 6 visits to access home   Goal Outcome # 3 Goal not met

## 2023-05-12 NOTE — PROGRESS NOTES
Neurosurgery Progress Note    Subjective:  83 y.o. male who presented 2023 with a very complicated picture.  He has stage IV CKD. S/p cholecystectomy.  With regards to his neurologic symptoms, he reports on the day of admission he had immediate and complete loss of lower extremity strength that has waxed and waned but has overall improved significantly since admission.   Currently denies neurologic symptoms other than weakness in the lower extremities, still unable to walk.    Denies bowel or bladder dysfunction.  Denies numbness in the lower extremities.    Denies radicular pain.     S/p multiple MRIs of the brain, thoracic spine.  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  MRI from 2023 shows T2 signal change within the spinal cord more proximally over multiple levels.      Repeat thoracic MRI w/ contrast under anesthesia  did not lend much more clarity regarding the lesion. 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 Dr. Springer discussed imaging findings with multiple radiologists.      Exam:  Awake, alert oriented x4.   Appropriate and cooperative.   Motor exam reveals 4+/5 in all extremities.   Sensation intact to light touch.     BP  Min: 114/66  Max: 137/69  Pulse  Av.1  Min: 61  Max: 74  Resp  Av  Min: 16  Max: 20  Temp  Av.6 °C (97.8 °F)  Min: 36.2 °C (97.2 °F)  Max: 37 °C (98.6 °F)  SpO2  Av %  Min: 91 %  Max: 99 %    No data recorded    Recent Labs     05/10/23  0836 23  0419 23  0130   WBC 14.6* 15.0* 13.3*   RBC 3.09* 3.06* 2.95*   HEMOGLOBIN 9.2* 9.1* 8.5*   HEMATOCRIT 27.8* 27.5* 26.8*   MCV 90.0 89.9 90.8   MCH 29.8 29.7 28.8   MCHC 33.1* 33.1* 31.7*   RDW 48.6 49.5 49.1   PLATELETCT 468* 463* 457*   MPV 10.4 10.4 10.8        Recent Labs     05/11/23  0419 05/12/23  0130   SODIUM 136 135   POTASSIUM 4.8 5.2   CHLORIDE 103 100   CO2 25 26   GLUCOSE 244* 316*   BUN 48* 56*   CREATININE 2.08* 2.32*   CALCIUM 10.1 9.8                 Intake/Output                         05/11/23 0700 - 05/12/23 0659 05/12/23 0700 - 05/13/23 0659     8618-1213 2405-6525 Total 0700-1859 1900-0659 Total                 Intake    I.V.  400  -- 400  --  -- --    Volume (mL) (Lactated Ringers) 400 -- 400 -- -- --    Total Intake 400 -- 400 -- -- --       Output    Urine  950  600 1550  --  -- --    Output (mL) (Urethral Catheter)  -- -- --    Total Output  -- -- --       Net I/O     -550 -600 -1150 -- -- --              Intake/Output Summary (Last 24 hours) at 5/12/2023 0856  Last data filed at 5/11/2023 2037  Gross per 24 hour   Intake 400 ml   Output 1550 ml   Net -1150 ml               piperacillin-tazobactam  4.5 g Q8HRS    dexamethasone  4 mg Q6HRS    senna-docusate  2 Tablet BID    And    polyethylene glycol/lytes  1 Packet QDAY PRN    And    magnesium hydroxide  30 mL QDAY PRN    And    bisacodyl  10 mg QDAY PRN    tamsulosin  0.4 mg AFTER BREAKFAST    heparin  5,000 Units Q8HRS    famotidine  20 mg DAILY    oxyCODONE immediate-release  5 mg Q3HRS PRN    Or    oxyCODONE immediate-release  10 mg Q3HRS PRN    morphine injection  2-4 mg Q3HRS PRN    Respiratory Therapy Consult   Continuous RT    amLODIPine  5 mg Q DAY    labetalol  10 mg Q4HRS PRN    hydrALAZINE  10 mg Q6HRS PRN    atorvastatin  10 mg DAILY AT 1800    allopurinol  100 mg DAILY    carvedilol  12.5 mg BID WITH MEALS    spironolactone  25 mg Q DAY    sodium bicarbonate  1,300 mg BID    DULoxetine  30 mg Q EVENING    acetaminophen  650 mg Q6HRS PRN    insulin regular  2-9 Units 4X/DAY ACHS    And    dextrose bolus  25 g Q15 MIN PRN    Pharmacy Consult Request  1 Each PHARMACY TO DOSE       Assessment and Plan:  Hospital day # 20  Reviewed repeat thoracic MRI.   There  is definitely some sort of subdural cyst and possibly an abnormal process in the cord.  Discussed these findings with the patient, and Dr. Springer plans to see the patient Sunday, 5/14 to further discuss.   We discussed the possibility of surgical intervention, which would involve a laminectomy with subdural cyst exploration. The patient is interested in this. Risks/benefits discussed.  If we are to proceed with surgery, this would be sometime next week pending OR time.   He should have repeat coags drawn closer to the day of surgery, and optimization of hyperglycemia management as much as possible though this is understandably difficult given Decadron administration.   We would plan to repeat his MRI 1 month s/p surgery.   Following.     Chemical prophylactic DVT therapy: Yes - Heparin 5000 units/q 8hrs  Start date/time: per hospitalist

## 2023-05-12 NOTE — PROGRESS NOTES
5/9 Abd wound culture POS+ for ESBL and E.Coli. Contact isolation precautions ordered per protocol

## 2023-05-12 NOTE — PROGRESS NOTES
4 Eyes Skin Assessment Completed by Josemanuel RN and JUAN JOSÉ Mullen.     Head WDL  Ears WDL  Nose WDL  Mouth WDL  Neck WDL  Breast/Chest WDL  Shoulder Blades WDL  Spine WDL  (R) Arm/Elbow/Hand WDL  (L) Arm/Elbow/Hand WDL  Abdomen : RUQ transverse umbilical Incision  with Steri-Strips and dressing over de-hissed portion (silver strip packing and adhesive foam, changed daily by this RN)  RLQ - Previous NAHUN Drain Site  with gauze and tegaderm   Groin: Florez catheter to Penis, CDI   Scrotum/Coccyx/Buttocks Redness and Blanching, Mepilex applied   (R) Leg Edema  (L) Leg Edema  (R) Heel/Foot/Toe WDL  (L) Heel/Foot/Toe WDL        Devices In Places: 20g LFA CDI, Pulse Ox, Florez, and Nasal Cannula        Interventions In Place Gray Ear Foams, Sacral Mepilex, TAP System, Pillows, Q2 Turns, Low Air Loss Mattress, and Heels Loaded W/Pillows     Possible Skin Injury No     Pictures Uploaded Into Epic N/A  Wound Consult Placed N/A  RN Wound Prevention Protocol Ordered No

## 2023-05-12 NOTE — CARE PLAN
Problem: Knowledge Deficit - Standard  Goal: Patient and family/care givers will demonstrate understanding of plan of care, disease process/condition, diagnostic tests and medications  Outcome: Progressing     Problem: Pain - Standard  Goal: Alleviation of pain or a reduction in pain to the patient’s comfort goal  Outcome: Progressing   The patient is Stable - Low risk of patient condition declining or worsening    Shift Goals  Clinical Goals: Maintain Skin Integrity  Patient Goals: Pain Control  Family Goals: N/A    Progress made toward(s) clinical / shift goals:  Q2 Turns in place, patient on COSME, pain controlled per MAR, educated patient on plan of care this shift, patient verbalized understanding    Patient is not progressing towards the following goals:

## 2023-05-13 LAB
ALBUMIN SERPL BCP-MCNC: 2.4 G/DL (ref 3.2–4.9)
ANION GAP SERPL CALC-SCNC: 9 MMOL/L (ref 7–16)
APTT PPP: 23.7 SEC (ref 24.7–36)
BASOPHILS # BLD AUTO: 0.1 % (ref 0–1.8)
BASOPHILS # BLD: 0.01 K/UL (ref 0–0.12)
BUN SERPL-MCNC: 59 MG/DL (ref 8–22)
BURR CELLS/RBC NFR CSF MANUAL: 0 %
CALCIUM ALBUM COR SERPL-MCNC: 10.8 MG/DL (ref 8.5–10.5)
CALCIUM SERPL-MCNC: 9.5 MG/DL (ref 8.5–10.5)
CHLORIDE SERPL-SCNC: 101 MMOL/L (ref 96–112)
CLARITY CSF: CLEAR
CO2 SERPL-SCNC: 24 MMOL/L (ref 20–33)
COLOR CSF: COLORLESS
COLOR SPUN CSF: COLORLESS
CREAT SERPL-MCNC: 2.38 MG/DL (ref 0.5–1.4)
CYTOLOGY REG CYTOL: NORMAL
EOSINOPHIL # BLD AUTO: 0 K/UL (ref 0–0.51)
EOSINOPHIL NFR BLD: 0 % (ref 0–6.9)
ERYTHROCYTE [DISTWIDTH] IN BLOOD BY AUTOMATED COUNT: 48.6 FL (ref 35.9–50)
GFR SERPLBLD CREATININE-BSD FMLA CKD-EPI: 27 ML/MIN/1.73 M 2
GLUCOSE BLD STRIP.AUTO-MCNC: 237 MG/DL (ref 65–99)
GLUCOSE BLD STRIP.AUTO-MCNC: 246 MG/DL (ref 65–99)
GLUCOSE BLD STRIP.AUTO-MCNC: 297 MG/DL (ref 65–99)
GLUCOSE BLD STRIP.AUTO-MCNC: 304 MG/DL (ref 65–99)
GLUCOSE CSF-MCNC: 156 MG/DL (ref 40–80)
GLUCOSE SERPL-MCNC: 204 MG/DL (ref 65–99)
GRAM STN SPEC: NORMAL
HCT VFR BLD AUTO: 27.3 % (ref 42–52)
HGB BLD-MCNC: 8.8 G/DL (ref 14–18)
IMM GRANULOCYTES # BLD AUTO: 0.14 K/UL (ref 0–0.11)
IMM GRANULOCYTES NFR BLD AUTO: 1.1 % (ref 0–0.9)
INR PPP: 1.2 (ref 0.87–1.13)
LYMPHOCYTES # BLD AUTO: 1.1 K/UL (ref 1–4.8)
LYMPHOCYTES NFR BLD: 8.9 % (ref 22–41)
MCH RBC QN AUTO: 28.9 PG (ref 27–33)
MCHC RBC AUTO-ENTMCNC: 32.2 G/DL (ref 33.7–35.3)
MCV RBC AUTO: 89.5 FL (ref 81.4–97.8)
MONOCYTES # BLD AUTO: 0.59 K/UL (ref 0–0.85)
MONOCYTES NFR BLD AUTO: 4.8 % (ref 0–13.4)
NEUTROPHILS # BLD AUTO: 10.47 K/UL (ref 1.82–7.42)
NEUTROPHILS NFR BLD: 85.1 % (ref 44–72)
NRBC # BLD AUTO: 0 K/UL
NRBC BLD-RTO: 0 /100 WBC
PHOSPHATE SERPL-MCNC: 3.1 MG/DL (ref 2.5–4.5)
PLATELET # BLD AUTO: 427 K/UL (ref 164–446)
PMV BLD AUTO: 10.8 FL (ref 9–12.9)
POTASSIUM SERPL-SCNC: 5.1 MMOL/L (ref 3.6–5.5)
PROT CSF-MCNC: 59 MG/DL (ref 15–45)
PROTHROMBIN TIME: 15.1 SEC (ref 12–14.6)
RBC # BLD AUTO: 3.05 M/UL (ref 4.7–6.1)
RBC # CSF: 54 CELLS/UL
SIGNIFICANT IND 70042: NORMAL
SITE SITE: NORMAL
SODIUM SERPL-SCNC: 134 MMOL/L (ref 135–145)
SOURCE SOURCE: NORMAL
SPECIMEN VOL CSF: 27 ML
TUBE # CSF: 3
TUBE # CSF: 3
WBC # BLD AUTO: 12.3 K/UL (ref 4.8–10.8)
WBC # CSF: 2 CELLS/UL (ref 0–10)

## 2023-05-13 PROCEDURE — 770001 HCHG ROOM/CARE - MED/SURG/GYN PRIV*

## 2023-05-13 PROCEDURE — 80069 RENAL FUNCTION PANEL: CPT

## 2023-05-13 PROCEDURE — A9270 NON-COVERED ITEM OR SERVICE: HCPCS | Performed by: HOSPITALIST

## 2023-05-13 PROCEDURE — 86255 FLUORESCENT ANTIBODY SCREEN: CPT | Mod: 91

## 2023-05-13 PROCEDURE — 62270 DX LMBR SPI PNXR: CPT | Performed by: STUDENT IN AN ORGANIZED HEALTH CARE EDUCATION/TRAINING PROGRAM

## 2023-05-13 PROCEDURE — 700102 HCHG RX REV CODE 250 W/ 637 OVERRIDE(OP): Performed by: HOSPITALIST

## 2023-05-13 PROCEDURE — 009U3ZX DRAINAGE OF SPINAL CANAL, PERCUTANEOUS APPROACH, DIAGNOSTIC: ICD-10-PCS | Performed by: STUDENT IN AN ORGANIZED HEALTH CARE EDUCATION/TRAINING PROGRAM

## 2023-05-13 PROCEDURE — 700105 HCHG RX REV CODE 258: Performed by: HOSPITALIST

## 2023-05-13 PROCEDURE — 87205 SMEAR GRAM STAIN: CPT

## 2023-05-13 PROCEDURE — A9270 NON-COVERED ITEM OR SERVICE: HCPCS | Performed by: FAMILY MEDICINE

## 2023-05-13 PROCEDURE — 700105 HCHG RX REV CODE 258: Performed by: INTERNAL MEDICINE

## 2023-05-13 PROCEDURE — 85025 COMPLETE CBC W/AUTO DIFF WBC: CPT

## 2023-05-13 PROCEDURE — 700102 HCHG RX REV CODE 250 W/ 637 OVERRIDE(OP): Performed by: SURGERY

## 2023-05-13 PROCEDURE — A9270 NON-COVERED ITEM OR SERVICE: HCPCS | Performed by: SURGERY

## 2023-05-13 PROCEDURE — 99233 SBSQ HOSP IP/OBS HIGH 50: CPT | Mod: 25 | Performed by: HOSPITALIST

## 2023-05-13 PROCEDURE — 700102 HCHG RX REV CODE 250 W/ 637 OVERRIDE(OP): Performed by: FAMILY MEDICINE

## 2023-05-13 PROCEDURE — 84157 ASSAY OF PROTEIN OTHER: CPT

## 2023-05-13 PROCEDURE — 82962 GLUCOSE BLOOD TEST: CPT

## 2023-05-13 PROCEDURE — 97602 WOUND(S) CARE NON-SELECTIVE: CPT

## 2023-05-13 PROCEDURE — 83615 LACTATE (LD) (LDH) ENZYME: CPT

## 2023-05-13 PROCEDURE — 99231 SBSQ HOSP IP/OBS SF/LOW 25: CPT | Performed by: PSYCHIATRY & NEUROLOGY

## 2023-05-13 PROCEDURE — 87070 CULTURE OTHR SPECIMN AEROBIC: CPT

## 2023-05-13 PROCEDURE — 85730 THROMBOPLASTIN TIME PARTIAL: CPT

## 2023-05-13 PROCEDURE — 36415 COLL VENOUS BLD VENIPUNCTURE: CPT

## 2023-05-13 PROCEDURE — 85610 PROTHROMBIN TIME: CPT

## 2023-05-13 PROCEDURE — 89051 BODY FLUID CELL COUNT: CPT

## 2023-05-13 PROCEDURE — 700111 HCHG RX REV CODE 636 W/ 250 OVERRIDE (IP): Performed by: HOSPITALIST

## 2023-05-13 PROCEDURE — 700101 HCHG RX REV CODE 250: Performed by: HOSPITALIST

## 2023-05-13 PROCEDURE — 700111 HCHG RX REV CODE 636 W/ 250 OVERRIDE (IP): Performed by: INTERNAL MEDICINE

## 2023-05-13 PROCEDURE — 88108 CYTOPATH CONCENTRATE TECH: CPT

## 2023-05-13 PROCEDURE — 82945 GLUCOSE OTHER FLUID: CPT

## 2023-05-13 RX ORDER — LIDOCAINE HYDROCHLORIDE 20 MG/ML
1 JELLY TOPICAL
Status: DISCONTINUED | OUTPATIENT
Start: 2023-05-13 | End: 2023-05-31 | Stop reason: HOSPADM

## 2023-05-13 RX ORDER — LIDOCAINE HYDROCHLORIDE 20 MG/ML
20 INJECTION, SOLUTION INFILTRATION; PERINEURAL
Status: DISCONTINUED | OUTPATIENT
Start: 2023-05-13 | End: 2023-05-31 | Stop reason: HOSPADM

## 2023-05-13 RX ORDER — LIDOCAINE HYDROCHLORIDE 40 MG/ML
SOLUTION TOPICAL
Status: DISCONTINUED | OUTPATIENT
Start: 2023-05-13 | End: 2023-05-31 | Stop reason: HOSPADM

## 2023-05-13 RX ORDER — SODIUM HYPOCHLORITE 1.25 MG/ML
SOLUTION TOPICAL 2 TIMES DAILY
Status: DISCONTINUED | OUTPATIENT
Start: 2023-05-13 | End: 2023-05-14

## 2023-05-13 RX ADMIN — ATORVASTATIN CALCIUM 10 MG: 10 TABLET, FILM COATED ORAL at 17:59

## 2023-05-13 RX ADMIN — TAMSULOSIN HYDROCHLORIDE 0.4 MG: 0.4 CAPSULE ORAL at 09:44

## 2023-05-13 RX ADMIN — DEXAMETHASONE SODIUM PHOSPHATE 4 MG: 4 INJECTION, SOLUTION INTRA-ARTICULAR; INTRALESIONAL; INTRAMUSCULAR; INTRAVENOUS; SOFT TISSUE at 04:31

## 2023-05-13 RX ADMIN — DULOXETINE HYDROCHLORIDE 30 MG: 30 CAPSULE, DELAYED RELEASE ORAL at 17:59

## 2023-05-13 RX ADMIN — OXYCODONE 5 MG: 5 TABLET ORAL at 21:47

## 2023-05-13 RX ADMIN — INSULIN HUMAN 3 UNITS: 100 INJECTION, SOLUTION PARENTERAL at 18:04

## 2023-05-13 RX ADMIN — SODIUM BICARBONATE 1300 MG: 650 TABLET ORAL at 04:30

## 2023-05-13 RX ADMIN — MEROPENEM 500 MG: 500 INJECTION INTRAVENOUS at 13:12

## 2023-05-13 RX ADMIN — LIDOCAINE HYDROCHLORIDE 1 APPLICATION: 20 JELLY TOPICAL at 12:03

## 2023-05-13 RX ADMIN — FAMOTIDINE 20 MG: 20 TABLET, FILM COATED ORAL at 04:30

## 2023-05-13 RX ADMIN — CARVEDILOL 12.5 MG: 12.5 TABLET, FILM COATED ORAL at 09:43

## 2023-05-13 RX ADMIN — SODIUM BICARBONATE 1300 MG: 650 TABLET ORAL at 17:59

## 2023-05-13 RX ADMIN — DEXAMETHASONE SODIUM PHOSPHATE 4 MG: 4 INJECTION, SOLUTION INTRA-ARTICULAR; INTRALESIONAL; INTRAMUSCULAR; INTRAVENOUS; SOFT TISSUE at 17:59

## 2023-05-13 RX ADMIN — INSULIN GLARGINE-YFGN 10 UNITS: 100 INJECTION, SOLUTION SUBCUTANEOUS at 13:17

## 2023-05-13 RX ADMIN — ALLOPURINOL 100 MG: 100 TABLET ORAL at 04:30

## 2023-05-13 RX ADMIN — DOCUSATE SODIUM 50 MG AND SENNOSIDES 8.6 MG 2 TABLET: 8.6; 5 TABLET, FILM COATED ORAL at 04:30

## 2023-05-13 RX ADMIN — MEROPENEM 500 MG: 500 INJECTION INTRAVENOUS at 04:35

## 2023-05-13 RX ADMIN — MEROPENEM 500 MG: 500 INJECTION INTRAVENOUS at 21:30

## 2023-05-13 RX ADMIN — INSULIN HUMAN 3 UNITS: 100 INJECTION, SOLUTION PARENTERAL at 09:44

## 2023-05-13 RX ADMIN — DEXAMETHASONE SODIUM PHOSPHATE 4 MG: 4 INJECTION, SOLUTION INTRA-ARTICULAR; INTRALESIONAL; INTRAMUSCULAR; INTRAVENOUS; SOFT TISSUE at 00:36

## 2023-05-13 RX ADMIN — SPIRONOLACTONE 25 MG: 50 TABLET ORAL at 04:30

## 2023-05-13 RX ADMIN — CARVEDILOL 12.5 MG: 12.5 TABLET, FILM COATED ORAL at 17:59

## 2023-05-13 RX ADMIN — INSULIN HUMAN 6 UNITS: 100 INJECTION, SOLUTION PARENTERAL at 13:17

## 2023-05-13 RX ADMIN — AMLODIPINE BESYLATE 5 MG: 10 TABLET ORAL at 04:30

## 2023-05-13 RX ADMIN — INSULIN HUMAN 5 UNITS: 100 INJECTION, SOLUTION PARENTERAL at 21:36

## 2023-05-13 RX ADMIN — DEXAMETHASONE SODIUM PHOSPHATE 4 MG: 4 INJECTION, SOLUTION INTRA-ARTICULAR; INTRALESIONAL; INTRAMUSCULAR; INTRAVENOUS; SOFT TISSUE at 13:13

## 2023-05-13 ASSESSMENT — ENCOUNTER SYMPTOMS
DIZZINESS: 0
SHORTNESS OF BREATH: 0
SORE THROAT: 0
FEVER: 0
SPEECH CHANGE: 0
MYALGIAS: 0
ABDOMINAL PAIN: 0
DIARRHEA: 0
WEAKNESS: 1
BLURRED VISION: 0
PALPITATIONS: 0
COUGH: 0
NERVOUS/ANXIOUS: 0
DEPRESSION: 0
HEARTBURN: 0

## 2023-05-13 ASSESSMENT — PAIN DESCRIPTION - PAIN TYPE
TYPE: ACUTE PAIN

## 2023-05-13 NOTE — PROGRESS NOTES
Assumed care of patient at 0645. Bedside report received. Assessment complete.  AA&Ox4. Denies CP/SOB.  Reporting 4/10 pain. Declined intervention at this time.  Educated patient regarding pharmacologic and non pharmacologic modalities for pain management.  Skin per flowsheets  Tolerating renal diet. Denies N/V.  + void via Florez Catheter. + BM. Last BM 5/13  Pt unable to tolerate OOB activity d/t BLE weakness  All needs met at this time. Call light within reach. Pt calls appropriately. Bed low and locked, non skid socks in place. Hourly rounding in place.

## 2023-05-13 NOTE — PROGRESS NOTES
4 Eyes Skin Assessment Completed by Josemanuel RN and Fifi RN.     Head WDL  Ears WDL  Nose WDL  Mouth WDL  Neck WDL  Breast/Chest WDL  Shoulder Blades WDL  Spine WDL  (R) Arm/Elbow/Hand WDL  (L) Arm/Elbow/Hand WDL  Abdomen : RUQ transverse umbilical Incision  with Steri-Strips and dressing over de-hissed portion (silver strip packing and adhesive foam, changed daily by this RN)  RLQ - Previous NAHUN Drain Site BERTA  Groin: Florez catheter to Penis, CDI   Scrotum/Coccyx/Buttocks Redness and Blanching BERTA  (R) Leg  +1 pitting Edema  (L) Leg  +1 pitting Edema  (R) Heel/Foot/Toe +1 pitting Edema  (L) Heel/Foot/Toe +1 pitting Edema        Devices In Places: 20g PIV LFA CDI, Pulse Ox, Florez, and Nasal Cannula        Interventions In Place Gray Ear Foams, TAP System, Pillows, Q2 Turns, Low Air Loss Mattress, and Heels Loaded W/Pillows     Possible Skin Injury No     Pictures Uploaded Into Epic yes  Wound Consult Placed yes  RN Wound Prevention Protocol Ordered Yes

## 2023-05-13 NOTE — WOUND TEAM
[facility]  Inpatient Services  Initial Wound and Skin Care Evaluation    Admission Date: 4/21/2023     Last order of IP CONSULT TO WOUND CARE was found on 5/10/2023 from Hospital Encounter on 4/21/2023     HPI, PMH, SH: Reviewed    Past Surgical History:   Procedure Laterality Date    ROMAN BY LAPAROSCOPY N/A 4/28/2023    Procedure: CHOLECYSTECTOMY, LAPAROSCOPIC ATTEMPTED, OPEN CHOLECYSTECTOMY;  Surgeon: Suraj Galvan M.D.;  Location: [facility];  Service: General    NV COLONOSCOPY,DIAGNOSTIC N/A 12/12/2021    Procedure: COLONOSCOPY;  Surgeon: Dariel Simons M.D.;  Location: [facility];  Service: Gastroenterology    NV UPPER GI ENDOSCOPY,BIOPSY N/A 12/12/2021    Procedure: GASTROSCOPY, WITH BIOPSY;  Surgeon: Dariel Simons M.D.;  Location: [facility];  Service: Gastroenterology    NV UPPER GI ENDOSCOPY,CTRL BLEED N/A 12/12/2021    Procedure: EGD, WITH CLIP PLACEMENT;  Surgeon: Dariel Simons M.D.;  Location: [facility];  Service: Gastroenterology    GASTROSCOPY W/PUSH ENTERSCOPY N/A 12/12/2021    Procedure: GASTROSCOPY, WITH PUSH ENTEROSCOPY;  Surgeon: Dariel Simons M.D.;  Location: [facility];  Service: Gastroenterology    SEPTAL RECONSTRUCTION  12/7/2015    Procedure: SEPTAL RECONSTRUCTION OPEN WITH  GRAFTS & CONCHAL CART GRAFT;  Surgeon: SYDNIE Gaitan M.D.;  Location: SURGERY SAME DAY [facility];  Service:     BLEPHAROPLASTY  7/23/2014    Performed by Gera Plasencia M.D. at [facility]    BROW LIFT  7/23/2014    Performed by Gera Plasencia M.D. at [facility]    CATARACT PHACO WITH IOL  12/4/2012    Performed by Suraj Gonzalez M.D. at [facility]    CATARACT PHACO WITH IOL  11/20/2012    Performed by Suraj Gonzalez M.D. at [facility]    APPENDECTOMY      ARTHROSCOPY, KNEE      CARDIAC CATH, LEFT HEART      LHC out of concern for STEMI, normal coronaries with minimal  atherosclerosis, diagnosed with PNA as cause of symptoms and ST changes.     OTHER      KNEE SURGERY - LEFT.    OTHER      NOSE SURGERY.    TONSILLECTOMY       Social History     Tobacco Use    Smoking status: Former     Packs/day: 0.20     Years: 6.00     Pack years: 1.20     Types: Cigarettes     Quit date:      Years since quittin.4    Smokeless tobacco: Never    Tobacco comments:     Stopped over 25 years ago.   Vaping Use    Vaping Use: Never used   Substance Use Topics    Alcohol use: No     Chief Complaint   Patient presents with    GLF     2 days ago. (-) thinners    Extremity Weakness     Chronic lower bilateral weakness.     Diagnosis: Acute kidney failure (HCC) [N17.9]  Sepsis (HCC) [A41.9]    Unit where seen by Wound Team: T405/00     WOUND CONSULT/FOLLOW UP RELATED TO:  abdominal incision dehiscence      WOUND HISTORY:  Pt underwent an open cholecystectomy on  with Dr. Galvan.  Patient seen by wound team 04/10 for drainage to incision site, Aquacel AG was ordered.  Received new consult that incision is saturating through dressings.         WOUND ASSESSMENT/LDA     Wound 23 Incision Abdomen Right carmelo incision partially dehisced (Active)   Wound Image      23 1240   Site Assessment Red    Periwound Assessment Intact    Margins Unattached edges    Closure Dermabond    Drainage Amount Moderate    Drainage Description Sanguineous;Purulent    Treatments Cleansed;Irrigation    Wound Cleansing Hydrogen Peroxide    Periwound Protectant Skin Protectant Wipes to Periwound    Dressing Cleansing/Solutions 1/4 Strength Dakin's Solution    Dressing Options Moist Roll Gauze;Absorbent Abdominal Pad;Hypafix Tape    Dressing Changed New    Dressing Status Clean;Dry;Intact    Dressing Change/Treatment Frequency Every Shift, and As Needed    NEXT Dressing Change/Treatment Date 23    NEXT Weekly Photo (Inpatient Only) 23    Number of Staples Removed 5    Non-staged Wound Description  Full thickness    Wound Length (cm) 1 cm    Wound Width (cm) 3 cm    Wound Depth (cm) 2.6 cm    Wound Surface Area (cm^2) 3 cm^2    Wound Volume (cm^3) 7.8 cm^3    Wound Healing % -316    Shape circular area along incision    Exposed Structures None    WOUND NURSE ONLY - Time Spent with Patient (mins) 75      Vascular:    REX:   No results found.    Lab Values:    Lab Results   Component Value Date/Time    WBC 12.3 (H) 05/13/2023 01:38 AM    RBC 3.05 (L) 05/13/2023 01:38 AM    HEMOGLOBIN 8.8 (L) 05/13/2023 01:38 AM    HEMATOCRIT 27.3 (L) 05/13/2023 01:38 AM    CREACTPROT 33.53 (H) 04/23/2023 10:32 AM    SEDRATEWES 66 (H) 04/23/2023 10:32 AM    HBA1C 6.6 (H) 01/03/2023 08:45 AM        Culture Results show:  Recent Results (from the past 720 hour(s))   CULTURE WOUND W/ GRAM STAIN    Collection Time: 05/09/23  9:50 AM    Specimen: Abdominal; Wound   Result Value Ref Range    Significant Indicator POS (POS)     Source WND     Site ABDOMINAL     Culture Result - (A)     Gram Stain Result Moderate WBCs.  Moderate Gram negative rods.       Culture Result (A)      Escherichia coli ESBL  Moderate growth  Extended Spectrum Beta-lactamase (ESBL) isolated.  ESBL's may be clinically resistant to therapy with  Penicillins,Cephalosporins or Aztreonam despite  apparent in vitro susceptibility to some of these agents.  The patient requires contact isolation.  Please contact pharmacy or an Infectious Disease Specialist  if you have any questions about appropriate therapy.         Susceptibility    Escherichia coli esbl - DENICE     Ampicillin >16 Resistant mcg/mL     Ceftriaxone >32 Resistant mcg/mL     Cefazolin >16 Resistant mcg/mL     Ciprofloxacin >2 Resistant mcg/mL     Cefepime >16 Resistant mcg/mL     Cefuroxime >16 Resistant mcg/mL     Ampicillin/sulbactam >16/8 Resistant mcg/mL     Ertapenem <=0.5 Sensitive mcg/mL     Tobramycin >8 Resistant mcg/mL     Gentamicin >8 Resistant mcg/mL     Minocycline <=4 Sensitive mcg/mL      "Moxifloxacin >4 Resistant mcg/mL     Pip/Tazobactam <=8 Sensitive mcg/mL     Trimeth/Sulfa <=0.5/9.5 Sensitive mcg/mL     Tigecycline <=2 Sensitive mcg/mL       Pain Level/Medicated: topical lidocaine jelly applied with good effect.         INTERVENTIONS BY WOUND TEAM:  Chart and images reviewed. Discussed with bedside RN. All areas of concern (based on picture review, LDA review and discussion with bedside RN) have been thoroughly assessed. Documentation of areas based on significant findings. This RN in to assess patient. Performed standard wound care which includes appropriate positioning, dressing removal and non-selective debridement. Pictures and measurements obtained weekly if/when required.  Preparation for Dressing removal: lidocaine jelly applied and let sit for 30 min.    Non-selectively Debrided with:  hydrogen peroxide to remove old clot   Sharp debridement: NA  Shaina wound: Cleansed with wound cleanser and gauze, Prepped with no sting skin prep   Primary Dressin/2\" plain strip packing moistened with hydrogen peroxide.    Secondary (Outer) Dressing: ABD pad and hypafix tape.      Advanced Wound Care Discharge Planning  Number of Clinicians necessary to complete wound care: 1  Is patient requiring IV pain medications for dressing changes: No  Length of time for dressing change 15 min. (This does not include chart review, pre-medication time, set up, clean up or time spent charting.)    Interdisciplinary consultation: Patient, Bedside RN, Kenzie FALL     EVALUATION / RATIONALE FOR TREATMENT:  Most Recent Date:  23:  Dakins applied to chemically debride nonviable tissue, decrease bioburden and odor.  Likely hematoma under surface of incision, hydrogen to encourage breakdown of clot.  Spoke with Kenzie FALL (sx signed off 1 week ago) talk about placed vac previously.  Will allow dakins to debride and possibly place VAC on Monday.       5/10/23: Patient with small dehiscence along transverse abdominal " incision. Small amount of purulence expressed following cleansing. Aquacel Ag Hydrofiber applied to manage bioburden, absorb exudate, and maintain a moist wound environment without laterally wicking exudate therefore reducing latonya-wound maceration.      Goals: Steady decrease in wound area and depth weekly.    WOUND TEAM PLAN OF CARE ([X] for frequency of wound follow up,):   Nursing to follow dressing orders written for wound care. Contact wound team if area fails to progress, deteriorates or with any questions/concerns if something comes up before next scheduled follow up (See below as to whether wound is following and frequency of wound follow up)  Dressing changes by wound team:                   Follow up 3 times weekly:                NPWT change 3 times weekly:     Follow up 1-2 times weekly:    X  Follow up Bi-Monthly:           Follow up Monthly (High Risk):                        Follow up as needed:     Other (explain):     NURSING PLAN OF CARE ORDERS (X):  Dressing changes: See Dressing Care orders: X  Skin care: See Skin Care orders:   RN Prevention Protocol:   Rectal tube care: See Rectal Tube Care orders:   Other orders:    RSKIN:   CURRENTLY IN PLACE (X), APPLIED THIS VISIT (A), ORDERED (O):   Q shift Jeremy:  X  Q shift pressure point assessments:  X    Surface/Positioning   Standard Mattress/Trauma Bed          Low Airloss        X  ICU Low Airloss   Bariatric COSME     Waffle cushion        Waffle Overlay          Reposition q 2 hours    X  TAPs Turning system     Z Les Pillow     Offloading/Redistribution   Sacral Offloading Dressing (Silicone dressing)   X  Heel Offloading Dressing (Silicone dressing)       X  Heel float boots (Prevalon boot)             Float Heels off Bed with Pillows           Respiratory NA  Silicone O2 tubing         Gray Foam Ear protectors     Cannula fixation Device (Tender )          High flow offloading Clip    Elastic head band offloading device      Anchorfast                                                          Trach with Optifoam split foam             Containment/Moisture Prevention FLEX    Rectal tube or BMS    Purwick/Condom Cath        Florez Catheter    Barrier wipes           Barrier paste       Antifungal tx      Interdry        Mobilization FLEX      Up to chair        Ambulate      PT/OT      Nutrition       Dietician        Diabetes Education      PO   X  TF     TPN     NPO   # days     Other        Anticipated discharge plans: TBD  LTACH:        SNF/Rehab:                  Home Health Care:           Outpatient Wound Center:            Self/Family Care:        Other:                  Vac Discharge Needs:   Vac Discharge plan is purely a recommendation from wound team and not a requirement for discharge unless otherwise stated by physician.  Not Applicable Pt not on a wound vac:     X  Regular Vac while inpatient, alternative dressing at DC:        Regular Vac in use and continued at DC:            Reg. Vac w/ Skin Sub/Biologic in use. Will need to be changed 2x wkly:      Veraflo Vac while inpatient, ok to transition to Regular Vac on Discharge (Bedside RN to Clamp small instillation tubing at time of DC):           Veraflo Vac while inpatient, would benefit from remaining on Veraflo Vac upon discharge:

## 2023-05-13 NOTE — PROGRESS NOTES
"Neurology Follow-up  Neurohospitalist Service, [facility]    Referring Physician: Smiley Cain M.D.    Chief Complaint   Patient presents with    GLF     2 days ago. (-) thinners    Extremity Weakness     Chronic lower bilateral weakness.       Interval history:   -No new neurological symptoms  -Patient had his wound change dressing today which was very painful  -Otherwise he was curious about the next plan    Review of systems: In addition to what is detailed in the HPI above, all other systems reviewed and are negative.    Past Medical History:    has a past medical history of Abdominal pain, Abnormal electrocardiogram, ACE inhibitor intolerance, BPH (benign prostatic hyperplasia), Bruxism, Cancer ([facility]), Cataract, Chickenpox, Cholesterol blood decreased, Chronic kidney disease (CKD) (2/27/2021), Chronic kidney disease, stage III (moderate) ([facility]), Cold, Congestion of both ears (2/27/2021), Cough, Depression, Dermatochalasis of eyelid (7/23/2014), Diabetes, Difficulty breathing, Fever, GERD (gastroesophageal reflux disease), GI bleed (12/8/2021), GOUT, Gout, History of skull fracture, History of urinary tract infection, Hyperlipidemia, Hypertension, Indigestion, Influenza, Insomnia, Mixed hyperlipidemia, Orthopnea, Pneumonia (9/2015), Proteinuria, Purulent nasal discharge (12/7/2015), Ringing in ears, Shortness of breath, Sputum production, Tonsillitis, Type 2 diabetes mellitus (HCC), Wears glasses, and Weight loss.    FHx:  family history includes Cancer in his brother; Diabetes in his father; Heart Attack (age of onset: 79) in his father.    SHx:   reports that he quit smoking about 58 years ago. His smoking use included cigarettes. He has a 1.20 pack-year smoking history. He has never used smokeless tobacco. He reports that he does not drink alcohol and does not use drugs.    Allergies:  Allergies   Allergen Reactions    Ether Unspecified     \"Violently sick\" "       Medications:    Current Facility-Administered Medications:     dakins 0.125% (1/4 strength) topical soln, , Topical, BID, Smiley Cain M.D.    lidocaine jelly (Uro-Jet) 2 % 1 Application., 1 Application., Topical, QDAY PRN, Smiley Cain M.D., 1 Application. at 05/13/23 1203    lidocaine (XYLOCAINE) 2 % injection 20 mL, 20 mL, Topical, QDAY PRN **OR** lidocaine (XYLOCAINE) 4 % topical solution, , Topical, QDAY PRN, Smiley Cain M.D.    insulin GLARGINE (Lantus,Semglee) injection, 10 Units, Subcutaneous, QAM INSULIN, Smiley Cain M.D., 10 Units at 05/13/23 1317    meropenem (Merrem) 500 mg in  mL IV-MBP, 500 mg, Intravenous, Q8HRS, Smiley Cain M.D., Stopped at 05/13/23 1342    dexamethasone (DECADRON) injection 4 mg, 4 mg, Intravenous, Q6HRS, Smiley Cain M.D., 4 mg at 05/13/23 1313    senna-docusate (PERICOLACE or SENOKOT S) 8.6-50 MG per tablet 2 Tablet, 2 Tablet, Oral, BID, 2 Tablet at 05/13/23 0430 **AND** polyethylene glycol/lytes (MIRALAX) PACKET 1 Packet, 1 Packet, Oral, QDAY PRN, 1 Packet at 05/12/23 2113 **AND** magnesium hydroxide (MILK OF MAGNESIA) suspension 30 mL, 30 mL, Oral, QDAY PRN **AND** bisacodyl (DULCOLAX) suppository 10 mg, 10 mg, Rectal, QDAY PRN, Smiley Cain M.D.    tamsulosin (FLOMAX) capsule 0.4 mg, 0.4 mg, Oral, AFTER BREAKFAST, Smiley Cain M.D., 0.4 mg at 05/13/23 0944    [Held by provider] heparin injection 5,000 Units, 5,000 Units, Subcutaneous, Q8HRS, Mell Masters M.D., 5,000 Units at 05/12/23 1401    famotidine (PEPCID) tablet 20 mg, 20 mg, Oral, DAILY, 20 mg at 05/13/23 0430 **OR** [DISCONTINUED] famotidine (PEPCID) injection 20 mg, 20 mg, Intravenous, DAILY, Pedro Luis sU M.D., 20 mg at 05/01/23 0529    oxyCODONE immediate-release (ROXICODONE) tablet 5 mg, 5 mg, Oral, Q3HRS PRN, 5 mg at 05/02/23 0301 **OR** oxyCODONE immediate release (ROXICODONE) tablet 10 mg, 10 mg, Oral, Q3HRS PRN, 10 mg at 05/12/23 2120 **OR** [DISCONTINUED] HYDROmorphone  (Dilaudid) injection 0.5-1 mg, 0.5-1 mg, Intravenous, Q2HRS PRN, Ema Scanlon M.D., 1 mg at 04/28/23 2212    morphine 4 MG/ML injection 2-4 mg, 2-4 mg, Intravenous, Q3HRS PRN, Eric Fowler M.D., 4 mg at 05/03/23 2322    Respiratory Therapy Consult, , Nebulization, Continuous RT, Pedro Luis Us M.D.    amLODIPine (NORVASC) tablet 5 mg, 5 mg, Oral, Q DAY, Félix Smith M.D., 5 mg at 05/13/23 0430    labetalol (NORMODYNE/TRANDATE) injection 10 mg, 10 mg, Intravenous, Q4HRS PRN, Félix Smith M.D.    hydrALAZINE (APRESOLINE) injection 10 mg, 10 mg, Intravenous, Q6HRS PRN, Félix Smith M.D., 10 mg at 04/28/23 0440    atorvastatin (LIPITOR) tablet 10 mg, 10 mg, Oral, DAILY AT 1800, Smiley Cain M.D., 10 mg at 05/12/23 1658    allopurinol (ZYLOPRIM) tablet 100 mg, 100 mg, Oral, DAILY, Smiley Cain M.D., 100 mg at 05/13/23 0430    carvedilol (COREG) tablet 12.5 mg, 12.5 mg, Oral, BID WITH MEALS, Félix Smith M.D., 12.5 mg at 05/13/23 0943    spironolactone (ALDACTONE) tablet 25 mg, 25 mg, Oral, Q DAY, Félix Smith M.D., 25 mg at 05/13/23 0430    sodium bicarbonate tablet 1,300 mg, 1,300 mg, Oral, BID, Smiley Cain M.D., 1,300 mg at 05/13/23 0430    DULoxetine (CYMBALTA) capsule 30 mg, 30 mg, Oral, Q EVENING, Ronaldo Perez M.D., 30 mg at 05/12/23 1658    acetaminophen (Tylenol) tablet 650 mg, 650 mg, Oral, Q6HRS PRN, Ronaldo Perez M.D., 650 mg at 04/29/23 1059    insulin regular (HumuLIN R,NovoLIN R) injection, 2-9 Units, Subcutaneous, 4X/DAY ACHS, 6 Units at 05/13/23 1317 **AND** POC blood glucose manual result, , , Q AC AND BEDTIME(S) **AND** NOTIFY MD and PharmD, , , Once **AND** Administer 20 grams of glucose (approximately 8 ounces of fruit juice) every 15 minutes PRN FSBG less than 70 mg/dL, , , PRN **AND** dextrose 10 % BOLUS 25 g, 25 g, Intravenous, Q15 MIN PRN, Ronaldo Perez M.D.    Pharmacy Consult Request ...Pain Management Review 1 Each, 1 Each, Other, PHARMACY TO  "Eli SARMIENTO A.P.RReynaN.    Physical Examination:     General: Patient is awake and in no acute distress    NEUROLOGICAL EXAM:     /73   Pulse 72   Temp 36.6 °C (97.9 °F) (Temporal)   Resp 17   Ht 1.803 m (5' 10.98\")   Wt 89.1 kg (196 lb 6.9 oz)   SpO2 94%   BMI 27.41 kg/m²       Mental status: Awake, alert and fully oriented  Speech and language: Speech is clear and fluent. The patient is able to name and repeat, and follow commands  Cranial nerve exam: Pupils are equal, round and reactive to light bilaterally. Visual fields are full. There is no nystagmus. Extraocular muscles are intact. Face is symmetric.   Motor exam: There is no pronator drift.  4/5 in bilateral lower extremities, right slightly weaker than the left  Sensory exam: Reacts to tactile in all 4 extremities, no neglect to double stim   Deep tendon reflexes:  2+ throughout.   Gait: Deferred due to patient preference      Objective Data:    Labs:  Lab Results   Component Value Date/Time    PROTHROMBTM 15.1 (H) 05/13/2023 09:12 AM    INR 1.20 (H) 05/13/2023 09:12 AM      Lab Results   Component Value Date/Time    WBC 12.3 (H) 05/13/2023 01:38 AM    RBC 3.05 (L) 05/13/2023 01:38 AM    HEMOGLOBIN 8.8 (L) 05/13/2023 01:38 AM    HEMATOCRIT 27.3 (L) 05/13/2023 01:38 AM    MCV 89.5 05/13/2023 01:38 AM    MCH 28.9 05/13/2023 01:38 AM    MCHC 32.2 (L) 05/13/2023 01:38 AM    MPV 10.8 05/13/2023 01:38 AM    NEUTSPOLYS 85.10 (H) 05/13/2023 01:38 AM    LYMPHOCYTES 8.90 (L) 05/13/2023 01:38 AM    MONOCYTES 4.80 05/13/2023 01:38 AM    EOSINOPHILS 0.00 05/13/2023 01:38 AM    BASOPHILS 0.10 05/13/2023 01:38 AM    HYPOCHROMIA 1+ 07/18/2014 12:15 PM      Lab Results   Component Value Date/Time    SODIUM 134 (L) 05/13/2023 01:38 AM    POTASSIUM 5.1 05/13/2023 01:38 AM    CHLORIDE 101 05/13/2023 01:38 AM    CO2 24 05/13/2023 01:38 AM    GLUCOSE 204 (H) 05/13/2023 01:38 AM    BUN 59 (H) 05/13/2023 01:38 AM    CREATININE 2.38 (H) 05/13/2023 01:38 AM "    CREATININE 1.4 02/06/2009 03:00 AM      Lab Results   Component Value Date/Time    CHOLSTRLTOT 151 04/03/2023 04:24 PM    LDL 79 04/03/2023 04:24 PM    HDL 35 (A) 04/03/2023 04:24 PM    TRIGLYCERIDE 138 04/28/2023 09:03 PM       Lab Results   Component Value Date/Time    ALKPHOSPHAT 305 (H) 05/05/2023 10:09 AM    ASTSGOT 94 (H) 05/05/2023 10:09 AM    ALTSGPT 77 (H) 05/05/2023 10:09 AM    TBILIRUBIN 0.5 05/05/2023 10:09 AM        Imaging/Testing:    I interpreted and/or reviewed the patient's neuroimaging    MR-THORACIC SPINE-WITH & W/O   Final Result      Compared with the previous MRI ,again noted is expansile lesion in the thoracic spinal cord at the levels of T2, T3 and T4. There is prominent right dorsal subarachnoid space at the level of T3 with flattening of the spinal cord. There is mild contrast    enhancement noted in the expanded portion of the spinal cord. 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 Other differential diagnosis includes transverse myelitis However the possibility of right-sided dorsal arachnoid cyst/arachnoid    web at the level of T3 causing spinal cord edema cannot be entirely ruled out. Despite multiple MRs, it is difficult to characterize the tumor. Therefore if there is any neurosurgical planning, CT myelogram is recommended for further characterization.      IR-US GUIDED PIV   Final Result    Ultrasound-guided PERIPHERAL IV INSERTION performed by    qualified nursing staff as above.      MR-THORACIC SPINE-WITH & W/O   Final Result      1.  Limited study due to the motion artifact.   2.  When compared with the previous MRI again noted is expansile T2 hyperintense area in the thoracic spinal cord at the levels of T2, T3 and T4. There is also prominent dorsal right-sided subarachnoid space at this level. The differential diagnosis    includes intramedullary spinal cord tumor and extramedullary cyst/web with cord compression and edema.   3.  Pre and postcontrast MR  examination of the thoracic spine is recommended with contrast under General anesthesia.   4.  1 -2 mm  T2-weighted sequences from T2 to T6 with smaller field of view is recommended for further characterization.      MR-THORACIC SPINE-WITH & W/O   Final Result         Stable appearance of expansile nonenhancing signal abnormality involving the upper thoracic cord between T1 and T4. Also noted is a small extra-axial collection/web along the right posterolateral spinal canal at T3-4 that deforms the cord and displaces    it anteriorly and to the left.   This could represent an Arachnoid web or arachnoid cyst with extensive mass effect and secondary edema of the cord.   Recommend additional thin section T2 and post contrast images through T1-T6 to better assess the abnormality.      MR-BRAIN-WITH & W/O   Final Result         No acute intracranial process.      Age-related volume loss and chronic microvascular ischemic changes.         DX-CHEST-PORTABLE (1 VIEW)   Final Result         1.  Pulmonary edema and/or infiltrates are identified, which are stable since the prior exam.   2.  Cardiomegaly   3.  Atherosclerosis      DX-CHEST-PORTABLE (1 VIEW)   Final Result         1.  Pulmonary edema and/or infiltrates are identified, which are stable since the prior exam.   2.  Left PICC line tip terminates in the left axilla, stable since prior study.      DX-CHEST-PORTABLE (1 VIEW)   Final Result      1.  No significant change.   2.  Possible left PICC line with tip projecting at the axillary vein.      DX-CHEST-PORTABLE (1 VIEW)   Final Result      1.  Mildly improved pulmonary opacities.   2.  Possible left PICC line with tip projecting at the axillary vein.   3.  Likely trace right pleural effusion.      DX-CHEST-LIMITED (1 VIEW)   Final Result      1.  Placement of endotracheal tube with tip projecting over the mid trachea      2.  Left arm catheter present which is presumably venous in projects in the expected position of  the left axillary vein      3.  Enlarged cardiac silhouette      NM-HEPATOBILIARY SCAN   Final Result      Hepatobiliary scan findings suspicious for acute cholecystitis.      MR-THORACIC SPINE-WITH & W/O   Final Result      1.  There is abnormal expansile T2 hyperintense lesion in the right side of the thoracic spinal cord at the levels of T2 and T3 Mild contrast enhancement is seen. This lesion is suspicious for spinal cord neoplasm. Other remote differential diagnosis    includes transverse myelitis.   2.  Exaggerated thoracic kyphosis.   3.  Thoracic dextroscoliosis.   4.  Minimal degenerative changes.   5.  Right pleural effusion.      MR-LUMBAR SPINE-WITH & W/O   Final Result      1.  Multifocal degenerative disease in the lumbar spine as described above.   2.  Moderate central canal and severe lateral recess stenosis at the level of L4-5. The exiting bilateral L5 nerve roots might have been impinged at the lateral recess.      MR-CERVICAL SPINE-WITH & W/O   Final Result      1.  There is abnormal expansile intramedullary T2 signal intensity lesion in the right cerebellum. Thoracic spinal cord at the level of T2 on T3. Mild diffuse contrast enhancement is seen. This finding is suspicious for spinal cord neoplasm. The other    less likely differential diagnosis includes transverse myelitis. Follow-up is recommended after 4 weeks.   2.  Mild degenerative disease in the cervical spine as described above.   3.  There is no evidence of infection in the cervical spine.      US-RUQ   Final Result         1.  Acute cholecystitis.   2.  Atrophic right kidney.      DX-CHEST-LIMITED (1 VIEW)   Final Result      Perihilar interstitial edema and basilar atelectasis and/or consolidation. Underlying infection is possible.      MR-LUMBAR SPINE-W/O   Final Result      1.  Lower lumbar spine predominant degenerative changes as described in detail above, progressed from 2010 MRI.   2.  Edema at the L4-L5 disc space and L5  superior endplate likely represents degenerative changes. Less likely this could represent a low-grade or early discitis osteomyelitis. Correlate for evidence of infection.   3.  Acute appearing Schmorl's node at L3-L4, which can be a source of pain.      US-RENAL   Final Result      1.  Atrophic kidneys. Echogenic bilateral renal parenchyma could relate to medical renal disease.      2.  No hydronephrosis. No renal calculus.      DX-CHEST-PORTABLE (1 VIEW)   Final Result      No acute cardiac or pulmonary abnormalities are identified.      CT-ABDOMEN-PELVIS W/O   Final Result      1.  Findings suspicious for focal colitis at the hepatic flexure, less likely cholecystitis or duodenitis with secondary involvement of the colon. Infectious, inflammatory and ischemic etiologies are considerations. Underlying mass is not excluded.   2.  Cholelithiasis with distention of the gallbladder   3.  BILATERAL renal atrophy   4.  Subcentimeter LEFT adrenal myelolipoma   5.  12 mm RIGHT adrenal nodule likely an adenoma absent a history of cancer   6.  Subcentimeter RIGHT hepatic lesion, likely a cyst absent a history of cancer   7.  Atherosclerosis   8.  Colonic diverticulosis      CT-HEAD W/O   Final Result      1.  Cerebral atrophy.      2.  White matter lucencies most consistent with small vessel ischemic change versus demyelination or gliosis.      3.  Otherwise, Head CT without contrast with no acute findings. No evidence of acute cerebral infarction, hemorrhage or mass lesion.             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      Based on his overall clinical course and imaging findings so far;  transverse myelitis still remains a possibility; especially neuromyelitis optica and I would like to repeat the spinal tap; my differential include transverse myelitis versus CNS lymphoma versus intramedullary tumor/mass.     Recommendations:  -Plan is to get the spinal tap and send CSF for cell count, glucose, protein, IgG index, paraneoplastic panel, VZV and HSV PCR, cryptococcal, CSF VDRL, ACE levels  -I will request high-volume tap and send for CSF cytology and CSF LDH  -We will follow-up on blood work for serum vitamin D levels, serum copper, FELICITA, anti- AQP4 and anti-MOG antibodies  -PT/OT  -Neurosurgery is consulted, appreciate their recs    Thank you for letting me participate in the care for this patient.  Neurology will continue to follow.    Aldo Ray MD  Board certified in Neurology and Epilepsy (ABPN)   Board certified in Headache Medicine ([employer])  Neurohospitalist, Acute Care Services

## 2023-05-13 NOTE — PROGRESS NOTES
4 Eyes Skin Assessment Completed by JUAN JOSÉ Jj and Ta Abernathy RN.     Head WDL  Ears WDL  Nose WDL  Mouth WDL  Neck WDL  Breast/Chest WDL  Shoulder Blades WDL  Spine WDL  (R) Arm/Elbow/Hand WDL  (L) Arm/Elbow/Hand WDL  Abdomen : RUQ transverse umbilical Incision  with Steri-Strips and dressing over de-hissed portion (silver strip packing and adhesive foam, changed daily by this RN)  RLQ - Previous NAHUN Drain Site BERTA  Groin: Florez catheter to Penis, CDI   Scrotum/Coccyx/Buttocks Redness and Blanching BERTA  (R) Leg  +1 pitting Edema  (L) Leg  +1 pitting Edema  (R) Heel/Foot/Toe +1 pitting Edema  (L) Heel/Foot/Toe +1 pitting Edema        Devices In Places: 20g PIV LFA CDI, Pulse Ox, Florez, and Nasal Cannula        Interventions In Place Gray Ear Foams, TAP System, Pillows, Q2 Turns, Low Air Loss Mattress, and Heels Loaded W/Pillows     Possible Skin Injury No     Pictures Uploaded Into Epic yes  Wound Consult Placed yes  RN Wound Prevention Protocol Ordered Yes

## 2023-05-13 NOTE — CARE PLAN
Problem: Pain - Standard  Goal: Alleviation of pain or a reduction in pain to the patient’s comfort goal  Outcome: Progressing     Problem: Skin Integrity  Goal: Skin integrity is maintained or improved  5/13/2023 0223 by Ta Car R.N.  Outcome: Progressing  5/13/2023 0223 by Ta Car R.N.  Reactivated     Problem: Mobility  Goal: Patient's capacity to carry out activities will improve  5/13/2023 0223 by Ta Car R.N.  Outcome: Progressing  5/13/2023 0223 by Ta Car R.N.  Reactivated   The patient is Stable - Low risk of patient condition declining or worsening    Shift Goals  Clinical Goals: pain control/prevent skin breakdown  Patient Goals: pain control/rest  Family Goals: N/A    Progress made toward(s) clinical / shift goals:  pain controlled as per Prn med, assisted every 2 hours turn, provided enough comfort

## 2023-05-13 NOTE — CARE PLAN
Problem: Knowledge Deficit - Standard  Goal: Patient and family/care givers will demonstrate understanding of plan of care, disease process/condition, diagnostic tests and medications  Outcome: Progressing     Problem: Pain - Standard  Goal: Alleviation of pain or a reduction in pain to the patient’s comfort goal  Outcome: Progressing     Problem: Skin Integrity  Goal: Skin integrity is maintained or improved  Outcome: Progressing   The patient is Stable - Low risk of patient condition declining or worsening    Shift Goals  Clinical Goals: Pain Control/ Maintain Skin Integrity  Patient Goals: Pain Control  Family Goals: N/A    Progress made toward(s) clinical / shift goals:  Pain Controlled per MAR, Q2 Turns in place, Patient placed on COSME Mattress     Patient is not progressing towards the following goals:

## 2023-05-13 NOTE — PROGRESS NOTES
[facility] Medicine Daily Progress Note    Date of Service  5/13/2023    Chief Complaint  Tyrone Cline is a 82 y.o. male admitted 4/21/2023 with Colitis.    Hospital Course  This 82-year-old male with a past medical history significant for CKD stage IV being followed by nephrology, diabetes mellitus, hypertension presented to ER on 4/21/2023 with a complaint of abdominal pain and lower extremity weakness.    Patient stated that he had epigastric and abdominal pain on Tuesday; CT scan of the abdomen and pelvis consistent with colitis but did show cholelithiasis with distention of gallbladder but less likely cholecystitis, ultrasound of the abdomen consistent with acute cholecystitis, HIDA scan showing acute carmelo; General surgery was consulted and he underwent  attempted laparoscopic cholecystectomy with conversion to open cholecystectomy; Subtotal cholecystectomy, patient has completed treatment with IV antibiotics.  Patient did have a NAHUN drain in place, which has been removed on 5/8/2023    His hospital course was complicated with postoperative hypotension, went to ICU vasopressor and fell back on 4/29 floor.    Of note, patient continues to have bilateral lower extremity weakness; MR T spine expansile T2 hyperintense area in the thoracic spinal cord at the levels of T2, T3 and T4. There is also prominent dorsal right-sided subarachnoid space at this level.  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     Interval events:  -- No acute events overnight, medicine has been stable, patient is currently requiring 3 to of oxygen, he is alert, and awake  --WBC 13.3, renal function at 2.2, continue to follow-up with nephrology  -- MRI of  T-spine reviewed, contacted neurosurgery  has been consulted, he will be evaluating the patient today.  He recommended continue Decadron 4 mg every 6 hrs   -- WBC is elevated at 13.3, creatinine at 2.22,  nephrology following    5/10:  -- No acute events overnight, medicine has been stable, patient is alert, awake.  Patient had urinary retention along with hematuria, nursing were not able to pass the Florez, patient  underwent cystoscopy with complex catheterization by Dr. De La Torre on  5/9/2023  -- Neurosurgery continue to follow the patient, patient will need MRI of the thoracic spine with this anesthesia, n.p.o. at midnight.  --In the meantime we will continue Decadron 4 mg every 6 hours as per NS recs  --He was noted to have wound dehiscence on the medial aspect, wound culture pending, will continue Unasyn plus doxycycline.  Patient to be out of bed 3 times daily with meals.    Plan of care has been discussed the patient, nursing staff, case management    5/11:  -- No acute events overnight, culture from the wound growing ESBL E. coli, will need aggressive wound care, patient antibiotics were switched from Unasyn plus doxycycline to Zosyn as it is susceptible to Zosun .  -- Continue isolation per protocol, patient will be undergoing MRI of the T-spine.  --Neurosurgery following, will follow their recommendation.  --Patient is noted to have dark urine coming out, will continue IV fluid for 1 L.  Discussed with the patient, nursing staff at bedside    5/12:  - no  acute events overnight, vital sign has been stable, patient is alert, awake, answering questions appropriately.  Patient renal function continued to get worse, will continue IV fluid  For 1 L.  If patient renal function continued to get worse, consider calling nephrology    MRI of the thoracic spine reviewed.  Neurosurgery PA continue to follow.  Neurosurgeon Dr. Ndiaye will be evaluating the patient on Sunday  -- Blood glucose continues to remain elevated, will adjust insulin accordingly.  WBC is elevated at 13.3, monitor.  Patient's abdominal wound culture grew ESBL E. coli, was on Zosyn, discussed with ID, changed to meropenem.  Please follow ID  recommendation.    Continue renal diet    5/13:  no acute events overnight, vital sign has been stable, patient is alert, awake, answering questions appropriately, neurology continue to follow, recommended obtaining LP to be completed.  I discussed with neurology on 5/12/2023; he stated that we could be missing HSV versus zoster and wanted to have LP completed on Saturday.  -- DVT ppx has held within anticipation of lumbar puncture    Patient continues to remain weak, right lower extremity is more weaker than the left, neurosurgery has been following, Dr. Ndiaye from neurosurgery will be coming evaluating the patient tomorrow to further address possible surgical intervention.    Patient wound is growing ESBL E. coli, will continue to benefit total of 7 days.  Patient renal function continues to remain stable around 2.3, if it continues to get worse, will consider calling nephrology    I have discussed this patient's plan of care and discharge plan at IDT rounds today with Case Management, Nursing, Nursing leadership, and other members of the IDT team.    Consultants/Specialty  general surgery, nephrology, and neurology  ID    Code Status  DNAR/DNI    Disposition  Patient is not medically cleared for discharge.   Anticipate discharge to to an inpatient rehabilitation hospital.  I have placed the appropriate orders for post-discharge needs.    Review of Systems  Review of Systems   Constitutional:  Positive for malaise/fatigue. Negative for fever.   HENT:  Negative for congestion, hearing loss and sore throat.    Eyes:  Negative for blurred vision.   Respiratory:  Negative for cough and shortness of breath.    Cardiovascular:  Negative for chest pain, palpitations and leg swelling.   Gastrointestinal:  Negative for abdominal pain, diarrhea and heartburn.   Genitourinary:  Negative for dysuria.   Musculoskeletal:  Negative for myalgias.   Neurological:  Positive for weakness. Negative for dizziness and speech change.    Psychiatric/Behavioral:  Negative for depression. The patient is not nervous/anxious.    All other systems reviewed and are negative.       Physical Exam  Temp:  [36.6 °C (97.9 °F)-37.1 °C (98.8 °F)] 36.6 °C (97.9 °F)  Pulse:  [66-72] 72  Resp:  [17] 17  BP: (137-148)/(71-78) 137/73  SpO2:  [92 %-94 %] 94 %    Physical Exam  Vitals and nursing note reviewed.   Constitutional:       Appearance: Normal appearance.   HENT:      Head: Normocephalic and atraumatic.   Eyes:      Extraocular Movements: Extraocular movements intact.      Pupils: Pupils are equal, round, and reactive to light.   Cardiovascular:      Rate and Rhythm: Normal rate and regular rhythm.   Pulmonary:      Effort: Pulmonary effort is normal.      Breath sounds: Normal breath sounds.   Abdominal:      Tenderness: There is abdominal tenderness (Mild). There is no guarding or rebound.   Musculoskeletal:      Cervical back: Neck supple.      Right lower leg: No edema.      Left lower leg: No edema.   Skin:     General: Skin is dry.      Coloration: Skin is pale.   Neurological:      Mental Status: He is alert and oriented to person, place, and time.      Cranial Nerves: No cranial nerve deficit.      Motor: Weakness present.         Fluids    Intake/Output Summary (Last 24 hours) at 5/13/2023 1250  Last data filed at 5/13/2023 1200  Gross per 24 hour   Intake --   Output 3550 ml   Net -3550 ml         Laboratory  Recent Labs     05/11/23 0419 05/12/23 0130 05/13/23 0138   WBC 15.0* 13.3* 12.3*   RBC 3.06* 2.95* 3.05*   HEMOGLOBIN 9.1* 8.5* 8.8*   HEMATOCRIT 27.5* 26.8* 27.3*   MCV 89.9 90.8 89.5   MCH 29.7 28.8 28.9   MCHC 33.1* 31.7* 32.2*   RDW 49.5 49.1 48.6   PLATELETCT 463* 457* 427   MPV 10.4 10.8 10.8       Recent Labs     05/11/23 0419 05/12/23  0130 05/13/23 0138   SODIUM 136 135 134*   POTASSIUM 4.8 5.2 5.1   CHLORIDE 103 100 101   CO2 25 26 24   GLUCOSE 244* 316* 204*   BUN 48* 56* 59*   CREATININE 2.08* 2.32* 2.38*   CALCIUM 10.1 9.8  9.5       Recent Labs     05/13/23  0912   APTT 23.7*   INR 1.20*                   Imaging  MR-THORACIC SPINE-WITH & W/O   Final Result      Compared with the previous MRI ,again noted is expansile lesion in the thoracic spinal cord at the levels of T2, T3 and T4. There is prominent right dorsal subarachnoid space at the level of T3 with flattening of the spinal cord. There is mild contrast    enhancement noted in the expanded portion of the spinal cord. 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 Other differential diagnosis includes transverse myelitis However the possibility of right-sided dorsal arachnoid cyst/arachnoid    web at the level of T3 causing spinal cord edema cannot be entirely ruled out. Despite multiple MRs, it is difficult to characterize the tumor. Therefore if there is any neurosurgical planning, CT myelogram is recommended for further characterization.      IR-US GUIDED PIV   Final Result    Ultrasound-guided PERIPHERAL IV INSERTION performed by    qualified nursing staff as above.      MR-THORACIC SPINE-WITH & W/O   Final Result      1.  Limited study due to the motion artifact.   2.  When compared with the previous MRI again noted is expansile T2 hyperintense area in the thoracic spinal cord at the levels of T2, T3 and T4. There is also prominent dorsal right-sided subarachnoid space at this level. The differential diagnosis    includes intramedullary spinal cord tumor and extramedullary cyst/web with cord compression and edema.   3.  Pre and postcontrast MR examination of the thoracic spine is recommended with contrast under General anesthesia.   4.  1 -2 mm  T2-weighted sequences from T2 to T6 with smaller field of view is recommended for further characterization.      MR-THORACIC SPINE-WITH & W/O   Final Result         Stable appearance of expansile nonenhancing signal abnormality involving the upper thoracic cord between T1 and T4. Also noted is a small extra-axial  collection/web along the right posterolateral spinal canal at T3-4 that deforms the cord and displaces    it anteriorly and to the left.   This could represent an Arachnoid web or arachnoid cyst with extensive mass effect and secondary edema of the cord.   Recommend additional thin section T2 and post contrast images through T1-T6 to better assess the abnormality.      MR-BRAIN-WITH & W/O   Final Result         No acute intracranial process.      Age-related volume loss and chronic microvascular ischemic changes.         DX-CHEST-PORTABLE (1 VIEW)   Final Result         1.  Pulmonary edema and/or infiltrates are identified, which are stable since the prior exam.   2.  Cardiomegaly   3.  Atherosclerosis      DX-CHEST-PORTABLE (1 VIEW)   Final Result         1.  Pulmonary edema and/or infiltrates are identified, which are stable since the prior exam.   2.  Left PICC line tip terminates in the left axilla, stable since prior study.      DX-CHEST-PORTABLE (1 VIEW)   Final Result      1.  No significant change.   2.  Possible left PICC line with tip projecting at the axillary vein.      DX-CHEST-PORTABLE (1 VIEW)   Final Result      1.  Mildly improved pulmonary opacities.   2.  Possible left PICC line with tip projecting at the axillary vein.   3.  Likely trace right pleural effusion.      DX-CHEST-LIMITED (1 VIEW)   Final Result      1.  Placement of endotracheal tube with tip projecting over the mid trachea      2.  Left arm catheter present which is presumably venous in projects in the expected position of the left axillary vein      3.  Enlarged cardiac silhouette      NM-HEPATOBILIARY SCAN   Final Result      Hepatobiliary scan findings suspicious for acute cholecystitis.      MR-THORACIC SPINE-WITH & W/O   Final Result      1.  There is abnormal expansile T2 hyperintense lesion in the right side of the thoracic spinal cord at the levels of T2 and T3 Mild contrast enhancement is seen. This lesion is suspicious for  spinal cord neoplasm. Other remote differential diagnosis    includes transverse myelitis.   2.  Exaggerated thoracic kyphosis.   3.  Thoracic dextroscoliosis.   4.  Minimal degenerative changes.   5.  Right pleural effusion.      MR-LUMBAR SPINE-WITH & W/O   Final Result      1.  Multifocal degenerative disease in the lumbar spine as described above.   2.  Moderate central canal and severe lateral recess stenosis at the level of L4-5. The exiting bilateral L5 nerve roots might have been impinged at the lateral recess.      MR-CERVICAL SPINE-WITH & W/O   Final Result      1.  There is abnormal expansile intramedullary T2 signal intensity lesion in the right cerebellum. Thoracic spinal cord at the level of T2 on T3. Mild diffuse contrast enhancement is seen. This finding is suspicious for spinal cord neoplasm. The other    less likely differential diagnosis includes transverse myelitis. Follow-up is recommended after 4 weeks.   2.  Mild degenerative disease in the cervical spine as described above.   3.  There is no evidence of infection in the cervical spine.      US-RUQ   Final Result         1.  Acute cholecystitis.   2.  Atrophic right kidney.      DX-CHEST-LIMITED (1 VIEW)   Final Result      Perihilar interstitial edema and basilar atelectasis and/or consolidation. Underlying infection is possible.      MR-LUMBAR SPINE-W/O   Final Result      1.  Lower lumbar spine predominant degenerative changes as described in detail above, progressed from 2010 MRI.   2.  Edema at the L4-L5 disc space and L5 superior endplate likely represents degenerative changes. Less likely this could represent a low-grade or early discitis osteomyelitis. Correlate for evidence of infection.   3.  Acute appearing Schmorl's node at L3-L4, which can be a source of pain.      US-RENAL   Final Result      1.  Atrophic kidneys. Echogenic bilateral renal parenchyma could relate to medical renal disease.      2.  No hydronephrosis. No renal  calculus.      DX-CHEST-PORTABLE (1 VIEW)   Final Result      No acute cardiac or pulmonary abnormalities are identified.      CT-ABDOMEN-PELVIS W/O   Final Result      1.  Findings suspicious for focal colitis at the hepatic flexure, less likely cholecystitis or duodenitis with secondary involvement of the colon. Infectious, inflammatory and ischemic etiologies are considerations. Underlying mass is not excluded.   2.  Cholelithiasis with distention of the gallbladder   3.  BILATERAL renal atrophy   4.  Subcentimeter LEFT adrenal myelolipoma   5.  12 mm RIGHT adrenal nodule likely an adenoma absent a history of cancer   6.  Subcentimeter RIGHT hepatic lesion, likely a cyst absent a history of cancer   7.  Atherosclerosis   8.  Colonic diverticulosis      CT-HEAD W/O   Final Result      1.  Cerebral atrophy.      2.  White matter lucencies most consistent with small vessel ischemic change versus demyelination or gliosis.      3.  Otherwise, Head CT without contrast with no acute findings. No evidence of acute cerebral infarction, hemorrhage or mass lesion.                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  Neurology evaluated, patient received IVIG on 5/1, discontinued 5/2    Patient does have right lower extremity weakness more than left.  MR T spine showing ; MR T spine expansile T2 hyperintense area in the thoracic spinal cord at the levels of T2, T3 and T4. There is also prominent dorsal right-sided subarachnoid space at this level.  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    -- NS called and recs decadron 4 mg every 6 hrs   On 5/11, MRI of the lumbar spine completed.     Neurosurgeon Dr. Ndiaye will be coming and evaluating,  discussing the surgical option with the patient on 5/14/2023    Urinary retention  Assessment & Plan  [facility].    -- Do not change Florez without urology consent    Abnormal MRI- (present on admission)  Assessment & Plan  MRI on admission showing T2/T3  hyperintense lesion in the right side of the thoracic spinal cord at the levels of T2 and T3 Mild contrast enhancement is seen. This lesion is suspicious for spinal cord neoplasm or possible transverse myelitis       5/8 MRI thoracic spine reviewed and called Neurosurgery  On 5/11: MRI with anaesthesia will completed     Acute gangrenous cholecystitis- (present on admission)  Assessment & Plan  Found to have gangrenous cholecystitis s/p laprascopic converted open cholecystectomy 4/28/2023   -- Completed antibiotic treatment from 4/25- 4/ 29, drain has been removed.  Follow surgical recommendation.    Surgical course was complicated with postoperative wound infection, culture has been obtained, wound care has been consulted  Wound cx growing ESBL E coli, continue meropeneam for 7 days as per ID recs    Hypomagnesemia- (present on admission)  Assessment & Plan  Replete as needed    Cholelithiasis with acute cholecystitis- (present on admission)  Assessment & Plan  S/p surgical removal, converted to open cholecystectomy  NAHUN drain-removed on 5/8/2023  The patient is tolerating a diet already, monitor bowel function    Hypokalemia- (present on admission)  Assessment & Plan  Stable, monitor CMP and replace as needed   - goal K >4    Hyponatremia- (present on admission)  Assessment & Plan  At 134    Colitis- (present on admission)  Assessment & Plan  Cultures negative, monitor  Initial admitting impression, currently improved  Diet as tolerated     Acute kidney injury superimposed on CKD (HCC)- (present on admission)  Assessment & Plan   Resolved    Avoid nephrotoxins       Diabetic peripheral neuropathy (HCC)- (present on admission)  Assessment & Plan  Cymbalta,  medical treatment    Essential hypertension- (present on admission)  Assessment & Plan  Continue amlodipine and coreg    Hypercholesterolemia- (present on admission)  Assessment & Plan  Stable, continue Lipitor    Chronic kidney disease (CKD), stage IV (severe) (HCC)- (present on admission)  Assessment & Plan   Creatinine at 2.38            Hypercalcemia- (present on admission)  Assessment & Plan  Likely due to immobility   Resolved with IVF hydration  Continue to follow and treat accordingly     Thrombocytopenia (HCC)- (present on admission)  Assessment & Plan  Resolved, plt are stable at 427    High anion gap metabolic acidosis- (present on admission)  Assessment & Plan  Acute on chronic kidney disease, -appears back at baseline on 5/3/2023    Resolved    Sepsis (HCC)- (present on admission)  Assessment & Plan  This is Sepsis Present on admission  SIRS criteria identified on my evaluation include: Leukocytosis, with WBC greater than 12,000  Source is colitis  Sepsis protocol initiated  Fluid resuscitation ordered per protocol  Crystalloid Fluid Administration: Fluid resuscitation ordered per standard protocol - 30 mL/kg per current or ideal body weight  IV antibiotics as appropriate for source of sepsis  Reassessment: I have reassessed the patient's hemodynamic status    Due to gangrenous gallbladder requiring open carmelo    -- Today patient was noted to have surgical  wound with drainage   -- wound cx pending  ESBL E coli; id consulted, will switch to meropenem    Type 2 diabetes mellitus with kidney complication, without long-term current use of insulin (HCC)- (present on admission)  Assessment & Plan  Ha1c 6.4% 4 months ago   On ozempic and januvia outpatient   Continue ISS and hypoglycemic protocol while inpatient    -- Has been started on Decadron, his blood glucose is noted to be elevated, started Lantus 10        VTE prophylaxis: scd    I have performed a physical exam and reviewed and updated ROS and Plan  today (5/13/2023). In review of yesterday's note (5/12/2023), there are no changes except as documented above.

## 2023-05-14 LAB
ALBUMIN SERPL BCP-MCNC: 2.5 G/DL (ref 3.2–4.9)
APTT PPP: 20.4 SEC (ref 24.7–36)
BUN SERPL-MCNC: 58 MG/DL (ref 8–22)
CALCIUM ALBUM COR SERPL-MCNC: 11.1 MG/DL (ref 8.5–10.5)
CALCIUM SERPL-MCNC: 9.9 MG/DL (ref 8.5–10.5)
CHLORIDE SERPL-SCNC: 102 MMOL/L (ref 96–112)
CO2 SERPL-SCNC: 24 MMOL/L (ref 20–33)
COPPER SERPL-MCNC: 125.7 UG/DL (ref 70–140)
CREAT SERPL-MCNC: 2.06 MG/DL (ref 0.5–1.4)
EKG IMPRESSION: NORMAL
ERYTHROCYTE [DISTWIDTH] IN BLOOD BY AUTOMATED COUNT: 49.8 FL (ref 35.9–50)
GFR SERPLBLD CREATININE-BSD FMLA CKD-EPI: 32 ML/MIN/1.73 M 2
GLUCOSE BLD STRIP.AUTO-MCNC: 259 MG/DL (ref 65–99)
GLUCOSE BLD STRIP.AUTO-MCNC: 273 MG/DL (ref 65–99)
GLUCOSE BLD STRIP.AUTO-MCNC: 282 MG/DL (ref 65–99)
GLUCOSE BLD STRIP.AUTO-MCNC: 301 MG/DL (ref 65–99)
GLUCOSE SERPL-MCNC: 221 MG/DL (ref 65–99)
HCT VFR BLD AUTO: 33.2 % (ref 42–52)
HGB BLD-MCNC: 10.8 G/DL (ref 14–18)
INR PPP: 1.09 (ref 0.87–1.13)
MCH RBC QN AUTO: 29.3 PG (ref 27–33)
MCHC RBC AUTO-ENTMCNC: 32.5 G/DL (ref 33.7–35.3)
MCV RBC AUTO: 90.2 FL (ref 81.4–97.8)
PHOSPHATE SERPL-MCNC: 3.2 MG/DL (ref 2.5–4.5)
PLATELET # BLD AUTO: 482 K/UL (ref 164–446)
PMV BLD AUTO: 10.9 FL (ref 9–12.9)
POTASSIUM SERPL-SCNC: 5.1 MMOL/L (ref 3.6–5.5)
PROTHROMBIN TIME: 14 SEC (ref 12–14.6)
RBC # BLD AUTO: 3.68 M/UL (ref 4.7–6.1)
SODIUM SERPL-SCNC: 136 MMOL/L (ref 135–145)
WBC # BLD AUTO: 11.7 K/UL (ref 4.8–10.8)

## 2023-05-14 PROCEDURE — 85027 COMPLETE CBC AUTOMATED: CPT

## 2023-05-14 PROCEDURE — A9270 NON-COVERED ITEM OR SERVICE: HCPCS | Performed by: HOSPITALIST

## 2023-05-14 PROCEDURE — 302098 PASTE RING (FLAT): Performed by: HOSPITALIST

## 2023-05-14 PROCEDURE — 700105 HCHG RX REV CODE 258: Performed by: HOSPITALIST

## 2023-05-14 PROCEDURE — 82962 GLUCOSE BLOOD TEST: CPT | Mod: 91

## 2023-05-14 PROCEDURE — 700102 HCHG RX REV CODE 250 W/ 637 OVERRIDE(OP): Performed by: HOSPITALIST

## 2023-05-14 PROCEDURE — A9270 NON-COVERED ITEM OR SERVICE: HCPCS | Performed by: SURGERY

## 2023-05-14 PROCEDURE — A9270 NON-COVERED ITEM OR SERVICE: HCPCS | Performed by: FAMILY MEDICINE

## 2023-05-14 PROCEDURE — 700102 HCHG RX REV CODE 250 W/ 637 OVERRIDE(OP): Performed by: SURGERY

## 2023-05-14 PROCEDURE — 99233 SBSQ HOSP IP/OBS HIGH 50: CPT | Performed by: HOSPITALIST

## 2023-05-14 PROCEDURE — 700101 HCHG RX REV CODE 250: Performed by: HOSPITALIST

## 2023-05-14 PROCEDURE — 770001 HCHG ROOM/CARE - MED/SURG/GYN PRIV*

## 2023-05-14 PROCEDURE — 93010 ELECTROCARDIOGRAM REPORT: CPT | Performed by: INTERNAL MEDICINE

## 2023-05-14 PROCEDURE — 85730 THROMBOPLASTIN TIME PARTIAL: CPT

## 2023-05-14 PROCEDURE — 97602 WOUND(S) CARE NON-SELECTIVE: CPT

## 2023-05-14 PROCEDURE — 36415 COLL VENOUS BLD VENIPUNCTURE: CPT

## 2023-05-14 PROCEDURE — 700102 HCHG RX REV CODE 250 W/ 637 OVERRIDE(OP): Performed by: FAMILY MEDICINE

## 2023-05-14 PROCEDURE — 93005 ELECTROCARDIOGRAM TRACING: CPT | Performed by: PHYSICIAN ASSISTANT

## 2023-05-14 PROCEDURE — 80069 RENAL FUNCTION PANEL: CPT

## 2023-05-14 PROCEDURE — 97605 NEG PRS WND THER DME<=50SQCM: CPT

## 2023-05-14 PROCEDURE — 700111 HCHG RX REV CODE 636 W/ 250 OVERRIDE (IP): Performed by: HOSPITALIST

## 2023-05-14 PROCEDURE — 85610 PROTHROMBIN TIME: CPT

## 2023-05-14 PROCEDURE — 99232 SBSQ HOSP IP/OBS MODERATE 35: CPT | Performed by: PSYCHIATRY & NEUROLOGY

## 2023-05-14 RX ORDER — SODIUM HYPOCHLORITE 1.25 MG/ML
SOLUTION TOPICAL
Status: DISCONTINUED | OUTPATIENT
Start: 2023-05-14 | End: 2023-05-25

## 2023-05-14 RX ADMIN — CARVEDILOL 12.5 MG: 12.5 TABLET, FILM COATED ORAL at 17:55

## 2023-05-14 RX ADMIN — SPIRONOLACTONE 25 MG: 50 TABLET ORAL at 05:02

## 2023-05-14 RX ADMIN — LIDOCAINE HYDROCHLORIDE 1 APPLICATION: 20 JELLY TOPICAL at 05:07

## 2023-05-14 RX ADMIN — AMLODIPINE BESYLATE 5 MG: 10 TABLET ORAL at 05:02

## 2023-05-14 RX ADMIN — OXYCODONE HYDROCHLORIDE 10 MG: 10 TABLET ORAL at 12:48

## 2023-05-14 RX ADMIN — DEXAMETHASONE SODIUM PHOSPHATE 4 MG: 4 INJECTION, SOLUTION INTRA-ARTICULAR; INTRALESIONAL; INTRAMUSCULAR; INTRAVENOUS; SOFT TISSUE at 05:05

## 2023-05-14 RX ADMIN — MEROPENEM 500 MG: 500 INJECTION INTRAVENOUS at 20:59

## 2023-05-14 RX ADMIN — SODIUM HYPOCHLORITE 1 ML: 1.25 SOLUTION TOPICAL at 05:07

## 2023-05-14 RX ADMIN — DEXAMETHASONE SODIUM PHOSPHATE 4 MG: 4 INJECTION, SOLUTION INTRA-ARTICULAR; INTRALESIONAL; INTRAMUSCULAR; INTRAVENOUS; SOFT TISSUE at 01:58

## 2023-05-14 RX ADMIN — DEXAMETHASONE SODIUM PHOSPHATE 4 MG: 4 INJECTION, SOLUTION INTRA-ARTICULAR; INTRALESIONAL; INTRAMUSCULAR; INTRAVENOUS; SOFT TISSUE at 17:55

## 2023-05-14 RX ADMIN — TAMSULOSIN HYDROCHLORIDE 0.4 MG: 0.4 CAPSULE ORAL at 09:38

## 2023-05-14 RX ADMIN — LIDOCAINE HYDROCHLORIDE 1 APPLICATION: 40 SOLUTION TOPICAL at 12:22

## 2023-05-14 RX ADMIN — CARVEDILOL 12.5 MG: 12.5 TABLET, FILM COATED ORAL at 09:38

## 2023-05-14 RX ADMIN — DEXAMETHASONE SODIUM PHOSPHATE 4 MG: 4 INJECTION, SOLUTION INTRA-ARTICULAR; INTRALESIONAL; INTRAMUSCULAR; INTRAVENOUS; SOFT TISSUE at 23:40

## 2023-05-14 RX ADMIN — SODIUM BICARBONATE 1300 MG: 650 TABLET ORAL at 17:55

## 2023-05-14 RX ADMIN — MEROPENEM 500 MG: 500 INJECTION INTRAVENOUS at 05:11

## 2023-05-14 RX ADMIN — DULOXETINE HYDROCHLORIDE 30 MG: 30 CAPSULE, DELAYED RELEASE ORAL at 17:55

## 2023-05-14 RX ADMIN — INSULIN HUMAN 5 UNITS: 100 INJECTION, SOLUTION PARENTERAL at 18:02

## 2023-05-14 RX ADMIN — DEXAMETHASONE SODIUM PHOSPHATE 4 MG: 4 INJECTION, SOLUTION INTRA-ARTICULAR; INTRALESIONAL; INTRAMUSCULAR; INTRAVENOUS; SOFT TISSUE at 11:56

## 2023-05-14 RX ADMIN — MEROPENEM 500 MG: 500 INJECTION INTRAVENOUS at 13:36

## 2023-05-14 RX ADMIN — INSULIN HUMAN 5 UNITS: 100 INJECTION, SOLUTION PARENTERAL at 20:53

## 2023-05-14 RX ADMIN — SODIUM BICARBONATE 1300 MG: 650 TABLET ORAL at 05:02

## 2023-05-14 RX ADMIN — INSULIN HUMAN 6 UNITS: 100 INJECTION, SOLUTION PARENTERAL at 09:52

## 2023-05-14 RX ADMIN — INSULIN GLARGINE-YFGN 10 UNITS: 100 INJECTION, SOLUTION SUBCUTANEOUS at 09:52

## 2023-05-14 RX ADMIN — OXYCODONE HYDROCHLORIDE 10 MG: 10 TABLET ORAL at 20:38

## 2023-05-14 RX ADMIN — ATORVASTATIN CALCIUM 10 MG: 10 TABLET, FILM COATED ORAL at 17:55

## 2023-05-14 RX ADMIN — INSULIN HUMAN 5 UNITS: 100 INJECTION, SOLUTION PARENTERAL at 12:52

## 2023-05-14 RX ADMIN — FAMOTIDINE 20 MG: 20 TABLET, FILM COATED ORAL at 05:02

## 2023-05-14 RX ADMIN — ALLOPURINOL 100 MG: 100 TABLET ORAL at 05:02

## 2023-05-14 ASSESSMENT — PAIN DESCRIPTION - PAIN TYPE
TYPE: ACUTE PAIN

## 2023-05-14 ASSESSMENT — ENCOUNTER SYMPTOMS
SPEECH CHANGE: 0
MYALGIAS: 0
DIZZINESS: 0
PALPITATIONS: 0
ABDOMINAL PAIN: 0
NERVOUS/ANXIOUS: 0
COUGH: 0
BLURRED VISION: 0
DEPRESSION: 0
SHORTNESS OF BREATH: 0
DIARRHEA: 0
WEAKNESS: 1
FEVER: 0
SORE THROAT: 0
HEARTBURN: 0

## 2023-05-14 NOTE — PROCEDURES
Procedure Lumbar Puncture    Date/Time: 5/13/2023 6:51 PM    Performed by: Josemanuel Elam M.D.  Authorized by: Josemanuel Elam M.D.    Consent:     Consent obtained:  Verbal and written    Consent given by:  Patient    Risks discussed:  Bleeding, nerve damage, headache, repeat procedure, pain and infection    Alternatives discussed:  No treatment  Universal protocol:     Procedure explained and questions answered to patient or proxy's satisfaction: yes      Immediately prior to procedure a time out was called: yes      Site/side marked: yes      Patient identity confirmed:  Verbally with patient and provided demographic data  Pre-procedure details:     Procedure purpose:  Diagnostic    Preparation: Patient was prepped and draped in usual sterile fashion    Anesthesia:     Anesthesia method:  Local infiltration    Local anesthetic:  Lidocaine 1% w/o epi  Procedure details:     Lumbar space:  L4-L5 interspace    Patient position:  Sitting    Needle gauge:  20    Needle type:  Spinal needle - Quincke tip    Needle length (in):  3.5    Ultrasound guidance: no      Number of attempts:  2    Fluid appearance:  Blood-tinged then clearing    Tubes of fluid:  1    Total volume (ml):  31  Post-procedure:     Puncture site:  Adhesive bandage applied    Patient tolerance of procedure:  Tolerated well, no immediate complications

## 2023-05-14 NOTE — WOUND TEAM
Assisted Zahira HAWTHORNE (Wound RN), RN with wound care, non-selective debridement performed using wound cleanser/NS and gauze. Please see Zahira HAWTHORNE (Wound RN) wound note for further wound care details.

## 2023-05-14 NOTE — PROGRESS NOTES
"Neurology Follow-up  Neurohospitalist Service, [facility]    Referring Physician: Smiley Cain M.D.    Chief Complaint   Patient presents with    GLF     2 days ago. (-) thinners    Extremity Weakness     Chronic lower bilateral weakness.       Interval history:   -No new neurological symptoms overnight  -Patient was seen by neurosurgery today  -Plan is for surgical biopsy/exploration of the cyst tomorrow    Review of systems: In addition to what is detailed in the HPI above, all other systems reviewed and are negative.    Past Medical History:    has a past medical history of Abdominal pain, Abnormal electrocardiogram, ACE inhibitor intolerance, BPH (benign prostatic hyperplasia), Bruxism, Cancer ([facility]), Cataract, Chickenpox, Cholesterol blood decreased, Chronic kidney disease (CKD) (2/27/2021), Chronic kidney disease, stage III (moderate) (HCC), Cold, Congestion of both ears (2/27/2021), Cough, Depression, Dermatochalasis of eyelid (7/23/2014), Diabetes, Difficulty breathing, Fever, GERD (gastroesophageal reflux disease), GI bleed (12/8/2021), GOUT, Gout, History of skull fracture, History of urinary tract infection, Hyperlipidemia, Hypertension, Indigestion, Influenza, Insomnia, Mixed hyperlipidemia, Orthopnea, Pneumonia (9/2015), Proteinuria, Purulent nasal discharge (12/7/2015), Ringing in ears, Shortness of breath, Sputum production, Tonsillitis, Type 2 diabetes mellitus (HCC), Wears glasses, and Weight loss.    FHx:  family history includes Cancer in his brother; Diabetes in his father; Heart Attack (age of onset: 79) in his father.    SHx:   reports that he quit smoking about 58 years ago. His smoking use included cigarettes. He has a 1.20 pack-year smoking history. He has never used smokeless tobacco. He reports that he does not drink alcohol and does not use drugs.    Allergies:  Allergies   Allergen Reactions    Ether Unspecified     \"Violently sick\"       Medications:    Current " Facility-Administered Medications:     [START ON 5/15/2023] insulin GLARGINE (Lantus,Semglee) injection, 15 Units, Subcutaneous, QAM INSULIN, Smiley Cain M.D.    insulin GLARGINE (Lantus,Semglee) injection, 5 Units, Subcutaneous, Q EVENING, Smiley Cain M.D.    dakins 0.125% (1/4 strength) topical soln, , Topical, QDAY PRN, Smiley Cain M.D.    lidocaine jelly (Uro-Jet) 2 % 1 Application., 1 Application., Topical, QDAY PRN, Smiley Cain M.D., 1 Application. at 05/14/23 0507    lidocaine (XYLOCAINE) 2 % injection 20 mL, 20 mL, Topical, QDAY PRN **OR** lidocaine (XYLOCAINE) 4 % topical solution, , Topical, QDAY PRN, Smiley Cain M.D., 1 Application at 05/14/23 1222    meropenem (Merrem) 500 mg in  mL IV-MBP, 500 mg, Intravenous, Q8HRS, Smiley Cain M.D., Stopped at 05/14/23 0541    dexamethasone (DECADRON) injection 4 mg, 4 mg, Intravenous, Q6HRS, Smiley Cain M.D., 4 mg at 05/14/23 1156    senna-docusate (PERICOLACE or SENOKOT S) 8.6-50 MG per tablet 2 Tablet, 2 Tablet, Oral, BID, 2 Tablet at 05/13/23 0430 **AND** polyethylene glycol/lytes (MIRALAX) PACKET 1 Packet, 1 Packet, Oral, QDAY PRN, 1 Packet at 05/12/23 2113 **AND** magnesium hydroxide (MILK OF MAGNESIA) suspension 30 mL, 30 mL, Oral, QDAY PRN **AND** bisacodyl (DULCOLAX) suppository 10 mg, 10 mg, Rectal, QDAY PRN, Smiley Cain M.D.    tamsulosin (FLOMAX) capsule 0.4 mg, 0.4 mg, Oral, AFTER BREAKFAST, Smiley Cain M.D., 0.4 mg at 05/14/23 0938    famotidine (PEPCID) tablet 20 mg, 20 mg, Oral, DAILY, 20 mg at 05/14/23 0502 **OR** [DISCONTINUED] famotidine (PEPCID) injection 20 mg, 20 mg, Intravenous, DAILY, Pedro Luis Us M.D., 20 mg at 05/01/23 0529    oxyCODONE immediate-release (ROXICODONE) tablet 5 mg, 5 mg, Oral, Q3HRS PRN, 5 mg at 05/13/23 2147 **OR** oxyCODONE immediate release (ROXICODONE) tablet 10 mg, 10 mg, Oral, Q3HRS PRN, 10 mg at 05/14/23 1248 **OR** [DISCONTINUED] HYDROmorphone (Dilaudid) injection 0.5-1 mg, 0.5-1  mg, Intravenous, Q2HRS PRN, Ema Scanlon M.D., 1 mg at 04/28/23 2212    morphine 4 MG/ML injection 2-4 mg, 2-4 mg, Intravenous, Q3HRS PRN, Eric Fowler M.D., 4 mg at 05/03/23 2322    Respiratory Therapy Consult, , Nebulization, Continuous RT, Pedro Luis Us M.D.    amLODIPine (NORVASC) tablet 5 mg, 5 mg, Oral, Q DAY, Félix Smith M.D., 5 mg at 05/14/23 0502    labetalol (NORMODYNE/TRANDATE) injection 10 mg, 10 mg, Intravenous, Q4HRS PRN, Félix Smith M.D.    hydrALAZINE (APRESOLINE) injection 10 mg, 10 mg, Intravenous, Q6HRS PRN, Félix Smith M.D., 10 mg at 04/28/23 0440    atorvastatin (LIPITOR) tablet 10 mg, 10 mg, Oral, DAILY AT 1800, Smiley Cain M.D., 10 mg at 05/13/23 1759    allopurinol (ZYLOPRIM) tablet 100 mg, 100 mg, Oral, DAILY, Smiley Cain M.D., 100 mg at 05/14/23 0502    carvedilol (COREG) tablet 12.5 mg, 12.5 mg, Oral, BID WITH MEALS, Félix Smith M.D., 12.5 mg at 05/14/23 0938    spironolactone (ALDACTONE) tablet 25 mg, 25 mg, Oral, Q DAY, Félix Smith M.D., 25 mg at 05/14/23 0502    sodium bicarbonate tablet 1,300 mg, 1,300 mg, Oral, BID, Smiley Cain M.D., 1,300 mg at 05/14/23 0502    DULoxetine (CYMBALTA) capsule 30 mg, 30 mg, Oral, Q EVENING, Ronaldo Perez M.D., 30 mg at 05/13/23 1759    acetaminophen (Tylenol) tablet 650 mg, 650 mg, Oral, Q6HRS PRN, Ronaldo Perez M.D., 650 mg at 04/29/23 1059    insulin regular (HumuLIN R,NovoLIN R) injection, 2-9 Units, Subcutaneous, 4X/DAY ACHS, 5 Units at 05/14/23 1252 **AND** POC blood glucose manual result, , , Q AC AND BEDTIME(S) **AND** NOTIFY MD and PharmD, , , Once **AND** Administer 20 grams of glucose (approximately 8 ounces of fruit juice) every 15 minutes PRN FSBG less than 70 mg/dL, , , PRN **AND** dextrose 10 % BOLUS 25 g, 25 g, Intravenous, Q15 MIN PRN, Ronaldo Perez M.D.    Pharmacy Consult Request ...Pain Management Review 1 Each, 1 Each, Other, PHARMACY TO DOSE, Eli Pandya,  "A.P.R.N.    Physical Examination:     General: Patient is awake and in no acute distress    NEUROLOGICAL EXAM:     /64   Pulse 68   Temp 36.3 °C (97.3 °F) (Temporal)   Resp 17   Ht 1.803 m (5' 10.98\")   Wt 89.1 kg (196 lb 6.9 oz)   SpO2 92%   BMI 27.41 kg/m²     Mental status: Awake, alert and fully oriented  Speech and language: Speech is clear and fluent. The patient is able to name and repeat, and follow commands  Cranial nerve exam: Pupils are equal, round and reactive to light bilaterally. Visual fields are full. There is no nystagmus. Extraocular muscles are intact. Face is symmetric.   Motor exam: There is no pronator drift.  4/5 in bilateral lower extremities, right slightly weaker than the left  Sensory exam: Reacts to tactile in all 4 extremities, no neglect to double stim   Deep tendon reflexes:  2+ throughout.   Gait: Deferred due to patient preference    Objective Data:    Labs:  Lab Results   Component Value Date/Time    PROTHROMBTM 14.0 05/14/2023 06:22 AM    INR 1.09 05/14/2023 06:22 AM      Lab Results   Component Value Date/Time    WBC 11.7 (H) 05/14/2023 06:22 AM    RBC 3.68 (L) 05/14/2023 06:22 AM    HEMOGLOBIN 10.8 (L) 05/14/2023 06:22 AM    HEMATOCRIT 33.2 (L) 05/14/2023 06:22 AM    MCV 90.2 05/14/2023 06:22 AM    MCH 29.3 05/14/2023 06:22 AM    MCHC 32.5 (L) 05/14/2023 06:22 AM    MPV 10.9 05/14/2023 06:22 AM    NEUTSPOLYS 85.10 (H) 05/13/2023 01:38 AM    LYMPHOCYTES 8.90 (L) 05/13/2023 01:38 AM    MONOCYTES 4.80 05/13/2023 01:38 AM    EOSINOPHILS 0.00 05/13/2023 01:38 AM    BASOPHILS 0.10 05/13/2023 01:38 AM    HYPOCHROMIA 1+ 07/18/2014 12:15 PM      Lab Results   Component Value Date/Time    SODIUM 136 05/14/2023 06:22 AM    POTASSIUM 5.1 05/14/2023 06:22 AM    CHLORIDE 102 05/14/2023 06:22 AM    CO2 24 05/14/2023 06:22 AM    GLUCOSE 221 (H) 05/14/2023 06:22 AM    BUN 58 (H) 05/14/2023 06:22 AM    CREATININE 2.06 (H) 05/14/2023 06:22 AM    CREATININE 1.4 02/06/2009 03:00 AM    "   Lab Results   Component Value Date/Time    CHOLSTRLTOT 151 04/03/2023 04:24 PM    LDL 79 04/03/2023 04:24 PM    HDL 35 (A) 04/03/2023 04:24 PM    TRIGLYCERIDE 138 04/28/2023 09:03 PM       Lab Results   Component Value Date/Time    ALKPHOSPHAT 305 (H) 05/05/2023 10:09 AM    ASTSGOT 94 (H) 05/05/2023 10:09 AM    ALTSGPT 77 (H) 05/05/2023 10:09 AM    TBILIRUBIN 0.5 05/05/2023 10:09 AM        Imaging/Testing:    I interpreted and/or reviewed the patient's neuroimaging    MR-THORACIC SPINE-WITH & W/O   Final Result      Compared with the previous MRI ,again noted is expansile lesion in the thoracic spinal cord at the levels of T2, T3 and T4. There is prominent right dorsal subarachnoid space at the level of T3 with flattening of the spinal cord. There is mild contrast    enhancement noted in the expanded portion of the spinal cord. 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 Other differential diagnosis includes transverse myelitis However the possibility of right-sided dorsal arachnoid cyst/arachnoid    web at the level of T3 causing spinal cord edema cannot be entirely ruled out. Despite multiple MRs, it is difficult to characterize the tumor. Therefore if there is any neurosurgical planning, CT myelogram is recommended for further characterization.      IR-US GUIDED PIV   Final Result    Ultrasound-guided PERIPHERAL IV INSERTION performed by    qualified nursing staff as above.      MR-THORACIC SPINE-WITH & W/O   Final Result      1.  Limited study due to the motion artifact.   2.  When compared with the previous MRI again noted is expansile T2 hyperintense area in the thoracic spinal cord at the levels of T2, T3 and T4. There is also prominent dorsal right-sided subarachnoid space at this level. The differential diagnosis    includes intramedullary spinal cord tumor and extramedullary cyst/web with cord compression and edema.   3.  Pre and postcontrast MR examination of the thoracic spine is  recommended with contrast under General anesthesia.   4.  1 -2 mm  T2-weighted sequences from T2 to T6 with smaller field of view is recommended for further characterization.      MR-THORACIC SPINE-WITH & W/O   Final Result         Stable appearance of expansile nonenhancing signal abnormality involving the upper thoracic cord between T1 and T4. Also noted is a small extra-axial collection/web along the right posterolateral spinal canal at T3-4 that deforms the cord and displaces    it anteriorly and to the left.   This could represent an Arachnoid web or arachnoid cyst with extensive mass effect and secondary edema of the cord.   Recommend additional thin section T2 and post contrast images through T1-T6 to better assess the abnormality.      MR-BRAIN-WITH & W/O   Final Result         No acute intracranial process.      Age-related volume loss and chronic microvascular ischemic changes.         DX-CHEST-PORTABLE (1 VIEW)   Final Result         1.  Pulmonary edema and/or infiltrates are identified, which are stable since the prior exam.   2.  Cardiomegaly   3.  Atherosclerosis      DX-CHEST-PORTABLE (1 VIEW)   Final Result         1.  Pulmonary edema and/or infiltrates are identified, which are stable since the prior exam.   2.  Left PICC line tip terminates in the left axilla, stable since prior study.      DX-CHEST-PORTABLE (1 VIEW)   Final Result      1.  No significant change.   2.  Possible left PICC line with tip projecting at the axillary vein.      DX-CHEST-PORTABLE (1 VIEW)   Final Result      1.  Mildly improved pulmonary opacities.   2.  Possible left PICC line with tip projecting at the axillary vein.   3.  Likely trace right pleural effusion.      DX-CHEST-LIMITED (1 VIEW)   Final Result      1.  Placement of endotracheal tube with tip projecting over the mid trachea      2.  Left arm catheter present which is presumably venous in projects in the expected position of the left axillary vein      3.   Enlarged cardiac silhouette      NM-HEPATOBILIARY SCAN   Final Result      Hepatobiliary scan findings suspicious for acute cholecystitis.      MR-THORACIC SPINE-WITH & W/O   Final Result      1.  There is abnormal expansile T2 hyperintense lesion in the right side of the thoracic spinal cord at the levels of T2 and T3 Mild contrast enhancement is seen. This lesion is suspicious for spinal cord neoplasm. Other remote differential diagnosis    includes transverse myelitis.   2.  Exaggerated thoracic kyphosis.   3.  Thoracic dextroscoliosis.   4.  Minimal degenerative changes.   5.  Right pleural effusion.      MR-LUMBAR SPINE-WITH & W/O   Final Result      1.  Multifocal degenerative disease in the lumbar spine as described above.   2.  Moderate central canal and severe lateral recess stenosis at the level of L4-5. The exiting bilateral L5 nerve roots might have been impinged at the lateral recess.      MR-CERVICAL SPINE-WITH & W/O   Final Result      1.  There is abnormal expansile intramedullary T2 signal intensity lesion in the right cerebellum. Thoracic spinal cord at the level of T2 on T3. Mild diffuse contrast enhancement is seen. This finding is suspicious for spinal cord neoplasm. The other    less likely differential diagnosis includes transverse myelitis. Follow-up is recommended after 4 weeks.   2.  Mild degenerative disease in the cervical spine as described above.   3.  There is no evidence of infection in the cervical spine.      US-RUQ   Final Result         1.  Acute cholecystitis.   2.  Atrophic right kidney.      DX-CHEST-LIMITED (1 VIEW)   Final Result      Perihilar interstitial edema and basilar atelectasis and/or consolidation. Underlying infection is possible.      MR-LUMBAR SPINE-W/O   Final Result      1.  Lower lumbar spine predominant degenerative changes as described in detail above, progressed from 2010 MRI.   2.  Edema at the L4-L5 disc space and L5 superior endplate likely represents  degenerative changes. Less likely this could represent a low-grade or early discitis osteomyelitis. Correlate for evidence of infection.   3.  Acute appearing Schmorl's node at L3-L4, which can be a source of pain.      US-RENAL   Final Result      1.  Atrophic kidneys. Echogenic bilateral renal parenchyma could relate to medical renal disease.      2.  No hydronephrosis. No renal calculus.      DX-CHEST-PORTABLE (1 VIEW)   Final Result      No acute cardiac or pulmonary abnormalities are identified.      CT-ABDOMEN-PELVIS W/O   Final Result      1.  Findings suspicious for focal colitis at the hepatic flexure, less likely cholecystitis or duodenitis with secondary involvement of the colon. Infectious, inflammatory and ischemic etiologies are considerations. Underlying mass is not excluded.   2.  Cholelithiasis with distention of the gallbladder   3.  BILATERAL renal atrophy   4.  Subcentimeter LEFT adrenal myelolipoma   5.  12 mm RIGHT adrenal nodule likely an adenoma absent a history of cancer   6.  Subcentimeter RIGHT hepatic lesion, likely a cyst absent a history of cancer   7.  Atherosclerosis   8.  Colonic diverticulosis      CT-HEAD W/O   Final Result      1.  Cerebral atrophy.      2.  White matter lucencies most consistent with small vessel ischemic change versus demyelination or gliosis.      3.  Otherwise, Head CT without contrast with no acute findings. No evidence of acute cerebral infarction, hemorrhage or mass lesion.             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      Based on his overall clinical course and imaging findings so  far; transverse myelitis still remains a possibility; especially neuromyelitis optica and I would like to repeat the spinal tap; my differential include transverse myelitis versus CNS lymphoma versus intramedullary tumor/mass.    Neurosurgery is also on board and patient is planned for exploration of the cyst versus biopsy tomorrow.  He spinal tap done on 5/13/2023 showed cell count of 2, proteins 59.  Neurology will continue to follow-up with the patient may be every other day.     Recommendations:  -Will follow up on CSF labs for IgG index, paraneoplastic panel, VZV and HSV PCR, cryptococcal, CSF VDRL, ACE levels  -Will follow up on CSF cytology and CSF LDH  -Will follow-up on blood work for serum vitamin D levels, serum copper, FELICITA, anti- AQP4 and anti-MOG antibodies  -PT/OT  -Neurosurgery is consulted, appreciate their recs     Thank you for letting me participate in the care for this patient.  Neurology will continue to follow and will see the patient every other day.    Aldo Ray MD  Board certified in Neurology and Epilepsy (ABPN)   Board certified in Headache Medicine (UCNS)  Neurohospitalist, Acute Care Services

## 2023-05-14 NOTE — CARE PLAN
The patient is Stable - Low risk of patient condition declining or worsening    Shift Goals  Clinical Goals: Pain control, mobility, wound care, IV ABX, 2 RN skin check, Q2 turns, rest  Patient Goals: Pain control, Lidocaine prior to dressing change, rest  Family Goals: N/A    Progress made toward(s) clinical / shift goals:  Medicated for pain; see MAR. Wound care tolerated after Lidocaine application. Patient tolerating IV ABX. 2 RN skin check performed; see prior PN. Patient slept intermittently during shift; stating he ws extremely anxious regarding results of the day.      Patient is not progressing towards the following goals: NA.

## 2023-05-14 NOTE — PROGRESS NOTES
Report received from AM RN; assumed care. Assessment/2 RN skin check completed. A&O x 4. VSS, intermittent HTN/bradycardia noted. 95% on 4, titrated to 1L NC. Patient denying SOB, tingling, nausea, vomiting, dizziness.  Numbness reported to RLE. Medicated for pain; see MAR. Abdomen obese, round, tender. Lumbar puncture site with bandaid, HA resolved after 1 hour per patient report. + void; yellow urine noted in rivera. + eructation. + flatus. LBM 05/13. Patient tolerating renal diet. Patient stood EOB per patient report with MD present. Patient tolerating IV ABX. Discussed plan of care with patient. All questions answered.  High fall risk. Bed's alarm engaged. Bed in locked/lowest position.  Call light/personal belongings within reach.  All needs met, patient sleeping at present time.

## 2023-05-14 NOTE — PROGRESS NOTES
Neurosurgery Progress Note    Subjective:  83 y.o. male who presented 2023 with a very complicated picture.  He has stage IV CKD. S/p cholecystectomy.  With regards to his neurologic symptoms, he reports on the day of admission he had immediate and complete loss of lower extremity strength that has waxed and waned but has overall improved significantly since admission.   Currently denies neurologic symptoms other than weakness in the lower extremities, still unable to walk.    Denies bowel or bladder dysfunction.  Denies numbness in the lower extremities.    Denies radicular pain.     S/p multiple MRIs of the brain, thoracic spine.  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  MRI from 2023 shows T2 signal change within the spinal cord more proximally over multiple levels.      Repeat thoracic MRI w/ contrast under anesthesia  did not lend much more clarity regarding the lesion. 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 Dr. Springer discussed imaging findings with multiple radiologists.      Underwent LP , pt tolerated well     Exam:  Awake, alert oriented x4.   Appropriate and cooperative.   Motor exam reveals 4/5 to the LLE, 4+/5 to the RLE   Sensation intact to light touch.   No clonus       BP  Min: 131/75  Max: 148/78  Pulse  Av.4  Min: 56  Max: 70  Resp  Av.2  Min: 16  Max: 18  Temp  Av.6 °C (97.8 °F)  Min: 36.3 °C (97.3 °F)  Max: 36.7 °C (98.1 °F)  Monitored Temp 2  Av.9 °C (98.4 °F)  Min: 36.9 °C (98.4 °F)  Max: 36.9 °C (98.4 °F)  SpO2  Av.4 %  Min: 92 %  Max: 95 %    No data recorded    Recent Labs     23  0130 23  0138 23  0622   WBC 13.3* 12.3* 11.7*   RBC 2.95* 3.05* 3.68*   HEMOGLOBIN 8.5* 8.8* 10.8*    HEMATOCRIT 26.8* 27.3* 33.2*   MCV 90.8 89.5 90.2   MCH 28.8 28.9 29.3   MCHC 31.7* 32.2* 32.5*   RDW 49.1 48.6 49.8   PLATELETCT 457* 427 482*   MPV 10.8 10.8 10.9       Recent Labs     05/12/23  0130 05/13/23  0138 05/14/23  0622   SODIUM 135 134* 136   POTASSIUM 5.2 5.1 5.1   CHLORIDE 100 101 102   CO2 26 24 24   GLUCOSE 316* 204* 221*   BUN 56* 59* 58*   CREATININE 2.32* 2.38* 2.06*   CALCIUM 9.8 9.5 9.9       Recent Labs     05/13/23  0912 05/14/23  0622   APTT 23.7* 20.4*   INR 1.20* 1.09           Intake/Output                         05/13/23 0700 - 05/14/23 0659 05/14/23 0700 - 05/15/23 0659     [id number] Total [id number] 0962-7480 Total                 Intake    Total Intake -- -- -- -- -- --       Output    Urine  2200  2200 4400  400  -- 400    Output (mL) (Urethral Catheter) 2200 2200 4400 400 -- 400    Stool  --  -- --  --  -- --    Number of Times Stooled 1 x 1 x 2 x 1 x -- 1 x    Total Output 2200 2200 4400 400 -- 400       Net I/O     -2200 -2200 -4400 -400 -- -400              Intake/Output Summary (Last 24 hours) at 5/14/2023 1006  Last data filed at 5/14/2023 0725  Gross per 24 hour   Intake --   Output 4200 ml   Net -4200 ml               dakins 0.125% (1/4 strength)   BID    lidocaine jelly  1 Application. QDAY PRN    lidocaine  20 mL QDAY PRN    Or    lidocaine   QDAY PRN    insulin GLARGINE  10 Units QAM INSULIN    meropenem  500 mg Q8HRS    dexamethasone  4 mg Q6HRS    senna-docusate  2 Tablet BID    And    polyethylene glycol/lytes  1 Packet QDAY PRN    And    magnesium hydroxide  30 mL QDAY PRN    And    bisacodyl  10 mg QDAY PRN    tamsulosin  0.4 mg AFTER BREAKFAST    famotidine  20 mg DAILY    oxyCODONE immediate-release  5 mg Q3HRS PRN    Or    oxyCODONE immediate-release  10 mg Q3HRS PRN    morphine injection  2-4 mg Q3HRS PRN    Respiratory Therapy Consult   Continuous RT    amLODIPine  5 mg Q DAY    labetalol  10 mg Q4HRS PRN    hydrALAZINE  10 mg Q6HRS PRN     atorvastatin  10 mg DAILY AT 1800    allopurinol  100 mg DAILY    carvedilol  12.5 mg BID WITH MEALS    spironolactone  25 mg Q DAY    sodium bicarbonate  1,300 mg BID    DULoxetine  30 mg Q EVENING    acetaminophen  650 mg Q6HRS PRN    insulin regular  2-9 Units 4X/DAY ACHS    And    dextrose bolus  25 g Q15 MIN PRN    Pharmacy Consult Request  1 Each PHARMACY TO DOSE       Assessment and Plan:  Hospital day # 21  We discussed laminectomy with subdural cyst exploration, likely tomorrow 5/14  Coags ordered today, stable   EKG for pre-op evaluation   NPO at MD, hold DVT ppx       Chemical prophylactic DVT therapy: hold for surgery

## 2023-05-14 NOTE — PROGRESS NOTES
Assumed care of patient at 0645. Bedside report received. Assessment complete.  AA&Ox4. Denies CP/SOB.  Reporting 4/10 pain. Declined intervention at this time.  Educated patient regarding pharmacologic and non pharmacologic modalities for pain management.  Skin per flowsheets  Tolerating Renal diet. Denies N/V.  + void via rivera catheter + BM. Last BM 5/14  Pt ambulates max assist, poorly tolerating OOB activity.  All needs met at this time. Call light within reach. Pt calls appropriately. Bed low and locked, non skid socks in place. Hourly rounding in place.

## 2023-05-14 NOTE — CARE PLAN
Problem: Knowledge Deficit - Standard  Goal: Patient and family/care givers will demonstrate understanding of plan of care, disease process/condition, diagnostic tests and medications  Outcome: Progressing     Problem: Pain - Standard  Goal: Alleviation of pain or a reduction in pain to the patient’s comfort goal  Outcome: Progressing     Problem: Skin Integrity  Goal: Skin integrity is maintained or improved  Outcome: Progressing   The patient is Stable - Low risk of patient condition declining or worsening    Shift Goals  Clinical Goals: Maintain Skin Integrity  Patient Goals: Rest  Family Goals: Communication on Plan of Care    Progress made toward(s) clinical / shift goals:  Q2 Turns in place, patient educated on plan of care this shift, pain medicated per MAR     Patient is not progressing towards the following goals:

## 2023-05-14 NOTE — PROGRESS NOTES
2 RN skin check complete.     Devices in place: NC, PIV right forearm, rivera catheter. .  Skin assessed under devices: above as much as possible.    Dry skin noted upper region of body. BUE mild edema, scattered bruising. PIV RFA, CDI dressing. Abdomen obese, round, distended. X 2 transverse incisions, upper region covered with new wound dressing covering dehiscence, CDI with gauze/hypafix. Lower umbilicus incision well approximated with dermabond.  Bruising noted to LLQ abdomen. Redness noted to left side of penile head. Rivera catheter in place with stat lock to rihgt upper quadrant. Lumber puncture with bandaid covering, no drainage noted. Blanchable redness to buttocks, discoloration noted to near gluteal cleft on right side. BLE pitting edema noted.     Confirmed pressure ulcers found on:  NA    New potential pressure ulcers noted on: monitoring penis/sacrum.   Wound team following.     The following interventions in place: Grey foam to ears, TAP system, Q2 turns, frequent perineal checks, COSME mattress, pillows for offloading, milly cream to sacrum, no mepilex in place d/t incontinence of stool.

## 2023-05-14 NOTE — PROGRESS NOTES
[facility] Medicine Daily Progress Note    Date of Service  5/14/2023    Chief Complaint  Tyrone Cline is a 82 y.o. male admitted 4/21/2023 with Colitis.    Hospital Course  This 82-year-old male with a past medical history significant for CKD stage IV being followed by nephrology, diabetes mellitus with glyco of 6.6, hypertension presented to ER on 4/21/2023 with a complaint of abdominal pain and lower extremity weakness.    Patient stated that he had epigastric and abdominal pain on Tuesday; CT scan of the abdomen and pelvis consistent with colitis but did show sternocleidomastoid but less likely cholecystitis, ultrasound of the abdomen consistent with acute cholecystitis, HIDA scan showing acute carmelo; General surgery was consulted and he underwent  attempted laparoscopic cholecystectomy with conversion to open cholecystectomy; Subtotal cholecystectomy, patient has completed treatment with IV antibiotics.  Patient did have a NAHUN drain in place, which has been removed on 5/8/2023.    Also he was he is noted to have postsurgical wound on the right side of the abdomen, culture growing ESBL E. coli, currently on meropenem, will continue total of 7 days    His hospital course was complicated with postoperative hypotension, went to ICU vasopressor and transferred back on 4/29 floor.    Of note, patient continues to have bilateral lower extremity weakness; MR T spine expansile T2 hyperintense area in the thoracic spinal cord at the levels of T2, T3 and T4.    Neurosurgery was contacted, patient underwent MRI of the spine with anesthesia , Showing expansile lesion in the thoracic spinal cord at the levels of T2, T3 and T4. There is prominent right dorsal subarachnoid space at the level of T3 with flattening of the spinal cord. Dr. Tran from neurosurgery will be coming and evaluating the patient today.  His hospital course was complicated with urinary retention, urology was contacted, patient underwent Florez  placement..  Please do not remove Florez without consent from urology.    Considering bilateral lower extremity weakness, neurology was consulted and stated, patient underwent LP on 4/23/2023, neurology continue to follow, thus LP was repeated again on 5/13.    Interval events:  -- No acute events overnight, patient has been stable, patient is currently requiring 1 L of oxygen.  He is alert and awake, answering questions appropriately.  Continue IV antibiotics per infectious disease recommendation, currently patient is on meropenem.  Will need a total of 7 days.  Wound care following.  --Today neurosurgery will be coming and evaluating the patient.  --LP was completed yesterday  Plan of care has been discussed the patient, nursing staff, case management.  I also updated patient's son in regards to the plan of care  -- Patient potassium is on the higher side at 5.1, will continue renal diet.  Creatinine remains stable at 2.06, monitor  -- Patient blood glucose has been elevated, will add just insulin, his blood glucose is around 290s;, patient has been receiving 10 units of Lantus at a.m.  Will increase to 17 at a.m. and 5 at p.m.  Patient work-up is elevated secondary to him receiving Decadron.  We will continue Pepcid    I have discussed this patient's plan of care and discharge plan at IDT rounds today with Case Management, Nursing, Nursing leadership, and other members of the IDT team.    Consultants/Specialty  general surgery, nephrology, and neurology  ID    Code Status  DNAR/DNI    Disposition  Patient is not medically cleared for discharge.   Anticipate discharge to to an inpatient rehabilitation hospital.  I have placed the appropriate orders for post-discharge needs.    Review of Systems  Review of Systems   Constitutional:  Positive for malaise/fatigue. Negative for fever.   HENT:  Negative for congestion, hearing loss and sore throat.    Eyes:  Negative for blurred vision.   Respiratory:  Negative for  cough and shortness of breath.    Cardiovascular:  Negative for chest pain, palpitations and leg swelling.   Gastrointestinal:  Negative for abdominal pain, diarrhea and heartburn.   Genitourinary:  Negative for dysuria.   Musculoskeletal:  Negative for myalgias.   Neurological:  Positive for weakness. Negative for dizziness and speech change.   Psychiatric/Behavioral:  Negative for depression. The patient is not nervous/anxious.    All other systems reviewed and are negative.       Physical Exam  Temp:  [36.3 °C (97.3 °F)-36.7 °C (98.1 °F)] 36.3 °C (97.3 °F)  Pulse:  [56-70] 68  Resp:  [16-18] 17  BP: (131-148)/(64-78) 136/64  SpO2:  [92 %-95 %] 92 %    Physical Exam  Vitals and nursing note reviewed.   Constitutional:       Appearance: Normal appearance.   HENT:      Head: Normocephalic and atraumatic.   Eyes:      Extraocular Movements: Extraocular movements intact.      Pupils: Pupils are equal, round, and reactive to light.   Cardiovascular:      Rate and Rhythm: Normal rate and regular rhythm.   Pulmonary:      Effort: Pulmonary effort is normal.      Breath sounds: Normal breath sounds.   Abdominal:      Tenderness: There is abdominal tenderness (Mild). There is no guarding or rebound.   Musculoskeletal:      Cervical back: Neck supple.      Right lower leg: No edema.      Left lower leg: No edema.   Skin:     General: Skin is dry.      Coloration: Skin is pale.   Neurological:      Mental Status: He is alert and oriented to person, place, and time.      Cranial Nerves: No cranial nerve deficit.      Motor: Weakness present.         Fluids    Intake/Output Summary (Last 24 hours) at 5/14/2023 1008  Last data filed at 5/14/2023 0725  Gross per 24 hour   Intake --   Output 4200 ml   Net -4200 ml         Laboratory  Recent Labs     05/12/23  0130 05/13/23  0138 05/14/23  0622   WBC 13.3* 12.3* 11.7*   RBC 2.95* 3.05* 3.68*   HEMOGLOBIN 8.5* 8.8* 10.8*   HEMATOCRIT 26.8* 27.3* 33.2*   MCV 90.8 89.5 90.2   MCH  28.8 28.9 29.3   MCHC 31.7* 32.2* 32.5*   RDW 49.1 48.6 49.8   PLATELETCT 457* 427 482*   MPV 10.8 10.8 10.9       Recent Labs     05/12/23  0130 05/13/23  0138 05/14/23  0622   SODIUM 135 134* 136   POTASSIUM 5.2 5.1 5.1   CHLORIDE 100 101 102   CO2 26 24 24   GLUCOSE 316* 204* 221*   BUN 56* 59* 58*   CREATININE 2.32* 2.38* 2.06*   CALCIUM 9.8 9.5 9.9       Recent Labs     05/13/23  0912 05/14/23  0622   APTT 23.7* 20.4*   INR 1.20* 1.09                     Imaging  MR-THORACIC SPINE-WITH & W/O   Final Result      Compared with the previous MRI ,again noted is expansile lesion in the thoracic spinal cord at the levels of T2, T3 and T4. There is prominent right dorsal subarachnoid space at the level of T3 with flattening of the spinal cord. There is mild contrast    enhancement noted in the expanded portion of the spinal cord. 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 Other differential diagnosis includes transverse myelitis However the possibility of right-sided dorsal arachnoid cyst/arachnoid    web at the level of T3 causing spinal cord edema cannot be entirely ruled out. Despite multiple MRs, it is difficult to characterize the tumor. Therefore if there is any neurosurgical planning, CT myelogram is recommended for further characterization.      IR-US GUIDED PIV   Final Result    Ultrasound-guided PERIPHERAL IV INSERTION performed by    qualified nursing staff as above.      MR-THORACIC SPINE-WITH & W/O   Final Result      1.  Limited study due to the motion artifact.   2.  When compared with the previous MRI again noted is expansile T2 hyperintense area in the thoracic spinal cord at the levels of T2, T3 and T4. There is also prominent dorsal right-sided subarachnoid space at this level. The differential diagnosis    includes intramedullary spinal cord tumor and extramedullary cyst/web with cord compression and edema.   3.  Pre and postcontrast MR examination of the thoracic spine is  recommended with contrast under General anesthesia.   4.  1 -2 mm  T2-weighted sequences from T2 to T6 with smaller field of view is recommended for further characterization.      MR-THORACIC SPINE-WITH & W/O   Final Result         Stable appearance of expansile nonenhancing signal abnormality involving the upper thoracic cord between T1 and T4. Also noted is a small extra-axial collection/web along the right posterolateral spinal canal at T3-4 that deforms the cord and displaces    it anteriorly and to the left.   This could represent an Arachnoid web or arachnoid cyst with extensive mass effect and secondary edema of the cord.   Recommend additional thin section T2 and post contrast images through T1-T6 to better assess the abnormality.      MR-BRAIN-WITH & W/O   Final Result         No acute intracranial process.      Age-related volume loss and chronic microvascular ischemic changes.         DX-CHEST-PORTABLE (1 VIEW)   Final Result         1.  Pulmonary edema and/or infiltrates are identified, which are stable since the prior exam.   2.  Cardiomegaly   3.  Atherosclerosis      DX-CHEST-PORTABLE (1 VIEW)   Final Result         1.  Pulmonary edema and/or infiltrates are identified, which are stable since the prior exam.   2.  Left PICC line tip terminates in the left axilla, stable since prior study.      DX-CHEST-PORTABLE (1 VIEW)   Final Result      1.  No significant change.   2.  Possible left PICC line with tip projecting at the axillary vein.      DX-CHEST-PORTABLE (1 VIEW)   Final Result      1.  Mildly improved pulmonary opacities.   2.  Possible left PICC line with tip projecting at the axillary vein.   3.  Likely trace right pleural effusion.      DX-CHEST-LIMITED (1 VIEW)   Final Result      1.  Placement of endotracheal tube with tip projecting over the mid trachea      2.  Left arm catheter present which is presumably venous in projects in the expected position of the left axillary vein      3.   Enlarged cardiac silhouette      NM-HEPATOBILIARY SCAN   Final Result      Hepatobiliary scan findings suspicious for acute cholecystitis.      MR-THORACIC SPINE-WITH & W/O   Final Result      1.  There is abnormal expansile T2 hyperintense lesion in the right side of the thoracic spinal cord at the levels of T2 and T3 Mild contrast enhancement is seen. This lesion is suspicious for spinal cord neoplasm. Other remote differential diagnosis    includes transverse myelitis.   2.  Exaggerated thoracic kyphosis.   3.  Thoracic dextroscoliosis.   4.  Minimal degenerative changes.   5.  Right pleural effusion.      MR-LUMBAR SPINE-WITH & W/O   Final Result      1.  Multifocal degenerative disease in the lumbar spine as described above.   2.  Moderate central canal and severe lateral recess stenosis at the level of L4-5. The exiting bilateral L5 nerve roots might have been impinged at the lateral recess.      MR-CERVICAL SPINE-WITH & W/O   Final Result      1.  There is abnormal expansile intramedullary T2 signal intensity lesion in the right cerebellum. Thoracic spinal cord at the level of T2 on T3. Mild diffuse contrast enhancement is seen. This finding is suspicious for spinal cord neoplasm. The other    less likely differential diagnosis includes transverse myelitis. Follow-up is recommended after 4 weeks.   2.  Mild degenerative disease in the cervical spine as described above.   3.  There is no evidence of infection in the cervical spine.      US-RUQ   Final Result         1.  Acute cholecystitis.   2.  Atrophic right kidney.      DX-CHEST-LIMITED (1 VIEW)   Final Result      Perihilar interstitial edema and basilar atelectasis and/or consolidation. Underlying infection is possible.      MR-LUMBAR SPINE-W/O   Final Result      1.  Lower lumbar spine predominant degenerative changes as described in detail above, progressed from 2010 MRI.   2.  Edema at the L4-L5 disc space and L5 superior endplate likely represents  degenerative changes. Less likely this could represent a low-grade or early discitis osteomyelitis. Correlate for evidence of infection.   3.  Acute appearing Schmorl's node at L3-L4, which can be a source of pain.      US-RENAL   Final Result      1.  Atrophic kidneys. Echogenic bilateral renal parenchyma could relate to medical renal disease.      2.  No hydronephrosis. No renal calculus.      DX-CHEST-PORTABLE (1 VIEW)   Final Result      No acute cardiac or pulmonary abnormalities are identified.      CT-ABDOMEN-PELVIS W/O   Final Result      1.  Findings suspicious for focal colitis at the hepatic flexure, less likely cholecystitis or duodenitis with secondary involvement of the colon. Infectious, inflammatory and ischemic etiologies are considerations. Underlying mass is not excluded.   2.  Cholelithiasis with distention of the gallbladder   3.  BILATERAL renal atrophy   4.  Subcentimeter LEFT adrenal myelolipoma   5.  12 mm RIGHT adrenal nodule likely an adenoma absent a history of cancer   6.  Subcentimeter RIGHT hepatic lesion, likely a cyst absent a history of cancer   7.  Atherosclerosis   8.  Colonic diverticulosis      CT-HEAD W/O   Final Result      1.  Cerebral atrophy.      2.  White matter lucencies most consistent with small vessel ischemic change versus demyelination or gliosis.      3.  Otherwise, Head CT without contrast with no acute findings. No evidence of acute cerebral infarction, hemorrhage or mass lesion.                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  Neurology evaluated, patient received IVIG on 5/1, discontinued 5/2    Patient does have right lower extremity weakness more than left.  MR T spine showing ; MR T spine expansile T2 hyperintense area in the  thoracic spinal cord at the levels of T2, T3 and T4. There is also prominent dorsal right-sided subarachnoid space at this level.  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    -- NS called and recs decadron 4 mg every 6 hrs   On 5/11, MRI of the lumbar spine completed.     Neurosurgeon Dr. Ndiaye will be coming and evaluating, discussing the surgical option with the patient on 5/14/2023    Urinary retention  Assessment & Plan  [facility] urology.    -- Do not change Florez without urology consent    Abnormal MRI- (present on admission)  Assessment & Plan  MRI on admission showing T2/T3  hyperintense lesion in the right side of the thoracic spinal cord at the levels of T2 and T3 Mild contrast enhancement is seen. This lesion is suspicious for spinal cord neoplasm or possible transverse myelitis       5/8 MRI thoracic spine reviewed and called Neurosurgery  On 5/11: MRI with anaesthesia will completed    Pending NS eval on 5/14    Acute gangrenous cholecystitis- (present on admission)  Assessment & Plan  Found to have gangrenous cholecystitis s/p laprascopic converted open cholecystectomy 4/28/2023   -- Completed antibiotic treatment from 4/25- 4/ 29, drain has been removed.  Follow surgical recommendation.    Surgical course was complicated with postoperative wound infection, culture has been obtained, wound care has been consulted  Wound cx growing ESBL E coli, continue meropeneam for 7 days as per ID recs    Hypomagnesemia- (present on admission)  Assessment & Plan  Replete as needed    Cholelithiasis with acute cholecystitis- (present on admission)  Assessment & Plan  S/p surgical removal, converted to open cholecystectomy  NAHUN drain-removed on 5/8/2023  The patient is tolerating a diet already, monitor bowel function    Hypokalemia- (present on admission)  Assessment & Plan  Stable, monitor CMP and replace as needed   - goal K >4    Hyponatremia- (present on  admission)  Assessment & Plan  Resolved    Colitis- (present on admission)  Assessment & Plan  Cultures negative, monitor  Initial admitting impression, currently improved  Diet as tolerated     Acute kidney injury superimposed on CKD (HCC)- (present on admission)  Assessment & Plan   Resolved    Avoid nephrotoxins       Diabetic peripheral neuropathy (HCC)- (present on admission)  Assessment & Plan  Cymbalta, medical treatment    Essential hypertension- (present on admission)  Assessment & Plan  Continue amlodipine and coreg    Hypercholesterolemia- (present on admission)  Assessment & Plan  Stable, continue Lipitor    Chronic kidney disease (CKD), stage IV (severe) (HCC)- (present on admission)  Assessment & Plan   Creatinine at 2.38            Hypercalcemia- (present on admission)  Assessment & Plan  Likely due to immobility   Resolved with IVF hydration  Continue to follow and treat accordingly     Thrombocytopenia (HCC)- (present on admission)  Assessment & Plan  Resolved, plt are stable at 427    High anion gap metabolic acidosis- (present on admission)  Assessment & Plan  Acute on chronic kidney disease, -appears back at baseline on 5/3/2023    Resolved    Sepsis (HCC)- (present on admission)  Assessment & Plan  This is Sepsis Present on admission  SIRS criteria identified on my evaluation include: Leukocytosis, with WBC greater than 12,000  Source is colitis  Sepsis protocol initiated  Fluid resuscitation ordered per protocol  Crystalloid Fluid Administration: Fluid resuscitation ordered per standard protocol - 30 mL/kg per current or ideal body weight  IV antibiotics as appropriate for source of sepsis  Reassessment: I have reassessed the patient's hemodynamic status    Due to gangrenous gallbladder requiring open carmelo    -- Today patient was noted to have surgical  wound with drainage   -- wound cx pending  ESBL E coli; id consulted, will switch to meropenem as per id     Type 2 diabetes mellitus with  kidney complication, without long-term current use of insulin (HCC)- (present on admission)  Assessment & Plan  Ha1c 6.4% 4 months ago   On ozempic and januvia outpatient   Continue ISS and hypoglycemic protocol while inpatient    -- Has been started on Decadron, his blood glucose is noted to be elevated, started Lantus 10        VTE prophylaxis: scd    I have performed a physical exam and reviewed and updated ROS and Plan today (5/14/2023). In review of yesterday's note (5/13/2023), there are no changes except as documented above.

## 2023-05-14 NOTE — WOUND TEAM
[facility]  Inpatient Services   Wound and Skin Care Evaluation    Admission Date: 4/21/2023     Last order of IP CONSULT TO WOUND CARE was found on 5/10/2023 from Hospital Encounter on 4/21/2023     HPI, PMH, SH: Reviewed    Past Surgical History:   Procedure Laterality Date    ROMAN BY LAPAROSCOPY N/A 4/28/2023    Procedure: CHOLECYSTECTOMY, LAPAROSCOPIC ATTEMPTED, OPEN CHOLECYSTECTOMY;  Surgeon: Suraj Galvan M.D.;  Location: [facility];  Service: General    WV COLONOSCOPY,DIAGNOSTIC N/A 12/12/2021    Procedure: COLONOSCOPY;  Surgeon: Dariel Simons M.D.;  Location: [facility];  Service: Gastroenterology    WV UPPER GI ENDOSCOPY,BIOPSY N/A 12/12/2021    Procedure: GASTROSCOPY, WITH BIOPSY;  Surgeon: Dariel Simons M.D.;  Location: [facility];  Service: Gastroenterology    WV UPPER GI ENDOSCOPY,CTRL BLEED N/A 12/12/2021    Procedure: EGD, WITH CLIP PLACEMENT;  Surgeon: Dariel Simons M.D.;  Location: [facility];  Service: Gastroenterology    GASTROSCOPY W/PUSH ENTERSCOPY N/A 12/12/2021    Procedure: GASTROSCOPY, WITH PUSH ENTEROSCOPY;  Surgeon: Dariel Simons M.D.;  Location: [facility];  Service: Gastroenterology    SEPTAL RECONSTRUCTION  12/7/2015    Procedure: SEPTAL RECONSTRUCTION OPEN WITH  GRAFTS & CONCHAL CART GRAFT;  Surgeon: SYDNIE Gaitan M.D.;  Location: SURGERY SAME DAY [facility];  Service:     BLEPHAROPLASTY  7/23/2014    Performed by Gera Plasencia M.D. at [facility]S    BROW LIFT  7/23/2014    Performed by Gera Plasencia M.D. at [facility]    CATARACT PHACO WITH IOL  12/4/2012    Performed by Suraj Gonzalez M.D. at [facility]    CATARACT PHACO WITH IOL  11/20/2012    Performed by Suraj Gonzalez M.D. at [facility]    APPENDECTOMY      ARTHROSCOPY, KNEE      CARDIAC CATH, LEFT HEART      LHC out of concern for STEMI, normal coronaries with minimal  atherosclerosis, diagnosed with PNA as cause of symptoms and ST changes.     OTHER      KNEE SURGERY - LEFT.    OTHER      NOSE SURGERY.    TONSILLECTOMY       Social History     Tobacco Use    Smoking status: Former     Packs/day: 0.20     Years: 6.00     Pack years: 1.20     Types: Cigarettes     Quit date:      Years since quittin.4    Smokeless tobacco: Never    Tobacco comments:     Stopped over 25 years ago.   Vaping Use    Vaping Use: Never used   Substance Use Topics    Alcohol use: No     Chief Complaint   Patient presents with    GLF     2 days ago. (-) thinners    Extremity Weakness     Chronic lower bilateral weakness.     Diagnosis: Acute kidney failure (HCC) [N17.9]  Sepsis (HCC) [A41.9]    Unit where seen by Wound Team: T4     WOUND CONSULT/FOLLOW UP RELATED TO:  abdominal incision vac    WOUND HISTORY:  Pt underwent an open cholecystectomy on  with Dr. Galvan.  Patient seen by wound team 04/10 for drainage to incision site, Aquacel AG was ordered.  Received new consult that incision is saturating through dressings.         WOUND ASSESSMENT/LDA     Negative Pressure Wound Therapy 23 Surgical Abdomen Right (Active)   Vacuum Serial Number [id number] 23 1200   NPWT Pump Mode / Pressure Setting Ulta    Dressing Type Small;Black Foam (Veraflo)    Number of Foam Pieces Used 3    Canister Changed Yes    NEXT Dressing Change/Treatment Date 23    VAC VeraFlo Irrigant 1/4 Strength Dakins    VAC VeraFlo Soak Time (mins) 8    VAC VeraFlo Instill Volume (ml) 34    VAC VeraFlo - Therapy Time (hrs) 2.5    VAC VeraFlo Pressure (mm/Hg) Intermittent;125 mmHg       Wound 23 Full Thickness Wound Abdomen Right (Active)   Wound Image      23 1300   Site Assessment Red;Brown;Drainage    Periwound Assessment Clean;Dry;Intact    Margins Attached edges;Defined edges    Closure Secondary intention    Drainage Amount Small    Drainage Description Busby;Serosanguineous    Treatments  Cleansed;Site care;Chemical Debridement    Wound Cleansing Hydrogen Peroxide    Periwound Protectant Skin Protectant Wipes to Periwound;Paste Ring;Drape    Dressing Cleansing/Solutions 1/4 Strength Dakin's Solution    Dressing Options Wound Vac    Dressing Changed New    Dressing Status Clean;Dry;Intact    Dressing Change/Treatment Frequency Tuesday, Thursday, Saturday, and As Needed    NEXT Dressing Change/Treatment Date 05/16/23    NEXT Weekly Photo (Inpatient Only) 05/22/23    Number of Staples Removed 5    Non-staged Wound Description Full thickness    Wound Length (cm) 1.5 cm    Wound Width (cm) 13 cm    Wound Depth (cm) 3 cm    Wound Surface Area (cm^2) 19.5 cm^2    Wound Volume (cm^3) 58.5 cm^3    Wound Healing % -3020    Shape Linear    Wound Odor None    Exposed Structures FLEX    WOUND NURSE ONLY - Time Spent with Patient (mins) 60      Vascular:    REX:   No results found.    Lab Values:    Lab Results   Component Value Date/Time    WBC 11.7 (H) 05/14/2023 06:22 AM    RBC 3.68 (L) 05/14/2023 06:22 AM    HEMOGLOBIN 10.8 (L) 05/14/2023 06:22 AM    HEMATOCRIT 33.2 (L) 05/14/2023 06:22 AM    CREACTPROT 33.53 (H) 04/23/2023 10:32 AM    SEDRATEWES 66 (H) 04/23/2023 10:32 AM    HBA1C 6.6 (H) 01/03/2023 08:45 AM        Culture Results show:  Recent Results (from the past 720 hour(s))   CULTURE WOUND W/ GRAM STAIN    Collection Time: 05/09/23  9:50 AM    Specimen: Abdominal; Wound   Result Value Ref Range    Significant Indicator POS (POS)     Source WND     Site ABDOMINAL     Culture Result - (A)     Gram Stain Result Moderate WBCs.  Moderate Gram negative rods.       Culture Result (A)      Escherichia coli ESBL  Moderate growth  Extended Spectrum Beta-lactamase (ESBL) isolated.  ESBL's may be clinically resistant to therapy with  Penicillins,Cephalosporins or Aztreonam despite  apparent in vitro susceptibility to some of these agents.  The patient requires contact isolation.  Please contact pharmacy or an  Infectious Disease Specialist  if you have any questions about appropriate therapy.         Susceptibility    Escherichia coli esbl - DENICE     Ampicillin >16 Resistant mcg/mL     Ceftriaxone >32 Resistant mcg/mL     Cefazolin >16 Resistant mcg/mL     Ciprofloxacin >2 Resistant mcg/mL     Cefepime >16 Resistant mcg/mL     Cefuroxime >16 Resistant mcg/mL     Ampicillin/sulbactam >16/8 Resistant mcg/mL     Ertapenem <=0.5 Sensitive mcg/mL     Tobramycin >8 Resistant mcg/mL     Gentamicin >8 Resistant mcg/mL     Minocycline <=4 Sensitive mcg/mL     Moxifloxacin >4 Resistant mcg/mL     Pip/Tazobactam <=8 Sensitive mcg/mL     Trimeth/Sulfa <=0.5/9.5 Sensitive mcg/mL     Tigecycline <=2 Sensitive mcg/mL       Pain Level/Medicated: topical 4% lidocaine solution applied 30min prior to vac application. Pt was given 10mg PO Oxy by bedside RN as well.    INTERVENTIONS BY WOUND TEAM:  Chart and images reviewed. Discussed with bedside RN. All areas of concern (based on picture review, LDA review and discussion with bedside RN) have been thoroughly assessed. Documentation of areas based on significant findings. This RN in to assess patient. Performed standard wound care which includes appropriate positioning, dressing removal and non-selective debridement. Pictures and measurements obtained weekly if/when required.  Preparation for Dressing removal: lidocaine Solution  Non-selectively Debrided with:  hydrogen peroxide to remove old clot   Sharp debridement: NA, Wound dehisced further while cleaning.  Shaina wound: Cleansed with wound cleanser and gauze, Prepped with no sting skin prep and 2 paste rings  Primary Dressin piece of half thickness spiraled veraflo foam packed into pocket along lateral side. 2nd piece of half thickness veraflo foam packed into depth along medial side and then continued out along wound bed. All foam secured with drape. A hole was cut in drape.   Secondary (Outer) Dressing: a 3rd an final piece of half  thickness circular black foam was applied as a button for trac pad. Veraflo trac pad applied.     [facility] Discharge Planning  Number of Clinicians necessary to complete wound care: 1  Is patient requiring IV pain medications for dressing changes: No  Length of time for dressing change 15 min. (This does not include chart review, pre-medication time, set up, clean up or time spent charting.)    Interdisciplinary consultation: Patient, Bedside RN (Josemanuel), Wound RN (Jaswinder)    EVALUATION / RATIONALE FOR TREATMENT:  Most Recent Date:  05/14/23: Wound vac applied today to cleanse and debride while assisting in granulation tissue development. Wound team to change again on Tuesday and if wound looks ok will plan to stretch to Friday to get pt on MWF schedule.     05/13/23:  Dakins applied to chemically debride nonviable tissue, decrease bioburden and odor.  Likely hematoma under surface of incision, hydrogen to encourage breakdown of clot.  Spoke with Kenzie FALL (sx signed off 1 week ago) talk about placed vac previously.  Will allow dakins to debride and possibly place VAC on Monday.    5/10/23: Patient with small dehiscence along transverse abdominal incision. Small amount of purulence expressed following cleansing. Aquacel Ag Hydrofiber applied to manage bioburden, absorb exudate, and maintain a moist wound environment without laterally wicking exudate therefore reducing latonya-wound maceration.      Goals: Steady decrease in wound area and depth weekly.    WOUND TEAM PLAN OF CARE ([X] for frequency of wound follow up,):   Nursing to follow dressing orders written for wound care. Contact wound team if area fails to progress, deteriorates or with any questions/concerns if something comes up before next scheduled follow up (See below as to whether wound is following and frequency of wound follow up)  Dressing changes by wound team:                   Follow up 3 times weekly:                NPWT change 3 times weekly:    X, Sunday, Tuesday then Friday, next week MWF  Follow up 1-2 times weekly:     Follow up Bi-Monthly:           Follow up Monthly (High Risk):                        Follow up as needed:     Other (explain):     NURSING PLAN OF CARE ORDERS (X):  Dressing changes: See Dressing Care orders: X  Skin care: See Skin Care orders:   RN Prevention Protocol:   Rectal tube care: See Rectal Tube Care orders:   Other orders:    RSKIN:   CURRENTLY IN PLACE (X), APPLIED THIS VISIT (A), ORDERED (O):   Q shift Jeremy:  X  Q shift pressure point assessments:  X    Surface/Positioning   Standard Mattress/Trauma Bed          Low Airloss        X  ICU Low Airloss   Bariatric COSME     Waffle cushion        Waffle Overlay          Reposition q 2 hours    X  TAPs Turning system     Z Les Pillow     Offloading/Redistribution   Sacral Offloading Dressing (Silicone dressing)   X  Heel Offloading Dressing (Silicone dressing)       X  Heel float boots (Prevalon boot)             Float Heels off Bed with Pillows           Respiratory NA  Silicone O2 tubing         Gray Foam Ear protectors     Cannula fixation Device (Tender )          High flow offloading Clip    Elastic head band offloading device      Anchorfast                                                         Trach with Optifoam split foam             Containment/Moisture Prevention FLEX    Rectal tube or BMS    Purwick/Condom Cath        Florez Catheter    Barrier wipes           Barrier paste       Antifungal tx      Interdry        Mobilization FLEX      Up to chair        Ambulate      PT/OT      Nutrition       Dietician        Diabetes Education      PO   X  TF     TPN     NPO   # days     Other        Anticipated discharge plans: TBD  LTACH:        SNF/Rehab:                  Home Health Care:           Outpatient Wound Center:            Self/Family Care:        Other:                  Vac Discharge Needs:   Vac Discharge plan is purely a recommendation from wound team and  not a requirement for discharge unless otherwise stated by physician.  Not Applicable Pt not on a wound vac:       Regular Vac while inpatient, alternative dressing at DC:        Regular Vac in use and continued at DC:            Reg. Vac w/ Skin Sub/Biologic in use. Will need to be changed 2x wkly:      Veraflo Vac while inpatient, ok to transition to Regular Vac on Discharge (Bedside RN to Clamp small instillation tubing at time of DC):    X       Veraflo Vac while inpatient, would benefit from remaining on Veraflo Vac upon discharge:

## 2023-05-14 NOTE — PROGRESS NOTES
4 Eyes Skin Assessment Completed by JUAN JOSÉ Abernathy and JUAN JOSÉ Vaughan.     Head WDL  Ears WDL  Nose WDL  Mouth WDL  Neck WDL  Breast/Chest WDL  Shoulder Blades WDL  Spine WDL  (R) Arm/Elbow/Hand WDL  (L) Arm/Elbow/Hand WDL  Abdomen : Midline Transverse Wound Vac in place   RLQ - Previous NAHUN Drain Site BERTA  Groin: Florez catheter to Penis, CDI   Scrotum/Coccyx/Buttocks Redness and Blanching BERTA  (R) Leg  +1 pitting Edema  (L) Leg  +1 pitting Edema  (R) Heel/Foot/Toe +1 pitting Edema  (L) Heel/Foot/Toe +1 pitting Edema        Devices In Places: 20g PIV LFA CDI, Pulse Ox, Florez, and Nasal Cannula, Wound Vac         Interventions In Place Gray Ear Foams, TAP System, Pillows, Q2 Turns, Low Air Loss Mattress, and Heels Loaded W/Pillows, Wedges in place for repositioning      Possible Skin Injury No     Pictures Uploaded Into Epic yes  Wound Consult Placed yes  RN Wound Prevention Protocol Ordered Yes

## 2023-05-15 ENCOUNTER — ANESTHESIA EVENT (OUTPATIENT)
Dept: SURGERY | Facility: MEDICAL CENTER | Age: 83
DRG: 853 | End: 2023-05-15
Payer: MEDICARE

## 2023-05-15 LAB
ALBUMIN SERPL BCP-MCNC: 2.5 G/DL (ref 3.2–4.9)
AQP4 H2O CHANNEL AB SERPL IA-ACNC: <1.5 U/ML
BUN SERPL-MCNC: 57 MG/DL (ref 8–22)
CALCIUM ALBUM COR SERPL-MCNC: 10.9 MG/DL (ref 8.5–10.5)
CALCIUM SERPL-MCNC: 9.7 MG/DL (ref 8.5–10.5)
CHLORIDE SERPL-SCNC: 102 MMOL/L (ref 96–112)
CO2 SERPL-SCNC: 23 MMOL/L (ref 20–33)
CREAT SERPL-MCNC: 2.07 MG/DL (ref 0.5–1.4)
ERYTHROCYTE [DISTWIDTH] IN BLOOD BY AUTOMATED COUNT: 48.4 FL (ref 35.9–50)
GFR SERPLBLD CREATININE-BSD FMLA CKD-EPI: 31 ML/MIN/1.73 M 2
GLUCOSE BLD STRIP.AUTO-MCNC: 187 MG/DL (ref 65–99)
GLUCOSE BLD STRIP.AUTO-MCNC: 243 MG/DL (ref 65–99)
GLUCOSE BLD STRIP.AUTO-MCNC: 287 MG/DL (ref 65–99)
GLUCOSE SERPL-MCNC: 207 MG/DL (ref 65–99)
HCT VFR BLD AUTO: 32.9 % (ref 42–52)
HGB BLD-MCNC: 10.6 G/DL (ref 14–18)
MCH RBC QN AUTO: 28.8 PG (ref 27–33)
MCHC RBC AUTO-ENTMCNC: 32.2 G/DL (ref 33.7–35.3)
MCV RBC AUTO: 89.4 FL (ref 81.4–97.8)
MOG AB SER QL CBA IFA: NORMAL
PHOSPHATE SERPL-MCNC: 3.6 MG/DL (ref 2.5–4.5)
PLATELET # BLD AUTO: 430 K/UL (ref 164–446)
PMV BLD AUTO: 10.6 FL (ref 9–12.9)
POTASSIUM SERPL-SCNC: 5.2 MMOL/L (ref 3.6–5.5)
RBC # BLD AUTO: 3.68 M/UL (ref 4.7–6.1)
SCCMEC + MECA PNL NOSE NAA+PROBE: NEGATIVE
SODIUM SERPL-SCNC: 136 MMOL/L (ref 135–145)
WBC # BLD AUTO: 12 K/UL (ref 4.8–10.8)

## 2023-05-15 PROCEDURE — 700102 HCHG RX REV CODE 250 W/ 637 OVERRIDE(OP): Performed by: SURGERY

## 2023-05-15 PROCEDURE — 87641 MR-STAPH DNA AMP PROBE: CPT

## 2023-05-15 PROCEDURE — A9270 NON-COVERED ITEM OR SERVICE: HCPCS | Performed by: FAMILY MEDICINE

## 2023-05-15 PROCEDURE — A9270 NON-COVERED ITEM OR SERVICE: HCPCS | Performed by: HOSPITALIST

## 2023-05-15 PROCEDURE — 700111 HCHG RX REV CODE 636 W/ 250 OVERRIDE (IP): Performed by: HOSPITALIST

## 2023-05-15 PROCEDURE — 700102 HCHG RX REV CODE 250 W/ 637 OVERRIDE(OP): Performed by: FAMILY MEDICINE

## 2023-05-15 PROCEDURE — 700102 HCHG RX REV CODE 250 W/ 637 OVERRIDE(OP): Performed by: HOSPITALIST

## 2023-05-15 PROCEDURE — 85027 COMPLETE CBC AUTOMATED: CPT

## 2023-05-15 PROCEDURE — 770001 HCHG ROOM/CARE - MED/SURG/GYN PRIV*

## 2023-05-15 PROCEDURE — 00BX0ZZ EXCISION OF THORACIC SPINAL CORD, OPEN APPROACH: ICD-10-PCS | Performed by: NEUROLOGICAL SURGERY

## 2023-05-15 PROCEDURE — 80069 RENAL FUNCTION PANEL: CPT

## 2023-05-15 PROCEDURE — 82962 GLUCOSE BLOOD TEST: CPT | Mod: 91

## 2023-05-15 PROCEDURE — 36415 COLL VENOUS BLD VENIPUNCTURE: CPT

## 2023-05-15 PROCEDURE — A9270 NON-COVERED ITEM OR SERVICE: HCPCS | Performed by: SURGERY

## 2023-05-15 PROCEDURE — 99233 SBSQ HOSP IP/OBS HIGH 50: CPT | Performed by: HOSPITALIST

## 2023-05-15 PROCEDURE — 700105 HCHG RX REV CODE 258: Performed by: HOSPITALIST

## 2023-05-15 RX ORDER — INSULIN LISPRO 100 [IU]/ML
4 INJECTION, SOLUTION INTRAVENOUS; SUBCUTANEOUS
Status: DISCONTINUED | OUTPATIENT
Start: 2023-05-15 | End: 2023-05-26

## 2023-05-15 RX ORDER — INSULIN LISPRO 100 [IU]/ML
2-9 INJECTION, SOLUTION INTRAVENOUS; SUBCUTANEOUS
Status: DISCONTINUED | OUTPATIENT
Start: 2023-05-15 | End: 2023-05-26

## 2023-05-15 RX ADMIN — MEROPENEM 500 MG: 500 INJECTION INTRAVENOUS at 13:14

## 2023-05-15 RX ADMIN — INSULIN LISPRO 4 UNITS: 100 INJECTION, SOLUTION INTRAVENOUS; SUBCUTANEOUS at 13:17

## 2023-05-15 RX ADMIN — DEXAMETHASONE SODIUM PHOSPHATE 4 MG: 4 INJECTION, SOLUTION INTRA-ARTICULAR; INTRALESIONAL; INTRAMUSCULAR; INTRAVENOUS; SOFT TISSUE at 05:50

## 2023-05-15 RX ADMIN — DEXAMETHASONE SODIUM PHOSPHATE 4 MG: 4 INJECTION, SOLUTION INTRA-ARTICULAR; INTRALESIONAL; INTRAMUSCULAR; INTRAVENOUS; SOFT TISSUE at 13:13

## 2023-05-15 RX ADMIN — DULOXETINE HYDROCHLORIDE 30 MG: 30 CAPSULE, DELAYED RELEASE ORAL at 16:43

## 2023-05-15 RX ADMIN — MORPHINE SULFATE 4 MG: 4 INJECTION, SOLUTION INTRAMUSCULAR; INTRAVENOUS at 22:00

## 2023-05-15 RX ADMIN — SODIUM BICARBONATE 1300 MG: 650 TABLET ORAL at 16:43

## 2023-05-15 RX ADMIN — AMLODIPINE BESYLATE 5 MG: 10 TABLET ORAL at 05:48

## 2023-05-15 RX ADMIN — MEROPENEM 500 MG: 500 INJECTION INTRAVENOUS at 06:44

## 2023-05-15 RX ADMIN — CARVEDILOL 12.5 MG: 12.5 TABLET, FILM COATED ORAL at 08:55

## 2023-05-15 RX ADMIN — CARVEDILOL 12.5 MG: 12.5 TABLET, FILM COATED ORAL at 16:43

## 2023-05-15 RX ADMIN — MEROPENEM 500 MG: 500 INJECTION INTRAVENOUS at 22:07

## 2023-05-15 RX ADMIN — INSULIN LISPRO 3 UNITS: 100 INJECTION, SOLUTION INTRAVENOUS; SUBCUTANEOUS at 16:53

## 2023-05-15 RX ADMIN — INSULIN HUMAN 2 UNITS: 100 INJECTION, SOLUTION PARENTERAL at 09:03

## 2023-05-15 RX ADMIN — ATORVASTATIN CALCIUM 10 MG: 10 TABLET, FILM COATED ORAL at 16:43

## 2023-05-15 RX ADMIN — SODIUM BICARBONATE 1300 MG: 650 TABLET ORAL at 05:47

## 2023-05-15 RX ADMIN — OXYCODONE 5 MG: 5 TABLET ORAL at 16:43

## 2023-05-15 RX ADMIN — DEXAMETHASONE SODIUM PHOSPHATE 4 MG: 4 INJECTION, SOLUTION INTRA-ARTICULAR; INTRALESIONAL; INTRAMUSCULAR; INTRAVENOUS; SOFT TISSUE at 16:43

## 2023-05-15 RX ADMIN — FAMOTIDINE 20 MG: 20 TABLET, FILM COATED ORAL at 05:48

## 2023-05-15 RX ADMIN — DOCUSATE SODIUM 50 MG AND SENNOSIDES 8.6 MG 2 TABLET: 8.6; 5 TABLET, FILM COATED ORAL at 16:43

## 2023-05-15 RX ADMIN — INSULIN LISPRO 5 UNITS: 100 INJECTION, SOLUTION INTRAVENOUS; SUBCUTANEOUS at 13:17

## 2023-05-15 RX ADMIN — ALLOPURINOL 100 MG: 100 TABLET ORAL at 05:48

## 2023-05-15 RX ADMIN — TAMSULOSIN HYDROCHLORIDE 0.4 MG: 0.4 CAPSULE ORAL at 08:55

## 2023-05-15 RX ADMIN — SPIRONOLACTONE 25 MG: 50 TABLET ORAL at 05:47

## 2023-05-15 RX ADMIN — INSULIN LISPRO 4 UNITS: 100 INJECTION, SOLUTION INTRAVENOUS; SUBCUTANEOUS at 16:50

## 2023-05-15 ASSESSMENT — ENCOUNTER SYMPTOMS
FEVER: 0
DIARRHEA: 0
DEPRESSION: 0
SHORTNESS OF BREATH: 0
BLURRED VISION: 0
HEARTBURN: 0
NERVOUS/ANXIOUS: 0
SORE THROAT: 0
MYALGIAS: 0
ABDOMINAL PAIN: 0
SPEECH CHANGE: 0
COUGH: 0
WEAKNESS: 1
DIZZINESS: 0
PALPITATIONS: 0

## 2023-05-15 ASSESSMENT — PAIN DESCRIPTION - PAIN TYPE
TYPE: ACUTE PAIN

## 2023-05-15 NOTE — PROGRESS NOTES
4 Eyes Skin Assessment Completed by JUAN JOSÉ Abernathy and JUAN JOSÉ Arthur.     Head WDL  Ears WDL  Nose WDL  Mouth WDL  Neck WDL  Breast/Chest WDL  Shoulder Blades WDL  Spine WDL  (R) Arm/Elbow/Hand WDL  (L) Arm/Elbow/Hand WDL  Abdomen : Midline Transverse Wound Vac in place   RLQ - Previous NAHUN Drain Site BERTA  Groin: Florez catheter to Penis, CDI   Scrotum/Coccyx/Buttocks Redness and Blanching BERTA  (R) Leg  +1 pitting Edema  (L) Leg  +1 pitting Edema  (R) Heel/Foot/Toe +1 pitting Edema  (L) Heel/Foot/Toe +1 pitting Edema        Devices In Places: 20g PIV LFA CDI, Pulse Ox, Florez, and Nasal Cannula, Wound Vac         Interventions In Place Gray Ear Foams, TAP System, Pillows, Q2 Turns, Low Air Loss Mattress, and Heels Loaded W/Pillows, Wedges in place for repositioning      Possible Skin Injury No     Pictures Uploaded Into Epic yes  Wound Consult Placed yes  RN Wound Prevention Protocol Ordered Yes

## 2023-05-15 NOTE — PROGRESS NOTES
Report received from AM RN; assumed care. Assessment/2 RN skin check completed. A&O x 3-4, mildly disoriented to time. Mild forgetfulness noted. Repetition noted with conversation. VSS, intermittent HTN/bradycardia noted. 95% on 1L NC. Patient denying SOB, new numbness/tingling, nausea, vomiting, dizziness. Medicated for pain; see MAR. Patient reported increased pain once veraflo cycle completes and feels reinstatement of suctions. Vacillating between 4 to 9/10 with pain reports. Abdomen obese, round, tender. WV to upper abdomen, CDI dressing. Lumbar puncture site with bandaid. + void; yellow urine noted in rivera. + eructation. + flatus. LBM 05/14. Patient tolerating renal diet, removed fluids at 000. Patient aware of NPO status. Patient tolerating IV ABX. Discussed plan of care/surgery with patient. All questions answered. High fall risk. Bed's alarm attempted to be engaged, not functioning. Work order placed. Patient compliant in not getting out of bed unassisted. Bed in locked/lowest position.  Call light/personal belongings within reach.  All needs met, patient sleeping at present time.

## 2023-05-15 NOTE — PROGRESS NOTES
Neurosurgery Progress Note    Subjective:  83 y.o. male who presented 2023 with a very complicated picture.  He has stage IV CKD. S/p cholecystectomy.  With regards to his neurologic symptoms, he reports on the day of admission he had immediate and complete loss of lower extremity strength that has waxed and waned but has overall improved significantly since admission.   Currently denies neurologic symptoms other than weakness in the lower extremities, still unable to walk.    Denies bowel or bladder dysfunction.  Denies numbness in the lower extremities.    Denies radicular pain.     S/p multiple MRIs of the brain, thoracic spine.  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  MRI from 2023 shows T2 signal change within the spinal cord more proximally over multiple levels.      Repeat thoracic MRI w/ contrast under anesthesia  did not lend much more clarity regarding the lesion. 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 Dr. Springer discussed imaging findings with multiple radiologists.    Was planning for surgery today, but there was no OR availability so has been scheduled for  ~noon.    Underwent LP , pt tolerated well     Exam:  Awake, alert oriented x4.   Appropriate and cooperative.   Motor exam reveals 4/5 to the LLE, 4+/5 to the RLE   Sensation intact to light touch.   No clonus       BP  Min: 124/72  Max: 136/79  Pulse  Av.5  Min: 58  Max: 69  Resp  Av.3  Min: 16  Max: 17  Temp  Av.9 °C (98.5 °F)  Min: 36.6 °C (97.9 °F)  Max: 37.2 °C (99 °F)  SpO2  Av %  Min: 93 %  Max: 95 %    No data recorded    Recent Labs     23  0138 23  0622 05/15/23  0655   WBC 12.3* 11.7* 12.0*   RBC 3.05* 3.68* 3.68*   HEMOGLOBIN 8.8* 10.8* 10.6*    HEMATOCRIT 27.3* 33.2* 32.9*   MCV 89.5 90.2 89.4   MCH 28.9 29.3 28.8   MCHC 32.2* 32.5* 32.2*   RDW 48.6 49.8 48.4   PLATELETCT 427 482* 430   MPV 10.8 10.9 10.6       Recent Labs     05/13/23  0138 05/14/23  0622 05/15/23  0655   SODIUM 134* 136 136   POTASSIUM 5.1 5.1 5.2   CHLORIDE 101 102 102   CO2 24 24 23   GLUCOSE 204* 221* 207*   BUN 59* 58* 57*   CREATININE 2.38* 2.06* 2.07*   CALCIUM 9.5 9.9 9.7       Recent Labs     05/13/23  0912 05/14/23  0622   APTT 23.7* 20.4*   INR 1.20* 1.09             Intake/Output                         05/14/23 0700 - 05/15/23 0659 05/15/23 0700 - 05/16/23 0659     [id number] 6783-6982 Total 5829-8842 7585-2487 Total                 Intake    P.O.  240  -- 240  --  -- --    P.O. 240 -- 240 -- -- --    Total Intake 240 -- 240 -- -- --       Output    Urine  1450  1150 2600  550  -- 550    Output (mL) (Urethral Catheter) 1450 1150 2600 550 -- 550    Drains  --  300 300  0  -- 0    Output (mL) (Negative Pressure Wound Therapy 05/14/23 Surgical Abdomen Right) -- 300 300 0 -- 0    Stool  --  -- --  --  -- --    Number of Times Stooled 1 x -- 1 x -- -- --    Total Output 1450 1450 2900 550 -- 550       Net I/O     -1210 -1450 -2660 -550 -- -550              Intake/Output Summary (Last 24 hours) at 5/15/2023 1009  Last data filed at 5/15/2023 0836  Gross per 24 hour   Intake 120 ml   Output 3050 ml   Net -2930 ml               insulin lispro  4 Units TID AC    [START ON 5/16/2023] insulin GLARGINE  17 Units QAM INSULIN    insulin lispro  2-9 Units TID AC    And    dextrose bolus  25 g Q15 MIN PRN    insulin GLARGINE  5 Units Q EVENING    dakins 0.125% (1/4 strength)   QDAY PRN    lidocaine jelly  1 Application. QDAY PRN    lidocaine  20 mL QDAY PRN    Or    lidocaine   QDAY PRN    meropenem  500 mg Q8HRS    dexamethasone  4 mg Q6HRS    senna-docusate  2 Tablet BID    And    polyethylene glycol/lytes  1 Packet QDAY PRN    And    magnesium hydroxide  30 mL QDAY PRN    And     bisacodyl  10 mg QDAY PRN    tamsulosin  0.4 mg AFTER BREAKFAST    famotidine  20 mg DAILY    oxyCODONE immediate-release  5 mg Q3HRS PRN    Or    oxyCODONE immediate-release  10 mg Q3HRS PRN    morphine injection  2-4 mg Q3HRS PRN    Respiratory Therapy Consult   Continuous RT    amLODIPine  5 mg Q DAY    labetalol  10 mg Q4HRS PRN    hydrALAZINE  10 mg Q6HRS PRN    atorvastatin  10 mg DAILY AT 1800    allopurinol  100 mg DAILY    carvedilol  12.5 mg BID WITH MEALS    spironolactone  25 mg Q DAY    sodium bicarbonate  1,300 mg BID    DULoxetine  30 mg Q EVENING    acetaminophen  650 mg Q6HRS PRN    Pharmacy Consult Request  1 Each PHARMACY TO DOSE       Assessment and Plan:  Hospital day # 22  We discussed laminectomy with subdural cyst exploration, likely tomorrow 5/16  Appreciate medical care.  NPO at midnight, hold DVT ppx       Chemical prophylactic DVT therapy: hold for surgery

## 2023-05-15 NOTE — DISCHARGE PLANNING
HTH/SCP TCN chart review completed. Current discharge considerations are SNF - patient has acceptance at [facility]. He is not yet medically clear and is scheduled for surgery tomorrow.     TCN will continue to follow and collaborate with discharge planning team as additional post acute needs arise. Thank you.    Completed:  PT/OT recommends post acute placement on 5/12/23 and 5/3/23.     SLP  5/1 recs for no further needs  Physiatry recommending SNF as of 5/2 as well  Choice obtained: IRF (-> declines; recs SNF) and SNF choice obtained on 4/24/23.  HH choice on 4/24/23.  Infusion on 4/22/23; see above; accepted to [facility];  [facility] referral sent 4/22/23

## 2023-05-15 NOTE — PROGRESS NOTES
Assumed care of patient at 0645. Bedside report received. Assessment complete.  AA&Ox4. Denies CP/SOB.  Reporting 0/10 pain. Declined intervention at this time.  Educated patient regarding pharmacologic and non pharmacologic modalities for pain management.  Skin per flowsheets  Tolerating Renal diet. Denies N/V.  + void via Florez Catheter. Last BM 5/14  Pt unable to participate in OOB activity d/t fatigue & weakness.  All needs met at this time. Call light within reach. Pt calls appropriately. Bed low and locked, non skid socks in place. Hourly rounding in place.

## 2023-05-15 NOTE — THERAPY
Physical Therapy Contact Note    Patient Name: Tyrone Cline  Age:  83 y.o., Sex:  male  Medical Record #: [id number]  Today's Date: 5/15/2023    Discussed missed therapy with MD       05/15/23 1036   Treatment Variance   Reason For Missed Therapy Medical - Other (Please Comment)  (Hold per MD. Surgery tomorrow. PT to follow post op.)   Interdisciplinary Plan of Care Collaboration   IDT Collaboration with  Physician, RN    Collaboration Comments Hold per MD. Surgery tomorrow. PT to follow post op.            Joan Ormonde PT

## 2023-05-15 NOTE — PROGRESS NOTES
[facility] Medicine Daily Progress Note    Date of Service  5/15/2023    Chief Complaint  Tyrone Cline is a 82 y.o. male admitted 4/21/2023 with Colitis.    Hospital Course  This 82-year-old male with a past medical history significant for CKD stage IV being followed by nephrology, diabetes mellitus with glyco of 6.6, hypertension presented to ER on 4/21/2023 with a complaint of abdominal pain and lower extremity weakness.    Patient stated that he had epigastric and abdominal pain on Tuesday; CT scan of the abdomen and pelvis consistent with colitis but did show sternocleidomastoid but less likely cholecystitis, ultrasound of the abdomen consistent with acute cholecystitis, HIDA scan showing acute carmeol; General surgery was consulted and he underwent  attempted laparoscopic cholecystectomy with conversion to open cholecystectomy; Subtotal cholecystectomy, patient has completed treatment with IV antibiotics.  Patient did have a NAHUN drain in place, which has been removed on 5/8/2023.    Also he was he is noted to have postsurgical wound on the right side of the abdomen, culture growing ESBL E. coli, currently on meropenem, will continue total of 7 days    His hospital course was complicated with postoperative hypotension, went to ICU vasopressor and transferred back on 4/29 floor.    Of note, patient continues to have bilateral lower extremity weakness; MR T spine expansile T2 hyperintense area in the thoracic spinal cord at the levels of T2, T3 and T4.    Neurosurgery was contacted, patient underwent MRI of the spine with anesthesia , Showing expansile lesion in the thoracic spinal cord at the levels of T2, T3 and T4. There is prominent right dorsal subarachnoid space at the level of T3 with flattening of the spinal cord. Dr. Tran from neurosurgery will be coming and evaluating the patient today.  His hospital course was complicated with urinary retention, urology was contacted, patient underwent Florez  placement..  Please do not remove Florez without consent from urology.    Considering bilateral lower extremity weakness, neurology was consulted and stated, patient underwent LP on 4/23/2023, neurology continue to follow, thus LP was repeated again on 5/13.    Interval events:  -- No acute events overnight, patient has been stable, patient is currently requiring 1 L of oxygen.  He is alert and awake, answering questions appropriately.  Continue IV antibiotics per infectious disease recommendation, currently patient is on meropenem.  Will need a total of 7 days.  Wound care following.  --Today neurosurgery will be coming and evaluating the patient.  --LP was completed yesterday  Plan of care has been discussed the patient, nursing staff, case management.  I also updated patient's son in regards to the plan of care  -- Patient potassium is on the higher side at 5.1, will continue renal diet.  Creatinine remains stable at 2.06, monitor  -- Patient blood glucose has been elevated, will add just insulin, his blood glucose is around 290s;, patient has been receiving 10 units of Lantus at a.m.  Will increase to 17 at a.m. and 5 at p.m.  Patient work-up is elevated secondary to him receiving Decadron.  We will continue Pepcid    5/15:  -- No acute events overnight, medicine has been stable, patient is alert, awake, answering questions appropriately, continue to have right lower extremity slightly weaker than the left.  But patient reports overall his bilateral lower extremity strength has gotten better.  Neurosurgery came and evaluated, patient will be going for surgery tomorrow, n.p.o. at midnight.  --Patient renal function remained stable with a creatinine of 2.07, WBC of 12, currently receiving meropenem for ESBL E coli abdominal  wound as per ID recs, last day being 5/19/2023.  -- Patient blood glucose continues remain elevated, patient has been getting insulin sliding scale, will add  short acting 4 units 3 times daily  with meals.  --Neurology continue to follow, patient underwent LP on 5/14:  please follow up with neurology recs as an op     According to revised cardiac index for preoperative risk, he has 10.1% 30-day risk of death, MI, cardiac arrest.  Patient does have 2 points which is creatinine greater than 2 and GI.  Treatment with insulin.  Echocardiogram obtained recently did not show any wall motion abnormality, EF is noted to be normal    I have discussed this patient's plan of care and discharge plan at IDT rounds today with Case Management, Nursing, Nursing leadership, and other members of the IDT team.    Consultants/Specialty  general surgery, nephrology, and neurology  ID    Code Status  DNAR/DNI    Disposition  Patient is not medically cleared for discharge.   Anticipate discharge to to an inpatient rehabilitation hospital.  I have placed the appropriate orders for post-discharge needs.    Review of Systems  Review of Systems   Constitutional:  Positive for malaise/fatigue. Negative for fever.   HENT:  Negative for congestion, hearing loss and sore throat.    Eyes:  Negative for blurred vision.   Respiratory:  Negative for cough and shortness of breath.    Cardiovascular:  Negative for chest pain, palpitations and leg swelling.   Gastrointestinal:  Negative for abdominal pain, diarrhea and heartburn.   Genitourinary:  Negative for dysuria.   Musculoskeletal:  Negative for myalgias.   Neurological:  Positive for weakness. Negative for dizziness and speech change.   Psychiatric/Behavioral:  Negative for depression. The patient is not nervous/anxious.    All other systems reviewed and are negative.       Physical Exam  Temp:  [36.6 °C (97.9 °F)-37.2 °C (99 °F)] 37 °C (98.6 °F)  Pulse:  [58-69] 67  Resp:  [16-17] 16  BP: (124-136)/(71-79) 134/71  SpO2:  [93 %-95 %] 95 %    Physical Exam  Vitals and nursing note reviewed.   Constitutional:       Appearance: Normal appearance.   HENT:      Head: Normocephalic and  atraumatic.   Eyes:      Extraocular Movements: Extraocular movements intact.      Pupils: Pupils are equal, round, and reactive to light.   Cardiovascular:      Rate and Rhythm: Normal rate and regular rhythm.   Pulmonary:      Effort: Pulmonary effort is normal.      Breath sounds: Normal breath sounds.   Abdominal:      Tenderness: There is abdominal tenderness (Mild). There is no guarding or rebound.   Musculoskeletal:      Cervical back: Neck supple.      Right lower leg: No edema.      Left lower leg: No edema.   Skin:     General: Skin is dry.      Coloration: Skin is pale.   Neurological:      Mental Status: He is alert and oriented to person, place, and time.      Cranial Nerves: No cranial nerve deficit.      Motor: Weakness present.         Fluids    Intake/Output Summary (Last 24 hours) at 5/15/2023 0914  Last data filed at 5/15/2023 0630  Gross per 24 hour   Intake 240 ml   Output 2500 ml   Net -2260 ml         Laboratory  Recent Labs     05/13/23 0138 05/14/23  0622 05/15/23  0655   WBC 12.3* 11.7* 12.0*   RBC 3.05* 3.68* 3.68*   HEMOGLOBIN 8.8* 10.8* 10.6*   HEMATOCRIT 27.3* 33.2* 32.9*   MCV 89.5 90.2 89.4   MCH 28.9 29.3 28.8   MCHC 32.2* 32.5* 32.2*   RDW 48.6 49.8 48.4   PLATELETCT 427 482* 430   MPV 10.8 10.9 10.6       Recent Labs     05/13/23  0138 05/14/23  0622 05/15/23  0655   SODIUM 134* 136 136   POTASSIUM 5.1 5.1 5.2   CHLORIDE 101 102 102   CO2 24 24 23   GLUCOSE 204* 221* 207*   BUN 59* 58* 57*   CREATININE 2.38* 2.06* 2.07*   CALCIUM 9.5 9.9 9.7       Recent Labs     05/13/23  0912 05/14/23 0622   APTT 23.7* 20.4*   INR 1.20* 1.09                     Imaging  MR-THORACIC SPINE-WITH & W/O   Final Result      Compared with the previous MRI ,again noted is expansile lesion in the thoracic spinal cord at the levels of T2, T3 and T4. There is prominent right dorsal subarachnoid space at the level of T3 with flattening of the spinal cord. There is mild contrast    enhancement noted in the  expanded portion of the spinal cord. 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 Other differential diagnosis includes transverse myelitis However the possibility of right-sided dorsal arachnoid cyst/arachnoid    web at the level of T3 causing spinal cord edema cannot be entirely ruled out. Despite multiple MRs, it is difficult to characterize the tumor. Therefore if there is any neurosurgical planning, CT myelogram is recommended for further characterization.      IR-US GUIDED PIV   Final Result    Ultrasound-guided PERIPHERAL IV INSERTION performed by    qualified nursing staff as above.      MR-THORACIC SPINE-WITH & W/O   Final Result      1.  Limited study due to the motion artifact.   2.  When compared with the previous MRI again noted is expansile T2 hyperintense area in the thoracic spinal cord at the levels of T2, T3 and T4. There is also prominent dorsal right-sided subarachnoid space at this level. The differential diagnosis    includes intramedullary spinal cord tumor and extramedullary cyst/web with cord compression and edema.   3.  Pre and postcontrast MR examination of the thoracic spine is recommended with contrast under General anesthesia.   4.  1 -2 mm  T2-weighted sequences from T2 to T6 with smaller field of view is recommended for further characterization.      MR-THORACIC SPINE-WITH & W/O   Final Result         Stable appearance of expansile nonenhancing signal abnormality involving the upper thoracic cord between T1 and T4. Also noted is a small extra-axial collection/web along the right posterolateral spinal canal at T3-4 that deforms the cord and displaces    it anteriorly and to the left.   This could represent an Arachnoid web or arachnoid cyst with extensive mass effect and secondary edema of the cord.   Recommend additional thin section T2 and post contrast images through T1-T6 to better assess the abnormality.      MR-BRAIN-WITH & W/O   Final Result         No acute  intracranial process.      Age-related volume loss and chronic microvascular ischemic changes.         DX-CHEST-PORTABLE (1 VIEW)   Final Result         1.  Pulmonary edema and/or infiltrates are identified, which are stable since the prior exam.   2.  Cardiomegaly   3.  Atherosclerosis      DX-CHEST-PORTABLE (1 VIEW)   Final Result         1.  Pulmonary edema and/or infiltrates are identified, which are stable since the prior exam.   2.  Left PICC line tip terminates in the left axilla, stable since prior study.      DX-CHEST-PORTABLE (1 VIEW)   Final Result      1.  No significant change.   2.  Possible left PICC line with tip projecting at the axillary vein.      DX-CHEST-PORTABLE (1 VIEW)   Final Result      1.  Mildly improved pulmonary opacities.   2.  Possible left PICC line with tip projecting at the axillary vein.   3.  Likely trace right pleural effusion.      DX-CHEST-LIMITED (1 VIEW)   Final Result      1.  Placement of endotracheal tube with tip projecting over the mid trachea      2.  Left arm catheter present which is presumably venous in projects in the expected position of the left axillary vein      3.  Enlarged cardiac silhouette      NM-HEPATOBILIARY SCAN   Final Result      Hepatobiliary scan findings suspicious for acute cholecystitis.      MR-THORACIC SPINE-WITH & W/O   Final Result      1.  There is abnormal expansile T2 hyperintense lesion in the right side of the thoracic spinal cord at the levels of T2 and T3 Mild contrast enhancement is seen. This lesion is suspicious for spinal cord neoplasm. Other remote differential diagnosis    includes transverse myelitis.   2.  Exaggerated thoracic kyphosis.   3.  Thoracic dextroscoliosis.   4.  Minimal degenerative changes.   5.  Right pleural effusion.      MR-LUMBAR SPINE-WITH & W/O   Final Result      1.  Multifocal degenerative disease in the lumbar spine as described above.   2.  Moderate central canal and severe lateral recess stenosis at the  level of L4-5. The exiting bilateral L5 nerve roots might have been impinged at the lateral recess.      MR-CERVICAL SPINE-WITH & W/O   Final Result      1.  There is abnormal expansile intramedullary T2 signal intensity lesion in the right cerebellum. Thoracic spinal cord at the level of T2 on T3. Mild diffuse contrast enhancement is seen. This finding is suspicious for spinal cord neoplasm. The other    less likely differential diagnosis includes transverse myelitis. Follow-up is recommended after 4 weeks.   2.  Mild degenerative disease in the cervical spine as described above.   3.  There is no evidence of infection in the cervical spine.      US-RUQ   Final Result         1.  Acute cholecystitis.   2.  Atrophic right kidney.      DX-CHEST-LIMITED (1 VIEW)   Final Result      Perihilar interstitial edema and basilar atelectasis and/or consolidation. Underlying infection is possible.      MR-LUMBAR SPINE-W/O   Final Result      1.  Lower lumbar spine predominant degenerative changes as described in detail above, progressed from 2010 MRI.   2.  Edema at the L4-L5 disc space and L5 superior endplate likely represents degenerative changes. Less likely this could represent a low-grade or early discitis osteomyelitis. Correlate for evidence of infection.   3.  Acute appearing Schmorl's node at L3-L4, which can be a source of pain.      US-RENAL   Final Result      1.  Atrophic kidneys. Echogenic bilateral renal parenchyma could relate to medical renal disease.      2.  No hydronephrosis. No renal calculus.      DX-CHEST-PORTABLE (1 VIEW)   Final Result      No acute cardiac or pulmonary abnormalities are identified.      CT-ABDOMEN-PELVIS W/O   Final Result      1.  Findings suspicious for focal colitis at the hepatic flexure, less likely cholecystitis or duodenitis with secondary involvement of the colon. Infectious, inflammatory and ischemic etiologies are considerations. Underlying mass is not excluded.   2.   Cholelithiasis with distention of the gallbladder   3.  BILATERAL renal atrophy   4.  Subcentimeter LEFT adrenal myelolipoma   5.  12 mm RIGHT adrenal nodule likely an adenoma absent a history of cancer   6.  Subcentimeter RIGHT hepatic lesion, likely a cyst absent a history of cancer   7.  Atherosclerosis   8.  Colonic diverticulosis      CT-HEAD W/O   Final Result      1.  Cerebral atrophy.      2.  White matter lucencies most consistent with small vessel ischemic change versus demyelination or gliosis.      3.  Otherwise, Head CT without contrast with no acute findings. No evidence of acute cerebral infarction, hemorrhage or mass lesion.                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  Neurology evaluated, patient received IVIG on 5/1, discontinued 5/2    Patient does have right lower extremity weakness more than left.  MRI was obtained, MRI T-spine with anesthesia was completed 5/11, called neurosurgery, recommended Decadron 4 mg every 6 hours.  Patient will be going for surgical intervention tomorrow, n.p.o. at midnight  -- follow NS recs      Neurology ahs evaluated and s/p LP on 4/23 and 5/14    Acute gangrenous cholecystitis- (present on admission)  Assessment & Plan  Found to have gangrenous cholecystitis s/p laprascopic converted open cholecystectomy 4/28/2023   -- Completed antibiotic treatment from 4/25- 4/ 29, drain has been removed.  Follow surgical recommendation.    Surgical course was complicated with postoperative wound infection, culture has been obtained, wound care has been consulted  Wound cx growing ESBL E coli, continue meropeneam for 7 days as per ID recs    Cholelithiasis with acute cholecystitis- (present on admission)  Assessment & Plan  S/p surgical removal,  converted to open cholecystectomy  NAHUN drain-removed on 5/8/2023   -- s/p completion of iv abx    Urinary retention  Assessment & Plan  Florez by  urology.    -- Do not change Florez without urology consent    Abnormal MRI- (present on admission)  Assessment & Plan  MRI on admission showing T2/T3  hyperintense lesion in the right side of the thoracic spinal cord at the levels of T2 and T3 Mild contrast enhancement is seen. This lesion is suspicious for spinal cord neoplasm or possible transverse myelitis       5/8 MRI thoracic spine reviewed and called Neurosurgery  On 5/11: MRI with anaesthesia will completed   Will be going for surgery on 5/16      Hypomagnesemia- (present on admission)  Assessment & Plan  Replete as needed    Acute kidney injury superimposed on CKD (HCC)- (present on admission)  Assessment & Plan   Resolved    Avoid nephrotoxins       Hypercalcemia- (present on admission)  Assessment & Plan  Likely due to immobility     -- monitor    Hypokalemia- (present on admission)  Assessment & Plan  Replete as needed    Thrombocytopenia (HCC)- (present on admission)  Assessment & Plan  Resolved, plt are stable at 427    High anion gap metabolic acidosis- (present on admission)  Assessment & Plan  Acute on chronic kidney disease, -appears back at baseline on 5/3/2023    Resolved    Hyponatremia- (present on admission)  Assessment & Plan  Resolved    Sepsis (HCC)- (present on admission)  Assessment & Plan  This is Sepsis Present on admission  SIRS criteria identified on my evaluation include: Leukocytosis, with WBC greater than 12,000  Source is colitis  Sepsis protocol initiated  Fluid resuscitation ordered per protocol  Crystalloid Fluid Administration: Fluid resuscitation ordered per standard protocol - 30 mL/kg per current or ideal body weight  IV antibiotics as appropriate for source of sepsis  Reassessment: I have reassessed the patient's hemodynamic status    Due to gangrenous gallbladder requiring open  carmelo    -- Today patient was noted to have surgical  wound with drainage   -- wound cx pending  ESBL E coli; id consulted, will switch to meropenem as per id; last day being on 5/19    Colitis- (present on admission)  Assessment & Plan  Cultures negative, monitor  Initial admitting impression, currently improved  Diet as tolerated     Diabetic peripheral neuropathy (HCC)- (present on admission)  Assessment & Plan  Cymbalta, medical treatment    Essential hypertension- (present on admission)  Assessment & Plan  Continue amlodipine and coreg    Type 2 diabetes mellitus with kidney complication, without long-term current use of insulin (HCC)- (present on admission)  Assessment & Plan  Ha1c 6.4% 4 months ago   On ozempic and januvia outpatient   Continue ISS and hypoglycemic protocol while inpatient    -- Has been started on Decadron, his blood glucose is noted to be elevated\    17 at a.m., 5 PM  Patient blood glucose is still elevated, started 4 3 times daily with meals    Hypercholesterolemia- (present on admission)  Assessment & Plan  Stable, continue Lipitor    Chronic kidney disease (CKD), stage IV (severe) (HCC)- (present on admission)  Assessment & Plan   Creatinine at 2.07            VTE prophylaxis: scd    I have performed a physical exam and reviewed and updated ROS and Plan today (5/15/2023). In review of yesterday's note (5/14/2023), there are no changes except as documented above.

## 2023-05-15 NOTE — DISCHARGE PLANNING
"Case Management Discharge Planning    Admission Date: 4/21/2023  GMLOS: 9.6  ALOS: 24    6-Clicks ADL Score: 19  6-Clicks Mobility Score: 7  PT and/or OT Eval ordered: Yes  Post-acute Referrals Ordered: Yes  Post-acute Choice Obtained: Yes  Has referral(s) been sent to post-acute provider:  Yes      Anticipated Discharge Dispo: Discharge Disposition: D/T to SNF with medicare cert w/planned hosp IP readmit (83)    DME Needed: Pending PT/OT recommendations.    Action(s) Taken: Updated Provider/Nurse on Discharge Plan    Pt was discussed in IDT rounds with Dr Cain. Pt  is scheduled for surgery tomorrow for   \"  laminectomy with subdural cyst exploration\".    [facility] once medically cleared.    Escalations Completed: None    Medically Clear: No    Next Steps:   This RN CM to continue to assist Pt with discharge as needed    Barriers to Discharge:   Medical clearance    Is the patient up for discharge tomorrow: No        "

## 2023-05-15 NOTE — CARE PLAN
The patient is Stable - Low risk of patient condition declining or worsening    Shift Goals  Clinical Goals: WV monitoring, Q2 turns, pain control, IV ABX, NPO, rest  Patient Goals: Pain control, rest, surgery  Family Goals: Communication on Plan of Care    Progress made toward(s) clinical / shift goals:  Patient tolerating WV, intermittent pain d/t suctioning after veraflo bothersome. Medicated for pain; see MAR. Q2 turns in place. Patient tolerating IV ABX. Fluids removed at 000. Patient understanding of surgical procedure. Patient expressed excitement daughter flown in overnight to be present for surgery. Patient slept intermittently during shift. New PIV ultrasound placed, patient tolerated.     Patient is not progressing towards the following goals:  NA    Noted leaking from rivera, patient noted clenching legs together. Still noted draining into bag.     Report given to pre-op RN bedside while providing patient care. Patient requested to speak to surgery to have a clear reference of what procedure will be performed. Informed pre-op RN of patients request.

## 2023-05-15 NOTE — THERAPY
Occupational Therapy Contact Note    Patient Name: Tyrone Cline  Age:  83 y.o., Sex:  male  Medical Record #: [id number]  Today's Date: 5/15/2023    Pt pending laminectomy with subdural cyst exploration, likely tomorrow 5/16. Will hold OT session and reattempt as appropriate/able.

## 2023-05-15 NOTE — PROGRESS NOTES
AM RN received update that surgery was rescheduled for 05/16. This RN informed patient. Patient expressed relief; providing him the opportunity to visit with his daughter who hasn't seen yet prior to surgery.

## 2023-05-15 NOTE — PROGRESS NOTES
2 RN skin check complete.      Devices in place: NC, PIV right AC, rivera catheter, WV to upper abdomen incision.      Skin assessed under devices: above as much as possible.     Pale skin, dry skin noted upper region of body. BUE/BLE edema, scattered bruising noted to arms. PIV right AC, CDI dressing. Abdomen obese, round, distended, bruising noted to lower abdominal region.  X 2 transverse incisions, upper region covered with wound vac, serosang drainage noted in cannister. Lower umbilicus incision well approximated with dermabond. Redness noted to left side of penile head. Rivera catheter in place with stat lock to rihgt upper quadrant, thin adhesive foam placed for skin protection. Lumber puncture with bandaid covering, no drainage noted. Blanchable redness to buttocks. BLE pitting edema noted.      Confirmed pressure ulcers found on:  NA     New potential pressure ulcers noted on: monitoring penis/sacrum.   Wound team following for WV care.      The following interventions in place: Grey foam to ears, TAP system, Q2 turns, frequent perineal checks, COSME mattress, pillows for offloading, milly cream to sacrum, no mepilex in place d/t incontinence of stool.

## 2023-05-15 NOTE — CARE PLAN
Problem: Knowledge Deficit - Standard  Goal: Patient and family/care givers will demonstrate understanding of plan of care, disease process/condition, diagnostic tests and medications  Outcome: Progressing     Problem: Pain - Standard  Goal: Alleviation of pain or a reduction in pain to the patient’s comfort goal  Outcome: Progressing     Problem: Skin Integrity  Goal: Skin integrity is maintained or improved  Outcome: Progressing   The patient is Stable - Low risk of patient condition declining or worsening    Shift Goals  Clinical Goals: WV monitoring, Q2 turns, pain control, IV ABX, NPO, rest  Patient Goals: Pain control, rest, surgery  Family Goals: Communication on Plan of Care    Progress made toward(s) clinical / shift goals:  Q2 Turns in place, pain medicated per MAR, patient placed on COSME Mattress, Educated patient on plan of care this shift, patient verbalized understanding     Patient is not progressing towards the following goals:

## 2023-05-16 ENCOUNTER — APPOINTMENT (OUTPATIENT)
Dept: RADIOLOGY | Facility: MEDICAL CENTER | Age: 83
DRG: 853 | End: 2023-05-16
Attending: NEUROLOGICAL SURGERY
Payer: MEDICARE

## 2023-05-16 ENCOUNTER — ANESTHESIA (OUTPATIENT)
Dept: SURGERY | Facility: MEDICAL CENTER | Age: 83
DRG: 853 | End: 2023-05-16
Payer: MEDICARE

## 2023-05-16 LAB
A-TOCOPHEROL VIT E SERPL-MCNC: 9.6 MG/L (ref 5.5–18)
ACE CSF-CCNC: 1.5 U/L (ref 0–2.5)
ALB CSF/SERPL: 12.3 RATIO (ref 0–9)
ALBUMIN CSF-MCNC: 21 MG/DL (ref 0–35)
ALBUMIN SERPL-MCNC: 1703 MG/DL (ref 3500–5200)
BACTERIA CSF CULT: NORMAL
BETA+GAMMA TOCOPHEROL SERPL-MCNC: 0.5 MG/L (ref 0–6)
CENTROMERE IGG TITR SER IF: 1 AU/ML (ref 0–40)
ENA JO1 AB TITR SER: 1 AU/ML (ref 0–40)
ENA SCL70 IGG SER QL: 103 AU/ML (ref 0–40)
ENA SM IGG SER-ACNC: 1 AU/ML (ref 0–40)
ENA SS-B IGG SER IA-ACNC: 0 AU/ML (ref 0–40)
GLUCOSE BLD STRIP.AUTO-MCNC: 220 MG/DL (ref 65–99)
GLUCOSE BLD STRIP.AUTO-MCNC: 224 MG/DL (ref 65–99)
GLUCOSE BLD STRIP.AUTO-MCNC: 228 MG/DL (ref 65–99)
GRAM STN SPEC: NORMAL
IGG CSF-MCNC: 6.2 MG/DL (ref 0–6)
IGG SERPL-MCNC: 1162 MG/DL (ref 768–1632)
IGG SYNTH RATE SER+CSF CALC-MRATE: <0 MG/D
IGG/ALB CLEAR SER+CSF-RTO: 0.43 RATIO (ref 0.28–0.66)
IGG/ALB CSF: 0.3 RATIO (ref 0.09–0.25)
RIBOSOMAL P AB SER-ACNC: 5 AU/ML (ref 0–40)
SIGNIFICANT IND 70042: NORMAL
SITE SITE: NORMAL
SOURCE SOURCE: NORMAL
SSA52 R0ENA AB IGG Q0420: 23 AU/ML (ref 0–40)
SSA60 R0ENA AB IGG Q0419: 1 AU/ML (ref 0–40)
U1 SNRNP IGG SER QL: 4 UNITS (ref 0–19)

## 2023-05-16 PROCEDURE — 160009 HCHG ANES TIME/MIN: Performed by: NEUROLOGICAL SURGERY

## 2023-05-16 PROCEDURE — 700111 HCHG RX REV CODE 636 W/ 250 OVERRIDE (IP): Performed by: HOSPITALIST

## 2023-05-16 PROCEDURE — 72020 X-RAY EXAM OF SPINE 1 VIEW: CPT

## 2023-05-16 PROCEDURE — 700102 HCHG RX REV CODE 250 W/ 637 OVERRIDE(OP): Performed by: HOSPITALIST

## 2023-05-16 PROCEDURE — A9270 NON-COVERED ITEM OR SERVICE: HCPCS | Performed by: FAMILY MEDICINE

## 2023-05-16 PROCEDURE — 97605 NEG PRS WND THER DME<=50SQCM: CPT

## 2023-05-16 PROCEDURE — 160048 HCHG OR STATISTICAL LEVEL 1-5: Performed by: NEUROLOGICAL SURGERY

## 2023-05-16 PROCEDURE — 700102 HCHG RX REV CODE 250 W/ 637 OVERRIDE(OP): Performed by: FAMILY MEDICINE

## 2023-05-16 PROCEDURE — 97602 WOUND(S) CARE NON-SELECTIVE: CPT

## 2023-05-16 PROCEDURE — 700101 HCHG RX REV CODE 250: Performed by: STUDENT IN AN ORGANIZED HEALTH CARE EDUCATION/TRAINING PROGRAM

## 2023-05-16 PROCEDURE — 770001 HCHG ROOM/CARE - MED/SURG/GYN PRIV*

## 2023-05-16 PROCEDURE — A9270 NON-COVERED ITEM OR SERVICE: HCPCS | Performed by: STUDENT IN AN ORGANIZED HEALTH CARE EDUCATION/TRAINING PROGRAM

## 2023-05-16 PROCEDURE — 700111 HCHG RX REV CODE 636 W/ 250 OVERRIDE (IP): Performed by: NEUROLOGICAL SURGERY

## 2023-05-16 PROCEDURE — 82962 GLUCOSE BLOOD TEST: CPT

## 2023-05-16 PROCEDURE — 700101 HCHG RX REV CODE 250: Performed by: NEUROLOGICAL SURGERY

## 2023-05-16 PROCEDURE — 700105 HCHG RX REV CODE 258: Performed by: STUDENT IN AN ORGANIZED HEALTH CARE EDUCATION/TRAINING PROGRAM

## 2023-05-16 PROCEDURE — 302098 PASTE RING (FLAT): Performed by: HOSPITALIST

## 2023-05-16 PROCEDURE — 700105 HCHG RX REV CODE 258: Performed by: HOSPITALIST

## 2023-05-16 PROCEDURE — 160041 HCHG SURGERY MINUTES - EA ADDL 1 MIN LEVEL 4: Performed by: NEUROLOGICAL SURGERY

## 2023-05-16 PROCEDURE — 00670 ANES XTNSV SP&SPI CORD PX: CPT | Performed by: STUDENT IN AN ORGANIZED HEALTH CARE EDUCATION/TRAINING PROGRAM

## 2023-05-16 PROCEDURE — 700101 HCHG RX REV CODE 250: Performed by: HOSPITALIST

## 2023-05-16 PROCEDURE — 160029 HCHG SURGERY MINUTES - 1ST 30 MINS LEVEL 4: Performed by: NEUROLOGICAL SURGERY

## 2023-05-16 PROCEDURE — 160002 HCHG RECOVERY MINUTES (STAT): Performed by: NEUROLOGICAL SURGERY

## 2023-05-16 PROCEDURE — 700102 HCHG RX REV CODE 250 W/ 637 OVERRIDE(OP): Performed by: STUDENT IN AN ORGANIZED HEALTH CARE EDUCATION/TRAINING PROGRAM

## 2023-05-16 PROCEDURE — 110454 HCHG SHELL REV 250: Performed by: NEUROLOGICAL SURGERY

## 2023-05-16 PROCEDURE — 110371 HCHG SHELL REV 272: Performed by: NEUROLOGICAL SURGERY

## 2023-05-16 PROCEDURE — A9270 NON-COVERED ITEM OR SERVICE: HCPCS | Performed by: HOSPITALIST

## 2023-05-16 PROCEDURE — 99100 ANES PT EXTEME AGE<1 YR&>70: CPT | Performed by: STUDENT IN AN ORGANIZED HEALTH CARE EDUCATION/TRAINING PROGRAM

## 2023-05-16 PROCEDURE — A9270 NON-COVERED ITEM OR SERVICE: HCPCS | Performed by: SURGERY

## 2023-05-16 PROCEDURE — 160035 HCHG PACU - 1ST 60 MINS PHASE I: Performed by: NEUROLOGICAL SURGERY

## 2023-05-16 PROCEDURE — 700111 HCHG RX REV CODE 636 W/ 250 OVERRIDE (IP): Performed by: STUDENT IN AN ORGANIZED HEALTH CARE EDUCATION/TRAINING PROGRAM

## 2023-05-16 PROCEDURE — 700102 HCHG RX REV CODE 250 W/ 637 OVERRIDE(OP): Performed by: SURGERY

## 2023-05-16 PROCEDURE — 99232 SBSQ HOSP IP/OBS MODERATE 35: CPT | Performed by: STUDENT IN AN ORGANIZED HEALTH CARE EDUCATION/TRAINING PROGRAM

## 2023-05-16 DEVICE — GRAFT DURAMATRIX ONLAY PLUS 1IN X 3IN OR 2.75CM X 7.5CM: Type: IMPLANTABLE DEVICE | Site: SPINE THORACIC | Status: FUNCTIONAL

## 2023-05-16 DEVICE — DURASEAL SEALANT SYSTEM 5ML - (5/BX): Type: IMPLANTABLE DEVICE | Site: SPINE THORACIC | Status: FUNCTIONAL

## 2023-05-16 RX ORDER — METHOCARBAMOL 750 MG/1
750 TABLET, FILM COATED ORAL EVERY 8 HOURS PRN
Status: DISCONTINUED | OUTPATIENT
Start: 2023-05-16 | End: 2023-05-25

## 2023-05-16 RX ORDER — MAGNESIUM HYDROXIDE 1200 MG/15ML
LIQUID ORAL
Status: COMPLETED | OUTPATIENT
Start: 2023-05-16 | End: 2023-05-16

## 2023-05-16 RX ORDER — SODIUM CHLORIDE, SODIUM LACTATE, POTASSIUM CHLORIDE, CALCIUM CHLORIDE 600; 310; 30; 20 MG/100ML; MG/100ML; MG/100ML; MG/100ML
INJECTION, SOLUTION INTRAVENOUS CONTINUOUS
Status: DISCONTINUED | OUTPATIENT
Start: 2023-05-16 | End: 2023-05-18

## 2023-05-16 RX ORDER — BUPIVACAINE HYDROCHLORIDE AND EPINEPHRINE 5; 5 MG/ML; UG/ML
INJECTION, SOLUTION EPIDURAL; INTRACAUDAL; PERINEURAL
Status: DISCONTINUED | OUTPATIENT
Start: 2023-05-16 | End: 2023-05-16 | Stop reason: HOSPADM

## 2023-05-16 RX ORDER — OXYCODONE HCL 5 MG/5 ML
10 SOLUTION, ORAL ORAL
Status: DISCONTINUED | OUTPATIENT
Start: 2023-05-16 | End: 2023-05-16 | Stop reason: HOSPADM

## 2023-05-16 RX ORDER — VANCOMYCIN HYDROCHLORIDE 1 G/20ML
INJECTION, POWDER, LYOPHILIZED, FOR SOLUTION INTRAVENOUS
Status: COMPLETED | OUTPATIENT
Start: 2023-05-16 | End: 2023-05-16

## 2023-05-16 RX ORDER — HYDRALAZINE HYDROCHLORIDE 20 MG/ML
5 INJECTION INTRAMUSCULAR; INTRAVENOUS
Status: DISCONTINUED | OUTPATIENT
Start: 2023-05-16 | End: 2023-05-16 | Stop reason: HOSPADM

## 2023-05-16 RX ORDER — HEPARIN SODIUM 5000 [USP'U]/ML
5000 INJECTION, SOLUTION INTRAVENOUS; SUBCUTANEOUS EVERY 8 HOURS
Status: DISCONTINUED | OUTPATIENT
Start: 2023-05-17 | End: 2023-05-20

## 2023-05-16 RX ORDER — DIPHENHYDRAMINE HYDROCHLORIDE 50 MG/ML
12.5 INJECTION INTRAMUSCULAR; INTRAVENOUS
Status: DISCONTINUED | OUTPATIENT
Start: 2023-05-16 | End: 2023-05-16 | Stop reason: HOSPADM

## 2023-05-16 RX ORDER — HYDROMORPHONE HYDROCHLORIDE 1 MG/ML
0.1 INJECTION, SOLUTION INTRAMUSCULAR; INTRAVENOUS; SUBCUTANEOUS
Status: DISCONTINUED | OUTPATIENT
Start: 2023-05-16 | End: 2023-05-16 | Stop reason: HOSPADM

## 2023-05-16 RX ORDER — ONDANSETRON 2 MG/ML
INJECTION INTRAMUSCULAR; INTRAVENOUS PRN
Status: DISCONTINUED | OUTPATIENT
Start: 2023-05-16 | End: 2023-05-16 | Stop reason: SURG

## 2023-05-16 RX ORDER — DIPHENHYDRAMINE HCL 25 MG
25 TABLET ORAL EVERY 6 HOURS PRN
Status: DISCONTINUED | OUTPATIENT
Start: 2023-05-16 | End: 2023-05-24

## 2023-05-16 RX ORDER — ONDANSETRON 2 MG/ML
4 INJECTION INTRAMUSCULAR; INTRAVENOUS
Status: DISCONTINUED | OUTPATIENT
Start: 2023-05-16 | End: 2023-05-16 | Stop reason: HOSPADM

## 2023-05-16 RX ORDER — EPHEDRINE SULFATE 50 MG/ML
INJECTION, SOLUTION INTRAVENOUS PRN
Status: DISCONTINUED | OUTPATIENT
Start: 2023-05-16 | End: 2023-05-16 | Stop reason: SURG

## 2023-05-16 RX ORDER — LIDOCAINE HYDROCHLORIDE 20 MG/ML
INJECTION, SOLUTION EPIDURAL; INFILTRATION; INTRACAUDAL; PERINEURAL PRN
Status: DISCONTINUED | OUTPATIENT
Start: 2023-05-16 | End: 2023-05-16 | Stop reason: SURG

## 2023-05-16 RX ORDER — DIPHENHYDRAMINE HYDROCHLORIDE 50 MG/ML
25 INJECTION INTRAMUSCULAR; INTRAVENOUS EVERY 6 HOURS PRN
Status: DISCONTINUED | OUTPATIENT
Start: 2023-05-16 | End: 2023-05-24

## 2023-05-16 RX ORDER — ROCURONIUM BROMIDE 10 MG/ML
INJECTION, SOLUTION INTRAVENOUS PRN
Status: DISCONTINUED | OUTPATIENT
Start: 2023-05-16 | End: 2023-05-16 | Stop reason: SURG

## 2023-05-16 RX ORDER — SODIUM CHLORIDE, SODIUM LACTATE, POTASSIUM CHLORIDE, CALCIUM CHLORIDE 600; 310; 30; 20 MG/100ML; MG/100ML; MG/100ML; MG/100ML
INJECTION, SOLUTION INTRAVENOUS CONTINUOUS
Status: DISCONTINUED | OUTPATIENT
Start: 2023-05-16 | End: 2023-05-16 | Stop reason: HOSPADM

## 2023-05-16 RX ORDER — EPHEDRINE SULFATE 50 MG/ML
5 INJECTION, SOLUTION INTRAVENOUS
Status: DISCONTINUED | OUTPATIENT
Start: 2023-05-16 | End: 2023-05-16 | Stop reason: HOSPADM

## 2023-05-16 RX ORDER — SODIUM CHLORIDE, SODIUM LACTATE, POTASSIUM CHLORIDE, CALCIUM CHLORIDE 600; 310; 30; 20 MG/100ML; MG/100ML; MG/100ML; MG/100ML
INJECTION, SOLUTION INTRAVENOUS
Status: DISCONTINUED | OUTPATIENT
Start: 2023-05-16 | End: 2023-05-16 | Stop reason: SURG

## 2023-05-16 RX ORDER — ACETAMINOPHEN 500 MG
1000 TABLET ORAL ONCE
Status: COMPLETED | OUTPATIENT
Start: 2023-05-16 | End: 2023-05-16

## 2023-05-16 RX ORDER — HYDROMORPHONE HYDROCHLORIDE 1 MG/ML
0.2 INJECTION, SOLUTION INTRAMUSCULAR; INTRAVENOUS; SUBCUTANEOUS
Status: DISCONTINUED | OUTPATIENT
Start: 2023-05-16 | End: 2023-05-16 | Stop reason: HOSPADM

## 2023-05-16 RX ORDER — HALOPERIDOL 5 MG/ML
1 INJECTION INTRAMUSCULAR
Status: DISCONTINUED | OUTPATIENT
Start: 2023-05-16 | End: 2023-05-16 | Stop reason: HOSPADM

## 2023-05-16 RX ORDER — HYDROMORPHONE HYDROCHLORIDE 1 MG/ML
0.4 INJECTION, SOLUTION INTRAMUSCULAR; INTRAVENOUS; SUBCUTANEOUS
Status: DISCONTINUED | OUTPATIENT
Start: 2023-05-16 | End: 2023-05-16 | Stop reason: HOSPADM

## 2023-05-16 RX ORDER — OXYCODONE HCL 5 MG/5 ML
5 SOLUTION, ORAL ORAL
Status: DISCONTINUED | OUTPATIENT
Start: 2023-05-16 | End: 2023-05-16 | Stop reason: HOSPADM

## 2023-05-16 RX ORDER — CEFAZOLIN SODIUM 1 G/3ML
INJECTION, POWDER, FOR SOLUTION INTRAMUSCULAR; INTRAVENOUS PRN
Status: DISCONTINUED | OUTPATIENT
Start: 2023-05-16 | End: 2023-05-16 | Stop reason: SURG

## 2023-05-16 RX ORDER — LIDOCAINE HYDROCHLORIDE 40 MG/ML
SOLUTION TOPICAL PRN
Status: DISCONTINUED | OUTPATIENT
Start: 2023-05-16 | End: 2023-05-16 | Stop reason: SURG

## 2023-05-16 RX ORDER — OXYCODONE HYDROCHLORIDE 5 MG/1
5 TABLET ORAL
Status: COMPLETED | OUTPATIENT
Start: 2023-05-16 | End: 2023-05-16

## 2023-05-16 RX ORDER — DEXAMETHASONE SODIUM PHOSPHATE 4 MG/ML
INJECTION, SOLUTION INTRA-ARTICULAR; INTRALESIONAL; INTRAMUSCULAR; INTRAVENOUS; SOFT TISSUE PRN
Status: DISCONTINUED | OUTPATIENT
Start: 2023-05-16 | End: 2023-05-16 | Stop reason: SURG

## 2023-05-16 RX ADMIN — MORPHINE SULFATE 4 MG: 4 INJECTION, SOLUTION INTRAMUSCULAR; INTRAVENOUS at 02:40

## 2023-05-16 RX ADMIN — EPHEDRINE SULFATE 10 MG: 50 INJECTION, SOLUTION INTRAVENOUS at 13:56

## 2023-05-16 RX ADMIN — SPIRONOLACTONE 25 MG: 50 TABLET ORAL at 05:34

## 2023-05-16 RX ADMIN — DEXAMETHASONE SODIUM PHOSPHATE 4 MG: 4 INJECTION, SOLUTION INTRA-ARTICULAR; INTRALESIONAL; INTRAMUSCULAR; INTRAVENOUS; SOFT TISSUE at 00:08

## 2023-05-16 RX ADMIN — ACETAMINOPHEN 1000 MG: 500 TABLET, FILM COATED ORAL at 11:34

## 2023-05-16 RX ADMIN — FENTANYL CITRATE 100 MCG: 50 INJECTION, SOLUTION INTRAMUSCULAR; INTRAVENOUS at 12:56

## 2023-05-16 RX ADMIN — ROCURONIUM BROMIDE 20 MG: 50 INJECTION, SOLUTION INTRAVENOUS at 14:02

## 2023-05-16 RX ADMIN — ATORVASTATIN CALCIUM 10 MG: 10 TABLET, FILM COATED ORAL at 18:01

## 2023-05-16 RX ADMIN — EPHEDRINE SULFATE 10 MG: 50 INJECTION, SOLUTION INTRAVENOUS at 13:25

## 2023-05-16 RX ADMIN — PROPOFOL 100 MG: 10 INJECTION, EMULSION INTRAVENOUS at 12:56

## 2023-05-16 RX ADMIN — DOCUSATE SODIUM 50 MG AND SENNOSIDES 8.6 MG 2 TABLET: 8.6; 5 TABLET, FILM COATED ORAL at 05:34

## 2023-05-16 RX ADMIN — LIDOCAINE HYDROCHLORIDE 4 ML: 40 SOLUTION TOPICAL at 12:57

## 2023-05-16 RX ADMIN — SODIUM CHLORIDE, POTASSIUM CHLORIDE, SODIUM LACTATE AND CALCIUM CHLORIDE: 600; 310; 30; 20 INJECTION, SOLUTION INTRAVENOUS at 12:51

## 2023-05-16 RX ADMIN — LIDOCAINE HYDROCHLORIDE 80 MG: 20 INJECTION, SOLUTION EPIDURAL; INFILTRATION; INTRACAUDAL at 12:56

## 2023-05-16 RX ADMIN — DEXAMETHASONE SODIUM PHOSPHATE 4 MG: 4 INJECTION, SOLUTION INTRA-ARTICULAR; INTRALESIONAL; INTRAMUSCULAR; INTRAVENOUS; SOFT TISSUE at 23:51

## 2023-05-16 RX ADMIN — INSULIN LISPRO 3 UNITS: 100 INJECTION, SOLUTION INTRAVENOUS; SUBCUTANEOUS at 18:15

## 2023-05-16 RX ADMIN — ROCURONIUM BROMIDE 70 MG: 50 INJECTION, SOLUTION INTRAVENOUS at 12:57

## 2023-05-16 RX ADMIN — LIDOCAINE HYDROCHLORIDE 1 APPLICATION: 40 SOLUTION TOPICAL at 09:45

## 2023-05-16 RX ADMIN — NOREPINEPHRINE BITARTRATE 0.02 MCG/KG/MIN: 1 INJECTION, SOLUTION, CONCENTRATE INTRAVENOUS at 14:11

## 2023-05-16 RX ADMIN — MORPHINE SULFATE 4 MG: 4 INJECTION, SOLUTION INTRAMUSCULAR; INTRAVENOUS at 10:17

## 2023-05-16 RX ADMIN — SUGAMMADEX 200 MG: 100 INJECTION, SOLUTION INTRAVENOUS at 15:21

## 2023-05-16 RX ADMIN — INSULIN LISPRO 4 UNITS: 100 INJECTION, SOLUTION INTRAVENOUS; SUBCUTANEOUS at 18:19

## 2023-05-16 RX ADMIN — CARVEDILOL 12.5 MG: 12.5 TABLET, FILM COATED ORAL at 10:33

## 2023-05-16 RX ADMIN — ONDANSETRON 4 MG: 2 INJECTION INTRAMUSCULAR; INTRAVENOUS at 15:11

## 2023-05-16 RX ADMIN — MEROPENEM 500 MG: 500 INJECTION INTRAVENOUS at 21:44

## 2023-05-16 RX ADMIN — ROCURONIUM BROMIDE 10 MG: 50 INJECTION, SOLUTION INTRAVENOUS at 14:51

## 2023-05-16 RX ADMIN — SODIUM CHLORIDE, POTASSIUM CHLORIDE, SODIUM LACTATE AND CALCIUM CHLORIDE: 600; 310; 30; 20 INJECTION, SOLUTION INTRAVENOUS at 18:10

## 2023-05-16 RX ADMIN — OXYCODONE 5 MG: 5 TABLET ORAL at 11:35

## 2023-05-16 RX ADMIN — EPHEDRINE SULFATE 10 MG: 50 INJECTION, SOLUTION INTRAVENOUS at 13:59

## 2023-05-16 RX ADMIN — EPHEDRINE SULFATE 20 MG: 50 INJECTION, SOLUTION INTRAVENOUS at 13:14

## 2023-05-16 RX ADMIN — DEXAMETHASONE SODIUM PHOSPHATE 4 MG: 4 INJECTION, SOLUTION INTRA-ARTICULAR; INTRALESIONAL; INTRAMUSCULAR; INTRAVENOUS; SOFT TISSUE at 05:29

## 2023-05-16 RX ADMIN — AMLODIPINE BESYLATE 5 MG: 10 TABLET ORAL at 05:34

## 2023-05-16 RX ADMIN — DULOXETINE HYDROCHLORIDE 30 MG: 30 CAPSULE, DELAYED RELEASE ORAL at 18:01

## 2023-05-16 RX ADMIN — EPHEDRINE SULFATE 10 MG: 50 INJECTION, SOLUTION INTRAVENOUS at 13:19

## 2023-05-16 RX ADMIN — SODIUM BICARBONATE 1300 MG: 650 TABLET ORAL at 05:34

## 2023-05-16 RX ADMIN — ALLOPURINOL 100 MG: 100 TABLET ORAL at 05:34

## 2023-05-16 RX ADMIN — EPHEDRINE SULFATE 20 MG: 50 INJECTION, SOLUTION INTRAVENOUS at 13:05

## 2023-05-16 RX ADMIN — FAMOTIDINE 20 MG: 20 TABLET, FILM COATED ORAL at 05:34

## 2023-05-16 RX ADMIN — DEXAMETHASONE SODIUM PHOSPHATE 4 MG: 4 INJECTION, SOLUTION INTRA-ARTICULAR; INTRALESIONAL; INTRAMUSCULAR; INTRAVENOUS; SOFT TISSUE at 19:06

## 2023-05-16 RX ADMIN — MEROPENEM 500 MG: 500 INJECTION INTRAVENOUS at 05:34

## 2023-05-16 RX ADMIN — DOCUSATE SODIUM 50 MG AND SENNOSIDES 8.6 MG 2 TABLET: 8.6; 5 TABLET, FILM COATED ORAL at 21:56

## 2023-05-16 RX ADMIN — CEFAZOLIN 2 G: 1 INJECTION, POWDER, FOR SOLUTION INTRAMUSCULAR; INTRAVENOUS at 12:56

## 2023-05-16 RX ADMIN — DEXAMETHASONE SODIUM PHOSPHATE 8 MG: 4 INJECTION INTRA-ARTICULAR; INTRALESIONAL; INTRAMUSCULAR; INTRAVENOUS; SOFT TISSUE at 13:20

## 2023-05-16 RX ADMIN — SODIUM BICARBONATE 1300 MG: 650 TABLET ORAL at 18:01

## 2023-05-16 ASSESSMENT — PAIN SCALES - GENERAL: PAIN_LEVEL: 0

## 2023-05-16 ASSESSMENT — PAIN DESCRIPTION - PAIN TYPE
TYPE: ACUTE PAIN

## 2023-05-16 ASSESSMENT — ENCOUNTER SYMPTOMS: WEAKNESS: 1

## 2023-05-16 NOTE — ANESTHESIA PROCEDURE NOTES
Peripheral IV    Date/Time: 5/16/2023 1:00 PM    Performed by: Rupal Davis M.D.  Authorized by: Rupal Davis M.D.    Size:  16 G  Laterality:  Right  Peripheral IV Location:  AC  Local Anesthetic:  None  Site Prep:  Alcohol  Technique:  Direct puncture  Attempts:  1

## 2023-05-16 NOTE — ANESTHESIA TIME REPORT
Anesthesia Start and Stop Event Times     Date Time Event    5/16/2023 1216 Ready for Procedure     1251 Anesthesia Start     1539 Anesthesia Stop        Responsible Staff  05/16/23    Name Role Begin End    Rupal Davis M.D. Anesth 1251 1539        Overtime Reason:  no overtime (within assigned shift)    Comments:

## 2023-05-16 NOTE — ANESTHESIA PREPROCEDURE EVALUATION
Case: [id number] Date/Time: 05/16/23 1200    Procedure: LAMINECTOMY, SPINE, THORACIC - T3, INTRADURAL WASHOUT (Spine Thoracic)    Location: [facility] OR 06 / SURGERY [facility]    Surgeons: Fidencio Springer M.D.        82 yo man admitted on 4/21/23 with complicated hospital course, now for T3 laminectomy and subdural cyst exploration.  - weakness in bilateral lower extremities  - s/p lap to open subtotal cholecystectomy on 4/28/23 c/b postsurgical wound infection, currently on meropenem, and postop hypotension requiring vasopressor support, currently on none.  - HTN  - DM2  - CKD    TTE 2/6/23  CONCLUSIONS  Prior study on 02/24/22, compared to the report of the prior study, the   measured ascending aorta diameter is less.   Normal left ventricular systolic function.   The left ventricular ejection fraction is visually estimated to be 60%.  Mild tricuspid regurgitation.  Estimated right ventricular systolic pressure is 25 mmHg.    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 (HCC)   (positive) Essential hypertension   (positive) Hypertensive emergency         (positive) Acute kidney injury superimposed on CKD (HCC)   (positive) Chronic kidney disease (CKD), stage IV (severe) (HCC)      ENDO   (positive) Type 2 diabetes mellitus with kidney complication, without long-term current use of insulin (HCC)       Physical Exam    Airway   Mallampati: II  TM distance: >3 FB  Neck ROM: full       Cardiovascular - normal exam  Rhythm: regular  Rate: normal  (-) murmur     Dental - normal exam        Facial Hair   Pulmonary - normal exam  Breath sounds clear to auscultation     Abdominal    Neurological - normal exam         Other findings: No loose teeth           Anesthesia Plan    ASA 3   ASA physical status 3 criteria: diabetes - poorly controlled and hypertension - poorly controlled    Plan - general       Airway plan will be ETT          Induction: intravenous    Postoperative  Plan: Postoperative administration of opioids is intended.    Pertinent diagnostic labs and testing reviewed    Informed Consent:    Anesthetic plan and risks discussed with patient.    Use of blood products discussed with: patient whom consented to blood products.

## 2023-05-16 NOTE — WOUND TEAM
Assisted Jaswinder CARBAJAL (Wound RN), RN with wound care, non-selective debridement performed using wound cleanser/NS and gauze. Please see Jaswinder CARBAJAL (Wound RN) wound note for further wound care details.

## 2023-05-16 NOTE — OR SURGEON
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    Anesthesiologist/Type of Anesthesia:  Anesthesiologist: Rupal Davis M.D./General    Surgical Staff:  Assistant: Mallorie Irizarry P.A.-C.  Circulator: Wolfgang Regan R.N.; Daniella Sampson R.N.  Relief Circulator: Prabha Josue R.N.  Scrub Person: Kitty Alba  Radiology Technologist: Chester Gates    Specimens removed if any:  * No specimens in log *    Estimated Blood Loss: 30 cc    Findings: patient did well, see OP report.     Complications: None        5/16/2023 3:29 PM Mallorie Irizarry P.A.-C.

## 2023-05-16 NOTE — PROGRESS NOTES
2 RN skin check complete.      Devices in place: NC, PIV left forearm, rivera catheter, WV to upper abdomen incision.       Skin assessed under devices: above as much as possible.     Pale skin, dry skin noted upper region of body. BUE/BLE edema, scattered bruising noted to arms. PIV left forearm, CDI dressing. Abdomen obese, round, distended, bruising noted to lower abdominal region.  X 2 transverse incisions, upper region covered with wound vac, serosang/brown drainage noted in cannister. Lower umbilicus incision well approximated with dermabond. Redness noted to left side of penile head. Rivera catheter in place with stat lock to rihgt upper quadrant. Lumber puncture with bandaid covering, no drainage noted. Blanchable redness to buttocks. BLE pitting edema noted.      Confirmed pressure ulcers found on:  NA     New potential pressure ulcers noted on: monitoring penis/sacrum.   Wound team following for WV care.      The following interventions in place: Grey foam to ears, TAP system, Q2 turns, frequent perineal checks, COSME mattress, pillows for offloading, milly cream to sacrum, no mepilex in place d/t incontinence of stool.

## 2023-05-16 NOTE — PROGRESS NOTES
Report received from AM RN; assumed care. Son bedside at change of shift. Assessment/2 RN skin check completed. A&O x 4 this shift. VSS, intermittent bradycardia noted. 96% on RA. Patient denying SOB, new numbness/tingling, nausea, vomiting, dizziness. Medicated for burning WV abdominal pain; see MAR. Abdomen obese, round, tender. Normoactive BS x 4 noted. WV to upper abdomen, CDI dressing. Lumbar puncture site with bandaid. + void; yellow urine noted in rivera. + eructation. + flatus. LBM 05/14. Patient tolerating renal diet, removed fluids at 000. Patient aware of NPO status. Patient tolerating IV ABX. Discussed plan of care/surgery with patient. All questions answered. High fall risk. Bed's alarm attempted to be engaged, not functioning. Patient compliant in not getting out of bed unassisted. Bed in locked/lowest position.  Call light/personal belongings within reach.  All needs met, patient sleeping at present time.

## 2023-05-16 NOTE — PROGRESS NOTES
Neurosurgery Progress Note    Subjective:  83 y.o. male who presented 2023 with a very complicated picture.  He has stage IV CKD. S/p cholecystectomy.  With regards to his neurologic symptoms, he reports on the day of admission he had immediate and complete loss of lower extremity strength that has waxed and waned but has overall improved significantly since admission.   Currently denies neurologic symptoms other than weakness in the lower extremities, still unable to walk.    Denies bowel or bladder dysfunction.  Denies numbness in the lower extremities.    Denies radicular pain.     S/p multiple MRIs of the brain, thoracic spine.  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  MRI from 2023 shows T2 signal change within the spinal cord more proximally over multiple levels.      Repeat thoracic MRI w/ contrast under anesthesia  did not lend much more clarity regarding the lesion. 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 Dr. Springer discussed imaging findings with multiple radiologists.    No acute events overnight.  Getting abdominal wound vac changed currently.  Has been NPO since last night for planned surgery today ~1200.     Underwent LP , pt tolerated well     Exam:  Awake, alert oriented x4.   Appropriate and cooperative.   Motor exam reveals 4/5 to the LLE, 4+/5 to the RLE   Sensation intact to light touch.   No clonus       BP  Min: 126/71  Max: 148/81  Pulse  Av.2  Min: 60  Max: 63  Resp  Av.2  Min: 16  Max: 17  Temp  Av.7 °C (98 °F)  Min: 36.5 °C (97.7 °F)  Max: 37 °C (98.6 °F)  SpO2  Av %  Min: 94 %  Max: 96 %    No data recorded    Recent Labs     23  0622 05/15/23  0655   WBC 11.7* 12.0*   RBC 3.68* 3.68*   HEMOGLOBIN 10.8*  10.6*   HEMATOCRIT 33.2* 32.9*   MCV 90.2 89.4   MCH 29.3 28.8   MCHC 32.5* 32.2*   RDW 49.8 48.4   PLATELETCT 482* 430   MPV 10.9 10.6       Recent Labs     05/14/23  0622 05/15/23  0655   SODIUM 136 136   POTASSIUM 5.1 5.2   CHLORIDE 102 102   CO2 24 23   GLUCOSE 221* 207*   BUN 58* 57*   CREATININE 2.06* 2.07*   CALCIUM 9.9 9.7       Recent Labs     05/14/23  0622   APTT 20.4*   INR 1.09             Intake/Output                         05/15/23 0700 - 05/16/23 0659 05/16/23 0700 - 05/17/23 0659     3646-6884 4853-7637 Total 7039-7158 7671-4778 Total                 Intake    Total Intake -- -- -- -- -- --       Output    Urine  1450  1200 2650  450  -- 450    Output (mL) (Urethral Catheter) 1450 1200 2650 450 -- 450    Drains  175  160 335  --  -- --    Output (mL) (Negative Pressure Wound Therapy 05/14/23 Surgical Abdomen Right) 175 160 335 -- -- --    Stool  --  -- --  --  -- --    Number of Times Stooled 0 x -- 0 x -- -- --    Total Output 1625 1360 2985 450 -- 450       Net I/O     -1625 -1360 -2985 -450 -- -450              Intake/Output Summary (Last 24 hours) at 5/16/2023 1016  Last data filed at 5/16/2023 0730  Gross per 24 hour   Intake --   Output 2885 ml   Net -2885 ml               insulin lispro  4 Units TID AC    insulin GLARGINE  17 Units QAM INSULIN    insulin lispro  2-9 Units TID AC    And    dextrose bolus  25 g Q15 MIN PRN    insulin GLARGINE  5 Units Q EVENING    dakins 0.125% (1/4 strength)   QDAY PRN    lidocaine jelly  1 Application. QDAY PRN    lidocaine  20 mL QDAY PRN    Or    lidocaine   QDAY PRN    meropenem  500 mg Q8HRS    dexamethasone  4 mg Q6HRS    senna-docusate  2 Tablet BID    And    polyethylene glycol/lytes  1 Packet QDAY PRN    And    magnesium hydroxide  30 mL QDAY PRN    And    bisacodyl  10 mg QDAY PRN    tamsulosin  0.4 mg AFTER BREAKFAST    famotidine  20 mg DAILY    oxyCODONE immediate-release  5 mg Q3HRS PRN    Or    oxyCODONE immediate-release  10 mg Q3HRS PRN     morphine injection  2-4 mg Q3HRS PRN    Respiratory Therapy Consult   Continuous RT    amLODIPine  5 mg Q DAY    labetalol  10 mg Q4HRS PRN    hydrALAZINE  10 mg Q6HRS PRN    atorvastatin  10 mg DAILY AT 1800    allopurinol  100 mg DAILY    carvedilol  12.5 mg BID WITH MEALS    spironolactone  25 mg Q DAY    sodium bicarbonate  1,300 mg BID    DULoxetine  30 mg Q EVENING    acetaminophen  650 mg Q6HRS PRN    Pharmacy Consult Request  1 Each PHARMACY TO DOSE       Assessment and Plan:  Hospital day # 23  POD# 0 laminectomy with subdural cyst exploration.  Appreciate medical care.  NPO currently.       Chemical prophylactic DVT therapy: hold for surgery

## 2023-05-16 NOTE — ANESTHESIA PROCEDURE NOTES
Airway    Date/Time: 5/16/2023 12:57 PM    Performed by: Rupal Davis M.D.  Authorized by: Rupal Davis M.D.    Location:  OR  Urgency:  Elective  Indications for Airway Management:  Anesthesia      Spontaneous Ventilation: absent    Sedation Level:  Deep  Preoxygenated: Yes    Patient Position:  Sniffing  Mask Difficulty Assessment:  1 - vent by mask  Final Airway Type:  Endotracheal airway  Final Endotracheal Airway:  ETT  Cuffed: Yes    Technique Used for Successful ETT Placement:  Direct laryngoscopy    Insertion Site:  Oral  Blade Type:  Darnell  Laryngoscope Blade/Videolaryngoscope Blade Size:  4  ETT Size (mm):  8.0  Measured from:  Teeth  ETT to Teeth (cm):  25  Placement Verified by: auscultation and capnometry    Cormack-Lehane Classification:  Grade I - full view of glottis  Number of Attempts at Approach:  1

## 2023-05-16 NOTE — ANESTHESIA POSTPROCEDURE EVALUATION
Patient: Tyrone Cline    Procedure Summary     Date: 05/16/23 Room / Location: [facility] OR 06 / SURGERY [facility]    Anesthesia Start: 1251 Anesthesia Stop: 1539    Procedure: LAMINECTOMY, SPINE, THORACIC - T 2-4, INTRADURAL WASHOUT (Spine Thoracic) Diagnosis: (arachnoid webs )    Surgeons: Fidencio Springer M.D. Responsible Provider: Rupal Davis M.D.    Anesthesia Type: general ASA Status: 3          Final Anesthesia Type: general  Last vitals  BP   Blood Pressure : 135/72    Temp   36.4 °C (97.6 °F)    Pulse   60   Resp   17    SpO2   94 %      Anesthesia Post Evaluation    Patient location during evaluation: PACU  Patient participation: complete - patient participated  Level of consciousness: awake  Pain score: 0    Airway patency: patent  Anesthetic complications: no  Cardiovascular status: hemodynamically stable  Respiratory status: acceptable and face mask  Hydration status: euvolemic    PONV: none          No notable events documented.     Nurse Pain Score: 9 (NPRS)

## 2023-05-16 NOTE — WOUND TEAM
[facility]  Inpatient Services   Wound and Skin Care Evaluation    Admission Date: 4/21/2023     Last order of IP CONSULT TO WOUND CARE was found on 5/10/2023 from Hospital Encounter on 4/21/2023     HPI, PMH, SH: Reviewed    Past Surgical History:   Procedure Laterality Date    ROMAN BY LAPAROSCOPY N/A 4/28/2023    Procedure: CHOLECYSTECTOMY, LAPAROSCOPIC ATTEMPTED, OPEN CHOLECYSTECTOMY;  Surgeon: Suraj Galvan M.D.;  Location: [facility];  Service: General    IL COLONOSCOPY,DIAGNOSTIC N/A 12/12/2021    Procedure: COLONOSCOPY;  Surgeon: Dariel Simons M.D.;  Location: [facility];  Service: Gastroenterology    IL UPPER GI ENDOSCOPY,BIOPSY N/A 12/12/2021    Procedure: GASTROSCOPY, WITH BIOPSY;  Surgeon: Dariel Simons M.D.;  Location: [facility];  Service: Gastroenterology    IL UPPER GI ENDOSCOPY,CTRL BLEED N/A 12/12/2021    Procedure: EGD, WITH CLIP PLACEMENT;  Surgeon: Dariel Simons M.D.;  Location: [facility];  Service: Gastroenterology    GASTROSCOPY W/PUSH ENTERSCOPY N/A 12/12/2021    Procedure: GASTROSCOPY, WITH PUSH ENTEROSCOPY;  Surgeon: Dariel Simons M.D.;  Location: [facility];  Service: Gastroenterology    SEPTAL RECONSTRUCTION  12/7/2015    Procedure: SEPTAL RECONSTRUCTION OPEN WITH  GRAFTS & CONCHAL CART GRAFT;  Surgeon: SYDNIE Gaitan M.D.;  Location: SURGERY SAME DAY [facility];  Service:     BLEPHAROPLASTY  7/23/2014    Performed by Gera Plasencia M.D. at [facility]S    BROW LIFT  7/23/2014    Performed by Gera Plasencia M.D. at [facility] ORS    CATARACT PHACO WITH IOL  12/4/2012    Performed by Suraj Gonzalez M.D. at [facility] ORS    CATARACT PHACO WITH IOL  11/20/2012    Performed by Suraj Gonzalez M.D. at [facility]    APPENDECTOMY      ARTHROSCOPY, KNEE      CARDIAC CATH, LEFT HEART      LHC out of concern for STEMI, normal coronaries with minimal  atherosclerosis, diagnosed with PNA as cause of symptoms and ST changes.     OTHER      KNEE SURGERY - LEFT.    OTHER      NOSE SURGERY.    TONSILLECTOMY       Social History     Tobacco Use    Smoking status: Former     Packs/day: 0.20     Years: 6.00     Pack years: 1.20     Types: Cigarettes     Quit date:      Years since quittin.4    Smokeless tobacco: Never    Tobacco comments:     Stopped over 25 years ago.   Vaping Use    Vaping Use: Never used   Substance Use Topics    Alcohol use: No     Chief Complaint   Patient presents with    GLF     2 days ago. (-) thinners    Extremity Weakness     Chronic lower bilateral weakness.     Diagnosis: Acute kidney failure (HCC) [N17.9]  Sepsis (HCC) [A41.9]    Unit where seen by Wound Team: T4     WOUND CONSULT/FOLLOW UP RELATED TO:  abdominal vac    WOUND HISTORY:  Pt underwent an open cholecystectomy on  with Dr. Galvan.  Patient seen by wound team 04/10 for drainage to incision site, Aquacel AG was ordered.  Received new consult that incision is saturating through dressings.         WOUND ASSESSMENT/LDA    Negative Pressure Wound Therapy 23 Surgical Abdomen Right (Active)   Vacuum Serial Number [id number] 05/15/23 0855   NPWT Pump Mode / Pressure Setting Ulta    Dressing Type Small;Black Foam (Veraflo)    Number of Foam Pieces Used 4    Canister Changed Yes    Output (mL) 160 mL    NEXT Dressing Change/Treatment Date 23    VAC VeraFlo Irrigant 1/4 Strength Dakins    VAC VeraFlo Soak Time (mins) 8    VAC VeraFlo Instill Volume (ml) 34    VAC VeraFlo - Therapy Time (hrs) 2.5    VAC VeraFlo Pressure (mm/Hg) Intermittent;125 mmHg    WOUND NURSE ONLY - Time Spent with Patient (mins) 60       Wound 23 Full Thickness Wound Abdomen Right Transverse Incision Closed with Wound Vac (Active)   Wound Image    23 1000   Site Assessment Red;Drainage    Periwound Assessment Clean;Dry;Intact    Margins Attached edges;Defined edges    Closure  Secondary intention    Drainage Amount Moderate    Drainage Description Serosanguineous;Brown    Treatments Cleansed;Site care;Offloading    Wound Cleansing Approved Wound Cleanser    Periwound Protectant Skin Protectant Wipes to Periwound;Paste Ring;Drape    Dressing Cleansing/Solutions 1/4 Strength Dakin's Solution    Dressing Options Wound Vac    Dressing Changed Changed    Dressing Status Clean;Intact;Dry    Dressing Change/Treatment Frequency Monday, Wednesday, Friday, and As Needed    NEXT Dressing Change/Treatment Date 05/18/23    NEXT Weekly Photo (Inpatient Only) 05/22/23    Number of Staples Removed 5    Non-staged Wound Description Full thickness    Wound Length (cm) 1.5 cm    Wound Width (cm) 13 cm    Wound Depth (cm) 3 cm    Wound Surface Area (cm^2) 19.5 cm^2    Wound Volume (cm^3) 58.5 cm^3    Wound Healing % -3020    Shape Linear    Wound Odor None    Exposed Structures FLEX    WOUND NURSE ONLY - Time Spent with Patient (mins) 60      Vascular:    REX:   No results found.    Lab Values:    Lab Results   Component Value Date/Time    WBC 12.0 (H) 05/15/2023 06:55 AM    RBC 3.68 (L) 05/15/2023 06:55 AM    HEMOGLOBIN 10.6 (L) 05/15/2023 06:55 AM    HEMATOCRIT 32.9 (L) 05/15/2023 06:55 AM    CREACTPROT 33.53 (H) 04/23/2023 10:32 AM    SEDRATEWES 66 (H) 04/23/2023 10:32 AM    HBA1C 6.6 (H) 01/03/2023 08:45 AM      Culture Results show:  Recent Results (from the past 720 hour(s))   CULTURE WOUND W/ GRAM STAIN    Collection Time: 05/09/23  9:50 AM    Specimen: Abdominal; Wound   Result Value Ref Range    Significant Indicator POS (POS)     Source WND     Site ABDOMINAL     Culture Result - (A)     Gram Stain Result Moderate WBCs.  Moderate Gram negative rods.       Culture Result (A)      Escherichia coli ESBL  Moderate growth  Extended Spectrum Beta-lactamase (ESBL) isolated.  ESBL's may be clinically resistant to therapy with  Penicillins,Cephalosporins or Aztreonam despite  apparent in vitro susceptibility  to some of these agents.  The patient requires contact isolation.  Please contact pharmacy or an Infectious Disease Specialist  if you have any questions about appropriate therapy.         Susceptibility    Escherichia coli esbl - DENICE     Ampicillin >16 Resistant mcg/mL     Ceftriaxone >32 Resistant mcg/mL     Cefazolin >16 Resistant mcg/mL     Ciprofloxacin >2 Resistant mcg/mL     Cefepime >16 Resistant mcg/mL     Cefuroxime >16 Resistant mcg/mL     Ampicillin/sulbactam >16/8 Resistant mcg/mL     Ertapenem <=0.5 Sensitive mcg/mL     Tobramycin >8 Resistant mcg/mL     Gentamicin >8 Resistant mcg/mL     Minocycline <=4 Sensitive mcg/mL     Moxifloxacin >4 Resistant mcg/mL     Pip/Tazobactam <=8 Sensitive mcg/mL     Trimeth/Sulfa <=0.5/9.5 Sensitive mcg/mL     Tigecycline <=2 Sensitive mcg/mL     Pain Level/Medicated: topical 4% lidocaine solution applied 30min prior to vac application. Pt was given IV pain medication by bedside RN as well.    INTERVENTIONS BY WOUND TEAM:  Chart and images reviewed. Discussed with bedside RN. All areas of concern (based on picture review, LDA review and discussion with bedside RN) have been thoroughly assessed. Documentation of areas based on significant findings. This RN in to assess patient. Performed standard wound care which includes appropriate positioning, dressing removal and non-selective debridement. Pictures and measurements obtained weekly if/when required.  Preparation for Dressing removal: lidocaine Solution  Non-selectively Debrided with:  wound cleanser and gauze  Sharp debridement: NA  Shaina wound: Cleansed with wound cleanser and gauze, Prepped with no sting skin prep and 2 paste rings  Primary Dressin piece of half thickness spiraled veraflo foam packed into pocket along lateral side. 2nd piece of half thickness veraflo foam packed into depth along medial side and then continued out along wound bed. 1 additional wedge of half thickness foam applied medially to  fill remaining depth. All foam secured with drape. A hole was cut in drape.   Secondary (Outer) Dressing: a 4th an final piece of half thickness circular black foam was applied as a button for trac pad. Veraflo trac pad applied.     Advanced Wound Care Discharge Planning  Number of Clinicians necessary to complete wound care: 1  Is patient requiring IV pain medications for dressing changes: No  Length of time for dressing change 30 min. (This does not include chart review, pre-medication time, set up, clean up or time spent charting.)    Interdisciplinary consultation: Patient, Bedside RN (Aletha), Wound RN (Zahira)    EVALUATION / RATIONALE FOR TREATMENT:  Most Recent Date:  05/16/23: Wound continues to have old clot present. Wound continues to open the subcutaneous layer. Was able to better pack foam into the wound. Continued with POC.    05/14/23: Wound vac applied today to cleanse and debride while assisting in granulation tissue development. Wound team to change again on Tuesday and if wound looks ok will plan to stretch to Friday to get pt on MWF schedule.   05/13/23:  Dakins applied to chemically debride nonviable tissue, decrease bioburden and odor.  Likely hematoma under surface of incision, hydrogen to encourage breakdown of clot.  Spoke with Kenzie FALL (sx signed off 1 week ago) talk about placed vac previously.  Will allow dakins to debride and possibly place VAC on Monday.    5/10/23: Patient with small dehiscence along transverse abdominal incision. Small amount of purulence expressed following cleansing. Aquacel Ag Hydrofiber applied to manage bioburden, absorb exudate, and maintain a moist wound environment without laterally wicking exudate therefore reducing latonya-wound maceration.      Goals: Steady decrease in wound area and depth weekly.    WOUND TEAM PLAN OF CARE ([X] for frequency of wound follow up,):   Nursing to follow dressing orders written for wound care. Contact wound team if area fails to  progress, deteriorates or with any questions/concerns if something comes up before next scheduled follow up (See below as to whether wound is following and frequency of wound follow up)  Dressing changes by wound team:                   Follow up 3 times weekly:                NPWT change 3 times weekly:   X, Sunday, Tuesday then Friday, next week MWF  Follow up 1-2 times weekly:     Follow up Bi-Monthly:           Follow up Monthly (High Risk):                        Follow up as needed:     Other (explain):     NURSING PLAN OF CARE ORDERS (X):  Dressing changes: See Dressing Care orders: X  Skin care: See Skin Care orders:   RN Prevention Protocol:   Rectal tube care: See Rectal Tube Care orders:   Other orders:    RSKIN:   CURRENTLY IN PLACE (X), APPLIED THIS VISIT (A), ORDERED (O):   Q shift Jeremy:  X  Q shift pressure point assessments:  X    Surface/Positioning   Standard Mattress/Trauma Bed          Low Airloss        X  ICU Low Airloss   Bariatric COSME     Waffle cushion        Waffle Overlay          Reposition q 2 hours    X  TAPs Turning system     Z Les Pillow     Offloading/Redistribution   Sacral Offloading Dressing (Silicone dressing)   X  Heel Offloading Dressing (Silicone dressing)       X  Heel float boots (Prevalon boot)             Float Heels off Bed with Pillows           Respiratory NA  Silicone O2 tubing         Gray Foam Ear protectors     Cannula fixation Device (Tender )          High flow offloading Clip    Elastic head band offloading device      Anchorfast                                                         Trach with Optifoam split foam             Containment/Moisture Prevention FLEX    Rectal tube or BMS    Purwick/Condom Cath        Florez Catheter    Barrier wipes           Barrier paste       Antifungal tx      Interdry        Mobilization FLEX      Up to chair        Ambulate      PT/OT      Nutrition       Dietician        Diabetes Education      PO   X  TF     TPN     NPO    # days     Other        Anticipated discharge plans: TBD  LTACH:        SNF/Rehab:                  Home Health Care:           Outpatient Wound Center:            Self/Family Care:        Other:                  Vac Discharge Needs:   Vac Discharge plan is purely a recommendation from wound team and not a requirement for discharge unless otherwise stated by physician.  Not Applicable Pt not on a wound vac:       Regular Vac while inpatient, alternative dressing at DC:        Regular Vac in use and continued at DC:            Reg. Vac w/ Skin Sub/Biologic in use. Will need to be changed 2x wkly:      Veraflo Vac while inpatient, ok to transition to Regular Vac on Discharge (Bedside RN to Clamp small instillation tubing at time of DC):    X       Veraflo Vac while inpatient, would benefit from remaining on Veraflo Vac upon discharge:

## 2023-05-16 NOTE — THERAPY
Occupational Therapy Contact Note    Patient Name: Tyrone Cline  Age:  83 y.o., Sex:  male  Medical Record #: [id number]  Today's Date: 5/16/2023    Discussed missed therapy with RN. Pt now preparing for sx at 11:00. Will hold OT tx and reassess post sx results and MD recommendations . RN agrees.         05/16/23 1000   Treatment Variance   Reason For Missed Therapy Medical - Other (Please Comment)  (going into sx. Will hold tx today per MD request.)   Interdisciplinary Plan of Care Collaboration   IDT Collaboration with  Nursing   Collaboration Comments Pt pending laminectomy with subdural cyst exploration, today 5/16 in preperation now. . Will hold OT session and reattempt as appropriate/able.   Session Information   Date / Session Number  5/3 #4 (2/3, 5/7) Attempted 5/16. Pt going into sx today soon per RN. OT tx held and will reassess post sx re: results and MD recommendations.

## 2023-05-16 NOTE — PROGRESS NOTES
Hospital Medicine Daily Progress Note    Date of Service  5/16/2023    Chief Complaint  Tyrone Cline is a 83 y.o. male admitted 4/21/2023 with colitis    Hospital Course  This 82-year-old male with a past medical history significant for CKD stage IV being followed by nephrology, diabetes mellitus with glyco of 6.6, hypertension presented to ER on 4/21/2023 with a complaint of abdominal pain and lower extremity weakness.    S/p laparoscopic cholecystectomy with conversion to open cholecystectomy 4/18 /2023 , patient has completed treatment with IV antibiotics.    His hospital course was complicated with postoperative hypotension, went to ICU vasopressor and transferred back on 4/29 floor.    Of note, patient continues to have bilateral lower extremity weakness; MR T spine expansile T2 hyperintense area in the thoracic spinal cord at the levels of T2, T3 and T4.      Neurosurgery evaluated and plan for laminectomy with subdural cyst exploration        Interval Problem Update  5/16/2023  Vital signs remained stable  Persistent leukocytosis.  No sign of ongoing active infection  Renal function remained stable.    Neurosurgery evaluated and plan for laminectomy with subdural cyst exploration    I have discussed this patient's plan of care and discharge plan at IDT rounds today with Case Management, Nursing, Nursing leadership, and other members of the IDT team.    Consultants/Specialty  general surgery and neurosurgery    Code Status  DNAR/DNI    Disposition  Discharge plan for PT/OT evaluation after laminectomy  I have placed the appropriate orders for post-discharge needs.    Review of Systems  Review of Systems   Neurological:  Positive for weakness.        Physical Exam  Temp:  [36.5 °C (97.7 °F)-37 °C (98.6 °F)] 36.5 °C (97.7 °F)  Pulse:  [60-63] 60  Resp:  [16-17] 17  BP: (126-148)/(71-81) 148/81  SpO2:  [94 %-96 %] 96 %    Physical Exam  Eyes:      Pupils: Pupils are equal, round, and reactive to light.    Cardiovascular:      Rate and Rhythm: Normal rate and regular rhythm.      Pulses: Normal pulses.      Heart sounds: Normal heart sounds.   Abdominal:      General: Abdomen is flat.   Neurological:      General: No focal deficit present.      Mental Status: He is alert and oriented to person, place, and time.      Motor: Weakness present.         Fluids    Intake/Output Summary (Last 24 hours) at 5/16/2023 0959  Last data filed at 5/16/2023 0730  Gross per 24 hour   Intake --   Output 2885 ml   Net -2885 ml       Laboratory  Recent Labs     05/14/23  0622 05/15/23  0655   WBC 11.7* 12.0*   RBC 3.68* 3.68*   HEMOGLOBIN 10.8* 10.6*   HEMATOCRIT 33.2* 32.9*   MCV 90.2 89.4   MCH 29.3 28.8   MCHC 32.5* 32.2*   RDW 49.8 48.4   PLATELETCT 482* 430   MPV 10.9 10.6     Recent Labs     05/14/23  0622 05/15/23  0655   SODIUM 136 136   POTASSIUM 5.1 5.2   CHLORIDE 102 102   CO2 24 23   GLUCOSE 221* 207*   BUN 58* 57*   CREATININE 2.06* 2.07*   CALCIUM 9.9 9.7     Recent Labs     05/14/23 0622   APTT 20.4*   INR 1.09               Imaging  MR-THORACIC SPINE-WITH & W/O   Final Result      Compared with the previous MRI ,again noted is expansile lesion in the thoracic spinal cord at the levels of T2, T3 and T4. There is prominent right dorsal subarachnoid space at the level of T3 with flattening of the spinal cord. There is mild contrast    enhancement noted in the expanded portion of the spinal cord. 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 Other differential diagnosis includes transverse myelitis However the possibility of right-sided dorsal arachnoid cyst/arachnoid    web at the level of T3 causing spinal cord edema cannot be entirely ruled out. Despite multiple MRs, it is difficult to characterize the tumor. Therefore if there is any neurosurgical planning, CT myelogram is recommended for further characterization.      IR-US GUIDED PIV   Final Result    Ultrasound-guided PERIPHERAL IV INSERTION  performed by    qualified nursing staff as above.      MR-THORACIC SPINE-WITH & W/O   Final Result      1.  Limited study due to the motion artifact.   2.  When compared with the previous MRI again noted is expansile T2 hyperintense area in the thoracic spinal cord at the levels of T2, T3 and T4. There is also prominent dorsal right-sided subarachnoid space at this level. The differential diagnosis    includes intramedullary spinal cord tumor and extramedullary cyst/web with cord compression and edema.   3.  Pre and postcontrast MR examination of the thoracic spine is recommended with contrast under General anesthesia.   4.  1 -2 mm  T2-weighted sequences from T2 to T6 with smaller field of view is recommended for further characterization.      MR-THORACIC SPINE-WITH & W/O   Final Result         Stable appearance of expansile nonenhancing signal abnormality involving the upper thoracic cord between T1 and T4. Also noted is a small extra-axial collection/web along the right posterolateral spinal canal at T3-4 that deforms the cord and displaces    it anteriorly and to the left.   This could represent an Arachnoid web or arachnoid cyst with extensive mass effect and secondary edema of the cord.   Recommend additional thin section T2 and post contrast images through T1-T6 to better assess the abnormality.      MR-BRAIN-WITH & W/O   Final Result         No acute intracranial process.      Age-related volume loss and chronic microvascular ischemic changes.         DX-CHEST-PORTABLE (1 VIEW)   Final Result         1.  Pulmonary edema and/or infiltrates are identified, which are stable since the prior exam.   2.  Cardiomegaly   3.  Atherosclerosis      DX-CHEST-PORTABLE (1 VIEW)   Final Result         1.  Pulmonary edema and/or infiltrates are identified, which are stable since the prior exam.   2.  Left PICC line tip terminates in the left axilla, stable since prior study.      DX-CHEST-PORTABLE (1 VIEW)   Final Result       1.  No significant change.   2.  Possible left PICC line with tip projecting at the axillary vein.      DX-CHEST-PORTABLE (1 VIEW)   Final Result      1.  Mildly improved pulmonary opacities.   2.  Possible left PICC line with tip projecting at the axillary vein.   3.  Likely trace right pleural effusion.      DX-CHEST-LIMITED (1 VIEW)   Final Result      1.  Placement of endotracheal tube with tip projecting over the mid trachea      2.  Left arm catheter present which is presumably venous in projects in the expected position of the left axillary vein      3.  Enlarged cardiac silhouette      NM-HEPATOBILIARY SCAN   Final Result      Hepatobiliary scan findings suspicious for acute cholecystitis.      MR-THORACIC SPINE-WITH & W/O   Final Result      1.  There is abnormal expansile T2 hyperintense lesion in the right side of the thoracic spinal cord at the levels of T2 and T3 Mild contrast enhancement is seen. This lesion is suspicious for spinal cord neoplasm. Other remote differential diagnosis    includes transverse myelitis.   2.  Exaggerated thoracic kyphosis.   3.  Thoracic dextroscoliosis.   4.  Minimal degenerative changes.   5.  Right pleural effusion.      MR-LUMBAR SPINE-WITH & W/O   Final Result      1.  Multifocal degenerative disease in the lumbar spine as described above.   2.  Moderate central canal and severe lateral recess stenosis at the level of L4-5. The exiting bilateral L5 nerve roots might have been impinged at the lateral recess.      MR-CERVICAL SPINE-WITH & W/O   Final Result      1.  There is abnormal expansile intramedullary T2 signal intensity lesion in the right cerebellum. Thoracic spinal cord at the level of T2 on T3. Mild diffuse contrast enhancement is seen. This finding is suspicious for spinal cord neoplasm. The other    less likely differential diagnosis includes transverse myelitis. Follow-up is recommended after 4 weeks.   2.  Mild degenerative disease in the cervical spine  as described above.   3.  There is no evidence of infection in the cervical spine.      US-RUQ   Final Result         1.  Acute cholecystitis.   2.  Atrophic right kidney.      DX-CHEST-LIMITED (1 VIEW)   Final Result      Perihilar interstitial edema and basilar atelectasis and/or consolidation. Underlying infection is possible.      MR-LUMBAR SPINE-W/O   Final Result      1.  Lower lumbar spine predominant degenerative changes as described in detail above, progressed from 2010 MRI.   2.  Edema at the L4-L5 disc space and L5 superior endplate likely represents degenerative changes. Less likely this could represent a low-grade or early discitis osteomyelitis. Correlate for evidence of infection.   3.  Acute appearing Schmorl's node at L3-L4, which can be a source of pain.      US-RENAL   Final Result      1.  Atrophic kidneys. Echogenic bilateral renal parenchyma could relate to medical renal disease.      2.  No hydronephrosis. No renal calculus.      DX-CHEST-PORTABLE (1 VIEW)   Final Result      No acute cardiac or pulmonary abnormalities are identified.      CT-ABDOMEN-PELVIS W/O   Final Result      1.  Findings suspicious for focal colitis at the hepatic flexure, less likely cholecystitis or duodenitis with secondary involvement of the colon. Infectious, inflammatory and ischemic etiologies are considerations. Underlying mass is not excluded.   2.  Cholelithiasis with distention of the gallbladder   3.  BILATERAL renal atrophy   4.  Subcentimeter LEFT adrenal myelolipoma   5.  12 mm RIGHT adrenal nodule likely an adenoma absent a history of cancer   6.  Subcentimeter RIGHT hepatic lesion, likely a cyst absent a history of cancer   7.  Atherosclerosis   8.  Colonic diverticulosis      CT-HEAD W/O   Final Result      1.  Cerebral atrophy.      2.  White matter lucencies most consistent with small vessel ischemic change versus demyelination or gliosis.      3.  Otherwise, Head CT without contrast with no acute  findings. No evidence of acute cerebral infarction, hemorrhage or mass lesion.         DX-PORTABLE FLUORO > 1 HOUR    (Results Pending)   DX-SPINE-ANY ONE VIEW    (Results Pending)        Assessment/Plan  * Acute gangrenous cholecystitis- (present on admission)  Assessment & Plan  Found to have gangrenous cholecystitis s/p laprascopic converted open cholecystectomy 4/28/2023   -- Completed antibiotic treatment from 4/25- 4/ 29, drain has been removed.  Follow surgical recommendation.    Surgical course was complicated with postoperative wound infection, culture has been obtained, wound care has been consulted  Wound cx growing ESBL E coli, continue meropeneam for 7 days as per ID recs    Urinary retention  Assessment & Plan  Florez by  urology.    -- Do not change Florez without urology consent    Abnormal MRI- (present on admission)  Assessment & Plan  MRI on admission showing T2/T3  hyperintense lesion in the right side of the thoracic spinal cord at the levels of T2 and T3 Mild contrast enhancement is seen. This lesion is suspicious for spinal cord neoplasm or possible transverse myelitis       5/8 MRI thoracic spine reviewed and called Neurosurgery  On 5/11: MRI with anaesthesia will completed   Will be going for surgery on 5/16      Hypercalcemia- (present on admission)  Assessment & Plan  Likely due to immobility     -- monitor    Hypomagnesemia- (present on admission)  Assessment & Plan  Replete as needed    Cholelithiasis with acute cholecystitis- (present on admission)  Assessment & Plan  S/p surgical removal, converted to open cholecystectomy  NAHUN drain-removed on 5/8/2023   -- s/p completion of iv abx    Hypokalemia- (present on admission)  Assessment & Plan  Replete as needed    Thrombocytopenia (HCC)- (present on admission)  Assessment & Plan  Resolved, plt are stable at 427    High anion gap metabolic acidosis- (present on admission)  Assessment & Plan  Acute on chronic kidney disease, -appears back at  baseline on 5/3/2023    Resolved    Hyponatremia- (present on admission)  Assessment & Plan  Resolved    Weakness of both lower extremities- (present on admission)  Assessment & Plan  T2/T3 intramedullary lesion of unclear chronicity and clinical significance as well as 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  Neurology evaluated, patient received IVIG on 5/1, discontinued 5/2    Patient does have right lower extremity weakness more than left.  MRI was obtained, MRI T-spine with anesthesia was completed 5/11, called neurosurgery, recommended Decadron 4 mg every 6 hours.  Patient will be going for surgical intervention tomorrow, n.p.o. at midnight  -- follow NS recs      Neurology ahs evaluated and s/p LP on 4/23 and 5/14    Sepsis (HCC)- (present on admission)  Assessment & Plan  This is Sepsis Present on admission  SIRS criteria identified on my evaluation include: Leukocytosis, with WBC greater than 12,000  Source is colitis  Sepsis protocol initiated  Fluid resuscitation ordered per protocol  Crystalloid Fluid Administration: Fluid resuscitation ordered per standard protocol - 30 mL/kg per current or ideal body weight  IV antibiotics as appropriate for source of sepsis  Reassessment: I have reassessed the patient's hemodynamic status    Due to gangrenous gallbladder requiring open carmelo    -- Today patient was noted to have surgical  wound with drainage   -- wound cx pending  ESBL E coli; id consulted, will switch to meropenem as per id; last day being on 5/19    Colitis- (present on admission)  Assessment & Plan  Cultures negative, monitor  Initial admitting impression, currently improved  Diet as tolerated     Acute kidney injury superimposed on CKD (HCC)- (present on admission)  Assessment & Plan   Resolved    Avoid nephrotoxins       Diabetic peripheral neuropathy (HCC)- (present on admission)  Assessment & Plan  Cymbalta, medical treatment    Essential hypertension-  (present on admission)  Assessment & Plan  Continue amlodipine and coreg    Type 2 diabetes mellitus with kidney complication, without long-term current use of insulin (HCC)- (present on admission)  Assessment & Plan  Ha1c 6.4% 4 months ago   On ozempic and januvia outpatient   Continue ISS and hypoglycemic protocol while inpatient    -- Has been started on Decadron, his blood glucose is noted to be elevated\    17 at a.m., 5 PM  Patient blood glucose is still elevated, started 4 3 times daily with meals    Hypercholesterolemia- (present on admission)  Assessment & Plan  Stable, continue Lipitor    Chronic kidney disease (CKD), stage IV (severe) (HCC)- (present on admission)  Assessment & Plan   Creatinine at 2.07             VTE prophylaxis: SCDs/TEDs  Resume chemical prophylaxis once cleared by neurosurgery  I have performed a physical exam and reviewed and updated ROS and Plan today (5/16/2023). In review of yesterday's note (5/15/2023), there are no changes except as documented above.

## 2023-05-16 NOTE — CARE PLAN
The patient is Stable - Low risk of patient condition declining or worsening    Shift Goals  Clinical Goals: W[place] monitoring, Q2 turns, NPO midnight, rivera care, IV ABX, rest  Patient Goals: Pain control, surgery  Family Goals: Communication on Plan of Care    Progress made toward(s) clinical / shift goals:  WV cannister changed, serosang/brown drainage noted. Q2 turns in place. Fluids/food removed at midnight. Rivera care performed. Patient tolerating IV ABX. Patient slept intermittently during shift.     Patient is not progressing towards the following goals: NA.

## 2023-05-16 NOTE — OP REPORT
Date of surgery: 05/16/2023    Surgeon: Fidencio Springer MD    First Assistant: lynne DONNELLY    anesthesiologist: Rupal Davis MD    Anesthesia: GETA    Preoperative diagnosis  1.  Thoracic myelopathy  2.  Intraspinal, intradural, extramedullary mass, thoracic      Postoperative diagnosis  1.  Thoracic myelopathy  2.  Intraspinal, intradural, extramedullary mass, thoracic    Procedure performed  1.  T2, T3, T4 laminectomy, intra spinal, intradural, extramedullary mass resection  2.  Microscope, microscopic dissection    Indication for surgery  Tyrone Cline is a pleasant 83-year-old male with a long hospital course.  He was initially admitted to the hospital with acute sepsis, renal failure, infected gallbladder.  His initial history was that he lost all motor function in his lower extremities during his initial presentation with sepsis.  Neurology was consulted and his strength actually recovered quite well.  A work-up showed T2 signal change in the upper thoracic spinal cord, centered around T2.  Further MRIs showed progressive T2 signal change and concern for an intramedullary, expansile mass.  The neuroradiologist read this as a potential tumor.  He also had an intraspinal, intradural, extramedullary mass centered around T3 posteriorly, on the right.  This mass has been present on multiple MRIs from April 26 through May 11 although, varying in size.  MRIs have been a bit difficult to interpret due to motion artifact, however these findings have been present on multiple MRIs over a number of weeks.  His neurologic exam, when I saw him was generally pretty good.  He did not have a sensory level, his sensation was intact in the thoracic, lumbar and sacral dermatomes.  For me his strength has always been symmetric, at least 4+/5 in the bilateral lower extremities.  Regardless, he has experienced functional deficits likely related to proprioception where he cannot ambulate.  Physical therapy has seen him numerous  times.  I had multiple long discussions with him regarding approach to the lesions.  I am not convinced that he has an intramedullary mass, this could be an intramedullary infection given his acute presentation, active demyelination from a process such as transverse myelitis, and intramedullary tumor amongst other possibilities.  Regardless there has been this intraspinal, intradural, extramedullary mass that could also be accounting not only for his symptoms but for the T2 signal change.  We discussed different options including repeating an MRI in a month, biopsying the intramedullary mass versus resecting the intraspinal, intradural, extramedullary mass.  I felt it was most safe and efficacious to resect the intradural, extramedullary mass to remove that variable from the pathology seen on his MRIs and subsequently repeat the MRI in a month.  I felt that an intramedullary mass biopsy at this point was exceedingly high risk.  Today's surgery was to explore the intradural space at T2, T3 and T4 and resect any extra medullary mass/cyst.  He understood risk, benefits, alternatives to procedure was to proceed    Procedure in detail  Patient was brought to the operating room and intubated by the anesthesiologist.  He was placed prone on a Evaristo OSI table.  He was marked, prepped, draped in usual sterile fashion.  Preprocedure timeout was performed.  0.5% Marcaine with epinephrine was infiltrated in the skin.  10 blade was used to sharply incise the skin and subcutaneous tissue.  Monopolar electrocautery was used to subperiosteally dissect the musculature off the spinous processes and lamina out to the transverse processes from T2 down through T4.  Fluoroscopy was utilized to confirm the appropriate surgical level after a self-retaining retractor was placed.  Leksell rongeur was used to remove the inferior half of the T2 spinous process, entirety of T3 and T4 spinous processes.  High-speed Midas Demetris drill was used to  drill lateral troughs in the lamina from T2 down through T4, thinning the lamina down to the ligamentum flavum.  The remainder the laminectomy was carried out with a 2 and 3 mm Kerrison from the inferior half of T2, down through T4, out to the pedicles laterally.  Bipolar electrocautery was used for epidural venous hemostasis.  The microscope was brought in the field.  A midline durotomy was created with an 11 blade and further extended with Geralds.  4-0 Nurolon were used to tack up the dura.  Under the microscope, I was able to open the dura without violating the arachnoid.  The arachnoid was full of CSF, bulging and pulsatile.  Microscissors were used to incise the arachnoid.  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  These arachnoid webs were thoroughly fenestrated with microscissors dorsally and left paramedian and right paramedian.  I did use a Rhoton 7 gently along the dura on the right side to investigate and sure that there were no collections of loculated CSF as the mass was more noted on the right on his MRI.  There was some minor calcifications of the arachnoid as well.  This was resected and removed with microscissors.  After the webs were thoroughly fenestrated, Rhoton 7 was used superiorly and inferiorly and no further blockage of CSF was noted.  5-0 Tiger-Geoffrey suture was used to close the midline durotomy.  The dura, particularly on the left was noted to be quite friable with numerous, pinhole openings around the suture line.  We were quite careful in trying to create a watertight closure but not enlarged these pinholes around the Tiger-Geoffrey suture.  Towards the end of the suture line, 10 cc of normal saline were irrigated into the dura.  No leaking was noted.  After the dura was closed, a 1 x 3 DuraGen patch was cut to size, placed over the dura in the epidural space.  DuraSeal was used over the DuraGen to seal the epidural space.  1 g vancomycin  powder was infiltrated deep and superficially within the wound.  The microscope was removed from the field.  A medium Hemovac was placed in the epidural space, tunneled through a separate stab incision and affixed to the skin.  The wound was meticulously closed in layers and Steri-Strips applied to the skin edges, sterile dressing was applied.  Patient was extubated in the operating room and taken to postoperative recovery.  Mean arterial blood pressures were generally in the mid 60s during surgery.    Estimated blood loss: 30 mL    Complications: None noted

## 2023-05-17 LAB
ALBUMIN SERPL BCP-MCNC: 2.4 G/DL (ref 3.2–4.9)
ALBUMIN/GLOB SERPL: 0.8 G/DL
ALP SERPL-CCNC: 168 U/L (ref 30–99)
ALT SERPL-CCNC: 42 U/L (ref 2–50)
ANION GAP SERPL CALC-SCNC: 11 MMOL/L (ref 7–16)
AST SERPL-CCNC: 18 U/L (ref 12–45)
BASOPHILS # BLD AUTO: 0.1 % (ref 0–1.8)
BASOPHILS # BLD: 0.01 K/UL (ref 0–0.12)
BILIRUB SERPL-MCNC: 0.3 MG/DL (ref 0.1–1.5)
BUN SERPL-MCNC: 68 MG/DL (ref 8–22)
CALCIUM ALBUM COR SERPL-MCNC: 10.3 MG/DL (ref 8.5–10.5)
CALCIUM SERPL-MCNC: 9 MG/DL (ref 8.5–10.5)
CHLORIDE SERPL-SCNC: 103 MMOL/L (ref 96–112)
CO2 SERPL-SCNC: 19 MMOL/L (ref 20–33)
CREAT SERPL-MCNC: 2.08 MG/DL (ref 0.5–1.4)
EOSINOPHIL # BLD AUTO: 0 K/UL (ref 0–0.51)
EOSINOPHIL NFR BLD: 0 % (ref 0–6.9)
ERYTHROCYTE [DISTWIDTH] IN BLOOD BY AUTOMATED COUNT: 50.9 FL (ref 35.9–50)
GFR SERPLBLD CREATININE-BSD FMLA CKD-EPI: 31 ML/MIN/1.73 M 2
GLOBULIN SER CALC-MCNC: 3.1 G/DL (ref 1.9–3.5)
GLUCOSE BLD STRIP.AUTO-MCNC: 232 MG/DL (ref 65–99)
GLUCOSE BLD STRIP.AUTO-MCNC: 247 MG/DL (ref 65–99)
GLUCOSE BLD STRIP.AUTO-MCNC: 299 MG/DL (ref 65–99)
GLUCOSE SERPL-MCNC: 329 MG/DL (ref 65–99)
HCT VFR BLD AUTO: 33 % (ref 42–52)
HGB BLD-MCNC: 10.4 G/DL (ref 14–18)
IMM GRANULOCYTES # BLD AUTO: 0.18 K/UL (ref 0–0.11)
IMM GRANULOCYTES NFR BLD AUTO: 1.1 % (ref 0–0.9)
LYMPHOCYTES # BLD AUTO: 0.63 K/UL (ref 1–4.8)
LYMPHOCYTES NFR BLD: 3.7 % (ref 22–41)
MCH RBC QN AUTO: 28.7 PG (ref 27–33)
MCHC RBC AUTO-ENTMCNC: 31.5 G/DL (ref 33.7–35.3)
MCV RBC AUTO: 91.2 FL (ref 81.4–97.8)
MONOCYTES # BLD AUTO: 0.54 K/UL (ref 0–0.85)
MONOCYTES NFR BLD AUTO: 3.2 % (ref 0–13.4)
NEUTROPHILS # BLD AUTO: 15.61 K/UL (ref 1.82–7.42)
NEUTROPHILS NFR BLD: 91.9 % (ref 44–72)
NRBC # BLD AUTO: 0 K/UL
NRBC BLD-RTO: 0 /100 WBC
PLATELET # BLD AUTO: 418 K/UL (ref 164–446)
PMV BLD AUTO: 10.9 FL (ref 9–12.9)
POTASSIUM SERPL-SCNC: 5.5 MMOL/L (ref 3.6–5.5)
PROT SERPL-MCNC: 5.5 G/DL (ref 6–8.2)
RBC # BLD AUTO: 3.62 M/UL (ref 4.7–6.1)
SODIUM SERPL-SCNC: 133 MMOL/L (ref 135–145)
TEST NAME 95000: NORMAL
VDRL CSF QL: NON REACTIVE
WBC # BLD AUTO: 17 K/UL (ref 4.8–10.8)

## 2023-05-17 PROCEDURE — 700102 HCHG RX REV CODE 250 W/ 637 OVERRIDE(OP): Performed by: FAMILY MEDICINE

## 2023-05-17 PROCEDURE — 82962 GLUCOSE BLOOD TEST: CPT | Mod: 91

## 2023-05-17 PROCEDURE — 97535 SELF CARE MNGMENT TRAINING: CPT

## 2023-05-17 PROCEDURE — 36415 COLL VENOUS BLD VENIPUNCTURE: CPT

## 2023-05-17 PROCEDURE — 700102 HCHG RX REV CODE 250 W/ 637 OVERRIDE(OP): Performed by: HOSPITALIST

## 2023-05-17 PROCEDURE — A9270 NON-COVERED ITEM OR SERVICE: HCPCS | Performed by: FAMILY MEDICINE

## 2023-05-17 PROCEDURE — 700105 HCHG RX REV CODE 258: Performed by: HOSPITALIST

## 2023-05-17 PROCEDURE — A9270 NON-COVERED ITEM OR SERVICE: HCPCS | Performed by: HOSPITALIST

## 2023-05-17 PROCEDURE — A9270 NON-COVERED ITEM OR SERVICE: HCPCS | Performed by: SURGERY

## 2023-05-17 PROCEDURE — 99233 SBSQ HOSP IP/OBS HIGH 50: CPT | Performed by: STUDENT IN AN ORGANIZED HEALTH CARE EDUCATION/TRAINING PROGRAM

## 2023-05-17 PROCEDURE — 85025 COMPLETE CBC W/AUTO DIFF WBC: CPT

## 2023-05-17 PROCEDURE — 97530 THERAPEUTIC ACTIVITIES: CPT

## 2023-05-17 PROCEDURE — 700102 HCHG RX REV CODE 250 W/ 637 OVERRIDE(OP): Performed by: SURGERY

## 2023-05-17 PROCEDURE — 700111 HCHG RX REV CODE 636 W/ 250 OVERRIDE (IP): Performed by: HOSPITALIST

## 2023-05-17 PROCEDURE — 770001 HCHG ROOM/CARE - MED/SURG/GYN PRIV*

## 2023-05-17 PROCEDURE — 700101 HCHG RX REV CODE 250: Performed by: HOSPITALIST

## 2023-05-17 PROCEDURE — 700105 HCHG RX REV CODE 258: Performed by: STUDENT IN AN ORGANIZED HEALTH CARE EDUCATION/TRAINING PROGRAM

## 2023-05-17 PROCEDURE — 700111 HCHG RX REV CODE 636 W/ 250 OVERRIDE (IP): Performed by: STUDENT IN AN ORGANIZED HEALTH CARE EDUCATION/TRAINING PROGRAM

## 2023-05-17 PROCEDURE — 80053 COMPREHEN METABOLIC PANEL: CPT

## 2023-05-17 RX ORDER — SODIUM CHLORIDE 9 MG/ML
INJECTION, SOLUTION INTRAVENOUS CONTINUOUS
Status: ACTIVE | OUTPATIENT
Start: 2023-05-17 | End: 2023-05-18

## 2023-05-17 RX ADMIN — INSULIN LISPRO 3 UNITS: 100 INJECTION, SOLUTION INTRAVENOUS; SUBCUTANEOUS at 12:48

## 2023-05-17 RX ADMIN — AMLODIPINE BESYLATE 5 MG: 10 TABLET ORAL at 05:23

## 2023-05-17 RX ADMIN — SODIUM BICARBONATE 1300 MG: 650 TABLET ORAL at 17:51

## 2023-05-17 RX ADMIN — FAMOTIDINE 20 MG: 20 TABLET, FILM COATED ORAL at 05:24

## 2023-05-17 RX ADMIN — INSULIN LISPRO 4 UNITS: 100 INJECTION, SOLUTION INTRAVENOUS; SUBCUTANEOUS at 12:48

## 2023-05-17 RX ADMIN — DEXAMETHASONE SODIUM PHOSPHATE 4 MG: 4 INJECTION, SOLUTION INTRA-ARTICULAR; INTRALESIONAL; INTRAMUSCULAR; INTRAVENOUS; SOFT TISSUE at 12:46

## 2023-05-17 RX ADMIN — MEROPENEM 500 MG: 500 INJECTION INTRAVENOUS at 05:22

## 2023-05-17 RX ADMIN — CARVEDILOL 12.5 MG: 12.5 TABLET, FILM COATED ORAL at 09:05

## 2023-05-17 RX ADMIN — SODIUM HYPOCHLORITE 1 ML: 1.25 SOLUTION TOPICAL at 10:40

## 2023-05-17 RX ADMIN — Medication 1 APPLICATOR: at 09:05

## 2023-05-17 RX ADMIN — DOCUSATE SODIUM 50 MG AND SENNOSIDES 8.6 MG 2 TABLET: 8.6; 5 TABLET, FILM COATED ORAL at 17:51

## 2023-05-17 RX ADMIN — POLYETHYLENE GLYCOL 3350 1 PACKET: 17 POWDER, FOR SOLUTION ORAL at 05:24

## 2023-05-17 RX ADMIN — TAMSULOSIN HYDROCHLORIDE 0.4 MG: 0.4 CAPSULE ORAL at 09:06

## 2023-05-17 RX ADMIN — SODIUM BICARBONATE 1300 MG: 650 TABLET ORAL at 05:23

## 2023-05-17 RX ADMIN — Medication 1 APPLICATOR: at 18:00

## 2023-05-17 RX ADMIN — INSULIN LISPRO 4 UNITS: 100 INJECTION, SOLUTION INTRAVENOUS; SUBCUTANEOUS at 18:02

## 2023-05-17 RX ADMIN — ALLOPURINOL 100 MG: 100 TABLET ORAL at 05:24

## 2023-05-17 RX ADMIN — OXYCODONE HYDROCHLORIDE 10 MG: 10 TABLET ORAL at 05:28

## 2023-05-17 RX ADMIN — DOCUSATE SODIUM 50 MG AND SENNOSIDES 8.6 MG 2 TABLET: 8.6; 5 TABLET, FILM COATED ORAL at 05:24

## 2023-05-17 RX ADMIN — INSULIN LISPRO 4 UNITS: 100 INJECTION, SOLUTION INTRAVENOUS; SUBCUTANEOUS at 09:14

## 2023-05-17 RX ADMIN — HEPARIN SODIUM 5000 UNITS: 5000 INJECTION, SOLUTION INTRAVENOUS; SUBCUTANEOUS at 17:52

## 2023-05-17 RX ADMIN — MEROPENEM 500 MG: 500 INJECTION INTRAVENOUS at 22:41

## 2023-05-17 RX ADMIN — DULOXETINE HYDROCHLORIDE 30 MG: 30 CAPSULE, DELAYED RELEASE ORAL at 17:51

## 2023-05-17 RX ADMIN — MEROPENEM 500 MG: 500 INJECTION INTRAVENOUS at 14:18

## 2023-05-17 RX ADMIN — INSULIN LISPRO 3 UNITS: 100 INJECTION, SOLUTION INTRAVENOUS; SUBCUTANEOUS at 18:01

## 2023-05-17 RX ADMIN — DEXAMETHASONE SODIUM PHOSPHATE 4 MG: 4 INJECTION, SOLUTION INTRA-ARTICULAR; INTRALESIONAL; INTRAMUSCULAR; INTRAVENOUS; SOFT TISSUE at 17:52

## 2023-05-17 RX ADMIN — DEXAMETHASONE SODIUM PHOSPHATE 4 MG: 4 INJECTION, SOLUTION INTRA-ARTICULAR; INTRALESIONAL; INTRAMUSCULAR; INTRAVENOUS; SOFT TISSUE at 05:19

## 2023-05-17 RX ADMIN — INSULIN LISPRO 5 UNITS: 100 INJECTION, SOLUTION INTRAVENOUS; SUBCUTANEOUS at 09:14

## 2023-05-17 RX ADMIN — HEPARIN SODIUM 5000 UNITS: 5000 INJECTION, SOLUTION INTRAVENOUS; SUBCUTANEOUS at 09:05

## 2023-05-17 RX ADMIN — SODIUM CHLORIDE: 9 INJECTION, SOLUTION INTRAVENOUS at 12:46

## 2023-05-17 RX ADMIN — ATORVASTATIN CALCIUM 10 MG: 10 TABLET, FILM COATED ORAL at 17:51

## 2023-05-17 RX ADMIN — CARVEDILOL 12.5 MG: 12.5 TABLET, FILM COATED ORAL at 17:52

## 2023-05-17 ASSESSMENT — COGNITIVE AND FUNCTIONAL STATUS - GENERAL
MOVING FROM LYING ON BACK TO SITTING ON SIDE OF FLAT BED: UNABLE
CLIMB 3 TO 5 STEPS WITH RAILING: TOTAL
DAILY ACTIVITIY SCORE: 19
SUGGESTED CMS G CODE MODIFIER DAILY ACTIVITY: CK
WALKING IN HOSPITAL ROOM: TOTAL
TURNING FROM BACK TO SIDE WHILE IN FLAT BAD: UNABLE
STANDING UP FROM CHAIR USING ARMS: A LOT
MOVING TO AND FROM BED TO CHAIR: UNABLE
DRESSING REGULAR LOWER BODY CLOTHING: A LOT
DRESSING REGULAR UPPER BODY CLOTHING: A LITTLE
TURNING FROM BACK TO SIDE WHILE IN FLAT BAD: UNABLE
CLIMB 3 TO 5 STEPS WITH RAILING: TOTAL
MOBILITY SCORE: 7
DRESSING REGULAR LOWER BODY CLOTHING: A LITTLE
SUGGESTED CMS G CODE MODIFIER MOBILITY: CM
SUGGESTED CMS G CODE MODIFIER DAILY ACTIVITY: CK
TOILETING: A LOT
STANDING UP FROM CHAIR USING ARMS: A LOT
SUGGESTED CMS G CODE MODIFIER MOBILITY: CM
HELP NEEDED FOR BATHING: A LITTLE
TOILETING: A LOT
WALKING IN HOSPITAL ROOM: TOTAL
HELP NEEDED FOR BATHING: A LOT
DAILY ACTIVITIY SCORE: 17
MOBILITY SCORE: 7
MOVING TO AND FROM BED TO CHAIR: UNABLE
DRESSING REGULAR UPPER BODY CLOTHING: A LITTLE
MOVING FROM LYING ON BACK TO SITTING ON SIDE OF FLAT BED: UNABLE

## 2023-05-17 ASSESSMENT — PAIN DESCRIPTION - PAIN TYPE
TYPE: ACUTE PAIN

## 2023-05-17 ASSESSMENT — GAIT ASSESSMENTS: GAIT LEVEL OF ASSIST: UNABLE TO PARTICIPATE

## 2023-05-17 ASSESSMENT — ENCOUNTER SYMPTOMS: WEAKNESS: 1

## 2023-05-17 NOTE — THERAPY
Occupational Therapy  Daily Treatment     Patient Name: Tyrone Cline  Age:  83 y.o., Sex:  male  Medical Record #: [id number]  Today's Date: 5/17/2023     Precautions  Precautions: Fall Risk, Spinal / Back Precautions   Comments: hemovac, no brace per order    Assessment    Pt seen for OT session. Now s/p T2-4 laminectomies with fenestration of arachnoid cyst with new spinal precautions. Pt is pleasant and motivated to participate. Provided with supine AROM and therapy sponge exercises. Continues to be limited by decreased functional mobility, activity tolerance, strength, balance, adherence to precautions, and pain which are currently affecting pt's ability to complete ADLs/IADLs at baseline. Will continue to follow.     Plan    Treatment Plan Status: Continue Current Treatment Plan (modified goals)  Type of Treatment: Self Care / Activities of Daily Living, Adaptive Equipment, Neuro Re-Education / Balance, Therapeutic Exercises, Therapeutic Activity  Treatment Frequency: 3 Times per Week  Treatment Duration: Until Therapy Goals Met    DC Equipment Recommendations: Unable to determine at this time  Discharge Recommendations: Recommend post-acute placement for additional occupational therapy services prior to discharge home     Objective     05/17/23 1456   Precautions   Precautions Fall Risk;Spinal / Back Precautions    Comments hemovac   Vitals   O2 (LPM) 2   O2 Delivery Device Silicone Nasal Cannula   Vitals Comments increased to 3L during activity; increased SOB and SpO2 decreased to low 80%s   Pain 0 - 10 Group   Location Back   Location Orientation Upper   Therapist Pain Assessment Post Activity;During Activity;Nurse Notified  (not quantified)   Cognition    Cognition / Consciousness WDL   Level of Consciousness Alert   Comments very pleasant and motivated to participate. Receptive to education   Passive ROM Upper Body   Passive ROM Upper Body WDL   Active ROM Upper Body   Active ROM Upper Body  WDL    Comments improving   Strength Upper Body   Upper Body Strength  X   Gross Strength Generalized Weakness, Equal Bilaterally.    Comments improving   Supine Upper Body Exercises   Comments Educated on BUE AROM exercises and provided with handout   Hand Strengthening   Comment Provided with blue therapy sponge and educated on , pinch, and adduction exercises   Neuro-Muscular Treatments   Neuro-Muscular Treatments Anterior weight shift;Postural Facilitation;Tactile Cuing;Sequencing;Weight Shift Right;Weight Shift Left;Postural Changes;Facilitation   Comments Dynamic sitting balance improving during ADLs   Balance   Sitting Balance (Static) Fair   Sitting Balance (Dynamic) Fair -   Standing Balance (Static) Trace +   Standing Balance (Dynamic) Trace   Weight Shift Sitting Fair   Weight Shift Standing Poor   Skilled Intervention Verbal Cuing;Tactile Cuing;Sequencing;Postural Facilitation;Facilitation;Compensatory Strategies   Comments Req UUE support in sitting, but able to maintain midline. Req min A for sitting balance during LB dressing   Bed Mobility    Supine to Sit Minimal Assist   Sit to Supine Moderate Assist   Scooting Standby Assist   Rolling Minimal Assist to Rt.   Skilled Intervention Verbal Cuing;Tactile Cuing;Postural Facilitation;Sequencing;Facilitation;Compensatory Strategies   Comments edu on logroll   Activities of Daily Living   Eating Supervision   Grooming Contact Guard Assist;Seated  (combing hair)   Lower Body Dressing Moderate Assist  (bringing legs up over knee)   Toileting Maximal Assist  (catheter and bedpan)   Skilled Intervention Verbal Cuing;Tactile Cuing;Sequencing;Postural Facilitation;Facilitation;Compensatory Strategies   Comments edu on adaptive techniques for LB dressing   Functional Mobility   Sit to Stand Maximal Assist  (B knee blocking)   Mobility EOB>STS x3 w/ B knee blocking>BTB   Skilled Intervention Verbal Cuing;Tactile Cuing;Sequencing;Postural  Facilitation;Facilitation;Compensatory Strategies   Activity Tolerance   Comments fatigues very quickly with standing, but improving with EOB seated activity   Patient / Family Goals   Patient / Family Goal #1 to get stronger   Goal #1 Outcome Progressing as expected   Short Term Goals   Short Term Goal # 1 Pt will complete ADL transfers with min A   Goal Outcome # 1 Progressing slower than expected   Short Term Goal # 2 Pt will complete LB dressing with supervision   Goal Outcome # 2 Progressing as expected   Short Term Goal # 3 Pt will complete toileting with supervision   Goal Outcome # 3 Progressing slower than expected

## 2023-05-17 NOTE — PROGRESS NOTES
Neurosurgery Progress Note    Subjective:  83 y.o. male who presented 2023 with a very complicated picture.  He has stage IV CKD. S/p cholecystectomy.  With regards to his neurologic symptoms, he reports on the day of admission he had immediate and complete loss of lower extremity strength that has waxed and waned but has overall improved significantly since admission.   Currently denies neurologic symptoms other than weakness in the lower extremities, still unable to walk.    Denies bowel or bladder dysfunction.  Denies numbness in the lower extremities.    Denies radicular pain.     S/p multiple MRIs of the brain, thoracic spine.  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  MRI from 2023 shows T2 signal change within the spinal cord more proximally over multiple levels.      POD#1 T2-4 laminectomies with fenestration of arachnoid cyst.  Doing ok, pain controlled.  Denies bilateral lower extremity radicular pain.  Denies HA.  Not mobilized yet.  Drain with 500 cc output last 8 hours.      Exam:  Awake, alert oriented x4.   Appropriate and cooperative.   Motor exam reveals 4/5 to the LLE, 4+/5 to the RLE   Sensation intact to light touch.   No clonus       BP  Min: 112/74  Max: 142/72  Pulse  Av.3  Min: 52  Max: 73  Resp  Av.8  Min: 14  Max: 24  Temp  Av.3 °C (97.4 °F)  Min: 35.8 °C (96.5 °F)  Max: 36.9 °C (98.4 °F)  SpO2  Av.3 %  Min: 93 %  Max: 99 %    No data recorded    Recent Labs     05/15/23  0655 23  0933   WBC 12.0* 17.0*   RBC 3.68* 3.62*   HEMOGLOBIN 10.6* 10.4*   HEMATOCRIT 32.9* 33.0*   MCV 89.4 91.2   MCH 28.8 28.7   MCHC 32.2* 31.5*   RDW 48.4 50.9*   PLATELETCT 430 418   MPV 10.6 10.9       Recent Labs     05/15/23  0655 23  0933   SODIUM 136 133*   POTASSIUM 5.2 5.5   CHLORIDE 102  103   CO2 23 19*   GLUCOSE 207* 329*   BUN 57* 68*   CREATININE 2.07* 2.08*   CALCIUM 9.7 9.0                   Intake/Output                         05/16/23 0700 - 05/17/23 0659 05/17/23 0700 - 05/18/23 0659     [id number] 1900-0659 Total 0700-1859 1900-0659 Total                 Intake    P.O.  200  -- 200  --  -- --    P.O. 200 -- 200 -- -- --    I.V.  800  -- 800  --  -- --    Volume (mL) (Lactated Ringers) 800 -- 800 -- -- --    Total Intake 1000 -- 1000 -- -- --       Output    Urine  675  1000 1675  550  -- 550    Output (mL) (Urethral Catheter) 675 1000 1675 550 -- 550    Drains  0  540 540  0  -- 0    Output (mL) (Negative Pressure Wound Therapy 05/14/23 Surgical Abdomen Right) -- 500 500 -- -- --    Output (mL) (Closed/Suction Drain 1 Back Hemovac) 0 40 40 0 -- 0    Total Output 675 1540 2215 550 -- 550       Net I/O     325 -1540 -1215 -550 -- -550              Intake/Output Summary (Last 24 hours) at 5/17/2023 1127  Last data filed at 5/17/2023 0725  Gross per 24 hour   Intake 1000 ml   Output 2315 ml   Net -1315 ml               Nozin nasal  swab  1 Applicator BID    Pharmacy Consult Request  1 Each PHARMACY TO DOSE    MD ALERT...DO NOT ADMINISTER NSAIDS or ASPIRIN unless ORDERED By Neurosurgery  1 Each PRN    LR   Continuous    heparin  5,000 Units Q8HRS    diphenhydrAMINE  25 mg Q6HRS PRN    Or    diphenhydrAMINE  25 mg Q6HRS PRN    methocarbamol  750 mg Q8HRS PRN    benzocaine-menthol  1 Lozenge Q2HRS PRN    insulin lispro  4 Units TID AC    insulin GLARGINE  17 Units QAM INSULIN    insulin lispro  2-9 Units TID AC    And    dextrose bolus  25 g Q15 MIN PRN    insulin GLARGINE  5 Units Q EVENING    dakins 0.125% (1/4 strength)   QDAY PRN    lidocaine jelly  1 Application. QDAY PRN    lidocaine  20 mL QDAY PRN    Or    lidocaine   QDAY PRN    meropenem  500 mg Q8HRS    dexamethasone  4 mg Q6HRS    senna-docusate  2 Tablet BID    And    polyethylene glycol/lytes  1 Packet QDAY PRN    And     magnesium hydroxide  30 mL QDAY PRN    And    bisacodyl  10 mg QDAY PRN    tamsulosin  0.4 mg AFTER BREAKFAST    famotidine  20 mg DAILY    oxyCODONE immediate-release  5 mg Q3HRS PRN    Or    oxyCODONE immediate-release  10 mg Q3HRS PRN    morphine injection  2-4 mg Q3HRS PRN    Respiratory Therapy Consult   Continuous RT    amLODIPine  5 mg Q DAY    labetalol  10 mg Q4HRS PRN    hydrALAZINE  10 mg Q6HRS PRN    atorvastatin  10 mg DAILY AT 1800    allopurinol  100 mg DAILY    carvedilol  12.5 mg BID WITH MEALS    [Held by provider] spironolactone  25 mg Q DAY    sodium bicarbonate  1,300 mg BID    DULoxetine  30 mg Q EVENING    acetaminophen  650 mg Q6HRS PRN       Assessment and Plan:  Hospital day # 24  POD# 1 laminectomy with subdural cyst exploration.  PT/OT/ambulate as tolerated  Place drain to 1/2 compression now, indefinitely.  Will likely need placement when appropriate.  Following.       Chemical prophylactic DVT therapy: hold for surgery

## 2023-05-17 NOTE — CARE PLAN
Problem: Knowledge Deficit - Standard  Goal: Patient and family/care givers will demonstrate understanding of plan of care, disease process/condition, diagnostic tests and medications  5/17/2023 1227 by Josemanuel Rosa R.N.  Outcome: Progressing     Problem: Pain - Standard  Goal: Alleviation of pain or a reduction in pain to the patient’s comfort goal  5/17/2023 1227 by Josemanuel Rosa R.N.  Outcome: Progressing     Problem: Skin Integrity  Goal: Skin integrity is maintained or improved  5/17/2023 1227 by Josemanuel Rosa R.N.  Outcome: Progressing     The patient is Stable - Low risk of patient condition declining or worsening    Shift Goals  Clinical Goals: Ambulation  Patient Goals: Ambulation  Family Goals: Communication on Plan of Care    Progress made toward(s) clinical / shift goals:  Patient Ambulated x1 this shift with PT, Son informed on plan of care this shift    Patient is not progressing towards the following goals:

## 2023-05-17 NOTE — PROGRESS NOTES
4 Eyes Skin Assessment Completed by Aletha, RN Анна, RN     Head WDL  Ears WDL  Nose WDL  Mouth WDL  Neck WDL  Breast/Chest WDL   Shoulder Blades T2/ T3/T4 Laminectomy w/ gauze, Tegaderm, hemovac, CDI   Spine  Laminectomy site, CDI  (R) Arm/Elbow/Hand WDL  (L) Arm/Elbow/Hand WDL  Abdomen MLI w/ wound vac placement    Groin rivera in place   Scrotum/Coccyx/Buttocks   (R) Leg WDL  (L) Leg WDL  (R) Heel/Foot/Toe WDL  (L) Heel/Foot/Toe WDL      Pictures Uploaded Into Epic  Wound Consult Placed   RN Wound Prevention Protocol Ordered

## 2023-05-17 NOTE — PROGRESS NOTES
Report received from AM RN; assumed care. Son bedside at change of shift. Assessment/2 RN skin check completed. A&O x 4 this shift. VSS, intermittent bradycardia noted. 96% on RA. Patient denying SOB, new numbness/tingling, nausea, vomiting, dizziness. Patient reported mld blurry vision to right eye. Patient stated it's improving. Scheduled medications effective for pain; see MAR. Abdomen obese, round, tender. Normoactive BS x 4 noted. Back incision covered with gauze/tegaderm with hemovac, CDI dressing. Bloody output noted in Hemovac cannister. WV to upper abdomen, CDI dressing, no drainage noted. + void; yellow urine noted in rivera. + eructation. + flatus. LBM 05/14. Patient tolerating renal CHO diet, x 2 dinners delivered to patients room; ingested. Patient tolerating MIVF/IV ABX. Discussed plan of care. All questions answered. High fall risk. Bed's alarm attempted to be engaged, not functioning. Patient compliant in not getting out of bed unassisted. Bed in locked/lowest position.  Call light/personal belongings within reach.  All needs met, patient sleeping at present time.

## 2023-05-17 NOTE — OR NURSING
1540  Dr Springer  at bedside; neuro exam completed; spoke with patient about surgery  1625  Daughter, Aimee, notified of patient's status  1630  Patient on O2 @ 2 l/m via nasal cannula  Patient tolerating fluids well; no complaints of nausea  Patient denies pain  Neuro checks as ordered: push pull equal and strong; knee resistance to pressure equal and strong; CACERES WNL.  Upper extremities push pull equal and strong.  Patient denies numbness or tingling    1640  Transferred to room via bed  O2 tank > 50% full  Pertinent post op orders reviewed with receiving RN

## 2023-05-17 NOTE — PROGRESS NOTES
Hospital Medicine Daily Progress Note    Date of Service  5/17/2023    Chief Complaint  Tyrone Cline is a 83 y.o. male admitted 4/21/2023 with colitis    Hospital Course  This 82-year-old male with a past medical history significant for CKD stage IV being followed by nephrology, diabetes mellitus with glyco of 6.6, hypertension presented to ER on 4/21/2023 with a complaint of abdominal pain and lower extremity weakness.    S/p laparoscopic cholecystectomy with conversion to open cholecystectomy 4/18 /2023 , patient has completed treatment with IV antibiotics.    His hospital course was complicated with postoperative hypotension, went to ICU vasopressor and transferred back on 4/29 floor.    Of note, patient continues to have bilateral lower extremity weakness; MR T spine expansile T2 hyperintense area in the thoracic spinal cord at the levels of T2, T3 and T4.      Neurosurgery evaluated and plan for laminectomy with subdural cyst exploration        Interval Problem Update  5/16/2023  Vital signs remained stable  Persistent leukocytosis.  No sign of ongoing active infection  Renal function remained stable.    Neurosurgery evaluated and plan for laminectomy with subdural cyst exploration    5/17/2023  Vitals remain stable  Labs reviewed  ,noted leucocytosis ,  ,K 5.5 , blood glucose 329 , elevated BU/Cr   S/p Laminectomy  Currently on meropenem   Neurosurgery following   PT/OT eval       I have discussed this patient's plan of care and discharge plan at IDT rounds today with Case Management, Nursing, Nursing leadership, and other members of the IDT team.    Consultants/Specialty  general surgery and neurosurgery    Code Status  DNAR/DNI    Disposition  Discharge plan for PT/OT evaluation after laminectomy  I have placed the appropriate orders for post-discharge needs.    Review of Systems  Review of Systems   Neurological:  Positive for weakness.        Physical Exam  Temp:  [35.8 °C (96.5 °F)-36.9  °C (98.4 °F)] 36.2 °C (97.2 °F)  Pulse:  [52-73] 63  Resp:  [14-24] 17  BP: (112-142)/(60-74) 121/68  SpO2:  [93 %-99 %] 94 %    Physical Exam  Eyes:      Pupils: Pupils are equal, round, and reactive to light.   Cardiovascular:      Rate and Rhythm: Normal rate and regular rhythm.      Pulses: Normal pulses.      Heart sounds: Normal heart sounds.   Abdominal:      General: Abdomen is flat.   Musculoskeletal:      Comments: Suction noted    Neurological:      General: No focal deficit present.      Mental Status: He is alert and oriented to person, place, and time.      Motor: Weakness present.         Fluids    Intake/Output Summary (Last 24 hours) at 5/17/2023 1211  Last data filed at 5/17/2023 0725  Gross per 24 hour   Intake 1000 ml   Output 2315 ml   Net -1315 ml         Laboratory  Recent Labs     05/15/23  0655 05/17/23  0933   WBC 12.0* 17.0*   RBC 3.68* 3.62*   HEMOGLOBIN 10.6* 10.4*   HEMATOCRIT 32.9* 33.0*   MCV 89.4 91.2   MCH 28.8 28.7   MCHC 32.2* 31.5*   RDW 48.4 50.9*   PLATELETCT 430 418   MPV 10.6 10.9       Recent Labs     05/15/23  0655 05/17/23  0933   SODIUM 136 133*   POTASSIUM 5.2 5.5   CHLORIDE 102 103   CO2 23 19*   GLUCOSE 207* 329*   BUN 57* 68*   CREATININE 2.07* 2.08*   CALCIUM 9.7 9.0                       Imaging  DX-SPINE-ANY ONE VIEW   Final Result      Digitized intraoperative radiograph is submitted for review. This examination is not for diagnostic purpose but for guidance during a surgical procedure. Please see the patient's chart for full procedural details.         INTERPRETING LOCATION: [address] [facility], [id number]      DX-PORTABLE FLUORO > 1 HOUR   Final Result      Portable fluoroscopy utilized for 6 seconds.      INTERPRETING LOCATION: [address] , [place], [address]      MR-THORACIC SPINE-WITH & W/O   Final Result      Compared with the previous MRI ,again noted is expansile lesion in the thoracic spinal cord at the levels of T2, T3 and T4. There is prominent right dorsal  subarachnoid space at the level of T3 with flattening of the spinal cord. There is mild contrast    enhancement noted in the expanded portion of the spinal cord. 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 Other differential diagnosis includes transverse myelitis However the possibility of right-sided dorsal arachnoid cyst/arachnoid    web at the level of T3 causing spinal cord edema cannot be entirely ruled out. Despite multiple MRs, it is difficult to characterize the tumor. Therefore if there is any neurosurgical planning, CT myelogram is recommended for further characterization.      IR-US GUIDED PIV   Final Result    Ultrasound-guided PERIPHERAL IV INSERTION performed by    qualified nursing staff as above.      MR-THORACIC SPINE-WITH & W/O   Final Result      1.  Limited study due to the motion artifact.   2.  When compared with the previous MRI again noted is expansile T2 hyperintense area in the thoracic spinal cord at the levels of T2, T3 and T4. There is also prominent dorsal right-sided subarachnoid space at this level. The differential diagnosis    includes intramedullary spinal cord tumor and extramedullary cyst/web with cord compression and edema.   3.  Pre and postcontrast MR examination of the thoracic spine is recommended with contrast under General anesthesia.   4.  1 -2 mm  T2-weighted sequences from T2 to T6 with smaller field of view is recommended for further characterization.      MR-THORACIC SPINE-WITH & W/O   Final Result         Stable appearance of expansile nonenhancing signal abnormality involving the upper thoracic cord between T1 and T4. Also noted is a small extra-axial collection/web along the right posterolateral spinal canal at T3-4 that deforms the cord and displaces    it anteriorly and to the left.   This could represent an Arachnoid web or arachnoid cyst with extensive mass effect and secondary edema of the cord.   Recommend additional thin section T2 and post  contrast images through T1-T6 to better assess the abnormality.      MR-BRAIN-WITH & W/O   Final Result         No acute intracranial process.      Age-related volume loss and chronic microvascular ischemic changes.         DX-CHEST-PORTABLE (1 VIEW)   Final Result         1.  Pulmonary edema and/or infiltrates are identified, which are stable since the prior exam.   2.  Cardiomegaly   3.  Atherosclerosis      DX-CHEST-PORTABLE (1 VIEW)   Final Result         1.  Pulmonary edema and/or infiltrates are identified, which are stable since the prior exam.   2.  Left PICC line tip terminates in the left axilla, stable since prior study.      DX-CHEST-PORTABLE (1 VIEW)   Final Result      1.  No significant change.   2.  Possible left PICC line with tip projecting at the axillary vein.      DX-CHEST-PORTABLE (1 VIEW)   Final Result      1.  Mildly improved pulmonary opacities.   2.  Possible left PICC line with tip projecting at the axillary vein.   3.  Likely trace right pleural effusion.      DX-CHEST-LIMITED (1 VIEW)   Final Result      1.  Placement of endotracheal tube with tip projecting over the mid trachea      2.  Left arm catheter present which is presumably venous in projects in the expected position of the left axillary vein      3.  Enlarged cardiac silhouette      NM-HEPATOBILIARY SCAN   Final Result      Hepatobiliary scan findings suspicious for acute cholecystitis.      MR-THORACIC SPINE-WITH & W/O   Final Result      1.  There is abnormal expansile T2 hyperintense lesion in the right side of the thoracic spinal cord at the levels of T2 and T3 Mild contrast enhancement is seen. This lesion is suspicious for spinal cord neoplasm. Other remote differential diagnosis    includes transverse myelitis.   2.  Exaggerated thoracic kyphosis.   3.  Thoracic dextroscoliosis.   4.  Minimal degenerative changes.   5.  Right pleural effusion.      MR-LUMBAR SPINE-WITH & W/O   Final Result      1.  Multifocal  degenerative disease in the lumbar spine as described above.   2.  Moderate central canal and severe lateral recess stenosis at the level of L4-5. The exiting bilateral L5 nerve roots might have been impinged at the lateral recess.      MR-CERVICAL SPINE-WITH & W/O   Final Result      1.  There is abnormal expansile intramedullary T2 signal intensity lesion in the right cerebellum. Thoracic spinal cord at the level of T2 on T3. Mild diffuse contrast enhancement is seen. This finding is suspicious for spinal cord neoplasm. The other    less likely differential diagnosis includes transverse myelitis. Follow-up is recommended after 4 weeks.   2.  Mild degenerative disease in the cervical spine as described above.   3.  There is no evidence of infection in the cervical spine.      US-RUQ   Final Result         1.  Acute cholecystitis.   2.  Atrophic right kidney.      DX-CHEST-LIMITED (1 VIEW)   Final Result      Perihilar interstitial edema and basilar atelectasis and/or consolidation. Underlying infection is possible.      MR-LUMBAR SPINE-W/O   Final Result      1.  Lower lumbar spine predominant degenerative changes as described in detail above, progressed from 2010 MRI.   2.  Edema at the L4-L5 disc space and L5 superior endplate likely represents degenerative changes. Less likely this could represent a low-grade or early discitis osteomyelitis. Correlate for evidence of infection.   3.  Acute appearing Schmorl's node at L3-L4, which can be a source of pain.      US-RENAL   Final Result      1.  Atrophic kidneys. Echogenic bilateral renal parenchyma could relate to medical renal disease.      2.  No hydronephrosis. No renal calculus.      DX-CHEST-PORTABLE (1 VIEW)   Final Result      No acute cardiac or pulmonary abnormalities are identified.      CT-ABDOMEN-PELVIS W/O   Final Result      1.  Findings suspicious for focal colitis at the hepatic flexure, less likely cholecystitis or duodenitis with secondary  involvement of the colon. Infectious, inflammatory and ischemic etiologies are considerations. Underlying mass is not excluded.   2.  Cholelithiasis with distention of the gallbladder   3.  BILATERAL renal atrophy   4.  Subcentimeter LEFT adrenal myelolipoma   5.  12 mm RIGHT adrenal nodule likely an adenoma absent a history of cancer   6.  Subcentimeter RIGHT hepatic lesion, likely a cyst absent a history of cancer   7.  Atherosclerosis   8.  Colonic diverticulosis      CT-HEAD W/O   Final Result      1.  Cerebral atrophy.      2.  White matter lucencies most consistent with small vessel ischemic change versus demyelination or gliosis.      3.  Otherwise, Head CT without contrast with no acute findings. No evidence of acute cerebral infarction, hemorrhage or mass lesion.                Assessment/Plan  * Acute gangrenous cholecystitis- (present on admission)  Assessment & Plan  Found to have gangrenous cholecystitis s/p laprascopic converted open cholecystectomy 4/28/2023   -- Completed antibiotic treatment from 4/25- 4/ 29, drain has been removed.  Follow surgical recommendation.    Surgical course was complicated with postoperative wound infection, culture has been obtained, wound care has been consulted  Wound cx growing ESBL E coli, continue meropeneam for 7 days as per ID recs    Urinary retention  Assessment & Plan  Florez by  urology.    -- Do not change Floerz without urology consent    Abnormal MRI- (present on admission)  Assessment & Plan  MRI on admission showing T2/T3  hyperintense lesion in the right side of the thoracic spinal cord at the levels of T2 and T3 Mild contrast enhancement is seen. This lesion is suspicious for spinal cord neoplasm or possible transverse myelitis       S/p Laminectomy    Hypercalcemia- (present on admission)  Assessment & Plan  Likely due to immobility     -- monitor    Hypomagnesemia- (present on admission)  Assessment & Plan  Replete as needed    Cholelithiasis with acute  cholecystitis- (present on admission)  Assessment & Plan  S/p surgical removal, converted to open cholecystectomy  NAHUN drain-removed on 5/8/2023   -- s/p completion of iv abx    Hypokalemia- (present on admission)  Assessment & Plan  Replete as needed    Thrombocytopenia (HCC)- (present on admission)  Assessment & Plan  Resolved, plt are stable at 427    High anion gap metabolic acidosis- (present on admission)  Assessment & Plan  Acute on chronic kidney disease, -appears back at baseline on 5/3/2023    Resolved    Hyponatremia- (present on admission)  Assessment & Plan  Resolved    Weakness of both lower extremities- (present on admission)  Assessment & Plan  MR T spine expansile T2 hyperintense area in the thoracic spinal cord at the levels of T2, T3 and T4.    S/p Laminectomy        Sepsis (HCC)- (present on admission)  Assessment & Plan  This is Sepsis Present on admission  SIRS criteria identified on my evaluation include: Leukocytosis, with WBC greater than 12,000  Source is colitis  Sepsis protocol initiated  Fluid resuscitation ordered per protocol  Crystalloid Fluid Administration: Fluid resuscitation ordered per standard protocol - 30 mL/kg per current or ideal body weight  IV antibiotics as appropriate for source of sepsis  Reassessment: I have reassessed the patient's hemodynamic status    Due to gangrenous gallbladder requiring open carmelo    -- Today patient was noted to have surgical  wound with drainage   -- wound cx pending  ESBL E coli; id consulted, will switch to meropenem as per id; last day being on 5/19    Colitis- (present on admission)  Assessment & Plan  Cultures negative, monitor  Initial admitting impression, currently improved  Diet as tolerated     Acute kidney injury superimposed on CKD (HCC)- (present on admission)  Assessment & Plan   Resolved    Avoid nephrotoxins       Diabetic peripheral neuropathy (HCC)- (present on admission)  Assessment & Plan  Cymbalta, medical  treatment    Essential hypertension- (present on admission)  Assessment & Plan  Continue amlodipine and coreg    Type 2 diabetes mellitus with kidney complication, without long-term current use of insulin (HCC)- (present on admission)  Assessment & Plan  Ha1c 6.4% 4 months ago   On ozempic and januvia outpatient   Continue ISS and hypoglycemic protocol while inpatient    -- Has been started on Decadron, his blood glucose is noted to be elevated\    17 at a.m., 5 PM  Patient blood glucose is still elevated, started 4 3 times daily with meals    Hypercholesterolemia- (present on admission)  Assessment & Plan  Stable, continue Lipitor    Chronic kidney disease (CKD), stage IV (severe) (HCC)- (present on admission)  Assessment & Plan   Creatinine at 2.07             VTE prophylaxis: SCDs/TEDs  Resume chemical prophylaxis once cleared by neurosurgery    I have performed a physical exam and reviewed and updated ROS and Plan today (5/17/2023). In review of yesterday's note (5/16/2023), there are no changes except as documented above.

## 2023-05-17 NOTE — THERAPY
"Physical Therapy   Daily Treatment     Patient Name: Tyrone Cline  Age:  83 y.o., Sex:  male  Medical Record #: [id number]  Today's Date: 5/17/2023     Precautions  Precautions: Fall Risk;Spinal / Back Precautions   Comments: hemovac    Assessment    Pt seen for PT treatment session s/p T2-4 laminectomy with subdural cyst exploration. Pt reports feeling globally improved following surgery; however, LE weakness persists. Today, pt required Min - Mod A to complete supine <> sit EOB. Once seated EOB pt demonstrated good seated balance; however, fatigues quickly due to limited activity in hospital setting.  Trialed sit to stand with FWW and Max A with increased difficulty maintaining B knee extension. Transitioned to 2 person assist for sit to stand with B LE blocking and pt able to complete 3 rounds of sit to stand at EOB with Max A x2 people. Attempted to initiate lateral stepping with B knees blocked as needed with pt able to take 1 step with each foot with support prior to fatiguing. Pt returned BTB, provided with supine LE exercises to work on between therapy sessions. Pt will continue to benefit from acute PT interventions to address impairments noted below, goals updated to reflect current function.     Pt would benefit from sitting upright in cardiac chair with LE supported in a more \"chair like\" position to improve upright activity tolerance. Or use of Mell Steady to aide mobility to bedside chair with nursing staff.     Plan    Treatment Plan Status:  (Goals updated to reflect current level of function)  Type of Treatment: Bed Mobility, Equipment, Gait Training, Manual Therapy, Neuro Re-Education / Balance, Self Care / Home Evaluation, Stair Training, Therapeutic Activities, Therapeutic Exercise  Treatment Frequency: 4 Times per Week  Treatment Duration: Until Therapy Goals Met    DC Equipment Recommendations: Unable to determine at this time  Discharge Recommendations: Recommend post-acute placement " "for additional physical therapy services prior to discharge home      Objective     05/17/23 1530   Precautions   Precautions Fall Risk;Spinal / Back Precautions    Comments hemovac   Vitals   O2 Delivery Device Silicone Nasal Cannula   Vitals Comments increased to 3L during mobility due to persistent SpO2 < 90% with activity EOB   Pain 0 - 10 Group   Therapist Pain Assessment During Activity;Nurse Notified  (\"tightness\" at surgical site, not rated)   Cognition    Cognition / Consciousness WDL   Level of Consciousness Alert   Comments very motivated to work with therapy and appreciative of care/mobility   Strength Lower Body   Lower Body Strength  X   Rt Hip Flexion Strength 3- (F-)   Rt Knee Flexion Strength 4- (G-)   Rt Knee Extension Strength 3 (F)   Rt Ankle Dorsiflexion Strength 3+ (F+)   Rt Ankle Plantar Flexion Strength 3+ (F+)   Lt Hip Flexion Strength 3 (F)   Lt Knee Flexion Strength 4- (G-)   Lt Knee Extension Strength 3+ (F+)   Lt Ankle Dorsiflexion Strength 4- (G-)   Lt Ankle Plantar Flexion Strength 4- (G-)   Comments B Knee extension limited by HS tightness   Sensation Lower Body   Lower Extremity Sensation   Not Tested   Neuro-Muscular Treatments   Neuro-Muscular Treatments Anterior weight shift;Postural Facilitation   Other Treatments   Other Treatments Provided provided pt with supine LE HEP handouts to work on between therapy session. Emphasis on ankle pumps, glute squeeze, heelslides, quad sets and SAQ   Balance   Sitting Balance (Static) Fair   Sitting Balance (Dynamic) Fair -   Standing Balance (Static) Trace +   Standing Balance (Dynamic) Trace +   Weight Shift Sitting Fair   Weight Shift Standing Poor   Skilled Intervention Compensatory Strategies;Sequencing;Tactile Cuing;Verbal Cuing   Comments required B kne blocking to maintain standing posture and allow lateral WS   Bed Mobility    Supine to Sit Minimal Assist   Sit to Supine Moderate Assist   Scooting Contact Guard Assist   Rolling " Minimal Assist to Rt.;Minimum Assist to Lt.   Skilled Intervention Verbal Cuing;Sequencing;Compensatory Strategies   Gait Analysis   Gait Level Of Assist Unable to Participate   Functional Mobility   Sit to Stand Maximal Assist  (x2 people with B knee blocking and facilitation at hips)   Bed, Chair, Wheelchair Transfer Unable to Participate   Skilled Intervention Compensatory Strategies;Postural Facilitation;Sequencing;Verbal Cuing;Tactile Cuing   Comments performed sit to stand at EOB x3 trials, pt quick to fatigue and unable to progress side stepping at EOB. Was able to seated scoot slightly at EOB but hips stuck to bed pad.   How much difficulty does the patient currently have...   Turning over in bed (including adjusting bedclothes, sheets and blankets)? 1   Sitting down on and standing up from a chair with arms (e.g., wheelchair, bedside commode, etc.) 1   Moving from lying on back to sitting on the side of the bed? 1   How much help from another person does the patient currently need...   Moving to and from a bed to a chair (including a wheelchair)? 2   Need to walk in a hospital room? 1   Climbing 3-5 steps with a railing? 1   6 clicks Mobility Score 7   Short Term Goals    Short Term Goal # 1 pt will perform supine <> sit via log roll and SPV in 6 visits to get in/out of bed at home   Goal Outcome # 1 goal not met   Short Term Goal # 2 pt will perform sit <> stand with LRAD and Mod A in 6 visits   Short Term Goal # 3 pt will perform functional transfers with LRAD and Max A to improve mobility in 6 vistis   Short Term Goal # 4 pt will ambulate > 10 ft with LRAD and Max A in 6 visits   Short Term Goal # 5 pt will tolerate sitting EOB or in chair > 30 min in 6 visits   Education Group   Role of Physical Therapist Patient Response Patient;Family;Acceptance;Explanation;Demonstration;Verbal Demonstration;Action Demonstration   Physical Therapy Treatment Plan   Physical Therapy Treatment Plan   (Goals updated to  reflect current level of function)   Treatment Plan  Bed Mobility;Equipment;Gait Training;Manual Therapy;Neuro Re-Education / Balance;Self Care / Home Evaluation;Stair Training;Therapeutic Activities;Therapeutic Exercise   Treatment Frequency 4 Times per Week   Duration Until Therapy Goals Met   Anticipated Discharge Equipment and Recommendations   Discharge Recommendations Recommend post-acute placement for additional physical therapy services prior to discharge home   Interdisciplinary Plan of Care Collaboration   IDT Collaboration with  Nursing;Occupational Therapist;Family / Caregiver   Patient Position at End of Therapy In Bed;Call Light within Reach;Tray Table within Reach;Family / Friend in Room   Collaboration Comments aware of PT session and pt's ability/tolerance   Session Information   Date / Session Number  5/17- 7 (1/4, 5/21)   Priority   (try to see daily, alternate days with OT?)

## 2023-05-17 NOTE — PROGRESS NOTES
Assumed care of patient at 0645. Bedside report received. Assessment complete.  AA&Ox4. Denies CP/SOB.  Reporting 4/10 pain. Declined intervention at this time.  Educated patient regarding pharmacologic and non pharmacologic modalities for pain management.  Skin per flowsheets  Tolerating Renal diet. Denies N/V.  + void via Florez Catheter Last BM 5/14  Pt ambulates Max assist, PT Session Pending .  All needs met at this time. Call light within reach. Pt calls appropriately. Bed low and locked, non skid socks in place. Hourly rounding in place.

## 2023-05-17 NOTE — CARE PLAN
The patient is Stable - Low risk of patient condition declining or worsening    Shift Goals  Clinical Goals: WV/drain monitoring, Q2 turns, pain control, rest  Patient Goals: Pain control, rest  Family Goals: Communication on Plan of Care    Progress made toward(s) clinical / shift goals:  WV with Dakins Veraflo, moderate serosang/brown drainage noted. Hemovac with bloody drainage noted from new surgical site, small sang drainage noted on dressing at end of shift.  Medicated for pain; see MAR. Q2 turns performed. Patient slept most of shift.     Patient is not progressing towards the following goals: NA.

## 2023-05-17 NOTE — DISCHARGE PLANNING
HTH/SCP TCN chart review completed. Collaborated with NIKIA Galdamez.  Current discharge considerations are for SNF.  Per chart review, patient has been acceptance at [facility] when medically cleared.  Patient not seen today as he is currently in surgery for laminectomy with subdural cyst exploration.  TCN will continue to follow and collaborate with discharge planning team as additional post acute needs arise. Thank you.    Completed:  PT/OT recommends post acute placement on 5/12/23 and 5/3/23.     SLP  5/1 recs for no further needs  Physiatry recommending SNF as of 5/2 as well  Choice obtained: IRF (-> declines; recs SNF) and SNF choice obtained on 4/24/23.  HH choice on 4/24/23.  Infusion on 4/22/23; see above; accepted to [facility];  [facility] referral sent 4/22/23

## 2023-05-17 NOTE — DISCHARGE PLANNING
HTH/SCP TCN chart review completed. Collaborated with NIKIA Galdamez.  Current discharge considerations are for SNF.  Per chart review, patient has been acceptance at [facility] when medically cleared.  Per chart review, patient has Dakins Veraflo wound vac following T2-4 laminectomies with fenestration of arachnoid cyst.  TCN will continue to follow and collaborate with discharge planning team as additional post acute needs arise. Thank you.    Completed:  PT/OT recommends post acute placement on 5/12/23 and 5/3/23.     SLP  5/1 recs for no further needs  Physiatry recommending SNF as of 5/2 as well  Choice obtained: IRF (-> declines; recs SNF) and SNF choice obtained on 4/24/23.  HH choice on 4/24/23.  Infusion on 4/22/23; see above; accepted to [facility];  [facility] referral sent 4/22/23    Addendum:  1644- OT recommends post-acute placement on 5/17/23 for additional occupational therapy services prior to discharge home.

## 2023-05-17 NOTE — PROGRESS NOTES
4 Eyes Skin Assessment Completed by JUAN JOSÉ Abernathy and JUAN JOSÉ Vaughan.     Head WDL  Ears WDL  Nose WDL  Mouth WDL  Neck Incision to Posterior Neck/ L Neck Hemovac, Dressing in place, FLEX insertion Site, Dressings CDI   Breast/Chest WDL  Shoulder Blades WDL  Spine WDL  (R) Arm/Elbow/Hand WDL  (L) Arm/Elbow/Hand WDL  Abdomen : Midline Transverse Wound Vac in place   RLQ - Previous NAHUN Drain Site BERTA  Groin: Florez catheter to Penis, CDI   Scrotum/Coccyx/Buttocks Redness and Blanching BERTA  (R) Leg  +1 pitting Edema  (L) Leg  +1 pitting Edema  (R) Heel/Foot/Toe +1 pitting Edema  (L) Heel/Foot/Toe +1 pitting Edema        Devices In Places: 20g PIV LFA CDI, Pulse Ox, Florez, and Nasal Cannula, Wound Vac         Interventions In Place Gray Ear Foams, TAP System, Pillows, Q2 Turns, Low Air Loss Mattress, and Heels Loaded W/Pillows, Wedges in place for repositioning      Possible Skin Injury No     Pictures Uploaded Into Epic yes  Wound Consult Placed yes  RN Wound Prevention Protocol Ordered Yes

## 2023-05-18 LAB
ANION GAP SERPL CALC-SCNC: 11 MMOL/L (ref 7–16)
BASOPHILS # BLD AUTO: 0.1 % (ref 0–1.8)
BASOPHILS # BLD: 0.02 K/UL (ref 0–0.12)
BUN SERPL-MCNC: 72 MG/DL (ref 8–22)
CALCIUM SERPL-MCNC: 8.6 MG/DL (ref 8.5–10.5)
CHLORIDE SERPL-SCNC: 101 MMOL/L (ref 96–112)
CO2 SERPL-SCNC: 20 MMOL/L (ref 20–33)
CREAT SERPL-MCNC: 2.24 MG/DL (ref 0.5–1.4)
EOSINOPHIL # BLD AUTO: 0 K/UL (ref 0–0.51)
EOSINOPHIL NFR BLD: 0 % (ref 0–6.9)
ERYTHROCYTE [DISTWIDTH] IN BLOOD BY AUTOMATED COUNT: 51 FL (ref 35.9–50)
GFR SERPLBLD CREATININE-BSD FMLA CKD-EPI: 29 ML/MIN/1.73 M 2
GLUCOSE BLD STRIP.AUTO-MCNC: 184 MG/DL (ref 65–99)
GLUCOSE BLD STRIP.AUTO-MCNC: 235 MG/DL (ref 65–99)
GLUCOSE BLD STRIP.AUTO-MCNC: 253 MG/DL (ref 65–99)
GLUCOSE SERPL-MCNC: 254 MG/DL (ref 65–99)
HCT VFR BLD AUTO: 32.1 % (ref 42–52)
HGB BLD-MCNC: 10.2 G/DL (ref 14–18)
IMM GRANULOCYTES # BLD AUTO: 0.22 K/UL (ref 0–0.11)
IMM GRANULOCYTES NFR BLD AUTO: 1.4 % (ref 0–0.9)
LYMPHOCYTES # BLD AUTO: 0.64 K/UL (ref 1–4.8)
LYMPHOCYTES NFR BLD: 3.9 % (ref 22–41)
MCH RBC QN AUTO: 28.8 PG (ref 27–33)
MCHC RBC AUTO-ENTMCNC: 31.8 G/DL (ref 33.7–35.3)
MCV RBC AUTO: 90.7 FL (ref 81.4–97.8)
MONOCYTES # BLD AUTO: 0.6 K/UL (ref 0–0.85)
MONOCYTES NFR BLD AUTO: 3.7 % (ref 0–13.4)
NEUTROPHILS # BLD AUTO: 14.81 K/UL (ref 1.82–7.42)
NEUTROPHILS NFR BLD: 90.9 % (ref 44–72)
NRBC # BLD AUTO: 0 K/UL
NRBC BLD-RTO: 0 /100 WBC
PLATELET # BLD AUTO: 366 K/UL (ref 164–446)
PMV BLD AUTO: 11.2 FL (ref 9–12.9)
POTASSIUM SERPL-SCNC: 4.9 MMOL/L (ref 3.6–5.5)
RBC # BLD AUTO: 3.54 M/UL (ref 4.7–6.1)
SODIUM SERPL-SCNC: 132 MMOL/L (ref 135–145)
WBC # BLD AUTO: 16.3 K/UL (ref 4.8–10.8)

## 2023-05-18 PROCEDURE — 99232 SBSQ HOSP IP/OBS MODERATE 35: CPT | Performed by: INTERNAL MEDICINE

## 2023-05-18 PROCEDURE — 700102 HCHG RX REV CODE 250 W/ 637 OVERRIDE(OP): Performed by: HOSPITALIST

## 2023-05-18 PROCEDURE — 700102 HCHG RX REV CODE 250 W/ 637 OVERRIDE(OP): Performed by: FAMILY MEDICINE

## 2023-05-18 PROCEDURE — 82962 GLUCOSE BLOOD TEST: CPT

## 2023-05-18 PROCEDURE — 700111 HCHG RX REV CODE 636 W/ 250 OVERRIDE (IP): Performed by: HOSPITALIST

## 2023-05-18 PROCEDURE — 99233 SBSQ HOSP IP/OBS HIGH 50: CPT | Performed by: STUDENT IN AN ORGANIZED HEALTH CARE EDUCATION/TRAINING PROGRAM

## 2023-05-18 PROCEDURE — A9270 NON-COVERED ITEM OR SERVICE: HCPCS | Performed by: HOSPITALIST

## 2023-05-18 PROCEDURE — A9270 NON-COVERED ITEM OR SERVICE: HCPCS | Performed by: SURGERY

## 2023-05-18 PROCEDURE — 700102 HCHG RX REV CODE 250 W/ 637 OVERRIDE(OP): Performed by: SURGERY

## 2023-05-18 PROCEDURE — 700105 HCHG RX REV CODE 258: Performed by: HOSPITALIST

## 2023-05-18 PROCEDURE — 700102 HCHG RX REV CODE 250 W/ 637 OVERRIDE(OP): Performed by: INTERNAL MEDICINE

## 2023-05-18 PROCEDURE — A9270 NON-COVERED ITEM OR SERVICE: HCPCS | Performed by: FAMILY MEDICINE

## 2023-05-18 PROCEDURE — 700101 HCHG RX REV CODE 250: Performed by: HOSPITALIST

## 2023-05-18 PROCEDURE — 36415 COLL VENOUS BLD VENIPUNCTURE: CPT

## 2023-05-18 PROCEDURE — 97605 NEG PRS WND THER DME<=50SQCM: CPT

## 2023-05-18 PROCEDURE — 80048 BASIC METABOLIC PNL TOTAL CA: CPT

## 2023-05-18 PROCEDURE — A9270 NON-COVERED ITEM OR SERVICE: HCPCS | Performed by: INTERNAL MEDICINE

## 2023-05-18 PROCEDURE — 85025 COMPLETE CBC W/AUTO DIFF WBC: CPT

## 2023-05-18 PROCEDURE — 302098 PASTE RING (FLAT): Performed by: STUDENT IN AN ORGANIZED HEALTH CARE EDUCATION/TRAINING PROGRAM

## 2023-05-18 PROCEDURE — 97606 NEG PRS WND THER DME>50 SQCM: CPT

## 2023-05-18 PROCEDURE — 770001 HCHG ROOM/CARE - MED/SURG/GYN PRIV*

## 2023-05-18 PROCEDURE — 700111 HCHG RX REV CODE 636 W/ 250 OVERRIDE (IP): Performed by: STUDENT IN AN ORGANIZED HEALTH CARE EDUCATION/TRAINING PROGRAM

## 2023-05-18 RX ORDER — OLMESARTAN MEDOXOMIL 20 MG/1
20 TABLET ORAL
Status: DISCONTINUED | OUTPATIENT
Start: 2023-05-19 | End: 2023-05-26

## 2023-05-18 RX ORDER — FUROSEMIDE 20 MG/1
80 TABLET ORAL
Status: DISCONTINUED | OUTPATIENT
Start: 2023-05-18 | End: 2023-05-22

## 2023-05-18 RX ADMIN — CARVEDILOL 12.5 MG: 12.5 TABLET, FILM COATED ORAL at 08:52

## 2023-05-18 RX ADMIN — DOCUSATE SODIUM 50 MG AND SENNOSIDES 8.6 MG 2 TABLET: 8.6; 5 TABLET, FILM COATED ORAL at 17:13

## 2023-05-18 RX ADMIN — TAMSULOSIN HYDROCHLORIDE 0.4 MG: 0.4 CAPSULE ORAL at 08:51

## 2023-05-18 RX ADMIN — INSULIN LISPRO 2 UNITS: 100 INJECTION, SOLUTION INTRAVENOUS; SUBCUTANEOUS at 13:14

## 2023-05-18 RX ADMIN — INSULIN LISPRO 5 UNITS: 100 INJECTION, SOLUTION INTRAVENOUS; SUBCUTANEOUS at 08:54

## 2023-05-18 RX ADMIN — INSULIN LISPRO 4 UNITS: 100 INJECTION, SOLUTION INTRAVENOUS; SUBCUTANEOUS at 13:14

## 2023-05-18 RX ADMIN — OXYCODONE HYDROCHLORIDE 10 MG: 10 TABLET ORAL at 09:16

## 2023-05-18 RX ADMIN — MEROPENEM 500 MG: 500 INJECTION INTRAVENOUS at 13:12

## 2023-05-18 RX ADMIN — MEROPENEM 500 MG: 500 INJECTION INTRAVENOUS at 05:09

## 2023-05-18 RX ADMIN — HEPARIN SODIUM 5000 UNITS: 5000 INJECTION, SOLUTION INTRAVENOUS; SUBCUTANEOUS at 02:08

## 2023-05-18 RX ADMIN — ATORVASTATIN CALCIUM 10 MG: 10 TABLET, FILM COATED ORAL at 17:13

## 2023-05-18 RX ADMIN — Medication 1 APPLICATOR: at 04:54

## 2023-05-18 RX ADMIN — DOCUSATE SODIUM 50 MG AND SENNOSIDES 8.6 MG 2 TABLET: 8.6; 5 TABLET, FILM COATED ORAL at 04:54

## 2023-05-18 RX ADMIN — HEPARIN SODIUM 5000 UNITS: 5000 INJECTION, SOLUTION INTRAVENOUS; SUBCUTANEOUS at 08:52

## 2023-05-18 RX ADMIN — AMLODIPINE BESYLATE 5 MG: 10 TABLET ORAL at 04:54

## 2023-05-18 RX ADMIN — ALLOPURINOL 100 MG: 100 TABLET ORAL at 04:55

## 2023-05-18 RX ADMIN — FUROSEMIDE 80 MG: 20 TABLET ORAL at 15:39

## 2023-05-18 RX ADMIN — DULOXETINE HYDROCHLORIDE 30 MG: 30 CAPSULE, DELAYED RELEASE ORAL at 17:13

## 2023-05-18 RX ADMIN — MEROPENEM 500 MG: 500 INJECTION INTRAVENOUS at 21:39

## 2023-05-18 RX ADMIN — HEPARIN SODIUM 5000 UNITS: 5000 INJECTION, SOLUTION INTRAVENOUS; SUBCUTANEOUS at 17:21

## 2023-05-18 RX ADMIN — INSULIN LISPRO 3 UNITS: 100 INJECTION, SOLUTION INTRAVENOUS; SUBCUTANEOUS at 17:26

## 2023-05-18 RX ADMIN — SODIUM HYPOCHLORITE 473 ML: 1.25 SOLUTION TOPICAL at 22:34

## 2023-05-18 RX ADMIN — SODIUM BICARBONATE 1300 MG: 650 TABLET ORAL at 17:13

## 2023-05-18 RX ADMIN — DEXAMETHASONE SODIUM PHOSPHATE 4 MG: 4 INJECTION, SOLUTION INTRA-ARTICULAR; INTRALESIONAL; INTRAMUSCULAR; INTRAVENOUS; SOFT TISSUE at 00:14

## 2023-05-18 RX ADMIN — CARVEDILOL 12.5 MG: 12.5 TABLET, FILM COATED ORAL at 17:14

## 2023-05-18 RX ADMIN — INSULIN LISPRO 4 UNITS: 100 INJECTION, SOLUTION INTRAVENOUS; SUBCUTANEOUS at 17:26

## 2023-05-18 RX ADMIN — INSULIN LISPRO 4 UNITS: 100 INJECTION, SOLUTION INTRAVENOUS; SUBCUTANEOUS at 08:54

## 2023-05-18 RX ADMIN — DEXAMETHASONE SODIUM PHOSPHATE 4 MG: 4 INJECTION, SOLUTION INTRA-ARTICULAR; INTRALESIONAL; INTRAMUSCULAR; INTRAVENOUS; SOFT TISSUE at 17:19

## 2023-05-18 RX ADMIN — SODIUM BICARBONATE 1300 MG: 650 TABLET ORAL at 04:55

## 2023-05-18 RX ADMIN — OXYCODONE HYDROCHLORIDE 10 MG: 10 TABLET ORAL at 21:43

## 2023-05-18 RX ADMIN — FAMOTIDINE 20 MG: 20 TABLET, FILM COATED ORAL at 04:56

## 2023-05-18 RX ADMIN — Medication 1 APPLICATOR: at 17:24

## 2023-05-18 RX ADMIN — DEXAMETHASONE SODIUM PHOSPHATE 4 MG: 4 INJECTION, SOLUTION INTRA-ARTICULAR; INTRALESIONAL; INTRAMUSCULAR; INTRAVENOUS; SOFT TISSUE at 13:12

## 2023-05-18 RX ADMIN — DEXAMETHASONE SODIUM PHOSPHATE 4 MG: 4 INJECTION, SOLUTION INTRA-ARTICULAR; INTRALESIONAL; INTRAMUSCULAR; INTRAVENOUS; SOFT TISSUE at 04:56

## 2023-05-18 ASSESSMENT — PAIN DESCRIPTION - PAIN TYPE
TYPE: ACUTE PAIN

## 2023-05-18 ASSESSMENT — ENCOUNTER SYMPTOMS
SHORTNESS OF BREATH: 0
WEAKNESS: 1
ABDOMINAL PAIN: 0
FEVER: 0

## 2023-05-18 NOTE — CARE PLAN
Problem: Knowledge Deficit - Standard  Goal: Patient and family/care givers will demonstrate understanding of plan of care, disease process/condition, diagnostic tests and medications  Outcome: Progressing     Problem: Pain - Standard  Goal: Alleviation of pain or a reduction in pain to the patient’s comfort goal  Outcome: Progressing     Problem: Skin Integrity  Goal: Skin integrity is maintained or improved  Outcome: Progressing   The patient is Stable - Low risk of patient condition declining or worsening    Shift Goals  Clinical Goals: Monitor Hemovac/ Ambulation  Patient Goals: Rest  Family Goals: N/A    Progress made toward(s) clinical / shift goals:  Hemovac output per flowsheets, pain medicated per MAR, q2 Turns in place, Patient up to chair this shift     Patient is not progressing towards the following goals:

## 2023-05-18 NOTE — CARE PLAN
The patient is Watcher - Medium risk of patient condition declining or worsening    Shift Goals  Clinical Goals: Monitor wound vac, pain management, and ABX  Patient Goals: rest  Family Goals: Communication on Plan of Care    Progress made toward(s) clinical / shift goals:  Intermittently monitoring wound vac and hemovac. Pain controlled with non pharmacological interventions. Q2 turns and TAPS to maintain skin integrity. Administering ABX as prescribed.       Problem: Pain - Standard  Goal: Alleviation of pain or a reduction in pain to the patient’s comfort goal  Outcome: Progressing     Problem: Skin Integrity  Goal: Skin integrity is maintained or improved  Outcome: Progressing

## 2023-05-18 NOTE — PROGRESS NOTES
4 Eyes Skin Assessment Completed by JUAN JOSÉ Abernathy and JUAN JOSÉ Harrison.     Head WDL  Ears WDL  Nose WDL  Mouth WDL  Neck Incision to Posterior Neck/ L Neck Hemovac, Dressing in place, FLEX insertion Site, Dressings CDI   Breast/Chest WDL  Shoulder Blades WDL  Spine WDL  (R) Arm/Elbow/Hand WDL  (L) Arm/Elbow/Hand WDL  Abdomen : Midline Transverse Wound Vac in place   RLQ - Previous NAHUN Drain Site BERTA  Groin: Florez catheter to Penis, CDI   Scrotum/Coccyx/Buttocks Redness and Blanching Mepilex Applied   (R) Leg  +1 pitting Edema  (L) Leg  +1 pitting Edema  (R) Heel/Foot/Toe +1 pitting Edema  (L) Heel/Foot/Toe +1 pitting Edema        Devices In Places: 20g PIV LFA CDI, Pulse Ox, Florez, and Nasal Cannula, Wound Vac         Interventions In Place Gray Ear Foams, TAP System, Pillows, Q2 Turns, Low Air Loss Mattress, and Heels Loaded W/Pillows, Wedges in place for repositioning      Possible Skin Injury No     Pictures Uploaded Into Epic yes  Wound Consult Placed yes  RN Wound Prevention Protocol Ordered Yes

## 2023-05-18 NOTE — PROGRESS NOTES
Bedside report received, assessment completed    A&O x  4, pt calls appropriately  Mobility: Up with Max assist   Fall Risk Assessment: Moderate, door notifications in use  Pain Assessment / Reassessment completed, medication provided per MAR  Diet: Renal  LDA:   IV Access: 18 R AC, CDI/ flushed/ SL  IV Access: 18 R FA, CDI/ flushed/ Infusing LR at 50 mL  Wound vac: ABD  Hemovac: Back  Florez: C/D/I    GI/: + void, + flatus, 5/14  BM  DVT Prophylaxis: Heaprin, SCD's on  Skin: per flowsheets     Reviewed plan of care with patient, bed in lowest position and locked, pt resting comfortably now, call light within reach, all needs met at this time. Interventions will be executed per plan of care

## 2023-05-18 NOTE — WOUND TEAM
[facility]  Inpatient Services   Wound and Skin Care Evaluation    Admission Date: 4/21/2023     Last order of IP CONSULT TO WOUND CARE was found on 5/10/2023 from Hospital Encounter on 4/21/2023     HPI, PMH, SH: Reviewed    Past Surgical History:   Procedure Laterality Date    THORACIC LAMINECTOMY N/A 5/16/2023    Procedure: LAMINECTOMY, SPINE, THORACIC - T 2-4, INTRADURAL WASHOUT;  Surgeon: Fidencio Springer M.D.;  Location: [facility];  Service: Neurosurgery    ROMAN BY LAPAROSCOPY N/A 4/28/2023    Procedure: CHOLECYSTECTOMY, LAPAROSCOPIC ATTEMPTED, OPEN CHOLECYSTECTOMY;  Surgeon: Suraj Galvan M.D.;  Location: [facility];  Service: General    FL COLONOSCOPY,DIAGNOSTIC N/A 12/12/2021    Procedure: COLONOSCOPY;  Surgeon: Dariel Simons M.D.;  Location: [facility];  Service: Gastroenterology    FL UPPER GI ENDOSCOPY,BIOPSY N/A 12/12/2021    Procedure: GASTROSCOPY, WITH BIOPSY;  Surgeon: Dariel Simons M.D.;  Location: [facility];  Service: Gastroenterology    FL UPPER GI ENDOSCOPY,CTRL BLEED N/A 12/12/2021    Procedure: EGD, WITH CLIP PLACEMENT;  Surgeon: Dariel Simons M.D.;  Location: [facility];  Service: Gastroenterology    GASTROSCOPY W/PUSH ENTERSCOPY N/A 12/12/2021    Procedure: GASTROSCOPY, WITH PUSH ENTEROSCOPY;  Surgeon: Dariel Simons M.D.;  Location: [facility];  Service: Gastroenterology    SEPTAL RECONSTRUCTION  12/7/2015    Procedure: SEPTAL RECONSTRUCTION OPEN WITH  GRAFTS & CONCHAL CART GRAFT;  Surgeon: SYDNIE Gaitan M.D.;  Location: SURGERY SAME DAY [facility];  Service:     BLEPHAROPLASTY  7/23/2014    Performed by Gera Plasencia M.D. at [facility] ORS    BROW LIFT  7/23/2014    Performed by Gera Plasencia M.D. at [facility]    CATARACT PHACO WITH IOL  12/4/2012    Performed by Suraj Gonzalez M.D. at [facility]    CATARACT PHACO WITH IOL  11/20/2012     Performed by Suraj Gonzalez M.D. at [facility]    APPENDECTOMY      ARTHROSCOPY, KNEE      CARDIAC CATH, LEFT HEART      C out of concern for STEMI, normal coronaries with minimal atherosclerosis, diagnosed with PNA as cause of symptoms and ST changes.     OTHER      KNEE SURGERY - LEFT.    OTHER      NOSE SURGERY.    TONSILLECTOMY       Social History     Tobacco Use    Smoking status: Former     Packs/day: 0.20     Years: 6.00     Pack years: 1.20     Types: Cigarettes     Quit date:      Years since quittin.4    Smokeless tobacco: Never    Tobacco comments:     Stopped over 25 years ago.   Vaping Use    Vaping Use: Never used   Substance Use Topics    Alcohol use: No     Chief Complaint   Patient presents with    GLF     2 days ago. (-) thinners    Extremity Weakness     Chronic lower bilateral weakness.     Diagnosis: Acute kidney failure (HCC) [N17.9]  Sepsis (HCC) [A41.9]    Unit where seen by Wound Team: T4     WOUND CONSULT/FOLLOW UP RELATED TO:  abdominal vac    WOUND HISTORY:  Pt underwent an open cholecystectomy on  with Dr. Galvan.  Patient seen by wound team 04/10 for drainage to incision site, Aquacel AG was ordered.  Received new consult that incision is saturating through dressings.         WOUND ASSESSMENT/LDA     Negative Pressure Wound Therapy 23 Surgical Abdomen Right (Active)   Vacuum Serial Number [id number]    NPWT Pump Mode / Pressure Setting Intermittent;125 mmHg    Dressing Type Small;Black Foam (Veraflo)    Number of Foam Pieces Used 2    Canister Changed No    Output (mL) 150 mL    NEXT Dressing Change/Treatment Date 23    VAC VeraFlo Irrigant 1/4 Strength Dakins    VAC VeraFlo Soak Time (mins) 8    VAC VeraFlo Instill Volume (ml) 34    VAC VeraFlo - Therapy Time (hrs) 2.5    VAC VeraFlo Pressure (mm/Hg) Intermittent;125 mmHg    WOUND NURSE ONLY - Time Spent with Patient (mins) 60      Wound 23 Full Thickness Wound Abdomen Right  Transverse Incision Closed with Wound Vac (Active)   Wound Image     05/18/23 1000   Site Assessment Red;Pink    Periwound Assessment Intact    Margins Attached edges    Closure Secondary intention    Drainage Amount Scant    Drainage Description Serosanguineous    Treatments Cleansed;Site care    Wound Cleansing Approved Wound Cleanser    Periwound Protectant Skin Protectant Wipes to Periwound;Paste Ring    Dressing Cleansing/Solutions 1/4 Strength Dakin's Solution    Dressing Options Wound Vac    Dressing Changed Changed    Dressing Status Clean;Dry;Intact    Dressing Change/Treatment Frequency By Wound Team Only    NEXT Dressing Change/Treatment Date 05/22/23    NEXT Weekly Photo (Inpatient Only) 05/25/23    Number of Staples Removed 5    Non-staged Wound Description Full thickness    Wound Length (cm) 2 cm    Wound Width (cm) 12.2 cm    Wound Depth (cm) 2.2 cm    Wound Surface Area (cm^2) 24.4 cm^2    Wound Volume (cm^3) 53.68 cm^3    Wound Healing % -2763    Shape Linear    Wound Odor None    Exposed Structures FLEX    WOUND NURSE ONLY - Time Spent with Patient (mins) 60      Vascular:    REX:   No results found.    Lab Values:    Lab Results   Component Value Date/Time    WBC 16.3 (H) 05/18/2023 12:37 AM    RBC 3.54 (L) 05/18/2023 12:37 AM    HEMOGLOBIN 10.2 (L) 05/18/2023 12:37 AM    HEMATOCRIT 32.1 (L) 05/18/2023 12:37 AM    CREACTPROT 33.53 (H) 04/23/2023 10:32 AM    SEDRATEWES 66 (H) 04/23/2023 10:32 AM    HBA1C 6.6 (H) 01/03/2023 08:45 AM      Culture Results show:  Recent Results (from the past 720 hour(s))   CULTURE WOUND W/ GRAM STAIN    Collection Time: 05/09/23  9:50 AM    Specimen: Abdominal; Wound   Result Value Ref Range    Significant Indicator POS (POS)     Source WND     Site ABDOMINAL     Culture Result - (A)     Gram Stain Result Moderate WBCs.  Moderate Gram negative rods.       Culture Result (A)      Escherichia coli ESBL  Moderate growth  Extended Spectrum Beta-lactamase (ESBL)  isolated.  ESBL's may be clinically resistant to therapy with  Penicillins,Cephalosporins or Aztreonam despite  apparent in vitro susceptibility to some of these agents.  The patient requires contact isolation.  Please contact pharmacy or an Infectious Disease Specialist  if you have any questions about appropriate therapy.         Susceptibility    Escherichia coli esbl - DENICE     Ampicillin >16 Resistant mcg/mL     Ceftriaxone >32 Resistant mcg/mL     Cefazolin >16 Resistant mcg/mL     Ciprofloxacin >2 Resistant mcg/mL     Cefepime >16 Resistant mcg/mL     Cefuroxime >16 Resistant mcg/mL     Ampicillin/sulbactam >16/8 Resistant mcg/mL     Ertapenem <=0.5 Sensitive mcg/mL     Tobramycin >8 Resistant mcg/mL     Gentamicin >8 Resistant mcg/mL     Minocycline <=4 Sensitive mcg/mL     Moxifloxacin >4 Resistant mcg/mL     Pip/Tazobactam <=8 Sensitive mcg/mL     Trimeth/Sulfa <=0.5/9.5 Sensitive mcg/mL     Tigecycline <=2 Sensitive mcg/mL     Pain Level/Medicated: topical 4% lidocaine solution applied 30min prior to vac application.  PO pain meds given.              INTERVENTIONS BY WOUND TEAM:  Chart and images reviewed. Discussed with bedside RN. All areas of concern (based on picture review, LDA review and discussion with bedside RN) have been thoroughly assessed. Documentation of areas based on significant findings. This RN in to assess patient. Performed standard wound care which includes appropriate positioning, dressing removal and non-selective debridement. Pictures and measurements obtained weekly if/when required.  Preparation for Dressing removal: lidocaine Solution  Non-selectively Debrided with:  wound cleanser and gauze  Sharp debridement: NA  Shaina wound: Cleansed with wound cleanser and gauze, Prepped with no sting skin prep and 2 paste rings  Primary Dressin piece of half thickness spiraled veraflo foam packed into pocket along lateral side and into wound depth.  All foam secured with drape. A hole was  cut in drape.   Secondary (Outer) Dressing: circular black foam was applied as a button for trac pad. Veraflo trac pad applied.     [facility] Discharge Planning  Number of Clinicians necessary to complete wound care: 1  Is patient requiring IV pain medications for dressing changes: No  Length of time for dressing change 30 min. (This does not include chart review, pre-medication time, set up, clean up or time spent charting.)    Interdisciplinary consultation: Patient, Bedside RN (Josemanuel)    EVALUATION / RATIONALE FOR TREATMENT:  Most Recent Date:  05/18/23:  wounds cleaning up but no real healthy granulation tissue noted.  Continue with veraflo while IP to cleanse wound and assist in moisture balance.      05/16/23: Wound continues to have old clot present. Wound continues to open the subcutaneous layer. Was able to better pack foam into the wound. Continued with POC.  05/14/23: Wound vac applied today to cleanse and debride while assisting in granulation tissue development. Wound team to change again on Tuesday and if wound looks ok will plan to stretch to Friday to get pt on MWF schedule.   05/13/23:  Dakins applied to chemically debride nonviable tissue, decrease bioburden and odor.  Likely hematoma under surface of incision, hydrogen to encourage breakdown of clot.  Spoke with Kenzie FALL (sx signed off 1 week ago) talk about placed vac previously.  Will allow dakins to debride and possibly place VAC on Monday.    5/10/23: Patient with small dehiscence along transverse abdominal incision. Small amount of purulence expressed following cleansing. Aquacel Ag Hydrofiber applied to manage bioburden, absorb exudate, and maintain a moist wound environment without laterally wicking exudate therefore reducing latonya-wound maceration.      Goals: Steady decrease in wound area and depth weekly.    WOUND TEAM PLAN OF CARE ([X] for frequency of wound follow up,):   Nursing to follow dressing orders written for wound  care. Contact wound team if area fails to progress, deteriorates or with any questions/concerns if something comes up before next scheduled follow up (See below as to whether wound is following and frequency of wound follow up)  Dressing changes by wound team:                   Follow up 3 times weekly:                NPWT change 3 times weekly:   X, Sunday, Tuesday then Friday, next week MWF  Follow up 1-2 times weekly:     Follow up Bi-Monthly:           Follow up Monthly (High Risk):                        Follow up as needed:     Other (explain):     NURSING PLAN OF CARE ORDERS (X):  Dressing changes: See Dressing Care orders: X  Skin care: See Skin Care orders:   RN Prevention Protocol:   Rectal tube care: See Rectal Tube Care orders:   Other orders:    RSKIN:   CURRENTLY IN PLACE (X), APPLIED THIS VISIT (A), ORDERED (O):   Q shift Jeremy:  X  Q shift pressure point assessments:  X    Surface/Positioning   Standard Mattress/Trauma Bed          Low Airloss        X  ICU Low Airloss   Bariatric COSME     Waffle cushion        Waffle Overlay          Reposition q 2 hours    X  TAPs Turning system     Z Les Pillow     Offloading/Redistribution   Sacral Offloading Dressing (Silicone dressing)   X  Heel Offloading Dressing (Silicone dressing)       X  Heel float boots (Prevalon boot)             Float Heels off Bed with Pillows           Respiratory NA  Silicone O2 tubing         Gray Foam Ear protectors     Cannula fixation Device (Tender )          High flow offloading Clip    Elastic head band offloading device      Anchorfast                                                         Trach with Optifoam split foam             Containment/Moisture Prevention FLEX    Rectal tube or BMS    Purwick/Condom Cath        Florez Catheter    Barrier wipes           Barrier paste       Antifungal tx      Interdry        Mobilization FLEX      Up to chair        Ambulate      PT/OT      Nutrition       Dietician        Diabetes  Education      PO   X  TF     TPN     NPO   # days     Other        Anticipated discharge plans: TBD  LTACH:        SNF/Rehab:                  Home Health Care:           Outpatient Wound Center:            Self/Family Care:        Other:                  Vac Discharge Needs:   Vac Discharge plan is purely a recommendation from wound team and not a requirement for discharge unless otherwise stated by physician.  Not Applicable Pt not on a wound vac:       Regular Vac while inpatient, alternative dressing at DC:        Regular Vac in use and continued at DC:            Reg. Vac w/ Skin Sub/Biologic in use. Will need to be changed 2x wkly:      Veraflo Vac while inpatient, ok to transition to Regular Vac on Discharge (Bedside RN to Clamp small instillation tubing at time of DC):    X       Veraflo Vac while inpatient, would benefit from remaining on Veraflo Vac upon discharge:

## 2023-05-18 NOTE — PROGRESS NOTES
Hospital Medicine Daily Progress Note    Date of Service  5/18/2023    Chief Complaint  Tyrone Cline is a 83 y.o. male admitted 4/21/2023 with colitis    Hospital Course  This 82-year-old male with a past medical history significant for CKD stage IV being followed by nephrology, diabetes mellitus with glyco of 6.6, hypertension presented to ER on 4/21/2023 with a complaint of abdominal pain and lower extremity weakness.    S/p laparoscopic cholecystectomy with conversion to open cholecystectomy 4/18 /2023 , patient has completed treatment with IV antibiotics.    His hospital course was complicated with postoperative hypotension, went to ICU vasopressor and transferred back on 4/29 floor.    Of note, patient continues to have bilateral lower extremity weakness; MR T spine expansile T2 hyperintense area in the thoracic spinal cord at the levels of T2, T3 and T4.      Neurosurgery evaluated and plan for laminectomy with subdural cyst exploration        Interval Problem Update  5/16/2023  Vital signs remained stable  Persistent leukocytosis.  No sign of ongoing active infection  Renal function remained stable.    Neurosurgery evaluated and plan for laminectomy with subdural cyst exploration    5/17/2023  Vitals remain stable  Labs reviewed  ,noted leucocytosis ,  ,K 5.5 , blood glucose 329 , elevated BU/Cr   S/p Laminectomy  Currently on meropenem   Neurosurgery following   PT/OT eval     5/18/2023  Vitals remained stable  Labs reviewed, leukocytosis improving  Noted worsening BUN/creatinine  Neurosurgery following  Nephrology Dr Bedoya been Consulted for worsening renal function.  Eventually need placement.   aware  I have discussed this patient's plan of care and discharge plan at IDT rounds today with Case Management, Nursing, Nursing leadership, and other members of the IDT team.    Consultants/Specialty  general surgery and neurosurgery    Code Status  DNAR/DNI    Disposition  Discharge  plan for PT/OT evaluation after laminectomy  I have placed the appropriate orders for post-discharge needs.    Review of Systems  Review of Systems   Neurological:  Positive for weakness.        Physical Exam  Temp:  [36.4 °C (97.5 °F)-36.8 °C (98.2 °F)] 36.8 °C (98.2 °F)  Pulse:  [61-64] 61  Resp:  [16-18] 16  BP: (121-149)/(67-74) 130/69  SpO2:  [94 %-96 %] 95 %    Physical Exam  Eyes:      Pupils: Pupils are equal, round, and reactive to light.   Cardiovascular:      Rate and Rhythm: Normal rate and regular rhythm.      Pulses: Normal pulses.      Heart sounds: Normal heart sounds.   Abdominal:      General: Abdomen is flat.   Musculoskeletal:      Comments: Suction noted    Neurological:      General: No focal deficit present.      Mental Status: He is alert and oriented to person, place, and time.      Motor: Weakness present.         Fluids    Intake/Output Summary (Last 24 hours) at 5/18/2023 1206  Last data filed at 5/18/2023 1000  Gross per 24 hour   Intake 240 ml   Output 2765 ml   Net -2525 ml         Laboratory  Recent Labs     05/17/23  0933 05/18/23  0037   WBC 17.0* 16.3*   RBC 3.62* 3.54*   HEMOGLOBIN 10.4* 10.2*   HEMATOCRIT 33.0* 32.1*   MCV 91.2 90.7   MCH 28.7 28.8   MCHC 31.5* 31.8*   RDW 50.9* 51.0*   PLATELETCT 418 366   MPV 10.9 11.2       Recent Labs     05/17/23  0933 05/18/23  0037   SODIUM 133* 132*   POTASSIUM 5.5 4.9   CHLORIDE 103 101   CO2 19* 20   GLUCOSE 329* 254*   BUN 68* 72*   CREATININE 2.08* 2.24*   CALCIUM 9.0 8.6                       Imaging  DX-SPINE-ANY ONE VIEW   Final Result      Digitized intraoperative radiograph is submitted for review. This examination is not for diagnostic purpose but for guidance during a surgical procedure. Please see the patient's chart for full procedural details.         INTERPRETING LOCATION: [address] [place] [address], [facility]      DX-PORTABLE FLUORO > 1 HOUR   Final Result      Portable fluoroscopy utilized for 6 seconds.      INTERPRETING  LOCATION: [address] [facility], [place], [address]      MR-THORACIC SPINE-WITH & W/O   Final Result      Compared with the previous MRI ,again noted is expansile lesion in the thoracic spinal cord at the levels of T2, T3 and T4. There is prominent right dorsal subarachnoid space at the level of T3 with flattening of the spinal cord. There is mild contrast    enhancement noted in the expanded portion of the spinal cord. 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 Other differential diagnosis includes transverse myelitis However the possibility of right-sided dorsal arachnoid cyst/arachnoid    web at the level of T3 causing spinal cord edema cannot be entirely ruled out. Despite multiple MRs, it is difficult to characterize the tumor. Therefore if there is any neurosurgical planning, CT myelogram is recommended for further characterization.      IR-US GUIDED PIV   Final Result    Ultrasound-guided PERIPHERAL IV INSERTION performed by    qualified nursing staff as above.      MR-THORACIC SPINE-WITH & W/O   Final Result      1.  Limited study due to the motion artifact.   2.  When compared with the previous MRI again noted is expansile T2 hyperintense area in the thoracic spinal cord at the levels of T2, T3 and T4. There is also prominent dorsal right-sided subarachnoid space at this level. The differential diagnosis    includes intramedullary spinal cord tumor and extramedullary cyst/web with cord compression and edema.   3.  Pre and postcontrast MR examination of the thoracic spine is recommended with contrast under General anesthesia.   4.  1 -2 mm  T2-weighted sequences from T2 to T6 with smaller field of view is recommended for further characterization.      MR-THORACIC SPINE-WITH & W/O   Final Result         Stable appearance of expansile nonenhancing signal abnormality involving the upper thoracic cord between T1 and T4. Also noted is a small extra-axial collection/web along the right posterolateral spinal  canal at T3-4 that deforms the cord and displaces    it anteriorly and to the left.   This could represent an Arachnoid web or arachnoid cyst with extensive mass effect and secondary edema of the cord.   Recommend additional thin section T2 and post contrast images through T1-T6 to better assess the abnormality.      MR-BRAIN-WITH & W/O   Final Result         No acute intracranial process.      Age-related volume loss and chronic microvascular ischemic changes.         DX-CHEST-PORTABLE (1 VIEW)   Final Result         1.  Pulmonary edema and/or infiltrates are identified, which are stable since the prior exam.   2.  Cardiomegaly   3.  Atherosclerosis      DX-CHEST-PORTABLE (1 VIEW)   Final Result         1.  Pulmonary edema and/or infiltrates are identified, which are stable since the prior exam.   2.  Left PICC line tip terminates in the left axilla, stable since prior study.      DX-CHEST-PORTABLE (1 VIEW)   Final Result      1.  No significant change.   2.  Possible left PICC line with tip projecting at the axillary vein.      DX-CHEST-PORTABLE (1 VIEW)   Final Result      1.  Mildly improved pulmonary opacities.   2.  Possible left PICC line with tip projecting at the axillary vein.   3.  Likely trace right pleural effusion.      DX-CHEST-LIMITED (1 VIEW)   Final Result      1.  Placement of endotracheal tube with tip projecting over the mid trachea      2.  Left arm catheter present which is presumably venous in projects in the expected position of the left axillary vein      3.  Enlarged cardiac silhouette      NM-HEPATOBILIARY SCAN   Final Result      Hepatobiliary scan findings suspicious for acute cholecystitis.      MR-THORACIC SPINE-WITH & W/O   Final Result      1.  There is abnormal expansile T2 hyperintense lesion in the right side of the thoracic spinal cord at the levels of T2 and T3 Mild contrast enhancement is seen. This lesion is suspicious for spinal cord neoplasm. Other remote differential  diagnosis    includes transverse myelitis.   2.  Exaggerated thoracic kyphosis.   3.  Thoracic dextroscoliosis.   4.  Minimal degenerative changes.   5.  Right pleural effusion.      MR-LUMBAR SPINE-WITH & W/O   Final Result      1.  Multifocal degenerative disease in the lumbar spine as described above.   2.  Moderate central canal and severe lateral recess stenosis at the level of L4-5. The exiting bilateral L5 nerve roots might have been impinged at the lateral recess.      MR-CERVICAL SPINE-WITH & W/O   Final Result      1.  There is abnormal expansile intramedullary T2 signal intensity lesion in the right cerebellum. Thoracic spinal cord at the level of T2 on T3. Mild diffuse contrast enhancement is seen. This finding is suspicious for spinal cord neoplasm. The other    less likely differential diagnosis includes transverse myelitis. Follow-up is recommended after 4 weeks.   2.  Mild degenerative disease in the cervical spine as described above.   3.  There is no evidence of infection in the cervical spine.      US-RUQ   Final Result         1.  Acute cholecystitis.   2.  Atrophic right kidney.      DX-CHEST-LIMITED (1 VIEW)   Final Result      Perihilar interstitial edema and basilar atelectasis and/or consolidation. Underlying infection is possible.      MR-LUMBAR SPINE-W/O   Final Result      1.  Lower lumbar spine predominant degenerative changes as described in detail above, progressed from 2010 MRI.   2.  Edema at the L4-L5 disc space and L5 superior endplate likely represents degenerative changes. Less likely this could represent a low-grade or early discitis osteomyelitis. Correlate for evidence of infection.   3.  Acute appearing Schmorl's node at L3-L4, which can be a source of pain.      US-RENAL   Final Result      1.  Atrophic kidneys. Echogenic bilateral renal parenchyma could relate to medical renal disease.      2.  No hydronephrosis. No renal calculus.      DX-CHEST-PORTABLE (1 VIEW)   Final  Result      No acute cardiac or pulmonary abnormalities are identified.      CT-ABDOMEN-PELVIS W/O   Final Result      1.  Findings suspicious for focal colitis at the hepatic flexure, less likely cholecystitis or duodenitis with secondary involvement of the colon. Infectious, inflammatory and ischemic etiologies are considerations. Underlying mass is not excluded.   2.  Cholelithiasis with distention of the gallbladder   3.  BILATERAL renal atrophy   4.  Subcentimeter LEFT adrenal myelolipoma   5.  12 mm RIGHT adrenal nodule likely an adenoma absent a history of cancer   6.  Subcentimeter RIGHT hepatic lesion, likely a cyst absent a history of cancer   7.  Atherosclerosis   8.  Colonic diverticulosis      CT-HEAD W/O   Final Result      1.  Cerebral atrophy.      2.  White matter lucencies most consistent with small vessel ischemic change versus demyelination or gliosis.      3.  Otherwise, Head CT without contrast with no acute findings. No evidence of acute cerebral infarction, hemorrhage or mass lesion.                Assessment/Plan  * Acute gangrenous cholecystitis- (present on admission)  Assessment & Plan  Found to have gangrenous cholecystitis s/p laprascopic converted open cholecystectomy 4/28/2023   -- Completed antibiotic treatment from 4/25- 4/ 29, drain has been removed.  Follow surgical recommendation.    Surgical course was complicated with postoperative wound infection, culture has been obtained, wound care has been consulted  Wound cx growing ESBL E coli, continue meropeneam for 7 days as per ID recs    Urinary retention  Assessment & Plan  Florez by  urology.    -- Do not change Florez without urology consent    Abnormal MRI- (present on admission)  Assessment & Plan  MRI on admission showing T2/T3  hyperintense lesion in the right side of the thoracic spinal cord at the levels of T2 and T3 Mild contrast enhancement is seen. This lesion is suspicious for spinal cord neoplasm or possible transverse  myelitis       S/p Laminectomy    Hypercalcemia- (present on admission)  Assessment & Plan  Likely due to immobility     -- monitor    Hypomagnesemia- (present on admission)  Assessment & Plan  Replete as needed    Cholelithiasis with acute cholecystitis- (present on admission)  Assessment & Plan  S/p surgical removal, converted to open cholecystectomy  NAHUN drain-removed on 5/8/2023   -- s/p completion of iv abx    Hypokalemia- (present on admission)  Assessment & Plan  Replete as needed    Thrombocytopenia (HCC)- (present on admission)  Assessment & Plan  Resolved, plt are stable at 427    High anion gap metabolic acidosis- (present on admission)  Assessment & Plan  Acute on chronic kidney disease, -appears back at baseline on 5/3/2023    Resolved    Hyponatremia- (present on admission)  Assessment & Plan  Resolved    Weakness of both lower extremities- (present on admission)  Assessment & Plan  MR T spine expansile T2 hyperintense area in the thoracic spinal cord at the levels of T2, T3 and T4.    S/p Laminectomy        Sepsis (HCC)- (present on admission)  Assessment & Plan  This is Sepsis Present on admission  SIRS criteria identified on my evaluation include: Leukocytosis, with WBC greater than 12,000  Source is colitis  Sepsis protocol initiated  Fluid resuscitation ordered per protocol  Crystalloid Fluid Administration: Fluid resuscitation ordered per standard protocol - 30 mL/kg per current or ideal body weight  IV antibiotics as appropriate for source of sepsis  Reassessment: I have reassessed the patient's hemodynamic status    Due to gangrenous gallbladder requiring open carmelo    -- Today patient was noted to have surgical  wound with drainage   -- wound cx pending  ESBL E coli; id consulted, will switch to meropenem as per id; last day being on 5/19    Colitis- (present on admission)  Assessment & Plan  Cultures negative, monitor  Initial admitting impression, currently improved  Diet as tolerated      Acute kidney injury superimposed on CKD (HCC)- (present on admission)  Assessment & Plan  Worsening BUN/creatinine  Avoid nephrotoxin  Nephrology following      Diabetic peripheral neuropathy (HCC)- (present on admission)  Assessment & Plan  Cymbalta, medical treatment    Essential hypertension- (present on admission)  Assessment & Plan  Continue amlodipine and coreg    Type 2 diabetes mellitus with kidney complication, without long-term current use of insulin (HCC)- (present on admission)  Assessment & Plan  Ha1c 6.4% 4 months ago   On ozempic and januvia outpatient   Continue ISS and hypoglycemic protocol while inpatient    -- Has been started on Decadron, his blood glucose is noted to be elevated\    17 at a.m., 5 PM  Patient blood glucose is still elevated, started 4 3 times daily with meals    Hypercholesterolemia- (present on admission)  Assessment & Plan  Stable, continue Lipitor    Chronic kidney disease (CKD), stage IV (severe) (HCC)- (present on admission)  Assessment & Plan   Creatinine at 2.07             VTE prophylaxis: SCDs/TEDs  Resume chemical prophylaxis once cleared by neurosurgery    I have performed a physical exam and reviewed and updated ROS and Plan today (5/18/2023). In review of yesterday's note (5/17/2023), there are no changes except as documented above.

## 2023-05-18 NOTE — PROGRESS NOTES
"Nephrology Daily Progress Note    Date of Service  5/18/2023    Chief Complaint  82 y.o. 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    Interval Problem Update  4/23 -urine output 2.1 L in the last 24 hours.  Patient denies chest pain, shortness of breath.  04/24/23 - Doing well.1.2 L urine oputut yesterday noted.  04/25/23 -creatinine stabilized at 2.0.  With 1.9 L of urine output noted improved from 1.2 L before.  Patient reports feeling subjectively better.  Evaluated by general surgery for possible cholecystectomy.  04/26 -has 2.0 L urine output documented yesterday.  04/27/23 -no overnight events.  5/18 -nephrology reconsulted for \"worsening renal function.\"  Patient seen and examined in his room.  He says he has a good appetite.  He is urinating well with Florez catheter.  He denies uremic symptoms.  He complains of some leg swelling.    Review of Systems  Review of Systems   Constitutional:  Negative for fever.   Respiratory:  Negative for shortness of breath.    Cardiovascular:  Positive for leg swelling. Negative for chest pain.   Gastrointestinal:  Negative for abdominal pain.   All other systems reviewed and are negative.       Physical Exam  Temp:  [36.4 °C (97.5 °F)-36.8 °C (98.2 °F)] 36.8 °C (98.2 °F)  Pulse:  [61-64] 61  Resp:  [16-18] 16  BP: (121-149)/(67-74) 130/69  SpO2:  [94 %-96 %] 95 %    Physical Exam  Constitutional:       General: He is not in acute distress.     Appearance: He is well-developed. He is not diaphoretic.   HENT:    "   Head: Normocephalic and atraumatic.      Mouth/Throat:      Mouth: Mucous membranes are moist.      Pharynx: Oropharynx is clear. No oropharyngeal exudate.   Eyes:      General: No scleral icterus.     Extraocular Movements: Extraocular movements intact.   Neck:      Trachea: No tracheal deviation.   Cardiovascular:      Rate and Rhythm: Normal rate.      Heart sounds: Normal heart sounds. No murmur heard.  Pulmonary:      Effort: Pulmonary effort is normal.      Breath sounds: Normal breath sounds. No stridor. No rales.   Abdominal:      General: There is no distension.      Palpations: Abdomen is soft.      Tenderness: There is no abdominal tenderness.   Musculoskeletal:         General: Normal range of motion.      Right lower leg: Edema (2-3+) present.      Left lower leg: Edema (2-3+) present.   Skin:     General: Skin is warm and dry.      Coloration: Skin is pale.   Neurological:      General: No focal deficit present.      Mental Status: He is alert and oriented to person, place, and time.   Psychiatric:         Mood and Affect: Mood normal.         Behavior: Behavior normal.         Fluids    Intake/Output Summary (Last 24 hours) at 5/18/2023 1459  Last data filed at 5/18/2023 1000  Gross per 24 hour   Intake 120 ml   Output 2765 ml   Net -2645 ml         Laboratory  Labs reviewed, pertinent labs below.  Recent Labs     05/17/23  0933 05/18/23  0037   WBC 17.0* 16.3*   RBC 3.62* 3.54*   HEMOGLOBIN 10.4* 10.2*   HEMATOCRIT 33.0* 32.1*   MCV 91.2 90.7   MCH 28.7 28.8   MCHC 31.5* 31.8*   RDW 50.9* 51.0*   PLATELETCT 418 366   MPV 10.9 11.2       Recent Labs     05/17/23  0933 05/18/23  0037   SODIUM 133* 132*   POTASSIUM 5.5 4.9   CHLORIDE 103 101   CO2 19* 20   GLUCOSE 329* 254*   BUN 68* 72*   CREATININE 2.08* 2.24*   CALCIUM 9.0 8.6                 URINALYSIS:  Lab Results   Component Value Date/Time    COLORURINE Yellow 04/22/2023 1537    CLARITY Clear 04/22/2023 1537    SPECGRAVITY 1.013 04/22/2023  1537    PHURINE 5.5 04/22/2023 1537    KETONES Negative 04/22/2023 1537    PROTEINURIN 100 (A) 04/22/2023 1537    BILIRUBINUR Negative 04/22/2023 1537    UROBILU 0.2 04/22/2023 1537    NITRITE Negative 04/22/2023 1537    LEUKESTERAS Negative 04/22/2023 1537    OCCULTBLOOD Small (A) 04/22/2023 1537     UPC  Lab Results   Component Value Date/Time    TOTPROTUR 51.0 (H) 04/22/2023 1537      Lab Results   Component Value Date/Time    CREATININEU 68.12 04/22/2023 1537    CREATININEU 70.18 04/22/2023 1537         Imaging interpreted by radiologist. Imaging reports reviewed with pertinent findings below  DX-SPINE-ANY ONE VIEW   Final Result      Digitized intraoperative radiograph is submitted for review. This examination is not for diagnostic purpose but for guidance during a surgical procedure. Please see the patient's chart for full procedural details.         INTERPRETING LOCATION: [address], [place], [address]      DX-PORTABLE FLUORO > 1 HOUR   Final Result      Portable fluoroscopy utilized for 6 seconds.      INTERPRETING LOCATION: [address], [place], [address]      MR-THORACIC SPINE-WITH & W/O   Final Result      Compared with the previous MRI ,again noted is expansile lesion in the thoracic spinal cord at the levels of T2, T3 and T4. There is prominent right dorsal subarachnoid space at the level of T3 with flattening of the spinal cord. There is mild contrast    enhancement noted in the expanded portion of the spinal cord. 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 Other differential diagnosis includes transverse myelitis However the possibility of right-sided dorsal arachnoid cyst/arachnoid    web at the level of T3 causing spinal cord edema cannot be entirely ruled out. Despite multiple MRs, it is difficult to characterize the tumor. Therefore if there is any neurosurgical planning, CT myelogram is recommended for further characterization.      IR-US GUIDED PIV   Final Result     Ultrasound-guided PERIPHERAL IV INSERTION performed by    qualified nursing staff as above.      MR-THORACIC SPINE-WITH & W/O   Final Result      1.  Limited study due to the motion artifact.   2.  When compared with the previous MRI again noted is expansile T2 hyperintense area in the thoracic spinal cord at the levels of T2, T3 and T4. There is also prominent dorsal right-sided subarachnoid space at this level. The differential diagnosis    includes intramedullary spinal cord tumor and extramedullary cyst/web with cord compression and edema.   3.  Pre and postcontrast MR examination of the thoracic spine is recommended with contrast under General anesthesia.   4.  1 -2 mm  T2-weighted sequences from T2 to T6 with smaller field of view is recommended for further characterization.      MR-THORACIC SPINE-WITH & W/O   Final Result         Stable appearance of expansile nonenhancing signal abnormality involving the upper thoracic cord between T1 and T4. Also noted is a small extra-axial collection/web along the right posterolateral spinal canal at T3-4 that deforms the cord and displaces    it anteriorly and to the left.   This could represent an Arachnoid web or arachnoid cyst with extensive mass effect and secondary edema of the cord.   Recommend additional thin section T2 and post contrast images through T1-T6 to better assess the abnormality.      MR-BRAIN-WITH & W/O   Final Result         No acute intracranial process.      Age-related volume loss and chronic microvascular ischemic changes.         DX-CHEST-PORTABLE (1 VIEW)   Final Result         1.  Pulmonary edema and/or infiltrates are identified, which are stable since the prior exam.   2.  Cardiomegaly   3.  Atherosclerosis      DX-CHEST-PORTABLE (1 VIEW)   Final Result         1.  Pulmonary edema and/or infiltrates are identified, which are stable since the prior exam.   2.  Left PICC line tip terminates in the left axilla, stable since prior study.       DX-CHEST-PORTABLE (1 VIEW)   Final Result      1.  No significant change.   2.  Possible left PICC line with tip projecting at the axillary vein.      DX-CHEST-PORTABLE (1 VIEW)   Final Result      1.  Mildly improved pulmonary opacities.   2.  Possible left PICC line with tip projecting at the axillary vein.   3.  Likely trace right pleural effusion.      DX-CHEST-LIMITED (1 VIEW)   Final Result      1.  Placement of endotracheal tube with tip projecting over the mid trachea      2.  Left arm catheter present which is presumably venous in projects in the expected position of the left axillary vein      3.  Enlarged cardiac silhouette      NM-HEPATOBILIARY SCAN   Final Result      Hepatobiliary scan findings suspicious for acute cholecystitis.      MR-THORACIC SPINE-WITH & W/O   Final Result      1.  There is abnormal expansile T2 hyperintense lesion in the right side of the thoracic spinal cord at the levels of T2 and T3 Mild contrast enhancement is seen. This lesion is suspicious for spinal cord neoplasm. Other remote differential diagnosis    includes transverse myelitis.   2.  Exaggerated thoracic kyphosis.   3.  Thoracic dextroscoliosis.   4.  Minimal degenerative changes.   5.  Right pleural effusion.      MR-LUMBAR SPINE-WITH & W/O   Final Result      1.  Multifocal degenerative disease in the lumbar spine as described above.   2.  Moderate central canal and severe lateral recess stenosis at the level of L4-5. The exiting bilateral L5 nerve roots might have been impinged at the lateral recess.      MR-CERVICAL SPINE-WITH & W/O   Final Result      1.  There is abnormal expansile intramedullary T2 signal intensity lesion in the right cerebellum. Thoracic spinal cord at the level of T2 on T3. Mild diffuse contrast enhancement is seen. This finding is suspicious for spinal cord neoplasm. The other    less likely differential diagnosis includes transverse myelitis. Follow-up is recommended after 4 weeks.   2.   Mild degenerative disease in the cervical spine as described above.   3.  There is no evidence of infection in the cervical spine.      US-RUQ   Final Result         1.  Acute cholecystitis.   2.  Atrophic right kidney.      DX-CHEST-LIMITED (1 VIEW)   Final Result      Perihilar interstitial edema and basilar atelectasis and/or consolidation. Underlying infection is possible.      MR-LUMBAR SPINE-W/O   Final Result      1.  Lower lumbar spine predominant degenerative changes as described in detail above, progressed from 2010 MRI.   2.  Edema at the L4-L5 disc space and L5 superior endplate likely represents degenerative changes. Less likely this could represent a low-grade or early discitis osteomyelitis. Correlate for evidence of infection.   3.  Acute appearing Schmorl's node at L3-L4, which can be a source of pain.      US-RENAL   Final Result      1.  Atrophic kidneys. Echogenic bilateral renal parenchyma could relate to medical renal disease.      2.  No hydronephrosis. No renal calculus.      DX-CHEST-PORTABLE (1 VIEW)   Final Result      No acute cardiac or pulmonary abnormalities are identified.      CT-ABDOMEN-PELVIS W/O   Final Result      1.  Findings suspicious for focal colitis at the hepatic flexure, less likely cholecystitis or duodenitis with secondary involvement of the colon. Infectious, inflammatory and ischemic etiologies are considerations. Underlying mass is not excluded.   2.  Cholelithiasis with distention of the gallbladder   3.  BILATERAL renal atrophy   4.  Subcentimeter LEFT adrenal myelolipoma   5.  12 mm RIGHT adrenal nodule likely an adenoma absent a history of cancer   6.  Subcentimeter RIGHT hepatic lesion, likely a cyst absent a history of cancer   7.  Atherosclerosis   8.  Colonic diverticulosis      CT-HEAD W/O   Final Result      1.  Cerebral atrophy.      2.  White matter lucencies most consistent with small vessel ischemic change versus demyelination or gliosis.      3.   Otherwise, Head CT without contrast with no acute findings. No evidence of acute cerebral infarction, hemorrhage or mass lesion.               Current Facility-Administered Medications   Medication Dose Route Frequency Provider Last Rate Last Admin    Nozin nasal  swab  1 Applicator Each Nostril BID Mitchell Smith M.D.   1 Applicator at 05/18/23 0454    Pharmacy Consult Request ...Pain Management Review 1 Each  1 Each Other PHARMACY TO DOSE Mallorie A Edie, P.A.-C.        MD ALERT...DO NOT ADMINISTER NSAIDS or ASPIRIN unless ORDERED By Neurosurgery 1 Each  1 Each Other PRN Mallorie A Kaus, P.A.-C.        heparin injection 5,000 Units  5,000 Units Subcutaneous Q8HRS Mallorie A Kaus, P.A.-C.   5,000 Units at 05/18/23 0852    diphenhydrAMINE (BENADRYL) tablet/capsule 25 mg  25 mg Oral Q6HRS PRN Mallorie A Kaus, P.A.-C.        Or    diphenhydrAMINE (BENADRYL) injection 25 mg  25 mg Intravenous Q6HRS PRN Mallorie A Kaus, P.A.-C.        methocarbamol (ROBAXIN) tablet 750 mg  750 mg Oral Q8HRS PRN Mallorie A Kaus, P.A.-C.        benzocaine-menthol (Cepacol) lozenge 1 Lozenge  1 Lozenge Mouth/Throat Q2HRS PRN Mallorie A Kaus, P.A.-C.        insulin lispro (HumaLOG,AdmeLOG) injection  4 Units Subcutaneous TID KAT Cain M.D.   4 Units at 05/18/23 1314    insulin GLARGINE (Lantus,Semglee) injection  17 Units Subcutaneous QAM INSULIN Smiley Cain M.D.   17 Units at 05/18/23 0502    insulin lispro (HumaLOG,AdmeLOG) injection  2-9 Units Subcutaneous TID KAT Cain M.D.   2 Units at 05/18/23 1314    And    dextrose 10 % BOLUS 25 g  25 g Intravenous Q15 MIN PRN Smiley Cain M.D.        insulin GLARGINE (Lantus,Semglee) injection  5 Units Subcutaneous Q EVENING Smiley Cain M.D.   5 Units at 05/17/23 1801    dakins 0.125% (1/4 strength) topical soln   Topical KISHAN Cain M.D.   1 mL at 05/17/23 1040    lidocaine jelly (Uro-Jet) 2 % 1 Application.  1 Application. Topical KISHAN Cain M.D.    1 Application. at 05/14/23 0507    lidocaine (XYLOCAINE) 2 % injection 20 mL  20 mL Topical QDAY PRN Smiley Cain M.D.        Or    lidocaine (XYLOCAINE) 4 % topical solution   Topical QDAY PRN Smiley Cain M.D.   1 Application at 05/16/23 0945    meropenem (Merrem) 500 mg in  mL IV-MBP  500 mg Intravenous Q8HRS Smiley Cain M.D.   Stopped at 05/18/23 1342    dexamethasone (DECADRON) injection 4 mg  4 mg Intravenous Q6HRS Smiley Cain M.D.   4 mg at 05/18/23 1312    senna-docusate (PERICOLACE or SENOKOT S) 8.6-50 MG per tablet 2 Tablet  2 Tablet Oral BID Smiley Cain M.D.   2 Tablet at 05/18/23 0454    And    polyethylene glycol/lytes (MIRALAX) PACKET 1 Packet  1 Packet Oral QDAY HILARIO Cain M.D.   1 Packet at 05/17/23 0524    And    magnesium hydroxide (MILK OF MAGNESIA) suspension 30 mL  30 mL Oral QDAY PRN Smiley Cain M.D.        And    bisacodyl (DULCOLAX) suppository 10 mg  10 mg Rectal QDAY PRN Smiley Cain M.D.        tamsulosin (FLOMAX) capsule 0.4 mg  0.4 mg Oral AFTER BREAKFAST Smiley Cain M.D.   0.4 mg at 05/18/23 0851    famotidine (PEPCID) tablet 20 mg  20 mg Oral DAILY Pedro Luis Us M.D.   20 mg at 05/18/23 0456    oxyCODONE immediate-release (ROXICODONE) tablet 5 mg  5 mg Oral Q3HRS PRN Pedro Luis Us M.D.   5 mg at 05/15/23 1643    Or    oxyCODONE immediate release (ROXICODONE) tablet 10 mg  10 mg Oral Q3HRS PRN Pedro Luis Us M.D.   10 mg at 05/18/23 0916    morphine 4 MG/ML injection 2-4 mg  2-4 mg Intravenous Q3HRS PRN Eric Fowler M.D.   4 mg at 05/16/23 1017    Respiratory Therapy Consult   Nebulization Continuous RT Pedro Luis Us M.D.        amLODIPine (NORVASC) tablet 5 mg  5 mg Oral Q DAY Félix Smith M.D.   5 mg at 05/18/23 0454    labetalol (NORMODYNE/TRANDATE) injection 10 mg  10 mg Intravenous Q4HRS PRN Félix Smith M.D.        hydrALAZINE (APRESOLINE) injection 10 mg  10 mg Intravenous Q6HRS PRN Félix Smith M.D.   10 mg  at 04/28/23 0440    atorvastatin (LIPITOR) tablet 10 mg  10 mg Oral DAILY AT 1800 Smiley Cain M.D.   10 mg at 05/17/23 1751    allopurinol (ZYLOPRIM) tablet 100 mg  100 mg Oral DAILY Smiley Cain M.D.   100 mg at 05/18/23 0455    carvedilol (COREG) tablet 12.5 mg  12.5 mg Oral BID WITH MEALS Félix Smith M.D.   12.5 mg at 05/18/23 0852    [Held by provider] spironolactone (ALDACTONE) tablet 25 mg  25 mg Oral Q DAY Félix Smith M.D.   25 mg at 05/16/23 0534    sodium bicarbonate tablet 1,300 mg  1,300 mg Oral BID Smiley Cain M.D.   1,300 mg at 05/18/23 0455    DULoxetine (CYMBALTA) capsule 30 mg  30 mg Oral Q EVENING Ronaldo Perez M.D.   30 mg at 05/17/23 1751    acetaminophen (Tylenol) tablet 650 mg  650 mg Oral Q6HRS PRN Ronaldo Perez M.D.   650 mg at 04/29/23 1059         Assessment/Plan  82 y.o. 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     1.  CKD 4.  Nonoliguric.  His baseline creatinine has historically been between 2.3 and 2.8.  He has a known history of macroalbuminuria dating back to 2015.  His CKD is from diabetic nephropathy.  There is no acute need for dialysis.  Patient is volume overloaded, and needs diuretics as below.  Avoid NSAIDs and other nephrotoxins.  Check renal function panel daily.     2.  Hyponatremia, persistent, with pseudohyponatremia from hyperglycemia.  Recommend aggressive management of hyperglycemia.  Do not order salt tabs.  Avoid hypotonic fluids.  Check sodium level daily.     3.  Metabolic  acidosis, due to advanced CKD.  Recommend continue sodium bicarbonate 1300 mg p.o. twice daily.  Check renal function panel daily.     4.  Azotemia, persistent.  Patient has no uremic symptoms, and there is no acute need for dialysis.  Check renal function panel daily.      5.  Hypertension and volume overload.  His home medications include olmesartan 20 mg daily, carvedilol 12.5 mg p.o. twice daily, finerenone 10 mg daily.  Patient is volume overloaded, and I recommend starting Lasix 80 mg p.o. twice daily.  When he becomes more euvolemic, I recommend transition to Lasix 80 mg once daily.  As the patient has diabetic nephropathy, he should be continued on his home ARB, resume telmisartan 20 mg daily.  Recommend discontinue amlodipine given his lower extremity edema.  Given his borderline hyperkalemia, I agree with holding spironolactone 25 mg daily for now.     5.  Normocytic anemia, slightly worsening.  Unclear etiology.  Check CBC daily.  Check iron studies and ferritin.  If patient is iron replete, and if hemoglobin drops to less than 10 for at least 3 days, then I would recommend starting subcutaneous Retacrit 10,000 units weekly.    6.  Myelopathy, due to extramedullary thoracic spinal mass, now status post neurosurgery and resection of arachnoid mass 5/16/2023.  Defer further management to neurosurgery.    As the patient is at his baseline kidney function, nephrology will sign off.  Please call back with further questions or concerns.    Juan Bedoya MD  Nephrology  [facility]

## 2023-05-18 NOTE — PROGRESS NOTES
Assumed care of patient at 0645. Bedside report received. Assessment complete.  AA&Ox4. Denies CP/SOB.  Reporting 8/10 pain. Medicated per MAR.   Educated patient regarding pharmacologic and non pharmacologic modalities for pain management.  Skin per flowsheets  Tolerating Renal diet. Denies N/V.  + void. Last BM 5/14  Pt refusing to ambulate at this time d/t pain.  All needs met at this time. Call light within reach. Pt calls appropriately. Bed low and locked, non skid socks in place. Hourly rounding in place.

## 2023-05-18 NOTE — DISCHARGE PLANNING
[facility] TCN chart review completed. Collaborated with NIKIA Galdamez.  Current discharge considerations are for SNF.  Per chart review, patient has been acceptance at [facility] when medically cleared.  Per chart review, patient has Dakins Veraflo wound vac following T2-4 laminectomies with fenestration of arachnoid cyst    PT/OT with recommendation for post acute placement.    TCN will continue to follow and collaborate with discharge planning team as additional post acute needs arise. Thank you.    Completed:  PT/OT recommends post acute placement    SLP  5/1 recs for no further needs  Physiatry recommending SNF as of 5/2 as well  Choice obtained: IRF (-> declines; recs SNF) and SNF choice obtained on 4/24/23.  HH choice on 4/24/23.  Infusion on 4/22/23; see above; accepted to [facility];  [facility] referral sent 4/22/23

## 2023-05-18 NOTE — PROGRESS NOTES
Neurosurgery Progress Note    Subjective:  83 y.o. male who presented 2023 with a very complicated picture.  He has stage IV CKD. S/p cholecystectomy.  With regards to his neurologic symptoms, he reports on the day of admission he had immediate and complete loss of lower extremity strength that has waxed and waned but has overall improved significantly since admission.   Currently denies neurologic symptoms other than weakness in the lower extremities, still unable to walk.    Denies bowel or bladder dysfunction.  Denies numbness in the lower extremities.    Denies radicular pain.     S/p multiple MRIs of the brain, thoracic spine.  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  MRI from 2023 shows T2 signal change within the spinal cord more proximally over multiple levels.      POD#2 T2-4 laminectomies with fenestration of arachnoid cyst.  Doing ok, pain controlled.  Denies bilateral lower extremity radicular pain.  Denies HA.  Has worked with therapies, not mobilized yet.   Drain with 70 cc output last 8 hours.      Exam:  Awake, alert oriented x4.   Appropriate and cooperative.   Motor exam reveals 4/5 to the LLE, 4+/5 to the RLE   Sensation intact to light touch.   No clonus   Dressing c/d/I.      BP  Min: 121/70  Max: 149/74  Pulse  Av.2  Min: 61  Max: 66  Resp  Av  Min: 16  Max: 18  Temp  Av.5 °C (97.7 °F)  Min: 36.2 °C (97.2 °F)  Max: 36.8 °C (98.2 °F)  SpO2  Av %  Min: 94 %  Max: 96 %    No data recorded    Recent Labs     23  0933 23   WBC 17.0* 16.3*   RBC 3.62* 3.54*   HEMOGLOBIN 10.4* 10.2*   HEMATOCRIT 33.0* 32.1*   MCV 91.2 90.7   MCH 28.7 28.8   MCHC 31.5* 31.8*   RDW 50.9* 51.0*   PLATELETCT 418 366   MPV 10.9 11.2       Recent Labs     2333 05/18/23  0037   SODIUM 133*  132*   POTASSIUM 5.5 4.9   CHLORIDE 103 101   CO2 19* 20   GLUCOSE 329* 254*   BUN 68* 72*   CREATININE 2.08* 2.24*   CALCIUM 9.0 8.6                   Intake/Output                         05/17/23 0700 - 05/18/23 0659 05/18/23 0700 - 05/19/23 0659     0021-7209 1206-2351 Total 0700-1859 1900-0659 Total                 Intake    P.O.  120  120 240  --  -- --    P.O. 120 120 240 -- -- --    Total Intake 120 120 240 -- -- --       Output    Urine  1300  1500 2800  --  -- --    Output (mL) (Urethral Catheter) 1300 1500 2800 -- -- --    Drains  50  70 120  100  -- 100    Output (mL) (Negative Pressure Wound Therapy 05/14/23 Surgical Abdomen Right) -- 50 50 100 -- 100    Output (mL) (Closed/Suction Drain 1 Back Hemovac) 50 20 70 -- -- --    Stool  --  -- --  --  -- --    Number of Times Stooled -- 0 x 0 x -- -- --    Total Output 1350 1570 2920 100 -- 100       Net I/O     -1230 -1450 -2680 -100 -- -100              Intake/Output Summary (Last 24 hours) at 5/18/2023 0947  Last data filed at 5/18/2023 0900  Gross per 24 hour   Intake 240 ml   Output 2470 ml   Net -2230 ml               Nozin nasal  swab  1 Applicator BID    NS   Continuous    Pharmacy Consult Request  1 Each PHARMACY TO DOSE    MD ALERT...DO NOT ADMINISTER NSAIDS or ASPIRIN unless ORDERED By Neurosurgery  1 Each PRN    heparin  5,000 Units Q8HRS    diphenhydrAMINE  25 mg Q6HRS PRN    Or    diphenhydrAMINE  25 mg Q6HRS PRN    methocarbamol  750 mg Q8HRS PRN    benzocaine-menthol  1 Lozenge Q2HRS PRN    insulin lispro  4 Units TID AC    insulin GLARGINE  17 Units QAM INSULIN    insulin lispro  2-9 Units TID AC    And    dextrose bolus  25 g Q15 MIN PRN    insulin GLARGINE  5 Units Q EVENING    dakins 0.125% (1/4 strength)   QDAY PRN    lidocaine jelly  1 Application. QDAY PRN    lidocaine  20 mL QDAY PRN    Or    lidocaine   QDAY PRN    meropenem  500 mg Q8HRS    dexamethasone  4 mg Q6HRS    senna-docusate  2 Tablet BID    And    polyethylene  glycol/lytes  1 Packet QDAY PRN    And    magnesium hydroxide  30 mL QDAY PRN    And    bisacodyl  10 mg QDAY PRN    tamsulosin  0.4 mg AFTER BREAKFAST    famotidine  20 mg DAILY    oxyCODONE immediate-release  5 mg Q3HRS PRN    Or    oxyCODONE immediate-release  10 mg Q3HRS PRN    morphine injection  2-4 mg Q3HRS PRN    Respiratory Therapy Consult   Continuous RT    amLODIPine  5 mg Q DAY    labetalol  10 mg Q4HRS PRN    hydrALAZINE  10 mg Q6HRS PRN    atorvastatin  10 mg DAILY AT 1800    allopurinol  100 mg DAILY    carvedilol  12.5 mg BID WITH MEALS    [Held by provider] spironolactone  25 mg Q DAY    sodium bicarbonate  1,300 mg BID    DULoxetine  30 mg Q EVENING    acetaminophen  650 mg Q6HRS PRN       Assessment and Plan:  Hospital day # 25  POD# 2 laminectomy with subdural cyst exploration.  PT/OT/ambulate as tolerated  Place drain to zero compression now, indefinitely.  Will likely need placement when appropriate.  Following.       Chemical prophylactic DVT therapy: hold for surgery

## 2023-05-19 LAB
ANION GAP SERPL CALC-SCNC: 12 MMOL/L (ref 7–16)
BASOPHILS # BLD AUTO: 0.1 % (ref 0–1.8)
BASOPHILS # BLD: 0.02 K/UL (ref 0–0.12)
BUN SERPL-MCNC: 73 MG/DL (ref 8–22)
CALCIUM SERPL-MCNC: 8.7 MG/DL (ref 8.5–10.5)
CHLORIDE SERPL-SCNC: 105 MMOL/L (ref 96–112)
CO2 SERPL-SCNC: 20 MMOL/L (ref 20–33)
CREAT SERPL-MCNC: 1.81 MG/DL (ref 0.5–1.4)
EOSINOPHIL # BLD AUTO: 0 K/UL (ref 0–0.51)
EOSINOPHIL NFR BLD: 0 % (ref 0–6.9)
ERYTHROCYTE [DISTWIDTH] IN BLOOD BY AUTOMATED COUNT: 53.6 FL (ref 35.9–50)
GFR SERPLBLD CREATININE-BSD FMLA CKD-EPI: 37 ML/MIN/1.73 M 2
GLUCOSE BLD STRIP.AUTO-MCNC: 186 MG/DL (ref 65–99)
GLUCOSE BLD STRIP.AUTO-MCNC: 295 MG/DL (ref 65–99)
GLUCOSE SERPL-MCNC: 222 MG/DL (ref 65–99)
HCT VFR BLD AUTO: 34 % (ref 42–52)
HGB BLD-MCNC: 10.9 G/DL (ref 14–18)
IMM GRANULOCYTES # BLD AUTO: 0.18 K/UL (ref 0–0.11)
IMM GRANULOCYTES NFR BLD AUTO: 1.2 % (ref 0–0.9)
LYMPHOCYTES # BLD AUTO: 0.67 K/UL (ref 1–4.8)
LYMPHOCYTES NFR BLD: 4.4 % (ref 22–41)
MCH RBC QN AUTO: 29.2 PG (ref 27–33)
MCHC RBC AUTO-ENTMCNC: 32.1 G/DL (ref 33.7–35.3)
MCV RBC AUTO: 91.2 FL (ref 81.4–97.8)
MONOCYTES # BLD AUTO: 0.45 K/UL (ref 0–0.85)
MONOCYTES NFR BLD AUTO: 2.9 % (ref 0–13.4)
NEUTROPHILS # BLD AUTO: 14 K/UL (ref 1.82–7.42)
NEUTROPHILS NFR BLD: 91.4 % (ref 44–72)
NRBC # BLD AUTO: 0 K/UL
NRBC BLD-RTO: 0 /100 WBC
PLATELET # BLD AUTO: 322 K/UL (ref 164–446)
PMV BLD AUTO: 11.5 FL (ref 9–12.9)
POTASSIUM SERPL-SCNC: 4.8 MMOL/L (ref 3.6–5.5)
RBC # BLD AUTO: 3.73 M/UL (ref 4.7–6.1)
SODIUM SERPL-SCNC: 137 MMOL/L (ref 135–145)
WBC # BLD AUTO: 15.3 K/UL (ref 4.8–10.8)

## 2023-05-19 PROCEDURE — 80048 BASIC METABOLIC PNL TOTAL CA: CPT

## 2023-05-19 PROCEDURE — 700102 HCHG RX REV CODE 250 W/ 637 OVERRIDE(OP): Performed by: HOSPITALIST

## 2023-05-19 PROCEDURE — 700111 HCHG RX REV CODE 636 W/ 250 OVERRIDE (IP): Performed by: HOSPITALIST

## 2023-05-19 PROCEDURE — A9270 NON-COVERED ITEM OR SERVICE: HCPCS | Performed by: SURGERY

## 2023-05-19 PROCEDURE — 82962 GLUCOSE BLOOD TEST: CPT

## 2023-05-19 PROCEDURE — 36415 COLL VENOUS BLD VENIPUNCTURE: CPT

## 2023-05-19 PROCEDURE — A9270 NON-COVERED ITEM OR SERVICE: HCPCS | Performed by: HOSPITALIST

## 2023-05-19 PROCEDURE — 700105 HCHG RX REV CODE 258: Performed by: HOSPITALIST

## 2023-05-19 PROCEDURE — 700111 HCHG RX REV CODE 636 W/ 250 OVERRIDE (IP): Performed by: STUDENT IN AN ORGANIZED HEALTH CARE EDUCATION/TRAINING PROGRAM

## 2023-05-19 PROCEDURE — 770001 HCHG ROOM/CARE - MED/SURG/GYN PRIV*

## 2023-05-19 PROCEDURE — 99232 SBSQ HOSP IP/OBS MODERATE 35: CPT | Performed by: STUDENT IN AN ORGANIZED HEALTH CARE EDUCATION/TRAINING PROGRAM

## 2023-05-19 PROCEDURE — 85025 COMPLETE CBC W/AUTO DIFF WBC: CPT

## 2023-05-19 PROCEDURE — A9270 NON-COVERED ITEM OR SERVICE: HCPCS | Performed by: INTERNAL MEDICINE

## 2023-05-19 PROCEDURE — 700102 HCHG RX REV CODE 250 W/ 637 OVERRIDE(OP): Performed by: SURGERY

## 2023-05-19 PROCEDURE — 700102 HCHG RX REV CODE 250 W/ 637 OVERRIDE(OP): Performed by: INTERNAL MEDICINE

## 2023-05-19 RX ADMIN — FUROSEMIDE 80 MG: 20 TABLET ORAL at 04:58

## 2023-05-19 RX ADMIN — DEXAMETHASONE SODIUM PHOSPHATE 4 MG: 4 INJECTION, SOLUTION INTRA-ARTICULAR; INTRALESIONAL; INTRAMUSCULAR; INTRAVENOUS; SOFT TISSUE at 00:23

## 2023-05-19 RX ADMIN — INSULIN LISPRO 2 UNITS: 100 INJECTION, SOLUTION INTRAVENOUS; SUBCUTANEOUS at 11:14

## 2023-05-19 RX ADMIN — DEXAMETHASONE SODIUM PHOSPHATE 4 MG: 4 INJECTION, SOLUTION INTRA-ARTICULAR; INTRALESIONAL; INTRAMUSCULAR; INTRAVENOUS; SOFT TISSUE at 17:42

## 2023-05-19 RX ADMIN — Medication 1 APPLICATOR: at 17:42

## 2023-05-19 RX ADMIN — DEXAMETHASONE SODIUM PHOSPHATE 4 MG: 4 INJECTION, SOLUTION INTRA-ARTICULAR; INTRALESIONAL; INTRAMUSCULAR; INTRAVENOUS; SOFT TISSUE at 23:45

## 2023-05-19 RX ADMIN — ALLOPURINOL 100 MG: 100 TABLET ORAL at 04:58

## 2023-05-19 RX ADMIN — DOCUSATE SODIUM 50 MG AND SENNOSIDES 8.6 MG 2 TABLET: 8.6; 5 TABLET, FILM COATED ORAL at 17:42

## 2023-05-19 RX ADMIN — TAMSULOSIN HYDROCHLORIDE 0.4 MG: 0.4 CAPSULE ORAL at 11:07

## 2023-05-19 RX ADMIN — INSULIN LISPRO 4 UNITS: 100 INJECTION, SOLUTION INTRAVENOUS; SUBCUTANEOUS at 11:14

## 2023-05-19 RX ADMIN — OXYCODONE HYDROCHLORIDE 10 MG: 10 TABLET ORAL at 14:24

## 2023-05-19 RX ADMIN — HEPARIN SODIUM 5000 UNITS: 5000 INJECTION, SOLUTION INTRAVENOUS; SUBCUTANEOUS at 02:06

## 2023-05-19 RX ADMIN — SODIUM BICARBONATE 1300 MG: 650 TABLET ORAL at 04:58

## 2023-05-19 RX ADMIN — HEPARIN SODIUM 5000 UNITS: 5000 INJECTION, SOLUTION INTRAVENOUS; SUBCUTANEOUS at 11:07

## 2023-05-19 RX ADMIN — MEROPENEM 500 MG: 500 INJECTION INTRAVENOUS at 05:05

## 2023-05-19 RX ADMIN — HEPARIN SODIUM 5000 UNITS: 5000 INJECTION, SOLUTION INTRAVENOUS; SUBCUTANEOUS at 17:42

## 2023-05-19 RX ADMIN — DEXAMETHASONE SODIUM PHOSPHATE 4 MG: 4 INJECTION, SOLUTION INTRA-ARTICULAR; INTRALESIONAL; INTRAMUSCULAR; INTRAVENOUS; SOFT TISSUE at 11:07

## 2023-05-19 RX ADMIN — FUROSEMIDE 80 MG: 20 TABLET ORAL at 17:55

## 2023-05-19 RX ADMIN — DEXAMETHASONE SODIUM PHOSPHATE 4 MG: 4 INJECTION, SOLUTION INTRA-ARTICULAR; INTRALESIONAL; INTRAMUSCULAR; INTRAVENOUS; SOFT TISSUE at 05:02

## 2023-05-19 RX ADMIN — DOCUSATE SODIUM 50 MG AND SENNOSIDES 8.6 MG 2 TABLET: 8.6; 5 TABLET, FILM COATED ORAL at 04:57

## 2023-05-19 RX ADMIN — OXYCODONE HYDROCHLORIDE 10 MG: 10 TABLET ORAL at 22:02

## 2023-05-19 RX ADMIN — OLMESARTAN MEDOXOMIL 20 MG: 20 TABLET, FILM COATED ORAL at 04:58

## 2023-05-19 RX ADMIN — FAMOTIDINE 20 MG: 20 TABLET, FILM COATED ORAL at 04:58

## 2023-05-19 RX ADMIN — ATORVASTATIN CALCIUM 10 MG: 10 TABLET, FILM COATED ORAL at 17:42

## 2023-05-19 RX ADMIN — INSULIN LISPRO 4 UNITS: 100 INJECTION, SOLUTION INTRAVENOUS; SUBCUTANEOUS at 20:33

## 2023-05-19 RX ADMIN — DULOXETINE HYDROCHLORIDE 30 MG: 30 CAPSULE, DELAYED RELEASE ORAL at 17:42

## 2023-05-19 RX ADMIN — INSULIN LISPRO 5 UNITS: 100 INJECTION, SOLUTION INTRAVENOUS; SUBCUTANEOUS at 20:38

## 2023-05-19 RX ADMIN — Medication 1 APPLICATOR: at 04:58

## 2023-05-19 RX ADMIN — SODIUM BICARBONATE 1300 MG: 650 TABLET ORAL at 17:42

## 2023-05-19 ASSESSMENT — FIBROSIS 4 INDEX: FIB4 SCORE: 0.71

## 2023-05-19 ASSESSMENT — ENCOUNTER SYMPTOMS: WEAKNESS: 1

## 2023-05-19 ASSESSMENT — PAIN DESCRIPTION - PAIN TYPE
TYPE: ACUTE PAIN;SURGICAL PAIN

## 2023-05-19 NOTE — CARE PLAN
The patient is Stable - Low risk of patient condition declining or worsening    Shift Goals  Clinical Goals: monitor wound vac, abx, and skin integrity  Patient Goals: sleep throughout the night  Family Goals: N/A    Progress made toward(s) clinical / shift goals: intermittently monitoring pt's wound vac. Administering abx as prescribed. Skin integrity maintained with TAPS system.       Problem: Skin Integrity  Goal: Skin integrity is maintained or improved  Outcome: Progressing     Problem: Wound/ / Incision Healing  Goal: Patient's wound/surgical incision will decrease in size and heals properly  Outcome: Progressing

## 2023-05-19 NOTE — PROGRESS NOTES
Neurosurgery Progress Note    Subjective:  83 y.o. male who presented 2023 with a very complicated picture.  He has stage IV CKD. S/p cholecystectomy.  With regards to his neurologic symptoms, he reports on the day of admission he had immediate and complete loss of lower extremity strength that has waxed and waned but has overall improved significantly since admission.   Currently denies neurologic symptoms other than weakness in the lower extremities, still unable to walk.    Denies bowel or bladder dysfunction.  Denies numbness in the lower extremities.    Denies radicular pain.     S/p multiple MRIs of the brain, thoracic spine.  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  MRI from 2023 shows T2 signal change within the spinal cord more proximally over multiple levels.      POD#3 T2-4 laminectomies with fenestration of arachnoid cyst.  Doing ok, pain controlled.  Denies bilateral lower extremity radicular pain.  Denies HA.  Has worked with therapies, not mobilized yet.   Drain with minimal output.       Exam:  Awake, alert oriented x4.   Appropriate and cooperative.   Motor exam reveals 4/5 to the LLE, 4+/5 to the RLE   Sensation intact to light touch.   No clonus   Dressing c/d/I.      BP  Min: 127/69  Max: 148/82  Pulse  Av.8  Min: 55  Max: 70  Resp  Av.5  Min: 16  Max: 18  Temp  Av.5 °C (97.7 °F)  Min: 36.2 °C (97.2 °F)  Max: 36.8 °C (98.2 °F)  SpO2  Av.5 %  Min: 95 %  Max: 98 %    No data recorded    Recent Labs     23  0933 23  0037 23  0433   WBC 17.0* 16.3* 15.3*   RBC 3.62* 3.54* 3.73*   HEMOGLOBIN 10.4* 10.2* 10.9*   HEMATOCRIT 33.0* 32.1* 34.0*   MCV 91.2 90.7 91.2   MCH 28.7 28.8 29.2   MCHC 31.5* 31.8* 32.1*   RDW 50.9* 51.0* 53.6*   PLATELETCT 418 366 322   MPV 10.9 11.2 11.5        Recent Labs     05/17/23  0933 05/18/23  0037 05/19/23  0433   SODIUM 133* 132* 137   POTASSIUM 5.5 4.9 4.8   CHLORIDE 103 101 105   CO2 19* 20 20   GLUCOSE 329* 254* 222*   BUN 68* 72* 73*   CREATININE 2.08* 2.24* 1.81*   CALCIUM 9.0 8.6 8.7                   Intake/Output                         05/18/23 0700 - 05/19/23 0659 05/19/23 0700 - 05/20/23 0659     0700-1859 1900-0659 Total 0700-1859 1900-0659 Total                 Intake    P.O.  120  120 240  --  -- --    P.O. 120 120 240 -- -- --    Total Intake 120 120 240 -- -- --       Output    Urine  1125  3525 4650  --  -- --    Output (mL) (Urethral Catheter) 1125 3525 4650 -- -- --    Drains  265  -- 265  --  -- --    Output (mL) (Negative Pressure Wound Therapy 05/14/23 Surgical Abdomen Right) 250 -- 250 -- -- --    Output (mL) ([REMOVED] Closed/Suction Drain 1 Back Hemovac 05/18/23 1623) 15 -- 15 -- -- --    Stool  --  -- --  --  -- --    Number of Times Stooled 0 x 0 x 0 x -- -- --    Total Output 1390 3525 4915 -- -- --       Net I/O     -1669 -1496 -1476 -- -- --              Intake/Output Summary (Last 24 hours) at 5/19/2023 0911  Last data filed at 5/19/2023 0459  Gross per 24 hour   Intake 240 ml   Output 4815 ml   Net -4575 ml               furosemide  80 mg BID DIURETIC    olmesartan  20 mg Q DAY    Nozin nasal  swab  1 Applicator BID    Pharmacy Consult Request  1 Each PHARMACY TO DOSE    MD ALERT...DO NOT ADMINISTER NSAIDS or ASPIRIN unless ORDERED By Neurosurgery  1 Each PRN    heparin  5,000 Units Q8HRS    diphenhydrAMINE  25 mg Q6HRS PRN    Or    diphenhydrAMINE  25 mg Q6HRS PRN    methocarbamol  750 mg Q8HRS PRN    benzocaine-menthol  1 Lozenge Q2HRS PRN    insulin lispro  4 Units TID AC    insulin GLARGINE  17 Units QAM INSULIN    insulin lispro  2-9 Units TID AC    And    dextrose bolus  25 g Q15 MIN PRN    insulin GLARGINE  5 Units Q EVENING    dakins 0.125% (1/4 strength)   QDAY PRN    lidocaine jelly  1 Application. QDAY PRN     lidocaine  20 mL QDAY PRN    Or    lidocaine   QDAY PRN    meropenem  500 mg Q8HRS    dexamethasone  4 mg Q6HRS    senna-docusate  2 Tablet BID    And    polyethylene glycol/lytes  1 Packet QDAY PRN    And    magnesium hydroxide  30 mL QDAY PRN    And    bisacodyl  10 mg QDAY PRN    tamsulosin  0.4 mg AFTER BREAKFAST    famotidine  20 mg DAILY    oxyCODONE immediate-release  5 mg Q3HRS PRN    Or    oxyCODONE immediate-release  10 mg Q3HRS PRN    morphine injection  2-4 mg Q3HRS PRN    Respiratory Therapy Consult   Continuous RT    labetalol  10 mg Q4HRS PRN    hydrALAZINE  10 mg Q6HRS PRN    atorvastatin  10 mg DAILY AT 1800    allopurinol  100 mg DAILY    carvedilol  12.5 mg BID WITH MEALS    [Held by provider] spironolactone  25 mg Q DAY    sodium bicarbonate  1,300 mg BID    DULoxetine  30 mg Q EVENING    acetaminophen  650 mg Q6HRS PRN       Assessment and Plan:  Hospital day # 26  POD# 3 laminectomy with subdural cyst exploration.  PT/OT/ambulate as tolerated  Nursing to d/c drain.  Will likely need placement when appropriate.  Following.       Chemical prophylactic DVT therapy: hold for surgery

## 2023-05-19 NOTE — DISCHARGE PLANNING
H/SCP TCN chart review completed.  Collaborated with NIKIA Galdamez.  Current discharge considerations are for SNF.  Per chart review, patient has been acceptance at [facility] when medically cleared.  Per chart review, patient has [employer] wound vac following T2-4 laminectomies with fenestration of arachnoid cyst. Per CM, [facility] will likely have a bed for patient on Tuesday 5/23.     PT/OT with recommendation for post acute placement.     TCN will continue to follow and collaborate with discharge planning team as additional post acute needs arise. Thank you.     Completed:  PT/OT recommends post acute placement    SLP  5/1 recs for no further needs  Physiatry recommending SNF as of 5/2 as well  Choice obtained: IRF (-> declines; recs SNF) and SNF choice obtained on 4/24/23.  HH choice on 4/24/23.  Infusion on 4/22/23; see above; accepted to [facility];  [facility] referral sent 4/22/23

## 2023-05-19 NOTE — THERAPY
Physical Therapy Contact Note    Patient Name: Tyrone Cline  Age:  83 y.o., Sex:  male  Medical Record #: [id number]  Today's Date: 5/19/2023    PT tx attempted, pt reports not feeling well due to pain at this time, informed nsg. Pt reports he was up in chair x3 hours yesterday. Encouraged pt to participate in supine ther ex however he declined requesting to rest this afternoon. Will follow-up for PT tx as able.    Adrienne Macario, PT, DPT  Ext. [phone]

## 2023-05-19 NOTE — DISCHARGE PLANNING
1135-  Agency/Facility Name: [facility]  Spoke To:   Outcome: DPA called to inform  in admissions that pt will most likely discharge on Monday 5/22/23 and has a [employer] wound vac. Per , she will consult with wound team regarding wound vac and facility should have a bed available for pt on Tuesday 5/23/23.

## 2023-05-19 NOTE — PROGRESS NOTES
Bedside report received, assessment completed    A&O x  4, pt calls appropriately  Mobility: Up with max assist x2, FWW, WC  Fall Risk Assessment: low, bed alarm no, door notifications yes  Pain Assessment / Reassessment completed, medication provided per MAR  Diet: Renal/ CHO, tolerating   LDA:   IV Access: 20G R wrist/ 20G R FA, CDI/ flushed/ SL/ Infusing abx  GI/: urinal void, + flatus,  PTA BM  DVT Prophylaxis: SCD +  Jeremy Score: 22, Interventions per flow sheet  Procedures:    - 4/26 MRI   - 4/28 Laparoscopic to open cholecystectomy   - 5/5 Repeat MRI   - 5/16 Laminectomy, Thoracic Spine T2-4 intradural washout  D/C Plan:     Reviewed plan of care with patient, bed in lowest position and locked, pt resting comfortably now, call light within reach, all needs met at this time. Interventions will be executed per plan of care

## 2023-05-19 NOTE — PROGRESS NOTES
Hospital Medicine Daily Progress Note    Date of Service  5/19/2023    Chief Complaint  Tyrone Cline is a 83 y.o. male admitted 4/21/2023 with colitis    Hospital Course  This 82-year-old male with a past medical history significant for CKD stage IV being followed by nephrology, diabetes mellitus with glyco of 6.6, hypertension presented to ER on 4/21/2023 with a complaint of abdominal pain and lower extremity weakness.    S/p laparoscopic cholecystectomy with conversion to open cholecystectomy 4/18 /2023 , patient has completed treatment with IV antibiotics.    His hospital course was complicated with postoperative hypotension, went to ICU vasopressor and transferred back on 4/29 floor.    Of note, patient continues to have bilateral lower extremity weakness; MR T spine expansile T2 hyperintense area in the thoracic spinal cord at the levels of T2, T3 and T4.      Neurosurgery evaluated and plan for laminectomy with subdural cyst exploration        Interval Problem Update  5/16/2023  Vital signs remained stable  Persistent leukocytosis.  No sign of ongoing active infection  Renal function remained stable.    Neurosurgery evaluated and plan for laminectomy with subdural cyst exploration    5/17/2023  Vitals remain stable  Labs reviewed  ,noted leucocytosis ,  ,K 5.5 , blood glucose 329 , elevated BU/Cr   S/p Laminectomy  Currently on meropenem   Neurosurgery following   PT/OT eval     5/18/2023  Vitals remained stable  Labs reviewed, leukocytosis improving  Noted worsening BUN/creatinine  Neurosurgery following  Nephrology Dr Bedoya been Consulted for worsening renal function.  Eventually need placement.   aware    5/19/2023  On 3 to oxygen saturating over 90  Leukocytosis improving  Renal function improving  Neurosurgery following  Plan to DC drain today  Nephrology evaluated  We will continue IV Lasix for now  Completed course of antibiotic  Eventually need placement.    aware    I have discussed this patient's plan of care and discharge plan at IDT rounds today with Case Management, Nursing, Nursing leadership, and other members of the IDT team.    Consultants/Specialty  general surgery and neurosurgery    Code Status  DNAR/DNI    Disposition  Discharge plan for PT/OT evaluation after laminectomy  I have placed the appropriate orders for post-discharge needs.    Review of Systems  Review of Systems   Neurological:  Positive for weakness.        Physical Exam  Temp:  [36.2 °C (97.2 °F)-36.8 °C (98.2 °F)] 36.2 °C (97.2 °F)  Pulse:  [55-70] 55  Resp:  [16-18] 18  BP: (127-148)/(68-82) 142/80  SpO2:  [95 %-98 %] 95 %    Physical Exam  Eyes:      Pupils: Pupils are equal, round, and reactive to light.   Cardiovascular:      Rate and Rhythm: Normal rate and regular rhythm.      Pulses: Normal pulses.      Heart sounds: Normal heart sounds.   Abdominal:      General: Abdomen is flat.   Musculoskeletal:      Comments: Suction noted    Neurological:      General: No focal deficit present.      Mental Status: He is alert and oriented to person, place, and time.      Motor: Weakness present.         Fluids    Intake/Output Summary (Last 24 hours) at 5/19/2023 1036  Last data filed at 5/19/2023 0826  Gross per 24 hour   Intake 240 ml   Output 5665 ml   Net -5425 ml         Laboratory  Recent Labs     05/17/23 0933 05/18/23  0037 05/19/23  0433   WBC 17.0* 16.3* 15.3*   RBC 3.62* 3.54* 3.73*   HEMOGLOBIN 10.4* 10.2* 10.9*   HEMATOCRIT 33.0* 32.1* 34.0*   MCV 91.2 90.7 91.2   MCH 28.7 28.8 29.2   MCHC 31.5* 31.8* 32.1*   RDW 50.9* 51.0* 53.6*   PLATELETCT 418 366 322   MPV 10.9 11.2 11.5       Recent Labs     05/17/23 0933 05/18/23  0037 05/19/23  0433   SODIUM 133* 132* 137   POTASSIUM 5.5 4.9 4.8   CHLORIDE 103 101 105   CO2 19* 20 20   GLUCOSE 329* 254* 222*   BUN 68* 72* 73*   CREATININE 2.08* 2.24* 1.81*   CALCIUM 9.0 8.6 8.7                       Imaging  DX-SPINE-ANY ONE VIEW   Final  Result      Digitized intraoperative radiograph is submitted for review. This examination is not for diagnostic purpose but for guidance during a surgical procedure. Please see the patient's chart for full procedural details.         INTERPRETING LOCATION: [address], [place], [address]      DX-PORTABLE FLUORO > 1 HOUR   Final Result      Portable fluoroscopy utilized for 6 seconds.      INTERPRETING LOCATION: [address], [place], [address]      MR-THORACIC SPINE-WITH & W/O   Final Result      Compared with the previous MRI ,again noted is expansile lesion in the thoracic spinal cord at the levels of T2, T3 and T4. There is prominent right dorsal subarachnoid space at the level of T3 with flattening of the spinal cord. There is mild contrast    enhancement noted in the expanded portion of the spinal cord. 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 Other differential diagnosis includes transverse myelitis However the possibility of right-sided dorsal arachnoid cyst/arachnoid    web at the level of T3 causing spinal cord edema cannot be entirely ruled out. Despite multiple MRs, it is difficult to characterize the tumor. Therefore if there is any neurosurgical planning, CT myelogram is recommended for further characterization.      IR-US GUIDED PIV   Final Result    Ultrasound-guided PERIPHERAL IV INSERTION performed by    qualified nursing staff as above.      MR-THORACIC SPINE-WITH & W/O   Final Result      1.  Limited study due to the motion artifact.   2.  When compared with the previous MRI again noted is expansile T2 hyperintense area in the thoracic spinal cord at the levels of T2, T3 and T4. There is also prominent dorsal right-sided subarachnoid space at this level. The differential diagnosis    includes intramedullary spinal cord tumor and extramedullary cyst/web with cord compression and edema.   3.  Pre and postcontrast MR examination of the thoracic spine is recommended with contrast under  General anesthesia.   4.  1 -2 mm  T2-weighted sequences from T2 to T6 with smaller field of view is recommended for further characterization.      MR-THORACIC SPINE-WITH & W/O   Final Result         Stable appearance of expansile nonenhancing signal abnormality involving the upper thoracic cord between T1 and T4. Also noted is a small extra-axial collection/web along the right posterolateral spinal canal at T3-4 that deforms the cord and displaces    it anteriorly and to the left.   This could represent an Arachnoid web or arachnoid cyst with extensive mass effect and secondary edema of the cord.   Recommend additional thin section T2 and post contrast images through T1-T6 to better assess the abnormality.      MR-BRAIN-WITH & W/O   Final Result         No acute intracranial process.      Age-related volume loss and chronic microvascular ischemic changes.         DX-CHEST-PORTABLE (1 VIEW)   Final Result         1.  Pulmonary edema and/or infiltrates are identified, which are stable since the prior exam.   2.  Cardiomegaly   3.  Atherosclerosis      DX-CHEST-PORTABLE (1 VIEW)   Final Result         1.  Pulmonary edema and/or infiltrates are identified, which are stable since the prior exam.   2.  Left PICC line tip terminates in the left axilla, stable since prior study.      DX-CHEST-PORTABLE (1 VIEW)   Final Result      1.  No significant change.   2.  Possible left PICC line with tip projecting at the axillary vein.      DX-CHEST-PORTABLE (1 VIEW)   Final Result      1.  Mildly improved pulmonary opacities.   2.  Possible left PICC line with tip projecting at the axillary vein.   3.  Likely trace right pleural effusion.      DX-CHEST-LIMITED (1 VIEW)   Final Result      1.  Placement of endotracheal tube with tip projecting over the mid trachea      2.  Left arm catheter present which is presumably venous in projects in the expected position of the left axillary vein      3.  Enlarged cardiac silhouette       NM-HEPATOBILIARY SCAN   Final Result      Hepatobiliary scan findings suspicious for acute cholecystitis.      MR-THORACIC SPINE-WITH & W/O   Final Result      1.  There is abnormal expansile T2 hyperintense lesion in the right side of the thoracic spinal cord at the levels of T2 and T3 Mild contrast enhancement is seen. This lesion is suspicious for spinal cord neoplasm. Other remote differential diagnosis    includes transverse myelitis.   2.  Exaggerated thoracic kyphosis.   3.  Thoracic dextroscoliosis.   4.  Minimal degenerative changes.   5.  Right pleural effusion.      MR-LUMBAR SPINE-WITH & W/O   Final Result      1.  Multifocal degenerative disease in the lumbar spine as described above.   2.  Moderate central canal and severe lateral recess stenosis at the level of L4-5. The exiting bilateral L5 nerve roots might have been impinged at the lateral recess.      MR-CERVICAL SPINE-WITH & W/O   Final Result      1.  There is abnormal expansile intramedullary T2 signal intensity lesion in the right cerebellum. Thoracic spinal cord at the level of T2 on T3. Mild diffuse contrast enhancement is seen. This finding is suspicious for spinal cord neoplasm. The other    less likely differential diagnosis includes transverse myelitis. Follow-up is recommended after 4 weeks.   2.  Mild degenerative disease in the cervical spine as described above.   3.  There is no evidence of infection in the cervical spine.      US-RUQ   Final Result         1.  Acute cholecystitis.   2.  Atrophic right kidney.      DX-CHEST-LIMITED (1 VIEW)   Final Result      Perihilar interstitial edema and basilar atelectasis and/or consolidation. Underlying infection is possible.      MR-LUMBAR SPINE-W/O   Final Result      1.  Lower lumbar spine predominant degenerative changes as described in detail above, progressed from 2010 MRI.   2.  Edema at the L4-L5 disc space and L5 superior endplate likely represents degenerative changes. Less likely  this could represent a low-grade or early discitis osteomyelitis. Correlate for evidence of infection.   3.  Acute appearing Schmorl's node at L3-L4, which can be a source of pain.      US-RENAL   Final Result      1.  Atrophic kidneys. Echogenic bilateral renal parenchyma could relate to medical renal disease.      2.  No hydronephrosis. No renal calculus.      DX-CHEST-PORTABLE (1 VIEW)   Final Result      No acute cardiac or pulmonary abnormalities are identified.      CT-ABDOMEN-PELVIS W/O   Final Result      1.  Findings suspicious for focal colitis at the hepatic flexure, less likely cholecystitis or duodenitis with secondary involvement of the colon. Infectious, inflammatory and ischemic etiologies are considerations. Underlying mass is not excluded.   2.  Cholelithiasis with distention of the gallbladder   3.  BILATERAL renal atrophy   4.  Subcentimeter LEFT adrenal myelolipoma   5.  12 mm RIGHT adrenal nodule likely an adenoma absent a history of cancer   6.  Subcentimeter RIGHT hepatic lesion, likely a cyst absent a history of cancer   7.  Atherosclerosis   8.  Colonic diverticulosis      CT-HEAD W/O   Final Result      1.  Cerebral atrophy.      2.  White matter lucencies most consistent with small vessel ischemic change versus demyelination or gliosis.      3.  Otherwise, Head CT without contrast with no acute findings. No evidence of acute cerebral infarction, hemorrhage or mass lesion.                Assessment/Plan  * Acute gangrenous cholecystitis- (present on admission)  Assessment & Plan  Found to have gangrenous cholecystitis s/p laprascopic converted open cholecystectomy 4/28/2023   -- Completed antibiotic treatment from 4/25- 4/ 29, drain has been removed.  Follow surgical recommendation.    Surgical course was complicated with postoperative wound infection, culture has been obtained, wound care has been consulted  Wound cx growing ESBL E coli, continue meropeneam for 7 days as per ID  recs    Urinary retention  Assessment & Plan  Florez by  urology.    -- Do not change Florez without urology consent    Abnormal MRI- (present on admission)  Assessment & Plan  MRI on admission showing T2/T3  hyperintense lesion in the right side of the thoracic spinal cord at the levels of T2 and T3 Mild contrast enhancement is seen. This lesion is suspicious for spinal cord neoplasm or possible transverse myelitis       S/p Laminectomy    Hypercalcemia- (present on admission)  Assessment & Plan  Likely due to immobility     -- monitor    Hypomagnesemia- (present on admission)  Assessment & Plan  Replete as needed    Cholelithiasis with acute cholecystitis- (present on admission)  Assessment & Plan  S/p surgical removal, converted to open cholecystectomy  NAHUN drain-removed on 5/8/2023   -- s/p completion of iv abx    Hypokalemia- (present on admission)  Assessment & Plan  Replete as needed    Thrombocytopenia (HCC)- (present on admission)  Assessment & Plan  Resolved, plt are stable at 427    High anion gap metabolic acidosis- (present on admission)  Assessment & Plan  Acute on chronic kidney disease, -appears back at baseline on 5/3/2023    Resolved    Hyponatremia- (present on admission)  Assessment & Plan  Resolved    Weakness of both lower extremities- (present on admission)  Assessment & Plan  MR T spine expansile T2 hyperintense area in the thoracic spinal cord at the levels of T2, T3 and T4.    S/p Laminectomy        Sepsis (HCC)- (present on admission)  Assessment & Plan  This is Sepsis Present on admission  SIRS criteria identified on my evaluation include: Leukocytosis, with WBC greater than 12,000  Source is colitis  Sepsis protocol initiated  Fluid resuscitation ordered per protocol  Crystalloid Fluid Administration: Fluid resuscitation ordered per standard protocol - 30 mL/kg per current or ideal body weight  IV antibiotics as appropriate for source of sepsis  Reassessment: I have reassessed the patient's  hemodynamic status    Due to gangrenous gallbladder requiring open carmelo    -- Today patient was noted to have surgical  wound with drainage   -- wound cx pending  ESBL E coli; id consulted, will switch to meropenem as per id; last day being on 5/19    Colitis- (present on admission)  Assessment & Plan  Cultures negative, monitor  Initial admitting impression, currently improved  Diet as tolerated     Acute kidney injury superimposed on CKD (HCC)- (present on admission)  Assessment & Plan  Worsening BUN/creatinine  Avoid nephrotoxin  Nephrology evaluated  Continue on Lasix      Diabetic peripheral neuropathy (HCC)- (present on admission)  Assessment & Plan  Cymbalta, medical treatment    Essential hypertension- (present on admission)  Assessment & Plan  Continue amlodipine and coreg    Type 2 diabetes mellitus with kidney complication, without long-term current use of insulin (HCC)- (present on admission)  Assessment & Plan  Ha1c 6.4% 4 months ago   On ozempic and januvia outpatient   Continue ISS and hypoglycemic protocol while inpatient    -- Has been started on Decadron, his blood glucose is noted to be elevated\    17 at a.m., 5 PM  Patient blood glucose is still elevated, started 4 3 times daily with meals    Hypercholesterolemia- (present on admission)  Assessment & Plan  Stable, continue Lipitor         VTE prophylaxis: SCDs/TEDs    Resume chemical prophylaxis once cleared by neurosurgery    I have performed a physical exam and reviewed and updated ROS and Plan today (5/19/2023). In review of yesterday's note (5/18/2023), there are no changes except as documented above.

## 2023-05-19 NOTE — PROGRESS NOTES
Bedside report received, assessment completed    A&O x  4, pt calls appropriately  Mobility: Up with max assist and FWW  Fall Risk Assessment: Moderate, bed alarm on, door notifications in use  Pain Assessment / Reassessment completed, medication provided per MAR  Diet: Renal  LDA:   IV Access: 16 R FA, CDI/ flushed/ SL  IV Access: 20 R FA, CDI/ flushed/ SL  Wound vac: ABD  Florez: C/D/I    GI/: + void, + flatus, 5/14  BM  DVT Prophylaxis: Heparin, SCD's on  Skin: per flowsheets    Reviewed plan of care with patient, bed in lowest position and locked, pt resting comfortably now, call light within reach, all needs met at this time. Interventions will be executed per plan of care

## 2023-05-20 LAB
ANION GAP SERPL CALC-SCNC: 14 MMOL/L (ref 7–16)
BASOPHILS # BLD AUTO: 0.1 % (ref 0–1.8)
BASOPHILS # BLD: 0.01 K/UL (ref 0–0.12)
BUN SERPL-MCNC: 79 MG/DL (ref 8–22)
CALCIUM SERPL-MCNC: 8.6 MG/DL (ref 8.5–10.5)
CHLORIDE SERPL-SCNC: 98 MMOL/L (ref 96–112)
CO2 SERPL-SCNC: 22 MMOL/L (ref 20–33)
CREAT SERPL-MCNC: 1.85 MG/DL (ref 0.5–1.4)
EOSINOPHIL # BLD AUTO: 0 K/UL (ref 0–0.51)
EOSINOPHIL NFR BLD: 0 % (ref 0–6.9)
ERYTHROCYTE [DISTWIDTH] IN BLOOD BY AUTOMATED COUNT: 51.3 FL (ref 35.9–50)
GFR SERPLBLD CREATININE-BSD FMLA CKD-EPI: 36 ML/MIN/1.73 M 2
GLUCOSE BLD STRIP.AUTO-MCNC: 211 MG/DL (ref 65–99)
GLUCOSE BLD STRIP.AUTO-MCNC: 226 MG/DL (ref 65–99)
GLUCOSE BLD STRIP.AUTO-MCNC: 274 MG/DL (ref 65–99)
GLUCOSE SERPL-MCNC: 305 MG/DL (ref 65–99)
HCT VFR BLD AUTO: 31.5 % (ref 42–52)
HGB BLD-MCNC: 10.2 G/DL (ref 14–18)
IMM GRANULOCYTES # BLD AUTO: 0.12 K/UL (ref 0–0.11)
IMM GRANULOCYTES NFR BLD AUTO: 0.9 % (ref 0–0.9)
LYMPHOCYTES # BLD AUTO: 0.42 K/UL (ref 1–4.8)
LYMPHOCYTES NFR BLD: 3.1 % (ref 22–41)
MCH RBC QN AUTO: 28.7 PG (ref 27–33)
MCHC RBC AUTO-ENTMCNC: 32.4 G/DL (ref 33.7–35.3)
MCV RBC AUTO: 88.7 FL (ref 81.4–97.8)
MONOCYTES # BLD AUTO: 0.68 K/UL (ref 0–0.85)
MONOCYTES NFR BLD AUTO: 5.1 % (ref 0–13.4)
NEUTROPHILS # BLD AUTO: 12.17 K/UL (ref 1.82–7.42)
NEUTROPHILS NFR BLD: 90.8 % (ref 44–72)
NRBC # BLD AUTO: 0 K/UL
NRBC BLD-RTO: 0 /100 WBC
PLATELET # BLD AUTO: 353 K/UL (ref 164–446)
PMV BLD AUTO: 11.8 FL (ref 9–12.9)
POTASSIUM SERPL-SCNC: 4.6 MMOL/L (ref 3.6–5.5)
RBC # BLD AUTO: 3.55 M/UL (ref 4.7–6.1)
SODIUM SERPL-SCNC: 134 MMOL/L (ref 135–145)
WBC # BLD AUTO: 13.4 K/UL (ref 4.8–10.8)

## 2023-05-20 PROCEDURE — 700102 HCHG RX REV CODE 250 W/ 637 OVERRIDE(OP): Performed by: HOSPITALIST

## 2023-05-20 PROCEDURE — 700102 HCHG RX REV CODE 250 W/ 637 OVERRIDE(OP): Performed by: FAMILY MEDICINE

## 2023-05-20 PROCEDURE — A9270 NON-COVERED ITEM OR SERVICE: HCPCS | Performed by: FAMILY MEDICINE

## 2023-05-20 PROCEDURE — A9270 NON-COVERED ITEM OR SERVICE: HCPCS | Performed by: SURGERY

## 2023-05-20 PROCEDURE — A9270 NON-COVERED ITEM OR SERVICE: HCPCS | Performed by: HOSPITALIST

## 2023-05-20 PROCEDURE — A9270 NON-COVERED ITEM OR SERVICE: HCPCS | Performed by: INTERNAL MEDICINE

## 2023-05-20 PROCEDURE — 82962 GLUCOSE BLOOD TEST: CPT

## 2023-05-20 PROCEDURE — 36415 COLL VENOUS BLD VENIPUNCTURE: CPT

## 2023-05-20 PROCEDURE — 700102 HCHG RX REV CODE 250 W/ 637 OVERRIDE(OP): Performed by: SURGERY

## 2023-05-20 PROCEDURE — 700111 HCHG RX REV CODE 636 W/ 250 OVERRIDE (IP): Performed by: STUDENT IN AN ORGANIZED HEALTH CARE EDUCATION/TRAINING PROGRAM

## 2023-05-20 PROCEDURE — 80048 BASIC METABOLIC PNL TOTAL CA: CPT

## 2023-05-20 PROCEDURE — 700102 HCHG RX REV CODE 250 W/ 637 OVERRIDE(OP): Performed by: INTERNAL MEDICINE

## 2023-05-20 PROCEDURE — 85025 COMPLETE CBC W/AUTO DIFF WBC: CPT

## 2023-05-20 PROCEDURE — 700111 HCHG RX REV CODE 636 W/ 250 OVERRIDE (IP): Performed by: HOSPITALIST

## 2023-05-20 PROCEDURE — 770001 HCHG ROOM/CARE - MED/SURG/GYN PRIV*

## 2023-05-20 PROCEDURE — 99232 SBSQ HOSP IP/OBS MODERATE 35: CPT | Performed by: STUDENT IN AN ORGANIZED HEALTH CARE EDUCATION/TRAINING PROGRAM

## 2023-05-20 RX ORDER — HEPARIN SODIUM 5000 [USP'U]/ML
5000 INJECTION, SOLUTION INTRAVENOUS; SUBCUTANEOUS EVERY 8 HOURS
Status: DISCONTINUED | OUTPATIENT
Start: 2023-05-20 | End: 2023-05-24

## 2023-05-20 RX ORDER — DEXAMETHASONE SODIUM PHOSPHATE 4 MG/ML
4 INJECTION, SOLUTION INTRA-ARTICULAR; INTRALESIONAL; INTRAMUSCULAR; INTRAVENOUS; SOFT TISSUE EVERY 12 HOURS
Status: DISCONTINUED | OUTPATIENT
Start: 2023-05-20 | End: 2023-05-21

## 2023-05-20 RX ADMIN — TAMSULOSIN HYDROCHLORIDE 0.4 MG: 0.4 CAPSULE ORAL at 11:12

## 2023-05-20 RX ADMIN — OLMESARTAN MEDOXOMIL 20 MG: 20 TABLET, FILM COATED ORAL at 05:46

## 2023-05-20 RX ADMIN — SODIUM BICARBONATE 1300 MG: 650 TABLET ORAL at 05:46

## 2023-05-20 RX ADMIN — CARVEDILOL 12.5 MG: 12.5 TABLET, FILM COATED ORAL at 11:13

## 2023-05-20 RX ADMIN — OXYCODONE 5 MG: 5 TABLET ORAL at 17:53

## 2023-05-20 RX ADMIN — INSULIN LISPRO 4 UNITS: 100 INJECTION, SOLUTION INTRAVENOUS; SUBCUTANEOUS at 17:43

## 2023-05-20 RX ADMIN — Medication 1 APPLICATOR: at 05:46

## 2023-05-20 RX ADMIN — INSULIN LISPRO 3 UNITS: 100 INJECTION, SOLUTION INTRAVENOUS; SUBCUTANEOUS at 17:46

## 2023-05-20 RX ADMIN — FAMOTIDINE 20 MG: 20 TABLET, FILM COATED ORAL at 05:46

## 2023-05-20 RX ADMIN — INSULIN LISPRO 5 UNITS: 100 INJECTION, SOLUTION INTRAVENOUS; SUBCUTANEOUS at 11:20

## 2023-05-20 RX ADMIN — HEPARIN SODIUM 5000 UNITS: 5000 INJECTION, SOLUTION INTRAVENOUS; SUBCUTANEOUS at 11:13

## 2023-05-20 RX ADMIN — SODIUM BICARBONATE 1300 MG: 650 TABLET ORAL at 17:38

## 2023-05-20 RX ADMIN — CARVEDILOL 12.5 MG: 12.5 TABLET, FILM COATED ORAL at 17:38

## 2023-05-20 RX ADMIN — POLYETHYLENE GLYCOL 3350 1 PACKET: 17 POWDER, FOR SOLUTION ORAL at 17:53

## 2023-05-20 RX ADMIN — ATORVASTATIN CALCIUM 10 MG: 10 TABLET, FILM COATED ORAL at 17:38

## 2023-05-20 RX ADMIN — FUROSEMIDE 80 MG: 20 TABLET ORAL at 05:45

## 2023-05-20 RX ADMIN — ALLOPURINOL 100 MG: 100 TABLET ORAL at 05:46

## 2023-05-20 RX ADMIN — DEXAMETHASONE SODIUM PHOSPHATE 4 MG: 4 INJECTION, SOLUTION INTRA-ARTICULAR; INTRALESIONAL; INTRAMUSCULAR; INTRAVENOUS; SOFT TISSUE at 05:46

## 2023-05-20 RX ADMIN — DOCUSATE SODIUM 50 MG AND SENNOSIDES 8.6 MG 2 TABLET: 8.6; 5 TABLET, FILM COATED ORAL at 17:38

## 2023-05-20 RX ADMIN — HEPARIN SODIUM 5000 UNITS: 5000 INJECTION, SOLUTION INTRAVENOUS; SUBCUTANEOUS at 22:13

## 2023-05-20 RX ADMIN — HEPARIN SODIUM 5000 UNITS: 5000 INJECTION, SOLUTION INTRAVENOUS; SUBCUTANEOUS at 02:19

## 2023-05-20 RX ADMIN — OXYCODONE 5 MG: 5 TABLET ORAL at 22:13

## 2023-05-20 RX ADMIN — DULOXETINE HYDROCHLORIDE 30 MG: 30 CAPSULE, DELAYED RELEASE ORAL at 17:38

## 2023-05-20 RX ADMIN — DOCUSATE SODIUM 50 MG AND SENNOSIDES 8.6 MG 2 TABLET: 8.6; 5 TABLET, FILM COATED ORAL at 05:46

## 2023-05-20 RX ADMIN — DEXAMETHASONE SODIUM PHOSPHATE 4 MG: 4 INJECTION INTRA-ARTICULAR; INTRALESIONAL; INTRAMUSCULAR; INTRAVENOUS; SOFT TISSUE at 17:38

## 2023-05-20 RX ADMIN — FUROSEMIDE 80 MG: 20 TABLET ORAL at 22:13

## 2023-05-20 ASSESSMENT — ENCOUNTER SYMPTOMS: WEAKNESS: 1

## 2023-05-20 ASSESSMENT — PAIN DESCRIPTION - PAIN TYPE
TYPE: ACUTE PAIN
TYPE: ACUTE PAIN;SURGICAL PAIN
TYPE: ACUTE PAIN;SURGICAL PAIN

## 2023-05-20 NOTE — CARE PLAN
The patient is Stable - Low risk of patient condition declining or worsening    Shift Goals  Clinical Goals: OOB/ EOB  Patient Goals: OOB/ EOB  Family Goals: maría elena    Progress made toward(s) clinical / shift goals:        Problem: Knowledge Deficit - Standard  Goal: Patient and family/care givers will demonstrate understanding of plan of care, disease process/condition, diagnostic tests and medications  Outcome: Progressing     Problem: Pain - Standard  Goal: Alleviation of pain or a reduction in pain to the patient’s comfort goal  Outcome: Progressing     Problem: Skin Integrity  Goal: Skin integrity is maintained or improved  Outcome: Progressing

## 2023-05-20 NOTE — PROGRESS NOTES
Neurosurgery Progress Note    Subjective:  83 y.o. male who presented 2023 with a very complicated picture.  He has stage IV CKD. S/p cholecystectomy.  With regards to his neurologic symptoms, he reports on the day of admission he had immediate and complete loss of lower extremity strength that has waxed and waned but has overall improved significantly since admission.   Currently denies neurologic symptoms other than weakness in the lower extremities, still unable to walk.    Denies bowel or bladder dysfunction.  Denies numbness in the lower extremities.    Denies radicular pain.     S/p multiple MRIs of the brain, thoracic spine.  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  MRI from 2023 shows T2 signal change within the spinal cord more proximally over multiple levels.      POD#4 T2-4 laminectomies with fenestration of arachnoid cyst.  Doing ok, pain controlled.  Denies bilateral lower extremity radicular pain.  Denies HA.  Has worked with therapies, not mobilized yet.   Drain out.   Looking at transfer soon.       Exam:  Awake, alert oriented x4.   Appropriate and cooperative.   Motor exam reveals 4/5 to the LLE, 4+/5 to the RLE   Sensation intact to light touch.   No clonus   Incision dry, healing well.      BP  Min: 120/62  Max: 139/77  Pulse  Av  Min: 57  Max: 90  Resp  Av  Min: 18  Max: 18  Temp  Av.6 °C (97.8 °F)  Min: 36.3 °C (97.3 °F)  Max: 36.8 °C (98.2 °F)  SpO2  Av %  Min: 97 %  Max: 99 %    No data recorded    Recent Labs     23  0037 23  0433 23  0024   WBC 16.3* 15.3* 13.4*   RBC 3.54* 3.73* 3.55*   HEMOGLOBIN 10.2* 10.9* 10.2*   HEMATOCRIT 32.1* 34.0* 31.5*   MCV 90.7 91.2 88.7   MCH 28.8 29.2 28.7   MCHC 31.8* 32.1* 32.4*   RDW 51.0* 53.6* 51.3*   PLATELETCT 366 322 353   MPV  11.2 11.5 11.8       Recent Labs     05/18/23  0037 05/19/23  0433 05/20/23  0024   SODIUM 132* 137 134*   POTASSIUM 4.9 4.8 4.6   CHLORIDE 101 105 98   CO2 20 20 22   GLUCOSE 254* 222* 305*   BUN 72* 73* 79*   CREATININE 2.24* 1.81* 1.85*   CALCIUM 8.6 8.7 8.6                   Intake/Output                         05/19/23 0700 - 05/20/23 0659 05/20/23 0700 - 05/21/23 0659     [id number] 9491-7556 Total 0700-1859 1900-0659 Total                 Intake    P.O.  240  -- 240  --  -- --    P.O. 240 -- 240 -- -- --    Total Intake 240 -- 240 -- -- --       Output    Urine  2400  1550 3950  --  -- --    Output (mL) (Urethral Catheter) 2400 1550 3950 -- -- --    Drains  175  100 275  --  -- --    Output (mL) (Negative Pressure Wound Therapy 05/14/23 Surgical Abdomen Right) 175 100 275 -- -- --    Total Output 2575 1650 4225 -- -- --       Net I/O     -2335 -1650 -3985 -- -- --              Intake/Output Summary (Last 24 hours) at 5/20/2023 0931  Last data filed at 5/20/2023 0340  Gross per 24 hour   Intake --   Output 2650 ml   Net -2650 ml               heparin  5,000 Units Q8HRS    dexamethasone  4 mg Q12HRS    furosemide  80 mg BID DIURETIC    olmesartan  20 mg Q DAY    Nozin nasal  swab  1 Applicator BID    Pharmacy Consult Request  1 Each PHARMACY TO DOSE    MD ALERT...DO NOT ADMINISTER NSAIDS or ASPIRIN unless ORDERED By Neurosurgery  1 Each PRN    diphenhydrAMINE  25 mg Q6HRS PRN    Or    diphenhydrAMINE  25 mg Q6HRS PRN    methocarbamol  750 mg Q8HRS PRN    benzocaine-menthol  1 Lozenge Q2HRS PRN    insulin lispro  4 Units TID AC    insulin GLARGINE  17 Units QAM INSULIN    insulin lispro  2-9 Units TID AC    And    dextrose bolus  25 g Q15 MIN PRN    insulin GLARGINE  5 Units Q EVENING    dakins 0.125% (1/4 strength)   QDAY PRN    lidocaine jelly  1 Application. QDAY PRN    lidocaine  20 mL QDAY PRN    Or    lidocaine   QDAY PRN    senna-docusate  2 Tablet BID    And    polyethylene glycol/lytes  1  Packet QDAY PRN    And    magnesium hydroxide  30 mL QDAY PRN    And    bisacodyl  10 mg QDAY PRN    tamsulosin  0.4 mg AFTER BREAKFAST    famotidine  20 mg DAILY    oxyCODONE immediate-release  5 mg Q3HRS PRN    Or    oxyCODONE immediate-release  10 mg Q3HRS PRN    morphine injection  2-4 mg Q3HRS PRN    Respiratory Therapy Consult   Continuous RT    labetalol  10 mg Q4HRS PRN    hydrALAZINE  10 mg Q6HRS PRN    atorvastatin  10 mg DAILY AT 1800    allopurinol  100 mg DAILY    carvedilol  12.5 mg BID WITH MEALS    [Held by provider] spironolactone  25 mg Q DAY    sodium bicarbonate  1,300 mg BID    DULoxetine  30 mg Q EVENING    acetaminophen  650 mg Q6HRS PRN       Assessment and Plan:  Hospital day # 27  POD# 4 laminectomy with subdural cyst exploration.  PT/OT/ambulate as tolerated  Ok to wean steroids to off over the next 3 days.   Will likely need placement when appropriate.  Needs thoracic MRI in one month and f/u with Dr. Springer at that time.   May reconsult NS as needed.        Chemical prophylactic DVT therapy: ok to start 5/20.

## 2023-05-20 NOTE — PROGRESS NOTES
Bedside report received, assessment completed    A&O x  4, pt calls appropriately  Mobility: Up with max assist x2, FWW, WC  Fall Risk Assessment: low, bed alarm no, door notifications yes  Pain Assessment / Reassessment completed, medication provided per MAR  Diet: Renal/ CHO, tolerating   LDA:   IV Access: 20G R wrist/ 20G R FA, CDI/ flushed/ SL/ Infusing abx  GI/: urinal void, + flatus,  PTA BM  DVT Prophylaxis: SCD +  Jeremy Score: 22, Interventions per flow sheet  Procedures:    - 4/26 MRI   - 4/28 Laparoscopic to open cholecystectomy   - 5/5 Repeat MRI   - 5/16 Laminectomy, Thoracic Spine T2-4 intradural washout  D/C Plan:    - Plan for d/c Monday 5/22    Reviewed plan of care with patient, bed in lowest position and locked, pt resting comfortably now, call light within reach, all needs met at this time. Interventions will be executed per plan of care

## 2023-05-20 NOTE — PROGRESS NOTES
Hospital Medicine Daily Progress Note    Date of Service  5/20/2023    Chief Complaint  Tyrone Cline is a 83 y.o. male admitted 4/21/2023 with colitis    Hospital Course  This 82-year-old male with a past medical history significant for CKD stage IV being followed by nephrology, diabetes mellitus with glyco of 6.6, hypertension presented to ER on 4/21/2023 with a complaint of abdominal pain and lower extremity weakness.    S/p laparoscopic cholecystectomy with conversion to open cholecystectomy 4/18 /2023 , patient has completed treatment with IV antibiotics.    His hospital course was complicated with postoperative hypotension, went to ICU vasopressor and transferred back on 4/29 floor.    Of note, patient continues to have bilateral lower extremity weakness; MR T spine expansile T2 hyperintense area in the thoracic spinal cord at the levels of T2, T3 and T4.      Neurosurgery evaluated and plan for laminectomy with subdural cyst exploration        Interval Problem Update  5/16/2023  Vital signs remained stable  Persistent leukocytosis.  No sign of ongoing active infection  Renal function remained stable.    Neurosurgery evaluated and plan for laminectomy with subdural cyst exploration    5/17/2023  Vitals remain stable  Labs reviewed  ,noted leucocytosis ,  ,K 5.5 , blood glucose 329 , elevated BU/Cr   S/p Laminectomy  Currently on meropenem   Neurosurgery following   PT/OT eval     5/18/2023  Vitals remained stable  Labs reviewed, leukocytosis improving  Noted worsening BUN/creatinine  Neurosurgery following  Nephrology Dr Bedoya been Consulted for worsening renal function.  Eventually need placement.   aware    5/19/2023  On 3 to oxygen saturating over 90  Leukocytosis improving  Renal function improving  Neurosurgery following  Plan to DC drain today  Nephrology evaluated  We will continue IV Lasix for now  Completed course of antibiotic  Eventually need placement.    aware      5/20/2023  Vitals remained stable  On 2 L oxygen saturating over 90  Pain well controlled    Labs reviewed.  Leukocytosis improving, hemoglobin stable    BMP reviewed, sodium 134, glucose 305, elevated BUN/creatinine    Continue to taper Decadron    Neurosurgery following    Continue IV Lasix    Repeat BMP in a.m. to monitor electrolytes and toxicity while on high-dose diuretics,     Repeat CBC in a.m. to monitor white count and hemoglobin      A.m./ordered      I have discussed this patient's plan of care and discharge plan at IDT rounds today with Case Management, Nursing, Nursing leadership, and other members of the IDT team.    Consultants/Specialty  general surgery and neurosurgery    Code Status  DNAR/DNI    Disposition  Discharge plan for PT/OT evaluation after laminectomy  I have placed the appropriate orders for post-discharge needs.    Review of Systems  Review of Systems   Neurological:  Positive for weakness.        Physical Exam  Temp:  [36.3 °C (97.3 °F)-36.8 °C (98.2 °F)] 36.8 °C (98.2 °F)  Pulse:  [60-90] 63  Resp:  [18] 18  BP: (120-139)/(62-77) 139/77  SpO2:  [97 %-99 %] 97 %    Physical Exam  Eyes:      Pupils: Pupils are equal, round, and reactive to light.   Cardiovascular:      Rate and Rhythm: Normal rate and regular rhythm.      Pulses: Normal pulses.      Heart sounds: Normal heart sounds.   Abdominal:      General: Abdomen is flat.   Musculoskeletal:      Comments: Suction noted    Neurological:      General: No focal deficit present.      Mental Status: He is alert and oriented to person, place, and time.      Motor: Weakness present.         Fluids    Intake/Output Summary (Last 24 hours) at 5/20/2023 1200  Last data filed at 5/20/2023 0340  Gross per 24 hour   Intake --   Output 1650 ml   Net -1650 ml         Laboratory  Recent Labs     05/18/23  0037 05/19/23  0433 05/20/23  0024   WBC 16.3* 15.3* 13.4*   RBC 3.54* 3.73* 3.55*   HEMOGLOBIN 10.2* 10.9* 10.2*   HEMATOCRIT 32.1*  34.0* 31.5*   MCV 90.7 91.2 88.7   MCH 28.8 29.2 28.7   MCHC 31.8* 32.1* 32.4*   RDW 51.0* 53.6* 51.3*   PLATELETCT 366 322 353   MPV 11.2 11.5 11.8       Recent Labs     05/18/23  0037 05/19/23  0433 05/20/23  0024   SODIUM 132* 137 134*   POTASSIUM 4.9 4.8 4.6   CHLORIDE 101 105 98   CO2 20 20 22   GLUCOSE 254* 222* 305*   BUN 72* 73* 79*   CREATININE 2.24* 1.81* 1.85*   CALCIUM 8.6 8.7 8.6                       Imaging  DX-SPINE-ANY ONE VIEW   Final Result      Digitized intraoperative radiograph is submitted for review. This examination is not for diagnostic purpose but for guidance during a surgical procedure. Please see the patient's chart for full procedural details.         INTERPRETING LOCATION: [address], [place], [address]      DX-PORTABLE FLUORO > 1 HOUR   Final Result      Portable fluoroscopy utilized for 6 seconds.      INTERPRETING LOCATION: [address], [place], [address]      MR-THORACIC SPINE-WITH & W/O   Final Result      Compared with the previous MRI ,again noted is expansile lesion in the thoracic spinal cord at the levels of T2, T3 and T4. There is prominent right dorsal subarachnoid space at the level of T3 with flattening of the spinal cord. There is mild contrast    enhancement noted in the expanded portion of the spinal cord. 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 Other differential diagnosis includes transverse myelitis However the possibility of right-sided dorsal arachnoid cyst/arachnoid    web at the level of T3 causing spinal cord edema cannot be entirely ruled out. Despite multiple MRs, it is difficult to characterize the tumor. Therefore if there is any neurosurgical planning, CT myelogram is recommended for further characterization.      IR-US GUIDED PIV   Final Result    Ultrasound-guided PERIPHERAL IV INSERTION performed by    qualified nursing staff as above.      MR-THORACIC SPINE-WITH & W/O   Final Result      1.  Limited study due to the motion artifact.    2.  When compared with the previous MRI again noted is expansile T2 hyperintense area in the thoracic spinal cord at the levels of T2, T3 and T4. There is also prominent dorsal right-sided subarachnoid space at this level. The differential diagnosis    includes intramedullary spinal cord tumor and extramedullary cyst/web with cord compression and edema.   3.  Pre and postcontrast MR examination of the thoracic spine is recommended with contrast under General anesthesia.   4.  1 -2 mm  T2-weighted sequences from T2 to T6 with smaller field of view is recommended for further characterization.      MR-THORACIC SPINE-WITH & W/O   Final Result         Stable appearance of expansile nonenhancing signal abnormality involving the upper thoracic cord between T1 and T4. Also noted is a small extra-axial collection/web along the right posterolateral spinal canal at T3-4 that deforms the cord and displaces    it anteriorly and to the left.   This could represent an Arachnoid web or arachnoid cyst with extensive mass effect and secondary edema of the cord.   Recommend additional thin section T2 and post contrast images through T1-T6 to better assess the abnormality.      MR-BRAIN-WITH & W/O   Final Result         No acute intracranial process.      Age-related volume loss and chronic microvascular ischemic changes.         DX-CHEST-PORTABLE (1 VIEW)   Final Result         1.  Pulmonary edema and/or infiltrates are identified, which are stable since the prior exam.   2.  Cardiomegaly   3.  Atherosclerosis      DX-CHEST-PORTABLE (1 VIEW)   Final Result         1.  Pulmonary edema and/or infiltrates are identified, which are stable since the prior exam.   2.  Left PICC line tip terminates in the left axilla, stable since prior study.      DX-CHEST-PORTABLE (1 VIEW)   Final Result      1.  No significant change.   2.  Possible left PICC line with tip projecting at the axillary vein.      DX-CHEST-PORTABLE (1 VIEW)   Final Result       1.  Mildly improved pulmonary opacities.   2.  Possible left PICC line with tip projecting at the axillary vein.   3.  Likely trace right pleural effusion.      DX-CHEST-LIMITED (1 VIEW)   Final Result      1.  Placement of endotracheal tube with tip projecting over the mid trachea      2.  Left arm catheter present which is presumably venous in projects in the expected position of the left axillary vein      3.  Enlarged cardiac silhouette      NM-HEPATOBILIARY SCAN   Final Result      Hepatobiliary scan findings suspicious for acute cholecystitis.      MR-THORACIC SPINE-WITH & W/O   Final Result      1.  There is abnormal expansile T2 hyperintense lesion in the right side of the thoracic spinal cord at the levels of T2 and T3 Mild contrast enhancement is seen. This lesion is suspicious for spinal cord neoplasm. Other remote differential diagnosis    includes transverse myelitis.   2.  Exaggerated thoracic kyphosis.   3.  Thoracic dextroscoliosis.   4.  Minimal degenerative changes.   5.  Right pleural effusion.      MR-LUMBAR SPINE-WITH & W/O   Final Result      1.  Multifocal degenerative disease in the lumbar spine as described above.   2.  Moderate central canal and severe lateral recess stenosis at the level of L4-5. The exiting bilateral L5 nerve roots might have been impinged at the lateral recess.      MR-CERVICAL SPINE-WITH & W/O   Final Result      1.  There is abnormal expansile intramedullary T2 signal intensity lesion in the right cerebellum. Thoracic spinal cord at the level of T2 on T3. Mild diffuse contrast enhancement is seen. This finding is suspicious for spinal cord neoplasm. The other    less likely differential diagnosis includes transverse myelitis. Follow-up is recommended after 4 weeks.   2.  Mild degenerative disease in the cervical spine as described above.   3.  There is no evidence of infection in the cervical spine.      US-RUQ   Final Result         1.  Acute cholecystitis.   2.   Atrophic right kidney.      DX-CHEST-LIMITED (1 VIEW)   Final Result      Perihilar interstitial edema and basilar atelectasis and/or consolidation. Underlying infection is possible.      MR-LUMBAR SPINE-W/O   Final Result      1.  Lower lumbar spine predominant degenerative changes as described in detail above, progressed from 2010 MRI.   2.  Edema at the L4-L5 disc space and L5 superior endplate likely represents degenerative changes. Less likely this could represent a low-grade or early discitis osteomyelitis. Correlate for evidence of infection.   3.  Acute appearing Schmorl's node at L3-L4, which can be a source of pain.      US-RENAL   Final Result      1.  Atrophic kidneys. Echogenic bilateral renal parenchyma could relate to medical renal disease.      2.  No hydronephrosis. No renal calculus.      DX-CHEST-PORTABLE (1 VIEW)   Final Result      No acute cardiac or pulmonary abnormalities are identified.      CT-ABDOMEN-PELVIS W/O   Final Result      1.  Findings suspicious for focal colitis at the hepatic flexure, less likely cholecystitis or duodenitis with secondary involvement of the colon. Infectious, inflammatory and ischemic etiologies are considerations. Underlying mass is not excluded.   2.  Cholelithiasis with distention of the gallbladder   3.  BILATERAL renal atrophy   4.  Subcentimeter LEFT adrenal myelolipoma   5.  12 mm RIGHT adrenal nodule likely an adenoma absent a history of cancer   6.  Subcentimeter RIGHT hepatic lesion, likely a cyst absent a history of cancer   7.  Atherosclerosis   8.  Colonic diverticulosis      CT-HEAD W/O   Final Result      1.  Cerebral atrophy.      2.  White matter lucencies most consistent with small vessel ischemic change versus demyelination or gliosis.      3.  Otherwise, Head CT without contrast with no acute findings. No evidence of acute cerebral infarction, hemorrhage or mass lesion.                Assessment/Plan  * Acute gangrenous cholecystitis-  (present on admission)  Assessment & Plan  Found to have gangrenous cholecystitis s/p laprascopic converted open cholecystectomy 4/28/2023   -- Completed antibiotic treatment from 4/25- 4/ 29, drain has been removed.  Follow surgical recommendation.    Surgical course was complicated with postoperative wound infection, culture has been obtained, wound care has been consulted  Wound cx growing ESBL E coli, continue meropeneam for 7 days as per ID recs    Urinary retention  Assessment & Plan  Florez by  urology.    -- Do not change Florez without urology consent    Abnormal MRI- (present on admission)  Assessment & Plan  MRI on admission showing T2/T3  hyperintense lesion in the right side of the thoracic spinal cord at the levels of T2 and T3 Mild contrast enhancement is seen. This lesion is suspicious for spinal cord neoplasm or possible transverse myelitis       S/p Laminectomy    Hypercalcemia- (present on admission)  Assessment & Plan  Likely due to immobility     -- monitor    Hypomagnesemia- (present on admission)  Assessment & Plan  Replete as needed    Cholelithiasis with acute cholecystitis- (present on admission)  Assessment & Plan  S/p surgical removal, converted to open cholecystectomy  NAHUN drain-removed on 5/8/2023   -- s/p completion of iv abx    Hypokalemia- (present on admission)  Assessment & Plan  Replete as needed    Thrombocytopenia (HCC)- (present on admission)  Assessment & Plan  Resolved, plt are stable at 427    High anion gap metabolic acidosis- (present on admission)  Assessment & Plan  Acute on chronic kidney disease, -appears back at baseline on 5/3/2023    Resolved    Hyponatremia- (present on admission)  Assessment & Plan  Resolved    Weakness of both lower extremities- (present on admission)  Assessment & Plan  MR T spine expansile T2 hyperintense area in the thoracic spinal cord at the levels of T2, T3 and T4.    S/p Laminectomy        Sepsis (HCC)- (present on admission)  Assessment &  Plan  This is Sepsis Present on admission  SIRS criteria identified on my evaluation include: Leukocytosis, with WBC greater than 12,000  Source is colitis  Sepsis protocol initiated  Fluid resuscitation ordered per protocol  Crystalloid Fluid Administration: Fluid resuscitation ordered per standard protocol - 30 mL/kg per current or ideal body weight  IV antibiotics as appropriate for source of sepsis  Reassessment: I have reassessed the patient's hemodynamic status    Due to gangrenous gallbladder requiring open carmelo    -- Today patient was noted to have surgical  wound with drainage   -- wound cx pending  ESBL E coli; id consulted, will switch to meropenem as per id; last day being on 5/19    Colitis- (present on admission)  Assessment & Plan  Cultures negative, monitor  Initial admitting impression, currently improved  Diet as tolerated     Acute kidney injury superimposed on CKD (HCC)- (present on admission)  Assessment & Plan  Worsening BUN/creatinine  Avoid nephrotoxin  Nephrology evaluated  Continue on Lasix      Diabetic peripheral neuropathy (HCC)- (present on admission)  Assessment & Plan  Cymbalta, medical treatment    Essential hypertension- (present on admission)  Assessment & Plan  Continue amlodipine and coreg    Type 2 diabetes mellitus with kidney complication, without long-term current use of insulin (HCC)- (present on admission)  Assessment & Plan  Ha1c 6.4% 4 months ago   On ozempic and januvia outpatient   Continue ISS and hypoglycemic protocol while inpatient    -- Has been started on Decadron, his blood glucose is noted to be elevated\    17 at a.m., 5 PM  Patient blood glucose is still elevated, started 4 3 times daily with meals    Hypercholesterolemia- (present on admission)  Assessment & Plan  Stable, continue Lipitor         VTE prophylaxis: SCDs/TEDs    Resume chemical prophylaxis once cleared by neurosurgery    I have performed a physical exam and reviewed and updated ROS and Plan  today (5/20/2023). In review of yesterday's note (5/19/2023), there are no changes except as documented above.

## 2023-05-20 NOTE — CARE PLAN
Problem: Knowledge Deficit - Standard  Goal: Patient and family/care givers will demonstrate understanding of plan of care, disease process/condition, diagnostic tests and medications  Outcome: Progressing     Problem: Pain - Standard  Goal: Alleviation of pain or a reduction in pain to the patient’s comfort goal  Outcome: Progressing   The patient is Stable - Low risk of patient condition declining or worsening    Shift Goals  Clinical Goals: pain control, rest  Patient Goals: pain control, rest, comfort  Family Goals: maría elena    Progress made toward(s) clinical / shift goals:  patient a+o x 4, 4-8/10 pain to rlq - oxycodone admin with good effect, wound vac to abdomen patent, rivera catheter patent and draining, no needs at this time, wctm    Patient is not progressing towards the following goals: n/a    Fall precautions/hourly rounding maintained, call light within reach and functioning, all items within reach.  Patient encouraged to call for assistance, poc reviewed with patient, ?'s/concerns answered.   Bed alarm active.

## 2023-05-21 LAB
ANION GAP SERPL CALC-SCNC: 13 MMOL/L (ref 7–16)
BASOPHILS # BLD AUTO: 0.1 % (ref 0–1.8)
BASOPHILS # BLD: 0.01 K/UL (ref 0–0.12)
BUN SERPL-MCNC: 85 MG/DL (ref 8–22)
CALCIUM SERPL-MCNC: 8.8 MG/DL (ref 8.5–10.5)
CHLORIDE SERPL-SCNC: 99 MMOL/L (ref 96–112)
CO2 SERPL-SCNC: 23 MMOL/L (ref 20–33)
CREAT SERPL-MCNC: 2.15 MG/DL (ref 0.5–1.4)
EOSINOPHIL # BLD AUTO: 0 K/UL (ref 0–0.51)
EOSINOPHIL NFR BLD: 0 % (ref 0–6.9)
ERYTHROCYTE [DISTWIDTH] IN BLOOD BY AUTOMATED COUNT: 53.1 FL (ref 35.9–50)
GFR SERPLBLD CREATININE-BSD FMLA CKD-EPI: 30 ML/MIN/1.73 M 2
GLUCOSE BLD STRIP.AUTO-MCNC: 158 MG/DL (ref 65–99)
GLUCOSE BLD STRIP.AUTO-MCNC: 81 MG/DL (ref 65–99)
GLUCOSE BLD STRIP.AUTO-MCNC: 83 MG/DL (ref 65–99)
GLUCOSE SERPL-MCNC: 173 MG/DL (ref 65–99)
HCT VFR BLD AUTO: 32.9 % (ref 42–52)
HGB BLD-MCNC: 10.6 G/DL (ref 14–18)
IMM GRANULOCYTES # BLD AUTO: 0.16 K/UL (ref 0–0.11)
IMM GRANULOCYTES NFR BLD AUTO: 1 % (ref 0–0.9)
LYMPHOCYTES # BLD AUTO: 0.68 K/UL (ref 1–4.8)
LYMPHOCYTES NFR BLD: 4.1 % (ref 22–41)
MCH RBC QN AUTO: 28.8 PG (ref 27–33)
MCHC RBC AUTO-ENTMCNC: 32.2 G/DL (ref 33.7–35.3)
MCV RBC AUTO: 89.4 FL (ref 81.4–97.8)
MONOCYTES # BLD AUTO: 0.84 K/UL (ref 0–0.85)
MONOCYTES NFR BLD AUTO: 5 % (ref 0–13.4)
NEUTROPHILS # BLD AUTO: 14.99 K/UL (ref 1.82–7.42)
NEUTROPHILS NFR BLD: 89.8 % (ref 44–72)
NRBC # BLD AUTO: 0 K/UL
NRBC BLD-RTO: 0 /100 WBC
PLATELET # BLD AUTO: 339 K/UL (ref 164–446)
PMV BLD AUTO: 11.8 FL (ref 9–12.9)
POTASSIUM SERPL-SCNC: 4.5 MMOL/L (ref 3.6–5.5)
RBC # BLD AUTO: 3.68 M/UL (ref 4.7–6.1)
SODIUM SERPL-SCNC: 135 MMOL/L (ref 135–145)
WBC # BLD AUTO: 16.7 K/UL (ref 4.8–10.8)

## 2023-05-21 PROCEDURE — A9270 NON-COVERED ITEM OR SERVICE: HCPCS | Performed by: INTERNAL MEDICINE

## 2023-05-21 PROCEDURE — 700102 HCHG RX REV CODE 250 W/ 637 OVERRIDE(OP): Performed by: HOSPITALIST

## 2023-05-21 PROCEDURE — 700102 HCHG RX REV CODE 250 W/ 637 OVERRIDE(OP): Performed by: SURGERY

## 2023-05-21 PROCEDURE — 770001 HCHG ROOM/CARE - MED/SURG/GYN PRIV*

## 2023-05-21 PROCEDURE — 36415 COLL VENOUS BLD VENIPUNCTURE: CPT

## 2023-05-21 PROCEDURE — A9270 NON-COVERED ITEM OR SERVICE: HCPCS | Performed by: SURGERY

## 2023-05-21 PROCEDURE — 80048 BASIC METABOLIC PNL TOTAL CA: CPT

## 2023-05-21 PROCEDURE — 700111 HCHG RX REV CODE 636 W/ 250 OVERRIDE (IP): Performed by: STUDENT IN AN ORGANIZED HEALTH CARE EDUCATION/TRAINING PROGRAM

## 2023-05-21 PROCEDURE — 700102 HCHG RX REV CODE 250 W/ 637 OVERRIDE(OP): Performed by: FAMILY MEDICINE

## 2023-05-21 PROCEDURE — 700102 HCHG RX REV CODE 250 W/ 637 OVERRIDE(OP): Performed by: INTERNAL MEDICINE

## 2023-05-21 PROCEDURE — 99232 SBSQ HOSP IP/OBS MODERATE 35: CPT | Performed by: STUDENT IN AN ORGANIZED HEALTH CARE EDUCATION/TRAINING PROGRAM

## 2023-05-21 PROCEDURE — A9270 NON-COVERED ITEM OR SERVICE: HCPCS | Performed by: HOSPITALIST

## 2023-05-21 PROCEDURE — 85025 COMPLETE CBC W/AUTO DIFF WBC: CPT

## 2023-05-21 PROCEDURE — A9270 NON-COVERED ITEM OR SERVICE: HCPCS | Performed by: FAMILY MEDICINE

## 2023-05-21 PROCEDURE — 82962 GLUCOSE BLOOD TEST: CPT | Mod: 91

## 2023-05-21 RX ORDER — DEXAMETHASONE SODIUM PHOSPHATE 4 MG/ML
4 INJECTION, SOLUTION INTRA-ARTICULAR; INTRALESIONAL; INTRAMUSCULAR; INTRAVENOUS; SOFT TISSUE DAILY
Status: DISCONTINUED | OUTPATIENT
Start: 2023-05-22 | End: 2023-05-22

## 2023-05-21 RX ORDER — ENEMA 19; 7 G/133ML; G/133ML
1 ENEMA RECTAL ONCE
Status: COMPLETED | OUTPATIENT
Start: 2023-05-21 | End: 2023-05-22

## 2023-05-21 RX ADMIN — OXYCODONE HYDROCHLORIDE 10 MG: 10 TABLET ORAL at 20:05

## 2023-05-21 RX ADMIN — HEPARIN SODIUM 5000 UNITS: 5000 INJECTION, SOLUTION INTRAVENOUS; SUBCUTANEOUS at 21:31

## 2023-05-21 RX ADMIN — ALLOPURINOL 100 MG: 100 TABLET ORAL at 06:35

## 2023-05-21 RX ADMIN — DEXAMETHASONE SODIUM PHOSPHATE 4 MG: 4 INJECTION INTRA-ARTICULAR; INTRALESIONAL; INTRAMUSCULAR; INTRAVENOUS; SOFT TISSUE at 06:34

## 2023-05-21 RX ADMIN — DULOXETINE HYDROCHLORIDE 30 MG: 30 CAPSULE, DELAYED RELEASE ORAL at 16:26

## 2023-05-21 RX ADMIN — FUROSEMIDE 80 MG: 20 TABLET ORAL at 16:25

## 2023-05-21 RX ADMIN — OXYCODONE 5 MG: 5 TABLET ORAL at 06:35

## 2023-05-21 RX ADMIN — FAMOTIDINE 20 MG: 20 TABLET, FILM COATED ORAL at 06:34

## 2023-05-21 RX ADMIN — SODIUM BICARBONATE 1300 MG: 650 TABLET ORAL at 06:34

## 2023-05-21 RX ADMIN — POLYETHYLENE GLYCOL 3350 1 PACKET: 17 POWDER, FOR SOLUTION ORAL at 16:25

## 2023-05-21 RX ADMIN — OLMESARTAN MEDOXOMIL 20 MG: 20 TABLET, FILM COATED ORAL at 06:35

## 2023-05-21 RX ADMIN — INSULIN LISPRO 4 UNITS: 100 INJECTION, SOLUTION INTRAVENOUS; SUBCUTANEOUS at 09:35

## 2023-05-21 RX ADMIN — DOCUSATE SODIUM 50 MG AND SENNOSIDES 8.6 MG 2 TABLET: 8.6; 5 TABLET, FILM COATED ORAL at 16:26

## 2023-05-21 RX ADMIN — OXYCODONE HYDROCHLORIDE 10 MG: 10 TABLET ORAL at 09:24

## 2023-05-21 RX ADMIN — SODIUM BICARBONATE 1300 MG: 650 TABLET ORAL at 16:26

## 2023-05-21 RX ADMIN — MAGNESIUM HYDROXIDE 30 ML: 400 SUSPENSION ORAL at 01:08

## 2023-05-21 RX ADMIN — Medication 1 APPLICATOR: at 06:35

## 2023-05-21 RX ADMIN — INSULIN LISPRO 2 UNITS: 100 INJECTION, SOLUTION INTRAVENOUS; SUBCUTANEOUS at 09:35

## 2023-05-21 RX ADMIN — Medication 1 APPLICATOR: at 16:31

## 2023-05-21 RX ADMIN — FUROSEMIDE 80 MG: 20 TABLET ORAL at 06:34

## 2023-05-21 RX ADMIN — TAMSULOSIN HYDROCHLORIDE 0.4 MG: 0.4 CAPSULE ORAL at 09:22

## 2023-05-21 RX ADMIN — CARVEDILOL 12.5 MG: 12.5 TABLET, FILM COATED ORAL at 09:23

## 2023-05-21 RX ADMIN — HEPARIN SODIUM 5000 UNITS: 5000 INJECTION, SOLUTION INTRAVENOUS; SUBCUTANEOUS at 06:34

## 2023-05-21 RX ADMIN — DOCUSATE SODIUM 50 MG AND SENNOSIDES 8.6 MG 2 TABLET: 8.6; 5 TABLET, FILM COATED ORAL at 06:34

## 2023-05-21 RX ADMIN — HEPARIN SODIUM 5000 UNITS: 5000 INJECTION, SOLUTION INTRAVENOUS; SUBCUTANEOUS at 14:27

## 2023-05-21 RX ADMIN — BISACODYL 10 MG: 10 SUPPOSITORY RECTAL at 09:23

## 2023-05-21 RX ADMIN — ATORVASTATIN CALCIUM 10 MG: 10 TABLET, FILM COATED ORAL at 16:25

## 2023-05-21 ASSESSMENT — PAIN DESCRIPTION - PAIN TYPE
TYPE: ACUTE PAIN;SURGICAL PAIN

## 2023-05-21 ASSESSMENT — ENCOUNTER SYMPTOMS: WEAKNESS: 1

## 2023-05-21 NOTE — PROGRESS NOTES
Patient aox4. No visible signs of distress. VSS. Tolerating medications without any signs or symptoms of adverse reactions. Patient reporting abdominal pain this shift, medicated per MAR. Continues to report discomfort from constipation, MoM given, +hypoactive bowel sounds +flatus. Patient O2 weaned to 0.5L, no complaints of SOB.

## 2023-05-21 NOTE — CARE PLAN
The patient is Stable - Low risk of patient condition declining or worsening    Shift Goals  Clinical Goals: Bowel meds, safety  Patient Goals: Rest, pain control  Family Goals: FLEX    Progress made toward(s) clinical / shift goals:      Problem: Knowledge Deficit - Standard  Goal: Patient and family/care givers will demonstrate understanding of plan of care, disease process/condition, diagnostic tests and medications  Description: Target End Date:  1-3 days or as soon as patient condition allows    Document in Patient Education    1.  Patient and family/caregiver oriented to unit, equipment, visitation policy and means for communicating concern  2.  Complete/review Learning Assessment  3.  Assess knowledge level of disease process/condition, treatment plan, diagnostic tests and medications  4.  Explain disease process/condition, treatment plan, diagnostic tests and medications  Outcome: Progressing     Problem: Pain - Standard  Goal: Alleviation of pain or a reduction in pain to the patient’s comfort goal  Description: Target End Date:  Prior to discharge or change in level of care    Document on Vitals flowsheet    1.  Document pain using the appropriate pain scale per order or unit policy  2.  Educate and implement non-pharmacologic comfort measures (i.e. relaxation, distraction, massage, cold/heat therapy, etc.)  3.  Pain management medications as ordered  4.  Reassess pain after pain med administration per policy  5.  If opiods administered assess patient's response to pain medication is appropriate per POSS sedation scale  6.  Follow pain management plan developed in collaboration with patient and interdisciplinary team (including palliative care or pain specialists if applicable)  Outcome: Progressing

## 2023-05-21 NOTE — PROGRESS NOTES
Hospital Medicine Daily Progress Note    Date of Service  5/21/2023    Chief Complaint  Tyrone Cline is a 83 y.o. male admitted 4/21/2023 with colitis    Hospital Course  This 82-year-old male with a past medical history significant for CKD stage IV being followed by nephrology, diabetes mellitus with glyco of 6.6, hypertension presented to ER on 4/21/2023 with a complaint of abdominal pain and lower extremity weakness.    S/p laparoscopic cholecystectomy with conversion to open cholecystectomy 4/18 /2023 , patient has completed treatment with IV antibiotics.    His hospital course was complicated with postoperative hypotension, went to ICU vasopressor and transferred back on 4/29 floor.    Of note, patient continues to have bilateral lower extremity weakness; MR T spine expansile T2 hyperintense area in the thoracic spinal cord at the levels of T2, T3 and T4.      Neurosurgery evaluated and plan for laminectomy with subdural cyst exploration        Interval Problem Update  5/16/2023  Vital signs remained stable  Persistent leukocytosis.  No sign of ongoing active infection  Renal function remained stable.    Neurosurgery evaluated and plan for laminectomy with subdural cyst exploration    5/17/2023  Vitals remain stable  Labs reviewed  ,noted leucocytosis ,  ,K 5.5 , blood glucose 329 , elevated BU/Cr   S/p Laminectomy  Currently on meropenem   Neurosurgery following   PT/OT eval     5/18/2023  Vitals remained stable  Labs reviewed, leukocytosis improving  Noted worsening BUN/creatinine  Neurosurgery following  Nephrology Dr Bedoya been Consulted for worsening renal function.  Eventually need placement.   aware    5/19/2023  On 3 to oxygen saturating over 90  Leukocytosis improving  Renal function improving  Neurosurgery following  Plan to DC drain today  Nephrology evaluated  We will continue IV Lasix for now  Completed course of antibiotic  Eventually need placement.    aware      5/20/2023  Vitals remained stable  On 2 L oxygen saturating over 90  Pain well controlled    Labs reviewed.  Leukocytosis improving, hemoglobin stable    BMP reviewed, sodium 134, glucose 305, elevated BUN/creatinine    Continue to taper Decadron    Neurosurgery following    Continue IV Lasix    Repeat BMP in a.m. to monitor electrolytes and toxicity while on high-dose diuretics,     Repeat CBC in a.m. to monitor white count and hemoglobin      A.m labs ordered      5/21/2023  Vitals remained stable  Persistent leukocytosis in setting of steroid use.  No concern of ongoing active infection.  BUN/creatinine remain elevated  Patient reports of normal movement past 6 days  On bowel regimen.  Fleet enema ordered    Continue to taper Decadron  Neurosurgery following  BMP ordered to monitor electrolytes/kidney function    I have discussed this patient's plan of care and discharge plan at IDT rounds today with Case Management, Nursing, Nursing leadership, and other members of the IDT team.    Consultants/Specialty  general surgery and neurosurgery    Code Status  DNAR/DNI    Disposition  Discharge plan for PT/OT evaluation after laminectomy  I have placed the appropriate orders for post-discharge needs.    Review of Systems  Review of Systems   Neurological:  Positive for weakness.        Physical Exam  Temp:  [36.4 °C (97.5 °F)-37 °C (98.6 °F)] 36.4 °C (97.5 °F)  Pulse:  [62-70] 62  Resp:  [17-18] 17  BP: (113-137)/(68-81) 137/80  SpO2:  [93 %-97 %] 93 %    Physical Exam  Eyes:      Pupils: Pupils are equal, round, and reactive to light.   Cardiovascular:      Rate and Rhythm: Normal rate and regular rhythm.      Pulses: Normal pulses.      Heart sounds: Normal heart sounds.   Abdominal:      General: Abdomen is flat.   Musculoskeletal:      Comments: Suction noted    Neurological:      General: No focal deficit present.      Mental Status: He is alert and oriented to person, place, and time.      Motor:  Weakness present.         Fluids    Intake/Output Summary (Last 24 hours) at 5/21/2023 1050  Last data filed at 5/21/2023 0725  Gross per 24 hour   Intake 320 ml   Output 3475 ml   Net -3155 ml         Laboratory  Recent Labs     05/19/23  0433 05/20/23  0024 05/21/23  0013   WBC 15.3* 13.4* 16.7*   RBC 3.73* 3.55* 3.68*   HEMOGLOBIN 10.9* 10.2* 10.6*   HEMATOCRIT 34.0* 31.5* 32.9*   MCV 91.2 88.7 89.4   MCH 29.2 28.7 28.8   MCHC 32.1* 32.4* 32.2*   RDW 53.6* 51.3* 53.1*   PLATELETCT 322 353 339   MPV 11.5 11.8 11.8       Recent Labs     05/19/23 0433 05/20/23  0024 05/21/23  0013   SODIUM 137 134* 135   POTASSIUM 4.8 4.6 4.5   CHLORIDE 105 98 99   CO2 20 22 23   GLUCOSE 222* 305* 173*   BUN 73* 79* 85*   CREATININE 1.81* 1.85* 2.15*   CALCIUM 8.7 8.6 8.8                       Imaging  DX-SPINE-ANY ONE VIEW   Final Result      Digitized intraoperative radiograph is submitted for review. This examination is not for diagnostic purpose but for guidance during a surgical procedure. Please see the patient's chart for full procedural details.         INTERPRETING LOCATION: [address] [facility], [facility], [address]      DX-PORTABLE FLUORO > 1 HOUR   Final Result      Portable fluoroscopy utilized for 6 seconds.      INTERPRETING LOCATION: [address] [facility], [place], [address]      MR-THORACIC SPINE-WITH & W/O   Final Result      Compared with the previous MRI ,again noted is expansile lesion in the thoracic spinal cord at the levels of T2, T3 and T4. There is prominent right dorsal subarachnoid space at the level of T3 with flattening of the spinal cord. There is mild contrast    enhancement noted in the expanded portion of the spinal cord. 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 Other differential diagnosis includes transverse myelitis However the possibility of right-sided dorsal arachnoid cyst/arachnoid    web at the level of T3 causing spinal cord edema cannot be entirely ruled out. Despite multiple MRs, it is  difficult to characterize the tumor. Therefore if there is any neurosurgical planning, CT myelogram is recommended for further characterization.      IR-US GUIDED PIV   Final Result    Ultrasound-guided PERIPHERAL IV INSERTION performed by    qualified nursing staff as above.      MR-THORACIC SPINE-WITH & W/O   Final Result      1.  Limited study due to the motion artifact.   2.  When compared with the previous MRI again noted is expansile T2 hyperintense area in the thoracic spinal cord at the levels of T2, T3 and T4. There is also prominent dorsal right-sided subarachnoid space at this level. The differential diagnosis    includes intramedullary spinal cord tumor and extramedullary cyst/web with cord compression and edema.   3.  Pre and postcontrast MR examination of the thoracic spine is recommended with contrast under General anesthesia.   4.  1 -2 mm  T2-weighted sequences from T2 to T6 with smaller field of view is recommended for further characterization.      MR-THORACIC SPINE-WITH & W/O   Final Result         Stable appearance of expansile nonenhancing signal abnormality involving the upper thoracic cord between T1 and T4. Also noted is a small extra-axial collection/web along the right posterolateral spinal canal at T3-4 that deforms the cord and displaces    it anteriorly and to the left.   This could represent an Arachnoid web or arachnoid cyst with extensive mass effect and secondary edema of the cord.   Recommend additional thin section T2 and post contrast images through T1-T6 to better assess the abnormality.      MR-BRAIN-WITH & W/O   Final Result         No acute intracranial process.      Age-related volume loss and chronic microvascular ischemic changes.         DX-CHEST-PORTABLE (1 VIEW)   Final Result         1.  Pulmonary edema and/or infiltrates are identified, which are stable since the prior exam.   2.  Cardiomegaly   3.  Atherosclerosis      DX-CHEST-PORTABLE (1 VIEW)   Final Result          1.  Pulmonary edema and/or infiltrates are identified, which are stable since the prior exam.   2.  Left PICC line tip terminates in the left axilla, stable since prior study.      DX-CHEST-PORTABLE (1 VIEW)   Final Result      1.  No significant change.   2.  Possible left PICC line with tip projecting at the axillary vein.      DX-CHEST-PORTABLE (1 VIEW)   Final Result      1.  Mildly improved pulmonary opacities.   2.  Possible left PICC line with tip projecting at the axillary vein.   3.  Likely trace right pleural effusion.      DX-CHEST-LIMITED (1 VIEW)   Final Result      1.  Placement of endotracheal tube with tip projecting over the mid trachea      2.  Left arm catheter present which is presumably venous in projects in the expected position of the left axillary vein      3.  Enlarged cardiac silhouette      NM-HEPATOBILIARY SCAN   Final Result      Hepatobiliary scan findings suspicious for acute cholecystitis.      MR-THORACIC SPINE-WITH & W/O   Final Result      1.  There is abnormal expansile T2 hyperintense lesion in the right side of the thoracic spinal cord at the levels of T2 and T3 Mild contrast enhancement is seen. This lesion is suspicious for spinal cord neoplasm. Other remote differential diagnosis    includes transverse myelitis.   2.  Exaggerated thoracic kyphosis.   3.  Thoracic dextroscoliosis.   4.  Minimal degenerative changes.   5.  Right pleural effusion.      MR-LUMBAR SPINE-WITH & W/O   Final Result      1.  Multifocal degenerative disease in the lumbar spine as described above.   2.  Moderate central canal and severe lateral recess stenosis at the level of L4-5. The exiting bilateral L5 nerve roots might have been impinged at the lateral recess.      MR-CERVICAL SPINE-WITH & W/O   Final Result      1.  There is abnormal expansile intramedullary T2 signal intensity lesion in the right cerebellum. Thoracic spinal cord at the level of T2 on T3. Mild diffuse contrast enhancement is  seen. This finding is suspicious for spinal cord neoplasm. The other    less likely differential diagnosis includes transverse myelitis. Follow-up is recommended after 4 weeks.   2.  Mild degenerative disease in the cervical spine as described above.   3.  There is no evidence of infection in the cervical spine.      US-RUQ   Final Result         1.  Acute cholecystitis.   2.  Atrophic right kidney.      DX-CHEST-LIMITED (1 VIEW)   Final Result      Perihilar interstitial edema and basilar atelectasis and/or consolidation. Underlying infection is possible.      MR-LUMBAR SPINE-W/O   Final Result      1.  Lower lumbar spine predominant degenerative changes as described in detail above, progressed from 2010 MRI.   2.  Edema at the L4-L5 disc space and L5 superior endplate likely represents degenerative changes. Less likely this could represent a low-grade or early discitis osteomyelitis. Correlate for evidence of infection.   3.  Acute appearing Schmorl's node at L3-L4, which can be a source of pain.      US-RENAL   Final Result      1.  Atrophic kidneys. Echogenic bilateral renal parenchyma could relate to medical renal disease.      2.  No hydronephrosis. No renal calculus.      DX-CHEST-PORTABLE (1 VIEW)   Final Result      No acute cardiac or pulmonary abnormalities are identified.      CT-ABDOMEN-PELVIS W/O   Final Result      1.  Findings suspicious for focal colitis at the hepatic flexure, less likely cholecystitis or duodenitis with secondary involvement of the colon. Infectious, inflammatory and ischemic etiologies are considerations. Underlying mass is not excluded.   2.  Cholelithiasis with distention of the gallbladder   3.  BILATERAL renal atrophy   4.  Subcentimeter LEFT adrenal myelolipoma   5.  12 mm RIGHT adrenal nodule likely an adenoma absent a history of cancer   6.  Subcentimeter RIGHT hepatic lesion, likely a cyst absent a history of cancer   7.  Atherosclerosis   8.  Colonic diverticulosis       CT-HEAD W/O   Final Result      1.  Cerebral atrophy.      2.  White matter lucencies most consistent with small vessel ischemic change versus demyelination or gliosis.      3.  Otherwise, Head CT without contrast with no acute findings. No evidence of acute cerebral infarction, hemorrhage or mass lesion.                Assessment/Plan  * Acute gangrenous cholecystitis- (present on admission)  Assessment & Plan  Found to have gangrenous cholecystitis s/p laprascopic converted open cholecystectomy 4/28/2023   -- Completed antibiotic treatment from 4/25- 4/ 29, drain has been removed.  Follow surgical recommendation.    Surgical course was complicated with postoperative wound infection, culture has been obtained, wound care has been consulted  Wound cx growing ESBL E coli, continue meropeneam for 7 days as per ID recs    Urinary retention  Assessment & Plan  Florez by  urology.    -- Do not change Florez without urology consent    Abnormal MRI- (present on admission)  Assessment & Plan  MRI on admission showing T2/T3  hyperintense lesion in the right side of the thoracic spinal cord at the levels of T2 and T3 Mild contrast enhancement is seen. This lesion is suspicious for spinal cord neoplasm or possible transverse myelitis       S/p Laminectomy    Hypercalcemia- (present on admission)  Assessment & Plan  Likely due to immobility     -- monitor    Hypomagnesemia- (present on admission)  Assessment & Plan  Replete as needed    Cholelithiasis with acute cholecystitis- (present on admission)  Assessment & Plan  S/p surgical removal, converted to open cholecystectomy  NAHUN drain-removed on 5/8/2023   -- s/p completion of iv abx    Hypokalemia- (present on admission)  Assessment & Plan  Replete as needed    Thrombocytopenia (HCC)- (present on admission)  Assessment & Plan  Resolved, plt are stable at 427    High anion gap metabolic acidosis- (present on admission)  Assessment & Plan  Acute on chronic kidney disease,  -appears back at baseline on 5/3/2023    Resolved    Hyponatremia- (present on admission)  Assessment & Plan  Resolved    Weakness of both lower extremities- (present on admission)  Assessment & Plan  MR T spine expansile T2 hyperintense area in the thoracic spinal cord at the levels of T2, T3 and T4.    S/p Laminectomy        Sepsis (HCC)- (present on admission)  Assessment & Plan  This is Sepsis Present on admission  SIRS criteria identified on my evaluation include: Leukocytosis, with WBC greater than 12,000  Source is colitis  Sepsis protocol initiated  Fluid resuscitation ordered per protocol  Crystalloid Fluid Administration: Fluid resuscitation ordered per standard protocol - 30 mL/kg per current or ideal body weight  IV antibiotics as appropriate for source of sepsis  Reassessment: I have reassessed the patient's hemodynamic status    Due to gangrenous gallbladder requiring open carmelo    -- Today patient was noted to have surgical  wound with drainage   -- wound cx pending  ESBL E coli; id consulted, will switch to meropenem as per id; last day being on 5/19    Colitis- (present on admission)  Assessment & Plan  Cultures negative, monitor  Initial admitting impression, currently improved  Diet as tolerated     Acute kidney injury superimposed on CKD (HCC)- (present on admission)  Assessment & Plan  Worsening BUN/creatinine  Avoid nephrotoxin  Nephrology evaluated  Continue on Lasix      Diabetic peripheral neuropathy (HCC)- (present on admission)  Assessment & Plan  Cymbalta, medical treatment    Essential hypertension- (present on admission)  Assessment & Plan  Continue amlodipine and coreg    Type 2 diabetes mellitus with kidney complication, without long-term current use of insulin (HCC)- (present on admission)  Assessment & Plan  Ha1c 6.4% 4 months ago   On ozempic and januvia outpatient   Continue ISS and hypoglycemic protocol while inpatient    -- Has been started on Decadron, his blood glucose is  noted to be elevated\    17 at a.m., 5 PM  Patient blood glucose is still elevated, started 4 3 times daily with meals    Hypercholesterolemia- (present on admission)  Assessment & Plan  Stable, continue Lipitor         VTE prophylaxis: SCDs/TEDs    Resume chemical prophylaxis once cleared by neurosurgery    I have performed a physical exam and reviewed and updated ROS and Plan today (5/21/2023). In review of yesterday's note (5/20/2023), there are no changes except as documented above.

## 2023-05-21 NOTE — CARE PLAN
The patient is Stable - Low risk of patient condition declining or worsening    Shift Goals  Clinical Goals: Bowel meds, safety  Patient Goals: Rest, pain control  Family Goals: FLEX    Progress made toward(s) clinical / shift goals:        Problem: Knowledge Deficit - Standard  Goal: Patient and family/care givers will demonstrate understanding of plan of care, disease process/condition, diagnostic tests and medications  Outcome: Progressing     Problem: Pain - Standard  Goal: Alleviation of pain or a reduction in pain to the patient’s comfort goal  Outcome: Progressing     Problem: Skin Integrity  Goal: Skin integrity is maintained or improved  Outcome: Progressing     Problem: Mobility  Goal: Patient's capacity to carry out activities will improve  Outcome: Progressing

## 2023-05-21 NOTE — PROGRESS NOTES
Bedside report received, assessment completed    A&O x  4, pt calls appropriately  Mobility: Up with max assist x2, WC  Fall Risk Assessment: low, bed alarm no, door notifications yes  Pain Assessment / Reassessment completed, medication provided per MAR  Diet: Renal/ CHO, tolerating   LDA:   IV Access: 20G R wrist/ 20G R FA, CDI/ flushed/ SL/ Infusing abx  GI/: rivera void, + flatus,  5/17 BM   - Suppository today 5/21  DVT Prophylaxis: SCD +  Jeremy Score: 22, Interventions per flow sheet  Procedures:    - 4/26 MRI   - 4/28 Laparoscopic to open cholecystectomy   - 5/5 Repeat MRI   - 5/16 Laminectomy, Thoracic Spine T2-4 intradural washout  D/C Plan:    - last day Abx 5/19   - Plan for d/c Tuesday 5/23   - continue Lasix    Reviewed plan of care with patient, bed in lowest position and locked, pt resting comfortably now, call light within reach, all needs met at this time. Interventions will be executed per plan of care

## 2023-05-22 LAB
ANION GAP SERPL CALC-SCNC: 14 MMOL/L (ref 7–16)
BUN SERPL-MCNC: 85 MG/DL (ref 8–22)
CALCIUM SERPL-MCNC: 9.2 MG/DL (ref 8.5–10.5)
CHLORIDE SERPL-SCNC: 97 MMOL/L (ref 96–112)
CO2 SERPL-SCNC: 27 MMOL/L (ref 20–33)
CREAT SERPL-MCNC: 2.12 MG/DL (ref 0.5–1.4)
GFR SERPLBLD CREATININE-BSD FMLA CKD-EPI: 30 ML/MIN/1.73 M 2
GLUCOSE BLD STRIP.AUTO-MCNC: 175 MG/DL (ref 65–99)
GLUCOSE BLD STRIP.AUTO-MCNC: 206 MG/DL (ref 65–99)
GLUCOSE BLD STRIP.AUTO-MCNC: 72 MG/DL (ref 65–99)
GLUCOSE SERPL-MCNC: 229 MG/DL (ref 65–99)
POTASSIUM SERPL-SCNC: 4.7 MMOL/L (ref 3.6–5.5)
SODIUM SERPL-SCNC: 138 MMOL/L (ref 135–145)

## 2023-05-22 PROCEDURE — A9270 NON-COVERED ITEM OR SERVICE: HCPCS | Performed by: INTERNAL MEDICINE

## 2023-05-22 PROCEDURE — 82962 GLUCOSE BLOOD TEST: CPT

## 2023-05-22 PROCEDURE — 700111 HCHG RX REV CODE 636 W/ 250 OVERRIDE (IP): Performed by: STUDENT IN AN ORGANIZED HEALTH CARE EDUCATION/TRAINING PROGRAM

## 2023-05-22 PROCEDURE — 80048 BASIC METABOLIC PNL TOTAL CA: CPT

## 2023-05-22 PROCEDURE — 700101 HCHG RX REV CODE 250: Performed by: HOSPITALIST

## 2023-05-22 PROCEDURE — 97597 DBRDMT OPN WND 1ST 20 CM/<: CPT

## 2023-05-22 PROCEDURE — 302098 PASTE RING (FLAT): Performed by: STUDENT IN AN ORGANIZED HEALTH CARE EDUCATION/TRAINING PROGRAM

## 2023-05-22 PROCEDURE — 770001 HCHG ROOM/CARE - MED/SURG/GYN PRIV*

## 2023-05-22 PROCEDURE — A9270 NON-COVERED ITEM OR SERVICE: HCPCS | Performed by: SURGERY

## 2023-05-22 PROCEDURE — 700102 HCHG RX REV CODE 250 W/ 637 OVERRIDE(OP): Performed by: HOSPITALIST

## 2023-05-22 PROCEDURE — 97530 THERAPEUTIC ACTIVITIES: CPT

## 2023-05-22 PROCEDURE — 97110 THERAPEUTIC EXERCISES: CPT

## 2023-05-22 PROCEDURE — 700102 HCHG RX REV CODE 250 W/ 637 OVERRIDE(OP): Performed by: SURGERY

## 2023-05-22 PROCEDURE — 97605 NEG PRS WND THER DME<=50SQCM: CPT

## 2023-05-22 PROCEDURE — 99232 SBSQ HOSP IP/OBS MODERATE 35: CPT | Performed by: STUDENT IN AN ORGANIZED HEALTH CARE EDUCATION/TRAINING PROGRAM

## 2023-05-22 PROCEDURE — A9270 NON-COVERED ITEM OR SERVICE: HCPCS | Performed by: HOSPITALIST

## 2023-05-22 PROCEDURE — 36415 COLL VENOUS BLD VENIPUNCTURE: CPT

## 2023-05-22 PROCEDURE — 700102 HCHG RX REV CODE 250 W/ 637 OVERRIDE(OP): Performed by: INTERNAL MEDICINE

## 2023-05-22 PROCEDURE — 700101 HCHG RX REV CODE 250: Performed by: STUDENT IN AN ORGANIZED HEALTH CARE EDUCATION/TRAINING PROGRAM

## 2023-05-22 RX ORDER — FUROSEMIDE 20 MG/1
80 TABLET ORAL
Status: DISCONTINUED | OUTPATIENT
Start: 2023-05-23 | End: 2023-05-24

## 2023-05-22 RX ADMIN — MAGNESIUM HYDROXIDE 30 ML: 400 SUSPENSION ORAL at 05:41

## 2023-05-22 RX ADMIN — HEPARIN SODIUM 5000 UNITS: 5000 INJECTION, SOLUTION INTRAVENOUS; SUBCUTANEOUS at 13:57

## 2023-05-22 RX ADMIN — HEPARIN SODIUM 5000 UNITS: 5000 INJECTION, SOLUTION INTRAVENOUS; SUBCUTANEOUS at 21:39

## 2023-05-22 RX ADMIN — INSULIN LISPRO 2 UNITS: 100 INJECTION, SOLUTION INTRAVENOUS; SUBCUTANEOUS at 13:57

## 2023-05-22 RX ADMIN — OXYCODONE HYDROCHLORIDE 10 MG: 10 TABLET ORAL at 09:31

## 2023-05-22 RX ADMIN — TAMSULOSIN HYDROCHLORIDE 0.4 MG: 0.4 CAPSULE ORAL at 08:39

## 2023-05-22 RX ADMIN — OXYCODONE HYDROCHLORIDE 10 MG: 10 TABLET ORAL at 21:39

## 2023-05-22 RX ADMIN — HEPARIN SODIUM 5000 UNITS: 5000 INJECTION, SOLUTION INTRAVENOUS; SUBCUTANEOUS at 05:41

## 2023-05-22 RX ADMIN — INSULIN LISPRO 4 UNITS: 100 INJECTION, SOLUTION INTRAVENOUS; SUBCUTANEOUS at 08:45

## 2023-05-22 RX ADMIN — DOCUSATE SODIUM 50 MG AND SENNOSIDES 8.6 MG 2 TABLET: 8.6; 5 TABLET, FILM COATED ORAL at 05:41

## 2023-05-22 RX ADMIN — ALLOPURINOL 100 MG: 100 TABLET ORAL at 05:41

## 2023-05-22 RX ADMIN — DEXAMETHASONE SODIUM PHOSPHATE 4 MG: 4 INJECTION INTRA-ARTICULAR; INTRALESIONAL; INTRAMUSCULAR; INTRAVENOUS; SOFT TISSUE at 05:42

## 2023-05-22 RX ADMIN — OXYCODONE HYDROCHLORIDE 10 MG: 10 TABLET ORAL at 18:19

## 2023-05-22 RX ADMIN — DOCUSATE SODIUM 50 MG AND SENNOSIDES 8.6 MG 2 TABLET: 8.6; 5 TABLET, FILM COATED ORAL at 18:20

## 2023-05-22 RX ADMIN — SODIUM BICARBONATE 1300 MG: 650 TABLET ORAL at 18:17

## 2023-05-22 RX ADMIN — OLMESARTAN MEDOXOMIL 20 MG: 20 TABLET, FILM COATED ORAL at 05:41

## 2023-05-22 RX ADMIN — DULOXETINE HYDROCHLORIDE 30 MG: 30 CAPSULE, DELAYED RELEASE ORAL at 18:19

## 2023-05-22 RX ADMIN — FUROSEMIDE 80 MG: 20 TABLET ORAL at 05:41

## 2023-05-22 RX ADMIN — LIDOCAINE HYDROCHLORIDE 1 APPLICATION: 40 SOLUTION TOPICAL at 09:30

## 2023-05-22 RX ADMIN — ATORVASTATIN CALCIUM 10 MG: 10 TABLET, FILM COATED ORAL at 18:20

## 2023-05-22 RX ADMIN — SODIUM BICARBONATE 1300 MG: 650 TABLET ORAL at 05:41

## 2023-05-22 RX ADMIN — INSULIN LISPRO 4 UNITS: 100 INJECTION, SOLUTION INTRAVENOUS; SUBCUTANEOUS at 13:58

## 2023-05-22 RX ADMIN — FAMOTIDINE 20 MG: 20 TABLET, FILM COATED ORAL at 05:41

## 2023-05-22 RX ADMIN — OXYCODONE HYDROCHLORIDE 10 MG: 10 TABLET ORAL at 05:38

## 2023-05-22 RX ADMIN — INSULIN LISPRO 3 UNITS: 100 INJECTION, SOLUTION INTRAVENOUS; SUBCUTANEOUS at 08:44

## 2023-05-22 ASSESSMENT — COGNITIVE AND FUNCTIONAL STATUS - GENERAL
MOVING TO AND FROM BED TO CHAIR: UNABLE
WALKING IN HOSPITAL ROOM: TOTAL
MOVING FROM LYING ON BACK TO SITTING ON SIDE OF FLAT BED: UNABLE
TURNING FROM BACK TO SIDE WHILE IN FLAT BAD: UNABLE
SUGGESTED CMS G CODE MODIFIER MOBILITY: CN
CLIMB 3 TO 5 STEPS WITH RAILING: TOTAL
MOBILITY SCORE: 6
STANDING UP FROM CHAIR USING ARMS: TOTAL

## 2023-05-22 ASSESSMENT — PAIN DESCRIPTION - PAIN TYPE
TYPE: ACUTE PAIN

## 2023-05-22 ASSESSMENT — GAIT ASSESSMENTS: GAIT LEVEL OF ASSIST: UNABLE TO PARTICIPATE

## 2023-05-22 ASSESSMENT — ENCOUNTER SYMPTOMS: WEAKNESS: 1

## 2023-05-22 NOTE — DISCHARGE PLANNING
Case Management Discharge Planning    Admission Date: 4/21/2023  GMLOS: 9.6  ALOS: 31    6-Clicks ADL Score: 17  6-Clicks Mobility Score: 7  PT and/or OT Eval ordered: Yes  Post-acute Referrals Ordered: Yes  Post-acute Choice Obtained: Yes  Has referral(s) been sent to post-acute provider:  Yes      Anticipated Discharge Dispo: Discharge Disposition: D/T to SNF with Medicare cert in anticipation of skilled care (03)    DME Needed: No    Action(s) Taken: Updated Provider/Nurse on Discharge Plan  Pt was discussed in IDT rounds today with Dr Smith.     Per Dr Smith, Pt [facility].    This RN CM  requested Janel HARRY to follow up with [facility] for  bed availability tomorrow 5/23 and if Pt Can be accepted on  a regular wound vac.     This RN CM sent a message to Zahira of Wound Team. Per Zahira Pt is now on  a regular wound vac.     Will prepare Cobra /Transfer packet.     Pt has a completed PASRR and the number is [id number]    Per Serena Downs of [facility] told her that Pt was declined at [facility] due to the complexity of the wound/ wound vac care  and they do not have sufficient Staff to handle wound cases.  Informed Dr Smith and JUAN JOSÉ Jack.   This RN CM requested Janel HARRY to send referral to the rests of SNFs in [facility].       Escalations Completed: None    Medically Clear: Yes    Next Steps:   This RN CM to continue to assist Pt with discharge as needed.      Barriers to Discharge:   Pending placement     Is the patient up for discharge tomorrow: Yes

## 2023-05-22 NOTE — PROGRESS NOTES
Received report from previous shift RN. Assumed care of patient at 1900.  Assessment complete.  Patient A&O x 4. Patient calls appropriately.  Patient ambulates with max assist. Bed alarm on.   Patient has 8/10 pain to lower back. Pain managed with prescribed medications, rest, repositioning.  Denies N&V. Tolerating CHO, renal diet.  Wound vac to abdomen with Dakin's veraflo. Canister changed.   + void via Florez, + flatus, - BM, last BM 5/17. Discussed with patient plan for enema. Patient states he would like to wait until AM.   Patient on RA, denies SOB.  SCD's on.  Patient sitting up in bed for dinner at this time.    Reviewed plan of care with patient. Call light and personal belongings within reach. Hourly rounding in place. All needs met at this time.

## 2023-05-22 NOTE — WOUND TEAM
Assisted Zahira HAWTHORNE (Wound RN), RN with wound care, non-selective debridement performed using wound cleanser/NS and gauze. Wound vac was applied. Please see Zahira HAWTHORNE (Wound RN) wound note for further wound care details.

## 2023-05-22 NOTE — CARE PLAN
The patient is Stable - Low risk of patient condition declining or worsening    Shift Goals  Clinical Goals: bowel movement, WV management, pain control  Patient Goals: pain control, comfort  Family Goals: FLEX    Progress made toward(s) clinical / shift goals:  Patient changes position frequently when prompted and assists with his own turns. Patient has had two bowel movements today.     Patient is not progressing towards the following goals: Patient has not yet been cleared for discharge.    Problem: Knowledge Deficit - Standard  Goal: Patient and family/care givers will demonstrate understanding of plan of care, disease process/condition, diagnostic tests and medications  Outcome: Progressing     Problem: Pain - Standard  Goal: Alleviation of pain or a reduction in pain to the patient’s comfort goal  Outcome: Progressing     Problem: Skin Integrity  Goal: Skin integrity is maintained or improved  Outcome: Progressing     Problem: Communication  Goal: The ability to communicate needs accurately and effectively will improve  Outcome: Progressing  Flowsheets (Taken 5/22/2023 1116)  Communication:   Assessed patient's ability to understand and communicate   Oriented patient to call light

## 2023-05-22 NOTE — DISCHARGE PLANNING
Agency/Facility Name: [facility]  Spoke To:   Outcome: They are reviewing wound vac information with wound nurse at the facility and will let DPA know if veraflo will be okay or if Pt will need regular wound vac.    DPA updated CM    1417  Agency[facility]Facility Name: [facility]  Spoke To:   Outcome: Facility had to decline due to wound vac care, currently do not have sufficient wound staff to care for Pt.    DPA updated CM    1608  Received Choice form @: 6730  Agency/Facility Name: [facility]  Referral sent per Choice form @: Per CM - 9120

## 2023-05-22 NOTE — CARE PLAN
The patient is Stable - Low risk of patient condition declining or worsening    Shift Goals  Clinical Goals: bowel movement, WV management, pain control  Patient Goals: pain control, comfort  Family Goals: FLEX    Progress made toward(s) clinical / shift goals:  Patient educated on need to have BM and plan for enema, pt requesting enema to be completed in AM.   WV in place, canister changed, WV patent. Pain controlled with rest, repositioning, and PRN pain medications.   Problem: Knowledge Deficit - Standard  Goal: Patient and family/care givers will demonstrate understanding of plan of care, disease process/condition, diagnostic tests and medications  Description: Target End Date:  1-3 days or as soon as patient condition allows    Document in Patient Education    1.  Patient and family/caregiver oriented to unit, equipment, visitation policy and means for communicating concern  2.  Complete/review Learning Assessment  3.  Assess knowledge level of disease process/condition, treatment plan, diagnostic tests and medications  4.  Explain disease process/condition, treatment plan, diagnostic tests and medications  Outcome: Progressing     Problem: Pain - Standard  Goal: Alleviation of pain or a reduction in pain to the patient’s comfort goal  Description: Target End Date:  Prior to discharge or change in level of care    Document on Vitals flowsheet    1.  Document pain using the appropriate pain scale per order or unit policy  2.  Educate and implement non-pharmacologic comfort measures (i.e. relaxation, distraction, massage, cold/heat therapy, etc.)  3.  Pain management medications as ordered  4.  Reassess pain after pain med administration per policy  5.  If opiods administered assess patient's response to pain medication is appropriate per POSS sedation scale  6.  Follow pain management plan developed in collaboration with patient and interdisciplinary team (including palliative care or pain specialists if  applicable)  Outcome: Progressing     Problem: Skin Integrity  Goal: Skin integrity is maintained or improved  Description: Target End Date:  Prior to discharge or change in level of care    Document interventions on Skin Risk/Jeremy flowsheet groups and corresponding LDA    1.  Assess and monitor skin integrity, appearance and/or temperature  2.  Assess risk factors for impaired skin integrity and/or pressures ulcers  3.  Implement precautions to protect skin integrity in collaboration with interdisciplinary team  4.  Implement pressure ulcer prevention protocol if at risk for skin breakdown  5.  Confirm wound care consult if at risk for skin breakdown  6.  Ensure patient use of pressure relieving devices  (Low air loss bed, waffle overlay, heel protectors, ROHO cushion, etc)  Outcome: Progressing     Problem: Mobility  Goal: Patient's capacity to carry out activities will improve  Description: Target End Date:  Prior to discharge or change in level of care    1.  Assess for barriers to mobility/activity  2.  Implement activity per interdisciplinary team recommendations  3.  Target activity level identified and patient/family/caregiver aware of goal  4.  Provide assistive devices  5.  Instruct patient/caregiver on proper use of assistive/adaptive devices  6.  Schedule activities and rest periods to decrease effects of fatigue  7.  Encourage mobilization to extent of ability  8.  Maintain proper body alignment  9.  Provide adequate pain management to allow progressive mobilization  10. Implement pace maker precautions as needed  Outcome: Progressing     Problem: Communication  Goal: The ability to communicate needs accurately and effectively will improve  Description: Target End Date:  End of day 1    1.  Assess ability to communicate and understand  2.  Provide augmentative or alternative methods of communication devices  3.  Use /language line as appropriate  4.  Collaborate with Speech Therapy as  needed  Outcome: Progressing     Problem: Hemodynamics  Goal: Patient's hemodynamics, fluid balance and neurologic status will be stable or improve  Description: Target End Date:  Prior to discharge or change in level of care    Document on Assessment and I/O flowsheet templates    1.  Monitor vital signs, pulse oximetry and cardiac monitor per provider order and/or policy  2.  Maintain blood pressure per provider order  3.  Hemodynamic monitoring per provider order  4.  Manage IV fluids and IV infusions  5.  Monitor intake and output  6.  Daily weights per unit policy or provider order  7.  Assess peripheral pulses and capillary refill  8.  Assess color and body temperature  9.  Position patient for maximum circulation/cardiac output  10. Monitor for signs/symptoms of excessive bleeding  11. Assess mental status, restlessness and changes in level of consciousness  12. Monitor temperature and report fever or hypothermia to provider immediately. Consideration of targeted temperature management.  Outcome: Progressing     Problem: Respiratory  Goal: Patient will achieve/maintain optimum respiratory ventilation and gas exchange  Description: Target End Date:  Prior to discharge or change in level of care    Document on Assessment flowsheet    1.  Assess and monitor rate, rhythm, depth and effort of respiration  2.  Breath sounds assessed qshift and/or as needed  3.  Assess O2 saturation, administer/titrate oxygen as ordered  4.  Position patient for maximum ventilatory efficiency  5.  Turn, cough, and deep breath with splinting to improve effectiveness  6.  Collaborate with RT to administer medication/treatments per order  7.  Encourage use of incentive spirometer and encourage patient to cough after use and utilize splinting techniques if applicable  8.  Airway suctioning  9.  Monitor sputum production for changes in color, consistency and frequency  10. Perform frequent oral hygiene  11. Alternate physical activity  with rest periods  Outcome: Progressing     Problem: Nutrition  Goal: Patient's nutritional and fluid intake will be adequate or improve  Description: Target End Date:  Prior to discharge or change in level of care    Document on I/O flowsheet    1.  Monitor nutritional intake  2.  Monitor weight per provider order  3.  Assess patient's ability to take oral nutrition  4.  Collaborate with Speech Therapy, Dietitian and interdisciplinary team for appropriate feeding and fluid intake  5.  Assist with feeding  Outcome: Progressing  Goal: Enteral nutrition will be maintained or improve  Description: Target End Date:  Prior to discharge or change in level of care    1. Enteral access to be obtained or modified  2. Advance to goal rate per protocol  3. Collaborate with Clinical Dietitian for signs/symptoms of intolerance  4. Weights per provider order  5. Consider Speech Therapy consult for swallowing difficulties  Outcome: Progressing  Goal: Enteral nutrition will be maintained or improve  Description: Target End Date:  Prior to discharge or change in level of care    1.  Fingerstick glucose every 8 hours  2.  Notify provider for fever or elevated glucose  3.  TPN tubing and port changes every 24 hours.  Turn TPN through dedicated line. (Document on LDA)  4.  TPN dressing changes 24 hours after insertion and then every Saturday  (Document on LDA)  5.  Daily weights  6.  Monitor TPN lab results  7.  Collaborate with Clinical Dietician  8.  Collaborate with Pharmacist  Outcome: Progressing     Problem: Urinary Elimination  Goal: Establish and maintain regular urinary output  Description: Target End Date:  Prior to discharge or change in level of care    Document on I/O and Assessment flowsheets    1.  Evaluate need to continue indwelling catheter every shift  2.  Assess signs and symptoms of urinary retention  3.  Assess post-void residual volumes  4.  Implement bladder training program  5.  Encourage scheduled voidings  6.   Assist patient to sit on bedside commode or toilet for voiding  7.  Educate patient and family/caregiver on use and purpose of urine collection devices (document in Patient Education)  Outcome: Progressing     Problem: Bowel Elimination  Goal: Establish and maintain regular bowel function  Description: Target End Date:  Prior to discharge or change in level of care    1.   Note date of last BM  2.   Educate about diet, fluid intake, medication and activity to promote bowel function  3.   Educate signs and symptoms of constipation and interventions to implement  4.   Pharmacologic bowel management per provider order  5.   Regular toileting schedule  6.   Upright position for toileting  7.   High fiber diet  8.   Encourage hydration  9.   Collaborate with Clinical Dietician  10. Care and maintenance of ostomy if applicable  Outcome: Progressing     Problem: Self Care  Goal: Patient will have the ability to perform ADLs independently or with assistance (bathe, groom, dress, toilet and feed)  Description: Target End Date:  Prior to discharge or change in level of care    Document on ADL flowsheet    1.  Assess the capability and level of deficiency to perform ADLs  2.  Encourage family/care giver involvement  3.  Provide assistive devices  4.  Consider PT/OT evaluations  5.  Maintain support, give positive feedback, encourage self-care allowing extra time and verbal cuing as needed  6.  Avoid doing something for patients they can do themselves, but provide assistance as needed  7.  Assist in anticipating/planning individual needs  8.  Collaborate with Case Management and  to meet discharge needs  Outcome: Progressing     Problem: Wound/ / Incision Healing  Goal: Patient's wound/surgical incision will decrease in size and heals properly  Description: Target End Date:  Prior to discharge or change in level of care    Document on LDA    1.  Assess and document surgical incision/wound  2.  Provide  incision/wound care per policy and/or provider orders  3.  Manage surgical drains per policy if applicable  4.  Encourage adequate nutrition to promote wound healing  5.  Collaborate with Clinical Dietician  Outcome: Progressing       Patient is not progressing towards the following goals:

## 2023-05-22 NOTE — THERAPY
Physical Therapy   Daily Treatment     Patient Name: Tyrone Cline  Age:  83 y.o., Sex:  male  Medical Record #: [id number]  Today's Date: 5/22/2023     Precautions: Fall Risk;Spinal / Back Precautions   Comments: wound vac, quick fatigue    Assessment    Pt conts to demonstrate rapid fatigue associated with OOB activity. He demonstrates a regression in sitting balance and activity tolerance compared to last visit. Today performed supine<>sit x2 reps due to only tolerating sitting EOB ~5s one 1st trial becoming slumped forward with lateral LOB. Returned BTB and performed supine exercises including bridging, ankle pumps, and heel slides. Pt reports improved energy once in supine position. He  tolerated slight increased duration of sitting upon 2nd attempt to 10-15s. Brought in shima steady to trial today however pt demonstrates inadequate sitting balance to attempt, also declines chair transfer today.     Pt would cont to benefit from cardiac chair vs chair position of bed to increase upright tolerance between therapy visits.     Plan    Treatment Plan Status: Continue Current Treatment Plan  Type of Treatment: Bed Mobility, Equipment, Gait Training, Manual Therapy, Neuro Re-Education / Balance, Self Care / Home Evaluation, Stair Training, Therapeutic Activities, Therapeutic Exercise  Treatment Frequency: 4 Times per Week  Treatment Duration: Until Therapy Goals Met    DC Equipment Recommendations: Unable to determine at this time  Discharge Recommendations: Recommend post-acute placement for additional physical therapy services prior to discharge home     Objective    Cognition    Cognition / Consciousness WDL   Level of Consciousness Alert   Comments motivated however quick fatigue, apologetic due to fatigue   Supine Lower Body Exercise   Supine Lower Body Exercises Yes   Bridges Two Legged  (x5)   Heel Slide Bilateral;1 set of 10   Ankle Pumps 1 set of 10;Bilateral   Neuro-Muscular Treatments    Neuro-Muscular Treatments Anterior weight shift;Postural Changes;Postural Facilitation;Tactile Cuing;Sequencing;Weight Shift Right;Weight Shift Left   Neurological Concerns   Comments unable to mantain sitting balance todya due to rapid fatigue   Balance   Sitting Balance (Static) Poor   Sitting Balance (Dynamic) Poor -   Weight Shift Sitting Poor   Skilled Intervention Verbal Cuing;Sequencing   Comments only able to maintain sitting balance for a few seconds before slumping fwd and lateral LOB with fatigue   Bed Mobility    Supine to Sit Moderate Assist   Sit to Supine Moderate Assist   Scooting Moderate Assist   Skilled Intervention Verbal Cuing;Sequencing   Comments cues to sequence log roll   Gait Analysis   Gait Level Of Assist Unable to Participate   Functional Mobility   Sit to Stand Unable to Participate  (unable to attempt 2/2 poor postural control)   Skilled Intervention Verbal Cuing;Sequencing;Postural Facilitation   Short Term Goals    Short Term Goal # 1 pt will perform supine <> sit via log roll and SPV in 6 visits to get in/out of bed at home   Goal Outcome # 1 Progressing slower than expected   Short Term Goal # 2 pt will perform sit <> stand with LRAD and Mod A in 6 visits   Goal Outcome # 2 Progressing slower than expected   Short Term Goal # 3 pt will perform functional transfers with LRAD and Max A to improve mobility in 6 vistis   Goal Outcome # 3 Progressing slower than expected   Short Term Goal # 4 pt will ambulate > 10 ft with LRAD and Max A in 6 visits   Goal Outcome # 4 Goal not met   Short Term Goal # 5 pt will tolerate sitting EOB or in chair > 30 min in 6 visits   Goal Outcome # 5   (sat in chair x3 hrs last week with significant pain the next day)

## 2023-05-22 NOTE — PROGRESS NOTES
Hospital Medicine Daily Progress Note    Date of Service  5/22/2023    Chief Complaint  Tyrone Cline is a 83 y.o. male admitted 4/21/2023 with colitis    Hospital Course  This 82-year-old male with a past medical history significant for CKD stage IV being followed by nephrology, diabetes mellitus with glyco of 6.6, hypertension presented to ER on 4/21/2023 with a complaint of abdominal pain and lower extremity weakness.    S/p laparoscopic cholecystectomy with conversion to open cholecystectomy 4/18 /2023 , patient has completed treatment with IV antibiotics.    His hospital course was complicated with postoperative hypotension, went to ICU vasopressor and transferred back on 4/29 floor.    Of note, patient continues to have bilateral lower extremity weakness; MR T spine expansile T2 hyperintense area in the thoracic spinal cord at the levels of T2, T3 and T4.      Neurosurgery evaluated and s/p laminectomy.  Neurosurgery planning for repeat MRI in 1 month with neurosurgery Dr. Lucas   Nephrology evaluated and recommended for overload.        Interval Problem Update  5/16/2023  Vital signs remained stable  Persistent leukocytosis.  No sign of ongoing active infection  Renal function remained stable.    Neurosurgery evaluated and plan for laminectomy with subdural cyst exploration    5/17/2023  Vitals remain stable  Labs reviewed  ,noted leucocytosis ,  ,K 5.5 , blood glucose 329 , elevated BU/Cr   S/p Laminectomy  Currently on meropenem   Neurosurgery following   PT/OT eval     5/18/2023  Vitals remained stable  Labs reviewed, leukocytosis improving  Noted worsening BUN/creatinine  Neurosurgery following  Nephrology Dr Bedoya been Consulted for worsening renal function.  Eventually need placement.   aware    5/19/2023    On 3 to oxygen saturating over 90  Leukocytosis improving  Renal function improving  Neurosurgery following  Plan to DC drain today  Nephrology evaluated  We will  continue IV Lasix for now  Completed course of antibiotic  Eventually need placement.   aware      5/20/2023  Vitals remained stable  On 2 L oxygen saturating over 90  Pain well controlled    Labs reviewed.  Leukocytosis improving, hemoglobin stable    BMP reviewed, sodium 134, glucose 305, elevated BUN/creatinine    Continue to taper Decadron    Neurosurgery following    Continue IV Lasix    Repeat BMP in a.m. to monitor electrolytes and toxicity while on high-dose diuretics,     Repeat CBC in a.m. to monitor white count and hemoglobin      A.m labs ordered      5/21/2023  Vitals remained stable  Persistent leukocytosis in setting of steroid use.  No concern of ongoing active infection.  BUN/creatinine remain elevated  Patient reports of normal movement past 6 days  On bowel regimen.  Fleet enema ordered    Continue to taper Decadron  Neurosurgery following  BMP ordered to monitor electrolytes/kidney function    5/22/2023  Vitals remained stable  Leukocytosis likely in setting of IV Decadron  No concern of ongoing  Renal function remained stable (creatinine baseline)  Blood glucose level fluctuating    Patient appears euvolemic on exam.  Switch to 80 mg Lasix daily.    Discontinue IV Decadron    Neurosurgery planning for repeat MRI in 1 month with neurosurgery Dr. Lucas .    Continue wound VAC, wound care following    CBC, BMP ordered to repeat white count, electrolytes    Need placement.   assisting.    [facility] accepted [facility].  Bed a.m. level on 5/23  Plan to discharge in [place]     I have discussed this patient's plan of care and discharge plan at IDT rounds today with Case Management, Nursing, Nursing leadership, and other members of the IDT team.    Consultants/Specialty  general surgery and neurosurgery    Code Status  DNAR/DNI    Disposition  Discharge plan for PT/OT evaluation after laminectomy  I have placed the appropriate orders for post-discharge needs.    Review of  Systems  Review of Systems   Neurological:  Positive for weakness.        Physical Exam  Temp:  [36.3 °C (97.3 °F)-36.9 °C (98.4 °F)] 36.9 °C (98.4 °F)  Pulse:  [62-67] 67  Resp:  [17-18] 18  BP: (107-123)/(74-82) 107/74  SpO2:  [90 %-96 %] 94 %    Physical Exam  Eyes:      Pupils: Pupils are equal, round, and reactive to light.   Cardiovascular:      Rate and Rhythm: Normal rate and regular rhythm.      Pulses: Normal pulses.      Heart sounds: Normal heart sounds.   Abdominal:      General: Abdomen is flat.      Comments: Adominal wound  vac    Neurological:      General: No focal deficit present.      Mental Status: He is alert and oriented to person, place, and time.      Motor: Weakness present.         Fluids    Intake/Output Summary (Last 24 hours) at 5/22/2023 1033  Last data filed at 5/22/2023 0826  Gross per 24 hour   Intake 380 ml   Output 5250 ml   Net -4870 ml       Laboratory  Recent Labs     05/20/23  0024 05/21/23  0013   WBC 13.4* 16.7*   RBC 3.55* 3.68*   HEMOGLOBIN 10.2* 10.6*   HEMATOCRIT 31.5* 32.9*   MCV 88.7 89.4   MCH 28.7 28.8   MCHC 32.4* 32.2*   RDW 51.3* 53.1*   PLATELETCT 353 339   MPV 11.8 11.8     Recent Labs     05/20/23  0024 05/21/23  0013 05/22/23  0251   SODIUM 134* 135 138   POTASSIUM 4.6 4.5 4.7   CHLORIDE 98 99 97   CO2 22 23 27   GLUCOSE 305* 173* 229*   BUN 79* 85* 85*   CREATININE 1.85* 2.15* 2.12*   CALCIUM 8.6 8.8 9.2                     Imaging  DX-SPINE-ANY ONE VIEW   Final Result      Digitized intraoperative radiograph is submitted for review. This examination is not for diagnostic purpose but for guidance during a surgical procedure. Please see the patient's chart for full procedural details.         INTERPRETING LOCATION: [address] [place] [address], [id number]      DX-PORTABLE FLUORO > 1 HOUR   Final Result      Portable fluoroscopy utilized for 6 seconds.      INTERPRETING LOCATION: [address] [place] [address], [id number]      MR-THORACIC SPINE-WITH & W/O   Final Result      Compared  with the previous MRI ,again noted is expansile lesion in the thoracic spinal cord at the levels of T2, T3 and T4. There is prominent right dorsal subarachnoid space at the level of T3 with flattening of the spinal cord. There is mild contrast    enhancement noted in the expanded portion of the spinal cord. 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 Other differential diagnosis includes transverse myelitis However the possibility of right-sided dorsal arachnoid cyst/arachnoid    web at the level of T3 causing spinal cord edema cannot be entirely ruled out. Despite multiple MRs, it is difficult to characterize the tumor. Therefore if there is any neurosurgical planning, CT myelogram is recommended for further characterization.      IR-US GUIDED PIV   Final Result    Ultrasound-guided PERIPHERAL IV INSERTION performed by    qualified nursing staff as above.      MR-THORACIC SPINE-WITH & W/O   Final Result      1.  Limited study due to the motion artifact.   2.  When compared with the previous MRI again noted is expansile T2 hyperintense area in the thoracic spinal cord at the levels of T2, T3 and T4. There is also prominent dorsal right-sided subarachnoid space at this level. The differential diagnosis    includes intramedullary spinal cord tumor and extramedullary cyst/web with cord compression and edema.   3.  Pre and postcontrast MR examination of the thoracic spine is recommended with contrast under General anesthesia.   4.  1 -2 mm  T2-weighted sequences from T2 to T6 with smaller field of view is recommended for further characterization.      MR-THORACIC SPINE-WITH & W/O   Final Result         Stable appearance of expansile nonenhancing signal abnormality involving the upper thoracic cord between T1 and T4. Also noted is a small extra-axial collection/web along the right posterolateral spinal canal at T3-4 that deforms the cord and displaces    it anteriorly and to the left.   This could  represent an Arachnoid web or arachnoid cyst with extensive mass effect and secondary edema of the cord.   Recommend additional thin section T2 and post contrast images through T1-T6 to better assess the abnormality.      MR-BRAIN-WITH & W/O   Final Result         No acute intracranial process.      Age-related volume loss and chronic microvascular ischemic changes.         DX-CHEST-PORTABLE (1 VIEW)   Final Result         1.  Pulmonary edema and/or infiltrates are identified, which are stable since the prior exam.   2.  Cardiomegaly   3.  Atherosclerosis      DX-CHEST-PORTABLE (1 VIEW)   Final Result         1.  Pulmonary edema and/or infiltrates are identified, which are stable since the prior exam.   2.  Left PICC line tip terminates in the left axilla, stable since prior study.      DX-CHEST-PORTABLE (1 VIEW)   Final Result      1.  No significant change.   2.  Possible left PICC line with tip projecting at the axillary vein.      DX-CHEST-PORTABLE (1 VIEW)   Final Result      1.  Mildly improved pulmonary opacities.   2.  Possible left PICC line with tip projecting at the axillary vein.   3.  Likely trace right pleural effusion.      DX-CHEST-LIMITED (1 VIEW)   Final Result      1.  Placement of endotracheal tube with tip projecting over the mid trachea      2.  Left arm catheter present which is presumably venous in projects in the expected position of the left axillary vein      3.  Enlarged cardiac silhouette      NM-HEPATOBILIARY SCAN   Final Result      Hepatobiliary scan findings suspicious for acute cholecystitis.      MR-THORACIC SPINE-WITH & W/O   Final Result      1.  There is abnormal expansile T2 hyperintense lesion in the right side of the thoracic spinal cord at the levels of T2 and T3 Mild contrast enhancement is seen. This lesion is suspicious for spinal cord neoplasm. Other remote differential diagnosis    includes transverse myelitis.   2.  Exaggerated thoracic kyphosis.   3.  Thoracic  dextroscoliosis.   4.  Minimal degenerative changes.   5.  Right pleural effusion.      MR-LUMBAR SPINE-WITH & W/O   Final Result      1.  Multifocal degenerative disease in the lumbar spine as described above.   2.  Moderate central canal and severe lateral recess stenosis at the level of L4-5. The exiting bilateral L5 nerve roots might have been impinged at the lateral recess.      MR-CERVICAL SPINE-WITH & W/O   Final Result      1.  There is abnormal expansile intramedullary T2 signal intensity lesion in the right cerebellum. Thoracic spinal cord at the level of T2 on T3. Mild diffuse contrast enhancement is seen. This finding is suspicious for spinal cord neoplasm. The other    less likely differential diagnosis includes transverse myelitis. Follow-up is recommended after 4 weeks.   2.  Mild degenerative disease in the cervical spine as described above.   3.  There is no evidence of infection in the cervical spine.      US-RUQ   Final Result         1.  Acute cholecystitis.   2.  Atrophic right kidney.      DX-CHEST-LIMITED (1 VIEW)   Final Result      Perihilar interstitial edema and basilar atelectasis and/or consolidation. Underlying infection is possible.      MR-LUMBAR SPINE-W/O   Final Result      1.  Lower lumbar spine predominant degenerative changes as described in detail above, progressed from 2010 MRI.   2.  Edema at the L4-L5 disc space and L5 superior endplate likely represents degenerative changes. Less likely this could represent a low-grade or early discitis osteomyelitis. Correlate for evidence of infection.   3.  Acute appearing Schmorl's node at L3-L4, which can be a source of pain.      US-RENAL   Final Result      1.  Atrophic kidneys. Echogenic bilateral renal parenchyma could relate to medical renal disease.      2.  No hydronephrosis. No renal calculus.      DX-CHEST-PORTABLE (1 VIEW)   Final Result      No acute cardiac or pulmonary abnormalities are identified.      CT-ABDOMEN-PELVIS  W/O   Final Result      1.  Findings suspicious for focal colitis at the hepatic flexure, less likely cholecystitis or duodenitis with secondary involvement of the colon. Infectious, inflammatory and ischemic etiologies are considerations. Underlying mass is not excluded.   2.  Cholelithiasis with distention of the gallbladder   3.  BILATERAL renal atrophy   4.  Subcentimeter LEFT adrenal myelolipoma   5.  12 mm RIGHT adrenal nodule likely an adenoma absent a history of cancer   6.  Subcentimeter RIGHT hepatic lesion, likely a cyst absent a history of cancer   7.  Atherosclerosis   8.  Colonic diverticulosis      CT-HEAD W/O   Final Result      1.  Cerebral atrophy.      2.  White matter lucencies most consistent with small vessel ischemic change versus demyelination or gliosis.      3.  Otherwise, Head CT without contrast with no acute findings. No evidence of acute cerebral infarction, hemorrhage or mass lesion.                Assessment/Plan  * Acute gangrenous cholecystitis- (present on admission)  Assessment & Plan  Found to have gangrenous cholecystitis s/p laprascopic converted open cholecystectomy 4/28/2023   -- Completed antibiotic treatment from 4/25- 4/ 29, drain has been removed.  Follow surgical recommendation.    Surgical course was complicated with postoperative wound infection, culture has been obtained, wound care has been consulted  Wound cx growing ESBL E coli, s/p completion of 7 days course of antibiotic    Urinary retention  Assessment & Plan  Florez by  urology.    -- Do not change Florez without urology consent    Abnormal MRI- (present on admission)  Assessment & Plan  MRI on admission showing T2/T3  hyperintense lesion in the right side of the thoracic spinal cord at the levels of T2 and T3 Mild contrast enhancement is seen. This lesion is suspicious for spinal cord neoplasm or possible transverse myelitis       S/p Laminectomy  Neurosurgery planning for repeat MRI in 1 month with  neurosurgery Dr. Donaldson    Hypercalcemia- (present on admission)  Assessment & Plan  Likely due to immobility     -- monitor    Hypomagnesemia- (present on admission)  Assessment & Plan  Replete as needed    Cholelithiasis with acute cholecystitis- (present on admission)  Assessment & Plan  S/p surgical removal, converted to open cholecystectomy  NAHUN drain-removed on 5/8/2023   -- s/p completion of iv abx    Hypokalemia- (present on admission)  Assessment & Plan  Replete as needed    Thrombocytopenia (HCC)- (present on admission)  Assessment & Plan  Resolved, plt are stable at 427    High anion gap metabolic acidosis- (present on admission)  Assessment & Plan  Acute on chronic kidney disease, -appears back at baseline on 5/3/2023    Resolved    Hyponatremia- (present on admission)  Assessment & Plan  Resolved    Weakness of both lower extremities- (present on admission)  Assessment & Plan  MR T spine expansile T2 hyperintense area in the thoracic spinal cord at the levels of T2, T3 and T4.    S/p Laminectomy        Sepsis (HCC)- (present on admission)  Assessment & Plan  Completed 7 days course of antibiotic    Colitis- (present on admission)  Assessment & Plan  Cultures negative, monitor  Initial admitting impression, currently improved  Diet as tolerated     Acute kidney injury superimposed on CKD (HCC)- (present on admission)  Assessment & Plan  Worsening BUN/creatinine  Avoid nephrotoxin  Nephrology evaluated  Continue on Lasix    Patient is euvolemic we will switch to oral Lasix      Diabetic peripheral neuropathy (HCC)- (present on admission)  Assessment & Plan  Cymbalta, medical treatment    Essential hypertension- (present on admission)  Assessment & Plan  Continue amlodipine and coreg    Type 2 diabetes mellitus with kidney complication, without long-term current use of insulin (HCC)- (present on admission)  Assessment & Plan  On insulin regimen  Blood glucose level acceptable    Hypercholesterolemia-  (present on admission)  Assessment & Plan  Stable, continue Lipitor         VTE prophylaxis: SCDs/TEDs  On lovenox  I have performed a physical exam and reviewed and updated ROS and Plan today (5/22/2023). In review of yesterday's note (5/21/2023), there are no changes except as documented above.

## 2023-05-22 NOTE — WOUND TEAM
[facility]  Inpatient Services   Wound and Skin Care Evaluation    Admission Date: 4/21/2023     Last order of IP CONSULT TO WOUND CARE was found on 5/10/2023 from Hospital Encounter on 4/21/2023     HPI, PMH, SH: Reviewed    Past Surgical History:   Procedure Laterality Date    THORACIC LAMINECTOMY N/A 5/16/2023    Procedure: LAMINECTOMY, SPINE, THORACIC - T 2-4, INTRADURAL WASHOUT;  Surgeon: Fidencio Springer M.D.;  Location: [facility];  Service: Neurosurgery    ROMAN BY LAPAROSCOPY N/A 4/28/2023    Procedure: CHOLECYSTECTOMY, LAPAROSCOPIC ATTEMPTED, OPEN CHOLECYSTECTOMY;  Surgeon: Suraj Galvan M.D.;  Location: [facility];  Service: General    AK COLONOSCOPY,DIAGNOSTIC N/A 12/12/2021    Procedure: COLONOSCOPY;  Surgeon: Dariel Simons M.D.;  Location: [facility];  Service: Gastroenterology    AK UPPER GI ENDOSCOPY,BIOPSY N/A 12/12/2021    Procedure: GASTROSCOPY, WITH BIOPSY;  Surgeon: Dariel Simons M.D.;  Location: [facility];  Service: Gastroenterology    AK UPPER GI ENDOSCOPY,CTRL BLEED N/A 12/12/2021    Procedure: EGD, WITH CLIP PLACEMENT;  Surgeon: Dariel Simons M.D.;  Location: [facility];  Service: Gastroenterology    GASTROSCOPY W/PUSH ENTERSCOPY N/A 12/12/2021    Procedure: GASTROSCOPY, WITH PUSH ENTEROSCOPY;  Surgeon: Dariel Simons M.D.;  Location: [facility];  Service: Gastroenterology    SEPTAL RECONSTRUCTION  12/7/2015    Procedure: SEPTAL RECONSTRUCTION OPEN WITH  GRAFTS & CONCHAL CART GRAFT;  Surgeon: SYDNIE Gaitan M.D.;  Location: SURGERY SAME DAY [facility];  Service:     BLEPHAROPLASTY  7/23/2014    Performed by Gera Plasencia M.D. at [facility] ORS    BROW LIFT  7/23/2014    Performed by Gera Plasencia M.D. at [facility]    CATARACT PHACO WITH IOL  12/4/2012    Performed by Suraj Gonzalez M.D. at [facility]    CATARACT PHACO WITH IOL  11/20/2012     Performed by Suraj Gonzalez M.D. at [facility]    APPENDECTOMY      ARTHROSCOPY, KNEE      CARDIAC CATH, LEFT HEART      C out of concern for STEMI, normal coronaries with minimal atherosclerosis, diagnosed with PNA as cause of symptoms and ST changes.     OTHER      KNEE SURGERY - LEFT.    OTHER      NOSE SURGERY.    TONSILLECTOMY       Social History     Tobacco Use    Smoking status: Former     Packs/day: 0.20     Years: 6.00     Pack years: 1.20     Types: Cigarettes     Quit date:      Years since quittin.4    Smokeless tobacco: Never    Tobacco comments:     Stopped over 25 years ago.   Vaping Use    Vaping Use: Never used   Substance Use Topics    Alcohol use: No     Chief Complaint   Patient presents with    GLF     2 days ago. (-) thinners    Extremity Weakness     Chronic lower bilateral weakness.     Diagnosis: Acute kidney failure (HCC) [N17.9]  Sepsis (HCC) [A41.9]    Unit where seen by Wound Team: T4     WOUND CONSULT/FOLLOW UP RELATED TO:  abdominal vac    WOUND HISTORY:  Pt underwent an open cholecystectomy on  with Dr. Galvan.  Patient seen by wound team 04/10 for drainage to incision site, Aquacel AG was ordered.  Received new consult that incision is saturating through dressings.         WOUND ASSESSMENT/LDA     Negative Pressure Wound Therapy 23 Surgical Abdomen Right (Active)   Vacuum Serial Number [id number] 2130   NPWT Pump Mode / Pressure Setting Ulta;Continuous;125 mmHg    Dressing Type Medium;Black Foam (Regular)    Number of Foam Pieces Used 2    Canister Changed No    Output (mL) 200 mL    NEXT Dressing Change/Treatment Date 23    VAC VeraFlo Irrigant Other (Comments)    VAC VeraFlo Soak Time (mins) 0    VAC VeraFlo Instill Volume (ml) 0    VAC VeraFlo - Therapy Time (hrs) 0    VAC VeraFlo Pressure (mm/Hg) Continuous;125 mmHg    WOUND NURSE ONLY - Time Spent with Patient (mins) 60            Wound 23 Full Thickness Wound  Abdomen Right Transverse Incision  with Wound Vac (Active)   Wound Image    05/22/23 1100   Site Assessment Red;Brown;Slough    Periwound Assessment Clean;Intact;Dry    Margins Attached edges;Defined edges    Closure Secondary intention    Drainage Amount Small    Drainage Description Serosanguineous    Treatments Cleansed;Site care;Topical Lidocaine;Offloading;CSWD - Conservative Sharp Wound Debridement    Wound Cleansing Approved Wound Cleanser    Periwound Protectant Skin Protectant Wipes to Periwound;Drape    Dressing Cleansing/Solutions Not Applicable    Dressing Options Wound Vac    Dressing Changed Changed    Dressing Status Clean;Dry;Intact    Dressing Change/Treatment Frequency Monday, Wednesday, Friday, and As Needed    NEXT Dressing Change/Treatment Date 05/24/23    NEXT Weekly Photo (Inpatient Only) 05/29/23    Number of Staples Removed 5    Non-staged Wound Description Full thickness    Wound Length (cm) 2 cm    Wound Width (cm) 12.2 cm    Wound Depth (cm) 2.2 cm    Wound Surface Area (cm^2) 24.4 cm^2    Wound Volume (cm^3) 53.68 cm^3    Wound Healing % -2763    Shape Linear    Wound Odor None    Exposed Structures FLEX    WOUND NURSE ONLY - Time Spent with Patient (mins) 60          Vascular:    REX:   No results found.    Lab Values:    Lab Results   Component Value Date/Time    WBC 16.7 (H) 05/21/2023 12:13 AM    RBC 3.68 (L) 05/21/2023 12:13 AM    HEMOGLOBIN 10.6 (L) 05/21/2023 12:13 AM    HEMATOCRIT 32.9 (L) 05/21/2023 12:13 AM    CREACTPROT 33.53 (H) 04/23/2023 10:32 AM    SEDRATEWES 66 (H) 04/23/2023 10:32 AM    HBA1C 6.6 (H) 01/03/2023 08:45 AM      Culture Results show:  Recent Results (from the past 720 hour(s))   CULTURE WOUND W/ GRAM STAIN    Collection Time: 05/09/23  9:50 AM    Specimen: Abdominal; Wound   Result Value Ref Range    Significant Indicator POS (POS)     Source WND     Site ABDOMINAL     Culture Result - (A)     Gram Stain Result Moderate WBCs.  Moderate Gram negative rods.        Culture Result (A)      Escherichia coli ESBL  Moderate growth  Extended Spectrum Beta-lactamase (ESBL) isolated.  ESBL's may be clinically resistant to therapy with  Penicillins,Cephalosporins or Aztreonam despite  apparent in vitro susceptibility to some of these agents.  The patient requires contact isolation.  Please contact pharmacy or an Infectious Disease Specialist  if you have any questions about appropriate therapy.         Susceptibility    Escherichia coli esbl - DENICE     Ampicillin >16 Resistant mcg/mL     Ceftriaxone >32 Resistant mcg/mL     Cefazolin >16 Resistant mcg/mL     Ciprofloxacin >2 Resistant mcg/mL     Cefepime >16 Resistant mcg/mL     Cefuroxime >16 Resistant mcg/mL     Ampicillin/sulbactam >16/8 Resistant mcg/mL     Ertapenem <=0.5 Sensitive mcg/mL     Tobramycin >8 Resistant mcg/mL     Gentamicin >8 Resistant mcg/mL     Minocycline <=4 Sensitive mcg/mL     Moxifloxacin >4 Resistant mcg/mL     Pip/Tazobactam <=8 Sensitive mcg/mL     Trimeth/Sulfa <=0.5/9.5 Sensitive mcg/mL     Tigecycline <=2 Sensitive mcg/mL     Pain Level/Medicated: topical 4% lidocaine solution applied 30min prior to vac application.  PO pain meds given.              INTERVENTIONS BY WOUND TEAM:  Chart and images reviewed. Discussed with bedside RN. All areas of concern (based on picture review, LDA review and discussion with bedside RN) have been thoroughly assessed. Documentation of areas based on significant findings. This RN in to assess patient. Performed standard wound care which includes appropriate positioning, dressing removal and non-selective debridement. Pictures and measurements obtained weekly if/when required.  Preparation for Dressing removal: lidocaine Solution  Non-selectively Debrided with:  wound cleanser and gauze  Sharp debridement: slough and non viable tissue debrided away using  forceps and scissors. <20 cm2 debrided. scant bleeding noted, controlled with manual pressure.  Shaina wound: Cleansed  with wound cleanser and gauze, Prepped with no sting skin prep and drape  Primary Dressin piece of full thickness spiraled regular foam packed into wound depth and secured with drape  Secondary (Outer) Dressing: A hole was cut and a piece of half thickness circular black foam was applied as a button for trac pad. Regular trac pad applied.     [facility] Discharge Planning  Number of Clinicians necessary to complete wound care: 1  Is patient requiring IV pain medications for dressing changes: No  Length of time for dressing change 30 min. (This does not include chart review, pre-medication time, set up, clean up or time spent charting.)    Interdisciplinary consultation: Patient, Bedside RN (Marcie)    EVALUATION / RATIONALE FOR TREATMENT:  Most Recent Date:  23: Wound fully open, pockets opened up giving better visualization. Was able to debride large amount of slough from wound bed. Transitioned to regular wound vac for home. Pt likely to DC on 23. Will need follow up appointment on Wednesday or Thursday.    23:  wounds cleaning up but no real healthy granulation tissue noted.  Continue with veraflo while IP to cleanse wound and assist in moisture balance.    23: Wound continues to have old clot present. Wound continues to open the subcutaneous layer. Was able to better pack foam into the wound. Continued with POC.  23: Wound vac applied today to cleanse and debride while assisting in granulation tissue development. Wound team to change again on Tuesday and if wound looks ok will plan to stretch to Friday to get pt on MWF schedule.   23:  Dakins applied to chemically debride nonviable tissue, decrease bioburden and odor.  Likely hematoma under surface of incision, hydrogen to encourage breakdown of clot.  Spoke with Kenzie FALL (sx signed off 1 week ago) talk about placed vac previously.  Will allow dakins to debride and possibly place VAC on Monday.    5/10/23:  Patient with small dehiscence along transverse abdominal incision. Small amount of purulence expressed following cleansing. Aquacel Ag Hydrofiber applied to manage bioburden, absorb exudate, and maintain a moist wound environment without laterally wicking exudate therefore reducing latonya-wound maceration.      Goals: Steady decrease in wound area and depth weekly.    WOUND TEAM PLAN OF CARE ([X] for frequency of wound follow up,):   Nursing to follow dressing orders written for wound care. Contact wound team if area fails to progress, deteriorates or with any questions/concerns if something comes up before next scheduled follow up (See below as to whether wound is following and frequency of wound follow up)  Dressing changes by wound team:                   Follow up 3 times weekly:                NPWT change 3 times weekly:   X, Sunday, Tuesday then Friday, next week MWF  Follow up 1-2 times weekly:     Follow up Bi-Monthly:           Follow up Monthly (High Risk):                        Follow up as needed:     Other (explain):     NURSING PLAN OF CARE ORDERS (X):  Dressing changes: See Dressing Care orders: X  Skin care: See Skin Care orders:   RN Prevention Protocol:   Rectal tube care: See Rectal Tube Care orders:   Other orders:    RSKIN:   CURRENTLY IN PLACE (X), APPLIED THIS VISIT (A), ORDERED (O):   Q shift Jeremy:  X  Q shift pressure point assessments:  X    Surface/Positioning   Standard Mattress/Trauma Bed          Low Airloss        X  ICU Low Airloss   Bariatric COSME     Waffle cushion        Waffle Overlay          Reposition q 2 hours    X  TAPs Turning system     Z Les Pillow     Offloading/Redistribution   Sacral Offloading Dressing (Silicone dressing)   X  Heel Offloading Dressing (Silicone dressing)       X  Heel float boots (Prevalon boot)             Float Heels off Bed with Pillows           Respiratory NA  Silicone O2 tubing         Gray Foam Ear protectors     Cannula fixation Device (Tender  )          High flow offloading Clip    Elastic head band offloading device      Anchorfast                                                         Trach with Optifoam split foam             Containment/Moisture Prevention FLEX    Rectal tube or BMS    Purwick/Condom Cath        Florez Catheter    Barrier wipes           Barrier paste       Antifungal tx      Interdry        Mobilization FLEX      Up to chair        Ambulate      PT/OT      Nutrition       Dietician        Diabetes Education      PO   X  TF     TPN     NPO   # days     Other        Anticipated discharge plans: TBD  LTACH:        SNF/Rehab:                  Home Health Care:           Outpatient Wound Center:            Self/Family Care:        Other:                  Vac Discharge Needs:   Vac Discharge plan is purely a recommendation from wound team and not a requirement for discharge unless otherwise stated by physician.  Not Applicable Pt not on a wound vac:       Regular Vac while inpatient, alternative dressing at DC:        Regular Vac in use and continued at DC:            Reg. Vac w/ Skin Sub/Biologic in use. Will need to be changed 2x wkly:      Veraflo Vac while inpatient, ok to transition to Regular Vac on Discharge (Bedside RN to Clamp small instillation tubing at time of DC):    X       Veraflo Vac while inpatient, would benefit from remaining on Veraflo Vac upon discharge:

## 2023-05-23 ENCOUNTER — HOSPICE ADMISSION (OUTPATIENT)
Dept: HOSPICE | Facility: HOSPICE | Age: 83
End: 2023-05-23

## 2023-05-23 ENCOUNTER — HOME CARE VISIT (OUTPATIENT)
Dept: HOSPICE | Facility: HOSPICE | Age: 83
End: 2023-05-23

## 2023-05-23 PROBLEM — Z71.89 ACP (ADVANCE CARE PLANNING): Status: ACTIVE | Noted: 2023-05-23

## 2023-05-23 PROBLEM — D72.829 LEUKOCYTOSIS: Status: ACTIVE | Noted: 2023-05-23

## 2023-05-23 LAB
ANION GAP SERPL CALC-SCNC: 13 MMOL/L (ref 7–16)
BASOPHILS # BLD AUTO: 0.1 % (ref 0–1.8)
BASOPHILS # BLD: 0.03 K/UL (ref 0–0.12)
BUN SERPL-MCNC: 87 MG/DL (ref 8–22)
CALCIUM SERPL-MCNC: 9.3 MG/DL (ref 8.5–10.5)
CHLORIDE SERPL-SCNC: 95 MMOL/L (ref 96–112)
CO2 SERPL-SCNC: 27 MMOL/L (ref 20–33)
CREAT SERPL-MCNC: 2.32 MG/DL (ref 0.5–1.4)
EOSINOPHIL # BLD AUTO: 0.01 K/UL (ref 0–0.51)
EOSINOPHIL NFR BLD: 0 % (ref 0–6.9)
ERYTHROCYTE [DISTWIDTH] IN BLOOD BY AUTOMATED COUNT: 53.1 FL (ref 35.9–50)
GFR SERPLBLD CREATININE-BSD FMLA CKD-EPI: 27 ML/MIN/1.73 M 2
GLUCOSE BLD STRIP.AUTO-MCNC: 111 MG/DL (ref 65–99)
GLUCOSE BLD STRIP.AUTO-MCNC: 119 MG/DL (ref 65–99)
GLUCOSE BLD STRIP.AUTO-MCNC: 177 MG/DL (ref 65–99)
GLUCOSE SERPL-MCNC: 172 MG/DL (ref 65–99)
HCT VFR BLD AUTO: 32.9 % (ref 42–52)
HGB BLD-MCNC: 11 G/DL (ref 14–18)
IMM GRANULOCYTES # BLD AUTO: 0.11 K/UL (ref 0–0.11)
IMM GRANULOCYTES NFR BLD AUTO: 0.5 % (ref 0–0.9)
LYMPHOCYTES # BLD AUTO: 0.93 K/UL (ref 1–4.8)
LYMPHOCYTES NFR BLD: 4.4 % (ref 22–41)
MCH RBC QN AUTO: 29.4 PG (ref 27–33)
MCHC RBC AUTO-ENTMCNC: 33.4 G/DL (ref 32.3–36.5)
MCV RBC AUTO: 88 FL (ref 81.4–97.8)
MONOCYTES # BLD AUTO: 1.25 K/UL (ref 0–0.85)
MONOCYTES NFR BLD AUTO: 6 % (ref 0–13.4)
NEUTROPHILS # BLD AUTO: 18.64 K/UL (ref 1.82–7.42)
NEUTROPHILS NFR BLD: 89 % (ref 44–72)
NRBC # BLD AUTO: 0 K/UL
NRBC BLD-RTO: 0 /100 WBC (ref 0–0.2)
PLATELET # BLD AUTO: 318 K/UL (ref 164–446)
PMV BLD AUTO: 12.1 FL (ref 9–12.9)
POTASSIUM SERPL-SCNC: 4.5 MMOL/L (ref 3.6–5.5)
RBC # BLD AUTO: 3.74 M/UL (ref 4.7–6.1)
SODIUM SERPL-SCNC: 135 MMOL/L (ref 135–145)
WBC # BLD AUTO: 21 K/UL (ref 4.8–10.8)

## 2023-05-23 PROCEDURE — 700111 HCHG RX REV CODE 636 W/ 250 OVERRIDE (IP): Performed by: STUDENT IN AN ORGANIZED HEALTH CARE EDUCATION/TRAINING PROGRAM

## 2023-05-23 PROCEDURE — 700102 HCHG RX REV CODE 250 W/ 637 OVERRIDE(OP): Performed by: SURGERY

## 2023-05-23 PROCEDURE — 82962 GLUCOSE BLOOD TEST: CPT | Mod: 91

## 2023-05-23 PROCEDURE — 700102 HCHG RX REV CODE 250 W/ 637 OVERRIDE(OP): Performed by: FAMILY MEDICINE

## 2023-05-23 PROCEDURE — 85025 COMPLETE CBC W/AUTO DIFF WBC: CPT

## 2023-05-23 PROCEDURE — 700102 HCHG RX REV CODE 250 W/ 637 OVERRIDE(OP): Performed by: HOSPITALIST

## 2023-05-23 PROCEDURE — A9270 NON-COVERED ITEM OR SERVICE: HCPCS | Performed by: INTERNAL MEDICINE

## 2023-05-23 PROCEDURE — A9270 NON-COVERED ITEM OR SERVICE: HCPCS | Performed by: STUDENT IN AN ORGANIZED HEALTH CARE EDUCATION/TRAINING PROGRAM

## 2023-05-23 PROCEDURE — A9270 NON-COVERED ITEM OR SERVICE: HCPCS | Performed by: FAMILY MEDICINE

## 2023-05-23 PROCEDURE — 99233 SBSQ HOSP IP/OBS HIGH 50: CPT | Performed by: INTERNAL MEDICINE

## 2023-05-23 PROCEDURE — 36415 COLL VENOUS BLD VENIPUNCTURE: CPT

## 2023-05-23 PROCEDURE — 80048 BASIC METABOLIC PNL TOTAL CA: CPT

## 2023-05-23 PROCEDURE — A9270 NON-COVERED ITEM OR SERVICE: HCPCS | Performed by: HOSPITALIST

## 2023-05-23 PROCEDURE — A9270 NON-COVERED ITEM OR SERVICE: HCPCS | Performed by: SURGERY

## 2023-05-23 PROCEDURE — 770001 HCHG ROOM/CARE - MED/SURG/GYN PRIV*

## 2023-05-23 PROCEDURE — 99497 ADVNCD CARE PLAN 30 MIN: CPT | Performed by: INTERNAL MEDICINE

## 2023-05-23 PROCEDURE — 700102 HCHG RX REV CODE 250 W/ 637 OVERRIDE(OP): Performed by: STUDENT IN AN ORGANIZED HEALTH CARE EDUCATION/TRAINING PROGRAM

## 2023-05-23 PROCEDURE — 700102 HCHG RX REV CODE 250 W/ 637 OVERRIDE(OP): Performed by: INTERNAL MEDICINE

## 2023-05-23 RX ADMIN — INSULIN LISPRO 2 UNITS: 100 INJECTION, SOLUTION INTRAVENOUS; SUBCUTANEOUS at 09:43

## 2023-05-23 RX ADMIN — FAMOTIDINE 20 MG: 20 TABLET, FILM COATED ORAL at 05:47

## 2023-05-23 RX ADMIN — DOCUSATE SODIUM 50 MG AND SENNOSIDES 8.6 MG 2 TABLET: 8.6; 5 TABLET, FILM COATED ORAL at 05:47

## 2023-05-23 RX ADMIN — INSULIN LISPRO 4 UNITS: 100 INJECTION, SOLUTION INTRAVENOUS; SUBCUTANEOUS at 09:41

## 2023-05-23 RX ADMIN — OLMESARTAN MEDOXOMIL 20 MG: 20 TABLET, FILM COATED ORAL at 05:47

## 2023-05-23 RX ADMIN — SODIUM BICARBONATE 1300 MG: 650 TABLET ORAL at 17:43

## 2023-05-23 RX ADMIN — SODIUM BICARBONATE 1300 MG: 650 TABLET ORAL at 05:47

## 2023-05-23 RX ADMIN — TAMSULOSIN HYDROCHLORIDE 0.4 MG: 0.4 CAPSULE ORAL at 09:39

## 2023-05-23 RX ADMIN — OXYCODONE HYDROCHLORIDE 10 MG: 10 TABLET ORAL at 05:47

## 2023-05-23 RX ADMIN — FUROSEMIDE 80 MG: 20 TABLET ORAL at 05:47

## 2023-05-23 RX ADMIN — HEPARIN SODIUM 5000 UNITS: 5000 INJECTION, SOLUTION INTRAVENOUS; SUBCUTANEOUS at 05:47

## 2023-05-23 RX ADMIN — HEPARIN SODIUM 5000 UNITS: 5000 INJECTION, SOLUTION INTRAVENOUS; SUBCUTANEOUS at 21:12

## 2023-05-23 RX ADMIN — ALLOPURINOL 100 MG: 100 TABLET ORAL at 05:47

## 2023-05-23 RX ADMIN — ATORVASTATIN CALCIUM 10 MG: 10 TABLET, FILM COATED ORAL at 17:43

## 2023-05-23 RX ADMIN — DULOXETINE HYDROCHLORIDE 30 MG: 30 CAPSULE, DELAYED RELEASE ORAL at 17:43

## 2023-05-23 RX ADMIN — CARVEDILOL 12.5 MG: 12.5 TABLET, FILM COATED ORAL at 17:43

## 2023-05-23 RX ADMIN — OXYCODONE HYDROCHLORIDE 10 MG: 10 TABLET ORAL at 09:39

## 2023-05-23 ASSESSMENT — ENCOUNTER SYMPTOMS
SPEECH CHANGE: 0
ABDOMINAL PAIN: 0
WEAKNESS: 1
PHOTOPHOBIA: 0
CHILLS: 0
PALPITATIONS: 0
WEIGHT LOSS: 0
HEMOPTYSIS: 0
FEVER: 0
MYALGIAS: 0
ORTHOPNEA: 0
VOMITING: 0
CLAUDICATION: 0
DOUBLE VISION: 0
NAUSEA: 0
DIARRHEA: 0
COUGH: 0
NECK PAIN: 0
BLURRED VISION: 0
DIZZINESS: 0
CONSTIPATION: 0

## 2023-05-23 ASSESSMENT — PAIN DESCRIPTION - PAIN TYPE
TYPE: ACUTE PAIN;SURGICAL PAIN
TYPE: ACUTE PAIN
TYPE: ACUTE PAIN;SURGICAL PAIN
TYPE: ACUTE PAIN

## 2023-05-23 NOTE — THERAPY
Occupational Therapy  Discharge      Patient Name: Tyrone Cline  Age:  83 y.o., Sex:  male  Medical Record #: [id number]  Today's Date: 5/23/2023     Precautions  Precautions: Fall Risk, Spinal / Back Precautions   Comments: wound vac, quick fatigue    Pt reports he no longer wishes to participate in OT services; after palliative consult today, his POC is d/c with hospice. However, pt is aware that if he changes his mind he can request therapy from the physician and OT can be re-consulted.

## 2023-05-23 NOTE — DISCHARGE PLANNING
0840-  Agency/Facility Name: [facility]  Outcome: DPA left a voicemail for Enio in admissions regarding pt referral. DPA left phone number and requested a call back.     0933-  Agency/Facility Name: [facility]   Outcome: DPA left a voicemail for Анна in admissions regarding bed availability for pt. DPA left phone number and requested a call back.     4514-  Received Choice Form @: 1155  Agency/Facility Name: [facility]   Referral Sent per Choice Form @: 1238

## 2023-05-23 NOTE — DISCHARGE PLANNING
HTH/SCP TCN chart review completed. Collaborated with Denice MONTEJO. Current discharge considerations were for SNF - [facility]. However, per today's conversation with NIKIA, patient is stating he wants to go hospice. CM obtained choice. Family will be discussing possible DC to group home on hospice. No new TCN needs identified at this time.     TCN will continue to follow and collaborate with discharge planning team as additional post acute needs arise. Thank you.    Completed:  PT/OT recommends post acute placement    SLP  5/1 recs for no further needs  Physiatry recommending SNF as of 5/2 as well  Choice obtained: IRF (-> declines; recs SNF) and SNF choice obtained on 4/24/23.  HH choice on 4/24/23.  Infusion on 4/22/23; see above; accepted to [facility]  5/23 NIKIA obtained choice for hospice based on patient's wishes  [facility] referral sent 4/22/23

## 2023-05-23 NOTE — CARE PLAN
"The patient is Watcher - Medium risk of patient condition declining or worsening    Shift Goals  Clinical Goals: skin integrity preservation, pain control, encourage oral intake  Patient Goals: \"sleep\"  Family Goals: not currently present    Progress made toward(s) clinical / shift goals:  Patient has been referred for hospice services at his request and spoke with a palliative care team RN today. Patient is being encouraged to change position frequently.     Patient is not progressing towards the following goals: Patient has poor oral intake.     Problem: Knowledge Deficit - Standard  Goal: Patient and family/care givers will demonstrate understanding of plan of care, disease process/condition, diagnostic tests and medications  Outcome: Progressing     Problem: Pain - Standard  Goal: Alleviation of pain or a reduction in pain to the patient’s comfort goal  Outcome: Progressing     Problem: Mobility  Goal: Patient's capacity to carry out activities will improve  Outcome: Progressing  Flowsheets (Taken 5/23/2023 1317)  Mobility:   Encouraged mobilization per interdisciplinary team recommendations   Monitored for signs of activity intolerance         "

## 2023-05-23 NOTE — CARE PLAN
The patient is Stable - Low risk of patient condition declining or worsening    Shift Goals  Clinical Goals: pain control, encourage turns  Patient Goals: pain control, rest    Progress made toward(s) clinical / shift goals:  Pain being controlled with PRN pain medication per MAR, rest and repositioning as needed. Pt being encouraged and frequently asked to turn to prevent pressure injuries.    Patient is not progressing towards the following goals:

## 2023-05-23 NOTE — DISCHARGE PLANNING
HTH/SCP TCN chart review completed. Collaborated with Denice MONTEJO. Current discharge considerations are SNF. Per Denice MONTEJO was unable to accept this patient due to medical complexity. [facility] has accepted per chart review. No further TCN needs at this time.    TCN will continue to follow and collaborate with discharge planning team as additional post acute needs arise. Thank you.    Completed:  PT/OT recommends post acute placement    SLP  5/1 recs for no further needs  Physiatry recommending SNF as of 5/2 as well  Choice obtained: IRF (-> declines; recs SNF) and SNF choice obtained on 4/24/23.  HH choice on 4/24/23.  Infusion on 4/22/23; see above; accepted to [facility]  [facility] referral sent 4/22/23

## 2023-05-23 NOTE — DISCHARGE PLANNING
"Case Management Discharge Planning    Admission Date: 4/21/2023  GMLOS: 9.6  ALOS: 32    6-Clicks ADL Score: 17  6-Clicks Mobility Score: 6  PT and/or OT Eval ordered: Yes  Post-acute Referrals Ordered: Yes  Post-acute Choice Obtained: Yes  Has referral(s) been sent to post-acute provider:  Yes      Anticipated Discharge Dispo: Discharge Disposition: D/T to SNF with Medicare cert in anticipation of skilled care (03) vs Home on Hospice    DME Needed: No    Action(s) Taken: Updated Provider/Nurse on Discharge Plan    This RN CM spoke with Morgan CAN of [facility]  and per Morgan \" Pt  states he wants to be on Hospice.\"    Will need an order/referral for hospice.    Morgan to reach out to Skip , Pt's Son about Pt's decision.   This RN CM informed Dr Blankenship and JUAN JOSÉ Jack of this update via voalte.     This RN CM received a call from  Enio of [facility] that Pt  can be accepted but on short tem and not on Hospice.  They are still concerned about Pt's wbc is 21.0    [facility] did not accept Pt but Pt opted for  other Hosp[facility]  choices: [facility]. Choice was faxed to Eli HARRY.     This RN CM spoke with Chester, Liaison at [facility] for the referral.   Per Chester another Liaison will talk to Pt.     Per Morgan CAN , he and Ld , (Pt's Son) spoke and the plan is Ld to come this Friday and will look into Group Homes. Ld already have the GH List.     Per Lu of [facility], she and Ld spoke and  Ld will look into GH /ALFS starting today.    This RN CM will be on stand by assist.     Escalations Completed: None    Medically Clear: Yes    Next Steps:   This RN CM to continue to assist Pt with discharge as needed    Barriers to Discharge:   Pending Hospice  Pending Group Home    Is the patient up for discharge tomorrow: No        "

## 2023-05-23 NOTE — PROGRESS NOTES
4 Eyes Skin Assessment Completed by JUAN JOSÉ Jack and JUAN JOSÉ Diaz.    Head WDL  Ears WDL  Nose WDL  Mouth WDL  Neck Incision - posterior neck with steri strips  Breast/Chest WDL  Shoulder Blades WDL  Spine WDL  (R) Arm/Elbow/Hand WDL  (L) Arm/Elbow/Hand WDL  Abdomen Incision - mid transverse incision with wound vac in place; old NAHUN site RLQ  Groin WDL - Florez in place  Scrotum/Coccyx/Buttocks Redness, Blanching, and Excoriation - fragility on right buttock; barrier cream applied; mepilex not in place due to fecal incontinence  (R) Leg Edema  (L) Leg Edema  (R) Heel/Foot/Toe Edema - mepilex on heel  (L) Heel/Foot/Toe Edema - mepilex on heel          Devices In Places Pulse Ox and Florez; SCDs; Mepilexes      Interventions In Place TAP System, Pillows, Q2 Turns, Low Air Loss Mattress, Barrier Cream, and Dri-Les Pads    Possible Skin Injury Yes    Pictures Uploaded Into Epic Yes - by wound RN  Wound Consult Placed Yes - previously  RN Wound Prevention Protocol Ordered Yes - previously

## 2023-05-23 NOTE — HOSPICE
"[facility] referral[facility]ult response    Is this patient accepted to [facility]?: No  What hospice level of care?: routine  Approved by provider: Dr. Salguero    Anticipated DC date: ?  DC Barriers: care givers/placement  Additional Information: Pt wants hospice, he doesn't want to \"prolong the inevitable.\"  He requested that I call Skip to inform him of his decision.  Pt understands that the wound vac will be coming off with hospice.  Message left for Skip.  1200 Pt declined for hospice by Dr. Salguero.  Obtained new hospice choice form.  Ruddy called me back, states that he is more available during the day to return calls than his brother.  Updated him on new information.   list texted to Ruddy as he is coming to town on Friday & wants to start visiting .    "

## 2023-05-23 NOTE — PROGRESS NOTES
Hospital Medicine Daily Progress Note    Date of Service  5/23/2023    Chief Complaint  Tyrone Cline is a 83 y.o. male admitted 4/21/2023 with colitis    Hospital Course    83-year-old male with a past medical history significant for CKD stage IV being followed by nephrology, diabetes mellitus with glyco of 6.6, hypertension presented to ER on 4/21/2023 with a complaint of abdominal pain and lower extremity weakness.    S/p laparoscopic cholecystectomy with conversion to open cholecystectomy 4/18 /2023 , patient has completed treatment with IV antibiotics.    His hospital course was complicated with postoperative hypotension, went to ICU vasopressor and transferred back on 4/29 floor.    Of note, patient continues to have bilateral lower extremity weakness; MR T spine expansile T2 hyperintense area in the thoracic spinal cord at the levels of T2, T3 and T4.      Neurosurgery evaluated and s/p laminectomy.  Neurosurgery planning for repeat MRI in 1 month with neurosurgery Dr. Lucas   Nephrology evaluated and recommended for overload.    Patient is alert and oriented x3, Patient is able to make his own decision.  Goal of care was discussed with the patient and he does not want any more aggressive treatment and he wanted to be discharged on hospice.      interval Problem Update  -Evaluating examined the patient at bedside patient was alert oriented x3, denied any significant new symptoms.  -Labs showed worsening leukocytosis at this time we will keep holding any imaging since patient refused any aggressive treatment  -Kidney function around baseline  -Plan of care was discussed in detail with the patient and patient was to be discharged with hospice and refused any aggressive treatment, hospice referral was placed.  -The plan and goals of care was discussed in detail with the patient's son Mr. Fox [phone] discussed his father's wishes including comfort care and no aggressive treatment, he agreed.   He will be in town on Friday and keep working for hospice discharge.      I have discussed this patient's plan of care and discharge plan at IDT rounds today with Case Management, Nursing, Nursing leadership, and other members of the IDT team.    Consultants/Specialty  general surgery and neurosurgery    Code Status  DNAR/DNI    Disposition  Discharge plan for PT/OT evaluation after laminectomy  I have placed the appropriate orders for post-discharge needs.    Review of Systems  Review of Systems   Constitutional:  Positive for malaise/fatigue. Negative for chills, fever and weight loss.   HENT:  Negative for ear pain, hearing loss and tinnitus.    Eyes:  Negative for blurred vision, double vision and photophobia.   Respiratory:  Negative for cough and hemoptysis.    Cardiovascular:  Negative for chest pain, palpitations, orthopnea and claudication.   Gastrointestinal:  Negative for abdominal pain, constipation, diarrhea, nausea and vomiting.   Genitourinary:  Negative for dysuria, frequency and urgency.   Musculoskeletal:  Negative for myalgias and neck pain.   Skin:  Negative for rash.   Neurological:  Positive for weakness. Negative for dizziness and speech change.        Physical Exam  Temp:  [36.3 °C (97.3 °F)-36.8 °C (98.2 °F)] 36.3 °C (97.3 °F)  Pulse:  [69-93] 93  Resp:  [14-19] 18  BP: (105-134)/(71-81) 121/79  SpO2:  [91 %-93 %] 93 %    Physical Exam  Constitutional:       General: He is not in acute distress.     Appearance: He is ill-appearing.   Eyes:      Pupils: Pupils are equal, round, and reactive to light.   Cardiovascular:      Rate and Rhythm: Normal rate and regular rhythm.      Pulses: Normal pulses.      Heart sounds: Normal heart sounds. No murmur heard.  Abdominal:      General: Abdomen is flat.      Palpations: Abdomen is soft.      Tenderness: There is abdominal tenderness. There is no guarding.      Comments: Adominal wound  vac    Neurological:      General: No focal deficit present.       Mental Status: He is alert and oriented to person, place, and time.      Cranial Nerves: No cranial nerve deficit.      Motor: Weakness present.         Fluids    Intake/Output Summary (Last 24 hours) at 5/23/2023 1714  Last data filed at 5/23/2023 1515  Gross per 24 hour   Intake 360 ml   Output 1700 ml   Net -1340 ml       Laboratory  Recent Labs     05/21/23  0013 05/23/23  0708   WBC 16.7* 21.0*   RBC 3.68* 3.74*   HEMOGLOBIN 10.6* 11.0*   HEMATOCRIT 32.9* 32.9*   MCV 89.4 88.0   MCH 28.8 29.4   MCHC 32.2* 33.4   RDW 53.1* 53.1*   PLATELETCT 339 318   MPV 11.8 12.1     Recent Labs     05/21/23  0013 05/22/23  0251 05/23/23  0708   SODIUM 135 138 135   POTASSIUM 4.5 4.7 4.5   CHLORIDE 99 97 95*   CO2 23 27 27   GLUCOSE 173* 229* 172*   BUN 85* 85* 87*   CREATININE 2.15* 2.12* 2.32*   CALCIUM 8.8 9.2 9.3                     Imaging         Assessment/Plan  * Acute gangrenous cholecystitis- (present on admission)  Assessment & Plan  Found to have gangrenous cholecystitis s/p laprascopic converted open cholecystectomy 4/28/2023   -- Completed antibiotic treatment from 4/25- 4/ 29, drain has been removed.  Follow surgical recommendation.    Surgical course was complicated with postoperative wound infection, culture has been obtained, wound care has been consulted  Wound cx growing ESBL E coli, s/p completion of 7 days course of antibiotic    ACP (advance care planning)  Assessment & Plan  On 5/23 patient was alert oriented x3, the goal of care and CODE STATUS was discussed in detail with the patient, answered all his questions, patient is a clear that he does not want any aggressive treatment and he wants to be discharged with hospice.  Time 31 minutes    We will have family meeting and working to discharge the patient with hospice.    Leukocytosis  Assessment & Plan  Worsening  Since patient refused any aggressive treatment, will hold on CT scan at this time, patient finished a course of antibiotics  Labs  daily    Urinary retention  Assessment & Plan  Florez by  urology.    Do not change Florez without urology consent    Abnormal MRI- (present on admission)  Assessment & Plan  MRI on admission showing T2/T3  hyperintense lesion in the right side of the thoracic spinal cord at the levels of T2 and T3 Mild contrast enhancement is seen. This lesion is suspicious for spinal cord neoplasm or possible transverse myelitis       S/p Laminectomy  Neurosurgery planning for repeat MRI in 1 month with neurosurgery Dr. Donaldson    Hypercalcemia- (present on admission)  Assessment & Plan  Likely due to immobility     -- monitor    Hypomagnesemia- (present on admission)  Assessment & Plan  Replete as needed    Cholelithiasis with acute cholecystitis- (present on admission)  Assessment & Plan  S/p surgical removal, converted to open cholecystectomy  NAHUN drain-removed on 5/8/2023   -- s/p completion of iv abx    Hypokalemia- (present on admission)  Assessment & Plan  Replete as needed    Thrombocytopenia (HCC)- (present on admission)  Assessment & Plan  Resolved, plt are stable at 427    High anion gap metabolic acidosis- (present on admission)  Assessment & Plan  Acute on chronic kidney disease, -appears back at baseline on 5/3/2023    Resolved    Hyponatremia- (present on admission)  Assessment & Plan  Resolved    Weakness of both lower extremities- (present on admission)  Assessment & Plan  MR T spine expansile T2 hyperintense area in the thoracic spinal cord at the levels of T2, T3 and T4.    S/p Laminectomy        Sepsis (HCC)- (present on admission)  Assessment & Plan  Completed 7 days course of antibiotic    Colitis- (present on admission)  Assessment & Plan  Cultures negative, monitor  Initial admitting impression, currently improved  Diet as tolerated     Acute kidney injury superimposed on CKD (HCC)- (present on admission)  Assessment & Plan  Worsening BUN/creatinine  Avoid nephrotoxin  Nephrology evaluated  Continue oral  Lasix      Diabetic peripheral neuropathy (HCC)- (present on admission)  Assessment & Plan  Cymbalta, medical treatment    Essential hypertension- (present on admission)  Assessment & Plan  Continue amlodipine and coreg    Type 2 diabetes mellitus with kidney complication, without long-term current use of insulin (HCC)- (present on admission)  Assessment & Plan  On insulin regimen  A1c 6.6  Blood glucose level acceptable    Hypercholesterolemia- (present on admission)  Assessment & Plan  Stable, continue Lipitor    BPH (benign prostatic hyperplasia)- (present on admission)  Assessment & Plan  Keep the Rivera  Continue Flomax         VTE prophylaxis: SCDs/TEDs  On lovenox  I have performed a physical exam and reviewed and updated ROS and Plan today (5/23/2023). In review of yesterday's note (5/22/2023), there are no changes except as documented above.        Interventions to be considered in all patients in order to minimize the risk of delirium.   -do not disturb patient (vitals or lab draws) between the hours of 10 PM and 6 AM.  -ideally the patient should not sleep during the day and we should avoid day time naps.   -up in chair for meals  -ambulate at least three times daily, as able  -watch for constipation  -timed voiding - ask patient is she would like to go to the bathroom q 2-3 hours, except during the do not disturb hours.   -remove all necessary lines (central lines, peripheral IVs, feeding tubes, rivera catheters)  -unless patient has shown harm to self or others I would recommend against use of restraints - either chemical or physical (antipsychotics)   -minimize polypharmacy, do not dose medication during sleep hours

## 2023-05-23 NOTE — PROGRESS NOTES
Bedside report received.  Assessment complete.  A&O x 4. Patient calls appropriately.  Patient ambulates with max assist. Q2 turns in place.  Patient has 8/10 pain. Declines pain intervention at this time.  Denies N&V. Tolerating DM, renal diet.  Midline wound vac, no leaks, CDI. Incision to posterior neck, CDI.  + void via rivera, + flatus, - BM last BM 5/22.  Patient denies SOB.  SCD's on.  Review plan with of care with patient. Call light and personal belongings within reach. Hourly rounding in place. All needs met at this time.

## 2023-05-23 NOTE — PROGRESS NOTES
Patient told both his son Ruddy and this RN separately that he wanted to enter hospice services. Notified attending Dr. Smith via Voalte message and received confirmation at 1647 that a referral to palliative care had been made.

## 2023-05-23 NOTE — CONSULTS
Palliative Care   Records reviewed. End of Life Care Discussion order pending for hospice discussion. Will defer consult for now. Please reach out if complex palliative care needs arise.     Sanjana Narvaez A.P.R.N.  Palliative Care Nurse Practitioner  [phone]

## 2023-05-24 LAB
ALBUMIN SERPL BCP-MCNC: 2.5 G/DL (ref 3.2–4.9)
ALBUMIN/GLOB SERPL: 0.8 G/DL
ALP SERPL-CCNC: 119 U/L (ref 30–99)
ALT SERPL-CCNC: 19 U/L (ref 2–50)
ANION GAP SERPL CALC-SCNC: 13 MMOL/L (ref 7–16)
AST SERPL-CCNC: 14 U/L (ref 12–45)
BASOPHILS # BLD AUTO: 0.1 % (ref 0–1.8)
BASOPHILS # BLD: 0.02 K/UL (ref 0–0.12)
BILIRUB SERPL-MCNC: 0.5 MG/DL (ref 0.1–1.5)
BUN SERPL-MCNC: 99 MG/DL (ref 8–22)
CALCIUM ALBUM COR SERPL-MCNC: 10.4 MG/DL (ref 8.5–10.5)
CALCIUM SERPL-MCNC: 9.2 MG/DL (ref 8.5–10.5)
CHLORIDE SERPL-SCNC: 93 MMOL/L (ref 96–112)
CO2 SERPL-SCNC: 28 MMOL/L (ref 20–33)
CREAT SERPL-MCNC: 2.61 MG/DL (ref 0.5–1.4)
CRP SERPL HS-MCNC: 5.8 MG/DL (ref 0–0.75)
EOSINOPHIL # BLD AUTO: 0.01 K/UL (ref 0–0.51)
EOSINOPHIL NFR BLD: 0 % (ref 0–6.9)
ERYTHROCYTE [DISTWIDTH] IN BLOOD BY AUTOMATED COUNT: 52.3 FL (ref 35.9–50)
GFR SERPLBLD CREATININE-BSD FMLA CKD-EPI: 24 ML/MIN/1.73 M 2
GLOBULIN SER CALC-MCNC: 3.1 G/DL (ref 1.9–3.5)
GLUCOSE SERPL-MCNC: 142 MG/DL (ref 65–99)
HCT VFR BLD AUTO: 32.1 % (ref 42–52)
HGB BLD-MCNC: 10.6 G/DL (ref 14–18)
IMM GRANULOCYTES # BLD AUTO: 0.15 K/UL (ref 0–0.11)
IMM GRANULOCYTES NFR BLD AUTO: 0.7 % (ref 0–0.9)
LYMPHOCYTES # BLD AUTO: 0.85 K/UL (ref 1–4.8)
LYMPHOCYTES NFR BLD: 3.8 % (ref 22–41)
MAGNESIUM SERPL-MCNC: 2.3 MG/DL (ref 1.5–2.5)
MCH RBC QN AUTO: 29.1 PG (ref 27–33)
MCHC RBC AUTO-ENTMCNC: 33 G/DL (ref 32.3–36.5)
MCV RBC AUTO: 88.2 FL (ref 81.4–97.8)
MONOCYTES # BLD AUTO: 1.09 K/UL (ref 0–0.85)
MONOCYTES NFR BLD AUTO: 4.8 % (ref 0–13.4)
NEUTROPHILS # BLD AUTO: 20.46 K/UL (ref 1.82–7.42)
NEUTROPHILS NFR BLD: 90.6 % (ref 44–72)
NRBC # BLD AUTO: 0 K/UL
NRBC BLD-RTO: 0 /100 WBC (ref 0–0.2)
PLATELET # BLD AUTO: 312 K/UL (ref 164–446)
PMV BLD AUTO: 12.4 FL (ref 9–12.9)
POTASSIUM SERPL-SCNC: 4.8 MMOL/L (ref 3.6–5.5)
PROCALCITONIN SERPL-MCNC: 0.24 NG/ML
PROT SERPL-MCNC: 5.6 G/DL (ref 6–8.2)
RBC # BLD AUTO: 3.64 M/UL (ref 4.7–6.1)
SODIUM SERPL-SCNC: 134 MMOL/L (ref 135–145)
TEST NAME 95000: NORMAL
WBC # BLD AUTO: 22.6 K/UL (ref 4.8–10.8)

## 2023-05-24 PROCEDURE — 36415 COLL VENOUS BLD VENIPUNCTURE: CPT

## 2023-05-24 PROCEDURE — A9270 NON-COVERED ITEM OR SERVICE: HCPCS | Performed by: SURGERY

## 2023-05-24 PROCEDURE — 99497 ADVNCD CARE PLAN 30 MIN: CPT | Performed by: INTERNAL MEDICINE

## 2023-05-24 PROCEDURE — 83735 ASSAY OF MAGNESIUM: CPT

## 2023-05-24 PROCEDURE — 700102 HCHG RX REV CODE 250 W/ 637 OVERRIDE(OP): Performed by: NURSE PRACTITIONER

## 2023-05-24 PROCEDURE — A9270 NON-COVERED ITEM OR SERVICE: HCPCS | Performed by: HOSPITALIST

## 2023-05-24 PROCEDURE — 85025 COMPLETE CBC W/AUTO DIFF WBC: CPT

## 2023-05-24 PROCEDURE — 770001 HCHG ROOM/CARE - MED/SURG/GYN PRIV*

## 2023-05-24 PROCEDURE — 700111 HCHG RX REV CODE 636 W/ 250 OVERRIDE (IP): Performed by: STUDENT IN AN ORGANIZED HEALTH CARE EDUCATION/TRAINING PROGRAM

## 2023-05-24 PROCEDURE — 700102 HCHG RX REV CODE 250 W/ 637 OVERRIDE(OP): Performed by: HOSPITALIST

## 2023-05-24 PROCEDURE — 99222 1ST HOSP IP/OBS MODERATE 55: CPT | Performed by: NURSE PRACTITIONER

## 2023-05-24 PROCEDURE — 700102 HCHG RX REV CODE 250 W/ 637 OVERRIDE(OP): Performed by: SURGERY

## 2023-05-24 PROCEDURE — 99232 SBSQ HOSP IP/OBS MODERATE 35: CPT | Performed by: INTERNAL MEDICINE

## 2023-05-24 PROCEDURE — 80053 COMPREHEN METABOLIC PANEL: CPT

## 2023-05-24 PROCEDURE — 84145 PROCALCITONIN (PCT): CPT

## 2023-05-24 PROCEDURE — A9270 NON-COVERED ITEM OR SERVICE: HCPCS | Performed by: NURSE PRACTITIONER

## 2023-05-24 PROCEDURE — 86140 C-REACTIVE PROTEIN: CPT

## 2023-05-24 RX ORDER — OXYCODONE AND ACETAMINOPHEN 10; 325 MG/1; MG/1
1 TABLET ORAL EVERY 8 HOURS
Status: DISCONTINUED | OUTPATIENT
Start: 2023-05-24 | End: 2023-05-31 | Stop reason: HOSPADM

## 2023-05-24 RX ORDER — ATROPINE SULFATE 10 MG/ML
2 SOLUTION/ DROPS OPHTHALMIC EVERY 4 HOURS PRN
Status: DISCONTINUED | OUTPATIENT
Start: 2023-05-24 | End: 2023-05-31 | Stop reason: HOSPADM

## 2023-05-24 RX ORDER — OXYCODONE HYDROCHLORIDE 10 MG/1
10 TABLET ORAL EVERY 8 HOURS
Status: DISCONTINUED | OUTPATIENT
Start: 2023-05-24 | End: 2023-05-24

## 2023-05-24 RX ADMIN — FAMOTIDINE 20 MG: 20 TABLET, FILM COATED ORAL at 05:42

## 2023-05-24 RX ADMIN — SODIUM BICARBONATE 1300 MG: 650 TABLET ORAL at 05:42

## 2023-05-24 RX ADMIN — ALLOPURINOL 100 MG: 100 TABLET ORAL at 05:42

## 2023-05-24 RX ADMIN — OXYCODONE AND ACETAMINOPHEN 1 TABLET: 10; 325 TABLET ORAL at 14:49

## 2023-05-24 RX ADMIN — OXYCODONE HYDROCHLORIDE 10 MG: 10 TABLET ORAL at 01:34

## 2023-05-24 RX ADMIN — OXYCODONE HYDROCHLORIDE 10 MG: 10 TABLET ORAL at 20:30

## 2023-05-24 RX ADMIN — OXYCODONE HYDROCHLORIDE 10 MG: 10 TABLET ORAL at 08:04

## 2023-05-24 RX ADMIN — HEPARIN SODIUM 5000 UNITS: 5000 INJECTION, SOLUTION INTRAVENOUS; SUBCUTANEOUS at 05:42

## 2023-05-24 RX ADMIN — TAMSULOSIN HYDROCHLORIDE 0.4 MG: 0.4 CAPSULE ORAL at 08:04

## 2023-05-24 ASSESSMENT — ENCOUNTER SYMPTOMS
WEIGHT LOSS: 0
PHOTOPHOBIA: 0
NAUSEA: 0
VOMITING: 0
DIZZINESS: 0
DOUBLE VISION: 0
BLURRED VISION: 0
ABDOMINAL PAIN: 1
SHORTNESS OF BREATH: 0
BACK PAIN: 1
CONSTIPATION: 0
NECK PAIN: 0
FEVER: 0
WEAKNESS: 1
ABDOMINAL PAIN: 0
CHILLS: 0
DIARRHEA: 0
ORTHOPNEA: 0
DEPRESSION: 0
CLAUDICATION: 0
COUGH: 0
NERVOUS/ANXIOUS: 0
SPEECH CHANGE: 0
PALPITATIONS: 0
INSOMNIA: 0
HEMOPTYSIS: 0
MYALGIAS: 0

## 2023-05-24 ASSESSMENT — PAIN DESCRIPTION - PAIN TYPE
TYPE: ACUTE PAIN

## 2023-05-24 NOTE — DISCHARGE PLANNING
Case Management Discharge Planning    Admission Date: 4/21/2023  GMLOS: 9.6  ALOS: 33    6-Clicks ADL Score: 17  6-Clicks Mobility Score: 6  PT and/or OT Eval ordered: Yes  Post-acute Referrals Ordered: Yes  Post-acute Choice Obtained: Yes  Has referral(s) been sent to post-acute provider:  Yes      Anticipated Discharge Dispo: Discharge Disposition: D/T to SNF with medicare cert w/planned hosp IP readmit (83)     DME Needed: No    Action(s) Taken: Updated Provider/Nurse on Discharge Plan    Pt was discussed in IDT rounds today.   Per Dr Blankenship , Pt is refusing imaging and antibiotics and the  plan is to start Pt on Comfort Care.     Pt was also declined by [facility] for Pt has no qualifying diagnosis.      Escalations Completed: None    Medically Clear: No    Next Steps:   This RN CM to continue to assist Pt with discharge as needed    Barriers to Discharge:   Medical clearance  Comfort Care     Is the patient up for discharge tomorrow: No

## 2023-05-24 NOTE — PROGRESS NOTES
Patient changed his mind and permitted bed bath, linen change, and skin assessment.    4 Eyes Skin Assessment Completed by JUAN JOSÉ Jack and JUAN JOSÉ Cespedes.    Head WDL  Ears WDL  Nose WDL  Mouth WDL  Neck Incision - posterior neck with steri strips and tegaderm    Breast/Chest WDL  Shoulder Blades WDL  Spine WDL  (R) Arm/Elbow/Hand WDL  (L) Arm/Elbow/Hand WDL  Abdomen Incision - wound vac in place; old healed NAHUN drain site  Groin Florez catheter in place  Scrotum/Coccyx/Buttocks Redness, Blanching, and Excoriation - right buttock skin tear; barrier paste applied  (R) Leg WDL  (L) Leg WDL  (R) Heel/Foot/Toe Mepilex on heel changed  (L) Heel/Foot/Toe mepilex on heel changed          Devices In Places Pulse Ox, Florez, SCD's, and Nasal Cannula      Interventions In Place NC W/Ear Foams, Heel Mepilex, TAP System, Pillows, Q2 Turns, Low Air Loss Mattress, Barrier Cream, and Dri-Les Pads    Possible Skin Injury No    Pictures Uploaded Into Epic N/A done previously  Wound Consult Placed N/A done previously  RN Wound Prevention Protocol Ordered No  done previously

## 2023-05-24 NOTE — PROGRESS NOTES
Bedside report received.  Assessment complete.  A&O x 4. Patient calls appropriately.  Patient ambulates with max assist. Q2 turns in place.  Patient has 5/10 pain. Declines pain intervention at this time.  Denies N&V. Tolerating DM, renal diet.  Midline wound vac, no leaks, CDI. Incision to posterior neck, CDI.  + void via rivera, + flatus, - BM last BM 5/23.  Patient denies SOB.  SCD's on.  Review plan with of care with patient. Call light and personal belongings within reach. Hourly rounding in place. All needs met at this time.

## 2023-05-24 NOTE — THERAPY
Physical Therapy Contact Note    Patient Name: Tyrone Cline  Age:  83 y.o., Sex:  male  Medical Record #: [id number]  Today's Date: 5/24/2023    Pt now comfort care, will no longer actively follow for PT per pts wishes. Please re-consult as needed for DC planning.    Adrienne Macario, PT, DPT  Ext. [phone]

## 2023-05-24 NOTE — ASSESSMENT & PLAN NOTE
On 5/23 patient was alert oriented x3, the goal of care and CODE STATUS was discussed in detail with the patient, answered all his questions, patient is a clear that he does not want any aggressive treatment and he wants to be discharged with hospice.  Time 31 minutes    On 5/24 again the plan of care was discussed with the patient and his son, discussed all treatment options including aggressive treatment versus comfort care the patient is a clear that he wants to be comfortable with no aggressive treatment or images.  The son agreed and comfort care was initiated on 5/24, time 25 minutes

## 2023-05-24 NOTE — PROGRESS NOTES
Report received from Anthony CAN and care of patient assumed at 0700. Assessment complete. Patient is now on comfort care. Patient declined blood glucose check this morning. Call light remains within patient reach.

## 2023-05-24 NOTE — PROGRESS NOTES
Late entry    Pt declined skin check. RN educated on importance of checking skin each shift for breakdown. Charge RN notified.

## 2023-05-24 NOTE — DISCHARGE PLANNING
"TCN following. HTH/SCP chart review completed. Pt has transitioned to comfort care measures at this time. Please see prior TCN note on 5/23 for most recent dc planning recs prior to this transition. Discussed with CM and also noted per palliative note from today @10:22 \"Ld (pt's son) confirms he is arriving to [facility] tomorrow evening and has meetings with multiple hospice agencies on Friday morning and then wants to come see his dad.  We set up a meeting time for 3 PM on 5/26\". TCN will not actively follow given current GOC/POC and limited need for TCN services. However if GOC/POC should change, please reach out to TCN for assistance with dc planning as needed. Thank you.    "

## 2023-05-24 NOTE — PROGRESS NOTES
[facility] Medicine Daily Progress Note    Date of Service  5/24/2023    Chief Complaint  Tyrone Cline is a 83 y.o. male admitted 4/21/2023 with colitis    Hospital Course    83-year-old male with a past medical history significant for CKD stage IV, diabetes mellitus, hypertension presented to ER on 4/21/2023 with a complaint of abdominal pain and lower extremity weakness.  On admission patient was found to have leukocytosis 19.  CT scan for abdomen showed colitis.  Patient was diagnosed with sepsis, IV antibiotics and fluids were started.  Also on admission CT scan showed possible colitis HIDA scan was done and confirmed acute cholecystitis, general surgeon was consulted and patient underwent laparoscopic cholecystectomy with conversion to open cholecystectomy 4/18 /2023 , unfortunately patient developedpostoperative hypotension, went to ICU and patient needed vasopressor and transferred back on 4/29 floor.  Patient finished course of antibiotics meropenem for ESBL coli from intra-abdominal infection..    Patient came with lower extremity edema and patient continues to have bilateral lower extremity weakness; MR-T spine expansile T2 hyperintense area in the thoracic spinal cord at the levels of T2, T3 and T4.  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  Neurosurgery planning for repeat MRI in 1 month with neurosurgery Dr. Lucas     Patient has a chronic kidney disease stage IV, patient was evaluated by nephrologist and no need for dialysis, his kidney function stable around 2.4    Patient is alert and oriented x3, Patient is able to make his own decision.  Goal of care was discussed with the patient and he does not want any more aggressive treatment and he wanted to be discharged on hospice.  Comfort care was initiated on 5/24.          interval Problem Update  -Evaluating examined the patient at bedside patient was alert oriented x3, patient  has generalized weakness and refusing to get out of bed.  -Again today the lab showed worsening leukocytosis discussed with the patient images and the procedure, he refused.  -Kidney function around baseline  -The patient is alert and oriented x4 and he is able to make his own decision.  The plan and goals of care was discussed in detail with the patient at bedside and with patient's son Mr. Fox on the phone, discussed the possible of recurrent infection and I discussed the all treatment options including intervention, antibiotics and comfort care.  The patient is a clear that he wants to be in comfort care, comfortable in bed and no any aggressive treatment.  And the son agreed.  The son will come to [place] on Friday to discuss discharge options.        I have discussed this patient's plan of care and discharge plan at IDT rounds today with Case Management, Nursing, Nursing leadership, and other members of the IDT team.    Consultants/Specialty  general surgery and neurosurgery    Code Status  Comfort Care/DNR    Disposition  Medically clear for discharge with hospice    Review of Systems  Review of Systems   Constitutional:  Positive for malaise/fatigue. Negative for chills, fever and weight loss.   HENT:  Negative for ear pain, hearing loss and tinnitus.    Eyes:  Negative for blurred vision, double vision and photophobia.   Respiratory:  Negative for cough and hemoptysis.    Cardiovascular:  Negative for chest pain, palpitations, orthopnea and claudication.   Gastrointestinal:  Negative for abdominal pain, constipation, diarrhea, nausea and vomiting.   Genitourinary:  Negative for dysuria, frequency and urgency.   Musculoskeletal:  Negative for myalgias and neck pain.   Skin:  Negative for rash.   Neurological:  Positive for weakness. Negative for dizziness and speech change.        Physical Exam  Temp:  [36 °C (96.8 °F)-36.6 °C (97.9 °F)] 36.6 °C (97.9 °F)  Pulse:  [63-93] 72  Resp:  [16-20] 16  BP:  ()/(49-79) 102/61  SpO2:  [90 %-94 %] 92 %    Physical Exam  Constitutional:       General: He is not in acute distress.     Appearance: He is ill-appearing.   Eyes:      Pupils: Pupils are equal, round, and reactive to light.   Cardiovascular:      Rate and Rhythm: Normal rate and regular rhythm.      Pulses: Normal pulses.      Heart sounds: Normal heart sounds. No murmur heard.  Abdominal:      General: Abdomen is flat.      Palpations: Abdomen is soft.      Tenderness: There is abdominal tenderness. There is no guarding.      Comments: Adominal wound  vac    Neurological:      General: No focal deficit present.      Mental Status: He is alert and oriented to person, place, and time.      Cranial Nerves: No cranial nerve deficit.      Motor: Weakness present.         Fluids    Intake/Output Summary (Last 24 hours) at 5/24/2023 1118  Last data filed at 5/24/2023 1000  Gross per 24 hour   Intake 180 ml   Output 1300 ml   Net -1120 ml       Laboratory  Recent Labs     05/23/23  0708 05/24/23  0243   WBC 21.0* 22.6*   RBC 3.74* 3.64*   HEMOGLOBIN 11.0* 10.6*   HEMATOCRIT 32.9* 32.1*   MCV 88.0 88.2   MCH 29.4 29.1   MCHC 33.4 33.0   RDW 53.1* 52.3*   PLATELETCT 318 312   MPV 12.1 12.4     Recent Labs     05/22/23  0251 05/23/23  0708 05/24/23  0243   SODIUM 138 135 134*   POTASSIUM 4.7 4.5 4.8   CHLORIDE 97 95* 93*   CO2 27 27 28   GLUCOSE 229* 172* 142*   BUN 85* 87* 99*   CREATININE 2.12* 2.32* 2.61*   CALCIUM 9.2 9.3 9.2                     Imaging         Assessment/Plan  * Acute gangrenous cholecystitis- (present on admission)  Assessment & Plan  Found to have gangrenous cholecystitis s/p laprascopic converted open cholecystectomy 4/28/2023   -- Completed antibiotic treatment from 4/25- 4/ 29, drain has been removed.  Follow surgical recommendation.    Surgical course was complicated with postoperative wound infection, culture has been obtained, wound care has been consulted  Wound cx growing ESBL E coli,  s/p completion of 7 days course of antibiotic    ACP (advance care planning)  Assessment & Plan  On 5/23 patient was alert oriented x3, the goal of care and CODE STATUS was discussed in detail with the patient, answered all his questions, patient is a clear that he does not want any aggressive treatment and he wants to be discharged with hospice.  Time 31 minutes    On 5/24 again the plan of care was discussed with the patient and his son, discussed all treatment options including aggressive treatment versus comfort care the patient is a clear that he wants to be comfortable with no aggressive treatment or images.  The son agreed and comfort care was initiated on 5/24, time 25 minutes    Leukocytosis  Assessment & Plan  Worsening  Since patient refused any aggressive treatment, will hold on CT scan at this time, patient finished a course of antibiotics  Labs daily    Urinary retention  Assessment & Plan  Florez by  urology.    Do not change Florez without urology consent    Abnormal MRI- (present on admission)  Assessment & Plan  MRI on admission showing T2/T3  hyperintense lesion in the right side of the thoracic spinal cord at the levels of T2 and T3 Mild contrast enhancement is seen. This lesion is suspicious for spinal cord neoplasm or possible transverse myelitis       S/p Laminectomy  Neurosurgery planning for repeat MRI in 1 month with neurosurgery Dr. Donaldson  Patient wants to be on comfort care and refused any more images or aggressive treatment    Hypercalcemia- (present on admission)  Assessment & Plan  Likely due to immobility     -- monitor    Hypomagnesemia- (present on admission)  Assessment & Plan  Replete as needed    Cholelithiasis with acute cholecystitis- (present on admission)  Assessment & Plan  S/p surgical removal, converted to open cholecystectomy  NAHUN drain-removed on 5/8/2023   -- s/p completion of iv abx  Worsening leukocytosis on 5/24, patient refused any procedures or images and he wants to  be on comfort care.    Hypokalemia- (present on admission)  Assessment & Plan  Replete as needed    Thrombocytopenia (HCC)- (present on admission)  Assessment & Plan  Resolved, plt are stable at 427    High anion gap metabolic acidosis- (present on admission)  Assessment & Plan  Acute on chronic kidney disease, -appears back at baseline on 5/3/2023    Resolved    Hyponatremia- (present on admission)  Assessment & Plan  Resolved    Weakness of both lower extremities- (present on admission)  Assessment & Plan  MR T spine expansile T2 hyperintense area in the thoracic spinal cord at the levels of T2, T3 and T4.    S/p Laminectomy  Patient refused any other procedures and he wanted to be in comfort care      Sepsis (HCC)- (present on admission)  Assessment & Plan  Completed 7 days course of antibiotic  Comfort care and patient does not want to repeat any images or labs    Colitis- (present on admission)  Assessment & Plan  Cultures negative, monitor  Initial admitting impression, currently improved  Diet as tolerated     Acute kidney injury superimposed on CKD (HCC)- (present on admission)  Assessment & Plan  Worsening BUN/creatinine  Avoid nephrotoxin  Nephrology evaluated  Continue oral Lasix      Diabetic peripheral neuropathy (HCC)- (present on admission)  Assessment & Plan  Cymbalta, medical treatment    Essential hypertension- (present on admission)  Assessment & Plan  Continue amlodipine and coreg    Type 2 diabetes mellitus with kidney complication, without long-term current use of insulin (HCC)- (present on admission)  Assessment & Plan  On insulin regimen  A1c 6.6  Blood glucose level acceptable    Hypercholesterolemia- (present on admission)  Assessment & Plan  Stable, continue Lipitor    BPH (benign prostatic hyperplasia)- (present on admission)  Assessment & Plan  Keep the Florez  Continue Flomax         VTE prophylaxis: SCDs/TEDs  On lovenox  I have performed a physical exam and reviewed and updated ROS and  Plan today (5/24/2023). In review of yesterday's note (5/23/2023), there are no changes except as documented above.        Interventions to be considered in all patients in order to minimize the risk of delirium.   -do not disturb patient (vitals or lab draws) between the hours of 10 PM and 6 AM.  -ideally the patient should not sleep during the day and we should avoid day time naps.   -up in chair for meals  -ambulate at least three times daily, as able  -watch for constipation  -timed voiding - ask patient is she would like to go to the bathroom q 2-3 hours, except during the do not disturb hours.   -remove all necessary lines (central lines, peripheral IVs, feeding tubes, rivera catheters)  -unless patient has shown harm to self or others I would recommend against use of restraints - either chemical or physical (antipsychotics)   -minimize polypharmacy, do not dose medication during sleep hours

## 2023-05-24 NOTE — CARE PLAN
The patient is Stable - Low risk of patient condition declining or worsening    Shift Goals  Clinical Goals: pain control, provide comfort  Patient Goals: rest    Progress made toward(s) clinical / shift goals:  Pain being controlled with rest. Pt declining to reposition even though repeatedly encouraged. Pt also encouraged to call if uncomfortable with his position or in pain.    Patient is not progressing towards the following goals:

## 2023-05-24 NOTE — DISCHARGE PLANNING
0840-  Agency/Facility Name: [facility]  Spoke To: Lu   Outcome: Per Lu, admissions cannot find a qualifying diagnosis in order to accept pt referral therefore, pt referral is declined.     RN CM notified.

## 2023-05-24 NOTE — CARE PLAN
The patient is Watcher - Medium risk of patient condition declining or worsening    Shift Goals  Clinical Goals: comfort care  Patient Goals: comfort care  Family Goals: not currently at patient bedside    Progress made toward(s) clinical / shift goals:  Patient transitioned to comfort care today. Patient has been resting comfortably. Pain has been medicated per MAR.     Problem: Knowledge Deficit - Standard  Goal: Patient and family/care givers will demonstrate understanding of plan of care, disease process/condition, diagnostic tests and medications  Outcome: Progressing     Problem: Skin Integrity  Goal: Skin integrity is maintained or improved  Outcome: Progressing     Problem: Respiratory  Goal: Patient will achieve/maintain optimum respiratory ventilation and gas exchange  Outcome: Progressing     Problem: Nutrition  Goal: Enteral nutrition will be maintained or improve  Outcome: Met  Goal: Enteral nutrition will be maintained or improve  Outcome: Met     Patient is not progressing towards the following goals:    Problem: Nutrition  Goal: Patient's nutritional and fluid intake will be adequate or improve  5/24/2023 1134 by Marcie Agustin, R.N.  Outcome: Not Progressing  Note: Patient has poor oral intake.   5/24/2023 1134 by Marcie Agustin, R.N.  Note: Patient has poor oral intake.

## 2023-05-24 NOTE — CONSULTS
"MRN: [id number]  Date of initial palliative care consult: 5/23/2024  Reason for palliative medicine consultation/visit: Advance care planning  Referring provider: Dr. Smith  Location of consult: [place] [address]  Additional consulting services: Neurology, nephrology, PM&R, general surgery, neurosurgery, surgical urology, ID    HPI:   Tyrone Cline is a 83 y.o. male with past medical history significant for chronic kidney disease followed by nephrology with baseline creatinine about 2.2, non-insulin-dependent diabetes mellitus, hypertension, recent acute gangrenous cholecystitis status post laparoscopic cholecystectomy 4/18 admitted 4/21/2023 following a ground-level fall, abdominal pain, and lower extremity weakness.  Work-up included CT imaging positive for colitis, leukocytosis, and SANJUANITA with creatinine with 5.5.  Nephrology consulted for SANJUANITA on CKD 4/22. Neurology consulted and following 4/21 for leg weakness.  MRIs revealed extradural mass.  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  Surgical urology consulted 5/9 for urinary retention and inability of nursing to pass catheter (prior TURP) and is status post cystoscopy with complex catheterization.    Pain History:  Onset: Since just prior to hospitalization  Location: Right sided abdomen and mid back  Duration: Constant  Characteristics: Sharp  Aggravating factors: Movement/rolling  Alleviating factors: Laying flat and pain medications  Radiation: Non radiating  Treatments: Patient receiving oxycodone 10 mg PO on an as-needed basis   Severity: Worst 8/10, best after medication 2-3/10    Additional symptoms: None    Medication Allergy/Sensitivities:  Allergies   Allergen Reactions    Ether Unspecified     \"Violently sick\"     ROS:    Review of Systems   Respiratory:  Negative for shortness of breath.    Cardiovascular:  Positive for leg swelling.   Gastrointestinal:  Positive for " abdominal pain. Negative for nausea and vomiting.   Genitourinary:         Indwelling urinary catheter   Musculoskeletal:  Positive for back pain.   Neurological:  Positive for weakness.   Psychiatric/Behavioral:  Negative for depression. The patient is not nervous/anxious and does not have insomnia.      PE:   Recent vital signs  BMI: Body mass index is 27.69 kg/m².    Temp (24hrs), Av.3 °C (97.3 °F), Min:36 °C (96.8 °F), Max:36.6 °C (97.9 °F)  Temperature: 36.6 °C (97.9 °F)  Pulse  Av.1  Min: 52  Max: 96   Blood Pressure : 102/61       Physical Exam  Constitutional:       Appearance: He is ill-appearing.   HENT:      Mouth/Throat:      Mouth: Mucous membranes are moist.      Pharynx: Oropharynx is clear.   Eyes:      Extraocular Movements: Extraocular movements intact.   Cardiovascular:      Rate and Rhythm: Normal rate.   Pulmonary:      Effort: Pulmonary effort is normal. No respiratory distress.   Abdominal:      Palpations: Abdomen is soft.      Tenderness: There is abdominal tenderness in the right upper quadrant.      Comments: Wound VAC in place   Genitourinary:     Comments: Indwelling urinary catheter  Musculoskeletal:      Right lower leg: Edema present.      Left lower leg: Edema present.   Skin:     Coloration: Skin is pale.   Neurological:      Mental Status: He is disoriented.      Motor: Atrophy present.      Comments: Thought it was 2023, oriented to person, place, and medical situation   Psychiatric:         Attention and Perception: Attention normal.         Mood and Affect: Mood normal.         Behavior: Behavior normal.         Thought Content: Thought content normal.       Recent Labs     23  0251 23  0708 23  0243   SODIUM 138 135 134*   POTASSIUM 4.7 4.5 4.8   CHLORIDE 97 95* 93*   CO2 27 27 28   GLUCOSE 229* 172* 142*   BUN 85* 87* 99*   CREATININE 2.12* 2.32* 2.61*   CALCIUM 9.2 9.3 9.2     Recent Labs     23  0708 23  0243   WBC 21.0* 22.6*    RBC 3.74* 3.64*   HEMOGLOBIN 11.0* 10.6*   HEMATOCRIT 32.9* 32.1*   MCV 88.0 88.2   MCH 29.4 29.1   MCHC 33.4 33.0   RDW 53.1* 52.3*   PLATELETCT 318 312   MPV 12.1 12.4       ASSESSMENT/PLAN WITH SHARED DECISION MAKING:   Medications reviewed. Labs Reviewed.   Pertinent imaging reviewed.    83-year-old male with baseline CKD admitted with SANJUANITA and has undergone open cholecystectomy, laminectomy, and surgical urology urinary catheter placement.  Comfort care initiated  as patient does not wish to pursue further disease modifying therapy and is seeking comfort focused care and hospice care.    PHYSICAL ASPECTS OF CARE  Palliative Performance Scale: 40%    #SANJUANITA on CKD with creatinine clearance of 23.2 mL/min  #Postoperative abdominal and back pain  #Urinary retention and BPH with indwelling urinary catheter  #Bilateral lower extremity weakness  #Colitis status post surgical intervention  #Non-IDDM  #Poor appetite and hypoalbuminemia    Continue duloxetine 30 mg PO daily; will not increase given creatinine clearance of 23.2 mL/min  We will start oxycodone-acetaminophen  1 tab every 8 hours to provide some scheduled pain relief  Continue oxycodone 5-10 mg PO Q 3 hours PRN moderate to severe breakthrough pain  We will avoid morphine given SANJUANITA/CKD  Patient does not wish to explore appetite stimulant    SOCIAL ASPECTS OF CARE  Patient is  for 9 years.  His wife of 44 years  of pancreatic cancer.  Patient has two sons one who lives locally [facility] and one who lives in [facility] who will be visiting from [place] tomorrow evening and helping patient with plan of care.    SPIRITUAL ASPECTS OF CARE   Christianity; declined spiritual care visit.    GOALS OF CARE/SERIOUS ILLNESS CONVERSATION  Patient confirms wishes against any further disease modifying treatment or pursuing rehabilitative therapy.  He wishes to pursue comfort focused treatment only and hospice care.  Patient reports he would like to  "explore postacute discharge plan where he can be cared for long-term.  Thus far patient has been evaluated by [facility] and unfortunately does not have a qualifying hospice diagnosis per their review.  Patient deferring decision making assistance to his son Ld and is agreeable to me calling.  Left contact information with patient and confirmed I will arrange meeting time with his sons.    Call placed to patient's son Ld who lives in [place].  Introduced myself and discussed role on patient's care team.  Ld confirms he is arriving to [facility] tomorrow evening and has meetings with multiple hospice agencies on Friday morning and then wants to come see his dad.  We set up a meeting time for 3 PM on 5/26.  I provided my contact information should Ld need to change the time or cancel meeting.  Outcome: Meeting set up for Friday at 3 PM with patient's son Ld (Ld is meeting with multiple hospice agencies Friday morning).  Continue comfort focused care.    Code Status: DNR/comfort care    ACP Documents: Advance directive on file and reviewed.  Recommended POLST form prior to discharge.      I spent a total of 60 minutes reviewing medical records, direct face-to-face time with the patient and/or family, documentation and coordination of care.This is separate from the time spent on advance care planning, which is documented above.    Sanjana \"Farida\" GANESH Coombs, [facility]  Palliative Care Nurse Practitioner  [phone]      "

## 2023-05-25 LAB
GLUCOSE BLD STRIP.AUTO-MCNC: 122 MG/DL (ref 65–99)
GLUCOSE BLD STRIP.AUTO-MCNC: 162 MG/DL (ref 65–99)
GLUCOSE BLD STRIP.AUTO-MCNC: 257 MG/DL (ref 65–99)
GLUCOSE BLD STRIP.AUTO-MCNC: 65 MG/DL (ref 65–99)

## 2023-05-25 PROCEDURE — 770001 HCHG ROOM/CARE - MED/SURG/GYN PRIV*

## 2023-05-25 PROCEDURE — 700102 HCHG RX REV CODE 250 W/ 637 OVERRIDE(OP): Performed by: HOSPITALIST

## 2023-05-25 PROCEDURE — 302098 PASTE RING (FLAT): Performed by: INTERNAL MEDICINE

## 2023-05-25 PROCEDURE — A9270 NON-COVERED ITEM OR SERVICE: HCPCS | Performed by: INTERNAL MEDICINE

## 2023-05-25 PROCEDURE — A9270 NON-COVERED ITEM OR SERVICE: HCPCS | Performed by: HOSPITALIST

## 2023-05-25 PROCEDURE — 97602 WOUND(S) CARE NON-SELECTIVE: CPT

## 2023-05-25 PROCEDURE — 700102 HCHG RX REV CODE 250 W/ 637 OVERRIDE(OP): Performed by: INTERNAL MEDICINE

## 2023-05-25 PROCEDURE — 82962 GLUCOSE BLOOD TEST: CPT | Mod: 91

## 2023-05-25 PROCEDURE — 700111 HCHG RX REV CODE 636 W/ 250 OVERRIDE (IP): Performed by: HOSPITALIST

## 2023-05-25 PROCEDURE — A9270 NON-COVERED ITEM OR SERVICE: HCPCS | Performed by: NURSE PRACTITIONER

## 2023-05-25 PROCEDURE — 99232 SBSQ HOSP IP/OBS MODERATE 35: CPT | Performed by: INTERNAL MEDICINE

## 2023-05-25 PROCEDURE — 700102 HCHG RX REV CODE 250 W/ 637 OVERRIDE(OP): Performed by: NURSE PRACTITIONER

## 2023-05-25 RX ADMIN — OXYCODONE AND ACETAMINOPHEN 1 TABLET: 10; 325 TABLET ORAL at 06:02

## 2023-05-25 RX ADMIN — TAMSULOSIN HYDROCHLORIDE 0.4 MG: 0.4 CAPSULE ORAL at 08:25

## 2023-05-25 RX ADMIN — MORPHINE SULFATE 4 MG: 4 INJECTION, SOLUTION INTRAMUSCULAR; INTRAVENOUS at 11:15

## 2023-05-25 RX ADMIN — INSULIN LISPRO 4 UNITS: 100 INJECTION, SOLUTION INTRAVENOUS; SUBCUTANEOUS at 08:39

## 2023-05-25 RX ADMIN — OXYCODONE AND ACETAMINOPHEN 1 TABLET: 10; 325 TABLET ORAL at 15:39

## 2023-05-25 RX ADMIN — INSULIN LISPRO 5 UNITS: 100 INJECTION, SOLUTION INTRAVENOUS; SUBCUTANEOUS at 08:38

## 2023-05-25 RX ADMIN — OLMESARTAN MEDOXOMIL 20 MG: 20 TABLET, FILM COATED ORAL at 06:03

## 2023-05-25 ASSESSMENT — ENCOUNTER SYMPTOMS
ABDOMINAL PAIN: 0
FEVER: 0
DIZZINESS: 0
SPEECH CHANGE: 0
WEIGHT LOSS: 0
PHOTOPHOBIA: 0
NAUSEA: 0
DIARRHEA: 0
ORTHOPNEA: 0
CONSTIPATION: 0
PALPITATIONS: 0
COUGH: 0
CLAUDICATION: 0
WEAKNESS: 1
BLURRED VISION: 0
MYALGIAS: 0
NECK PAIN: 0
VOMITING: 0
DOUBLE VISION: 0
CHILLS: 0
HEMOPTYSIS: 0

## 2023-05-25 ASSESSMENT — PAIN DESCRIPTION - PAIN TYPE
TYPE: ACUTE PAIN
TYPE: ACUTE PAIN;SURGICAL PAIN
TYPE: ACUTE PAIN

## 2023-05-25 NOTE — CARE PLAN
Problem: Knowledge Deficit - Standard  Goal: Patient and family/care givers will demonstrate understanding of plan of care, disease process/condition, diagnostic tests and medications  Outcome: Progressing     Problem: Pain - Standard  Goal: Alleviation of pain or a reduction in pain to the patient’s comfort goal  Outcome: Progressing   The patient is Stable - Low risk of patient condition declining or worsening    Shift Goals  Clinical Goals: comfort, pain control, rest  Patient Goals: comfort, rest  Family Goals: not currently at patient bedside    Progress made toward(s) clinical / shift goals:  Pt on comfort care as of 5/24/23. Pt declining Q2 turns. Discussed with pt and care team. Wound vac to be discontinued 5/25/23. Patient medicated with PRN and scheduled pain medication. Pt has decreased PO intake this shift d/t fatigue.    Patient is not progressing towards the following goals:

## 2023-05-25 NOTE — PROGRESS NOTES
Report received from Joseph CAN, assumed care at 0700.  Pt is A0X4, and responds appropriately   Pt on comfort care. Pt refusing Q2 turns, education provided.  Pt declines any SOB, chest pain, new onset of numbness/ tingling  Pt declines pain at this time.   Pt has + void via rivera.  Pt has + flatus, + bowel sounds, + BM on 5/23/23.  Pt is chair bound with x2 max assist.  Pt is tolerating a CHO renal diet, pt denies any nausea/vomiting at this time.   Plan of care discussed, all questions answered. Explained importance of calling before getting OOB and pt verbalizes understanding. Explained importance of oral care. Call light is within reach, treaded slipper socks on, bed in lowest/ locked position, hourly rounding in place, all needs met at this time

## 2023-05-25 NOTE — CARE PLAN
The patient is Stable - Low risk of patient condition declining or worsening    Shift Goals  Clinical Goals: Comfort  Patient Goals: Comfort, rest  Family Goals: not currently at patient bedside    Progress made toward(s) clinical / shift goals:    Problem: Knowledge Deficit - Standard  Goal: Patient and family/care givers will demonstrate understanding of plan of care, disease process/condition, diagnostic tests and medications  Description: Target End Date:  1-3 days or as soon as patient condition allows    Document in Patient Education    1.  Patient and family/caregiver oriented to unit, equipment, visitation policy and means for communicating concern  2.  Complete/review Learning Assessment  3.  Assess knowledge level of disease process/condition, treatment plan, diagnostic tests and medications  4.  Explain disease process/condition, treatment plan, diagnostic tests and medications  Outcome: Progressing     Problem: Pain - Standard  Goal: Alleviation of pain or a reduction in pain to the patient’s comfort goal  Description: Target End Date:  Prior to discharge or change in level of care    Document on Vitals flowsheet    1.  Document pain using the appropriate pain scale per order or unit policy  2.  Educate and implement non-pharmacologic comfort measures (i.e. relaxation, distraction, massage, cold/heat therapy, etc.)  3.  Pain management medications as ordered  4.  Reassess pain after pain med administration per policy  5.  If opiods administered assess patient's response to pain medication is appropriate per POSS sedation scale  6.  Follow pain management plan developed in collaboration with patient and interdisciplinary team (including palliative care or pain specialists if applicable)  Outcome: Progressing     Problem: Skin Integrity  Goal: Skin integrity is maintained or improved  Description: Target End Date:  Prior to discharge or change in level of care    Document interventions on Skin Risk/Jeremy  flowsheet groups and corresponding LDA    1.  Assess and monitor skin integrity, appearance and/or temperature  2.  Assess risk factors for impaired skin integrity and/or pressures ulcers  3.  Implement precautions to protect skin integrity in collaboration with interdisciplinary team  4.  Implement pressure ulcer prevention protocol if at risk for skin breakdown  5.  Confirm wound care consult if at risk for skin breakdown  6.  Ensure patient use of pressure relieving devices  (Low air loss bed, waffle overlay, heel protectors, ROHO cushion, etc)  Outcome: Progressing     Problem: Self Care  Goal: Patient will have the ability to perform ADLs independently or with assistance (bathe, groom, dress, toilet and feed)  Description: Target End Date:  Prior to discharge or change in level of care    Document on ADL flowsheet    1.  Assess the capability and level of deficiency to perform ADLs  2.  Encourage family/care giver involvement  3.  Provide assistive devices  4.  Consider PT/OT evaluations  5.  Maintain support, give positive feedback, encourage self-care allowing extra time and verbal cuing as needed  6.  Avoid doing something for patients they can do themselves, but provide assistance as needed  7.  Assist in anticipating/planning individual needs  8.  Collaborate with Case Management and  to meet discharge needs  Outcome: Progressing

## 2023-05-25 NOTE — PROGRESS NOTES
4 Eyes Skin Assessment Completed by Alexus EDMONDSON RN and JUAN JOSÉ Jay.     Pt on comfort care and refusing Q2 turns and to turn for skin check, education provided and pt verbalizes understanding. Pressure injury prevention interventions in place. Low air loss mattress, heel mepilex, TAPS system.  Head WDL  Ears WDL  Nose WDL  Mouth WDL  Neck Incision - posterior neck with steri strips  Breast/Chest WDL  Shoulder Blades WDL  Spine WDL  (R) Arm/Elbow/Hand WDL  (L) Arm/Elbow/Hand WDL  Abdomen Incision - mid transverse incision with wet to dry dressing in place; old NAHUN site RLQ  Groin WDL - Florez in place  Scrotum/Coccyx/Buttocks FLEX, pt refusing to turn. incontinence  (R) Leg Edema  (L) Leg Edema  (R) Heel/Foot/Toe Edema - mepilex on heel  (L) Heel/Foot/Toe Edema - mepilex on heel              Devices In Places Pulse Ox and Florez; SCDs; Mepilexes        Interventions In Place TAP System, Pillows, Q2 Turns, Low Air Loss Mattress, Barrier Cream, and Dri-Les Pads     Possible Skin Injury Yes     Pictures Uploaded Into Epic Yes - by wound RN  Wound Consult Placed Yes - previously  RN Wound Prevention Protocol Ordered Yes - previously

## 2023-05-25 NOTE — WOUND TEAM
[facility]  Inpatient Services   Wound and Skin Care Evaluation    Admission Date: 4/21/2023     Last order of IP CONSULT TO WOUND CARE was found on 5/10/2023 from Hospital Encounter on 4/21/2023     HPI, PMH, SH: Reviewed    Past Surgical History:   Procedure Laterality Date    THORACIC LAMINECTOMY N/A 5/16/2023    Procedure: LAMINECTOMY, SPINE, THORACIC - T 2-4, INTRADURAL WASHOUT;  Surgeon: Fidencio Springer M.D.;  Location: [facility];  Service: Neurosurgery    ROMAN BY LAPAROSCOPY N/A 4/28/2023    Procedure: CHOLECYSTECTOMY, LAPAROSCOPIC ATTEMPTED, OPEN CHOLECYSTECTOMY;  Surgeon: Suraj Galvan M.D.;  Location: [facility];  Service: General    NY COLONOSCOPY,DIAGNOSTIC N/A 12/12/2021    Procedure: COLONOSCOPY;  Surgeon: Dariel Simons M.D.;  Location: [facility];  Service: Gastroenterology    NY UPPER GI ENDOSCOPY,BIOPSY N/A 12/12/2021    Procedure: GASTROSCOPY, WITH BIOPSY;  Surgeon: Dariel Simons M.D.;  Location: [facility];  Service: Gastroenterology    NY UPPER GI ENDOSCOPY,CTRL BLEED N/A 12/12/2021    Procedure: EGD, WITH CLIP PLACEMENT;  Surgeon: Dariel Simons M.D.;  Location: [facility];  Service: Gastroenterology    GASTROSCOPY W/PUSH ENTERSCOPY N/A 12/12/2021    Procedure: GASTROSCOPY, WITH PUSH ENTEROSCOPY;  Surgeon: Dariel Simons M.D.;  Location: [facility];  Service: Gastroenterology    SEPTAL RECONSTRUCTION  12/7/2015    Procedure: SEPTAL RECONSTRUCTION OPEN WITH  GRAFTS & CONCHAL CART GRAFT;  Surgeon: SYDNIE Gaitan M.D.;  Location: SURGERY SAME DAY [facility];  Service:     BLEPHAROPLASTY  7/23/2014    Performed by Gera Plasencia M.D. at [facility]    BROW LIFT  7/23/2014    Performed by Gera Plasencia M.D. at [facility]    CATARACT PHACO WITH IOL  12/4/2012    Performed by Suraj Gonzalez M.D. at [facility]    CATARACT PHACO WITH IOL  11/20/2012     Performed by Suraj Gonzalez M.D. at [facility]    APPENDECTOMY      ARTHROSCOPY, KNEE      CARDIAC CATH, LEFT HEART      C out of concern for STEMI, normal coronaries with minimal atherosclerosis, diagnosed with PNA as cause of symptoms and ST changes.     OTHER      KNEE SURGERY - LEFT.    OTHER      NOSE SURGERY.    TONSILLECTOMY       Social History     Tobacco Use    Smoking status: Former     Packs/day: 0.20     Years: 6.00     Pack years: 1.20     Types: Cigarettes     Quit date:      Years since quittin.4    Smokeless tobacco: Never    Tobacco comments:     Stopped over 25 years ago.   Vaping Use    Vaping Use: Never used   Substance Use Topics    Alcohol use: No     Chief Complaint   Patient presents with    GLF     2 days ago. (-) thinners    Extremity Weakness     Chronic lower bilateral weakness.     Diagnosis: Acute kidney failure (HCC) [N17.9]  Sepsis (HCC) [A41.9]    Unit where seen by Wound Team: T4     WOUND CONSULT/FOLLOW UP RELATED TO:  abdominal vac    WOUND HISTORY:  Pt underwent an open cholecystectomy on  with Dr. Galvan.  Patient seen by wound team 04/10 for drainage to incision site, Aquacel AG was ordered.  Received new consult that incision is saturating through dressings.         WOUND ASSESSMENT/LDA     Wound 23 Full Thickness Wound Abdomen Right Transverse Incision Closed with Wound Vac (Active)   Wound Image   23 1100   Site Assessment Red;Fragile;Painful 23 1100   Periwound Assessment Intact 23 1100   Margins Defined edges;Unattached edges 23 1100   Closure Open to air 23 1100   Drainage Amount Small 23 1100   Drainage Description Serosanguineous 23 1100   Treatments Cleansed;Site care 23 1100   Wound Cleansing Normal Saline Irrigation 23 1100   Periwound Protectant Barrier Paste 23 1100   Dressing Cleansing/Solutions Normal Saline 23 1100   Dressing Options Moist Roll  Gauze;Mepilex 05/25/23 1100   Dressing Changed Changed 05/25/23 1100   Dressing Status Clean;Dry;Intact 05/25/23 1100   Dressing Change/Treatment Frequency Daily, and As Needed 05/25/23 1100   NEXT Dressing Change/Treatment Date 05/26/23 05/25/23 1100   NEXT Weekly Photo (Inpatient Only) 05/29/23 05/22/23 1100   Number of Staples Removed 5 05/06/23 1400   Non-staged Wound Description Full thickness 05/25/23 1100   Wound Length (cm) 2 cm 05/18/23 1000   Wound Width (cm) 12.2 cm 05/18/23 1000   Wound Depth (cm) 2.2 cm 05/18/23 1000   Wound Surface Area (cm^2) 24.4 cm^2 05/18/23 1000   Wound Volume (cm^3) 53.68 cm^3 05/18/23 1000   Wound Healing % -2763 05/18/23 1000   Shape linear 05/25/23 1100   Wound Odor None 05/25/23 1100   Exposed Structures Sutures 05/25/23 1100   WOUND NURSE ONLY - Time Spent with Patient (mins) 30 05/25/23 1100       Vascular:    REX:   No results found.    Lab Values:    Lab Results   Component Value Date/Time    WBC 22.6 (H) 05/24/2023 02:43 AM    RBC 3.64 (L) 05/24/2023 02:43 AM    HEMOGLOBIN 10.6 (L) 05/24/2023 02:43 AM    HEMATOCRIT 32.1 (L) 05/24/2023 02:43 AM    CREACTPROT 5.80 (H) 05/24/2023 02:43 AM    SEDRATEWES 66 (H) 04/23/2023 10:32 AM    HBA1C 6.6 (H) 01/03/2023 08:45 AM      Culture Results show:  Recent Results (from the past 720 hour(s))   CULTURE WOUND W/ GRAM STAIN    Collection Time: 05/09/23  9:50 AM    Specimen: Abdominal; Wound   Result Value Ref Range    Significant Indicator POS (POS)     Source WND     Site ABDOMINAL     Culture Result - (A)     Gram Stain Result Moderate WBCs.  Moderate Gram negative rods.       Culture Result (A)      Escherichia coli ESBL  Moderate growth  Extended Spectrum Beta-lactamase (ESBL) isolated.  ESBL's may be clinically resistant to therapy with  Penicillins,Cephalosporins or Aztreonam despite  apparent in vitro susceptibility to some of these agents.  The patient requires contact isolation.  Please contact pharmacy or an Infectious  Disease Specialist  if you have any questions about appropriate therapy.         Susceptibility    Escherichia coli esbl - DENICE     Ampicillin >16 Resistant mcg/mL     Ceftriaxone >32 Resistant mcg/mL     Cefazolin >16 Resistant mcg/mL     Ciprofloxacin >2 Resistant mcg/mL     Cefepime >16 Resistant mcg/mL     Cefuroxime >16 Resistant mcg/mL     Ampicillin/sulbactam >16/8 Resistant mcg/mL     Ertapenem <=0.5 Sensitive mcg/mL     Tobramycin >8 Resistant mcg/mL     Gentamicin >8 Resistant mcg/mL     Minocycline <=4 Sensitive mcg/mL     Moxifloxacin >4 Resistant mcg/mL     Pip/Tazobactam <=8 Sensitive mcg/mL     Trimeth/Sulfa <=0.5/9.5 Sensitive mcg/mL     Tigecycline <=2 Sensitive mcg/mL     Pain Level/Medicated: IV morphine given by bedside RN          INTERVENTIONS BY WOUND TEAM:  Chart and images reviewed. Discussed with bedside RN. All areas of concern (based on picture review, LDA review and discussion with bedside RN) have been thoroughly assessed. Documentation of areas based on significant findings. This RN in to assess patient. Performed standard wound care which includes appropriate positioning, dressing removal and non-selective debridement. Pictures and measurements obtained weekly if/when required.  Preparation for Dressing removal: previous dressing soaked in wound cleanser prior to removal   Non-selectively Debrided with:  wound cleanser and gauze  Sharp debridement: NA  Shaina wound: Cleansed with wound cleanser and gauze, Prepped with barrier paste  Primary Dressing: Moistened roll gauze placed to wound bed  Secondary (Outer) Dressing: mepilex    Advanced Wound Care Discharge Planning  Number of Clinicians necessary to complete wound care: 1  Is patient requiring IV pain medications for dressing changes: No  Length of time for dressing change 30 min. (This does not include chart review, pre-medication time, set up, clean up or time spent charting.)    Interdisciplinary consultation: Patient, Bedside RN  (Alexus REBOLLEDO, Dr. Blankenship, Wound RN Lawrence     EVALUATION / RATIONALE FOR TREATMENT:  Most Recent Date:    5/25/23: Pt transitioned to comfort care, thus wound vac discontinued. Transitioned patient to NS WTD dressing. Bedside nursing to change Qdaily or as patient tolerates. Wound team signing off.     05/22/23: Wound fully open, pockets opened up giving better visualization. Was able to debride large amount of slough from wound bed. Transitioned to regular wound vac for home. Pt likely to DC on Tuesday 5/23/23. Will need follow up appointment on Wednesday or Thursday.    05/18/23:  wounds cleaning up but no real healthy granulation tissue noted.  Continue with veraflo while IP to cleanse wound and assist in moisture balance.    05/16/23: Wound continues to have old clot present. Wound continues to open the subcutaneous layer. Was able to better pack foam into the wound. Continued with POC.  05/14/23: Wound vac applied today to cleanse and debride while assisting in granulation tissue development. Wound team to change again on Tuesday and if wound looks ok will plan to stretch to Friday to get pt on MWF schedule.   05/13/23:  Dakins applied to chemically debride nonviable tissue, decrease bioburden and odor.  Likely hematoma under surface of incision, hydrogen to encourage breakdown of clot.  Spoke with Kenzie FALL (sx signed off 1 week ago) talk about placed vac previously.  Will allow dakins to debride and possibly place VAC on Monday.    5/10/23: Patient with small dehiscence along transverse abdominal incision. Small amount of purulence expressed following cleansing. Aquacel Ag Hydrofiber applied to manage bioburden, absorb exudate, and maintain a moist wound environment without laterally wicking exudate therefore reducing latonya-wound maceration.      Goals: Steady decrease in wound area and depth weekly.    WOUND TEAM PLAN OF CARE ([X] for frequency of wound follow up,):   Nursing to follow dressing orders written  for wound care. Contact wound team if area fails to progress, deteriorates or with any questions/concerns if something comes up before next scheduled follow up (See below as to whether wound is following and frequency of wound follow up)  Dressing changes by wound team:                   Follow up 3 times weekly:                NPWT change 3 times weekly:   X, Sunday, Tuesday then Friday, next week MWF  Follow up 1-2 times weekly:     Follow up Bi-Monthly:           Follow up Monthly (High Risk):                        Follow up as needed:     Other (explain):     NURSING PLAN OF CARE ORDERS (X):  Dressing changes: See Dressing Care orders: X  Skin care: See Skin Care orders:   RN Prevention Protocol:   Rectal tube care: See Rectal Tube Care orders:   Other orders:    RSKIN:   CURRENTLY IN PLACE (X), APPLIED THIS VISIT (A), ORDERED (O):   Q shift Jeremy:  X  Q shift pressure point assessments:  X    Surface/Positioning   Standard Mattress/Trauma Bed          Low Airloss        X  ICU Low Airloss   Bariatric COSME     Waffle cushion        Waffle Overlay          Reposition q 2 hours    X  TAPs Turning system     Z Les Pillow     Offloading/Redistribution   Sacral Offloading Dressing (Silicone dressing)   X  Heel Offloading Dressing (Silicone dressing)       X  Heel float boots (Prevalon boot)             Float Heels off Bed with Pillows           Respiratory NA  Silicone O2 tubing         Gray Foam Ear protectors     Cannula fixation Device (Tender )          High flow offloading Clip    Elastic head band offloading device      Anchorfast                                                         Trach with Optifoam split foam             Containment/Moisture Prevention FLEX    Rectal tube or BMS    Purwick/Condom Cath        Florez Catheter    Barrier wipes           Barrier paste       Antifungal tx      Interdry        Mobilization FLEX      Up to chair        Ambulate      PT/OT      Nutrition       Dietician         Diabetes Education      PO   X  TF     TPN     NPO   # days     Other        Anticipated discharge plans: TBD  LTACH:        SNF/Rehab:                  Home Health Care:           Outpatient Wound Center:            Self/Family Care:        Other:                  Vac Discharge Needs:   Vac Discharge plan is purely a recommendation from wound team and not a requirement for discharge unless otherwise stated by physician.  Not Applicable Pt not on a wound vac:       Regular Vac while inpatient, alternative dressing at DC:        Regular Vac in use and continued at DC:            Reg. Vac w/ Skin Sub/Biologic in use. Will need to be changed 2x wkly:      Veraflo Vac while inpatient, ok to transition to Regular Vac on Discharge (Bedside RN to Clamp small instillation tubing at time of DC):    X       Veraflo Vac while inpatient, would benefit from remaining on Veraflo Vac upon discharge:

## 2023-05-25 NOTE — PROGRESS NOTES
Pt refused Q2 turns and Qshift skin check. Pt informed of risks to skin breakdown, pt understands. Charge RN notified.

## 2023-05-25 NOTE — PROGRESS NOTES
Patient refused 2 RN skin check today. Current Jeremy score is 14. Patient is on comfort measures. Patient is on a low-airloss bed. Heel mepilexes remain in place.

## 2023-05-25 NOTE — PROGRESS NOTES
Received report from previous shift RN  Assessment complete.  A&O x 4. Patient on comfort care. Patient calls appropriately.  Patient max assist, Q2 turns. Bed alarm on.   Patient has 8/10 pain. Pain managed with prescribed medications.  Denies N&V. Tolerating Diabetic/renal diet.  Back of neck incision, closed with steri strips.  Transverse incision to mid abdomen, closed with W[place].  Florez in place, + flatus, + BM.  Patient denies SOB.  SCD's on.    Review plan of care with patient. Call light and personal belongings with in reach. Hourly rounding in place. All needs met at this time.

## 2023-05-25 NOTE — PROGRESS NOTES
[facility] Medicine Daily Progress Note    Date of Service  5/25/2023    Chief Complaint  Tyrone Cline is a 83 y.o. male admitted 4/21/2023 with colitis    Hospital Course    83-year-old male with a past medical history significant for CKD stage IV, diabetes mellitus, hypertension presented to ER on 4/21/2023 with a complaint of abdominal pain and lower extremity weakness.  On admission patient was found to have leukocytosis 19.  CT scan for abdomen showed colitis.  Patient was diagnosed with sepsis, IV antibiotics and fluids were started.  Also on admission CT scan showed possible colitis HIDA scan was done and confirmed acute cholecystitis, general surgeon was consulted and patient underwent laparoscopic cholecystectomy with conversion to open cholecystectomy 4/18 /2023 , unfortunately patient developedpostoperative hypotension, went to ICU and patient needed vasopressor and transferred back on 4/29 floor.  Patient finished course of antibiotics meropenem for ESBL coli from intra-abdominal infection..    Patient came with lower extremity edema and patient continues to have bilateral lower extremity weakness; MR-T spine expansile T2 hyperintense area in the thoracic spinal cord at the levels of T2, T3 and T4.  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  Neurosurgery planning for repeat MRI in 1 month with neurosurgery Dr. Lucas     Patient has a chronic kidney disease stage IV, patient was evaluated by nephrologist and no need for dialysis, his kidney function stable around 2.4    Patient is alert and oriented x3, Patient is able to make his own decision.  Goal of care was discussed with the patient and he does not want any more aggressive treatment and he wanted to be discharged on hospice.  Comfort care was initiated on 5/24.          interval Problem Update  -Evaluating examined the patient at bedside patient was alert oriented x3, patient  has generalized weakness.  Patient is comfortable and he wants to continue the comfort care.  -Remove the wound VAC and start with wound dressing by wound team.  -Working for placement with a  and son who will come tomorrow.        I have discussed this patient's plan of care and discharge plan at IDT rounds today with Case Management, Nursing, Nursing leadership, and other members of the IDT team.    Consultants/Specialty  general surgery and neurosurgery    Code Status  Comfort Care/DNR    Disposition  Medically clear for discharge with hospice    Review of Systems  Review of Systems   Constitutional:  Positive for malaise/fatigue. Negative for chills, fever and weight loss.   HENT:  Negative for ear pain, hearing loss and tinnitus.    Eyes:  Negative for blurred vision, double vision and photophobia.   Respiratory:  Negative for cough and hemoptysis.    Cardiovascular:  Negative for chest pain, palpitations, orthopnea and claudication.   Gastrointestinal:  Negative for abdominal pain, constipation, diarrhea, nausea and vomiting.   Genitourinary:  Negative for dysuria, frequency and urgency.   Musculoskeletal:  Negative for myalgias and neck pain.   Skin:  Negative for rash.   Neurological:  Positive for weakness. Negative for dizziness and speech change.        Physical Exam  Temp:  [36.4 °C (97.5 °F)-36.7 °C (98.1 °F)] 36.4 °C (97.5 °F)  Pulse:  [69-74] 69  Resp:  [16-17] 17  BP: (102-118)/(61-73) 107/61  SpO2:  [90 %-92 %] 92 %    Physical Exam  Constitutional:       General: He is not in acute distress.     Appearance: He is ill-appearing.   Eyes:      Pupils: Pupils are equal, round, and reactive to light.   Cardiovascular:      Rate and Rhythm: Normal rate and regular rhythm.      Pulses: Normal pulses.      Heart sounds: Normal heart sounds. No murmur heard.  Abdominal:      General: Abdomen is flat.      Palpations: Abdomen is soft.      Tenderness: There is abdominal tenderness. There is no  guarding.      Comments: Adominal wound  vac    Neurological:      General: No focal deficit present.      Mental Status: He is alert and oriented to person, place, and time.      Cranial Nerves: No cranial nerve deficit.      Motor: Weakness present.         Fluids    Intake/Output Summary (Last 24 hours) at 5/25/2023 1206  Last data filed at 5/25/2023 1000  Gross per 24 hour   Intake 120 ml   Output 675 ml   Net -555 ml       Laboratory  Recent Labs     05/23/23  0708 05/24/23  0243   WBC 21.0* 22.6*   RBC 3.74* 3.64*   HEMOGLOBIN 11.0* 10.6*   HEMATOCRIT 32.9* 32.1*   MCV 88.0 88.2   MCH 29.4 29.1   MCHC 33.4 33.0   RDW 53.1* 52.3*   PLATELETCT 318 312   MPV 12.1 12.4     Recent Labs     05/23/23  0708 05/24/23  0243   SODIUM 135 134*   POTASSIUM 4.5 4.8   CHLORIDE 95* 93*   CO2 27 28   GLUCOSE 172* 142*   BUN 87* 99*   CREATININE 2.32* 2.61*   CALCIUM 9.3 9.2                     Imaging         Assessment/Plan  * Acute gangrenous cholecystitis- (present on admission)  Assessment & Plan  Found to have gangrenous cholecystitis s/p laprascopic converted open cholecystectomy 4/28/2023   -- Completed antibiotic treatment from 4/25- 4/ 29, drain has been removed.  Follow surgical recommendation.    Surgical course was complicated with postoperative wound infection, culture has been obtained, wound care has been consulted  Wound cx growing ESBL E coli, s/p completion of 7 days course of antibiotic    ACP (advance care planning)  Assessment & Plan  On 5/23 patient was alert oriented x3, the goal of care and CODE STATUS was discussed in detail with the patient, answered all his questions, patient is a clear that he does not want any aggressive treatment and he wants to be discharged with hospice.  Time 31 minutes    On 5/24 again the plan of care was discussed with the patient and his son, discussed all treatment options including aggressive treatment versus comfort care the patient is a clear that he wants to be  comfortable with no aggressive treatment or images.  The son agreed and comfort care was initiated on 5/24, time 25 minutes    Leukocytosis  Assessment & Plan  Worsening  Since patient refused any aggressive treatment, will hold on CT scan at this time, patient finished a course of antibiotics  Labs daily    Urinary retention  Assessment & Plan  Florez by  urology.    Do not change Florez without urology consent    Abnormal MRI- (present on admission)  Assessment & Plan  MRI on admission showing T2/T3  hyperintense lesion in the right side of the thoracic spinal cord at the levels of T2 and T3 Mild contrast enhancement is seen. This lesion is suspicious for spinal cord neoplasm or possible transverse myelitis       S/p Laminectomy  Neurosurgery planning for repeat MRI in 1 month with neurosurgery Dr. Donaldson  Patient wants to be on comfort care and refused any more images or aggressive treatment    Hypercalcemia- (present on admission)  Assessment & Plan  Likely due to immobility     -- monitor    Hypomagnesemia- (present on admission)  Assessment & Plan  Replete as needed    Cholelithiasis with acute cholecystitis- (present on admission)  Assessment & Plan  S/p surgical removal, converted to open cholecystectomy  NAHUN drain-removed on 5/8/2023   -- s/p completion of iv abx  Worsening leukocytosis on 5/24, patient refused any procedures or images and he wants to be on comfort care.    Hypokalemia- (present on admission)  Assessment & Plan  Replete as needed    Thrombocytopenia (HCC)- (present on admission)  Assessment & Plan  Resolved, plt are stable at 427    High anion gap metabolic acidosis- (present on admission)  Assessment & Plan  Acute on chronic kidney disease, -appears back at baseline on 5/3/2023    Resolved    Hyponatremia- (present on admission)  Assessment & Plan  Resolved    Weakness of both lower extremities- (present on admission)  Assessment & Plan  MR T spine expansile T2 hyperintense area in the  thoracic spinal cord at the levels of T2, T3 and T4.    S/p Laminectomy  Patient refused any other procedures and he wanted to be in comfort care      Sepsis (HCC)- (present on admission)  Assessment & Plan  Completed 7 days course of antibiotic  Comfort care and patient does not want to repeat any images or labs    Colitis- (present on admission)  Assessment & Plan  Cultures negative, monitor  Initial admitting impression, currently improved  Diet as tolerated     Acute kidney injury superimposed on CKD (HCC)- (present on admission)  Assessment & Plan  Worsening BUN/creatinine  Avoid nephrotoxin  Nephrology evaluated  Continue oral Lasix      Diabetic peripheral neuropathy (HCC)- (present on admission)  Assessment & Plan  Cymbalta, medical treatment    Essential hypertension- (present on admission)  Assessment & Plan  Continue amlodipine and coreg    Type 2 diabetes mellitus with kidney complication, without long-term current use of insulin (HCC)- (present on admission)  Assessment & Plan  On insulin regimen  A1c 6.6  Blood glucose level acceptable    Hypercholesterolemia- (present on admission)  Assessment & Plan  Stable, continue Lipitor    BPH (benign prostatic hyperplasia)- (present on admission)  Assessment & Plan  Keep the Florez  Continue Flomax         VTE prophylaxis: SCDs/TEDs  On lovenox  I have performed a physical exam and reviewed and updated ROS and Plan today (5/25/2023). In review of yesterday's note (5/24/2023), there are no changes except as documented above.        Interventions to be considered in all patients in order to minimize the risk of delirium.   -do not disturb patient (vitals or lab draws) between the hours of 10 PM and 6 AM.  -ideally the patient should not sleep during the day and we should avoid day time naps.   -up in chair for meals  -ambulate at least three times daily, as able  -watch for constipation  -timed voiding - ask patient is she would like to go to the bathroom q 2-3  hours, except during the do not disturb hours.   -remove all necessary lines (central lines, peripheral IVs, feeding tubes, rivera catheters)  -unless patient has shown harm to self or others I would recommend against use of restraints - either chemical or physical (antipsychotics)   -minimize polypharmacy, do not dose medication during sleep hours

## 2023-05-26 LAB
GLUCOSE BLD STRIP.AUTO-MCNC: 47 MG/DL (ref 65–99)
GLUCOSE BLD STRIP.AUTO-MCNC: 55 MG/DL (ref 65–99)
GLUCOSE BLD STRIP.AUTO-MCNC: 97 MG/DL (ref 65–99)

## 2023-05-26 PROCEDURE — A9270 NON-COVERED ITEM OR SERVICE: HCPCS | Performed by: NURSE PRACTITIONER

## 2023-05-26 PROCEDURE — 700102 HCHG RX REV CODE 250 W/ 637 OVERRIDE(OP): Performed by: SURGERY

## 2023-05-26 PROCEDURE — A9270 NON-COVERED ITEM OR SERVICE: HCPCS | Performed by: SURGERY

## 2023-05-26 PROCEDURE — 99498 ADVNCD CARE PLAN ADDL 30 MIN: CPT | Performed by: NURSE PRACTITIONER

## 2023-05-26 PROCEDURE — 700102 HCHG RX REV CODE 250 W/ 637 OVERRIDE(OP): Performed by: INTERNAL MEDICINE

## 2023-05-26 PROCEDURE — 770001 HCHG ROOM/CARE - MED/SURG/GYN PRIV*

## 2023-05-26 PROCEDURE — 82962 GLUCOSE BLOOD TEST: CPT | Mod: 91

## 2023-05-26 PROCEDURE — A9270 NON-COVERED ITEM OR SERVICE: HCPCS | Performed by: HOSPITALIST

## 2023-05-26 PROCEDURE — 700102 HCHG RX REV CODE 250 W/ 637 OVERRIDE(OP): Performed by: HOSPITALIST

## 2023-05-26 PROCEDURE — 99232 SBSQ HOSP IP/OBS MODERATE 35: CPT | Performed by: INTERNAL MEDICINE

## 2023-05-26 PROCEDURE — A9270 NON-COVERED ITEM OR SERVICE: HCPCS | Performed by: INTERNAL MEDICINE

## 2023-05-26 PROCEDURE — 99233 SBSQ HOSP IP/OBS HIGH 50: CPT | Mod: 25 | Performed by: NURSE PRACTITIONER

## 2023-05-26 PROCEDURE — 700102 HCHG RX REV CODE 250 W/ 637 OVERRIDE(OP): Performed by: NURSE PRACTITIONER

## 2023-05-26 PROCEDURE — 99497 ADVNCD CARE PLAN 30 MIN: CPT | Performed by: NURSE PRACTITIONER

## 2023-05-26 RX ADMIN — OXYCODONE AND ACETAMINOPHEN 1 TABLET: 10; 325 TABLET ORAL at 21:45

## 2023-05-26 RX ADMIN — DULOXETINE HYDROCHLORIDE 30 MG: 30 CAPSULE, DELAYED RELEASE ORAL at 17:27

## 2023-05-26 RX ADMIN — OXYCODONE AND ACETAMINOPHEN 1 TABLET: 10; 325 TABLET ORAL at 14:49

## 2023-05-26 RX ADMIN — OXYCODONE HYDROCHLORIDE 10 MG: 10 TABLET ORAL at 17:32

## 2023-05-26 RX ADMIN — TAMSULOSIN HYDROCHLORIDE 0.4 MG: 0.4 CAPSULE ORAL at 09:59

## 2023-05-26 RX ADMIN — OXYCODONE AND ACETAMINOPHEN 1 TABLET: 10; 325 TABLET ORAL at 06:47

## 2023-05-26 RX ADMIN — OLMESARTAN MEDOXOMIL 20 MG: 20 TABLET, FILM COATED ORAL at 06:46

## 2023-05-26 ASSESSMENT — PAIN DESCRIPTION - PAIN TYPE
TYPE: ACUTE PAIN

## 2023-05-26 ASSESSMENT — ENCOUNTER SYMPTOMS
CHILLS: 0
PALPITATIONS: 0
NECK PAIN: 0
NAUSEA: 0
DIARRHEA: 0
PHOTOPHOBIA: 0
MYALGIAS: 0
COUGH: 0
ABDOMINAL PAIN: 0
BLURRED VISION: 0
WEIGHT LOSS: 0
DIZZINESS: 0
SPEECH CHANGE: 0
WEAKNESS: 1
CLAUDICATION: 0
DOUBLE VISION: 0
VOMITING: 0
FEVER: 0
HEMOPTYSIS: 0
ORTHOPNEA: 0
CONSTIPATION: 0

## 2023-05-26 ASSESSMENT — PAIN SCALES - WONG BAKER
WONGBAKER_NUMERICALRESPONSE: DOESN'T HURT AT ALL
WONGBAKER_NUMERICALRESPONSE: DOESN'T HURT AT ALL

## 2023-05-26 ASSESSMENT — PATIENT HEALTH QUESTIONNAIRE - PHQ9
SUM OF ALL RESPONSES TO PHQ9 QUESTIONS 1 AND 2: 0
2. FEELING DOWN, DEPRESSED, IRRITABLE, OR HOPELESS: NOT AT ALL
1. LITTLE INTEREST OR PLEASURE IN DOING THINGS: NOT AT ALL

## 2023-05-26 NOTE — PROGRESS NOTES
Received report from previous shift RN  Assessment complete.  Pt on comfort care.  A&O x 4. Patient calls appropriately. Pt not complaining of pain and currently comfortable at this time. Refused turns at this time.

## 2023-05-26 NOTE — CARE PLAN
The patient is Stable - Low risk of patient condition declining or worsening    Shift Goals  Clinical Goals: comfort  Patient Goals: comfort  Family Goals: comfort    Progress made toward(s) clinical / shift goals:  Patient and family updated on plan of care, questions answered best to ability, hospice consult for this afternoon. Florez in place with documented output.   Problem: Knowledge Deficit - Standard  Goal: Patient and family/care givers will demonstrate understanding of plan of care, disease process/condition, diagnostic tests and medications  Outcome: Progressing     Problem: Pain - Standard  Goal: Alleviation of pain or a reduction in pain to the patient’s comfort goal  Outcome: Progressing     Problem: Urinary Elimination  Goal: Establish and maintain regular urinary output  Outcome: Progressing       Patient is not progressing towards the following goals:

## 2023-05-26 NOTE — CARE PLAN
The patient is Stable - Low risk of patient condition declining or worsening    Shift Goals  Clinical Goals: Comfort  Patient Goals: Rest, comfort  Family Goals: Comfort for pt    Progress made toward(s) clinical / shift goals:  Patient not progressing toward goals.    Patient is not progressing towards the following goals:Patient transitioned to comfort care.  Alert to staff and able to make basic needs known. Vital signs stable. No complaints of pain or discomfort. Tolerating PO fluids well. Florez catheter continues. Incontinent of bowel. Bed in lowest position. Call button within reach.  Problem: Knowledge Deficit - Standard  Goal: Patient and family/care givers will demonstrate understanding of plan of care, disease process/condition, diagnostic tests and medications  Outcome: Not Progressing     Problem: Pain - Standard  Goal: Alleviation of pain or a reduction in pain to the patient’s comfort goal  Outcome: Not Progressing     Problem: Skin Integrity  Goal: Skin integrity is maintained or improved  Outcome: Not Progressing     Problem: Mobility  Goal: Patient's capacity to carry out activities will improve  Outcome: Not Progressing     Problem: Communication  Goal: The ability to communicate needs accurately and effectively will improve  Outcome: Not Progressing     Problem: Hemodynamics  Goal: Patient's hemodynamics, fluid balance and neurologic status will be stable or improve  Outcome: Not Progressing     Problem: Respiratory  Goal: Patient will achieve/maintain optimum respiratory ventilation and gas exchange  Outcome: Not Progressing     Problem: Nutrition  Goal: Patient's nutritional and fluid intake will be adequate or improve  Outcome: Not Progressing     Problem: Urinary Elimination  Goal: Establish and maintain regular urinary output  Outcome: Not Progressing     Problem: Bowel Elimination  Goal: Establish and maintain regular bowel function  Outcome: Not Progressing     Problem: Self Care  Goal:  Patient will have the ability to perform ADLs independently or with assistance (bathe, groom, dress, toilet and feed)  Outcome: Not Progressing     Problem: Wound/ / Incision Healing  Goal: Patient's wound/surgical incision will decrease in size and heals properly  Outcome: Not Progressing

## 2023-05-26 NOTE — PROGRESS NOTES
4 Eyes Skin Assessment Completed by Adelso Tenorio RN and JUAN JOSÉ Soto.    Head WDL  Ears WDL  Nose WDL  Mouth WDL  Neck WDL  Breast/Chest WDL  Shoulder Blades WDL  Spine WDL  (R) Arm/Elbow/Hand WDL  (L) Arm/Elbow/Hand WDL  Abdomen Incision  Groin WDL  Scrotum/Coccyx/Moisture Fissure  (R) Leg WDL  (L) Leg WDL  (R) Heel/Foot/Toe Redness and Blanching  (L) Heel/Foot/Toe Redness and Blanching          Devices In Places PIV, Florez      Interventions In Place Heel Mepilex, Heel Float Boots, and Barrier Cream    Possible Skin Injury No    Pictures Uploaded Into Epic Yes  Wound Consult Placed N/A  RN Wound Prevention Protocol Ordered No

## 2023-05-26 NOTE — CARE PLAN
The patient is Stable - Low risk of patient condition declining or worsening    Shift Goals  Clinical Goals: Comfort  Patient Goals: Rest, comfort  Family Goals: Comfort for pt    Progress made toward(s) clinical / shift goals:    Problem: Knowledge Deficit - Standard  Goal: Patient and family/care givers will demonstrate understanding of plan of care, disease process/condition, diagnostic tests and medications  Description: Target End Date:  1-3 days or as soon as patient condition allows    Document in Patient Education    1.  Patient and family/caregiver oriented to unit, equipment, visitation policy and means for communicating concern  2.  Complete/review Learning Assessment  3.  Assess knowledge level of disease process/condition, treatment plan, diagnostic tests and medications  4.  Explain disease process/condition, treatment plan, diagnostic tests and medications  Outcome: Progressing     Problem: Pain - Standard  Goal: Alleviation of pain or a reduction in pain to the patient’s comfort goal  Description: Target End Date:  Prior to discharge or change in level of care    Document on Vitals flowsheet    1.  Document pain using the appropriate pain scale per order or unit policy  2.  Educate and implement non-pharmacologic comfort measures (i.e. relaxation, distraction, massage, cold/heat therapy, etc.)  3.  Pain management medications as ordered  4.  Reassess pain after pain med administration per policy  5.  If opiods administered assess patient's response to pain medication is appropriate per POSS sedation scale  6.  Follow pain management plan developed in collaboration with patient and interdisciplinary team (including palliative care or pain specialists if applicable)  Outcome: Progressing     Problem: Skin Integrity  Goal: Skin integrity is maintained or improved  Description: Target End Date:  Prior to discharge or change in level of care    Document interventions on Skin Risk/Jeremy flowsheet groups  and corresponding LDA    1.  Assess and monitor skin integrity, appearance and/or temperature  2.  Assess risk factors for impaired skin integrity and/or pressures ulcers  3.  Implement precautions to protect skin integrity in collaboration with interdisciplinary team  4.  Implement pressure ulcer prevention protocol if at risk for skin breakdown  5.  Confirm wound care consult if at risk for skin breakdown  6.  Ensure patient use of pressure relieving devices  (Low air loss bed, waffle overlay, heel protectors, ROHO cushion, etc)  Outcome: Progressing     Problem: Mobility  Goal: Patient's capacity to carry out activities will improve  Description: Target End Date:  Prior to discharge or change in level of care    1.  Assess for barriers to mobility/activity  2.  Implement activity per interdisciplinary team recommendations  3.  Target activity level identified and patient/family/caregiver aware of goal  4.  Provide assistive devices  5.  Instruct patient/caregiver on proper use of assistive/adaptive devices  6.  Schedule activities and rest periods to decrease effects of fatigue  7.  Encourage mobilization to extent of ability  8.  Maintain proper body alignment  9.  Provide adequate pain management to allow progressive mobilization  10. Implement pace maker precautions as needed  Outcome: Progressing

## 2023-05-26 NOTE — PROGRESS NOTES
[facility] Medicine Daily Progress Note    Date of Service  5/26/2023    Chief Complaint  Tyrone Cline is a 83 y.o. male admitted 4/21/2023 with colitis    Hospital Course    83-year-old male with a past medical history significant for CKD stage IV, diabetes mellitus, hypertension presented to ER on 4/21/2023 with a complaint of abdominal pain and lower extremity weakness.  On admission patient was found to have leukocytosis 19.  CT scan for abdomen showed colitis.  Patient was diagnosed with sepsis, IV antibiotics and fluids were started.  Also on admission CT scan showed possible colitis HIDA scan was done and confirmed acute cholecystitis, general surgeon was consulted and patient underwent laparoscopic cholecystectomy with conversion to open cholecystectomy 4/18 /2023 , unfortunately patient developedpostoperative hypotension, went to ICU and patient needed vasopressor and transferred back on 4/29 floor.  Patient finished course of antibiotics meropenem for ESBL coli from intra-abdominal infection..    Patient came with lower extremity edema and patient continues to have bilateral lower extremity weakness; MR-T spine expansile T2 hyperintense area in the thoracic spinal cord at the levels of T2, T3 and T4.  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  Neurosurgery planning for repeat MRI in 1 month with neurosurgery Dr. Lucas     Patient has a chronic kidney disease stage IV, patient was evaluated by nephrologist and no need for dialysis, his kidney function stable around 2.4    Patient is alert and oriented x3, Patient is able to make his own decision.  Goal of care was discussed with the patient and he does not want any more aggressive treatment and he wanted to be discharged on hospice.  Comfort care was initiated on 5/24.      interval Problem Update  -Evaluating examined the patient at bedside patient was alert oriented x3, patient has  generalized weakness.  -Patient is comfortable and he wants to continue the comfort care.  -Discontinue blood pressure medications and insulin due to low blood pressure and low blood sugar.  -Case was discussed in detail with the patient and his 2 sons, answered all their questions, discussed with the , and planning to discharge the patient to group home versus home versus SNF.          I have discussed this patient's plan of care and discharge plan at IDT rounds today with Case Management, Nursing, Nursing leadership, and other members of the IDT team.    Consultants/Specialty  general surgery and neurosurgery    Code Status  Comfort Care/DNR    Disposition  Medically clear for discharge with hospice    Review of Systems  Review of Systems   Constitutional:  Positive for malaise/fatigue. Negative for chills, fever and weight loss.   HENT:  Negative for ear pain, hearing loss and tinnitus.    Eyes:  Negative for blurred vision, double vision and photophobia.   Respiratory:  Negative for cough and hemoptysis.    Cardiovascular:  Negative for chest pain, palpitations, orthopnea and claudication.   Gastrointestinal:  Negative for abdominal pain, constipation, diarrhea, nausea and vomiting.   Genitourinary:  Negative for dysuria, frequency and urgency.   Musculoskeletal:  Negative for myalgias and neck pain.   Skin:  Negative for rash.   Neurological:  Positive for weakness. Negative for dizziness and speech change.        Physical Exam  Temp:  [36.2 °C (97.2 °F)-36.7 °C (98 °F)] 36.7 °C (98 °F)  Pulse:  [70-73] 70  Resp:  [16] 16  BP: ()/(49-63) 108/63  SpO2:  [93 %-94 %] 93 %    Physical Exam  Constitutional:       General: He is not in acute distress.     Appearance: He is ill-appearing.   Eyes:      Pupils: Pupils are equal, round, and reactive to light.   Cardiovascular:      Rate and Rhythm: Normal rate and regular rhythm.      Pulses: Normal pulses.      Heart sounds: Normal heart sounds. No  murmur heard.  Abdominal:      General: Abdomen is flat.      Palpations: Abdomen is soft.      Tenderness: There is abdominal tenderness. There is no guarding.      Comments: Adominal wound  vac    Neurological:      General: No focal deficit present.      Mental Status: He is alert and oriented to person, place, and time.      Cranial Nerves: No cranial nerve deficit.      Motor: Weakness present.         Fluids    Intake/Output Summary (Last 24 hours) at 5/26/2023 1638  Last data filed at 5/26/2023 0535  Gross per 24 hour   Intake 100 ml   Output 350 ml   Net -250 ml       Laboratory  Recent Labs     05/24/23  0243   WBC 22.6*   RBC 3.64*   HEMOGLOBIN 10.6*   HEMATOCRIT 32.1*   MCV 88.2   MCH 29.1   MCHC 33.0   RDW 52.3*   PLATELETCT 312   MPV 12.4     Recent Labs     05/24/23  0243   SODIUM 134*   POTASSIUM 4.8   CHLORIDE 93*   CO2 28   GLUCOSE 142*   BUN 99*   CREATININE 2.61*   CALCIUM 9.2                     Imaging         Assessment/Plan  * Acute gangrenous cholecystitis- (present on admission)  Assessment & Plan  Found to have gangrenous cholecystitis s/p laprascopic converted open cholecystectomy 4/28/2023   -- Completed antibiotic treatment from 4/25- 4/ 29, drain has been removed.  Follow surgical recommendation.    Surgical course was complicated with postoperative wound infection, culture has been obtained, wound care has been consulted  Wound cx growing ESBL E coli, s/p completion of 7 days course of antibiotic    ACP (advance care planning)  Assessment & Plan  On 5/23 patient was alert oriented x3, the goal of care and CODE STATUS was discussed in detail with the patient, answered all his questions, patient is a clear that he does not want any aggressive treatment and he wants to be discharged with hospice.  Time 31 minutes    On 5/24 again the plan of care was discussed with the patient and his son, discussed all treatment options including aggressive treatment versus comfort care the patient is a  clear that he wants to be comfortable with no aggressive treatment or images.  The son agreed and comfort care was initiated on 5/24, time 25 minutes    Leukocytosis  Assessment & Plan  Worsening  Since patient refused any aggressive treatment, will hold on CT scan at this time, patient finished a course of antibiotics  Labs daily    Urinary retention  Assessment & Plan  Florez by  urology.    Do not change Florez without urology consent    Abnormal MRI- (present on admission)  Assessment & Plan  MRI on admission showing T2/T3  hyperintense lesion in the right side of the thoracic spinal cord at the levels of T2 and T3 Mild contrast enhancement is seen. This lesion is suspicious for spinal cord neoplasm or possible transverse myelitis       S/p Laminectomy  Neurosurgery planning for repeat MRI in 1 month with neurosurgery Dr. Donaldson  Patient wants to be on comfort care and refused any more images or aggressive treatment    Hypercalcemia- (present on admission)  Assessment & Plan  Likely due to immobility     -- monitor    Hypomagnesemia- (present on admission)  Assessment & Plan  Replete as needed    Cholelithiasis with acute cholecystitis- (present on admission)  Assessment & Plan  S/p surgical removal, converted to open cholecystectomy  NAHUN drain-removed on 5/8/2023   -- s/p completion of iv abx  Worsening leukocytosis on 5/24, patient refused any procedures or images and he wants to be on comfort care.    Hypokalemia- (present on admission)  Assessment & Plan  Replete as needed    Thrombocytopenia (HCC)- (present on admission)  Assessment & Plan  Resolved, plt are stable at 427    High anion gap metabolic acidosis- (present on admission)  Assessment & Plan  Acute on chronic kidney disease, -appears back at baseline on 5/3/2023    Resolved    Hyponatremia- (present on admission)  Assessment & Plan  Resolved    Weakness of both lower extremities- (present on admission)  Assessment & Plan  MR T spine expansile T2  hyperintense area in the thoracic spinal cord at the levels of T2, T3 and T4.    S/p Laminectomy  Patient refused any other procedures and he wanted to be in comfort care      Sepsis (HCC)- (present on admission)  Assessment & Plan  Completed 7 days course of antibiotic  Comfort care and patient does not want to repeat any images or labs    Colitis- (present on admission)  Assessment & Plan  Cultures negative, monitor  Initial admitting impression, currently improved  Diet as tolerated     Acute kidney injury superimposed on CKD (HCC)- (present on admission)  Assessment & Plan  Worsening BUN/creatinine  Avoid nephrotoxin  Nephrology evaluated  Continue oral Lasix      Diabetic peripheral neuropathy (HCC)- (present on admission)  Assessment & Plan  Cymbalta, medical treatment    Essential hypertension- (present on admission)  Assessment & Plan  Continue amlodipine and coreg    Type 2 diabetes mellitus with kidney complication, without long-term current use of insulin (HCC)- (present on admission)  Assessment & Plan  On insulin regimen  A1c 6.6  Blood glucose level acceptable   discontinue Lantus and oral medications      Hypercholesterolemia- (present on admission)  Assessment & Plan  Stable, continue Lipitor    BPH (benign prostatic hyperplasia)- (present on admission)  Assessment & Plan  Keep the Florez  Continue Flomax         VTE prophylaxis: SCDs/TEDs  On lovenox  I have performed a physical exam and reviewed and updated ROS and Plan today (5/26/2023). In review of yesterday's note (5/25/2023), there are no changes except as documented above.        Interventions to be considered in all patients in order to minimize the risk of delirium.   -do not disturb patient (vitals or lab draws) between the hours of 10 PM and 6 AM.  -ideally the patient should not sleep during the day and we should avoid day time naps.   -up in chair for meals  -ambulate at least three times daily, as able  -watch for constipation  -timed  voiding - ask patient is she would like to go to the bathroom q 2-3 hours, except during the do not disturb hours.   -remove all necessary lines (central lines, peripheral IVs, feeding tubes, rivera catheters)  -unless patient has shown harm to self or others I would recommend against use of restraints - either chemical or physical (antipsychotics)   -minimize polypharmacy, do not dose medication during sleep hours

## 2023-05-26 NOTE — DISCHARGE PLANNING
Patient wants to go home with home hospice. Referrals sent. Please follow up with hospice facilities.      Son Ruddy Cline (from [place]) is here with spouse and was given a copy of private care givers to call to assist at home along with hospice.      Other option would be to go to [facility] with hospice if patient does not get accepted to hospice.    Palliative Care APRN.    Aishwarya MONTEJO, RN Case Manager  [phone]

## 2023-05-27 PROCEDURE — A9270 NON-COVERED ITEM OR SERVICE: HCPCS | Performed by: NURSE PRACTITIONER

## 2023-05-27 PROCEDURE — 700102 HCHG RX REV CODE 250 W/ 637 OVERRIDE(OP): Performed by: SURGERY

## 2023-05-27 PROCEDURE — 700102 HCHG RX REV CODE 250 W/ 637 OVERRIDE(OP): Performed by: NURSE PRACTITIONER

## 2023-05-27 PROCEDURE — 99231 SBSQ HOSP IP/OBS SF/LOW 25: CPT | Performed by: INTERNAL MEDICINE

## 2023-05-27 PROCEDURE — 770001 HCHG ROOM/CARE - MED/SURG/GYN PRIV*

## 2023-05-27 PROCEDURE — A9270 NON-COVERED ITEM OR SERVICE: HCPCS | Performed by: HOSPITALIST

## 2023-05-27 PROCEDURE — A9270 NON-COVERED ITEM OR SERVICE: HCPCS | Performed by: INTERNAL MEDICINE

## 2023-05-27 PROCEDURE — 700102 HCHG RX REV CODE 250 W/ 637 OVERRIDE(OP): Performed by: INTERNAL MEDICINE

## 2023-05-27 PROCEDURE — A9270 NON-COVERED ITEM OR SERVICE: HCPCS | Performed by: SURGERY

## 2023-05-27 PROCEDURE — 700102 HCHG RX REV CODE 250 W/ 637 OVERRIDE(OP): Performed by: HOSPITALIST

## 2023-05-27 RX ADMIN — DULOXETINE HYDROCHLORIDE 30 MG: 30 CAPSULE, DELAYED RELEASE ORAL at 17:18

## 2023-05-27 RX ADMIN — TAMSULOSIN HYDROCHLORIDE 0.4 MG: 0.4 CAPSULE ORAL at 09:20

## 2023-05-27 RX ADMIN — OXYCODONE AND ACETAMINOPHEN 1 TABLET: 10; 325 TABLET ORAL at 05:59

## 2023-05-27 RX ADMIN — OXYCODONE HYDROCHLORIDE 10 MG: 10 TABLET ORAL at 04:52

## 2023-05-27 ASSESSMENT — PAIN DESCRIPTION - PAIN TYPE
TYPE: ACUTE PAIN
TYPE: ACUTE PAIN

## 2023-05-27 ASSESSMENT — ENCOUNTER SYMPTOMS
ABDOMINAL PAIN: 0
DOUBLE VISION: 0
FEVER: 0
NAUSEA: 0
SPEECH CHANGE: 0
ORTHOPNEA: 0
CLAUDICATION: 0
VOMITING: 0
DIZZINESS: 0
DIARRHEA: 0
HEMOPTYSIS: 0
WEAKNESS: 1
MYALGIAS: 0
PHOTOPHOBIA: 0
CONSTIPATION: 0
WEIGHT LOSS: 0
NECK PAIN: 0
BLURRED VISION: 0
PALPITATIONS: 0
COUGH: 0
CHILLS: 0

## 2023-05-27 ASSESSMENT — PAIN SCALES - WONG BAKER: WONGBAKER_NUMERICALRESPONSE: HURTS JUST A LITTLE BIT

## 2023-05-27 NOTE — PROGRESS NOTES
Received bedside report from day shift RN , pt care assumed. , pt assessment completed. Pt A&Ox3, denies pain at this time. POC . Pt denies any additional needs at this time. Patient remain on comfort level of care .Turned and repositioned as much as patient can tolerate Bed locked and in lowest position, bed alarm . Pt educated on fall risk and verbalized understanding, call light within reach, hourly rounding initiated..

## 2023-05-27 NOTE — PROGRESS NOTES
Inpatient Palliative Care     Location: [place] [address]     HPI:     Tyrone Cline is a 83 y.o. male with past medical history significant for chronic kidney disease followed by nephrology with baseline creatinine about 2.2, non-insulin-dependent diabetes mellitus, hypertension, recent acute gangrenous cholecystitis status post laparoscopic cholecystectomy 4/18 admitted 4/21/2023 following a ground-level fall, abdominal pain, and lower extremity weakness.  Work-up included CT imaging positive for colitis, leukocytosis, and SANJUANITA with creatinine with 5.5.  Nephrology consulted for SANJUANITA on CKD 4/22. Neurology consulted and following 4/21 for leg weakness.  MRIs revealed extradural mass.  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  Surgical urology consulted 5/9 for urinary retention and inability of nursing to pass catheter (prior TURP) and is status post cystoscopy with complex catheterization.    Plan was for SNF discharge however patient indicated that he did not want to pursue skilled therapy and would prefer hospice care.  Patient was evaluated by [facility] and [facility] however their teams did not find a qualifying hospice diagnosis to except referral and thus declined referral.     I saw the patient 5/24/2023 for continued goals of care discussions.  I did start him on some scheduled pain medications due to continued postoperative pain from his abdominal and back surgeries.    Summary:     Met with patient, patient's son Ld and his  who are in town from [place] until Monday 2 PM. Patient reports his pain is improved.  Patient deferred most questions to his son Ld.    I provided an overview of patient's hospital course.  Patient confirmed wishes for hospice care.  I discussed barriers to hospice discharge (lacking qualifying diagnosis prior to agencies).  Discussed hospice qualification requirements with patient and family in  detail.  Answered questions regarding hospice benefit, qualifications, referral process, and that there are different agencies in [facility].  Ld would like blanket referral sent to hospice agencies as this is a type of care his father wishes for and both patient and family feel that the patient may only have months to live given his overall poor health, and mental and spiritual she does.     Ld toured multiple group homes this morning.  Ld would like to explore all options for his father including  private caregiver agencies to explore home care, he is wanting to explore any and all options including group home placement versus SNF placement with private/partial private pay.  Appreciate RN NIKIA Neff providing private caregiver information.  Requested she/care coordination provide SNF list as well.  Discussed how Ld can follow-up on requests over the weekend.    Discussed if no hospice agencies are accepting could consider referral to [facility] as well as home based Palliative Care with hospice like care plan, until patient eventually qualifies for hospice.  Recommended completion of POLST form for DNR/comfort measures only, no artificial nutrition, no IV fluids given the patient's wishes.  Form was completed.  Ld and his  will be in town until Monday and are hopeful to explore any and all options that meet the patient's care needs and preferences.  They may explore short-term SNF care until longer-term plan can be figured out.     Plan:     1) [place] hospice referrals  2) Family exploring home with private caregivers vs GH vs SNF private pay  3) Son/POA [facility] in [facility] until Monday       60 minutes spent discussing advanced care planning, this time excludes any other billed services.    I spent a total of 80 minutes reviewing medical records, direct face-to-face time with the patient and/or family, coordination of care, and documentation. This is separate from the time spent on advance  "care planning, which is documented above.     Sanjana \"Farida\" GANESH Coombs, [facility]  Palliative Care Nurse Practitioner  [phone]               "

## 2023-05-27 NOTE — DISCHARGE PLANNING
Received choice form @: [id number]  Agency/Facility name: [facility]  Sent referral per choice form @: [id number]

## 2023-05-27 NOTE — CARE PLAN
The patient is Stable - Low risk of patient condition declining or worsening    Shift Goals  Clinical Goals: comfort  Patient Goals: comfort  Family Goals: FLEX    Progress made toward(s) clinical / shift goals:  on going    Patient is  progressing towards the following goals:  Problem: Pain - Standard  Goal: Alleviation of pain or a reduction in pain to the patient’s comfort goal  Outcome: Progressing     Problem: Knowledge Deficit - Standard  Goal: Patient and family/care givers will demonstrate understanding of plan of care, disease process/condition, diagnostic tests and medications  Outcome: Progressing     Problem: Wound/ / Incision Healing  Goal: Patient's wound/surgical incision will decrease in size and heals properly  Outcome: Progressing

## 2023-05-27 NOTE — CARE PLAN
Problem: Pain - Standard  Goal: Alleviation of pain or a reduction in pain to the patient’s comfort goal  Outcome: Progressing  Note: Education provided regarding pain management including nonpharmacological measures such as rest and relaxation   The patient is Stable - Low risk of patient condition declining or worsening    Shift Goals  Clinical Goals: comfort  Patient Goals: comfort  Family Goals: maría elena    Progress made toward(s) clinical / shift goals:      Patient is not progressing towards the following goals:

## 2023-05-27 NOTE — DISCHARGE PLANNING
Case Management Discharge Planning    Admission Date: 4/21/2023  GMLOS: 9.6  ALOS: 36    6-Clicks ADL Score: 17  6-Clicks Mobility Score: 6  PT and/or OT Eval ordered: Yes  Post-acute Referrals Ordered: Yes  Post-acute Choice Obtained: Yes  Has referral(s) been sent to post-acute provider:  Yes      Anticipated Discharge Dispo: Discharge Disposition: D/T to SNF with medicare cert w/planned hosp IP readmit (83)    DME Needed: Yes    DME Ordered: No    Action(s) Taken: Updated Provider/Nurse on Discharge Plan, Choice obtained, and Referral(s) sent    Escalations Completed: None    Medically Clear: Yes    Next Steps: CM requested at bedside to discuss discharge plan with family. CM met with son Ld Valdivia and dL's  Dm to discuss discharge plan. They are working on private duty caregiver for 24/7 at home for Tuesday discharge. CM to FU on blanket referral for hospice with [facility] as [facility] declined. CM communicated with DPA and additional referral sent to [facility]. Ag would like to die peacefully at home. CM to follow-up with family tomorrow. CM instructed Ld to notifiy bedside nurse when they arrive so CM can follow-up with them tomorrow. Ld and Dm will be returning to [place] on Monday. Con is working on coordinating Home Caregiver and will have more information on Tuesday.    Barriers to Discharge: DME, Outpatient referrals pending, and Transportation    Is the patient up for discharge tomorrow: No

## 2023-05-27 NOTE — PROGRESS NOTES
[facility] Medicine Daily Progress Note    Date of Service  5/27/2023    Chief Complaint  Tyrone Cline is a 83 y.o. male admitted 4/21/2023 with colitis    Hospital Course    83-year-old male with a past medical history significant for CKD stage IV, diabetes mellitus, hypertension presented to ER on 4/21/2023 with a complaint of abdominal pain and lower extremity weakness.  On admission patient was found to have leukocytosis 19.  CT scan for abdomen showed colitis.  Patient was diagnosed with sepsis, IV antibiotics and fluids were started.  Also on admission CT scan showed possible colitis HIDA scan was done and confirmed acute cholecystitis, general surgeon was consulted and patient underwent laparoscopic cholecystectomy with conversion to open cholecystectomy 4/18 /2023 , unfortunately patient developedpostoperative hypotension, went to ICU and patient needed vasopressor and transferred back on 4/29 floor.  Patient finished course of antibiotics meropenem for ESBL coli from intra-abdominal infection..    Patient came with lower extremity edema and patient continues to have bilateral lower extremity weakness; MR-T spine expansile T2 hyperintense area in the thoracic spinal cord at the levels of T2, T3 and T4.  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  Neurosurgery planning for repeat MRI in 1 month with neurosurgery Dr. Lucas     Patient has a chronic kidney disease stage IV, patient was evaluated by nephrologist and no need for dialysis, his kidney function stable around 2.4    Patient is alert and oriented x3, Patient is able to make his own decision.  Goal of care was discussed with the patient and he does not want any more aggressive treatment and he wanted to be discharged on hospice.  Comfort care was initiated on 5/24.      interval Problem Update  -Evaluating examined the patient at bedside patient was alert oriented x3, no events last  night.  -Comfortable, did not ask for pain medication  -Working with  and family for discharge.          I have discussed this patient's plan of care and discharge plan at IDT rounds today with Case Management, Nursing, Nursing leadership, and other members of the IDT team.    Consultants/Specialty  general surgery and neurosurgery    Code Status  Comfort Care/DNR    Disposition  Medically clear for discharge with hospice    Review of Systems  Review of Systems   Constitutional:  Positive for malaise/fatigue. Negative for chills, fever and weight loss.   HENT:  Negative for ear pain, hearing loss and tinnitus.    Eyes:  Negative for blurred vision, double vision and photophobia.   Respiratory:  Negative for cough and hemoptysis.    Cardiovascular:  Negative for chest pain, palpitations, orthopnea and claudication.   Gastrointestinal:  Negative for abdominal pain, constipation, diarrhea, nausea and vomiting.   Genitourinary:  Negative for dysuria, frequency and urgency.   Musculoskeletal:  Negative for myalgias and neck pain.   Skin:  Negative for rash.   Neurological:  Positive for weakness. Negative for dizziness and speech change.        Physical Exam  Temp:  [36 °C (96.8 °F)-36.4 °C (97.6 °F)] 36 °C (96.8 °F)  Pulse:  [70-78] 78  Resp:  [15-18] 18  BP: ()/(52-59) 86/52  SpO2:  [90 %-96 %] 90 %    Physical Exam  Constitutional:       General: He is not in acute distress.     Appearance: He is ill-appearing.   Eyes:      Pupils: Pupils are equal, round, and reactive to light.   Cardiovascular:      Rate and Rhythm: Normal rate and regular rhythm.      Pulses: Normal pulses.      Heart sounds: Normal heart sounds. No murmur heard.  Abdominal:      General: Abdomen is flat.      Palpations: Abdomen is soft.      Tenderness: There is abdominal tenderness. There is no guarding.      Comments: Adominal wound  vac    Neurological:      General: No focal deficit present.      Mental Status: He is alert  and oriented to person, place, and time.      Cranial Nerves: No cranial nerve deficit.      Motor: Weakness present.         Fluids    Intake/Output Summary (Last 24 hours) at 5/27/2023 1238  Last data filed at 5/27/2023 0553  Gross per 24 hour   Intake 240 ml   Output 1000 ml   Net -760 ml       Laboratory                              Imaging         Assessment/Plan  * Acute gangrenous cholecystitis- (present on admission)  Assessment & Plan  Found to have gangrenous cholecystitis s/p laprascopic converted open cholecystectomy 4/28/2023   -- Completed antibiotic treatment from 4/25- 4/ 29, drain has been removed.  Follow surgical recommendation.    Surgical course was complicated with postoperative wound infection, culture has been obtained, wound care has been consulted  Wound cx growing ESBL E coli, s/p completion of 7 days course of antibiotic    ACP (advance care planning)  Assessment & Plan  On 5/23 patient was alert oriented x3, the goal of care and CODE STATUS was discussed in detail with the patient, answered all his questions, patient is a clear that he does not want any aggressive treatment and he wants to be discharged with hospice.  Time 31 minutes    On 5/24 again the plan of care was discussed with the patient and his son, discussed all treatment options including aggressive treatment versus comfort care the patient is a clear that he wants to be comfortable with no aggressive treatment or images.  The son agreed and comfort care was initiated on 5/24, time 25 minutes    Leukocytosis  Assessment & Plan  Worsening  Since patient refused any aggressive treatment, will hold on CT scan at this time, patient finished a course of antibiotics  Labs daily    Urinary retention  Assessment & Plan  Florez by  urology.    Do not change Florez without urology consent    Abnormal MRI- (present on admission)  Assessment & Plan  MRI on admission showing T2/T3  hyperintense lesion in the right side of the thoracic  spinal cord at the levels of T2 and T3 Mild contrast enhancement is seen. This lesion is suspicious for spinal cord neoplasm or possible transverse myelitis       S/p Laminectomy  Neurosurgery planning for repeat MRI in 1 month with neurosurgery Dr. Donaldson  Patient wants to be on comfort care and refused any more images or aggressive treatment    Hypercalcemia- (present on admission)  Assessment & Plan  Likely due to immobility     -- monitor    Hypomagnesemia- (present on admission)  Assessment & Plan  Replete as needed    Cholelithiasis with acute cholecystitis- (present on admission)  Assessment & Plan  S/p surgical removal, converted to open cholecystectomy  NAHUN drain-removed on 5/8/2023   -- s/p completion of iv abx  Worsening leukocytosis on 5/24, patient refused any procedures or images and he wants to be on comfort care.    Hypokalemia- (present on admission)  Assessment & Plan  Replete as needed    Thrombocytopenia (HCC)- (present on admission)  Assessment & Plan  Resolved, plt are stable at 427    High anion gap metabolic acidosis- (present on admission)  Assessment & Plan  Acute on chronic kidney disease, -appears back at baseline on 5/3/2023    Resolved    Hyponatremia- (present on admission)  Assessment & Plan  Resolved    Weakness of both lower extremities- (present on admission)  Assessment & Plan  MR T spine expansile T2 hyperintense area in the thoracic spinal cord at the levels of T2, T3 and T4.    S/p Laminectomy  Patient refused any other procedures and he wanted to be in comfort care      Sepsis (HCC)- (present on admission)  Assessment & Plan  Completed 7 days course of antibiotic  Comfort care and patient does not want to repeat any images or labs    Colitis- (present on admission)  Assessment & Plan  Cultures negative, monitor  Initial admitting impression, currently improved  Diet as tolerated     Acute kidney injury superimposed on CKD (HCC)- (present on admission)  Assessment &  Plan  Worsening BUN/creatinine  Avoid nephrotoxin  Nephrology evaluated  Continue oral Lasix      Diabetic peripheral neuropathy (HCC)- (present on admission)  Assessment & Plan  Cymbalta, medical treatment    Essential hypertension- (present on admission)  Assessment & Plan  Continue amlodipine and coreg    Type 2 diabetes mellitus with kidney complication, without long-term current use of insulin (HCC)- (present on admission)  Assessment & Plan  On insulin regimen  A1c 6.6  Blood glucose level acceptable   discontinue Lantus and oral medications      Hypercholesterolemia- (present on admission)  Assessment & Plan  Stable, continue Lipitor    BPH (benign prostatic hyperplasia)- (present on admission)  Assessment & Plan  Keep the Rivera  Continue Flomax         VTE prophylaxis: SCDs/TEDs  On lovenox  I have performed a physical exam and reviewed and updated ROS and Plan today (5/27/2023). In review of yesterday's note (5/26/2023), there are no changes except as documented above.        Interventions to be considered in all patients in order to minimize the risk of delirium.   -do not disturb patient (vitals or lab draws) between the hours of 10 PM and 6 AM.  -ideally the patient should not sleep during the day and we should avoid day time naps.   -up in chair for meals  -ambulate at least three times daily, as able  -watch for constipation  -timed voiding - ask patient is she would like to go to the bathroom q 2-3 hours, except during the do not disturb hours.   -remove all necessary lines (central lines, peripheral IVs, feeding tubes, rivera catheters)  -unless patient has shown harm to self or others I would recommend against use of restraints - either chemical or physical (antipsychotics)   -minimize polypharmacy, do not dose medication during sleep hours

## 2023-05-28 PROCEDURE — 700102 HCHG RX REV CODE 250 W/ 637 OVERRIDE(OP): Performed by: INTERNAL MEDICINE

## 2023-05-28 PROCEDURE — 770001 HCHG ROOM/CARE - MED/SURG/GYN PRIV*

## 2023-05-28 PROCEDURE — A9270 NON-COVERED ITEM OR SERVICE: HCPCS | Performed by: INTERNAL MEDICINE

## 2023-05-28 PROCEDURE — 700102 HCHG RX REV CODE 250 W/ 637 OVERRIDE(OP): Performed by: SURGERY

## 2023-05-28 PROCEDURE — 99232 SBSQ HOSP IP/OBS MODERATE 35: CPT | Performed by: INTERNAL MEDICINE

## 2023-05-28 PROCEDURE — 700111 HCHG RX REV CODE 636 W/ 250 OVERRIDE (IP): Performed by: HOSPITALIST

## 2023-05-28 PROCEDURE — A9270 NON-COVERED ITEM OR SERVICE: HCPCS | Performed by: SURGERY

## 2023-05-28 RX ADMIN — ACETAMINOPHEN 650 MG: 325 TABLET, FILM COATED ORAL at 14:59

## 2023-05-28 RX ADMIN — DULOXETINE HYDROCHLORIDE 30 MG: 30 CAPSULE, DELAYED RELEASE ORAL at 16:52

## 2023-05-28 RX ADMIN — MORPHINE SULFATE 2 MG: 4 INJECTION, SOLUTION INTRAMUSCULAR; INTRAVENOUS at 18:13

## 2023-05-28 RX ADMIN — OXYCODONE HYDROCHLORIDE 10 MG: 10 TABLET ORAL at 16:13

## 2023-05-28 ASSESSMENT — ENCOUNTER SYMPTOMS
CHILLS: 0
MYALGIAS: 0
COUGH: 0
DIARRHEA: 0
ORTHOPNEA: 0
SPEECH CHANGE: 0
NECK PAIN: 0
HEMOPTYSIS: 0
BLURRED VISION: 0
NAUSEA: 0
FEVER: 0
WEAKNESS: 1
WEIGHT LOSS: 0
PALPITATIONS: 0
DIZZINESS: 0
ABDOMINAL PAIN: 0
PHOTOPHOBIA: 0
DOUBLE VISION: 0
CLAUDICATION: 0
VOMITING: 0
CONSTIPATION: 0

## 2023-05-28 ASSESSMENT — PAIN DESCRIPTION - PAIN TYPE
TYPE: ACUTE PAIN

## 2023-05-28 NOTE — CARE PLAN
Problem: Knowledge Deficit - Standard  Goal: Patient and family/care givers will demonstrate understanding of plan of care, disease process/condition, diagnostic tests and medications  Note: Education provided regarding pain management including nonpharmacological measures such as rest and relaxation   The patient is Stable - Low risk of patient condition declining or worsening    Shift Goals  Clinical Goals: comfort  Patient Goals: comfort  Family Goals: maría elena    Progress made toward(s) clinical / shift goals:      Patient is not progressing towards the following goals:

## 2023-05-28 NOTE — DISCHARGE PLANNING
Case Management Discharge Planning    Admission Date: 4/21/2023  GMLOS: 9.6  ALOS: 37    6-Clicks ADL Score: 17  6-Clicks Mobility Score: 6  PT and/or OT Eval ordered: Yes  Post-acute Referrals Ordered: Yes  Post-acute Choice Obtained: Yes  Has referral(s) been sent to post-acute provider:  Yes      Anticipated Discharge Dispo: Discharge Disposition:       DME Needed: Yes        Action(s) Taken:   Pt has [facility] declined by [facility] on 5-23-23 as he does not have a qualifying diagnosis.    RN NIKIA met with patient and son, Ld at bedside.  I informed him of above.  He requested a blanket hospice referral to all other hospice agencies in [facility].  Referral sent to [facility] via [facility].  Telephone call to Tri Burt who stated that she will notify their clinic team to review.    Ld stated that he has reached out to Con Mckeon,  in the community [phone] to assist with finding 24/7 care givers.    This RN CM spoke with Con via telephone.  He plans to evaluate patient on Tuesday, 5-30-23 at hospital.  He stated that patient does not want to do any therapy and SNF for rehab is out.  I provided him with information above.  He anticipates patient being discharged to home with home health for skilled nursing, wound care and 24/7 caregivers.  He will follow up with patient's son, Ld.    Medically Clear: Yes 5-24-23    Next Steps: Follow up with [facility].  Obtain home health    Barriers to Discharge: Hospice acceptance, 24/7 caregivers

## 2023-05-28 NOTE — PROGRESS NOTES
[facility] Medicine Daily Progress Note    Date of Service  5/28/2023    Chief Complaint  Tyrone Cline is a 83 y.o. male admitted 4/21/2023 with colitis    Hospital Course    83-year-old male with a past medical history significant for CKD stage IV, diabetes mellitus, hypertension presented to ER on 4/21/2023 with a complaint of abdominal pain and lower extremity weakness.  On admission patient was found to have leukocytosis 19.  CT scan for abdomen showed colitis.  Patient was diagnosed with sepsis, IV antibiotics and fluids were started.  Also on admission CT scan showed possible colitis HIDA scan was done and confirmed acute cholecystitis, general surgeon was consulted and patient underwent laparoscopic cholecystectomy with conversion to open cholecystectomy 4/18 /2023 , unfortunately patient developedpostoperative hypotension, went to ICU and patient needed vasopressor and transferred back on 4/29 floor.  Patient finished course of antibiotics meropenem for ESBL coli from intra-abdominal infection..    Patient came with lower extremity edema and patient continues to have bilateral lower extremity weakness; MR-T spine expansile T2 hyperintense area in the thoracic spinal cord at the levels of T2, T3 and T4.  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  Neurosurgery planning for repeat MRI in 1 month with neurosurgery Dr. Lucas     Patient has a chronic kidney disease stage IV, patient was evaluated by nephrologist and no need for dialysis, his kidney function stable around 2.4    Patient is alert and oriented x3, Patient is able to make his own decision.  Goal of care was discussed with the patient and he does not want any more aggressive treatment and he wanted to be discharged on hospice.  Comfort care was initiated on 5/24.      interval Problem Update  -Evaluating examined the patient at bedside patient was alert oriented x4, no events last  night.  -Patient is comfortable in his bed, eating his breakfast, denied any symptoms and wondering about discharge plan.  -Continue duloxetine and discontinue Flomax, patient has Florez.          I have discussed this patient's plan of care and discharge plan at IDT rounds today with Case Management, Nursing, Nursing leadership, and other members of the IDT team.    Consultants/Specialty  general surgery and neurosurgery    Code Status  Comfort Care/DNR    Disposition  Medically clear for discharge with hospice    Review of Systems  Review of Systems   Constitutional:  Positive for malaise/fatigue. Negative for chills, fever and weight loss.   HENT:  Negative for ear pain, hearing loss and tinnitus.    Eyes:  Negative for blurred vision, double vision and photophobia.   Respiratory:  Negative for cough and hemoptysis.    Cardiovascular:  Negative for chest pain, palpitations, orthopnea and claudication.   Gastrointestinal:  Negative for abdominal pain, constipation, diarrhea, nausea and vomiting.   Genitourinary:  Negative for dysuria, frequency and urgency.   Musculoskeletal:  Negative for myalgias and neck pain.   Skin:  Negative for rash.   Neurological:  Positive for weakness. Negative for dizziness and speech change.        Physical Exam  Temp:  [36 °C (96.8 °F)-36.3 °C (97.4 °F)] 36 °C (96.8 °F)  Pulse:  [79-90] 90  Resp:  [16-18] 18  BP: ()/(47-61) 118/61  SpO2:  [92 %-97 %] 92 %    Physical Exam  Constitutional:       General: He is not in acute distress.     Appearance: He is ill-appearing.   Eyes:      Pupils: Pupils are equal, round, and reactive to light.   Cardiovascular:      Rate and Rhythm: Normal rate and regular rhythm.      Pulses: Normal pulses.      Heart sounds: Normal heart sounds. No murmur heard.  Abdominal:      General: Abdomen is flat.      Palpations: Abdomen is soft.      Tenderness: There is abdominal tenderness. There is no guarding.      Comments: Adominal wound  vac     Neurological:      General: No focal deficit present.      Mental Status: He is alert and oriented to person, place, and time.      Cranial Nerves: No cranial nerve deficit.      Motor: Weakness present.         Fluids    Intake/Output Summary (Last 24 hours) at 5/28/2023 1013  Last data filed at 5/28/2023 0400  Gross per 24 hour   Intake 120 ml   Output 1300 ml   Net -1180 ml         Laboratory                              Imaging         Assessment/Plan  * Acute gangrenous cholecystitis- (present on admission)  Assessment & Plan  Found to have gangrenous cholecystitis s/p laprascopic converted open cholecystectomy 4/28/2023   -- Completed antibiotic treatment from 4/25- 4/ 29, drain has been removed.  Follow surgical recommendation.    Surgical course was complicated with postoperative wound infection, culture has been obtained, wound care has been consulted  Wound cx growing ESBL E coli, s/p completion of 7 days course of antibiotic    ACP (advance care planning)  Assessment & Plan  On 5/23 patient was alert oriented x3, the goal of care and CODE STATUS was discussed in detail with the patient, answered all his questions, patient is a clear that he does not want any aggressive treatment and he wants to be discharged with hospice.  Time 31 minutes    On 5/24 again the plan of care was discussed with the patient and his son, discussed all treatment options including aggressive treatment versus comfort care the patient is a clear that he wants to be comfortable with no aggressive treatment or images.  The son agreed and comfort care was initiated on 5/24, time 25 minutes    Leukocytosis  Assessment & Plan  Worsening  Since patient refused any aggressive treatment, will hold on CT scan at this time, patient finished a course of antibiotics  Labs daily    Urinary retention  Assessment & Plan  Florez by  urology.    Do not change Florez without urology consent    Abnormal MRI- (present on admission)  Assessment &  Plan  MRI on admission showing T2/T3  hyperintense lesion in the right side of the thoracic spinal cord at the levels of T2 and T3 Mild contrast enhancement is seen. This lesion is suspicious for spinal cord neoplasm or possible transverse myelitis       S/p Laminectomy  Neurosurgery planning for repeat MRI in 1 month with neurosurgery Dr. Donaldson  Patient wants to be on comfort care and refused any more images or aggressive treatment    Hypercalcemia- (present on admission)  Assessment & Plan  Likely due to immobility     -- monitor    Hypomagnesemia- (present on admission)  Assessment & Plan  Replete as needed    Cholelithiasis with acute cholecystitis- (present on admission)  Assessment & Plan  S/p surgical removal, converted to open cholecystectomy  NAHUN drain-removed on 5/8/2023   -- s/p completion of iv abx  Worsening leukocytosis on 5/24, patient refused any procedures or images and he wants to be on comfort care.    Hypokalemia- (present on admission)  Assessment & Plan  Replete as needed    Thrombocytopenia (HCC)- (present on admission)  Assessment & Plan  Resolved, plt are stable at 427    High anion gap metabolic acidosis- (present on admission)  Assessment & Plan  Acute on chronic kidney disease, -appears back at baseline on 5/3/2023    Resolved    Hyponatremia- (present on admission)  Assessment & Plan  Resolved    Weakness of both lower extremities- (present on admission)  Assessment & Plan  MR T spine expansile T2 hyperintense area in the thoracic spinal cord at the levels of T2, T3 and T4.    S/p Laminectomy  Patient refused any other procedures and he wanted to be in comfort care      Sepsis (HCC)- (present on admission)  Assessment & Plan  Completed 7 days course of antibiotic  Comfort care and patient does not want to repeat any images or labs    Colitis- (present on admission)  Assessment & Plan  Cultures negative, monitor  Initial admitting impression, currently improved  Diet as tolerated      Acute kidney injury superimposed on CKD (HCC)- (present on admission)  Assessment & Plan  Worsening BUN/creatinine  Avoid nephrotoxin  Nephrology evaluated  Continue oral Lasix      Diabetic peripheral neuropathy (HCC)- (present on admission)  Assessment & Plan  Cymbalta, medical treatment    Essential hypertension- (present on admission)  Assessment & Plan  Continue amlodipine and coreg    Type 2 diabetes mellitus with kidney complication, without long-term current use of insulin (HCC)- (present on admission)  Assessment & Plan  On insulin regimen  A1c 6.6  Blood glucose level acceptable   discontinue Lantus and oral medications      Hypercholesterolemia- (present on admission)  Assessment & Plan  Stable, continue Lipitor    BPH (benign prostatic hyperplasia)- (present on admission)  Assessment & Plan  Keep the Rivera  Continue Flomax         VTE prophylaxis: SCDs/TEDs  On lovenox  I have performed a physical exam and reviewed and updated ROS and Plan today (5/28/2023). In review of yesterday's note (5/27/2023), there are no changes except as documented above.        Interventions to be considered in all patients in order to minimize the risk of delirium.   -do not disturb patient (vitals or lab draws) between the hours of 10 PM and 6 AM.  -ideally the patient should not sleep during the day and we should avoid day time naps.   -up in chair for meals  -ambulate at least three times daily, as able  -watch for constipation  -timed voiding - ask patient is she would like to go to the bathroom q 2-3 hours, except during the do not disturb hours.   -remove all necessary lines (central lines, peripheral IVs, feeding tubes, rivera catheters)  -unless patient has shown harm to self or others I would recommend against use of restraints - either chemical or physical (antipsychotics)   -minimize polypharmacy, do not dose medication during sleep hours

## 2023-05-28 NOTE — DISCHARGE PLANNING
Case Management Discharge Planning    Admission Date: 4/21/2023  GMLOS: 9.6  ALOS: 37    6-Clicks ADL Score: 17  6-Clicks Mobility Score: 6        Anticipated Discharge Dispo: Discharge Disposition:   D/T to hospice home (50)  Discharge Address: [address] [place] [address] [place]  [address]  Discharge Contact Phone Number: [phone]    DME Needed: Yes        Action(s) Taken:   Pt's son Ld was able to speak with Tri, Liaison with [facility] via telephone.  She or her colleague plan to meet with patient and son Ld tomorrow a.m.  There is a possibility that patient has a qualifying hospice diagnosis, criteria.  Ld plans to speak with outpatient Con MONTEJO tomorrow as well regarding 24/7 caregivers.  Ld would like for patient to be discharged home on Wednesday, 5-31-23.    Medically Clear: Yes    Next Steps: Follow up with [facility] tomorrow a.m.    Barriers to Discharge: hospice acceptance, 24/7 caregivers

## 2023-05-28 NOTE — CARE PLAN
The patient is Stable - Low risk of patient condition declining or worsening    Shift Goals  Clinical Goals: comfort  Patient Goals: comfort  Family Goals: maría elena      Problem: Knowledge Deficit - Standard  Goal: Patient and family/care givers will demonstrate understanding of plan of care, disease process/condition, diagnostic tests and medications  Outcome: Progressing     Problem: Pain - Standard  Goal: Alleviation of pain or a reduction in pain to the patient’s comfort goal  Outcome: Progressing     Problem: Skin Integrity  Goal: Skin integrity is maintained or improved  Outcome: Progressing     Problem: Mobility  Goal: Patient's capacity to carry out activities will improve  Outcome: Progressing     Problem: Communication  Goal: The ability to communicate needs accurately and effectively will improve  Outcome: Progressing     Problem: Hemodynamics  Goal: Patient's hemodynamics, fluid balance and neurologic status will be stable or improve  Outcome: Progressing     Problem: Respiratory  Goal: Patient will achieve/maintain optimum respiratory ventilation and gas exchange  Outcome: Progressing     Problem: Nutrition  Goal: Patient's nutritional and fluid intake will be adequate or improve  Outcome: Progressing     Problem: Urinary Elimination  Goal: Establish and maintain regular urinary output  Outcome: Progressing     Problem: Bowel Elimination  Goal: Establish and maintain regular bowel function  Outcome: Progressing     Problem: Self Care  Goal: Patient will have the ability to perform ADLs independently or with assistance (bathe, groom, dress, toilet and feed)  Outcome: Progressing     Problem: Wound/ / Incision Healing  Goal: Patient's wound/surgical incision will decrease in size and heals properly  Outcome: Progressing

## 2023-05-29 PROCEDURE — A9270 NON-COVERED ITEM OR SERVICE: HCPCS | Performed by: INTERNAL MEDICINE

## 2023-05-29 PROCEDURE — 770001 HCHG ROOM/CARE - MED/SURG/GYN PRIV*

## 2023-05-29 PROCEDURE — A9270 NON-COVERED ITEM OR SERVICE: HCPCS | Performed by: NURSE PRACTITIONER

## 2023-05-29 PROCEDURE — 99231 SBSQ HOSP IP/OBS SF/LOW 25: CPT | Performed by: INTERNAL MEDICINE

## 2023-05-29 PROCEDURE — 700102 HCHG RX REV CODE 250 W/ 637 OVERRIDE(OP): Performed by: SURGERY

## 2023-05-29 PROCEDURE — A9270 NON-COVERED ITEM OR SERVICE: HCPCS | Performed by: SURGERY

## 2023-05-29 PROCEDURE — 700102 HCHG RX REV CODE 250 W/ 637 OVERRIDE(OP): Performed by: NURSE PRACTITIONER

## 2023-05-29 PROCEDURE — 700102 HCHG RX REV CODE 250 W/ 637 OVERRIDE(OP): Performed by: INTERNAL MEDICINE

## 2023-05-29 RX ADMIN — OXYCODONE HYDROCHLORIDE 10 MG: 10 TABLET ORAL at 03:25

## 2023-05-29 RX ADMIN — OXYCODONE HYDROCHLORIDE 10 MG: 10 TABLET ORAL at 11:41

## 2023-05-29 RX ADMIN — OXYCODONE AND ACETAMINOPHEN 1 TABLET: 10; 325 TABLET ORAL at 21:50

## 2023-05-29 RX ADMIN — DULOXETINE HYDROCHLORIDE 30 MG: 30 CAPSULE, DELAYED RELEASE ORAL at 18:44

## 2023-05-29 RX ADMIN — OXYCODONE HYDROCHLORIDE 10 MG: 10 TABLET ORAL at 15:13

## 2023-05-29 ASSESSMENT — ENCOUNTER SYMPTOMS
HEMOPTYSIS: 0
WEAKNESS: 1
DOUBLE VISION: 0
FEVER: 0
VOMITING: 0
DIZZINESS: 0
PHOTOPHOBIA: 0
COUGH: 0
PALPITATIONS: 0
ABDOMINAL PAIN: 0
CHILLS: 0
ORTHOPNEA: 0
NAUSEA: 0
BLURRED VISION: 0
SPEECH CHANGE: 0
NECK PAIN: 0
CONSTIPATION: 0
MYALGIAS: 0
DIARRHEA: 0
WEIGHT LOSS: 0
CLAUDICATION: 0

## 2023-05-29 ASSESSMENT — PAIN DESCRIPTION - PAIN TYPE: TYPE: ACUTE PAIN

## 2023-05-29 NOTE — PROGRESS NOTES
[facility] Medicine Daily Progress Note    Date of Service  5/29/2023    Chief Complaint  Tyrone Cline is a 83 y.o. male admitted 4/21/2023 with colitis    Hospital Course    83-year-old male with a past medical history significant for CKD stage IV, diabetes mellitus, hypertension presented to ER on 4/21/2023 with a complaint of abdominal pain and lower extremity weakness.  On admission patient was found to have leukocytosis 19.  CT scan for abdomen showed colitis.  Patient was diagnosed with sepsis, IV antibiotics and fluids were started.  Also on admission CT scan showed possible colitis HIDA scan was done and confirmed acute cholecystitis, general surgeon was consulted and patient underwent laparoscopic cholecystectomy with conversion to open cholecystectomy 4/18 /2023 , unfortunately patient developedpostoperative hypotension, went to ICU and patient needed vasopressor and transferred back on 4/29 floor.  Patient finished course of antibiotics meropenem for ESBL coli from intra-abdominal infection..    Patient came with lower extremity edema and patient continues to have bilateral lower extremity weakness; MR-T spine expansile T2 hyperintense area in the thoracic spinal cord at the levels of T2, T3 and T4.  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  Neurosurgery planning for repeat MRI in 1 month with neurosurgery Dr. Lucas     Patient has a chronic kidney disease stage IV, patient was evaluated by nephrologist and no need for dialysis, his kidney function stable around 2.4    Patient is alert and oriented x3, Patient is able to make his own decision.  Goal of care was discussed with the patient and he does not want any more aggressive treatment and he wanted to be discharged on hospice.  Comfort care was initiated on 5/24.      interval Problem Update  -Evaluating examined the patient at bedside patient was alert oriented x4, no events last  night.  -Patient seen in bed, comfortable, still working with a  and family for discharge planning,, possible discharge home with hospice agency and private care giver.      I have discussed this patient's plan of care and discharge plan at IDT rounds today with Case Management, Nursing, Nursing leadership, and other members of the IDT team.    Consultants/Specialty  general surgery and neurosurgery    Code Status  Comfort Care/DNR    Disposition  Medically clear for discharge with hospice    Review of Systems  Review of Systems   Constitutional:  Positive for malaise/fatigue. Negative for chills, fever and weight loss.   HENT:  Negative for ear pain, hearing loss and tinnitus.    Eyes:  Negative for blurred vision, double vision and photophobia.   Respiratory:  Negative for cough and hemoptysis.    Cardiovascular:  Negative for chest pain, palpitations, orthopnea and claudication.   Gastrointestinal:  Negative for abdominal pain, constipation, diarrhea, nausea and vomiting.   Genitourinary:  Negative for dysuria, frequency and urgency.   Musculoskeletal:  Negative for myalgias and neck pain.   Skin:  Negative for rash.   Neurological:  Positive for weakness. Negative for dizziness and speech change.        Physical Exam  Temp:  [36 °C (96.8 °F)-36.5 °C (97.7 °F)] 36.5 °C (97.7 °F)  Pulse:  [74-78] 78  Resp:  [17-18] 18  BP: (78-88)/(42-49) 88/49  SpO2:  [91 %-94 %] 94 %    Physical Exam  Constitutional:       General: He is not in acute distress.     Appearance: He is ill-appearing.   Eyes:      Pupils: Pupils are equal, round, and reactive to light.   Cardiovascular:      Rate and Rhythm: Normal rate and regular rhythm.      Pulses: Normal pulses.      Heart sounds: Normal heart sounds. No murmur heard.  Abdominal:      General: Abdomen is flat.      Palpations: Abdomen is soft.      Tenderness: There is abdominal tenderness. There is no guarding.      Comments: Adominal wound  vac    Neurological:       General: No focal deficit present.      Mental Status: He is alert and oriented to person, place, and time.      Cranial Nerves: No cranial nerve deficit.      Motor: Weakness present.         Fluids    Intake/Output Summary (Last 24 hours) at 5/29/2023 1316  Last data filed at 5/29/2023 0900  Gross per 24 hour   Intake 360 ml   Output 500 ml   Net -140 ml       Laboratory                              Imaging         Assessment/Plan  * Acute gangrenous cholecystitis- (present on admission)  Assessment & Plan  Found to have gangrenous cholecystitis s/p laprascopic converted open cholecystectomy 4/28/2023   -- Completed antibiotic treatment from 4/25- 4/ 29, drain has been removed.  Follow surgical recommendation.    Surgical course was complicated with postoperative wound infection, culture has been obtained, wound care has been consulted  Wound cx growing ESBL E coli, s/p completion of 7 days course of antibiotic    ACP (advance care planning)  Assessment & Plan  On 5/23 patient was alert oriented x3, the goal of care and CODE STATUS was discussed in detail with the patient, answered all his questions, patient is a clear that he does not want any aggressive treatment and he wants to be discharged with hospice.  Time 31 minutes    On 5/24 again the plan of care was discussed with the patient and his son, discussed all treatment options including aggressive treatment versus comfort care the patient is a clear that he wants to be comfortable with no aggressive treatment or images.  The son agreed and comfort care was initiated on 5/24, time 25 minutes    Leukocytosis  Assessment & Plan  Worsening  Since patient refused any aggressive treatment, will hold on CT scan at this time, patient finished a course of antibiotics  Labs daily    Urinary retention  Assessment & Plan  Florez by  urology.    Do not change Florez without urology consent    Abnormal MRI- (present on admission)  Assessment & Plan  MRI on admission  showing T2/T3  hyperintense lesion in the right side of the thoracic spinal cord at the levels of T2 and T3 Mild contrast enhancement is seen. This lesion is suspicious for spinal cord neoplasm or possible transverse myelitis       S/p Laminectomy  Neurosurgery planning for repeat MRI in 1 month with neurosurgery Dr. Donaldson  Patient wants to be on comfort care and refused any more images or aggressive treatment    Hypercalcemia- (present on admission)  Assessment & Plan  Likely due to immobility     -- monitor    Hypomagnesemia- (present on admission)  Assessment & Plan  Replete as needed    Cholelithiasis with acute cholecystitis- (present on admission)  Assessment & Plan  S/p surgical removal, converted to open cholecystectomy  NAHUN drain-removed on 5/8/2023   -- s/p completion of iv abx  Worsening leukocytosis on 5/24, patient refused any procedures or images and he wants to be on comfort care.    Hypokalemia- (present on admission)  Assessment & Plan  Replete as needed    Thrombocytopenia (HCC)- (present on admission)  Assessment & Plan  Resolved, plt are stable at 427    High anion gap metabolic acidosis- (present on admission)  Assessment & Plan  Acute on chronic kidney disease, -appears back at baseline on 5/3/2023    Resolved    Hyponatremia- (present on admission)  Assessment & Plan  Resolved    Weakness of both lower extremities- (present on admission)  Assessment & Plan  MR T spine expansile T2 hyperintense area in the thoracic spinal cord at the levels of T2, T3 and T4.    S/p Laminectomy  Patient refused any other procedures and he wanted to be in comfort care      Sepsis (HCC)- (present on admission)  Assessment & Plan  Completed 7 days course of antibiotic  Comfort care and patient does not want to repeat any images or labs    Colitis- (present on admission)  Assessment & Plan  Cultures negative, monitor  Initial admitting impression, currently improved  Diet as tolerated     Acute kidney injury  superimposed on CKD (HCC)- (present on admission)  Assessment & Plan  Worsening BUN/creatinine  Avoid nephrotoxin  Nephrology evaluated  Continue oral Lasix      Diabetic peripheral neuropathy (HCC)- (present on admission)  Assessment & Plan  Cymbalta, medical treatment    Essential hypertension- (present on admission)  Assessment & Plan  Continue amlodipine and coreg    Type 2 diabetes mellitus with kidney complication, without long-term current use of insulin (HCC)- (present on admission)  Assessment & Plan  On insulin regimen  A1c 6.6  Blood glucose level acceptable   discontinue Lantus and oral medications      Hypercholesterolemia- (present on admission)  Assessment & Plan  Stable, continue Lipitor    BPH (benign prostatic hyperplasia)- (present on admission)  Assessment & Plan  Keep the Rivera  Continue Flomax         VTE prophylaxis: SCDs/TEDs  On lovenox  I have performed a physical exam and reviewed and updated ROS and Plan today (5/29/2023). In review of yesterday's note (5/28/2023), there are no changes except as documented above.        Interventions to be considered in all patients in order to minimize the risk of delirium.   -do not disturb patient (vitals or lab draws) between the hours of 10 PM and 6 AM.  -ideally the patient should not sleep during the day and we should avoid day time naps.   -up in chair for meals  -ambulate at least three times daily, as able  -watch for constipation  -timed voiding - ask patient is she would like to go to the bathroom q 2-3 hours, except during the do not disturb hours.   -remove all necessary lines (central lines, peripheral IVs, feeding tubes, rivera catheters)  -unless patient has shown harm to self or others I would recommend against use of restraints - either chemical or physical (antipsychotics)   -minimize polypharmacy, do not dose medication during sleep hours

## 2023-05-29 NOTE — CARE PLAN
The patient is Stable - Low risk of patient condition declining or worsening    Shift Goals  Clinical Goals: comfort  Patient Goals: comfort  Family Goals: maría elena      Problem: Knowledge Deficit - Standard  Goal: Patient and family/care givers will demonstrate understanding of plan of care, disease process/condition, diagnostic tests and medications  Outcome: Progressing     Problem: Pain - Standard  Goal: Alleviation of pain or a reduction in pain to the patient’s comfort goal  Outcome: Progressing     Problem: Skin Integrity  Goal: Skin integrity is maintained or improved  Outcome: Progressing     Problem: Mobility  Goal: Patient's capacity to carry out activities will improve  Outcome: Progressing     Problem: Communication  Goal: The ability to communicate needs accurately and effectively will improve  Outcome: Progressing     Problem: Hemodynamics  Goal: Patient's hemodynamics, fluid balance and neurologic status will be stable or improve  Outcome: Progressing     Problem: Self Care  Goal: Patient will have the ability to perform ADLs independently or with assistance (bathe, groom, dress, toilet and feed)  Outcome: Progressing

## 2023-05-30 PROCEDURE — A9270 NON-COVERED ITEM OR SERVICE: HCPCS | Performed by: INTERNAL MEDICINE

## 2023-05-30 PROCEDURE — 770001 HCHG ROOM/CARE - MED/SURG/GYN PRIV*

## 2023-05-30 PROCEDURE — 99232 SBSQ HOSP IP/OBS MODERATE 35: CPT | Performed by: INTERNAL MEDICINE

## 2023-05-30 PROCEDURE — A9270 NON-COVERED ITEM OR SERVICE: HCPCS | Performed by: SURGERY

## 2023-05-30 PROCEDURE — A9270 NON-COVERED ITEM OR SERVICE: HCPCS | Performed by: NURSE PRACTITIONER

## 2023-05-30 PROCEDURE — 700102 HCHG RX REV CODE 250 W/ 637 OVERRIDE(OP): Performed by: INTERNAL MEDICINE

## 2023-05-30 PROCEDURE — 700102 HCHG RX REV CODE 250 W/ 637 OVERRIDE(OP): Performed by: NURSE PRACTITIONER

## 2023-05-30 PROCEDURE — 700102 HCHG RX REV CODE 250 W/ 637 OVERRIDE(OP): Performed by: SURGERY

## 2023-05-30 RX ADMIN — DULOXETINE HYDROCHLORIDE 30 MG: 30 CAPSULE, DELAYED RELEASE ORAL at 18:45

## 2023-05-30 RX ADMIN — OXYCODONE HYDROCHLORIDE 10 MG: 10 TABLET ORAL at 11:37

## 2023-05-30 RX ADMIN — OXYCODONE AND ACETAMINOPHEN 1 TABLET: 10; 325 TABLET ORAL at 05:47

## 2023-05-30 RX ADMIN — OXYCODONE AND ACETAMINOPHEN 1 TABLET: 10; 325 TABLET ORAL at 22:02

## 2023-05-30 ASSESSMENT — PAIN DESCRIPTION - PAIN TYPE
TYPE: ACUTE PAIN
TYPE: ACUTE PAIN

## 2023-05-30 ASSESSMENT — ENCOUNTER SYMPTOMS
MYALGIAS: 0
NECK PAIN: 0
CONSTIPATION: 0
DIZZINESS: 0
SPEECH CHANGE: 0
WEAKNESS: 1
WEIGHT LOSS: 0
COUGH: 0
DIARRHEA: 0
FEVER: 0
VOMITING: 0
BLURRED VISION: 0
ABDOMINAL PAIN: 0
NAUSEA: 0
HEMOPTYSIS: 0
CHILLS: 0

## 2023-05-30 NOTE — DISCHARGE PLANNING
TCN following. [facility] chart review completed. Collaborated with NIKIA Mack. Per CM, patient will go home tomorrow, 5/31/23, and start hospic care once there. GMT transport home to be covered by [facility]. TCN will monitor if change in POC/status though will not actively be involved given comfort care status/hospice plan.

## 2023-05-30 NOTE — WOUND TEAM
[facility]  Inpatient Services   Wound and Skin Care Evaluation    Admission Date: 4/21/2023     Last order of IP CONSULT TO WOUND CARE was found on 5/10/2023 from Hospital Encounter on 4/21/2023     HPI, PMH, SH: Reviewed    Past Surgical History:   Procedure Laterality Date    THORACIC LAMINECTOMY N/A 5/16/2023    Procedure: LAMINECTOMY, SPINE, THORACIC - T 2-4, INTRADURAL WASHOUT;  Surgeon: Fidencio Springer M.D.;  Location: [facility];  Service: Neurosurgery    ROMAN BY LAPAROSCOPY N/A 4/28/2023    Procedure: CHOLECYSTECTOMY, LAPAROSCOPIC ATTEMPTED, OPEN CHOLECYSTECTOMY;  Surgeon: Suraj Galvan M.D.;  Location: [facility];  Service: General    UT COLONOSCOPY,DIAGNOSTIC N/A 12/12/2021    Procedure: COLONOSCOPY;  Surgeon: Dariel Simons M.D.;  Location: [facility];  Service: Gastroenterology    UT UPPER GI ENDOSCOPY,BIOPSY N/A 12/12/2021    Procedure: GASTROSCOPY, WITH BIOPSY;  Surgeon: Dariel Simons M.D.;  Location: [facility];  Service: Gastroenterology    UT UPPER GI ENDOSCOPY,CTRL BLEED N/A 12/12/2021    Procedure: EGD, WITH CLIP PLACEMENT;  Surgeon: Dariel Simons M.D.;  Location: [facility];  Service: Gastroenterology    GASTROSCOPY W/PUSH ENTERSCOPY N/A 12/12/2021    Procedure: GASTROSCOPY, WITH PUSH ENTEROSCOPY;  Surgeon: Dariel Simons M.D.;  Location: [facility];  Service: Gastroenterology    SEPTAL RECONSTRUCTION  12/7/2015    Procedure: SEPTAL RECONSTRUCTION OPEN WITH  GRAFTS & CONCHAL CART GRAFT;  Surgeon: SYDNIE Gaitan M.D.;  Location: SURGERY SAME DAY [facility];  Service:     BLEPHAROPLASTY  7/23/2014    Performed by Gera Plasencia M.D. at [facility] ORS    BROW LIFT  7/23/2014    Performed by Gera Plasencia M.D. at [facility]    CATARACT PHACO WITH IOL  12/4/2012    Performed by Suraj Gonzalez M.D. at [facility]    CATARACT PHACO WITH IOL  11/20/2012     Performed by Suraj Gonzalez M.D. at [facility]    APPENDECTOMY      ARTHROSCOPY, KNEE      CARDIAC CATH, LEFT HEART      LHC out of concern for STEMI, normal coronaries with minimal atherosclerosis, diagnosed with PNA as cause of symptoms and ST changes.     OTHER      KNEE SURGERY - LEFT.    OTHER      NOSE SURGERY.    TONSILLECTOMY       Social History     Tobacco Use    Smoking status: Former     Packs/day: 0.20     Years: 6.00     Pack years: 1.20     Types: Cigarettes     Quit date:      Years since quittin.4    Smokeless tobacco: Never    Tobacco comments:     Stopped over 25 years ago.   Vaping Use    Vaping Use: Never used   Substance Use Topics    Alcohol use: No     Chief Complaint   Patient presents with    GLF     2 days ago. (-) thinners    Extremity Weakness     Chronic lower bilateral weakness.     Diagnosis: Acute kidney failure (HCC) [N17.9]  Sepsis (HCC) [A41.9]    Unit where seen by Wound Team: [id number]     WOUND CONSULT/FOLLOW UP RELATED TO:  Sacrum    WOUND HISTORY:  Pt underwent an open cholecystectomy on  with Dr. Galvan.  Patient seen by wound team 04/10 for drainage to incision site, Aquacel AG was ordered.  Received new consult that incision is saturating through dressings. Incision had began to dehisce and wound vac was applied.        Pt underwent laminectomy on 23 to thoracic spine.    Pt transitioned to comfort care on 23.    Sacrum was assessed on 23 and was found to have an intact sacrum with small open area to the right side. On 23 a new image of the sacrum was obtained which showed what appeared to be a large sDTI like wound developing on the sacrum despite use of COSME and turns. On 23 pt was noted to have a large unstageable like wound to the sacrum and therefore wound team was asked to evaluate and make recommendations prior to pt transitioning home on hospice.     WOUND ASSESSMENT/LDA      Wound 23 Pressure Injury  Buttocks;Coccyx;Sacrum Skin Failure (Active)   Wound Image           05/30/23 1600   Site Assessment Black    Periwound Assessment Red    Margins Attached edges;Defined edges    Closure Open to air    Drainage Amount None    Treatments Cleansed;Site care    Wound Cleansing Foam Cleanser/Washcloth    Periwound Protectant Not Applicable    Dressing Cleansing/Solutions 3% Betadine    Dressing Options Open to Air    Dressing Change/Treatment Frequency Daily, and As Needed    NEXT Dressing Change/Treatment Date 05/31/23    NEXT Weekly Photo (Inpatient Only) 06/06/23    WOUND NURSE ONLY - Pressure Injury Stage Marcial's Ul - Wound is related to skin failure/dying process and is therefore a Marcial's Ulcer/Full thickness wound. Please see wound history and evaluation for further explanation    Non-staged Wound Description Full thickness    Wound Length (cm) 13 cm    Wound Width (cm) 10.5 cm    Wound Surface Area (cm^2) 136.5 cm^2    Shape Upside down heart    Wound Odor None    Exposed Structures FLEX    WOUND NURSE ONLY - Time Spent with Patient (mins) 60      Vascular:    REX:   No results found.    Lab Values:    Lab Results   Component Value Date/Time    WBC 22.6 (H) 05/24/2023 02:43 AM    RBC 3.64 (L) 05/24/2023 02:43 AM    HEMOGLOBIN 10.6 (L) 05/24/2023 02:43 AM    HEMATOCRIT 32.1 (L) 05/24/2023 02:43 AM    CREACTPROT 5.80 (H) 05/24/2023 02:43 AM    SEDRATEWES 66 (H) 04/23/2023 10:32 AM    HBA1C 6.6 (H) 01/03/2023 08:45 AM      Culture Results show:  Recent Results (from the past 720 hour(s))   CULTURE WOUND W/ GRAM STAIN    Collection Time: 05/09/23  9:50 AM    Specimen: Abdominal; Wound   Result Value Ref Range    Significant Indicator POS (POS)     Source WND     Site ABDOMINAL     Culture Result - (A)     Gram Stain Result Moderate WBCs.  Moderate Gram negative rods.       Culture Result (A)      Escherichia coli ESBL  Moderate growth  Extended Spectrum Beta-lactamase (ESBL) isolated.  ESBL's may be clinically  resistant to therapy with  Penicillins,Cephalosporins or Aztreonam despite  apparent in vitro susceptibility to some of these agents.  The patient requires contact isolation.  Please contact pharmacy or an Infectious Disease Specialist  if you have any questions about appropriate therapy.         Susceptibility    Escherichia coli esbl - DENICE     Ampicillin >16 Resistant mcg/mL     Ceftriaxone >32 Resistant mcg/mL     Cefazolin >16 Resistant mcg/mL     Ciprofloxacin >2 Resistant mcg/mL     Cefepime >16 Resistant mcg/mL     Cefuroxime >16 Resistant mcg/mL     Ampicillin/sulbactam >16/8 Resistant mcg/mL     Ertapenem <=0.5 Sensitive mcg/mL     Tobramycin >8 Resistant mcg/mL     Gentamicin >8 Resistant mcg/mL     Minocycline <=4 Sensitive mcg/mL     Moxifloxacin >4 Resistant mcg/mL     Pip/Tazobactam <=8 Sensitive mcg/mL     Trimeth/Sulfa <=0.5/9.5 Sensitive mcg/mL     Tigecycline <=2 Sensitive mcg/mL     Pain Level/Medicated: Tolerated without pain medications         INTERVENTIONS BY WOUND TEAM:  Chart and images reviewed. Discussed with bedside RN. All areas of concern (based on picture review, LDA review and discussion with bedside RN) have been thoroughly assessed. Documentation of areas based on significant findings. This RN in to assess patient. Performed standard wound care which includes appropriate positioning, dressing removal and non-selective debridement. Pictures and measurements obtained weekly if/when required.  Preparation for Dressing removal: BERTA  Non-selectively Debrided with:  moist warm washcloth and no rinse foam soap  Sharp debridement: NA  Shaina wound: Cleansed with moist warm washcloth and no rinse foam soap, Prepped with barrier paste  Primary Dressing: Betadine  Secondary (Outer) Dressing: BERTA    Advanced Wound Care Discharge Planning  Number of Clinicians necessary to complete wound care: 1  Is patient requiring IV pain medications for dressing changes: No  Length of time for dressing change  30 min. (This does not include chart review, pre-medication time, set up, clean up or time spent charting.)    Interdisciplinary consultation: Patient, Bedside RN (Sue), Wound RN (Zahira)    EVALUATION / RATIONALE FOR TREATMENT:  Most Recent Date:  05/30/23: Pt developed a aby's ulcer/skin failure to the sacrococcygeal area. Wound was determined to be related to skin failure related to rapid progression of wound in size and severity. Sacrum deteriorated from intact to large heart shaped wound in a matter of 1 weeks time. Pt has been on a COSME bed since arrival on 4/21/23. Pt has pillows in use for turns and is compliant with turns.  Pt also has had a complicated hospital course including sepsis, stage 4 kidney disease, complications post cholecystectomy requiring ICU and vasopressors as well as Bilateral lower extremity weakness related to hyperintense area in the thoracic spine requiring laminectomy on 5/16/23.  Pt has since decided he does not want any further aggressive treatment and was transitioned to comfort care on 05/24/23. Betadine applied to dry the wound and firm eschar creating a biological bandaid to prevent infection. Pt should continue to be turned and barrier paste should continue to be applied around the rectal area.     5/25/23: Pt transitioned to comfort care, thus wound vac discontinued. Transitioned patient to NS WTD dressing. Bedside nursing to change Qdaily or as patient tolerates. Wound team signing off.   05/22/23: Wound fully open, pockets opened up giving better visualization. Was able to debride large amount of slough from wound bed. Transitioned to regular wound vac for home. Pt likely to DC on Tuesday 5/23/23. Will need follow up appointment on Wednesday or Thursday.  05/18/23:  wounds cleaning up but no real healthy granulation tissue noted.  Continue with veraflo while IP to cleanse wound and assist in moisture balance.    05/16/23: Wound continues to have old clot present. Wound  continues to open the subcutaneous layer. Was able to better pack foam into the wound. Continued with POC.  05/14/23: Wound vac applied today to cleanse and debride while assisting in granulation tissue development. Wound team to change again on Tuesday and if wound looks ok will plan to stretch to Friday to get pt on MWF schedule.   05/13/23:  Dakins applied to chemically debride nonviable tissue, decrease bioburden and odor.  Likely hematoma under surface of incision, hydrogen to encourage breakdown of clot.  Spoke with Kenzie FALL (sx signed off 1 week ago) talk about placed vac previously.  Will allow dakins to debride and possibly place VAC on Monday.    5/10/23: Patient with small dehiscence along transverse abdominal incision. Small amount of purulence expressed following cleansing. Aquacel Ag Hydrofiber applied to manage bioburden, absorb exudate, and maintain a moist wound environment without laterally wicking exudate therefore reducing latonya-wound maceration.      Goals: Steady decrease in wound area and depth weekly.    WOUND TEAM PLAN OF CARE ([X] for frequency of wound follow up,):   Nursing to follow dressing orders written for wound care. Contact wound team if area fails to progress, deteriorates or with any questions/concerns if something comes up before next scheduled follow up (See below as to whether wound is following and frequency of wound follow up)  Dressing changes by wound team:                   Follow up 3 times weekly:                NPWT change 3 times weekly:     Follow up 1-2 times weekly:   X  Follow up Bi-Monthly:           Follow up Monthly (High Risk):                        Follow up as needed:     Other (explain):     NURSING PLAN OF CARE ORDERS (X):  Dressing changes: See Dressing Care orders: X  Skin care: See Skin Care orders:   RN Prevention Protocol:   Rectal tube care: See Rectal Tube Care orders:   Other orders:    RSKIN:   CURRENTLY IN PLACE (X), APPLIED THIS VISIT (A),  ORDERED (O):   Q shift Jeremy:  X  Q shift pressure point assessments:  X    Surface/Positioning   Standard Mattress/Trauma Bed          Low Airloss        X  ICU Low Airloss   Bariatric COSME     Waffle cushion        Waffle Overlay          Reposition q 2 hours    X  TAPs Turning system     Z Les Pillow     Offloading/Redistribution   Sacral Offloading Dressing (Silicone dressing)   X  Heel Offloading Dressing (Silicone dressing)       X  Heel float boots (Prevalon boot)             Float Heels off Bed with Pillows           Respiratory NA  Silicone O2 tubing         Gray Foam Ear protectors     Cannula fixation Device (Tender )          High flow offloading Clip    Elastic head band offloading device      Anchorfast                                                         Trach with Optifoam split foam             Containment/Moisture Prevention FLEX    Rectal tube or BMS    Purwick/Condom Cath        Florez Catheter    Barrier wipes           Barrier paste       Antifungal tx      Interdry        Mobilization FLEX      Up to chair        Ambulate      PT/OT      Nutrition       Dietician        Diabetes Education      PO   X  TF     TPN     NPO   # days     Other        Anticipated discharge plans: TBD  LTACH:        SNF/Rehab:                  Home Health Care:           Outpatient Wound Center:            Self/Family Care:        Other:                  Vac Discharge Needs:   Vac Discharge plan is purely a recommendation from wound team and not a requirement for discharge unless otherwise stated by physician.  Not Applicable Pt not on a wound vac:       Regular Vac while inpatient, alternative dressing at DC:        Regular Vac in use and continued at DC:            Reg. Vac w/ Skin Sub/Biologic in use. Will need to be changed 2x wkly:      Veraflo Vac while inpatient, ok to transition to Regular Vac on Discharge (Bedside RN to Clamp small instillation tubing at time of DC):    X       Veraflo Vac while  inpatient, would benefit from remaining on Veraflo Vac upon discharge:

## 2023-05-30 NOTE — DISCHARGE PLANNING
Case Management Discharge Planning    Admission Date: 4/21/2023  GMLOS: 9.6  ALOS: 39    6-Clicks ADL Score: 17  6-Clicks Mobility Score: 6  PT and/or OT Eval ordered: Yes  Post-acute Referrals Ordered: Yes  Post-acute Choice Obtained: Yes  Has referral(s) been sent to post-acute provider:  Yes      Anticipated Discharge Dispo: Discharge Disposition: D/T to hospice home (50)  Discharge Address: [address] [place] [address] [place]  [address]  Discharge Contact Phone Number: [phone]    DME Needed: Yes    DME Ordered: Yes    Action(s) Taken: Updated Provider/Nurse on Discharge Plan, Referral(s) sent, Acceptance Received, Transport Arranged , Authorization Received , and OTHER    Escalations Completed: None    Medically Clear: Yes    Next Steps:   Plan dc in AM to home on hospice.   Pt accepted by Tri with [facility]. She is coordinating pt's home care with pt's son Ld.   She is having [facility] deliver hospital bed and bedside table today between 3:00-5:30PM.   Karley hernandez [facility] caregivers arranged to meet pt at home in AM.   [facility] johnny transport arranged for 5/31/23 at 09:00am.  MD DELBERT, and son all aware.     Barriers to Discharge: None    Is the patient up for discharge tomorrow: Yes    Is transport arranged for discharge disposition: Yes

## 2023-05-30 NOTE — PROGRESS NOTES
[facility] Medicine Daily Progress Note    Date of Service  5/30/2023    Chief Complaint  Tyrone Cline is a 83 y.o. male admitted 4/21/2023 with colitis    Hospital Course    83-year-old male with a past medical history significant for CKD stage IV, diabetes mellitus, hypertension presented to ER on 4/21/2023 with a complaint of abdominal pain and lower extremity weakness.  On admission patient was found to have leukocytosis 19.  CT scan for abdomen showed colitis.  Patient was diagnosed with sepsis, IV antibiotics and fluids were started.  Also on admission CT scan showed possible colitis HIDA scan was done and confirmed acute cholecystitis, general surgeon was consulted and patient underwent laparoscopic cholecystectomy with conversion to open cholecystectomy 4/18 /2023 , unfortunately patient developedpostoperative hypotension, went to ICU and patient needed vasopressor and transferred back on 4/29 floor.  Patient finished course of antibiotics meropenem for ESBL coli from intra-abdominal infection..    Patient came with lower extremity edema and patient continues to have bilateral lower extremity weakness; MR-T spine expansile T2 hyperintense area in the thoracic spinal cord at the levels of T2, T3 and T4.  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  Neurosurgery planning for repeat MRI in 1 month with neurosurgery Dr. Lucas     Patient has a chronic kidney disease stage IV, patient was evaluated by nephrologist and no need for dialysis, his kidney function stable around 2.4    Patient is alert and oriented x3, Patient is able to make his own decision.  Goal of care was discussed with the patient and he does not want any more aggressive treatment and he wanted to be discharged on hospice.  Comfort care was initiated on 5/24.      interval Problem Update  5/30  Patient is comfortable. Long discussion with patient's son, Ruddy, about comfort  care, hospice and outpatient arrangements. Patient's primary pain is around the surgical sites in the RUQ.       I have discussed this patient's plan of care and discharge plan at IDT rounds today with Case Management, Nursing, Nursing leadership, and other members of the IDT team.    Consultants/Specialty  general surgery and neurosurgery    Code Status  Comfort Care/DNR    Disposition  Medically clear for discharge with hospice, will be going home on 5/31    Review of Systems  Review of Systems   Constitutional:  Positive for malaise/fatigue. Negative for chills, fever and weight loss.        Remains about the same today   HENT:  Negative for ear pain, hearing loss and tinnitus.    Eyes:  Negative for blurred vision.   Respiratory:  Negative for cough and hemoptysis.    Gastrointestinal:  Negative for abdominal pain, constipation, diarrhea, nausea and vomiting.   Genitourinary:  Negative for dysuria, frequency and urgency.   Musculoskeletal:  Negative for myalgias and neck pain.   Skin:  Negative for rash.   Neurological:  Positive for weakness. Negative for dizziness and speech change.        Physical Exam  Temp:  [36.5 °C (97.7 °F)-36.6 °C (97.8 °F)] 36.5 °C (97.7 °F)  Pulse:  [74-81] 74  Resp:  [17-18] 18  BP: (92-94)/(42-46) 92/42  SpO2:  [94 %-95 %] 94 %    Physical Exam  Constitutional:       General: He is not in acute distress.     Appearance: He is ill-appearing.      Comments: Does converse somewhat   Eyes:      Pupils: Pupils are equal, round, and reactive to light.   Cardiovascular:      Rate and Rhythm: Normal rate and regular rhythm.      Pulses: Normal pulses.      Heart sounds: Normal heart sounds. No murmur heard.  Abdominal:      General: Abdomen is flat.      Palpations: Abdomen is soft.      Tenderness: There is abdominal tenderness. There is no guarding.      Comments: Gauze in place, RUQ   Neurological:      General: No focal deficit present.      Mental Status: He is alert and oriented to  person, place, and time.      Cranial Nerves: No cranial nerve deficit.      Motor: Weakness present.         Fluids    Intake/Output Summary (Last 24 hours) at 5/30/2023 1549  Last data filed at 5/30/2023 0500  Gross per 24 hour   Intake --   Output 700 ml   Net -700 ml       Laboratory                              Imaging         Assessment/Plan  * Acute gangrenous cholecystitis- (present on admission)  Assessment & Plan  Found to have gangrenous cholecystitis s/p laprascopic converted open cholecystectomy 4/28/2023   -- Completed antibiotic treatment from 4/25- 4/ 29    Surgical course was complicated with postoperative wound infection    Comfort care measures are in place now    Leukocytosis- (present on admission)  Assessment & Plan  Comfort care    ACP (advance care planning)- (present on admission)  Assessment & Plan  On 5/23 patient was alert oriented x3, the goal of care and CODE STATUS was discussed in detail with the patient, answered all his questions, patient is a clear that he does not want any aggressive treatment and he wants to be discharged with hospice.  Time 31 minutes    On 5/24 again the plan of care was discussed with the patient and his son, discussed all treatment options including aggressive treatment versus comfort care the patient is a clear that he wants to be comfortable with no aggressive treatment or images.  The son agreed and comfort care was initiated on 5/24, time 25 minutes    Urinary retention- (present on admission)  Assessment & Plan  Florez by  urology.    Do not change Florez without urology consent    Abnormal MRI- (present on admission)  Assessment & Plan  MRI on admission showing T2/T3  hyperintense lesion in the right side of the thoracic spinal cord at the levels of T2 and T3 Mild contrast enhancement is seen. This lesion is suspicious for spinal cord neoplasm or possible transverse myelitis       S/p Laminectomy  Neurosurgery planning for repeat MRI in 1 month with  neurosurgery Dr. Donaldson  Patient wants to be on comfort care and refused any more images or aggressive treatment    Hypercalcemia- (present on admission)  Assessment & Plan  Comfort care    Hypomagnesemia- (present on admission)  Assessment & Plan  Replete as needed    Cholelithiasis with acute cholecystitis- (present on admission)  Assessment & Plan  S/p surgical removal, converted to open cholecystectomy  NAHUN drain-removed on 5/8/2023   -- s/p completion of iv abx  Worsening leukocytosis on 5/24, patient refused any procedures or images and he wants to be on comfort care  Pain is tolerable, but continue oral and IV opiates PRN, adjust to patient's comfort  Going home on hospice on 5/31, orders are in     Hypokalemia- (present on admission)  Assessment & Plan  Comfort care    Thrombocytopenia (HCC)- (present on admission)  Assessment & Plan  Comfort care    High anion gap metabolic acidosis- (present on admission)  Assessment & Plan  Resolved    Hyponatremia- (present on admission)  Assessment & Plan  Resolved    Weakness of both lower extremities- (present on admission)  Assessment & Plan  MR T spine expansile T2 hyperintense area in the thoracic spinal cord at the levels of T2, T3 and T4.    S/p Laminectomy  Patient refused any other procedures and he wanted to be in comfort care      Sepsis (HCC)- (present on admission)  Assessment & Plan  Completed 7 days course of antibiotic  Comfort care and patient does not want to repeat any images or labs    Colitis- (present on admission)  Assessment & Plan  Comfort care    Acute kidney injury superimposed on CKD (HCC)- (present on admission)  Assessment & Plan  Comfort care      Diabetic peripheral neuropathy (HCC)- (present on admission)  Assessment & Plan  Cymbalta, comfort care    Essential hypertension- (present on admission)  Assessment & Plan  Comfort care measures    Type 2 diabetes mellitus with kidney complication, without long-term current use of insulin (HCC)-  (present on admission)  Assessment & Plan  Comfort care measures   discontinue Lantus and oral medications      Hypercholesterolemia- (present on admission)  Assessment & Plan  Comfort care    BPH (benign prostatic hyperplasia)- (present on admission)  Assessment & Plan  Keep the Rivera  Comfort care         VTE prophylaxis: SCDs/TEDs  On lovenox  I have performed a physical exam and reviewed and updated ROS and Plan today (5/30/2023). In review of yesterday's note (5/29/2023), there are no changes except as documented above.        Interventions to be considered in all patients in order to minimize the risk of delirium.   -do not disturb patient (vitals or lab draws) between the hours of 10 PM and 6 AM.  -ideally the patient should not sleep during the day and we should avoid day time naps.   -up in chair for meals  -ambulate at least three times daily, as able  -watch for constipation  -timed voiding - ask patient is she would like to go to the bathroom q 2-3 hours, except during the do not disturb hours.   -remove all necessary lines (central lines, peripheral IVs, feeding tubes, rivera catheters)  -unless patient has shown harm to self or others I would recommend against use of restraints - either chemical or physical (antipsychotics)   -minimize polypharmacy, do not dose medication during sleep hours      The patient is medically cleared for discharge to home or a post-acute facility.  Anticipate discharge to: hospice

## 2023-05-30 NOTE — CARE PLAN
Problem: Knowledge Deficit - Standard  Goal: Patient and family/care givers will demonstrate understanding of plan of care, disease process/condition, diagnostic tests and medications  Outcome: Progressing     Problem: Pain - Standard  Goal: Alleviation of pain or a reduction in pain to the patient’s comfort goal  Outcome: Progressing     Problem: Skin Integrity  Goal: Skin integrity is maintained or improved  Outcome: Progressing     Problem: Mobility  Goal: Patient's capacity to carry out activities will improve  Outcome: Progressing     Problem: Communication  Goal: The ability to communicate needs accurately and effectively will improve  Outcome: Progressing     Problem: Hemodynamics  Goal: Patient's hemodynamics, fluid balance and neurologic status will be stable or improve  Outcome: Progressing     Problem: Respiratory  Goal: Patient will achieve/maintain optimum respiratory ventilation and gas exchange  Outcome: Progressing     Problem: Nutrition  Goal: Patient's nutritional and fluid intake will be adequate or improve  Outcome: Progressing     Problem: Urinary Elimination  Goal: Establish and maintain regular urinary output  Outcome: Progressing     Problem: Bowel Elimination  Goal: Establish and maintain regular bowel function  Outcome: Progressing     Problem: Self Care  Goal: Patient will have the ability to perform ADLs independently or with assistance (bathe, groom, dress, toilet and feed)  Outcome: Progressing     Problem: Wound/ / Incision Healing  Goal: Patient's wound/surgical incision will decrease in size and heals properly  Outcome: Progressing   The patient is Unstable - High likelihood or risk of patient condition declining or worsening    Shift Goals  Clinical Goals: comfort care, wound care  Patient Goals: comfort  Family Goals: maría elena    Progress made toward(s) clinical / shift goals:  pt. Accepted into Hospice, Q2 turns, incontinence care, pt. Kept comfortable/pain controlled, no  SOB.    Patient is not progressing towards the following goals:

## 2023-05-30 NOTE — DISCHARGE PLANNING
Received choice form at: [id number]  Agency/Facility name: [facility]   Referral sent per choice form at:  [phone]

## 2023-05-30 NOTE — DISCHARGE PLANNING
DC Transport Scheduled    Received request at: 5/30/2023 at 1433    Transport Company Scheduled:  [employer]    Scheduled Date: 5/31/2023  Scheduled Time: 0900    Destination: Home on hospice to [address] [place] [address] [place]IV[place]     Notified care team of scheduled transport via [employer].     If there are any changes needed to the DC transportation scheduled, please contact [facility] at ext. [phone] between the hours of 5200-4086 Mon-Fri. If outside those hours, contact the ED Case Manager at ext. [phone].

## 2023-05-31 ENCOUNTER — PHARMACY VISIT (OUTPATIENT)
Dept: PHARMACY | Facility: MEDICAL CENTER | Age: 83
End: 2023-05-31
Payer: COMMERCIAL

## 2023-05-31 VITALS
HEIGHT: 71 IN | SYSTOLIC BLOOD PRESSURE: 83 MMHG | OXYGEN SATURATION: 91 % | RESPIRATION RATE: 18 BRPM | BODY MASS INDEX: 27.78 KG/M2 | WEIGHT: 198.41 LBS | TEMPERATURE: 97.7 F | HEART RATE: 81 BPM | DIASTOLIC BLOOD PRESSURE: 45 MMHG

## 2023-05-31 PROCEDURE — 700102 HCHG RX REV CODE 250 W/ 637 OVERRIDE(OP): Performed by: NURSE PRACTITIONER

## 2023-05-31 PROCEDURE — 99239 HOSP IP/OBS DSCHRG MGMT >30: CPT | Performed by: INTERNAL MEDICINE

## 2023-05-31 PROCEDURE — A9270 NON-COVERED ITEM OR SERVICE: HCPCS | Performed by: SURGERY

## 2023-05-31 PROCEDURE — 700102 HCHG RX REV CODE 250 W/ 637 OVERRIDE(OP): Performed by: SURGERY

## 2023-05-31 PROCEDURE — A9270 NON-COVERED ITEM OR SERVICE: HCPCS | Performed by: NURSE PRACTITIONER

## 2023-05-31 PROCEDURE — RXMED WILLOW AMBULATORY MEDICATION CHARGE: Performed by: INTERNAL MEDICINE

## 2023-05-31 RX ORDER — OXYCODONE HYDROCHLORIDE 10 MG/1
10 TABLET ORAL
Qty: 28 TABLET | Refills: 0 | Status: SHIPPED | OUTPATIENT
Start: 2023-05-31 | End: 2023-06-05

## 2023-05-31 RX ADMIN — OXYCODONE HYDROCHLORIDE 10 MG: 10 TABLET ORAL at 08:51

## 2023-05-31 RX ADMIN — OXYCODONE AND ACETAMINOPHEN 1 TABLET: 10; 325 TABLET ORAL at 05:30

## 2023-05-31 NOTE — CARE PLAN
Problem: Knowledge Deficit - Standard  Goal: Patient and family/care givers will demonstrate understanding of plan of care, disease process/condition, diagnostic tests and medications  Outcome: Met     Problem: Pain - Standard  Goal: Alleviation of pain or a reduction in pain to the patient’s comfort goal  Outcome: Met     Problem: Skin Integrity  Goal: Skin integrity is maintained or improved  Outcome: Met     Problem: Mobility  Goal: Patient's capacity to carry out activities will improve  Outcome: Met     Problem: Communication  Goal: The ability to communicate needs accurately and effectively will improve  Outcome: Met     Problem: Hemodynamics  Goal: Patient's hemodynamics, fluid balance and neurologic status will be stable or improve  Outcome: Met     Problem: Respiratory  Goal: Patient will achieve/maintain optimum respiratory ventilation and gas exchange  Outcome: Met     Problem: Nutrition  Goal: Patient's nutritional and fluid intake will be adequate or improve  Outcome: Met     Problem: Urinary Elimination  Goal: Establish and maintain regular urinary output  Outcome: Met     Problem: Bowel Elimination  Goal: Establish and maintain regular bowel function  Outcome: Met     Problem: Self Care  Goal: Patient will have the ability to perform ADLs independently or with assistance (bathe, groom, dress, toilet and feed)  Outcome: Met     Problem: Wound/ / Incision Healing  Goal: Patient's wound/surgical incision will decrease in size and heals properly  Outcome: Met   The patient is Unstable - High likelihood or risk of patient condition declining or worsening    Shift Goals  Clinical Goals: comfort care, monitor wounds/wound care, pain control, maintain skin integrity  Patient Goals: comfort  Family Goals: comfort, wound care    Progress made toward(s) clinical / shift goals:  pain controlled, wound care completed this am, skin integrity maintained, incontinence care completed.  Pt. D/c'ing to home, goal of  pt.and family.    Patient is not progressing towards the following goals:

## 2023-05-31 NOTE — CARE PLAN
Problem: Knowledge Deficit - Standard  Goal: Patient and family/care givers will demonstrate understanding of plan of care, disease process/condition, diagnostic tests and medications  Outcome: Progressing     Problem: Pain - Standard  Goal: Alleviation of pain or a reduction in pain to the patient’s comfort goal  Outcome: Progressing     Problem: Skin Integrity  Goal: Skin integrity is maintained or improved  Outcome: Progressing     Problem: Mobility  Goal: Patient's capacity to carry out activities will improve  Outcome: Progressing     Problem: Communication  Goal: The ability to communicate needs accurately and effectively will improve  Outcome: Progressing     Problem: Hemodynamics  Goal: Patient's hemodynamics, fluid balance and neurologic status will be stable or improve  Outcome: Progressing     Problem: Respiratory  Goal: Patient will achieve/maintain optimum respiratory ventilation and gas exchange  Outcome: Progressing     Problem: Nutrition  Goal: Patient's nutritional and fluid intake will be adequate or improve  Outcome: Progressing     Problem: Urinary Elimination  Goal: Establish and maintain regular urinary output  Outcome: Progressing     Problem: Bowel Elimination  Goal: Establish and maintain regular bowel function  Outcome: Progressing     Problem: Self Care  Goal: Patient will have the ability to perform ADLs independently or with assistance (bathe, groom, dress, toilet and feed)  Outcome: Progressing     Problem: Wound/ / Incision Healing  Goal: Patient's wound/surgical incision will decrease in size and heals properly  Outcome: Progressing   The patient is Unstable - High likelihood or risk of patient condition declining or worsening    Shift Goals  Clinical Goals: comfort care, wound care, maintain skin integrity/incontinence care  Patient Goals: comfort  Family Goals: comfort, wound care    Progress made toward(s) clinical / shift goals:  comfort care, pain controlled with  repositioning/pain medication, maintain skin integrity/incontinence care, wound care.    Patient is not progressing towards the following goals:

## 2023-05-31 NOTE — DISCHARGE PLANNING
Case Management Discharge Planning    Admission Date: 4/21/2023  GMLOS: 9.6  ALOS: 40    6-Clicks ADL Score: 17  6-Clicks Mobility Score: 6  PT and/or OT Eval ordered: Yes  Post-acute Referrals Ordered: Yes  Post-acute Choice Obtained: Yes  Has referral(s) been sent to post-acute provider:  Yes      Anticipated Discharge Dispo: Discharge Disposition: D/T to hospice home (50)  Discharge Address: [address] [place] [address] [place]  [address]  Discharge Contact Phone Number: [phone]    DME Needed: No    Action(s) Taken: Updated Provider/Nurse on Discharge Plan and OTHER    Escalations Completed: None    Medically Clear: Yes    Next Steps:  Plan dc at 09:00 home on hospice.   Pt accepted by [facility].   Son Ld aware.   [employer] DME completed for hospital bed and bedside table.   Karley from [employer] [facility] arranged to meet pt at home.   [facility] transport arranged for 5/31/23 at 09:00am.  DC Packet with DNR RX given to DELBERT Rogers.   MD DELBERT, Hospice, and son all aware.        Barriers to Discharge: None    Is the patient up for discharge tomorrow: No

## 2023-06-01 NOTE — DISCHARGE SUMMARY
Discharge Summary    CHIEF COMPLAINT ON ADMISSION  Chief Complaint   Patient presents with    GLF     2 days ago. (-) thinners    Extremity Weakness     Chronic lower bilateral weakness.       Reason for Admission  EMS     Admission Date  4/21/2023    CODE STATUS  Prior    HPI & HOSPITAL COURSE  He had a very complicated hospital course which is summarized below:    83-year-old male with a past medical history significant for CKD stage IV, diabetes mellitus, hypertension presented to ER on 4/21/2023 with a complaint of abdominal pain and lower extremity weakness.  On admission patient was found to have leukocytosis 19.  CT scan for abdomen showed colitis.  Patient was diagnosed with sepsis, IV antibiotics and fluids were started.  Also on admission CT scan showed possible colitis HIDA scan was done and confirmed acute cholecystitis, general surgeon was consulted and patient underwent laparoscopic cholecystectomy with conversion to open cholecystectomy 4/18 /2023 , unfortunately patient developedpostoperative hypotension, went to ICU and patient needed vasopressor and transferred back on 4/29 floor.  Patient finished course of antibiotics meropenem for ESBL coli from intra-abdominal infection..     Patient came with lower extremity edema and patient continues to have bilateral lower extremity weakness; MR-T spine expansile T2 hyperintense area in the thoracic spinal cord at the levels of T2, T3 and T4.  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      Patient has a chronic kidney disease stage IV, patient was evaluated by nephrologist and no need for dialysis, his kidney function stable around 2.4    Given he continued to not improve and his surgical abdominal wounds worsened, in consultation with the patient and his family, he elected to go on hospice.     Therefore, he is discharged in guarded and stable condition to hospice.    The patient met 2-midnight  criteria for an inpatient stay at the time of discharge.    Discharge Date  5/31/2023    FOLLOW UP ITEMS POST DISCHARGE  F/U with Area hospice physicians    DISCHARGE DIAGNOSES  Principal Problem:    Acute gangrenous cholecystitis (POA: Yes)  Active Problems:    BPH (benign prostatic hyperplasia) (POA: Yes)    Hypercholesterolemia (Chronic) (POA: Yes)    Type 2 diabetes mellitus with kidney complication, without long-term current use of insulin (HCC) (POA: Yes)    Essential hypertension (Chronic) (POA: Yes)    Diabetic peripheral neuropathy (HCC) (Chronic) (POA: Yes)    Acute kidney injury superimposed on CKD (HCC) (POA: Yes)    Colitis (POA: Yes)    Sepsis (HCC) (POA: Yes)    Weakness of both lower extremities (POA: Yes)    Hyponatremia (POA: Yes)    High anion gap metabolic acidosis (POA: Yes)    Thrombocytopenia (HCC) (POA: Yes)    Hypokalemia (POA: Yes)    Cholelithiasis with acute cholecystitis (POA: Yes)    Hypomagnesemia (POA: Yes)    Hypercalcemia (POA: Yes)    Abnormal MRI (POA: Yes)    Urinary retention (POA: Yes)    ACP (advance care planning) (POA: Yes)    Leukocytosis (POA: Yes)  Resolved Problems:    Chronic kidney disease (CKD), stage IV (severe) (HCC) (Chronic) (POA: Yes)    On mechanically assisted ventilation (HCC) (POA: No)    Postprocedural hypotension (POA: No)      FOLLOW UP  Future Appointments   Date Time Provider Department Center   9/13/2023  2:20 PM Michael J Bloch, M.D. [facility] None     [facility]  [address]  [facility] [address]  [phone]    Schedule an appointment as soon as possible for a visit in 1 week(s)  [facility]. Follow up open cholecystectomy.  No follow up imaging or labs ordered    DASHA Davis  [address] [facility] [id number] [phone]    Schedule an appointment as soon as possible for a visit in 1 week(s)      Mike Salguero D.O.  [address] [facility] [address]  [facility] [id number] [phone]          [facility] [place] [address]  "[facility] [address]  [facility] [address] [phone]          MEDICATIONS ON DISCHARGE     Medication List        START taking these medications        Instructions   oxyCODONE immediate release 10 MG immediate release tablet  Commonly known as: ROXICODONE   Take 1 Tablet by mouth every 3 hours as needed for Moderate Pain or Severe Pain for up to 5 days.  Dose: 10 mg            CONTINUE taking these medications        Instructions   DULoxetine 30 MG Cpep  Commonly known as: CYMBALTA   Take 1 Capsule by mouth every day.  Dose: 30 mg            STOP taking these medications      acetaminophen 500 MG Tabs  Commonly known as: TYLENOL     allopurinol 100 MG Tabs  Commonly known as: ZYLOPRIM     atorvastatin 10 MG Tabs  Commonly known as: LIPITOR     B COMPLEX 1 PO     carvedilol 12.5 MG Tabs  Commonly known as: COREG     famotidine 20 MG Tabs  Commonly known as: PEPCID     Kerendia 10 MG Tabs  Generic drug: Finerenone     OCUVITE ADULT 50+ PO     olmesartan 20 MG Tabs  Commonly known as: BENICAR     Ozempic (0.25 or 0.5 MG/DOSE) 2 MG/1.5ML Sopn  Generic drug: Semaglutide(0.25 or 0.5MG/DOS)     SITagliptin 25 MG Tabs  Commonly known as: JANUVIA     Testosterone 20.25 MG/ACT (1.62%) Gel  Commonly known as: AndroGel     Vitamin D3 2000 UNIT Caps     zolpidem 10 MG Tabs  Commonly known as: AMBIEN              Allergies  Allergies   Allergen Reactions    Ether Unspecified     \"Violently sick\"       DIET  Oral satisfaction    ACTIVITY  As tolerated.  Weight bearing as tolerated    CONSULTATIONS  ID, IR, neurosurgery, critical care, hospice, Urology, Rehab, neurology, general surgery    PROCEDURES  As noted above    DX-SPINE-ANY ONE VIEW   Final Result      Digitized intraoperative radiograph is submitted for review. This examination is not for diagnostic purpose but for guidance during a surgical procedure. Please see the patient's chart for full procedural details.         INTERPRETING LOCATION: [address] [place] [address] " [place], [address]      DX-PORTABLE FLUORO > 1 HOUR   Final Result      Portable fluoroscopy utilized for 6 seconds.      INTERPRETING LOCATION: [address] [place], [address]      MR-THORACIC SPINE-WITH & W/O   Final Result      Compared with the previous MRI ,again noted is expansile lesion in the thoracic spinal cord at the levels of T2, T3 and T4. There is prominent right dorsal subarachnoid space at the level of T3 with flattening of the spinal cord. There is mild contrast    enhancement noted in the expanded portion of the spinal cord. 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 Other differential diagnosis includes transverse myelitis However the possibility of right-sided dorsal arachnoid cyst/arachnoid    web at the level of T3 causing spinal cord edema cannot be entirely ruled out. Despite multiple MRs, it is difficult to characterize the tumor. Therefore if there is any neurosurgical planning, CT myelogram is recommended for further characterization.      IR-US GUIDED PIV   Final Result    Ultrasound-guided PERIPHERAL IV INSERTION performed by    qualified nursing staff as above.      MR-THORACIC SPINE-WITH & W/O   Final Result      1.  Limited study due to the motion artifact.   2.  When compared with the previous MRI again noted is expansile T2 hyperintense area in the thoracic spinal cord at the levels of T2, T3 and T4. There is also prominent dorsal right-sided subarachnoid space at this level. The differential diagnosis    includes intramedullary spinal cord tumor and extramedullary cyst/web with cord compression and edema.   3.  Pre and postcontrast MR examination of the thoracic spine is recommended with contrast under General anesthesia.   4.  1 -2 mm  T2-weighted sequences from T2 to T6 with smaller field of view is recommended for further characterization.      MR-THORACIC SPINE-WITH & W/O   Final Result         Stable appearance of expansile nonenhancing signal abnormality involving the  upper thoracic cord between T1 and T4. Also noted is a small extra-axial collection/web along the right posterolateral spinal canal at T3-4 that deforms the cord and displaces    it anteriorly and to the left.   This could represent an Arachnoid web or arachnoid cyst with extensive mass effect and secondary edema of the cord.   Recommend additional thin section T2 and post contrast images through T1-T6 to better assess the abnormality.      MR-BRAIN-WITH & W/O   Final Result         No acute intracranial process.      Age-related volume loss and chronic microvascular ischemic changes.         DX-CHEST-PORTABLE (1 VIEW)   Final Result         1.  Pulmonary edema and/or infiltrates are identified, which are stable since the prior exam.   2.  Cardiomegaly   3.  Atherosclerosis      DX-CHEST-PORTABLE (1 VIEW)   Final Result         1.  Pulmonary edema and/or infiltrates are identified, which are stable since the prior exam.   2.  Left PICC line tip terminates in the left axilla, stable since prior study.      DX-CHEST-PORTABLE (1 VIEW)   Final Result      1.  No significant change.   2.  Possible left PICC line with tip projecting at the axillary vein.      DX-CHEST-PORTABLE (1 VIEW)   Final Result      1.  Mildly improved pulmonary opacities.   2.  Possible left PICC line with tip projecting at the axillary vein.   3.  Likely trace right pleural effusion.      DX-CHEST-LIMITED (1 VIEW)   Final Result      1.  Placement of endotracheal tube with tip projecting over the mid trachea      2.  Left arm catheter present which is presumably venous in projects in the expected position of the left axillary vein      3.  Enlarged cardiac silhouette      NM-HEPATOBILIARY SCAN   Final Result      Hepatobiliary scan findings suspicious for acute cholecystitis.      MR-THORACIC SPINE-WITH & W/O   Final Result      1.  There is abnormal expansile T2 hyperintense lesion in the right side of the thoracic spinal cord at the levels of T2  and T3 Mild contrast enhancement is seen. This lesion is suspicious for spinal cord neoplasm. Other remote differential diagnosis    includes transverse myelitis.   2.  Exaggerated thoracic kyphosis.   3.  Thoracic dextroscoliosis.   4.  Minimal degenerative changes.   5.  Right pleural effusion.      MR-LUMBAR SPINE-WITH & W/O   Final Result      1.  Multifocal degenerative disease in the lumbar spine as described above.   2.  Moderate central canal and severe lateral recess stenosis at the level of L4-5. The exiting bilateral L5 nerve roots might have been impinged at the lateral recess.      MR-CERVICAL SPINE-WITH & W/O   Final Result      1.  There is abnormal expansile intramedullary T2 signal intensity lesion in the right cerebellum. Thoracic spinal cord at the level of T2 on T3. Mild diffuse contrast enhancement is seen. This finding is suspicious for spinal cord neoplasm. The other    less likely differential diagnosis includes transverse myelitis. Follow-up is recommended after 4 weeks.   2.  Mild degenerative disease in the cervical spine as described above.   3.  There is no evidence of infection in the cervical spine.      US-RUQ   Final Result         1.  Acute cholecystitis.   2.  Atrophic right kidney.      DX-CHEST-LIMITED (1 VIEW)   Final Result      Perihilar interstitial edema and basilar atelectasis and/or consolidation. Underlying infection is possible.      MR-LUMBAR SPINE-W/O   Final Result      1.  Lower lumbar spine predominant degenerative changes as described in detail above, progressed from 2010 MRI.   2.  Edema at the L4-L5 disc space and L5 superior endplate likely represents degenerative changes. Less likely this could represent a low-grade or early discitis osteomyelitis. Correlate for evidence of infection.   3.  Acute appearing Schmorl's node at L3-L4, which can be a source of pain.      US-RENAL   Final Result      1.  Atrophic kidneys. Echogenic bilateral renal parenchyma could  relate to medical renal disease.      2.  No hydronephrosis. No renal calculus.      DX-CHEST-PORTABLE (1 VIEW)   Final Result      No acute cardiac or pulmonary abnormalities are identified.      CT-ABDOMEN-PELVIS W/O   Final Result      1.  Findings suspicious for focal colitis at the hepatic flexure, less likely cholecystitis or duodenitis with secondary involvement of the colon. Infectious, inflammatory and ischemic etiologies are considerations. Underlying mass is not excluded.   2.  Cholelithiasis with distention of the gallbladder   3.  BILATERAL renal atrophy   4.  Subcentimeter LEFT adrenal myelolipoma   5.  12 mm RIGHT adrenal nodule likely an adenoma absent a history of cancer   6.  Subcentimeter RIGHT hepatic lesion, likely a cyst absent a history of cancer   7.  Atherosclerosis   8.  Colonic diverticulosis      CT-HEAD W/O   Final Result      1.  Cerebral atrophy.      2.  White matter lucencies most consistent with small vessel ischemic change versus demyelination or gliosis.      3.  Otherwise, Head CT without contrast with no acute findings. No evidence of acute cerebral infarction, hemorrhage or mass lesion.               LABORATORY  Lab Results   Component Value Date    SODIUM 134 (L) 05/24/2023    POTASSIUM 4.8 05/24/2023    CHLORIDE 93 (L) 05/24/2023    CO2 28 05/24/2023    GLUCOSE 142 (H) 05/24/2023    BUN 99 (H) 05/24/2023    CREATININE 2.61 (H) 05/24/2023    CREATININE 1.4 02/06/2009        Lab Results   Component Value Date    WBC 22.6 (H) 05/24/2023    HEMOGLOBIN 10.6 (L) 05/24/2023    HEMATOCRIT 32.1 (L) 05/24/2023    PLATELETCT 312 05/24/2023        Total time of the discharge process exceeds 43 minutes.

## 2023-09-06 NOTE — PATIENT INSTRUCTIONS
Stop Benicar /HCTZ  New Benicar 20 mg daily and if BP < 110/60 reduce Benicar dose to 10 mg daily  Keep well hydrated  Low salt diet  
Detail Level: Simple

## (undated) DEVICE — GLOVE BIOGEL SZ 6.5 SURGICAL PF LTX (50PR/BX 4BX/CA)

## (undated) DEVICE — CANNULA O2 COMFORT SOFT EAR ADULT 7 FT TUBING (50/CA)

## (undated) DEVICE — DRAPE 36X28IN RAD CARM BND BG - (25/CA) O

## (undated) DEVICE — SYRINGE DISP. 50CC LS - (40/BX)

## (undated) DEVICE — HEADREST PRONEVIEW LARGE - (10/CA)

## (undated) DEVICE — COVER LIGHT HANDLE ALC PLUS DISP (18EA/BX)

## (undated) DEVICE — SENSOR OXIMETER ADULT SPO2 RD SET (20EA/BX)

## (undated) DEVICE — FORCEP RADIAL JAW 4 STANDARD CAPACITY W/NEEDLE 240CM (40EA/BX)

## (undated) DEVICE — GLOVE BIOGEL SZ 7 SURGICAL PF LTX - (50PR/BX 4BX/CA)

## (undated) DEVICE — SUTURE 2-0 VICRYL PLUS CT-1 - 8 X 18 INCH(12/BX)

## (undated) DEVICE — LIGHT SOURCE MIS 12FT

## (undated) DEVICE — ELECTRODE 850 FOAM ADHESIVE - HYDROGEL RADIOTRNSPRNT (50/PK)

## (undated) DEVICE — SUTURE 4-0 MONOCRYL PLUS PS-2 - 27 INCH (36/BX)

## (undated) DEVICE — SWAB CULTURE AMIES ESWAB (50EA/PK)

## (undated) DEVICE — TRAY CATHETER FOLEY URINE METER W/STATLOCK 350ML (10EA/CA)

## (undated) DEVICE — TUBE E-T HI-LO CUFF 7.0MM (10EA/PK)

## (undated) DEVICE — SUTURE GENERAL

## (undated) DEVICE — STAPLER SKIN DISP - (6/BX 10BX/CA) VISISTAT

## (undated) DEVICE — FILM CASSETTE ENDO

## (undated) DEVICE — SOD. CHL 10CC SYRINGE PREFILL - W/10 CC (30/BX)

## (undated) DEVICE — TUBE CONNECTING SUCTION - CLEAR PLASTIC STERILE 72 IN (50EA/CA)

## (undated) DEVICE — TOWEL STOP TIMEOUT SAFETY FLAG (40EA/CA)

## (undated) DEVICE — GLOVE SZ 6 BIOGEL PI MICRO - PF LF (50PR/BX 4BX/CA)

## (undated) DEVICE — SUTURE 4-0 NUROLON CR/8 TF - (12/BX) ETHICON

## (undated) DEVICE — TUBE E-T HI-LO CUFF 8.0MM (10EA/PK)

## (undated) DEVICE — PACK LAP CHOLE OR - (2EA/CA)

## (undated) DEVICE — BLANKET WARMING LOWER BODY (10EA/CA)

## (undated) DEVICE — MASK WITH FACE SHIELD (25/BX 4BX/CA)

## (undated) DEVICE — CLOSURE SKIN STRIP 1/2 X 4 IN - (STERI STRIP) (50/BX 4BX/CA)

## (undated) DEVICE — GLOVE BIOGEL PI INDICATOR SZ 6.5 SURGICAL PF LF - (50/BX 4BX/CA)

## (undated) DEVICE — TOOL MR8 14CM MATCH HD SYM-TRI 3MM DIAMETER (1/EA)

## (undated) DEVICE — COVER MAYO STAND X-LG - (22EA/CA)

## (undated) DEVICE — RESERVOIR SUCTION 100 CC - SILICONE (20EA/CA)

## (undated) DEVICE — HEMOSTAT ABSORBABLE POWDER SURGICEL 3G (5EA/BX)

## (undated) DEVICE — TROCAR Z THREAD11MM OPTICAL - NON BLADED(6/BX)

## (undated) DEVICE — PATTIES SURG X-RAYCOTTONOID - 1/2 X 3 IN (200/CA)

## (undated) DEVICE — SUCTION INSTRUMENT YANKAUER BULBOUS TIP W/O VENT (50EA/CA)

## (undated) DEVICE — PACK JACKSON TABLE KIT W/OUT - HR (6EA/CA)

## (undated) DEVICE — DERMABOND ADVANCED - (12EA/BX)

## (undated) DEVICE — SENSOR SPO2 ADULT LNCS ADTX (20/BX) ORDER ITEM #19593

## (undated) DEVICE — DEVICE HEMOSTATIC CLIPPING RESOLUTION 360 DEGREES (20EA/BX)

## (undated) DEVICE — MASK OXYGEN VNYL ADLT MED CONC WITH 7 FOOT TUBING  - (50EA/CA)

## (undated) DEVICE — ELECTRODE DUAL RETURN W/ CORD - (50/PK)

## (undated) DEVICE — CATHETER IV 14 GA X 2 ---SURG.& SDS ONLY---(200EA/CA)

## (undated) DEVICE — ARMREST CRADLE FOAM - (2PR/PK 12PR/CA)

## (undated) DEVICE — WATER IRRIGATION STERILE 1000ML (12EA/CA)

## (undated) DEVICE — KIT SURGIFLO W/OUT THROMBIN - (6EA/CA)

## (undated) DEVICE — SLEEVE, VASO, THIGH, MED

## (undated) DEVICE — GOWN WARMING STANDARD FLEX - (30/CA)

## (undated) DEVICE — KIT ANESTHESIA W/CIRCUIT & 3/LT BAG W/FILTER (20EA/CA)

## (undated) DEVICE — SYRINGE NDL SAFETY 12 CC 20 GA X 1-1/2 (50/BX 4BX/CA)

## (undated) DEVICE — BAG RETRIEVAL 10ML (10EA/BX)

## (undated) DEVICE — TUBING C&T SET FLYING LEADS DRAIN TUBING (10EA/BX)

## (undated) DEVICE — SET EXTENSION WITH 2 PORTS (48EA/CA) ***PART #2C8610 IS A SUBSTITUTE*****

## (undated) DEVICE — SODIUM CHL IRRIGATION 0.9% 1000ML (12EA/CA)

## (undated) DEVICE — CHLORAPREP 26 ML APPLICATOR - ORANGE TINT(25/CA)

## (undated) DEVICE — BOVIE  BLADE 6 EXTENDED - (50/PK)

## (undated) DEVICE — CANISTER SUCTION 3000ML MECHANICAL FILTER AUTO SHUTOFF MEDI-VAC NONSTERILE LF DISP  (40EA/CA)

## (undated) DEVICE — PAD LAP STERILE 18 X 18 - (5/PK 40PK/CA)

## (undated) DEVICE — CLIP MED INTNL HRZN TI ESCP - (25/BX)

## (undated) DEVICE — LACTATED RINGERS INJ 1000 ML - (14EA/CA 60CA/PF)

## (undated) DEVICE — SUTURE 0 SILK CT-1 (36PK/BX)

## (undated) DEVICE — MANIFOLD NEPTUNE 1 PORT (20/PK)

## (undated) DEVICE — SET LEADWIRE 5 LEAD BEDSIDE DISPOSABLE ECG (1SET OF 5/EA)

## (undated) DEVICE — TROCAR LAPSCP 100MM 12MM NTHRD - (6/BX)

## (undated) DEVICE — DRAPE STRLE REG TOWEL 18X24 - (10/BX 4BX/CA)"

## (undated) DEVICE — SUTURE 3-0 ETHILON FS-1 - (36/BX) 30 INCH

## (undated) DEVICE — BITE BLOCK ADULT 60FR (100EA/CA)

## (undated) DEVICE — BLADE SURGICAL CLIPPER - (50EA/CA)

## (undated) DEVICE — TUBING CLEARLINK DUO-VENT - C-FLO (48EA/CA)

## (undated) DEVICE — GLOVE BIOGEL INDICATOR SZ 7.5 SURGICAL PF LTX - (50PR/BX 4BX/CA)

## (undated) DEVICE — SET TUBING PNEUMOCLEAR HIGH FLOW SMOKE EVACUATION (10EA/BX)

## (undated) DEVICE — SPONGE GAUZESTER 4 X 4 4PLY - (128PK/CA)

## (undated) DEVICE — PAD BABY LAP 4X18 W/O - RINGS PREWASHED 5/PK 40PK/CS

## (undated) DEVICE — CANNULA W/SEAL 5X100 Z-THRE - ADED KII (12/BX)

## (undated) DEVICE — TROCAR 5X100 NON BLADED Z-TH - READ KII (6/BX)

## (undated) DEVICE — TUBE CONNECT SUCTION CLEAR 120 X 1/4" (50EA/CA)"

## (undated) DEVICE — TRAY MULTI-LUMEN 7FR PRESSURE W/MAX BARRIER AND BIOPATCH - (5/CA)

## (undated) DEVICE — LACTATED RINGERS INJ. 500 ML - (24EA/CA)

## (undated) DEVICE — GLOVE BIOGEL INDICATOR SZ 7SURGICAL PF LTX - (50/BX 4BX/CA)

## (undated) DEVICE — DRAPE LAPAROTOMY T SHEET - (12EA/CA)

## (undated) DEVICE — SUTURE 0 VICRYL PLUS UR-6 - 27 INCH (36/BX)

## (undated) DEVICE — GLOVE BIOGEL SZ 8.5 SURGICAL PF LTX - (50PR/BX 4BX/CA)

## (undated) DEVICE — CANISTER SUCTION RIGID RED 1500CC (40EA/CA)

## (undated) DEVICE — SUTURE 1 PDS II PLUS TP-1 - (12PK/BX)

## (undated) DEVICE — TUBING O2 7FT TIP SMTH BORE - (50/CA)

## (undated) DEVICE — KIT EVACUATER 3 SPRING PVC LF 1/8 DRAIN SIZE (10EA/CA)"

## (undated) DEVICE — KIT CUSTOM PROCEDURE SINGLE FOR ENDO  (15/CA)

## (undated) DEVICE — MIDAS LUBRICATOR DIFFUSER PACK (4EA/CA)

## (undated) DEVICE — PACK NEURO - (2EA/CA)

## (undated) DEVICE — DRESSING TRANSPARENT FILM TEGADERM 4 X 4.75" (50EA/BX)"

## (undated) DEVICE — SYRINGE NON SAFETY 10 CC 20 GA X 1-1/2 IN (100/BX 4BX/CA)

## (undated) DEVICE — CONTAINER, SPECIMEN, STERILE

## (undated) DEVICE — SUTURE 0 VICRYL PLUS CT-1 - 8 X 18 INCH (12/BX)

## (undated) DEVICE — BOVIE BLADE COATED - (50/PK)

## (undated) DEVICE — GOWN SURGEONS LARGE - (32/CA)

## (undated) DEVICE — GLOVE BIOGEL INDICATOR SZ 8.5 SURGICAL PF LTX - (50/BX 4BX/CA)